# Patient Record
Sex: FEMALE | Race: BLACK OR AFRICAN AMERICAN | NOT HISPANIC OR LATINO | Employment: OTHER | URBAN - METROPOLITAN AREA
[De-identification: names, ages, dates, MRNs, and addresses within clinical notes are randomized per-mention and may not be internally consistent; named-entity substitution may affect disease eponyms.]

---

## 2017-08-13 ENCOUNTER — HOSPITAL ENCOUNTER (EMERGENCY)
Facility: HOSPITAL | Age: 72
Discharge: HOME | End: 2017-08-13
Attending: EMERGENCY MEDICINE
Payer: MEDICARE

## 2017-08-13 VITALS
TEMPERATURE: 98.3 F | HEART RATE: 66 BPM | RESPIRATION RATE: 18 BRPM | HEIGHT: 62 IN | SYSTOLIC BLOOD PRESSURE: 188 MMHG | DIASTOLIC BLOOD PRESSURE: 79 MMHG | OXYGEN SATURATION: 97 % | BODY MASS INDEX: 37.57 KG/M2 | WEIGHT: 204.15 LBS

## 2017-08-13 DIAGNOSIS — I10 ASYMPTOMATIC HYPERTENSION: Primary | ICD-10-CM

## 2017-08-13 PROCEDURE — 93005 ELECTROCARDIOGRAM TRACING: CPT

## 2017-08-13 PROCEDURE — 99283 EMERGENCY DEPT VISIT LOW MDM: CPT

## 2017-08-13 RX ORDER — TRAVOPROST OPHTHALMIC SOLUTION 0.04 MG/ML
1 SOLUTION OPHTHALMIC
COMMUNITY
End: 2022-02-27 | Stop reason: ALTCHOICE

## 2017-08-13 RX ORDER — ALBUTEROL SULFATE 90 UG/1
1 AEROSOL, METERED RESPIRATORY (INHALATION) AS NEEDED
COMMUNITY
End: 2019-09-30 | Stop reason: SDUPTHER

## 2017-08-13 RX ORDER — ENALAPRIL MALEATE 10 MG/1
10 TABLET ORAL 2 TIMES DAILY
COMMUNITY
Start: 2005-10-07 | End: 2019-09-30 | Stop reason: SDUPTHER

## 2017-08-13 RX ORDER — ASPIRIN 81 MG/1
81 TABLET, CHEWABLE ORAL DAILY
COMMUNITY
End: 2020-03-23 | Stop reason: SDUPTHER

## 2017-08-13 RX ORDER — LIDOCAINE HYDROCHLORIDE 30 MG/G
1 CREAM TOPICAL AS NEEDED
COMMUNITY
Start: 2006-10-05 | End: 2019-09-27 | Stop reason: ALTCHOICE

## 2017-08-13 RX ORDER — PREGABALIN 150 MG/1
150 CAPSULE ORAL DAILY
COMMUNITY
Start: 2007-05-15 | End: 2019-09-30 | Stop reason: SDUPTHER

## 2017-08-14 LAB
ATRIAL RATE: 59 BPM
P AXIS: 69 DEGREES
PR INTERVAL: 166 MS
QRS AXIS: -35 DEGREES
QRSD INTERVAL: 72 MS
QT INTERVAL: 418 MS
QTC INTERVAL: 413 MS
T WAVE AXIS: 55 DEGREES
VENTRICULAR RATE: 59 BPM

## 2017-09-13 ENCOUNTER — APPOINTMENT (OUTPATIENT)
Dept: LAB | Facility: HOSPITAL | Age: 72
End: 2017-09-13
Attending: UROLOGY
Payer: MEDICARE

## 2017-09-13 ENCOUNTER — TRANSCRIBE ORDERS (OUTPATIENT)
Dept: ADMINISTRATIVE | Facility: HOSPITAL | Age: 72
End: 2017-09-13

## 2017-09-13 ENCOUNTER — ALLSCRIPTS OFFICE VISIT (OUTPATIENT)
Dept: OTHER | Facility: OTHER | Age: 72
End: 2017-09-13

## 2017-09-13 DIAGNOSIS — N39.0 URINARY TRACT INFECTION: ICD-10-CM

## 2017-09-13 DIAGNOSIS — N20.0 CALCULUS OF KIDNEY: ICD-10-CM

## 2017-09-13 DIAGNOSIS — N39.0 URINARY TRACT INFECTION, SITE NOT SPECIFIED: Primary | ICD-10-CM

## 2017-09-13 LAB
BILIRUB UR QL STRIP: NORMAL
CLARITY UR: NORMAL
COLOR UR: YELLOW
GLUCOSE (HISTORICAL): NORMAL
HGB UR QL STRIP.AUTO: NORMAL
KETONES UR STRIP-MCNC: NORMAL MG/DL
LEUKOCYTE ESTERASE UR QL STRIP: NORMAL
NITRITE UR QL STRIP: NORMAL
PH UR STRIP.AUTO: 6 [PH]
PROT UR STRIP-MCNC: NORMAL MG/DL
SP GR UR STRIP.AUTO: 1
UROBILINOGEN UR QL STRIP.AUTO: NORMAL

## 2017-09-13 PROCEDURE — 87086 URINE CULTURE/COLONY COUNT: CPT

## 2017-09-14 LAB — BACTERIA UR CULT: NORMAL

## 2017-11-13 ENCOUNTER — HOSPITAL ENCOUNTER (OUTPATIENT)
Dept: ULTRASOUND IMAGING | Facility: HOSPITAL | Age: 72
Discharge: HOME | End: 2017-11-13
Attending: UROLOGY
Payer: MEDICARE

## 2017-11-13 DIAGNOSIS — N39.0 URINARY TRACT INFECTION: ICD-10-CM

## 2017-11-13 PROCEDURE — 51798 US URINE CAPACITY MEASURE: CPT

## 2017-12-05 ENCOUNTER — GENERIC CONVERSION - ENCOUNTER (OUTPATIENT)
Dept: OTHER | Facility: OTHER | Age: 72
End: 2017-12-05

## 2017-12-15 ENCOUNTER — ALLSCRIPTS OFFICE VISIT (OUTPATIENT)
Dept: OTHER | Facility: OTHER | Age: 72
End: 2017-12-15

## 2017-12-16 NOTE — PROGRESS NOTES
Assessment  1  Nephrolithiasis (592 0) (N20 0)   2  Overactive bladder (596 51) (N32 81)   3  Mixed incontinence urge and stress (788 33) (N39 46)    Plan   Overactive bladder    · Myrbetriq 25 MG Oral Tablet Extended Release 24 Hour; Take 1 tablet daily   Rx By: Paola Mayer; Dispense: 30 Days ; #:30 Tablet Extended Release 24 Hour; Refill: 5;For: Overactive bladder; IMELDA = N; Faxed To: [facility] #[id number]  · Measure Post Void Residual - POC; Status:Active - Perform Order; Requestedfor:34Fas0819;    Perform: In Office; Due:99Wfj1764; Ordered;For:Overactive bladder; Ordered By:Hayley Hurst;  * XR ABDOMEN 1 VIEW KUB; Status:Hold For - Exact Date,Retrospective Authorization; Requested for:Before 02Apr2018; [facility]; Due:48Lbo2772; Last Updated By:Oskar Hurst; 12/15/2017 11:18:21 AM;Ordered;   For:Nephrolithiasis; Ordered By:Oskar Hurst; Follow-up visit in 3 months Evaluation and Treatment  Follow-up  Status: Hold For - Scheduling,Retrospective Authorization  Requested for: 04IBL1210 Ordered; For: Overactive bladder;  Ordered By: Paola Mayer  Performed:   Due: 21JAI4764; Last Updated By: Paola Mayer; 12/15/2017 11:18:21 AM   Discussion/Summary  Discussion Summary:   1  Asymptomatic bacteruria with occasional urinary tract infection- Patient is currently asymptomatic of a urinary tract infection at this time  She was instructed to contact us should she become symptomatic of urinary infection and a urine culture will be obtained at that time so she can be treated appropriately  2  Nephrolithiasis- reviewed with the patient that since she do not have copies of her previous imaging nor did she obtain her KUB, I do not have a great idea of her current stone burden  According to her ultrasound there is some small intrarenal calculi within the left kidney that is nonobstructing at this time   I would not recommend intervention currently due to with results of her ultrasound  - Patient will obtain a KUB before her next follow-up  - She is aware to contact us with any signs and symptoms of a passing stone  3  OAB, mixed stress/urge incontinence- Reviewed in detail with the patient the etiology of both stress and urge incontinence  We've reviewed treatment options for both of these types of incontinence  - Pain emphasized with the patient the importance of proper hydration and tight glycemic control in order to minimize bladder irritation - I reviewed Kegel exercises with the patient  She is aware how to use these exercises during episodes of stress in order to help prevent incontinence  - We discussed treatment of overactivity and urge incontinence with anticholinergic versus beta 3  Patient will be initiated on Myrbetriq 25 mg for her her OAB symptoms  Side effects profile reviewed  - Patient will follow-up in about 3 months with a PVR at that time for symptom reassessment  Chief Complaint  Chief Complaint Free Text Note Form: UTIs, Nephrolithiasis      History of Present Illness  HPI: Marie Woodward is a 24-year-old female patient of Dr Aaron Santillan with a history of urinary tract infection and nephrolithiasis presenting for follow-up  Patient had previously been admitted for sepsis due to urinary source in July at a [facility] within [place]  In August 2017 patient had a low colony count multidrug-resistant Pseudomonas urinary culture  Her symptoms at that time or more so overactive in nature with urgency and frequency  Patientâs main lower urinary tract complaints include urinary urgency and urge incontinence wearing approximately 1 panty liner a day as a precautionary measure  Nocturia of approximately 2-3 times nightly  Her last appointment  She was reviewed to minimize her bladder irritant consumption  She had a recent ultrasound of her kidney and bladder revealing a left intrarenal calculus  There was no hydronephrosis   He was found to have a 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  Patient had no significant residual urine  Patient unfortunately did not go for her KUB nor did she bring copies of her imaging from [place]  Patient denies any urinary tract infections over the past month or 2  she is asymptomatic of any urinary tract infections at this time  She denies any dysuria, gross hematuria, suprapubic pressure, flank pain, nausea, vomiting, fevers, or chills  Her biggest complaint is her urinary incontinence  On further questioning patient has urge incontinence and stress incontinence  She does admit to a history of 2 vaginal births and a hysterectomy  Review of Systems  Complete-Female Urology:  Constitutional: No fever, no chills, feels well, no tiredness, no recent weight gain or weight loss  Respiratory: No complaints of shortness of breath, no wheezing, no cough, no SOB on exertion, no orthopnea, no PND  Cardiovascular: No complaints of slow heart rate, no fast heart rate, no chest pain, no palpitations, no leg claudication, no lower extremity edema  Gastrointestinal: No complaints of abdominal pain, no constipation, no nausea or vomiting, no diarrhea, no bloody stools  Genitourinary: feelings of urinary urgency,-- Empty sensation,-- incontinence-- and-- stream quality good, but-- no dysuria,-- no urinary hesitancy-- and-- no hematuria--   The patient presents with complaints of nocturia (2x)  Musculoskeletal: No complaints of arthralgias, no myalgias, no joint swelling or stiffness, no limb pain or swelling  Integumentary: No complaints of skin rash or lesions, no itching, no skin wounds, no breast pain or lump  Hematologic/Lymphatic: No complaints of swollen glands, no swollen glands in the neck, does not bleed easily, does not bruise easily  Neurological: No complaints of headache, no confusion, no convulsions, no numbness, no dizziness or fainting, no tingling, no limb weakness, no difficulty walking     ROS Reviewed:   ROS reviewed  Active Problems  1  Arthritis (716 90) (M19 90)   2  Asthma (493 90) (J45 909)   3  Bowel trouble (569 9) (K63 9)   4  Diabetes (250 00) (E11 9)   5  Mental health problem (V40 9) (F48 9)   6  Nephrolithiasis (592 0) (N20 0)   7  Obesity (278 00) (E66 9)   8  BOLA (obstructive sleep apnea) (327 23) (G47 33)   9  SOB (shortness of breath) (786 05) (R06 02)   10  UTI (urinary tract infection) (599 0) (N39 0)    Past Medical History  1  History of Bilateral kidney stones (592 0) (N20 0)   2  History of asthma (V12 69) (Z87 09)   3  History of chest pain (V13 89) (Z87 898)   4  History of hypertension (V12 59) (Z86 79)  Active Problems And Past Medical History Reviewed: The active problems and past medical history were reviewed and updated today  Surgical History  1  History of Hysterectomy   2  History of Tonsillectomy  Surgical History Reviewed: The surgical history was reviewed and updated today  Family History  Mother    1  Family history of diabetes mellitus (V18 0) (Z83 3)  Father    2  Family history of hypertension (V17 49) (Z82 49)   3  Family history of malignant neoplasm of prostate (V16 42) (Z80 45)  Sister    4  Family history of diabetes mellitus (V18 0) (Z83 3)   5  Family history of hypertension (V17 49) (Z82 49)  Brother    10  Family history of diabetes mellitus (V18 0) (Z83 3)   7  Family history of hypertension (V17 49) (Z82 49)  Family History    8  Family history of asthma (V17 5) (Z82 5)  Family History Reviewed: The family history was reviewed and updated today  Social History   ·    · Never a smoker   · No alcohol use   · No drug use   · Retired  Social History Reviewed: The social history was reviewed and is unchanged  Current Meds   1  Advair Diskus 250-50 MCG/DOSE Inhalation Aerosol Powder Breath Activated Recorded   2  Enalapril Maleate 10 MG Oral Tablet; TAKE 1 TABLET DAILY; Therapy: (Recorded:17Oct2016) to Recorded   3  Ibuprofen 600 MG Oral Tablet; Therapy: (Recorded:17Oct2016) to Recorded   4  Janumet -1000 MG Oral Tablet Extended Release 24 Hour; Therapy: (Recorded:17Oct2016) to Recorded   5  Lyrica 150 MG Oral Capsule; TAKE 1 CAPSULE TWICE DAILY; Therapy: (Recorded:17Oct2016) to Recorded   6  Sertraline HCl - 50 MG Oral Tablet; TAKE 1 TABLET DAILY AS DIRECTED; Therapy: (Recorded:17Oct2016) to Recorded   7  Travatan Z 0 004 % Ophthalmic Solution; Therapy: (Recorded:17Oct2016) to Recorded   8  Ventolin  (90 Base) MCG/ACT Inhalation Aerosol Solution; INHALE 2 PUFFS Every 6 hours PRN; Therapy: (Recorded:17Oct2016) to Recorded   9  Vitamin D3 46102 UNIT Oral Capsule; Therapy: (Recorded:17Oct2016) to Recorded  Medication List Reviewed: The medication list was reviewed and updated today  Allergies  1  Cipro TABS   2  Penicillins    Vitals  Vital Signs    Recorded: 52Hvk6037 10:12AM   Heart Rate 74   Systolic 513   Diastolic 80   Height 5 ft 2 in   Weight 203 lb 6 oz   BMI Calculated 37 2   BSA Calculated 1 93     Physical Exam   Constitutional  General appearance: No acute distress, well appearing and well nourished  Pulmonary  Respiratory effort: No increased work of breathing or signs of respiratory distress  Cardiovascular  Examination of extremities for edema and/or varicosities: Normal    Musculoskeletal  Gait and station: Normal    Skin  Skin and subcutaneous tissue: Normal without rashes or lesions  Additional Exam:  no focal neurologic deficits  Mood and affect appropriate  Results/Data  US KIDNEY AND BLADDER WITH PVR [id number] 11:28AM [facility]     Test Name Result Flag Reference   US KIDNEY AND BLADDER WITH PVR (Report)     RENAL ULTRASOUND   INDICATION: Urinary tract infection   COMPARISON: None  TECHNIQUE:  Ultrasound of the retroperitoneum was performed with a curvilinear transducer utilizing volumetric sweeps and still imaging techniques      FINDINGS:   KIDNEYS:  The left kidney small left   Right kidney: 11 1 x 4 1 cm  Normal echogenicity and contour  No suspicious masses detected  No hydronephrosis  No shadowing calculi  No perinephric fluid collections  Left kidney: 7 7 cm  X 4 9 cm  The left kidney demonstrates mildly lobulated contour  Echogenic foci are seen within the left kidney suggest calculi  There is no hydronephrosis seen   URETERS:  Nonvisualized  BLADDER:   Normally distended  The prevoid urinary bladder volume is 218 mL    There is no significant residue urine seen  Bladder diverticulum is seen from its right posterior lateral aspect   IMPRESSION:   There is no hydronephrosis   Echogenic foci seen within the mid left kidney compatible with the calculi, these were also seen on the previous CT chest of November 3, 2016   The left kidney is smaller than the right kidney, limited evaluation of the left kidney   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

## 2018-01-13 VITALS
BODY MASS INDEX: 37.43 KG/M2 | HEART RATE: 72 BPM | HEIGHT: 62 IN | WEIGHT: 203.38 LBS | DIASTOLIC BLOOD PRESSURE: 72 MMHG | SYSTOLIC BLOOD PRESSURE: 124 MMHG

## 2018-01-23 VITALS
WEIGHT: 203.38 LBS | HEART RATE: 74 BPM | BODY MASS INDEX: 37.43 KG/M2 | DIASTOLIC BLOOD PRESSURE: 80 MMHG | SYSTOLIC BLOOD PRESSURE: 156 MMHG | HEIGHT: 62 IN

## 2018-01-23 NOTE — MISCELLANEOUS
Message  The patient had called our office yesterday to discuss her renal bladder ultrasound results  I did review these results personally and also looked at the images and shows that she has complete bladder emptying with a small bladder diverticulum which I do not believe is clinically significant or contributing to her recurrent urinary tract infections as she is completely emptying her bladder  I did call her, she did not answer but I left her a vague message expressing that there is no need for urgent concern without leaving any protected health information or results in the body of that message  I invited her to call us back should she wish to discuss these results, per ID X it looks like she has an appointment on the 22nd December 2017  Should she continue to have recurrent urinary tract infections I recommend starting methenamine hippurate and scheduling the patient for cystoscopy        Signatures   Electronically signed by : TERRI Topete ; Dec  5 2017 10:09AM EST                       (Author)

## 2018-04-02 DIAGNOSIS — N20.0 CALCULUS OF KIDNEY: ICD-10-CM

## 2018-08-03 LAB — HBA1C MFR BLD HPLC: 6.7 %

## 2018-08-20 ENCOUNTER — TRANSCRIBE ORDERS (OUTPATIENT)
Dept: ADMINISTRATIVE | Facility: HOSPITAL | Age: 73
End: 2018-08-20

## 2018-08-20 DIAGNOSIS — Z12.31 VISIT FOR SCREENING MAMMOGRAM: Primary | ICD-10-CM

## 2018-08-23 ENCOUNTER — HOSPITAL ENCOUNTER (OUTPATIENT)
Dept: MAMMOGRAPHY | Facility: CLINIC | Age: 73
Discharge: HOME | End: 2018-08-23
Payer: MEDICARE

## 2018-08-23 DIAGNOSIS — Z12.31 VISIT FOR SCREENING MAMMOGRAM: ICD-10-CM

## 2018-08-23 PROCEDURE — 77067 SCR MAMMO BI INCL CAD: CPT

## 2018-08-23 PROCEDURE — 77063 BREAST TOMOSYNTHESIS BI: CPT

## 2018-11-30 ENCOUNTER — OFFICE VISIT (OUTPATIENT)
Dept: FAMILY MEDICINE CLINIC | Facility: CLINIC | Age: 73
End: 2018-11-30
Payer: MEDICARE

## 2018-11-30 VITALS
SYSTOLIC BLOOD PRESSURE: 140 MMHG | OXYGEN SATURATION: 96 % | RESPIRATION RATE: 14 BRPM | HEART RATE: 70 BPM | TEMPERATURE: 97.8 F | WEIGHT: 204 LBS | HEIGHT: 64 IN | BODY MASS INDEX: 34.83 KG/M2 | DIASTOLIC BLOOD PRESSURE: 80 MMHG

## 2018-11-30 DIAGNOSIS — E11.40 CONTROLLED TYPE 2 DIABETES WITH NEUROPATHY (HCC): Primary | ICD-10-CM

## 2018-11-30 DIAGNOSIS — Z12.11 SCREEN FOR COLON CANCER: ICD-10-CM

## 2018-11-30 DIAGNOSIS — I10 BENIGN HYPERTENSION: ICD-10-CM

## 2018-11-30 DIAGNOSIS — F41.8 DEPRESSION WITH ANXIETY: ICD-10-CM

## 2018-11-30 DIAGNOSIS — Z00.00 ENCOUNTER FOR MEDICAL EXAMINATION TO ESTABLISH CARE: ICD-10-CM

## 2018-11-30 DIAGNOSIS — F42.4 COMPULSIVE SKIN PICKING: ICD-10-CM

## 2018-11-30 DIAGNOSIS — J45.20 MILD INTERMITTENT ASTHMA, UNSPECIFIED WHETHER COMPLICATED: ICD-10-CM

## 2018-11-30 PROCEDURE — 83036 HEMOGLOBIN GLYCOSYLATED A1C: CPT | Performed by: FAMILY MEDICINE

## 2018-11-30 PROCEDURE — 99204 OFFICE O/P NEW MOD 45 MIN: CPT | Performed by: FAMILY MEDICINE

## 2018-11-30 PROCEDURE — 82948 REAGENT STRIP/BLOOD GLUCOSE: CPT | Performed by: FAMILY MEDICINE

## 2018-11-30 RX ORDER — LANCETS 33 GAUGE
EACH MISCELLANEOUS
COMMUNITY
Start: 2018-08-16 | End: 2022-06-13 | Stop reason: ALTCHOICE

## 2018-11-30 RX ORDER — LANOLIN ALCOHOL/MO/W.PET/CERES
6 CREAM (GRAM) TOPICAL
COMMUNITY
Start: 2018-10-19 | End: 2019-10-19

## 2018-11-30 RX ORDER — MOMETASONE FUROATE 1 MG/G
CREAM TOPICAL DAILY
Qty: 45 G | Refills: 0 | Status: SHIPPED | OUTPATIENT
Start: 2018-11-30 | End: 2020-03-12 | Stop reason: SDUPTHER

## 2018-11-30 NOTE — ASSESSMENT & PLAN NOTE
Advised to check the blood pressure at home is the blood pressure is slightly elevated today I would prefer to see it less than 140/80  Cut back on salt in the diet    Record the numbers and bring to follow-up when you return for your visit in January

## 2018-11-30 NOTE — ASSESSMENT & PLAN NOTE
Will consider increasing the Zoloft as needed  Patient at this time thinks her depression is a little better she is just suffering with the loss  Will also consider hydroxyzine for the picking  She will let me now

## 2018-11-30 NOTE — PATIENT INSTRUCTIONS
Discussed all with patient  Will request all were records from her previous physician  Will schedule colonoscopy after the new year  If you think that the depression is worse or the anxiety and the picking is worse we have 2 options  We can increase the Zoloft or I can give you hydroxyzine for the anxiety and the itch  Also will request labs and urine testing  Drink water before the test but no food for 12 hours  I will call with all results  Follow-up here in 3 months  At this time patient is refusing her Prevnar but she will think about it by the next appointment  Also does not want showing Sin at this time

## 2019-01-07 LAB
LEFT EYE DIABETIC RETINOPATHY: NORMAL
RIGHT EYE DIABETIC RETINOPATHY: NORMAL

## 2019-02-28 ENCOUNTER — OFFICE VISIT (OUTPATIENT)
Dept: FAMILY MEDICINE CLINIC | Facility: CLINIC | Age: 74
End: 2019-02-28
Payer: MEDICARE

## 2019-02-28 VITALS
HEART RATE: 70 BPM | DIASTOLIC BLOOD PRESSURE: 90 MMHG | TEMPERATURE: 98.3 F | HEIGHT: 64 IN | OXYGEN SATURATION: 97 % | BODY MASS INDEX: 35.27 KG/M2 | WEIGHT: 206.6 LBS | RESPIRATION RATE: 16 BRPM | SYSTOLIC BLOOD PRESSURE: 160 MMHG

## 2019-02-28 DIAGNOSIS — E11.9 WELL CONTROLLED DIABETES MELLITUS (HCC): ICD-10-CM

## 2019-02-28 DIAGNOSIS — M25.572 BILATERAL ANKLE PAIN, UNSPECIFIED CHRONICITY: ICD-10-CM

## 2019-02-28 DIAGNOSIS — Z00.00 MEDICARE ANNUAL WELLNESS VISIT, SUBSEQUENT: Primary | ICD-10-CM

## 2019-02-28 DIAGNOSIS — M25.571 BILATERAL ANKLE PAIN, UNSPECIFIED CHRONICITY: ICD-10-CM

## 2019-02-28 DIAGNOSIS — Z12.11 SCREEN FOR COLON CANCER: ICD-10-CM

## 2019-02-28 DIAGNOSIS — F41.8 DEPRESSION WITH ANXIETY: ICD-10-CM

## 2019-02-28 DIAGNOSIS — E11.40 CONTROLLED TYPE 2 DIABETES WITH NEUROPATHY (HCC): ICD-10-CM

## 2019-02-28 DIAGNOSIS — E66.9 OBESITY (BMI 35.0-39.9 WITHOUT COMORBIDITY): ICD-10-CM

## 2019-02-28 DIAGNOSIS — I10 BENIGN HYPERTENSION: ICD-10-CM

## 2019-02-28 DIAGNOSIS — Z13.220 SCREENING, LIPID: ICD-10-CM

## 2019-02-28 DIAGNOSIS — Z78.0 POSTMENOPAUSAL: ICD-10-CM

## 2019-02-28 PROCEDURE — 99214 OFFICE O/P EST MOD 30 MIN: CPT | Performed by: FAMILY MEDICINE

## 2019-02-28 PROCEDURE — G0439 PPPS, SUBSEQ VISIT: HCPCS | Performed by: FAMILY MEDICINE

## 2019-02-28 RX ORDER — AMLODIPINE BESYLATE 5 MG/1
5 TABLET ORAL DAILY
Qty: 30 TABLET | Refills: 1 | Status: SHIPPED | OUTPATIENT
Start: 2019-02-28 | End: 2019-03-25 | Stop reason: SDUPTHER

## 2019-02-28 NOTE — PROGRESS NOTES
Assessment/Plan:     Chronic Problems:  Well controlled diabetes mellitus ([facility] )  Hemoglobin A1c today is 6 3  Great job the diabetes is well controlled  Depression with anxiety  Suspect mostly grieving  Will refer to Mikael Phan      Visit Diagnosis:  Diagnoses and all orders for this visit:    Medicare annual wellness visit, subsequent    Screen for colon cancer  -     Occult Blood, Fecal Immunochemical    Postmenopausal  -     DXA bone density spine hip and pelvis; Future    Well controlled diabetes mellitus ([facility] )    Benign hypertension  -     amLODIPine (NORVASC) 5 mg tablet; Take 1 tablet (5 mg total) by mouth daily  -     Comprehensive metabolic panel; Future  -     Lipid panel; Future  -     Microalbumin / creatinine urine ratio  -     Urinalysis with microscopic    Controlled type 2 diabetes with neuropathy ([facility] )    Depression with anxiety  -     Ambulatory referral to Rodrigo Byrd; Future    Bilateral ankle pain, unspecified chronicity  -     XR ankle 3+ vw left; Future  -     XR ankle 3+ vw right; Future  -     JAMAAL Screen w/ Reflex to Titer/Pattern; Future  -     Lyme Antibody Profile with reflex to WB; Future  -     RF Screen w/ Reflex to Titer; Future  -     Sedimentation rate, automated; Future    Screening, lipid  -     Lipid panel; Future    Obesity (BMI 35 0-39 9 without comorbidity)          Subjective:    Patient ID: Damien Maharaj is a 69 y o  female  Pt is here for routine f/u appt  Feels a little sore since Monday  No cough, no sneezing just a runny nose  Feels lousy  Had diarrhea and had to take kaopectate  Drinking enough  No vomiting since Monday  Not checking her blood sugars at home  Watching her diet  Not taking bp's at  Home, but when she does they are lower than today  Usually in the 130's  Feels she is doing better on her zoloft  Lost her sister and brother together in less than 1 month  Very unexpectedly  Brother was 52 and sister was 46  Brother had ckd on dialysis  Sister with bone cancer and  in 7 months  Still grieving  Takes all other meds as directed  No side effects noted  The following portions of the patient's history were reviewed and updated as appropriate: allergies, current medications, past family history, past medical history, past social history, past surgical history and problem list     Review of Systems   Constitutional: Positive for chills  Negative for diaphoresis, fatigue and fever  HENT: Positive for rhinorrhea  Negative for congestion, sinus pressure, sinus pain and sore throat  Respiratory: Positive for wheezing (feels the inhaler helps  Last pft's in )  Negative for cough and shortness of breath  Cardiovascular: Negative for chest pain and palpitations  Gastrointestinal: Negative for abdominal pain, diarrhea (since Monday night but had a lot of kaopectate  ) and vomiting  Pt recently ate oxtail and goat  Genitourinary: Positive for frequency (but drinks alot  ) and urgency  Negative for dysuria  No h/o urinary incontinence  Musculoskeletal: Positive for arthralgias (ankles feel like they are breaking  Worse when she is walking  Feels uneven  Started about 1 years ago  ) and myalgias (jsut since Monday  No new exercises or meds  )  Neurological: Positive for headaches (only this week  )  Negative for dizziness and light-headedness  Psychiatric/Behavioral: Positive for dysphoric mood (and still grieving)  The patient is not nervous/anxious            /90   Pulse 70   Temp 98 3 °F (36 8 °C)   Resp 16   Ht 5' 3 5" (1 613 m)   Wt 93 7 kg (206 lb 9 6 oz)   SpO2 97%   BMI 36 02 kg/m²   Social History     Socioeconomic History    Marital status: /Civil Union     Spouse name: Not on file    Number of children: Not on file    Years of education: Not on file    Highest education level: Not on file   Occupational History    Occupation: retired    Social Needs    Financial resource strain: Not on file    Food insecurity:     Worry: Not on file     Inability: Not on file    Transportation needs:     Medical: Not on file     Non-medical: Not on file   Tobacco Use    Smoking status: Never Smoker    Smokeless tobacco: Never Used   Substance and Sexual Activity    Alcohol use: No    Drug use: No    Sexual activity: Not on file   Lifestyle    Physical activity:     Days per week: Not on file     Minutes per session: Not on file    Stress: Not on file   Relationships    Social connections:     Talks on phone: Not on file     Gets together: Not on file     Attends Hindu service: Not on file     Active member of club or organization: Not on file     Attends meetings of clubs or organizations: Not on file     Relationship status: Not on file    Intimate partner violence:     Fear of current or ex partner: Not on file     Emotionally abused: Not on file     Physically abused: Not on file     Forced sexual activity: Not on file   Other Topics Concern    Not on file   Social History Narrative    Not on file     Past Medical History:   Diagnosis Date    Asthma     [facility]     [facility]     Glaucoma     Hypertension     Neuropathy      Family History   Problem Relation Age of Onset    Diabetes Mother     Hypertension Father     Prostate cancer Father     Diabetes Sister     Hypertension Sister     Cancer Sister     Diabetes Brother     Hypertension Brother     Asthma Family      Past Surgical History:   Procedure Laterality Date     SECTION      HYSTERECTOMY      OOPHORECTOMY      TONSILLECTOMY         Current Outpatient Medications:     albuterol (VENTOLIN HFA) 90 mcg/act inhaler, Inhale 1 puff as needed, Disp: , Rfl:     enalapril (VASOTEC) 10 mg tablet, Take 10 mg by mouth 2 (two) times a day, Disp: , Rfl:     fluticasone-salmeterol (ADVAIR DISKUS) 250-50 mcg/dose inhaler, Inhale 1 puff as needed, Disp: , Rfl:     glucose blood (ONETOUCH VERIO) test strip, E11 9 / testing daily, Disp: , Rfl:     Lidocaine HCl 3 % CREA, Apply 1 application topically as needed, Disp: , Rfl:     melatonin 3 mg, Take 6 mg by mouth, Disp: , Rfl:     mometasone (ELOCON) 0 1 % cream, Apply topically daily, Disp: 45 g, Rfl: 0    pregabalin (LYRICA) 150 mg capsule, Take 150 mg by mouth daily, Disp: , Rfl:     sertraline (ZOLOFT) 50 mg tablet, Take 50 mg by mouth daily, Disp: , Rfl:     SITagliptin-MetFORMIN HCl ER (JANUMET XR) 100-1000 MG TB24, Take 1 tablet by mouth daily at bedtime, Disp: , Rfl:     travoprost (TRAVATAN Z) 0 004 % ophthalmic solution, Apply 1 drop to eye daily at bedtime, Disp: , Rfl:     amLODIPine (NORVASC) 5 mg tablet, Take 1 tablet (5 mg total) by mouth daily, Disp: 30 tablet, Rfl: 1    aspirin 81 mg chewable tablet, Chew 81 mg daily, Disp: , Rfl:     Mirabegron ER (MYRBETRIQ) 25 MG TB24, Take 1 tablet by mouth daily, Disp: , Rfl:     ONETOUCH DELICA LANCETS 80N MISC, E11 9/ testing daily, Disp: , Rfl:     Allergies   Allergen Reactions    Ciprofloxacin Itching    Levaquin [Levofloxacin] Swelling    Penicillins GI Intolerance          Lab Review   No visits with results within 2 Month(s) from this visit  Latest known visit with results is:   Office Visit on 11/30/2018   Component Date Value    Hemoglobin A1C 11/30/2018          Imaging: No results found  Objective:     Physical Exam   Constitutional: She is oriented to person, place, and time  She appears well-developed and well-nourished  No distress  HENT:   Head: Normocephalic and atraumatic  Right Ear: External ear normal    Left Ear: External ear normal    Mouth/Throat: Oropharynx is clear and moist    Eyes: Pupils are equal, round, and reactive to light  Conjunctivae and EOM are normal  Right eye exhibits no discharge  Left eye exhibits no discharge  Neck: Normal range of motion  Neck supple  No thyromegaly present     Cardiovascular: Normal rate, regular rhythm and normal heart sounds  Exam reveals no friction rub  No murmur heard  Repeat bp by me 170/80 on the right arm, 166/90 left arm  Pt feels it is because she is upset today  Pulmonary/Chest: Effort normal and breath sounds normal  No respiratory distress  She has no wheezes  She has no rales  She exhibits no tenderness  Abdominal: Soft  Bowel sounds are normal  There is no tenderness  Musculoskeletal: Normal range of motion  She exhibits tenderness (bilateral ankles  )  She exhibits no edema or deformity  Lymphadenopathy:     She has no cervical adenopathy  Neurological: She is alert and oriented to person, place, and time  No cranial nerve deficit  Skin: Skin is warm and dry  No rash noted  She is not diaphoretic  Psychiatric: She has a normal mood and affect  Her behavior is normal  Judgment and thought content normal    Looks sad  Tearful          Patient Instructions     Pt refuses any vaccines  Will get previous dexa and eye exam and colonoscopy  Let me know if you change your mind about getting your immunizations  Advised to have the lab done fasting and drink water before the test  The blood pressure is too high today  Will add amlodipine at night and advised to check the bp's daily and f/u here with the numbers when you return from [place]  Have the xrays done of the ankles as this has been bothering you for a year  I will call with results  Have your eye doctor send us a copy of the results  Will refer to Rajendra Minor when she returns from [place]  Obesity   AMBULATORY CARE:   Obesity  is when your body mass index (BMI) is greater than 30  Your healthcare provider will use your height and weight to measure your BMI  The risks of obesity include  many health problems, such as injuries or physical disability   You may need tests to check for the following:  · Diabetes     · High blood pressure or high cholesterol     · Heart disease     · Gallbladder or liver disease · Cancer of the colon, breast, prostate, liver, or kidney     · Sleep apnea     · Arthritis or gout  Seek care immediately if:   · You have a severe headache, confusion, or difficulty speaking  · You have weakness on one side of your body  · You have chest pain, sweating, or shortness of breath  Contact your healthcare provider if:   · You have symptoms of gallbladder or liver disease, such as pain in your upper abdomen  · You have knee or hip pain and discomfort while walking  · You have symptoms of diabetes, such as intense hunger and thirst, and frequent urination  · You have symptoms of sleep apnea, such as snoring or daytime sleepiness  · You have questions or concerns about your condition or care  Treatment for obesity  focuses on helping you lose weight to improve your health  Even a small decrease in BMI can reduce the risk for many health problems  Your healthcare provider will help you set a weight-loss goal   · Lifestyle changes  are the first step in treating obesity  These include making healthy food choices and getting regular physical activity  Your healthcare provider may suggest a weight-loss program that involves coaching, education, and therapy  · Medicine  may help you lose weight when it is used with a healthy diet and physical activity  · Surgery  can help you lose weight if you are very obese and have other health problems  There are several types of weight-loss surgery  Ask your healthcare provider for more information  Be successful losing weight:   · Set small, realistic goals  An example of a small goal is to walk for 20 minutes 5 days a week  Cindy goal is to lose 5% of your body weight  · Tell friends, family members, and coworkers about your goals  and ask for their support  Ask a friend to lose weight with you, or join a weight-loss support group  · Identify foods or triggers that may cause you to overeat , and find ways to avoid them  Remove tempting high-calorie foods from your home and workplace  Place a bowl of fresh fruit on your kitchen counter  If stress causes you to eat, then find other ways to cope with stress  · Keep a diary to track what you eat and drink  Also write down how many minutes of physical activity you do each day  Weigh yourself once a week and record it in your diary  Eating changes: You will need to eat 500 to 1,000 fewer calories each day than you currently eat to lose 1 to 2 pounds a week  The following changes will help you cut calories:  · Eat smaller portions  Use small plates, no larger than 9 inches in diameter  Fill your plate half full of fruits and vegetables  Measure your food using measuring cups until you know what a serving size looks like  · Eat 3 meals and 1 or 2 snacks each day  Plan your meals in advance  Richi Mcduffie and eat at home most of the time  Eat slowly  · Eat fruits and vegetables at every meal   They are low in calories and high in fiber, which makes you feel full  Do not add butter, margarine, or cream sauce to vegetables  Use herbs to season steamed vegetables  · Eat less fat and fewer fried foods  Eat more baked or grilled chicken and fish  These protein sources are lower in calories and fat than red meat  Limit fast food  Dress your salads with olive oil and vinegar instead of bottled dressing  · Limit the amount of sugar you eat  Do not drink sugary beverages  Limit alcohol  Activity changes:  Physical activity is good for your body in many ways  It helps you burn calories and build strong muscles  It decreases stress and depression, and improves your mood  It can also help you sleep better  Talk to your healthcare provider before you begin an exercise program   · Exercise for at least 30 minutes 5 days a week  Start slowly  Set aside time each day for physical activity that you enjoy and that is convenient for you   It is best to do both weight training and an activity that increases your heart rate, such as walking, bicycling, or swimming  · Find ways to be more active  Do yard work and housecleaning  Walk up the stairs instead of using elevators  Spend your leisure time going to events that require walking, such as outdoor festivals or fairs  This extra physical activity can help you lose weight and keep it off  Follow up with your healthcare provider as directed: You may need to meet with a dietitian  Write down your questions so you remember to ask them during your visits  © 2017 [employer]  Information is for End User's use only and may not be sold, redistributed or otherwise used for commercial purposes  All illustrations and images included in CareNotes® are the copyrighted property of [employer]  or Trevor Brown  The above information is an  only  It is not intended as medical advice for individual conditions or treatments  Talk to your doctor, nurse or pharmacist before following any medical regimen to see if it is safe and effective for you  Urinary Incontinence   WHAT YOU NEED TO KNOW:   What is urinary incontinence? Urinary incontinence (UI) is when you lose control of your bladder  What causes UI? UI occurs because your bladder cannot store or empty urine properly  The following are the most common types of UI:  · Stress incontinence  is when you leak urine due to increased bladder pressure  This may happen when you cough, sneeze, or exercise  · Urge incontinence  is when you feel the need to urinate right away and leak urine accidentally  · Mixed incontinence  is when you have both stress and urge UI  What are the signs and symptoms of UI?   · You feel like your bladder does not empty completely when you urinate  · You urinate often and need to urinate immediately  · You leak urine when you sleep, or you wake up with the urge to urinate      · You leak urine when you cough, sneeze, exercise, or laugh  How is UI diagnosed? Your healthcare provider will ask how often you leak urine and whether you have stress or urge symptoms  Tell him which medicines you take, how often you urinate, and how much liquid you drink each day  You may need any of the following tests:  · Urine tests  may show infection or kidney function  · A pelvic exam  may be done to check for blockages  A pelvic exam will also show if your bladder, uterus, or other organs have moved out of place  · An x-ray, ultrasound, or CT  may show problems with parts of your urinary system  You may be given contrast liquid to help your organs show up better in the pictures  Tell the healthcare provider if you have ever had an allergic reaction to contrast liquid  Do not enter the MRI room with anything metal  Metal can cause serious injury  Tell the healthcare provider if you have any metal in or on your body  · A bladder scan  will show how much urine is left in your bladder after you urinate  You will be asked to urinate and then healthcare providers will use a small ultrasound machine to check the urine left in your bladder  · Cystometry  is used to check the function of your urinary system  Your healthcare provider checks the pressure in your bladder while filling it with fluid  Your bladder pressure may also be tested when your bladder is full and while you urinate  How is UI treated? · Medicines  can help strengthen your bladder control  · Electrical stimulation  is used to send a small amount of electrical energy to your pelvic floor muscles  This helps control your bladder function  Electrodes may be placed outside your body or in your rectum  For women, the electrodes may be placed in the vagina  · A bulking agent  may be injected into the wall of your urethra to make it thicker  This helps keep your urethra closed and decreases urine leakage       · Devices  such as a clamp, pessary, or tampon may help stop urine leaks  Ask your healthcare provider for more information about these and other devices  · Surgery  may be needed if other treatments do not work  Several types of surgery can help improve your bladder control  Ask your healthcare provider for more information about the surgery you may need  How can I manage my symptoms? · Do pelvic muscle exercises often  Your pelvic muscles help you stop urinating  Squeeze these muscles tight for 5 seconds, then relax for 5 seconds  Gradually work up to squeezing for 10 seconds  Do 3 sets of 15 repetitions a day, or as directed  This will help strengthen your pelvic muscles and improve bladder control  · A catheter  may be used to help empty your bladder  A catheter is a tiny, plastic tube that is put into your bladder to drain your urine  Your healthcare provider may tell you to use a catheter to prevent your bladder from getting too full and leaking urine  · Keep a UI record  Write down how often you leak urine and how much you leak  Make a note of what you were doing when you leaked urine  · Train your bladder  Go to the bathroom at set times, such as every 2 hours, even if you do not feel the urge to go  You can also try to hold your urine when you feel the urge to go  For example, hold your urine for 5 minutes when you feel the urge to go  As that becomes easier, hold your urine for 10 minutes  · Drink liquids as directed  Ask your healthcare provider how much liquid to drink each day and which liquids are best for you  You may need to limit the amount of liquid you drink to help control your urine leakage  Limit or do not have drinks that contain caffeine or alcohol  Do not drink any liquid right before you go to bed  · Prevent constipation  Eat a variety of high-fiber foods  Good examples are high-fiber cereals, beans, vegetables, and whole-grain breads  Prune juice may help make your bowel movement softer   Walking is the best way to trigger your intestines to have a bowel movement  · Exercise regularly and maintain a healthy weight  Ask your healthcare provider how much you should weigh and about the best exercise plan for you  Weight loss and exercise will decrease pressure on your bladder and help you control your leakage  Ask him to help you create a weight loss plan if you are overweight  When should I seek immediate care? · You have severe pain  · You are confused or cannot think clearly  When should I contact my healthcare provider? · You have a fever  · You see blood in your urine  · You have pain when you urinate  · You have new or worse pain, even after treatment  · Your mouth feels dry or you have vision changes  · Your urine is cloudy or smells bad  · You have questions or concerns about your condition or care  CARE AGREEMENT:   You have the right to help plan your care  Learn about your health condition and how it may be treated  Discuss treatment options with your caregivers to decide what care you want to receive  You always have the right to refuse treatment  The above information is an  only  It is not intended as medical advice for individual conditions or treatments  Talk to your doctor, nurse or pharmacist before following any medical regimen to see if it is safe and effective for you  © 2017 [address] [employer]  Information is for End User's use only and may not be sold, redistributed or otherwise used for commercial purposes  All illustrations and images included in CareNotes® are the copyrighted property of [employer] M , Inc  or Trevor Brown  Cigarette Smoking and Your Health   AMBULATORY CARE:   Risks to your health if you smoke:  Nicotine and other chemicals found in tobacco damage every cell in your body  Even if you are a light smoker, you have an increased risk for cancer, heart disease, and lung disease   If you are pregnant or have diabetes, smoking increases your risk for complications  Benefits to your health if you stop smoking:   · You decrease respiratory symptoms such as coughing, wheezing, and shortness of breath  · You reduce your risk for cancers of the lung, mouth, throat, kidney, bladder, pancreas, stomach, and cervix  If you already have cancer, you increase the benefits of chemotherapy  You also reduce your risk for cancer returning or a second cancer from developing  · You reduce your risk for heart disease, blood clots, heart attack, and stroke  · You reduce your risk for lung infections, and diseases such as pneumonia, asthma, chronic bronchitis, and emphysema  · Your circulation improves  More oxygen can be delivered to your body  If you have diabetes, you lower your risk for complications, such as kidney, artery, and eye diseases  You also lower your risk for nerve damage  Nerve damage can lead to amputations, poor vision, and blindness  · You improve your body's ability to heal and to fight infections  Benefits to the health of others if you stop smoking:  Tobacco is harmful to nonsmokers who breathe in your secondhand smoke  The following are ways the health of others around you may improve when you stop smoking:  · You lower the risks for lung cancer and heart disease in nonsmoking adults  · If you are pregnant, you lower the risk for miscarriage, early delivery, low birth weight, and stillbirth  You also lower your baby's risk for SIDS, obesity, developmental delay, and neurobehavioral problems, such as ADHD  · If you have children, you lower their risk for ear infections, colds, pneumonia, bronchitis, and asthma  For more information and support to stop smoking:   · Smokefree  gov  Phone: 7- 587 - 457-8493  Web Address: www [employer]  gov  Follow up with your healthcare provider as directed:  Write down your questions so you remember to ask them during your visits     © 2017 [address] [place] Information is for End User's use only and may not be sold, redistributed or otherwise used for commercial purposes  All illustrations and images included in CareNotes® are the copyrighted property of [employer]  or Trevor Brown  The above information is an  only  It is not intended as medical advice for individual conditions or treatments  Talk to your doctor, nurse or pharmacist before following any medical regimen to see if it is safe and effective for you  Fall Prevention   AMBULATORY CARE:   Fall prevention  includes ways to make your home and other areas safer  It also includes ways you can move more carefully to prevent a fall  Health conditions that cause changes in your blood pressure, vision, or muscle strength and coordination may increase your risk for falls  Medicines may also increase your risk for falls if they make you dizzy, weak, or sleepy  Call 911 or have someone else call if:   · You have fallen and are unconscious  · You have fallen and cannot move part of your body  Contact your healthcare provider if:   · You have fallen and have pain or a headache  · You have questions or concerns about your condition or care  Fall prevention tips:   · Stand or sit up slowly  This may help you keep your balance and prevent falls  · Use assistive devices as directed  Your healthcare provider may suggest that you use a cane or walker to help you keep your balance  You may need to have grab bars put in your bathroom near the toilet or in the shower  · Wear shoes that fit well and have soles that   Wear shoes both inside and outside  Use slippers with good   Do not wear shoes with high heels  · Wear a personal alarm  This is a device that allows you to call 911 if you fall and need help  Ask your healthcare provider for more information  · Stay active  Exercise can help strengthen your muscles and improve your balance   Your healthcare provider may recommend water aerobics or walking  He or she may also recommend physical therapy to improve your coordination  Never start an exercise program without talking to your healthcare provider first      · Manage your medical conditions  Keep all appointments with your healthcare providers  Visit your eye doctor as directed  Home safety tips:   · Add items to prevent falls in the bathroom  Put nonslip strips on your bath or shower floor to prevent you from slipping  Use a bath mat if you do not have carpet in the bathroom  This will prevent you from falling when you step out of the bath or shower  Use a shower seat so you do not need to stand while you shower  Sit on the toilet or a chair in your bathroom to dry yourself and put on clothing  This will prevent you from losing your balance from drying or dressing yourself while you are standing  · Keep paths clear  Remove books, shoes, and other objects from walkways and stairs  Place cords for telephones and lamps out of the way so that you do not need to walk over them  Tape them down if you cannot move them  Remove small rugs  If you cannot remove a rug, secure it with double-sided tape  This will prevent you from tripping  · Install bright lights in your home  Use night lights to help light paths to the bathroom or kitchen  Always turn on the light before you start walking  · Keep items you use often on shelves within reach  Do not use a step stool to help you reach an item  · Paint or place reflective tape on the edges of your stairs  This will help you see the stairs better  Follow up with your healthcare provider as directed:  Write down your questions so you remember to ask them during your visits  © 2017 [address] [employer] Information is for End User's use only and may not be sold, redistributed or otherwise used for commercial purposes   All illustrations and images included in CareNotes® are the copyrighted property of [employer]  or Trevor Brown  The above information is an  only  It is not intended as medical advice for individual conditions or treatments  Talk to your doctor, nurse or pharmacist before following any medical regimen to see if it is safe and effective for you  Advance Directives   WHAT YOU NEED TO KNOW:   What are advance directives? Advance directives are legal documents that state your wishes and plans for medical care  These plans are made ahead of time in case you lose your ability to make decisions for yourself  Advance directives can apply to any medical decision, such as the treatments you want, and if you want to donate organs  What are the types of advance directives? There are many types of advance directives, and each state has rules about how to use them  You may choose a combination of any of the following:  · Living will: This is a written record of the treatment you want  You can also choose which treatments you do not want, which to limit, and which to stop at a certain time  This includes surgery, medicine, IV fluid, and tube feedings  · Durable power of  for healthcare [facility]): This is a written record that states who you want to make healthcare choices for you when you are unable to make them for yourself  This person, called a proxy, is usually a family member or a friend  You may choose more than 1 proxy  · Do not resuscitate (DNR) order:  A DNR order is used in case your heart stops beating or you stop breathing  It is a request not to have certain forms of treatment, such as CPR  A DNR order may be included in other types of advance directives  · Medical directive: This covers the care that you want if you are in a coma, near death, or unable to make decisions for yourself  You can list the treatments you want for each condition   Treatment may include pain medicine, surgery, blood transfusions, dialysis, IV or tube feedings, and a ventilator (breathing machine)  · Values history: This document has questions about your views, beliefs, and how you feel and think about life  This information can help others choose the care that you would choose  Why are advance directives important? An advance directive helps you control your care  Although spoken wishes may be used, it is better to have your wishes written down  Spoken wishes can be misunderstood, or not followed  Treatments may be given even if you do not want them  An advance directive may make it easier for your family to make difficult choices about your care  How do I decide what to put in my advance directives? · Make informed decisions:  Make sure you fully understand treatments or care you may receive  Think about the benefits and problems your decisions could cause for you or your family  Talk to healthcare providers if you have concerns or questions before you write down your wishes  You may also want to talk with your Confucianist or , or a   Check your state laws to make sure that what you put in your advance directive is legal      · Sign all forms:  Sign and date your advance directive when you have finished  You may also need 2 witnesses to sign the forms  Witnesses cannot be your doctor or his staff, your spouse, heirs or beneficiaries, people you owe money to, or your chosen proxy  Talk to your family, proxy, and healthcare providers about your advance directive  Give each person a copy, and keep one for yourself in a place you can get to easily  Do not keep it hidden or locked away  · Review and revise your plans: You can revise your advance directive at any time, as long as you are able to make decisions  Review your plan every year, and when there are changes in your life, or your health  When you make changes, let your family, proxy, and healthcare providers know  Give each a new copy  Where can I find more information?   · [facility]  [address] , [address]  Phone: [phone]  Phone: [phone]  Web Address: http://www  aafp org  · [address] [facility])  [address], [address] [facility] , [address] [place] [address]  Phone: [phone]  Phone: [phone]  Web Address: Shanthi hu  CARE AGREEMENT:   You have the right to help plan your care  To help with this plan, you must learn about your health condition and treatment options  You must also learn about advance directives and how they are used  Work with your healthcare providers to decide what care will be used to treat you  You always have the right to refuse treatment  The above information is an  only  It is not intended as medical advice for individual conditions or treatments  Talk to your doctor, nurse or pharmacist before following any medical regimen to see if it is safe and effective for you  © 2017 [address] [facility] Information is for End User's use only and may not be sold, redistributed or otherwise used for commercial purposes  All illustrations and images included in CareNotes® are the copyrighted property of [employer]  or Trevor Brown  Obesity   AMBULATORY CARE:   Obesity  is when your body mass index (BMI) is greater than 30  Your healthcare provider will use your height and weight to measure your BMI  The risks of obesity include  many health problems, such as injuries or physical disability  You may need tests to check for the following:  · Diabetes     · High blood pressure or high cholesterol     · Heart disease     · Gallbladder or liver disease     · Cancer of the colon, breast, prostate, liver, or kidney     · Sleep apnea     · Arthritis or gout  Seek care immediately if:   · You have a severe headache, confusion, or difficulty speaking  · You have weakness on one side of your body       · You have chest pain, sweating, or shortness of breath  Contact your healthcare provider if:   · You have symptoms of gallbladder or liver disease, such as pain in your upper abdomen  · You have knee or hip pain and discomfort while walking  · You have symptoms of diabetes, such as intense hunger and thirst, and frequent urination  · You have symptoms of sleep apnea, such as snoring or daytime sleepiness  · You have questions or concerns about your condition or care  Treatment for obesity  focuses on helping you lose weight to improve your health  Even a small decrease in BMI can reduce the risk for many health problems  Your healthcare provider will help you set a weight-loss goal   · Lifestyle changes  are the first step in treating obesity  These include making healthy food choices and getting regular physical activity  Your healthcare provider may suggest a weight-loss program that involves coaching, education, and therapy  · Medicine  may help you lose weight when it is used with a healthy diet and physical activity  · Surgery  can help you lose weight if you are very obese and have other health problems  There are several types of weight-loss surgery  Ask your healthcare provider for more information  Be successful losing weight:   · Set small, realistic goals  An example of a small goal is to walk for 20 minutes 5 days a week  Anther goal is to lose 5% of your body weight  · Tell friends, family members, and coworkers about your goals  and ask for their support  Ask a friend to lose weight with you, or join a weight-loss support group  · Identify foods or triggers that may cause you to overeat , and find ways to avoid them  Remove tempting high-calorie foods from your home and workplace  Place a bowl of fresh fruit on your kitchen counter  If stress causes you to eat, then find other ways to cope with stress  · Keep a diary to track what you eat and drink    Also write down how many minutes of physical activity you do each day  Weigh yourself once a week and record it in your diary  Eating changes: You will need to eat 500 to 1,000 fewer calories each day than you currently eat to lose 1 to 2 pounds a week  The following changes will help you cut calories:  · Eat smaller portions  Use small plates, no larger than 9 inches in diameter  Fill your plate half full of fruits and vegetables  Measure your food using measuring cups until you know what a serving size looks like  · Eat 3 meals and 1 or 2 snacks each day  Plan your meals in advance  Terryl Mcburney and eat at home most of the time  Eat slowly  · Eat fruits and vegetables at every meal   They are low in calories and high in fiber, which makes you feel full  Do not add butter, margarine, or cream sauce to vegetables  Use herbs to season steamed vegetables  · Eat less fat and fewer fried foods  Eat more baked or grilled chicken and fish  These protein sources are lower in calories and fat than red meat  Limit fast food  Dress your salads with olive oil and vinegar instead of bottled dressing  · Limit the amount of sugar you eat  Do not drink sugary beverages  Limit alcohol  Activity changes:  Physical activity is good for your body in many ways  It helps you burn calories and build strong muscles  It decreases stress and depression, and improves your mood  It can also help you sleep better  Talk to your healthcare provider before you begin an exercise program   · Exercise for at least 30 minutes 5 days a week  Start slowly  Set aside time each day for physical activity that you enjoy and that is convenient for you  It is best to do both weight training and an activity that increases your heart rate, such as walking, bicycling, or swimming  · Find ways to be more active  Do yard work and housecleaning  Walk up the stairs instead of using elevators   Spend your leisure time going to events that require walking, such as outdoor festivals or fairs  This extra physical activity can help you lose weight and keep it off  Follow up with your healthcare provider as directed: You may need to meet with a dietitian  Write down your questions so you remember to ask them during your visits  © 2017 [address] [employer] Information is for End User's use only and may not be sold, redistributed or otherwise used for commercial purposes  All illustrations and images included in CareNotes® are the copyrighted property of [employer]  or Trevor Brown  The above information is an  only  It is not intended as medical advice for individual conditions or treatments  Talk to your doctor, nurse or pharmacist before following any medical regimen to see if it is safe and effective for you  Weight Management   AMBULATORY CARE:   Why it is important to manage your weight:  Being overweight increases your risk of health conditions such as heart disease, high blood pressure, type 2 diabetes, and certain types of cancer  It can also increase your risk for osteoarthritis, sleep apnea, and other respiratory problems  Aim for a slow, steady weight loss  Even a small amount of weight loss can lower your risk of health problems  How to lose weight safely:  A safe and healthy way to lose weight is to eat fewer calories and get regular exercise  You can lose up about 1 pound a week by decreasing the number of calories you eat by 500 calories each day  You can decrease calories by eating smaller portion sizes or by cutting out high-calorie foods  Read labels to find out how many calories are in the foods you eat  You can also burn calories with exercise such as walking, swimming, or biking  You will be more likely to keep weight off if you make these changes part of your lifestyle  Healthy meal plan for weight management:  A healthy meal plan includes a variety of foods, contains fewer calories, and helps you stay healthy   A healthy meal plan includes the following:  · Eat whole-grain foods more often  A healthy meal plan should contain fiber  Fiber is the part of grains, fruits, and vegetables that is not broken down by your body  Whole-grain foods are healthy and provide extra fiber in your diet  Some examples of whole-grain foods are whole-wheat breads and pastas, oatmeal, brown rice, and bulgur  · Eat a variety of vegetables every day  Include dark, leafy greens such as spinach, kale, rowena greens, and mustard greens  Eat yellow and orange vegetables such as carrots, sweet potatoes, and winter squash  · Eat a variety of fruits every day  Choose fresh or canned fruit (canned in its own juice or light syrup) instead of juice  Fruit juice has very little or no fiber  · Eat low-fat dairy foods  Drink fat-free (skim) milk or 1% milk  Eat fat-free yogurt and low-fat cottage cheese  Try low-fat cheeses such as mozzarella and other reduced-fat cheeses  · Choose meat and other protein foods that are low in fat  Choose beans or other legumes such as split peas or lentils  Choose fish, skinless poultry (chicken or turkey), or lean cuts of red meat (beef or pork)  Before you cook meat or poultry, cut off any visible fat  · Use less fat and oil  Try baking foods instead of frying them  Add less fat, such as margarine, sour cream, regular salad dressing and mayonnaise to foods  Eat fewer high-fat foods  Some examples of high-fat foods include french fries, doughnuts, ice cream, and cakes  · Eat fewer sweets  Limit foods and drinks that are high in sugar  This includes candy, cookies, regular soda, and sweetened drinks  Ways to decrease calories:   · Eat smaller portions  ¨ Use a small plate with smaller servings  ¨ Do not eat second helpings  ¨ When you eat at a restaurant, ask for a box and place half of your meal in the box before you eat  ¨ Share an entrée with someone else      · Replace high-calorie snacks with healthy, low-calorie snacks  ¨ Choose fresh fruit, vegetables, fat-free rice cakes, or air-popped popcorn instead of potato chips, nuts, or chocolate  ¨ Choose water or calorie-free drinks instead of soda or sweetened drinks  · Eat regular meals  Skipping meals can lead to overeating later in the day  Eat a healthy snack in place of a meal if you do not have time to eat a regular meal      · Do not shop for groceries when you are hungry  You may be more likely to make unhealthy food choices  Take a grocery list of healthy foods and shop after you have eaten  Exercise:  Exercise at least 30 minutes per day on most days of the week  Some examples of exercise include walking, biking, dancing, and swimming  You can also fit in more physical activity by taking the stairs instead of the elevator or parking farther away from stores  Ask your healthcare provider about the best exercise plan for you  Other things to consider as you try to lose weight:   · Be aware of situations that may give you the urge to overeat, such as eating while watching television  Find ways to avoid these situations  For example, read a book, go for a walk, or do crafts  · Meet with a weight loss support group or friends who are also trying to lose weight  This may help you stay motivated to continue working on your weight loss goals  © 2017 [address] [employer] Information is for End User's use only and may not be sold, redistributed or otherwise used for commercial purposes  All illustrations and images included in CareNotes® are the copyrighted property of [employer]  or Trevor Brown  The above information is an  only  It is not intended as medical advice for individual conditions or treatments  Talk to your doctor, nurse or pharmacist before following any medical regimen to see if it is safe and effective for you      Low Fat Diet   AMBULATORY CARE:   A low-fat diet  is an eating plan that is low in total fat, unhealthy fat, and cholesterol  You may need to follow a low-fat diet if you have trouble digesting or absorbing fat  You may also need to follow this diet if you have high cholesterol  You can also lower your cholesterol by increasing the amount of fiber in your diet  Soluble fiber is a type of fiber that helps to decrease cholesterol levels  Different types of fat in food:   · Limit unhealthy fats  A diet that is high in cholesterol, saturated fat, and trans fat may cause unhealthy cholesterol levels  Unhealthy cholesterol levels increase your risk of heart disease  ¨ Cholesterol:  Limit intake of cholesterol to less than 200 mg per day  Cholesterol is found in meat, eggs, and dairy  ¨ Saturated fat:  Limit saturated fat to less than 7% of your total daily calories  Ask your dietitian how many calories you need each day  Saturated fat is found in butter, cheese, ice cream, whole milk, and palm oil  Saturated fat is also found in meat, such as beef, pork, chicken skin, and processed meats  Processed meats include sausage, hot dogs, and bologna  ¨ Trans fat:  Avoid trans fat as much as possible  Trans fat is used in fried and baked foods  Foods that say trans fat free on the label may still have up to 0 5 grams of trans fat per serving  · Include healthy fats  Replace foods that are high in saturated and trans fat with foods high in healthy fats  This may help to decrease high cholesterol levels  ¨ Monounsaturated fats: These are found in avocados, nuts, and vegetable oils, such as olive, canola, and sunflower oil  ¨ Polyunsaturated fats: These can be found in vegetable oils, such as soybean or corn oil  Omega-3 fats can help to decrease the risk of heart disease  Omega-3 fats are found in fish, such as salmon, herring, trout, and tuna  Omega-3 fats can also be found in plant foods, such as walnuts, flaxseed, soybeans, and canola oil    Foods to limit or avoid: · Grains:      ¨ Snacks that are made with partially hydrogenated oils, such as chips, regular crackers, and butter-flavored popcorn    ¨ High-fat baked goods, such as biscuits, croissants, doughnuts, pies, cookies, and pastries    · Dairy:      ¨ Whole milk, 2% milk, and yogurt and ice cream made with whole milk    ¨ Half and half creamer, heavy cream, and whipping cream    ¨ Cheese, cream cheese, and sour cream    · Meats and proteins:      ¨ High-fat cuts of meat (T-bone steak, regular hamburger, and ribs)    ¨ Fried meat, poultry (turkey and chicken), and fish    ¨ Poultry (chicken and turkey) with skin    ¨ Cold cuts (salami or bologna), hot dogs, clayton, and sausage    ¨ Whole eggs and egg yolks    · Vegetables and fruits with added fat:      ¨ Fried vegetables or vegetables in butter or high-fat sauces, such as cream or cheese sauces    ¨ Fried fruit or fruit served with butter or cream    · Fats:      ¨ Butter, stick margarine, and shortening    ¨ Coconut, palm oil, and palm kernel oil  Foods to include:   · Grains:      ¨ Whole-grain breads, cereals, pasta, and brown rice    ¨ Low-fat crackers and pretzels    · Vegetables and fruits:      ¨ Fresh, frozen, or canned vegetables (no salt or low-sodium)    ¨ Fresh, frozen, dried, or canned fruit (canned in light syrup or fruit juice)    ¨ Avocado    · Low-fat dairy products:      ¨ Nonfat (skim) or 1% milk    ¨ Nonfat or low-fat cheese, yogurt, and cottage cheese    · Meats and proteins:      ¨ Chicken or turkey with no skin    ¨ Baked or broiled fish    ¨ Lean beef and pork (loin, round, extra lean hamburger)    ¨ Beans and peas, unsalted nuts, soy products    ¨ Egg whites and substitutes    ¨ Seeds and nuts    · Fats:      ¨ Unsaturated oil, such as canola, olive, peanut, soybean, or sunflower oil    ¨ Soft or liquid margarine and vegetable oil spread    ¨ Low-fat salad dressing  Other ways to decrease fat:   · Read food labels before you buy foods    Choose foods that have less than 30% of calories from fat  Choose low-fat or fat-free dairy products  Remember that fat free does not mean calorie free  These foods still contain calories, and too many calories can lead to weight gain  · Trim fat from meat and avoid fried food  Trim all visible fat from meat before you cook it  Remove the skin from poultry  Do not boston meat, fish, or poultry  Bake, roast, boil, or broil these foods instead  Avoid fried foods  Eat a baked potato instead of Western Janie fries  Steam vegetables instead of sautéing them in butter  · Add less fat to foods  Use imitation clayton bits on salads and baked potatoes instead of regular clayton bits  Use fat-free or low-fat salad dressings instead of regular dressings  Use low-fat or nonfat butter-flavored topping instead of regular butter or margarine on popcorn and other foods  Ways to decrease fat in recipes:  Replace high-fat ingredients with low-fat or nonfat ones  This may cause baked goods to be drier than usual  You may need to use nonfat cooking spray on pans to prevent food from sticking  You also may need to change the amount of other ingredients, such as water, in the recipe  Try the following:  · Use low-fat or light margarine instead of regular margarine or shortening  · Use lean ground turkey breast or chicken, or lean ground beef (less than 5% fat) instead of hamburger  · Add 1 teaspoon of canola oil to 8 ounces of skim milk instead of using cream or half and half  · Use grated zucchini, carrots, or apples in breads instead of coconut  · Use blenderized, low-fat cottage cheese, plain tofu, or low-fat ricotta cheese instead of cream cheese  · Use 1 egg white and 1 teaspoon of canola oil, or use ¼ cup (2 ounces) of fat-free egg substitute instead of a whole egg  · Replace half of the oil that is called for in a recipe with applesauce when you bake   Use 3 tablespoons of cocoa powder and 1 tablespoon of canola oil instead of a square of baking chocolate  How to increase fiber:  Eat enough high-fiber foods to get 20 to 30 grams of fiber every day  Slowly increase your fiber intake to avoid stomach cramps, gas, and other problems  · Eat 3 ounces of whole-grain foods each day  An ounce is about 1 slice of bread  Eat whole-grain breads, such as whole-wheat bread  Whole wheat, whole-wheat flour, or other whole grains should be listed as the first ingredient on the food label  Replace white flour with whole-grain flour or use half of each in recipes  Whole-grain flour is heavier than white flour, so you may have to add more yeast or baking powder  · Eat a high-fiber cereal for breakfast   Oatmeal is a good source of soluble fiber  Look for cereals that have bran or fiber in the name  Choose whole-grain products, such as brown rice, barley, and whole-wheat pasta  · Eat more beans, peas, and lentils  For example, add beans to soups or salads  Eat at least 5 cups of fruits and vegetables each day  Eat fruits and vegetables with the peel because the peel is high in fiber  © 2017 [address]  Information is for End User's use only and may not be sold, redistributed or otherwise used for commercial purposes  All illustrations and images included in CareNotes® are the copyrighted property of [employer]  or Trevor Brown  The above information is an  only  It is not intended as medical advice for individual conditions or treatments  Talk to your doctor, nurse or pharmacist before following any medical regimen to see if it is safe and effective for you  Heart Healthy Diet   AMBULATORY CARE:   A heart healthy diet  is an eating plan low in total fat, unhealthy fats, and sodium (salt)  A heart healthy diet helps decrease your risk for heart disease and stroke  Limit the amount of fat you eat to 25% to 35% of your total daily calories  Limit sodium to less than 2,300 mg each day  Healthy fats:  Healthy fats can help improve cholesterol levels  The risk for heart disease is decreased when cholesterol levels are normal  Choose healthy fats, such as the following:  · Unsaturated fat  is found in foods such as soybean, canola, olive, corn, and safflower oils  It is also found in soft tub margarine that is made with liquid vegetable oil  · Omega-3 fat  is found in certain fish, such as salmon, tuna, and trout, and in walnuts and flaxseed  Unhealthy fats:  Unhealthy fats can cause unhealthy cholesterol levels in your blood and increase your risk of heart disease  Limit unhealthy fats, such as the following:  · Cholesterol  is found in animal foods, such as eggs and lobster, and in dairy products made from whole milk  Limit cholesterol to less than 300 milligrams (mg) each day  You may need to limit cholesterol to 200 mg each day if you have heart disease  · Saturated fat  is found in meats, such as clayton and hamburger  It is also found in chicken or turkey skin, whole milk, and butter  Limit saturated fat to less than 7% of your total daily calories  Limit saturated fat to less than 6% if you have heart disease or are at increased risk for it  · Trans fat  is found in packaged foods, such as potato chips and cookies  It is also in hard margarine, some fried foods, and shortening  Avoid trans fats as much as possible    Heart healthy foods and drinks to include:  Ask your dietitian or healthcare provider how many servings to have from each of the following food groups:  · Grains:      ¨ Whole-wheat breads, cereals, and pastas, and brown rice    ¨ Low-fat, low-sodium crackers and chips    · Vegetables:      ¨ Broccoli, green beans, green peas, and spinach    ¨ Collards, kale, and lima beans    ¨ Carrots, sweet potatoes, tomatoes, and peppers    ¨ Canned vegetables with no salt added    · Fruits:      ¨ Bananas, peaches, pears, and pineapple    ¨ Grapes, raisins, and dates    ¨ Oranges, tangerines, grapefruit, orange juice, and grapefruit juice    ¨ Apricots, mangoes, melons, and papaya    ¨ Raspberries and strawberries    ¨ Canned fruit with no added sugar    · Low-fat dairy products:      ¨ Nonfat (skim) milk, 1% milk, and low-fat almond, cashew, or soy milks fortified with calcium    ¨ Low-fat cheese, regular or frozen yogurt, and cottage cheese    · Meats and proteins , such as lean cuts of beef and pork (loin, leg, round), skinless chicken and turkey, legumes, soy products, egg whites, and nuts  Foods and drinks to limit or avoid:  Ask your dietitian or healthcare provider about these and other foods that are high in unhealthy fat, sodium, and sugar:  · Snack or packaged foods , such as frozen dinners, cookies, macaroni and cheese, and cereals with more than 300 mg of sodium per serving    · Canned or dry mixes  for cakes, soups, sauces, or gravies    · Vegetables with added sodium , such as instant potatoes, vegetables with added sauces, or regular canned vegetables    · Other foods high in sodium , such as ketchup, barbecue sauce, salad dressing, pickles, olives, soy sauce, and miso    · High-fat dairy foods  such as whole or 2% milk, cream cheese, or sour cream, and cheeses     · High-fat protein foods  such as high-fat cuts of beef (T-bone steaks, ribs), chicken or turkey with skin, and organ meats, such as liver    · Cured or smoked meats , such as hot dogs, clayton, and sausage    · Unhealthy fats and oils , such as butter, stick margarine, shortening, and cooking oils such as coconut or palm oil    · Food and drinks high in sugar , such as soft drinks (soda), sports drinks, sweetened tea, candy, cake, cookies, pies, and doughnuts  Other diet guidelines to follow:   · Eat more foods containing omega-3 fats  Eat fish high in omega-3 fats at least 2 times a week  · Limit alcohol  Too much alcohol can damage your heart and raise your blood pressure   Women should limit alcohol to 1 drink a day  Men should limit alcohol to 2 drinks a day  A drink of alcohol is 12 ounces of beer, 5 ounces of wine, or 1½ ounces of liquor  · Choose low-sodium foods  High-sodium foods can lead to high blood pressure  Add little or no salt to food you prepare  Use herbs and spices in place of salt  · Eat more fiber  to help lower cholesterol levels  Eat at least 5 servings of fruits and vegetables each day  Eat 3 ounces of whole-grain foods each day  Legumes (beans) are also a good source of fiber  Lifestyle guidelines:   · Do not smoke  Nicotine and other chemicals in cigarettes and cigars can cause lung and heart damage  Ask your healthcare provider for information if you currently smoke and need help to quit  E-cigarettes or smokeless tobacco still contain nicotine  Talk to your healthcare provider before you use these products  · Exercise regularly  to help you maintain a healthy weight and improve your blood pressure and cholesterol levels  Ask your healthcare provider about the best exercise plan for you  Do not start an exercise program without asking your healthcare provider  Follow up with your healthcare provider as directed:  Write down your questions so you remember to ask them during your visits  © 2017 [address]  Information is for End User's use only and may not be sold, redistributed or otherwise used for commercial purposes  All illustrations and images included in CareNotes® are the copyrighted property of [employer]  or Trevor Brown  The above information is an  only  It is not intended as medical advice for individual conditions or treatments  Talk to your doctor, nurse or pharmacist before following any medical regimen to see if it is safe and effective for you  Calorie Counting Diet   WHAT YOU NEED TO KNOW:   What is a calorie counting diet? It is a meal plan based on counting calories each day to reach a healthy body weight   You will need to eat fewer calories if you are trying to lose weight  Weight loss may decrease your risk for certain health problems or improve your health if you have health problems  Some of these health problems include heart disease, high blood pressure, and diabetes  What foods should I avoid? Your dietitian will tell you if you need to avoid certain foods based on your body weight and health condition  You may need to avoid high-fat foods if you are at risk for or have heart disease  You may need to eat fewer foods from the breads and starches food group if you have diabetes  How many calories are in foods? The following is a list of foods and drinks with the approximate number of calories in each  Check the food label to find the exact number of calories  A dietitian can tell you how many calories you should have from each food group each day    · Carbohydrate:      ¨ ½ of a 3-inch bagel, 1 slice of bread, or ½ of a hamburger bun or hot dog bun (80)    ¨ 1 (8-inch) flour tortilla or ½ cup of cooked rice (100)    ¨ 1 (6-inch) corn tortilla (80)    ¨ 1 (6-inch) pancake or 1 cup of bran flakes cereal (110)    ¨ ½ cup of cooked cereal (80)    ¨ ½ cup of cooked pasta (85)    ¨ 1 ounce of pretzels (100)    ¨ 3 cups of air-popped popcorn without butter or oil (80)    · Dairy:      ¨ 1 cup of skim or 1% milk (90)    ¨ 1 cup of 2% milk (120)    ¨ 1 cup of whole milk (160)    ¨ 1 cup of 2% chocolate milk (220)    ¨ 1 ounce of low-fat cheese with 3 grams of fat per ounce (70)    ¨ 1 ounce of cheddar cheese (114)    ¨ ½ cup of 1% fat cottage cheese (80)    ¨ 1 cup of plain or sugar-free, fat-free yogurt (90)    · Protein foods:      ¨ 3 ounces of fish (not breaded or fried) (95)    ¨ 3 ounces of breaded, fried fish (195)    ¨ ¾ cup of tuna canned in water (105)    ¨ 3 ounces of chicken breast without skin (105)    ¨ 1 fried chicken breast with skin (350)    ¨ ¼ cup of fat free egg substitute (40)    ¨ 1 large egg (75)    ¨ 3 ounces of lean beef or pork (165)    ¨ 3 ounces of fried pork chop or ham (185)    ¨ ½ cup of cooked dried beans, such as kidney, bartholomew, lentils, or navy (115)    ¨ 3 ounces of bologna or lunch meat (225)    ¨ 2 links of breakfast sausage (140)    · Vegetables:      ¨ ½ cup of sliced mushrooms (10)    ¨ 1 cup of salad greens, such as lettuce, spinach, or dolores (15)    ¨ ½ cup of steamed asparagus (20)    ¨ ½ cup of cooked summer squash, zucchini squash, or green or wax beans (25)    ¨ 1 cup of broccoli or cauliflower florets, or 1 medium tomato (25)    ¨ 1 large raw carrot or ½ cup of cooked carrots (40)    ¨ ? of a medium cucumber or 1 stalk of celery (5)    ¨ 1 small baked potato (160)    ¨ 1 cup of breaded, fried vegetables (230)    · Fruit:      ¨ 1 (6-inch) banana (55)     ¨ ½ of a 4-inch grapefruit (55)    ¨ 15 grapes (60)    ¨ 1 medium orange or apple (70)    ¨ 1 large peach (65)    ¨ 1 cup of fresh pineapple chunks (75)    ¨ 1 cup of melon cubes (50)    ¨ 1¼ cups of whole strawberries (45)    ¨ ½ cup of fruit canned in juice (55)    ¨ ½ cup of fruit canned in heavy syrup (110)    ¨ ?  cup of raisins (130)    ¨ ½ cup of unsweetened fruit juice (60)    ¨ ½ cup of grape, cranberry, or prune juice (90)    · Fat:      ¨ 10 peanuts or 2 teaspoons of peanut butter (55)    ¨ 2 tablespoons of avocado or 1 tablespoon of regular salad dressing (45)    ¨ 2 slices of clayton (90)    ¨ 1 teaspoon of oil, such as safflower, canola, corn, or olive oil (45)    ¨ 2 teaspoons of low-fat margarine, or 1 tablespoon of low-fat mayonnaise (50)    ¨ 1 teaspoon of regular margarine (40)    ¨ 1 tablespoon of regular mayonnaise (135)    ¨ 1 tablespoon of cream cheese or 2 tablespoons of low-fat cream cheese (45)    ¨ 2 tablespoons of vegetable shortening (215)    · Dessert and sweets:      ¨ 8 animal crackers or 5 vanilla wafers (80)    ¨ 1 frozen fruit juice bar (80)    ¨ ½ cup of ice milk or low-fat frozen yogurt (90)    ¨ ½ cup of sherbet or sorbet (125)    ¨ ½ cup of sugar-free pudding or custard (60)    ¨ ½ cup of ice cream (140)    ¨ ½ cup of pudding or custard (175)    ¨ 1 (2-inch) square chocolate brownie (185)    · Combination foods:      ¨ Bean burrito made with an 8-inch tortilla, without cheese (275)    ¨ Chicken breast sandwich with lettuce and tomato (325)    ¨ 1 cup of chicken noodle soup (60)    ¨ 1 beef taco (175)    ¨ Regular hamburger with lettuce and tomato (310)    ¨ Regular cheeseburger with lettuce and tomato (410)     ¨ ¼ of a 12-inch cheese pizza (280)    ¨ Fried fish sandwich with lettuce and tomato (425)    ¨ Hot dog and bun (275)    ¨ 1½ cups of macaroni and cheese (310)    ¨ Taco salad with a fried tortilla shell (870)    · Low-calorie foods:      ¨ 1 tablespoon of ketchup or 1 tablespoon of fat free sour cream (15)    ¨ 1 teaspoon of mustard (5)    ¨ ¼ cup of salsa (20)    ¨ 1 large dill pickle (15)    ¨ 1 tablespoon of fat free salad dressing (10)    ¨ 2 teaspoons of low-sugar, light jam or jelly, or 1 tablespoon of sugar-free syrup (15)    ¨ 1 sugar-free popsicle (15)    ¨ 1 cup of club soda, seltzer water, or diet soda (0)  CARE AGREEMENT:   You have the right to help plan your care  Discuss treatment options with your caregivers to decide what care you want to receive  You always have the right to refuse treatment  The above information is an  only  It is not intended as medical advice for individual conditions or treatments  Talk to your doctor, nurse or pharmacist before following any medical regimen to see if it is safe and effective for you  © 2017 [address] Information is for End User's use only and may not be sold, redistributed or otherwise used for commercial purposes  All illustrations and images included in CareNotes® are the copyrighted property of [employer]  or ALEKSANDER [employer]on    Portions of the record may have been created with voice recognition software   Occasional wrong word or "sound a like" substitutions may have occurred due to the inherent limitations of voice recognition software   Read the chart carefully and recognize, using context, where substitutions have occurred  BMI Counseling: Body mass index is 36 02 kg/m²  Discussed the patient's BMI with her  The BMI is above average  BMI counseling and education was provided to the patient  Nutrition recommendations include reducing portion sizes, decreasing overall calorie intake, 3-5 servings of fruits/vegetables daily, reducing fast food intake, consuming healthier snacks, decreasing soda and/or juice intake, moderation in carbohydrate intake, increasing intake of lean protein, reducing intake of saturated fat and trans fat and reducing intake of cholesterol  Exercise recommendations include exercising 3-5 times per week

## 2019-02-28 NOTE — PATIENT INSTRUCTIONS
Pt refuses any vaccines  Will get previous dexa and eye exam and colonoscopy  Let me know if you change your mind about getting your immunizations  Advised to have the lab done fasting and drink water before the test  The blood pressure is too high today  Will add amlodipine at night and advised to check the bp's daily and f/u here with the numbers when you return from [place]  Have the xrays done of the ankles as this has been bothering you for a year  I will call with results  Have your eye doctor send us a copy of the results  Will refer to Pj Jose Luis when she returns from [place]  Obesity   AMBULATORY CARE:   Obesity  is when your body mass index (BMI) is greater than 30  Your healthcare provider will use your height and weight to measure your BMI  The risks of obesity include  many health problems, such as injuries or physical disability  You may need tests to check for the following:  · Diabetes     · High blood pressure or high cholesterol     · Heart disease     · Gallbladder or liver disease     · Cancer of the colon, breast, prostate, liver, or kidney     · Sleep apnea     · Arthritis or gout  Seek care immediately if:   · You have a severe headache, confusion, or difficulty speaking  · You have weakness on one side of your body  · You have chest pain, sweating, or shortness of breath  Contact your healthcare provider if:   · You have symptoms of gallbladder or liver disease, such as pain in your upper abdomen  · You have knee or hip pain and discomfort while walking  · You have symptoms of diabetes, such as intense hunger and thirst, and frequent urination  · You have symptoms of sleep apnea, such as snoring or daytime sleepiness  · You have questions or concerns about your condition or care  Treatment for obesity  focuses on helping you lose weight to improve your health  Even a small decrease in BMI can reduce the risk for many health problems   Your healthcare provider will help you set a weight-loss goal   · Lifestyle changes  are the first step in treating obesity  These include making healthy food choices and getting regular physical activity  Your healthcare provider may suggest a weight-loss program that involves coaching, education, and therapy  · Medicine  may help you lose weight when it is used with a healthy diet and physical activity  · Surgery  can help you lose weight if you are very obese and have other health problems  There are several types of weight-loss surgery  Ask your healthcare provider for more information  Be successful losing weight:   · Set small, realistic goals  An example of a small goal is to walk for 20 minutes 5 days a week  Anther goal is to lose 5% of your body weight  · Tell friends, family members, and coworkers about your goals  and ask for their support  Ask a friend to lose weight with you, or join a weight-loss support group  · Identify foods or triggers that may cause you to overeat , and find ways to avoid them  Remove tempting high-calorie foods from your home and workplace  Place a bowl of fresh fruit on your kitchen counter  If stress causes you to eat, then find other ways to cope with stress  · Keep a diary to track what you eat and drink  Also write down how many minutes of physical activity you do each day  Weigh yourself once a week and record it in your diary  Eating changes: You will need to eat 500 to 1,000 fewer calories each day than you currently eat to lose 1 to 2 pounds a week  The following changes will help you cut calories:  · Eat smaller portions  Use small plates, no larger than 9 inches in diameter  Fill your plate half full of fruits and vegetables  Measure your food using measuring cups until you know what a serving size looks like  · Eat 3 meals and 1 or 2 snacks each day  Plan your meals in advance  Richi Mcduffie and eat at home most of the time  Eat slowly       · Eat fruits and vegetables at every meal   They are low in calories and high in fiber, which makes you feel full  Do not add butter, margarine, or cream sauce to vegetables  Use herbs to season steamed vegetables  · Eat less fat and fewer fried foods  Eat more baked or grilled chicken and fish  These protein sources are lower in calories and fat than red meat  Limit fast food  Dress your salads with olive oil and vinegar instead of bottled dressing  · Limit the amount of sugar you eat  Do not drink sugary beverages  Limit alcohol  Activity changes:  Physical activity is good for your body in many ways  It helps you burn calories and build strong muscles  It decreases stress and depression, and improves your mood  It can also help you sleep better  Talk to your healthcare provider before you begin an exercise program   · Exercise for at least 30 minutes 5 days a week  Start slowly  Set aside time each day for physical activity that you enjoy and that is convenient for you  It is best to do both weight training and an activity that increases your heart rate, such as walking, bicycling, or swimming  · Find ways to be more active  Do yard work and housecleaning  Walk up the stairs instead of using elevators  Spend your leisure time going to events that require walking, such as outdoor festivals or fairs  This extra physical activity can help you lose weight and keep it off  Follow up with your healthcare provider as directed: You may need to meet with a dietitian  Write down your questions so you remember to ask them during your visits  © 2017 [address] [employer] Information is for End User's use only and may not be sold, redistributed or otherwise used for commercial purposes  All illustrations and images included in CareNotes® are the copyrighted property of [employer] M [employer]  or Trevor Brown  The above information is an  only   It is not intended as medical advice for individual conditions or treatments  Talk to your doctor, nurse or pharmacist before following any medical regimen to see if it is safe and effective for you  Urinary Incontinence   WHAT YOU NEED TO KNOW:   What is urinary incontinence? Urinary incontinence (UI) is when you lose control of your bladder  What causes UI? UI occurs because your bladder cannot store or empty urine properly  The following are the most common types of UI:  · Stress incontinence  is when you leak urine due to increased bladder pressure  This may happen when you cough, sneeze, or exercise  · Urge incontinence  is when you feel the need to urinate right away and leak urine accidentally  · Mixed incontinence  is when you have both stress and urge UI  What are the signs and symptoms of UI?   · You feel like your bladder does not empty completely when you urinate  · You urinate often and need to urinate immediately  · You leak urine when you sleep, or you wake up with the urge to urinate  · You leak urine when you cough, sneeze, exercise, or laugh  How is UI diagnosed? Your healthcare provider will ask how often you leak urine and whether you have stress or urge symptoms  Tell him which medicines you take, how often you urinate, and how much liquid you drink each day  You may need any of the following tests:  · Urine tests  may show infection or kidney function  · A pelvic exam  may be done to check for blockages  A pelvic exam will also show if your bladder, uterus, or other organs have moved out of place  · An x-ray, ultrasound, or CT  may show problems with parts of your urinary system  You may be given contrast liquid to help your organs show up better in the pictures  Tell the healthcare provider if you have ever had an allergic reaction to contrast liquid  Do not enter the MRI room with anything metal  Metal can cause serious injury   Tell the healthcare provider if you have any metal in or on your body     · A bladder scan  will show how much urine is left in your bladder after you urinate  You will be asked to urinate and then healthcare providers will use a small ultrasound machine to check the urine left in your bladder  · Cystometry  is used to check the function of your urinary system  Your healthcare provider checks the pressure in your bladder while filling it with fluid  Your bladder pressure may also be tested when your bladder is full and while you urinate  How is UI treated? · Medicines  can help strengthen your bladder control  · Electrical stimulation  is used to send a small amount of electrical energy to your pelvic floor muscles  This helps control your bladder function  Electrodes may be placed outside your body or in your rectum  For women, the electrodes may be placed in the vagina  · A bulking agent  may be injected into the wall of your urethra to make it thicker  This helps keep your urethra closed and decreases urine leakage  · Devices  such as a clamp, pessary, or tampon may help stop urine leaks  Ask your healthcare provider for more information about these and other devices  · Surgery  may be needed if other treatments do not work  Several types of surgery can help improve your bladder control  Ask your healthcare provider for more information about the surgery you may need  How can I manage my symptoms? · Do pelvic muscle exercises often  Your pelvic muscles help you stop urinating  Squeeze these muscles tight for 5 seconds, then relax for 5 seconds  Gradually work up to squeezing for 10 seconds  Do 3 sets of 15 repetitions a day, or as directed  This will help strengthen your pelvic muscles and improve bladder control  · A catheter  may be used to help empty your bladder  A catheter is a tiny, plastic tube that is put into your bladder to drain your urine   Your healthcare provider may tell you to use a catheter to prevent your bladder from getting too full and leaking urine  · Keep a UI record  Write down how often you leak urine and how much you leak  Make a note of what you were doing when you leaked urine  · Train your bladder  Go to the bathroom at set times, such as every 2 hours, even if you do not feel the urge to go  You can also try to hold your urine when you feel the urge to go  For example, hold your urine for 5 minutes when you feel the urge to go  As that becomes easier, hold your urine for 10 minutes  · Drink liquids as directed  Ask your healthcare provider how much liquid to drink each day and which liquids are best for you  You may need to limit the amount of liquid you drink to help control your urine leakage  Limit or do not have drinks that contain caffeine or alcohol  Do not drink any liquid right before you go to bed  · Prevent constipation  Eat a variety of high-fiber foods  Good examples are high-fiber cereals, beans, vegetables, and whole-grain breads  Prune juice may help make your bowel movement softer  Walking is the best way to trigger your intestines to have a bowel movement  · Exercise regularly and maintain a healthy weight  Ask your healthcare provider how much you should weigh and about the best exercise plan for you  Weight loss and exercise will decrease pressure on your bladder and help you control your leakage  Ask him to help you create a weight loss plan if you are overweight  When should I seek immediate care? · You have severe pain  · You are confused or cannot think clearly  When should I contact my healthcare provider? · You have a fever  · You see blood in your urine  · You have pain when you urinate  · You have new or worse pain, even after treatment  · Your mouth feels dry or you have vision changes  · Your urine is cloudy or smells bad  · You have questions or concerns about your condition or care  CARE AGREEMENT:   You have the right to help plan your care   Learn about your health condition and how it may be treated  Discuss treatment options with your caregivers to decide what care you want to receive  You always have the right to refuse treatment  The above information is an  only  It is not intended as medical advice for individual conditions or treatments  Talk to your doctor, nurse or pharmacist before following any medical regimen to see if it is safe and effective for you  © 2017 [address] [employer] Information is for End User's use only and may not be sold, redistributed or otherwise used for commercial purposes  All illustrations and images included in CareNotes® are the copyrighted property of [employer]  or [employer]  Cigarette Smoking and Your Health   AMBULATORY CARE:   Risks to your health if you smoke:  Nicotine and other chemicals found in tobacco damage every cell in your body  Even if you are a light smoker, you have an increased risk for cancer, heart disease, and lung disease  If you are pregnant or have diabetes, smoking increases your risk for complications  Benefits to your health if you stop smoking:   · You decrease respiratory symptoms such as coughing, wheezing, and shortness of breath  · You reduce your risk for cancers of the lung, mouth, throat, kidney, bladder, pancreas, stomach, and cervix  If you already have cancer, you increase the benefits of chemotherapy  You also reduce your risk for cancer returning or a second cancer from developing  · You reduce your risk for heart disease, blood clots, heart attack, and stroke  · You reduce your risk for lung infections, and diseases such as pneumonia, asthma, chronic bronchitis, and emphysema  · Your circulation improves  More oxygen can be delivered to your body  If you have diabetes, you lower your risk for complications, such as kidney, artery, and eye diseases  You also lower your risk for nerve damage   Nerve damage can lead to amputations, poor vision, and blindness  · You improve your body's ability to heal and to fight infections  Benefits to the health of others if you stop smoking:  Tobacco is harmful to nonsmokers who breathe in your secondhand smoke  The following are ways the health of others around you may improve when you stop smoking:  · You lower the risks for lung cancer and heart disease in nonsmoking adults  · If you are pregnant, you lower the risk for miscarriage, early delivery, low birth weight, and stillbirth  You also lower your baby's risk for SIDS, obesity, developmental delay, and neurobehavioral problems, such as ADHD  · If you have children, you lower their risk for ear infections, colds, pneumonia, bronchitis, and asthma  For more information and support to stop smoking:   · [employer]  Phone: 2- 172 - 307-2207  Web Address: www [employer]  Follow up with your healthcare provider as directed:  Write down your questions so you remember to ask them during your visits  © 2017 [address] [facility] Information is for End User's use only and may not be sold, redistributed or otherwise used for commercial purposes  All illustrations and images included in CareNotes® are the copyrighted property of [employer]  or Trevor Brown  The above information is an  only  It is not intended as medical advice for individual conditions or treatments  Talk to your doctor, nurse or pharmacist before following any medical regimen to see if it is safe and effective for you  Fall Prevention   AMBULATORY CARE:   Fall prevention  includes ways to make your home and other areas safer  It also includes ways you can move more carefully to prevent a fall  Health conditions that cause changes in your blood pressure, vision, or muscle strength and coordination may increase your risk for falls  Medicines may also increase your risk for falls if they make you dizzy, weak, or sleepy     Call 911 or have someone else call if:   · You have fallen and are unconscious  · You have fallen and cannot move part of your body  Contact your healthcare provider if:   · You have fallen and have pain or a headache  · You have questions or concerns about your condition or care  Fall prevention tips:   · Stand or sit up slowly  This may help you keep your balance and prevent falls  · Use assistive devices as directed  Your healthcare provider may suggest that you use a cane or walker to help you keep your balance  You may need to have grab bars put in your bathroom near the toilet or in the shower  · Wear shoes that fit well and have soles that   Wear shoes both inside and outside  Use slippers with good   Do not wear shoes with high heels  · Wear a personal alarm  This is a device that allows you to call 911 if you fall and need help  Ask your healthcare provider for more information  · Stay active  Exercise can help strengthen your muscles and improve your balance  Your healthcare provider may recommend water aerobics or walking  He or she may also recommend physical therapy to improve your coordination  Never start an exercise program without talking to your healthcare provider first      · Manage your medical conditions  Keep all appointments with your healthcare providers  Visit your eye doctor as directed  Home safety tips:   · Add items to prevent falls in the bathroom  Put nonslip strips on your bath or shower floor to prevent you from slipping  Use a bath mat if you do not have carpet in the bathroom  This will prevent you from falling when you step out of the bath or shower  Use a shower seat so you do not need to stand while you shower  Sit on the toilet or a chair in your bathroom to dry yourself and put on clothing  This will prevent you from losing your balance from drying or dressing yourself while you are standing  · Keep paths clear    Remove books, shoes, and other objects from walkways and stairs  Place cords for telephones and lamps out of the way so that you do not need to walk over them  Tape them down if you cannot move them  Remove small rugs  If you cannot remove a rug, secure it with double-sided tape  This will prevent you from tripping  · Install bright lights in your home  Use night lights to help light paths to the bathroom or kitchen  Always turn on the light before you start walking  · Keep items you use often on shelves within reach  Do not use a step stool to help you reach an item  · Paint or place reflective tape on the edges of your stairs  This will help you see the stairs better  Follow up with your healthcare provider as directed:  Write down your questions so you remember to ask them during your visits  © 2017 [address] [employer] Information is for End User's use only and may not be sold, redistributed or otherwise used for commercial purposes  All illustrations and images included in CareNotes® are the copyrighted property of [employer]  or Trevor Brown  The above information is an  only  It is not intended as medical advice for individual conditions or treatments  Talk to your doctor, nurse or pharmacist before following any medical regimen to see if it is safe and effective for you  Advance Directives   WHAT YOU NEED TO KNOW:   What are advance directives? Advance directives are legal documents that state your wishes and plans for medical care  These plans are made ahead of time in case you lose your ability to make decisions for yourself  Advance directives can apply to any medical decision, such as the treatments you want, and if you want to donate organs  What are the types of advance directives? There are many types of advance directives, and each state has rules about how to use them  You may choose a combination of any of the following:  · Living will:   This is a written record of the treatment you want  You can also choose which treatments you do not want, which to limit, and which to stop at a certain time  This includes surgery, medicine, IV fluid, and tube feedings  · Durable power of  for healthcare [facility]): This is a written record that states who you want to make healthcare choices for you when you are unable to make them for yourself  This person, called a proxy, is usually a family member or a friend  You may choose more than 1 proxy  · Do not resuscitate (DNR) order:  A DNR order is used in case your heart stops beating or you stop breathing  It is a request not to have certain forms of treatment, such as CPR  A DNR order may be included in other types of advance directives  · Medical directive: This covers the care that you want if you are in a coma, near death, or unable to make decisions for yourself  You can list the treatments you want for each condition  Treatment may include pain medicine, surgery, blood transfusions, dialysis, IV or tube feedings, and a ventilator (breathing machine)  · Values history: This document has questions about your views, beliefs, and how you feel and think about life  This information can help others choose the care that you would choose  Why are advance directives important? An advance directive helps you control your care  Although spoken wishes may be used, it is better to have your wishes written down  Spoken wishes can be misunderstood, or not followed  Treatments may be given even if you do not want them  An advance directive may make it easier for your family to make difficult choices about your care  How do I decide what to put in my advance directives? · Make informed decisions:  Make sure you fully understand treatments or care you may receive  Think about the benefits and problems your decisions could cause for you or your family   Talk to healthcare providers if you have concerns or questions before you write down your wishes  You may also want to talk with your [employer] or , or a   Check your state laws to make sure that what you put in your advance directive is legal      · Sign all forms:  Sign and date your advance directive when you have finished  You may also need 2 witnesses to sign the forms  Witnesses cannot be your doctor or his staff, your spouse, heirs or beneficiaries, people you owe money to, or your chosen proxy  Talk to your family, proxy, and healthcare providers about your advance directive  Give each person a copy, and keep one for yourself in a place you can get to easily  Do not keep it hidden or locked away  · Review and revise your plans: You can revise your advance directive at any time, as long as you are able to make decisions  Review your plan every year, and when there are changes in your life, or your health  When you make changes, let your family, proxy, and healthcare providers know  Give each a new copy  Where can I find more information? · [employer]  [address] , [place]   Phone: [phone]  Phone: [phone]  Web Address: http://www  aafp org  · [address] [facility])  [address] [address] [facility], [address]  Phone: [phone]  Phone: [phone]  Web Address: Shanthi hu  CARE AGREEMENT:   You have the right to help plan your care  To help with this plan, you must learn about your health condition and treatment options  You must also learn about advance directives and how they are used  Work with your healthcare providers to decide what care will be used to treat you  You always have the right to refuse treatment  The above information is an  only  It is not intended as medical advice for individual conditions or treatments   Talk to your doctor, nurse or pharmacist before following any medical regimen to see if it is safe and effective for you  © 2017 [address] [employer] Information is for End User's use only and may not be sold, redistributed or otherwise used for commercial purposes  All illustrations and images included in CareNotes® are the copyrighted property of [employer]  or Trevor Brown  Obesity   AMBULATORY CARE:   Obesity  is when your body mass index (BMI) is greater than 30  Your healthcare provider will use your height and weight to measure your BMI  The risks of obesity include  many health problems, such as injuries or physical disability  You may need tests to check for the following:  · Diabetes     · High blood pressure or high cholesterol     · Heart disease     · Gallbladder or liver disease     · Cancer of the colon, breast, prostate, liver, or kidney     · Sleep apnea     · Arthritis or gout  Seek care immediately if:   · You have a severe headache, confusion, or difficulty speaking  · You have weakness on one side of your body  · You have chest pain, sweating, or shortness of breath  Contact your healthcare provider if:   · You have symptoms of gallbladder or liver disease, such as pain in your upper abdomen  · You have knee or hip pain and discomfort while walking  · You have symptoms of diabetes, such as intense hunger and thirst, and frequent urination  · You have symptoms of sleep apnea, such as snoring or daytime sleepiness  · You have questions or concerns about your condition or care  Treatment for obesity  focuses on helping you lose weight to improve your health  Even a small decrease in BMI can reduce the risk for many health problems  Your healthcare provider will help you set a weight-loss goal   · Lifestyle changes  are the first step in treating obesity  These include making healthy food choices and getting regular physical activity   Your healthcare provider may suggest a weight-loss program that involves coaching, education, and therapy  · Medicine  may help you lose weight when it is used with a healthy diet and physical activity  · Surgery  can help you lose weight if you are very obese and have other health problems  There are several types of weight-loss surgery  Ask your healthcare provider for more information  Be successful losing weight:   · Set small, realistic goals  An example of a small goal is to walk for 20 minutes 5 days a week  Anther goal is to lose 5% of your body weight  · Tell friends, family members, and coworkers about your goals  and ask for their support  Ask a friend to lose weight with you, or join a weight-loss support group  · Identify foods or triggers that may cause you to overeat , and find ways to avoid them  Remove tempting high-calorie foods from your home and workplace  Place a bowl of fresh fruit on your kitchen counter  If stress causes you to eat, then find other ways to cope with stress  · Keep a diary to track what you eat and drink  Also write down how many minutes of physical activity you do each day  Weigh yourself once a week and record it in your diary  Eating changes: You will need to eat 500 to 1,000 fewer calories each day than you currently eat to lose 1 to 2 pounds a week  The following changes will help you cut calories:  · Eat smaller portions  Use small plates, no larger than 9 inches in diameter  Fill your plate half full of fruits and vegetables  Measure your food using measuring cups until you know what a serving size looks like  · Eat 3 meals and 1 or 2 snacks each day  Plan your meals in advance  Richi Mcduffie and eat at home most of the time  Eat slowly  · Eat fruits and vegetables at every meal   They are low in calories and high in fiber, which makes you feel full  Do not add butter, margarine, or cream sauce to vegetables  Use herbs to season steamed vegetables  · Eat less fat and fewer fried foods    Eat more baked or grilled chicken and fish  These protein sources are lower in calories and fat than red meat  Limit fast food  Dress your salads with olive oil and vinegar instead of bottled dressing  · Limit the amount of sugar you eat  Do not drink sugary beverages  Limit alcohol  Activity changes:  Physical activity is good for your body in many ways  It helps you burn calories and build strong muscles  It decreases stress and depression, and improves your mood  It can also help you sleep better  Talk to your healthcare provider before you begin an exercise program   · Exercise for at least 30 minutes 5 days a week  Start slowly  Set aside time each day for physical activity that you enjoy and that is convenient for you  It is best to do both weight training and an activity that increases your heart rate, such as walking, bicycling, or swimming  · Find ways to be more active  Do yard work and housecleaning  Walk up the stairs instead of using elevators  Spend your leisure time going to events that require walking, such as outdoor festivals or fairs  This extra physical activity can help you lose weight and keep it off  Follow up with your healthcare provider as directed: You may need to meet with a dietitian  Write down your questions so you remember to ask them during your visits  © 2017 [facility] Information is for End User's use only and may not be sold, redistributed or otherwise used for commercial purposes  All illustrations and images included in CareNotes® are the copyrighted property of [employer]  or Trevor Brown  The above information is an  only  It is not intended as medical advice for individual conditions or treatments  Talk to your doctor, nurse or pharmacist before following any medical regimen to see if it is safe and effective for you      Weight Management   AMBULATORY CARE:   Why it is important to manage your weight:  Being overweight increases your risk of health conditions such as heart disease, high blood pressure, type 2 diabetes, and certain types of cancer  It can also increase your risk for osteoarthritis, sleep apnea, and other respiratory problems  Aim for a slow, steady weight loss  Even a small amount of weight loss can lower your risk of health problems  How to lose weight safely:  A safe and healthy way to lose weight is to eat fewer calories and get regular exercise  You can lose up about 1 pound a week by decreasing the number of calories you eat by 500 calories each day  You can decrease calories by eating smaller portion sizes or by cutting out high-calorie foods  Read labels to find out how many calories are in the foods you eat  You can also burn calories with exercise such as walking, swimming, or biking  You will be more likely to keep weight off if you make these changes part of your lifestyle  Healthy meal plan for weight management:  A healthy meal plan includes a variety of foods, contains fewer calories, and helps you stay healthy  A healthy meal plan includes the following:  · Eat whole-grain foods more often  A healthy meal plan should contain fiber  Fiber is the part of grains, fruits, and vegetables that is not broken down by your body  Whole-grain foods are healthy and provide extra fiber in your diet  Some examples of whole-grain foods are whole-wheat breads and pastas, oatmeal, brown rice, and bulgur  · Eat a variety of vegetables every day  Include dark, leafy greens such as spinach, kale, rowena greens, and mustard greens  Eat yellow and orange vegetables such as carrots, sweet potatoes, and winter squash  · Eat a variety of fruits every day  Choose fresh or canned fruit (canned in its own juice or light syrup) instead of juice  Fruit juice has very little or no fiber  · Eat low-fat dairy foods  Drink fat-free (skim) milk or 1% milk  Eat fat-free yogurt and low-fat cottage cheese   Try low-fat cheeses such as mozzarella and other reduced-fat cheeses  · Choose meat and other protein foods that are low in fat  Choose beans or other legumes such as split peas or lentils  Choose fish, skinless poultry (chicken or turkey), or lean cuts of red meat (beef or pork)  Before you cook meat or poultry, cut off any visible fat  · Use less fat and oil  Try baking foods instead of frying them  Add less fat, such as margarine, sour cream, regular salad dressing and mayonnaise to foods  Eat fewer high-fat foods  Some examples of high-fat foods include french fries, doughnuts, ice cream, and cakes  · Eat fewer sweets  Limit foods and drinks that are high in sugar  This includes candy, cookies, regular soda, and sweetened drinks  Ways to decrease calories:   · Eat smaller portions  ¨ Use a small plate with smaller servings  ¨ Do not eat second helpings  ¨ When you eat at a restaurant, ask for a box and place half of your meal in the box before you eat  ¨ Share an entrée with someone else  · Replace high-calorie snacks with healthy, low-calorie snacks  ¨ Choose fresh fruit, vegetables, fat-free rice cakes, or air-popped popcorn instead of potato chips, nuts, or chocolate  ¨ Choose water or calorie-free drinks instead of soda or sweetened drinks  · Eat regular meals  Skipping meals can lead to overeating later in the day  Eat a healthy snack in place of a meal if you do not have time to eat a regular meal      · Do not shop for groceries when you are hungry  You may be more likely to make unhealthy food choices  Take a grocery list of healthy foods and shop after you have eaten  Exercise:  Exercise at least 30 minutes per day on most days of the week  Some examples of exercise include walking, biking, dancing, and swimming  You can also fit in more physical activity by taking the stairs instead of the elevator or parking farther away from stores  Ask your healthcare provider about the best exercise plan for you  Other things to consider as you try to lose weight:   · Be aware of situations that may give you the urge to overeat, such as eating while watching television  Find ways to avoid these situations  For example, read a book, go for a walk, or do crafts  · Meet with a weight loss support group or friends who are also trying to lose weight  This may help you stay motivated to continue working on your weight loss goals  © 2017 [address] Information is for End User's use only and may not be sold, redistributed or otherwise used for commercial purposes  All illustrations and images included in CareNotes® are the copyrighted property of [employer]  or Trevor Brown  The above information is an  only  It is not intended as medical advice for individual conditions or treatments  Talk to your doctor, nurse or pharmacist before following any medical regimen to see if it is safe and effective for you  Low Fat Diet   AMBULATORY CARE:   A low-fat diet  is an eating plan that is low in total fat, unhealthy fat, and cholesterol  You may need to follow a low-fat diet if you have trouble digesting or absorbing fat  You may also need to follow this diet if you have high cholesterol  You can also lower your cholesterol by increasing the amount of fiber in your diet  Soluble fiber is a type of fiber that helps to decrease cholesterol levels  Different types of fat in food:   · Limit unhealthy fats  A diet that is high in cholesterol, saturated fat, and trans fat may cause unhealthy cholesterol levels  Unhealthy cholesterol levels increase your risk of heart disease  ¨ Cholesterol:  Limit intake of cholesterol to less than 200 mg per day  Cholesterol is found in meat, eggs, and dairy  ¨ Saturated fat:  Limit saturated fat to less than 7% of your total daily calories  Ask your dietitian how many calories you need each day   Saturated fat is found in butter, cheese, ice cream, whole milk, and palm oil  Saturated fat is also found in meat, such as beef, pork, chicken skin, and processed meats  Processed meats include sausage, hot dogs, and bologna  ¨ Trans fat:  Avoid trans fat as much as possible  Trans fat is used in fried and baked foods  Foods that say trans fat free on the label may still have up to 0 5 grams of trans fat per serving  · Include healthy fats  Replace foods that are high in saturated and trans fat with foods high in healthy fats  This may help to decrease high cholesterol levels  ¨ Monounsaturated fats: These are found in avocados, nuts, and vegetable oils, such as olive, canola, and sunflower oil  ¨ Polyunsaturated fats: These can be found in vegetable oils, such as soybean or corn oil  Omega-3 fats can help to decrease the risk of heart disease  Omega-3 fats are found in fish, such as salmon, herring, trout, and tuna  Omega-3 fats can also be found in plant foods, such as walnuts, flaxseed, soybeans, and canola oil    Foods to limit or avoid:   · Grains:      ¨ Snacks that are made with partially hydrogenated oils, such as chips, regular crackers, and butter-flavored popcorn    ¨ High-fat baked goods, such as biscuits, croissants, doughnuts, pies, cookies, and pastries    · Dairy:      ¨ Whole milk, 2% milk, and yogurt and ice cream made with whole milk    ¨ Half and half creamer, heavy cream, and whipping cream    ¨ Cheese, cream cheese, and sour cream    · Meats and proteins:      ¨ High-fat cuts of meat (T-bone steak, regular hamburger, and ribs)    ¨ Fried meat, poultry (turkey and chicken), and fish    ¨ Poultry (chicken and turkey) with skin    ¨ Cold cuts (salami or bologna), hot dogs, clayton, and sausage    ¨ Whole eggs and egg yolks    · Vegetables and fruits with added fat:      ¨ Fried vegetables or vegetables in butter or high-fat sauces, such as cream or cheese sauces    ¨ Fried fruit or fruit served with butter or cream    · Fats: ¨ Butter, stick margarine, and shortening    ¨ Coconut, palm oil, and palm kernel oil  Foods to include:   · Grains:      ¨ Whole-grain breads, cereals, pasta, and brown rice    ¨ Low-fat crackers and pretzels    · Vegetables and fruits:      ¨ Fresh, frozen, or canned vegetables (no salt or low-sodium)    ¨ Fresh, frozen, dried, or canned fruit (canned in light syrup or fruit juice)    ¨ Avocado    · Low-fat dairy products:      ¨ Nonfat (skim) or 1% milk    ¨ Nonfat or low-fat cheese, yogurt, and cottage cheese    · Meats and proteins:      ¨ Chicken or turkey with no skin    ¨ Baked or broiled fish    ¨ Lean beef and pork (loin, round, extra lean hamburger)    ¨ Beans and peas, unsalted nuts, soy products    ¨ Egg whites and substitutes    ¨ Seeds and nuts    · Fats:      ¨ Unsaturated oil, such as canola, olive, peanut, soybean, or sunflower oil    ¨ Soft or liquid margarine and vegetable oil spread    ¨ Low-fat salad dressing  Other ways to decrease fat:   · Read food labels before you buy foods  Choose foods that have less than 30% of calories from fat  Choose low-fat or fat-free dairy products  Remember that fat free does not mean calorie free  These foods still contain calories, and too many calories can lead to weight gain  · Trim fat from meat and avoid fried food  Trim all visible fat from meat before you cook it  Remove the skin from poultry  Do not [place] meat, fish, or poultry  Bake, roast, boil, or broil these foods instead  Avoid fried foods  Eat a baked potato instead of [place] Janie fries  Steam vegetables instead of sautéing them in butter  · Add less fat to foods  Use imitation clayton bits on salads and baked potatoes instead of regular clayton bits  Use fat-free or low-fat salad dressings instead of regular dressings  Use low-fat or nonfat butter-flavored topping instead of regular butter or margarine on popcorn and other foods    Ways to decrease fat in recipes:  Replace high-fat ingredients with low-fat or nonfat ones  This may cause baked goods to be drier than usual  You may need to use nonfat cooking spray on pans to prevent food from sticking  You also may need to change the amount of other ingredients, such as water, in the recipe  Try the following:  · Use low-fat or light margarine instead of regular margarine or shortening  · Use lean ground turkey breast or chicken, or lean ground beef (less than 5% fat) instead of hamburger  · Add 1 teaspoon of canola oil to 8 ounces of skim milk instead of using cream or half and half  · Use grated zucchini, carrots, or apples in breads instead of coconut  · Use blenderized, low-fat cottage cheese, plain tofu, or low-fat ricotta cheese instead of cream cheese  · Use 1 egg white and 1 teaspoon of canola oil, or use ¼ cup (2 ounces) of fat-free egg substitute instead of a whole egg  · Replace half of the oil that is called for in a recipe with applesauce when you bake  Use 3 tablespoons of cocoa powder and 1 tablespoon of canola oil instead of a square of baking chocolate  How to increase fiber:  Eat enough high-fiber foods to get 20 to 30 grams of fiber every day  Slowly increase your fiber intake to avoid stomach cramps, gas, and other problems  · Eat 3 ounces of whole-grain foods each day  An ounce is about 1 slice of bread  Eat whole-grain breads, such as whole-wheat bread  Whole wheat, whole-wheat flour, or other whole grains should be listed as the first ingredient on the food label  Replace white flour with whole-grain flour or use half of each in recipes  Whole-grain flour is heavier than white flour, so you may have to add more yeast or baking powder  · Eat a high-fiber cereal for breakfast   Oatmeal is a good source of soluble fiber  Look for cereals that have bran or fiber in the name  Choose whole-grain products, such as brown rice, barley, and whole-wheat pasta  · Eat more beans, peas, and lentils    For example, add beans to soups or salads  Eat at least 5 cups of fruits and vegetables each day  Eat fruits and vegetables with the peel because the peel is high in fiber  © 2017 [address] [employer] Information is for End User's use only and may not be sold, redistributed or otherwise used for commercial purposes  All illustrations and images included in CareNotes® are the copyrighted property of [employer]  or Trevor Brown  The above information is an  only  It is not intended as medical advice for individual conditions or treatments  Talk to your doctor, nurse or pharmacist before following any medical regimen to see if it is safe and effective for you  Heart Healthy Diet   AMBULATORY CARE:   A heart healthy diet  is an eating plan low in total fat, unhealthy fats, and sodium (salt)  A heart healthy diet helps decrease your risk for heart disease and stroke  Limit the amount of fat you eat to 25% to 35% of your total daily calories  Limit sodium to less than 2,300 mg each day  Healthy fats:  Healthy fats can help improve cholesterol levels  The risk for heart disease is decreased when cholesterol levels are normal  Choose healthy fats, such as the following:  · Unsaturated fat  is found in foods such as soybean, canola, olive, corn, and safflower oils  It is also found in soft tub margarine that is made with liquid vegetable oil  · Omega-3 fat  is found in certain fish, such as salmon, tuna, and trout, and in walnuts and flaxseed  Unhealthy fats:  Unhealthy fats can cause unhealthy cholesterol levels in your blood and increase your risk of heart disease  Limit unhealthy fats, such as the following:  · Cholesterol  is found in animal foods, such as eggs and lobster, and in dairy products made from whole milk  Limit cholesterol to less than 300 milligrams (mg) each day  You may need to limit cholesterol to 200 mg each day if you have heart disease       · Saturated fat  is found in meats, such as clayton and hamburger  It is also found in chicken or turkey skin, whole milk, and butter  Limit saturated fat to less than 7% of your total daily calories  Limit saturated fat to less than 6% if you have heart disease or are at increased risk for it  · Trans fat  is found in packaged foods, such as potato chips and cookies  It is also in hard margarine, some fried foods, and shortening  Avoid trans fats as much as possible    Heart healthy foods and drinks to include:  Ask your dietitian or healthcare provider how many servings to have from each of the following food groups:  · Grains:      ¨ Whole-wheat breads, cereals, and pastas, and brown rice    ¨ Low-fat, low-sodium crackers and chips    · Vegetables:      ¨ Broccoli, green beans, green peas, and spinach    ¨ Collards, kale, and lima beans    ¨ Carrots, sweet potatoes, tomatoes, and peppers    ¨ Canned vegetables with no salt added    · Fruits:      ¨ Bananas, peaches, pears, and pineapple    ¨ Grapes, raisins, and dates    ¨ Oranges, tangerines, grapefruit, orange juice, and grapefruit juice    ¨ Apricots, mangoes, melons, and papaya    ¨ Raspberries and strawberries    ¨ Canned fruit with no added sugar    · Low-fat dairy products:      ¨ Nonfat (skim) milk, 1% milk, and low-fat almond, cashew, or soy milks fortified with calcium    ¨ Low-fat cheese, regular or frozen yogurt, and cottage cheese    · Meats and proteins , such as lean cuts of beef and pork (loin, leg, round), skinless chicken and turkey, legumes, soy products, egg whites, and nuts  Foods and drinks to limit or avoid:  Ask your dietitian or healthcare provider about these and other foods that are high in unhealthy fat, sodium, and sugar:  · Snack or packaged foods , such as frozen dinners, cookies, macaroni and cheese, and cereals with more than 300 mg of sodium per serving    · Canned or dry mixes  for cakes, soups, sauces, or gravies    · Vegetables with added sodium , such as instant potatoes, vegetables with added sauces, or regular canned vegetables    · Other foods high in sodium , such as ketchup, barbecue sauce, salad dressing, pickles, olives, soy sauce, and miso    · High-fat dairy foods  such as whole or 2% milk, cream cheese, or sour cream, and cheeses     · High-fat protein foods  such as high-fat cuts of beef (T-bone steaks, ribs), chicken or turkey with skin, and organ meats, such as liver    · Cured or smoked meats , such as hot dogs, clayton, and sausage    · Unhealthy fats and oils , such as butter, stick margarine, shortening, and cooking oils such as coconut or palm oil    · Food and drinks high in sugar , such as soft drinks (soda), sports drinks, sweetened tea, candy, cake, cookies, pies, and doughnuts  Other diet guidelines to follow:   · Eat more foods containing omega-3 fats  Eat fish high in omega-3 fats at least 2 times a week  · Limit alcohol  Too much alcohol can damage your heart and raise your blood pressure  Women should limit alcohol to 1 drink a day  Men should limit alcohol to 2 drinks a day  A drink of alcohol is 12 ounces of beer, 5 ounces of wine, or 1½ ounces of liquor  · Choose low-sodium foods  High-sodium foods can lead to high blood pressure  Add little or no salt to food you prepare  Use herbs and spices in place of salt  · Eat more fiber  to help lower cholesterol levels  Eat at least 5 servings of fruits and vegetables each day  Eat 3 ounces of whole-grain foods each day  Legumes (beans) are also a good source of fiber  Lifestyle guidelines:   · Do not smoke  Nicotine and other chemicals in cigarettes and cigars can cause lung and heart damage  Ask your healthcare provider for information if you currently smoke and need help to quit  E-cigarettes or smokeless tobacco still contain nicotine  Talk to your healthcare provider before you use these products       · Exercise regularly  to help you maintain a healthy weight and improve your blood pressure and cholesterol levels  Ask your healthcare provider about the best exercise plan for you  Do not start an exercise program without asking your healthcare provider  Follow up with your healthcare provider as directed:  Write down your questions so you remember to ask them during your visits  © 2017 [address] [employer] Information is for End User's use only and may not be sold, redistributed or otherwise used for commercial purposes  All illustrations and images included in CareNotes® are the copyrighted property of [employer]  or Trevor Brown  The above information is an  only  It is not intended as medical advice for individual conditions or treatments  Talk to your doctor, nurse or pharmacist before following any medical regimen to see if it is safe and effective for you  Calorie Counting Diet   WHAT YOU NEED TO KNOW:   What is a calorie counting diet? It is a meal plan based on counting calories each day to reach a healthy body weight  You will need to eat fewer calories if you are trying to lose weight  Weight loss may decrease your risk for certain health problems or improve your health if you have health problems  Some of these health problems include heart disease, high blood pressure, and diabetes  What foods should I avoid? Your dietitian will tell you if you need to avoid certain foods based on your body weight and health condition  You may need to avoid high-fat foods if you are at risk for or have heart disease  You may need to eat fewer foods from the breads and starches food group if you have diabetes  How many calories are in foods? The following is a list of foods and drinks with the approximate number of calories in each  Check the food label to find the exact number of calories  A dietitian can tell you how many calories you should have from each food group each day    · Carbohydrate:      ¨ ½ of a 3-inch bagel, 1 slice of bread, or ½ of a hamburger bun or hot dog bun (80)    ¨ 1 (8-inch) flour tortilla or ½ cup of cooked rice (100)    ¨ 1 (6-inch) corn tortilla (80)    ¨ 1 (6-inch) pancake or 1 cup of bran flakes cereal (110)    ¨ ½ cup of cooked cereal (80)    ¨ ½ cup of cooked pasta (85)    ¨ 1 ounce of pretzels (100)    ¨ 3 cups of air-popped popcorn without butter or oil (80)    · Dairy:      ¨ 1 cup of skim or 1% milk (90)    ¨ 1 cup of 2% milk (120)    ¨ 1 cup of whole milk (160)    ¨ 1 cup of 2% chocolate milk (220)    ¨ 1 ounce of low-fat cheese with 3 grams of fat per ounce (70)    ¨ 1 ounce of cheddar cheese (114)    ¨ ½ cup of 1% fat cottage cheese (80)    ¨ 1 cup of plain or sugar-free, fat-free yogurt (90)    · Protein foods:      ¨ 3 ounces of fish (not breaded or fried) (95)    ¨ 3 ounces of breaded, fried fish (195)    ¨ ¾ cup of tuna canned in water (105)    ¨ 3 ounces of chicken breast without skin (105)    ¨ 1 fried chicken breast with skin (350)    ¨ ¼ cup of fat free egg substitute (40)    ¨ 1 large egg (75)    ¨ 3 ounces of lean beef or pork (165)    ¨ 3 ounces of fried pork chop or ham (185)    ¨ ½ cup of cooked dried beans, such as kidney, bartholomew, lentils, or navy (115)    ¨ 3 ounces of bologna or lunch meat (225)    ¨ 2 links of breakfast sausage (140)    · Vegetables:      ¨ ½ cup of sliced mushrooms (10)    ¨ 1 cup of salad greens, such as lettuce, spinach, or dolores (15)    ¨ ½ cup of steamed asparagus (20)    ¨ ½ cup of cooked summer squash, zucchini squash, or green or wax beans (25)    ¨ 1 cup of broccoli or cauliflower florets, or 1 medium tomato (25)    ¨ 1 large raw carrot or ½ cup of cooked carrots (40)    ¨ ? of a medium cucumber or 1 stalk of celery (5)    ¨ 1 small baked potato (160)    ¨ 1 cup of breaded, fried vegetables (230)    · Fruit:      ¨ 1 (6-inch) banana (55)     ¨ ½ of a 4-inch grapefruit (55)    ¨ 15 grapes (60)    ¨ 1 medium orange or apple (70)    ¨ 1 large peach (65)    ¨ 1 cup of fresh pineapple chunks (75)    ¨ 1 cup of melon cubes (50)    ¨ 1¼ cups of whole strawberries (45)    ¨ ½ cup of fruit canned in juice (55)    ¨ ½ cup of fruit canned in heavy syrup (110)    ¨ ?  cup of raisins (130)    ¨ ½ cup of unsweetened fruit juice (60)    ¨ ½ cup of grape, cranberry, or prune juice (90)    · Fat:      ¨ 10 peanuts or 2 teaspoons of peanut butter (55)    ¨ 2 tablespoons of avocado or 1 tablespoon of regular salad dressing (45)    ¨ 2 slices of clayton (90)    ¨ 1 teaspoon of oil, such as safflower, canola, corn, or olive oil (45)    ¨ 2 teaspoons of low-fat margarine, or 1 tablespoon of low-fat mayonnaise (50)    ¨ 1 teaspoon of regular margarine (40)    ¨ 1 tablespoon of regular mayonnaise (135)    ¨ 1 tablespoon of cream cheese or 2 tablespoons of low-fat cream cheese (45)    ¨ 2 tablespoons of vegetable shortening (215)    · Dessert and sweets:      ¨ 8 animal crackers or 5 vanilla wafers (80)    ¨ 1 frozen fruit juice bar (80)    ¨ ½ cup of ice milk or low-fat frozen yogurt (90)    ¨ ½ cup of sherbet or sorbet (125)    ¨ ½ cup of sugar-free pudding or custard (60)    ¨ ½ cup of ice cream (140)    ¨ ½ cup of pudding or custard (175)    ¨ 1 (2-inch) square chocolate brownie (185)    · Combination foods:      ¨ Bean burrito made with an 8-inch tortilla, without cheese (275)    ¨ Chicken breast sandwich with lettuce and tomato (325)    ¨ 1 cup of chicken noodle soup (60)    ¨ 1 beef taco (175)    ¨ Regular hamburger with lettuce and tomato (310)    ¨ Regular cheeseburger with lettuce and tomato (410)     ¨ ¼ of a 12-inch cheese pizza (280)    ¨ Fried fish sandwich with lettuce and tomato (425)    ¨ Hot dog and bun (275)    ¨ 1½ cups of macaroni and cheese (310)    ¨ Taco salad with a fried tortilla shell (870)    · Low-calorie foods:      ¨ 1 tablespoon of ketchup or 1 tablespoon of fat free sour cream (15)    ¨ 1 teaspoon of mustard (5)    ¨ ¼ cup of salsa (20)    ¨ 1 large dill pickle (15)    ¨ 1 tablespoon of fat free salad dressing (10)    ¨ 2 teaspoons of low-sugar, light jam or jelly, or 1 tablespoon of sugar-free syrup (15)    ¨ 1 sugar-free popsicle (15)    ¨ 1 cup of club soda, seltzer water, or diet soda (0)  CARE AGREEMENT:   You have the right to help plan your care  Discuss treatment options with your caregivers to decide what care you want to receive  You always have the right to refuse treatment  The above information is an  only  It is not intended as medical advice for individual conditions or treatments  Talk to your doctor, nurse or pharmacist before following any medical regimen to see if it is safe and effective for you  © 2017 [address] [employer] Information is for End User's use only and may not be sold, redistributed or otherwise used for commercial purposes  All illustrations and images included in CareNotes® are the copyrighted property of [employer]  or Trevor Brown

## 2019-02-28 NOTE — PROGRESS NOTES
Assessment and Plan:    Problem List Items Addressed This Visit     None      Visit Diagnoses     Medicare annual wellness visit, subsequent    -  Primary    Screen for colon cancer        Relevant Orders    Occult Blood, Fecal Immunochemical    Postmenopausal        Relevant Orders    DXA bone density spine hip and pelvis        Health Maintenance Due   Topic Date Due    Hepatitis C Screening  1945   Kimberly Domingo Medicare Annual Wellness Visit (AWV)  1945    CRC Screening: Colonoscopy  1945    BMI: Followup Plan  02/15/1965         HPI:  Felix Trinidad is a 69 y o  female here for her Subsequent Wellness Visit      Patient Active Problem List   Diagnosis    Benign hypertension    Depression with anxiety    Mild intermittent asthma     Past Medical History:   Diagnosis Date    Asthma     [facility] [address]     Diabetes mellitus [facility] [address]     Glaucoma     Hypertension     Neuropathy      Past Surgical History:   Procedure Laterality Date     SECTION      HYSTERECTOMY      OOPHORECTOMY      TONSILLECTOMY       Family History   Problem Relation Age of Onset    Diabetes Mother     Hypertension Father     Prostate cancer Father     Diabetes Sister     Hypertension Sister     Diabetes Brother     Hypertension Brother     Asthma Family      Social History     Tobacco Use   Smoking Status Never Smoker   Smokeless Tobacco Never Used     Social History     Substance and Sexual Activity   Alcohol Use No      Social History     Substance and Sexual Activity   Drug Use No       Current Outpatient Medications   Medication Sig Dispense Refill    albuterol (VENTOLIN HFA) 90 mcg/act inhaler Inhale 1 puff as needed      enalapril (VASOTEC) 10 mg tablet Take 10 mg by mouth 2 (two) times a day      fluticasone-salmeterol (ADVAIR DISKUS) 250-50 mcg/dose inhaler Inhale 1 puff as needed      glucose blood (ONETOUCH VERIO) test strip E11 9 / testing daily      Lidocaine HCl 3 % CREA Apply 1 application topically as needed      melatonin 3 mg Take 6 mg by mouth      mometasone (ELOCON) 0 1 % cream Apply topically daily 45 g 0    pregabalin (LYRICA) 150 mg capsule Take 150 mg by mouth daily      sertraline (ZOLOFT) 50 mg tablet Take 50 mg by mouth daily      SITagliptin-MetFORMIN HCl ER (JANUMET XR) 100-1000 MG TB24 Take 1 tablet by mouth daily at bedtime      travoprost (TRAVATAN Z) 0 004 % ophthalmic solution Apply 1 drop to eye daily at bedtime      aspirin 81 mg chewable tablet Chew 81 mg daily      Mirabegron ER (MYRBETRIQ) 25 MG TB24 Take 1 tablet by mouth daily      ONETOUCH DELICA LANCETS 98Z MISC E11 9/ testing daily       No current facility-administered medications for this visit  Allergies   Allergen Reactions    Ciprofloxacin Itching    Levaquin [Levofloxacin] Swelling    Penicillins GI Intolerance       There is no immunization history on file for this patient  Patient Care Team:  Annika Gastelum as PCP - General (Family Medicine)  Glory Mcburney, MD Nancey Hoit, MD    Medicare Screening Tests and Risk Assessments:  Regina Hernandez is here for her Subsequent Wellness visit  Health Risk Assessment:  Patient rates overall health as good  Patient feels that their physical health rating is Same  Eyesight was rated as Slightly worse  Hearing was rated as Slightly worse  Patient feels that their emotional and mental health rating is Slightly worse  Pain experienced by patient in the last 7 days has been None  Emotional/Mental Health:  Patient has been feeling nervous/anxious  Broken Bones/Falls: Fall Risk Assessment:    In the past year, patient has experienced: No history of falling in past year          Bladder/Bowel:  Patient has not leaked urine accidently in the last six months  Patient reports no loss of bowel control  Immunizations:  Patient has not had a flu vaccination within the last year  Patient has not received a pneumonia shot  Patient has not received a shingles shot  Patient has not received tetanus/diphtheria shot  Home Safety:  Patient has trouble with stairs inside or outside of their home  Patient currently reports that there are no safety hazards present in home, working smoke alarms, working carbon monoxide detectors  Preventative Screenings:   Breast cancer screening performed, no colon cancer screen completed, cholesterol screen completed, glaucoma eye exam completed,     Nutrition:  Current diet: Diabetic with servings of the following:    Medications:  Patient is currently taking over-the-counter supplements  Patient is able to manage medications  Lifestyle Choices:  Patient reports no tobacco use  Patient has not smoked or used tobacco in the past   Patient reports alcohol use  Patient drives a vehicle  Patient wears seat belt  Activities of Daily Living:  Can get out of bed by his or her self, able to dress self, able to make own meals, able to do own shopping, able to bathe self, can do own laundry/housekeeping, can manage own money, pay bills and track expenses    Previous Hospitalizations:  No hospitalization or ED visit in past 12 months        Advanced Directives:  Patient has decided on a power of   Patient has spoken to designated power of   Patient has completed advanced directive  Preventative Screening/Counseling:      Cardiovascular:      General: Risks and Benefits Discussed and Screening Current          Diabetes:      General: Risks and Benefits Discussed      Comments: Patient is overdue for labs  For God to have her labs to last November  Promises she will get them done  Colorectal Cancer:      General: Risks and Benefits Discussed      Due for studies: Fecal Occult Blood      Comments: The patient had her last colonoscopy done in [facility] daily  Will call for colonoscopy results          Breast Cancer:      General: Risks and Benefits Discussed and Screening Current          Cervical Cancer:      General: Risks and Benefits Discussed and Screening Not Indicated          Osteoporosis:      General: Risks and Benefits Discussed      Due for studies: DXA Appendicular          AAA:      General: Risks and Benefits Discussed and Screening Not Indicated          Glaucoma:      General: Risks and Benefits Discussed and Screening Current      Comments: Follows with Dr Trisha Hill from [facility]  HIV:      General: Risks and Benefits Discussed and Patient Declines          Hepatitis C:      General: Risks and Benefits Discussed and Patient Declines        Advanced Directives:   Patient has no living will for healthcare, does not have durable POA for healthcare, patient does not have an advanced directive  Information on ACP and/or AD provided  5 wishes given  End of life assessment reviewed with patient  Provider agrees with end of life decisions   Additional Comments: Would not want to be resuscitated if there were no hope for recovery  Immunizations:      Influenza: Risks & Benefits Discussed and Patient Declines      Pneumococcal: Risks & Benefits Discussed and Patient Declines      Shingrix: Risks & Benefits Discussed and Patient Declines      Hepatitis B (Medium to high risk patients): Risks & Benefits Discussed and Patient Declines      Zostavax: Patient Declines and Risks & Benefits Discussed      TDAP: Risks & Benefits Discussed and Tdap Vaccine Needed Today  Additional Comments: Pt was advised if she cuts or burns herself she would need a tetanus shot

## 2019-03-25 DIAGNOSIS — I10 BENIGN HYPERTENSION: ICD-10-CM

## 2019-03-25 RX ORDER — AMLODIPINE BESYLATE 5 MG/1
5 TABLET ORAL DAILY
Qty: 90 TABLET | Refills: 1 | Status: SHIPPED | OUTPATIENT
Start: 2019-03-25 | End: 2019-04-03 | Stop reason: DRUGHIGH

## 2019-04-01 ENCOUNTER — TELEPHONE (OUTPATIENT)
Dept: FAMILY MEDICINE CLINIC | Facility: CLINIC | Age: 74
End: 2019-04-01

## 2019-04-02 DIAGNOSIS — I10 BENIGN HYPERTENSION: Primary | ICD-10-CM

## 2019-04-03 RX ORDER — AMLODIPINE BESYLATE 10 MG/1
10 TABLET ORAL DAILY
Qty: 90 TABLET | Refills: 1 | Status: SHIPPED | OUTPATIENT
Start: 2019-04-03 | End: 2019-09-30 | Stop reason: ALTCHOICE

## 2019-04-29 LAB
LEFT EYE DIABETIC RETINOPATHY: NORMAL
RIGHT EYE DIABETIC RETINOPATHY: NORMAL

## 2019-07-16 ENCOUNTER — TELEPHONE (OUTPATIENT)
Dept: FAMILY MEDICINE CLINIC | Facility: CLINIC | Age: 74
End: 2019-07-16

## 2019-07-16 NOTE — TELEPHONE ENCOUNTER
Called patient to see which pharmacy she wanted this medication to be sent to   We got a refill request from [facility] for her amlodipine

## 2019-08-28 DIAGNOSIS — E11.40 CONTROLLED TYPE 2 DIABETES WITH NEUROPATHY (HCC): Primary | ICD-10-CM

## 2019-08-28 RX ORDER — SITAGLIPTIN AND METFORMIN HYDROCHLORIDE 100; 1000 MG/1; MG/1
TABLET, FILM COATED, EXTENDED RELEASE ORAL
Qty: 30 TABLET | Refills: 3 | Status: SHIPPED | OUTPATIENT
Start: 2019-08-28 | End: 2019-09-30 | Stop reason: SDUPTHER

## 2019-09-27 ENCOUNTER — APPOINTMENT (EMERGENCY)
Dept: CT IMAGING | Facility: HOSPITAL | Age: 74
End: 2019-09-27
Payer: MEDICARE

## 2019-09-27 ENCOUNTER — OFFICE VISIT (OUTPATIENT)
Dept: URGENT CARE | Facility: CLINIC | Age: 74
End: 2019-09-27
Payer: MEDICARE

## 2019-09-27 ENCOUNTER — HOSPITAL ENCOUNTER (EMERGENCY)
Facility: HOSPITAL | Age: 74
Discharge: HOME | End: 2019-09-27
Attending: EMERGENCY MEDICINE | Admitting: EMERGENCY MEDICINE
Payer: MEDICARE

## 2019-09-27 VITALS
WEIGHT: 203.6 LBS | HEIGHT: 62 IN | RESPIRATION RATE: 18 BRPM | DIASTOLIC BLOOD PRESSURE: 88 MMHG | SYSTOLIC BLOOD PRESSURE: 187 MMHG | BODY MASS INDEX: 37.47 KG/M2 | TEMPERATURE: 97.6 F | OXYGEN SATURATION: 95 % | HEART RATE: 67 BPM

## 2019-09-27 VITALS
HEIGHT: 62 IN | BODY MASS INDEX: 37.61 KG/M2 | DIASTOLIC BLOOD PRESSURE: 76 MMHG | RESPIRATION RATE: 18 BRPM | TEMPERATURE: 98.2 F | OXYGEN SATURATION: 93 % | HEART RATE: 60 BPM | WEIGHT: 204.4 LBS | SYSTOLIC BLOOD PRESSURE: 182 MMHG

## 2019-09-27 DIAGNOSIS — R10.31 RIGHT LOWER QUADRANT ABDOMINAL PAIN: Primary | ICD-10-CM

## 2019-09-27 DIAGNOSIS — R10.84 GENERALIZED ABDOMINAL PAIN: Primary | ICD-10-CM

## 2019-09-27 DIAGNOSIS — R19.7 DIARRHEA: ICD-10-CM

## 2019-09-27 DIAGNOSIS — R93.5 ABNORMAL CT OF THE ABDOMEN: ICD-10-CM

## 2019-09-27 LAB
ALBUMIN SERPL BCP-MCNC: 3.6 G/DL (ref 3.5–5)
ALP SERPL-CCNC: 74 U/L (ref 46–116)
ALT SERPL W P-5'-P-CCNC: 16 U/L (ref 12–78)
ANION GAP SERPL CALCULATED.3IONS-SCNC: 10 MMOL/L (ref 4–13)
AST SERPL W P-5'-P-CCNC: 17 U/L (ref 5–45)
BASOPHILS # BLD AUTO: 0.04 THOUSANDS/ΜL (ref 0–0.1)
BASOPHILS NFR BLD AUTO: 1 % (ref 0–1)
BILIRUB SERPL-MCNC: 0.3 MG/DL (ref 0.2–1)
BUN SERPL-MCNC: 14 MG/DL (ref 5–25)
CALCIUM SERPL-MCNC: 9.5 MG/DL (ref 8.3–10.1)
CHLORIDE SERPL-SCNC: 103 MMOL/L (ref 100–108)
CO2 SERPL-SCNC: 28 MMOL/L (ref 21–32)
CREAT SERPL-MCNC: 0.9 MG/DL (ref 0.6–1.3)
EOSINOPHIL # BLD AUTO: 0.21 THOUSAND/ΜL (ref 0–0.61)
EOSINOPHIL NFR BLD AUTO: 4 % (ref 0–6)
ERYTHROCYTE [DISTWIDTH] IN BLOOD BY AUTOMATED COUNT: 13.9 % (ref 11.6–15.1)
GFR SERPL CREATININE-BSD FRML MDRD: 74 ML/MIN/1.73SQ M
GLUCOSE SERPL-MCNC: 104 MG/DL (ref 65–140)
GLUCOSE SERPL-MCNC: 68 MG/DL (ref 65–140)
GLUCOSE SERPL-MCNC: 94 MG/DL (ref 65–140)
HCT VFR BLD AUTO: 41 % (ref 34.8–46.1)
HGB BLD-MCNC: 13.2 G/DL (ref 11.5–15.4)
IMM GRANULOCYTES # BLD AUTO: 0.01 THOUSAND/UL (ref 0–0.2)
IMM GRANULOCYTES NFR BLD AUTO: 0 % (ref 0–2)
LIPASE SERPL-CCNC: 170 U/L (ref 73–393)
LYMPHOCYTES # BLD AUTO: 2.22 THOUSANDS/ΜL (ref 0.6–4.47)
LYMPHOCYTES NFR BLD AUTO: 39 % (ref 14–44)
MCH RBC QN AUTO: 29.1 PG (ref 26.8–34.3)
MCHC RBC AUTO-ENTMCNC: 32.2 G/DL (ref 31.4–37.4)
MCV RBC AUTO: 91 FL (ref 82–98)
MONOCYTES # BLD AUTO: 0.34 THOUSAND/ΜL (ref 0.17–1.22)
MONOCYTES NFR BLD AUTO: 6 % (ref 4–12)
NEUTROPHILS # BLD AUTO: 2.81 THOUSANDS/ΜL (ref 1.85–7.62)
NEUTS SEG NFR BLD AUTO: 50 % (ref 43–75)
NRBC BLD AUTO-RTO: 0 /100 WBCS
PLATELET # BLD AUTO: 253 THOUSANDS/UL (ref 149–390)
PMV BLD AUTO: 9.7 FL (ref 8.9–12.7)
POTASSIUM SERPL-SCNC: 4.2 MMOL/L (ref 3.5–5.3)
PROT SERPL-MCNC: 8.6 G/DL (ref 6.4–8.2)
RBC # BLD AUTO: 4.53 MILLION/UL (ref 3.81–5.12)
SODIUM SERPL-SCNC: 141 MMOL/L (ref 136–145)
WBC # BLD AUTO: 5.63 THOUSAND/UL (ref 4.31–10.16)

## 2019-09-27 PROCEDURE — 99285 EMERGENCY DEPT VISIT HI MDM: CPT | Performed by: EMERGENCY MEDICINE

## 2019-09-27 PROCEDURE — G0463 HOSPITAL OUTPT CLINIC VISIT: HCPCS | Performed by: PHYSICIAN ASSISTANT

## 2019-09-27 PROCEDURE — 83690 ASSAY OF LIPASE: CPT | Performed by: EMERGENCY MEDICINE

## 2019-09-27 PROCEDURE — 85025 COMPLETE CBC W/AUTO DIFF WBC: CPT | Performed by: EMERGENCY MEDICINE

## 2019-09-27 PROCEDURE — 96375 TX/PRO/DX INJ NEW DRUG ADDON: CPT

## 2019-09-27 PROCEDURE — 74177 CT ABD & PELVIS W/CONTRAST: CPT

## 2019-09-27 PROCEDURE — 99284 EMERGENCY DEPT VISIT MOD MDM: CPT

## 2019-09-27 PROCEDURE — 82948 REAGENT STRIP/BLOOD GLUCOSE: CPT

## 2019-09-27 PROCEDURE — 36415 COLL VENOUS BLD VENIPUNCTURE: CPT | Performed by: EMERGENCY MEDICINE

## 2019-09-27 PROCEDURE — 80053 COMPREHEN METABOLIC PANEL: CPT | Performed by: EMERGENCY MEDICINE

## 2019-09-27 PROCEDURE — 96361 HYDRATE IV INFUSION ADD-ON: CPT

## 2019-09-27 PROCEDURE — 96374 THER/PROPH/DIAG INJ IV PUSH: CPT

## 2019-09-27 PROCEDURE — 99213 OFFICE O/P EST LOW 20 MIN: CPT | Performed by: PHYSICIAN ASSISTANT

## 2019-09-27 RX ORDER — ONDANSETRON 4 MG/1
4 TABLET, ORALLY DISINTEGRATING ORAL EVERY 8 HOURS PRN
Qty: 12 TABLET | Refills: 0 | Status: SHIPPED | OUTPATIENT
Start: 2019-09-27 | End: 2020-03-12

## 2019-09-27 RX ORDER — DICYCLOMINE HCL 20 MG
20 TABLET ORAL EVERY 6 HOURS
Qty: 20 TABLET | Refills: 0 | Status: SHIPPED | OUTPATIENT
Start: 2019-09-27 | End: 2019-10-15 | Stop reason: SDUPTHER

## 2019-09-27 RX ORDER — MORPHINE SULFATE 4 MG/ML
4 INJECTION, SOLUTION INTRAMUSCULAR; INTRAVENOUS ONCE
Status: COMPLETED | OUTPATIENT
Start: 2019-09-27 | End: 2019-09-27

## 2019-09-27 RX ORDER — ONDANSETRON 2 MG/ML
4 INJECTION INTRAMUSCULAR; INTRAVENOUS ONCE
Status: COMPLETED | OUTPATIENT
Start: 2019-09-27 | End: 2019-09-27

## 2019-09-27 RX ADMIN — IOHEXOL 100 ML: 350 INJECTION, SOLUTION INTRAVENOUS at 19:27

## 2019-09-27 RX ADMIN — MORPHINE SULFATE 4 MG: 4 INJECTION INTRAVENOUS at 20:30

## 2019-09-27 RX ADMIN — ONDANSETRON 4 MG: 2 INJECTION INTRAMUSCULAR; INTRAVENOUS at 20:30

## 2019-09-27 RX ADMIN — SODIUM CHLORIDE 1000 ML: 0.9 INJECTION, SOLUTION INTRAVENOUS at 20:30

## 2019-09-27 NOTE — PROGRESS NOTES
[facility]        NAME: Cecilia Padilla is a 69 y o  female  : 1945    MRN: [id number]  DATE: 2019  TIME: 5:35 PM    Assessment and Plan   Right lower quadrant abdominal pain [R10 31]  1  Right lower quadrant abdominal pain  Transfer to other facility         Patient Instructions       Follow up with PCP in 3-5 days  Proceed to  ER if symptoms worsen  Chief Complaint     Chief Complaint   Patient presents with    Abdominal Pain     pt states she has had nausea and stomach pain for a week, some diarrhea, no vomiting   Nausea         History of Present Illness       62-year-old female presents with abdominal pain that has been waxing waning for the past week  Patient reports no sore throat ear pain  Previous  for surgical history  Still has appendix and gallbladder  Denies any dysuria or urinary frequency  No chest pain shortness of breath  Abdominal Pain   This is a new problem  The current episode started in the past 7 days  The onset quality is sudden  The problem occurs constantly  The problem has been waxing and waning  The pain is located in the RLQ  The pain is moderate  The quality of the pain is aching  The abdominal pain does not radiate  Associated symptoms include diarrhea  Pertinent negatives include no constipation or hematochezia  The pain is relieved by nothing  She has tried nothing for the symptoms  The treatment provided no relief  Her past medical history is significant for abdominal surgery ()  Review of Systems   Review of Systems   Constitutional: Negative  HENT: Negative  Respiratory: Negative  Cardiovascular: Negative  Gastrointestinal: Positive for abdominal pain and diarrhea  Negative for constipation and hematochezia  Musculoskeletal: Negative  Skin: Negative  Neurological: Negative            Current Medications       Current Outpatient Medications:     albuterol (VENTOLIN HFA) 90 mcg/act inhaler, Inhale 1 puff as needed, Disp: , Rfl:     enalapril (VASOTEC) 10 mg tablet, Take 10 mg by mouth 2 (two) times a day, Disp: , Rfl:     fluticasone-salmeterol (ADVAIR DISKUS) 250-50 mcg/dose inhaler, Inhale 1 puff as needed, Disp: , Rfl:     glucose blood (ONETOUCH VERIO) test strip, E11 9 / testing daily, Disp: , Rfl:     JANUMET -1000 MG TB24, TAKE 1 TABLET BY MOUTH EVERY DAY, Disp: 30 tablet, Rfl: 3    melatonin 3 mg, Take 6 mg by mouth, Disp: , Rfl:     ONETOUCH DELICA LANCETS 28X MISC, E11 9/ testing daily, Disp: , Rfl:     pregabalin (LYRICA) 150 mg capsule, Take 150 mg by mouth daily, Disp: , Rfl:     sertraline (ZOLOFT) 50 mg tablet, Take 50 mg by mouth daily, Disp: , Rfl:     travoprost (TRAVATAN Z) 0 004 % ophthalmic solution, Apply 1 drop to eye daily at bedtime, Disp: , Rfl:     amLODIPine (NORVASC) 10 mg tablet, Take 1 tablet (10 mg total) by mouth daily (Patient not taking: Reported on 2019), Disp: 90 tablet, Rfl: 1    aspirin 81 mg chewable tablet, Chew 81 mg daily, Disp: , Rfl:     Mirabegron ER (MYRBETRIQ) 25 MG TB24, Take 1 tablet by mouth daily, Disp: , Rfl:     mometasone (ELOCON) 0 1 % cream, Apply topically daily (Patient not taking: Reported on 2019), Disp: 45 g, Rfl: 0    Current Allergies     Allergies as of 2019 - Reviewed 2019   Allergen Reaction Noted    Ciprofloxacin Itching 10/07/2005    Levaquin [levofloxacin] Swelling 2017    Penicillins GI Intolerance 2017            The following portions of the patient's history were reviewed and updated as appropriate: allergies, current medications, past family history, past medical history, past social history, past surgical history and problem list      Past Medical History:   Diagnosis Date    Asthma     [facility] [address]     Diabetes mellitus [facility] [address]     Glaucoma     Hypertension     Neuropathy        Past Surgical History:   Procedure Laterality Date     SECTION  HYSTERECTOMY      OOPHORECTOMY      TONSILLECTOMY         Family History   Problem Relation Age of Onset    Diabetes Mother     Hypertension Father     Prostate cancer Father     Diabetes Sister     Hypertension Sister     Cancer Sister     Diabetes Brother     Hypertension Brother     Asthma Family          Medications have been verified  Objective   BP (!) 187/88   Pulse 67   Temp 97 6 °F (36 4 °C) (Temporal)   Resp 18   Ht 5' 2" (1 575 m)   Wt 92 4 kg (203 lb 9 6 oz)   SpO2 95%   BMI 37 24 kg/m²        Physical Exam     Physical Exam   Constitutional: She is oriented to person, place, and time  She appears well-developed and well-nourished  No distress  HENT:   Head: Normocephalic and atraumatic  Right Ear: External ear normal    Left Ear: External ear normal    Nose: Nose normal    Mouth/Throat: Oropharynx is clear and moist  No oropharyngeal exudate  Eyes: Conjunctivae and EOM are normal  Right eye exhibits no discharge  Left eye exhibits no discharge  Neck: Normal range of motion  Neck supple  Cardiovascular: Normal rate, regular rhythm, normal heart sounds and intact distal pulses  No murmur heard  Pulmonary/Chest: Effort normal and breath sounds normal  No respiratory distress  She has no wheezes  She has no rales  Abdominal: Soft  Bowel sounds are normal  There is no hepatosplenomegaly, splenomegaly or hepatomegaly  There is tenderness (Moderate to severe) in the right lower quadrant  There is guarding and tenderness at McBurney's point  There is no rigidity, no rebound, no CVA tenderness and negative Mckinney's sign  No hernia  Musculoskeletal: Normal range of motion  Lymphadenopathy:     She has no cervical adenopathy  Neurological: She is alert and oriented to person, place, and time  Skin: Skin is warm and dry  Psychiatric: She has a normal mood and affect  Nursing note and vitals reviewed

## 2019-09-27 NOTE — ED PROVIDER NOTES
History  Chief Complaint   Patient presents with    Abdominal Pain     Patient reports abdominal pain for the last week or so  Patient reports nausea amd diarrhea  History provided by:  Patient  Abdominal Pain   Pain location:  Generalized  Pain quality: aching    Pain radiates to:  Does not radiate  Pain severity:  Moderate  Onset quality:  Gradual  Duration:  1 week  Timing:  Constant  Progression:  Unchanged  Chronicity:  New  Context: previous surgery (hysterectomy) and suspicious food intake (symptoms started after she ate a big salad)    Context: not eating    Relieved by: took some antacids and diarrhea medicines like mylanta  Worsened by:  Nothing  Ineffective treatments:  None tried  Associated symptoms: anorexia, diarrhea (had some watery brown diarrhea yesterday but none today because she hasn't eaten much) and nausea    Associated symptoms: no belching, no chest pain, no chills, no dysuria, no fatigue, no fever, no flatus, no hematuria, no shortness of breath, no sore throat and no vomiting        Prior to Admission Medications   Prescriptions Last Dose Informant Patient Reported? Taking?    JANUMET -1000 MG TB24   No No   Sig: TAKE 1 TABLET BY MOUTH EVERY DAY   Mirabegron ER (MYRBETRIQ) 25 MG TB24   Yes No   Sig: Take 1 tablet by mouth daily   ONETOUCH DELICA LANCETS 55G MISC   Yes No   Sig: E11 9/ testing daily   albuterol (VENTOLIN HFA) 90 mcg/act inhaler   Yes No   Sig: Inhale 1 puff as needed   amLODIPine (NORVASC) 10 mg tablet   No No   Sig: Take 1 tablet (10 mg total) by mouth daily   Patient not taking: Reported on 9/27/2019   aspirin 81 mg chewable tablet   Yes No   Sig: Chew 81 mg daily   enalapril (VASOTEC) 10 mg tablet   Yes No   Sig: Take 10 mg by mouth 2 (two) times a day   fluticasone-salmeterol (ADVAIR DISKUS) 250-50 mcg/dose inhaler   Yes No   Sig: Inhale 1 puff as needed   glucose blood (ONETOUCH VERIO) test strip   Yes No   Sig: E11 9 / testing daily   melatonin 3 mg Yes No   Sig: Take 6 mg by mouth   mometasone (ELOCON) 0 1 % cream   No No   Sig: Apply topically daily   Patient not taking: Reported on 2019   pregabalin (LYRICA) 150 mg capsule   Yes No   Sig: Take 150 mg by mouth daily   sertraline (ZOLOFT) 50 mg tablet   Yes No   Sig: Take 50 mg by mouth daily   travoprost (TRAVATAN Z) 0 004 % ophthalmic solution   Yes No   Sig: Apply 1 drop to eye daily at bedtime      Facility-Administered Medications: None       Past Medical History:   Diagnosis Date    Asthma     Cardiac arrest ([facility] )     Diabetes mellitus ([facility] )     Glaucoma     Hypertension     Neuropathy        Past Surgical History:   Procedure Laterality Date     SECTION      HYSTERECTOMY      OOPHORECTOMY      TONSILLECTOMY         Family History   Problem Relation Age of Onset    Diabetes Mother     Hypertension Father     Prostate cancer Father     Diabetes Sister     Hypertension Sister     Cancer Sister     Diabetes Brother     Hypertension Brother     Asthma Family      I have reviewed and agree with the history as documented  Social History     Tobacco Use    Smoking status: Never Smoker    Smokeless tobacco: Never Used   Substance Use Topics    Alcohol use: No    Drug use: No        Review of Systems   Constitutional: Negative for chills, fatigue and fever  HENT: Negative for sore throat  Respiratory: Negative for shortness of breath  Cardiovascular: Negative for chest pain  Gastrointestinal: Positive for abdominal pain, anorexia, diarrhea (had some watery brown diarrhea yesterday but none today because she hasn't eaten much) and nausea  Negative for flatus and vomiting  Genitourinary: Negative for dysuria and hematuria  All other systems reviewed and are negative  Physical Exam  Physical Exam   Constitutional: She is oriented to person, place, and time  She appears well-developed and well-nourished  No distress     HENT:   Head: Normocephalic and atraumatic  Nose: Nose normal    Mouth/Throat: Oropharynx is clear and moist    Eyes: Conjunctivae and EOM are normal    Neck: Normal range of motion  Neck supple  Cardiovascular: Normal rate, regular rhythm and normal heart sounds  Pulmonary/Chest: Effort normal and breath sounds normal  No respiratory distress  Abdominal: Soft  She exhibits no distension  There is no tenderness  Musculoskeletal: Normal range of motion  She exhibits no edema  Neurological: She is alert and oriented to person, place, and time  Skin: Skin is warm and dry  No rash noted  She is not diaphoretic  No erythema  Psychiatric: She has a normal mood and affect  Nursing note and vitals reviewed        Vital Signs  ED Triage Vitals [09/27/19 1811]   Temperature Pulse Respirations Blood Pressure SpO2   98 2 °F (36 8 °C) 63 18 (!) 195/84 95 %      Temp Source Heart Rate Source Patient Position - Orthostatic VS BP Location FiO2 (%)   Oral Monitor Sitting Left arm --      Pain Score       7           Vitals:    09/27/19 1811 09/27/19 2100 09/27/19 2130   BP: (!) 195/84 (!) 213/85 (!) 182/76   Pulse: 63 67 60   Patient Position - Orthostatic VS: Sitting Lying Sitting         Visual Acuity      ED Medications  Medications   ondansetron (ZOFRAN) injection 4 mg (4 mg Intravenous Given 9/27/19 2030)   morphine (PF) 4 mg/mL injection 4 mg (4 mg Intravenous Given 9/27/19 2030)   sodium chloride 0 9 % bolus 1,000 mL (0 mL Intravenous Stopped 9/27/19 2147)   iohexol (OMNIPAQUE) 350 MG/ML injection (MULTI-DOSE) 100 mL (100 mL Intravenous Given 9/27/19 1927)       Diagnostic Studies  Results Reviewed     Procedure Component Value Units Date/Time    Fingerstick Glucose (POCT) [[id number]]  (Normal) Collected:  09/27/19 2139    Lab Status:  Final result Updated:  09/27/19 2145     POC Glucose 104 mg/dl     Fingerstick Glucose (POCT) [[id number]]  (Normal) Collected:  09/27/19 2051    Lab Status:  Final result Updated:  09/27/19 2053     POC Glucose 68 mg/dl     Comprehensive metabolic panel [[id number]]  (Abnormal) Collected:  09/27/19 1854    Lab Status:  Final result Specimen:  Blood from Arm, Left Updated:  09/27/19 1916     Sodium 141 mmol/L      Potassium 4 2 mmol/L      Chloride 103 mmol/L      CO2 28 mmol/L      ANION GAP 10 mmol/L      BUN 14 mg/dL      Creatinine 0 90 mg/dL      Glucose 94 mg/dL      Calcium 9 5 mg/dL      AST 17 U/L      ALT 16 U/L      Alkaline Phosphatase 74 U/L      Total Protein 8 6 g/dL      Albumin 3 6 g/dL      Total Bilirubin 0 30 mg/dL      eGFR 74 ml/min/1 73sq m     Narrative:       [place] guidelines for Chronic Kidney Disease (CKD):     Stage 1 with normal or high GFR (GFR > 90 mL/min/1 73 square meters)    Stage 2 Mild CKD (GFR = 60-89 mL/min/1 73 square meters)    Stage 3A Moderate CKD (GFR = 45-59 mL/min/1 73 square meters)    Stage 3B Moderate CKD (GFR = 30-44 mL/min/1 73 square meters)    Stage 4 Severe CKD (GFR = 15-29 mL/min/1 73 square meters)    Stage 5 End Stage CKD (GFR <15 mL/min/1 73 square meters)  Note: GFR calculation is accurate only with a steady state creatinine    Lipase [[id number]]  (Normal) Collected:  09/27/19 1854    Lab Status:  Final result Specimen:  Blood from Arm, Left Updated:  09/27/19 1916     Lipase 170 u/L     CBC and differential [[id number]] Collected:  09/27/19 1854    Lab Status:  Final result Specimen:  Blood from Arm, Left Updated:  09/27/19 1903     WBC 5 63 Thousand/uL      RBC 4 53 Million/uL      Hemoglobin 13 2 g/dL      Hematocrit 41 0 %      MCV 91 fL      MCH 29 1 pg      MCHC 32 2 g/dL      RDW 13 9 %      MPV 9 7 fL      Platelets 931 Thousands/uL      nRBC 0 /100 WBCs      Neutrophils Relative 50 %      Immat GRANS % 0 %      Lymphocytes Relative 39 %      Monocytes Relative 6 %      Eosinophils Relative 4 %      Basophils Relative 1 %      Neutrophils Absolute 2 81 Thousands/µL      Immature Grans Absolute 0 01 Thousand/uL Lymphocytes Absolute 2 22 Thousands/µL      Monocytes Absolute 0 34 Thousand/µL      Eosinophils Absolute 0 21 Thousand/µL      Basophils Absolute 0 04 Thousands/µL                  CT abdomen pelvis with contrast   Final Result by Janessa Grubbs MD (09/27 1954)      1  Nonobstructing left renal calculi measure up to 2 mm   2  Cystic structure near the right cervix, concerning etiology  Recommend nonemergent transvaginal ultrasound      The study was marked in EPIC for significant notification  Workstation performed: [id number]                    Procedures  Procedures       ED Course  ED Course as of Sep 27 2300   Fri Sep 27, 2019   2117 Discussed with patient her labs and her CT scan results, including that they saw an incidental kidney stone and that she had a right sided cystic structure near her cervix  Patient discussed with her that this needs follow-up ultrasound to evaluate further and that it could be concerning for cancer and that she needs to call her family doctor Monday and arrange an outpatient ultrasound for evaluation a soon as possible  Her symptoms of her nausea vomiting and diarrhea for actually all improved  She feels better  Will send her with Zofran and Bentyl for her symptoms  Copy of her CT scan will be printed out highlighted for her for follow-up                                    MDM  Number of Diagnoses or Management Options  Abnormal CT of the abdomen: new and requires workup  Diarrhea: new and requires workup  Generalized abdominal pain: new and requires workup     Amount and/or Complexity of Data Reviewed  Clinical lab tests: ordered and reviewed  Tests in the radiology section of CPT®: ordered and reviewed    Risk of Complications, Morbidity, and/or Mortality  Presenting problems: high    Patient Progress  Patient progress: stable      Disposition  Final diagnoses:   Generalized abdominal pain   Diarrhea   Abnormal CT of the abdomen     Time reflects when diagnosis was documented in both MDM as applicable and the Disposition within this note     Time User Action Codes Description Comment    9/27/2019  9:25 PM Atiya Napoles Add [R10 84] Generalized abdominal pain     9/27/2019  9:26 PM Aakash Perales [address] [R19 7] Diarrhea     9/27/2019  9:26 PM Adrien [address] [R93 5] Abnormal CT of the abdomen       ED Disposition     ED Disposition Condition Date/Time Comment    Discharge Stable Fri Sep 27, 2019  9:27 PM Zachary Agosto discharge to home/self care              Follow-up Information     Follow up With Specialties Details Why Contact Info Additional [address] [facility] Emergency Department Emergency Medicine Go to  If symptoms worsen [address] [facility] [address] [phone] [place] ED, [address], [place], [address] [facility], [address] [place] [address], Nurse Practitioner Call  In 1 week to make sure you follow-up to get an ultrasound from your abnormal CT  [address] DWAYNE [place] [address]  [phone]             Discharge Medication List as of 9/27/2019  9:33 PM      START taking these medications    Details   dicyclomine (BENTYL) 20 mg tablet Take 1 tablet (20 mg total) by mouth every 6 (six) hours for 20 doses, Starting Fri 9/27/2019, Until Wed 10/2/2019, Print      ondansetron (ZOFRAN-ODT) 4 mg disintegrating tablet Take 1 tablet (4 mg total) by mouth every 8 (eight) hours as needed for nausea or vomiting, Starting Fri 9/27/2019, Until Sun 10/27/2019, Print         CONTINUE these medications which have NOT CHANGED    Details   albuterol (VENTOLIN HFA) 90 mcg/act inhaler Inhale 1 puff as needed, Historical Med      amLODIPine (NORVASC) 10 mg tablet Take 1 tablet (10 mg total) by mouth daily, Starting Wed 4/3/2019, Normal      aspirin 81 mg chewable tablet Chew 81 mg daily, Historical Med      enalapril (VASOTEC) 10 mg tablet Take 10 mg by mouth 2 (two) times a day, Starting Fri 10/7/2005, Historical Med      fluticasone-salmeterol (ADVAIR DISKUS) 250-50 mcg/dose inhaler Inhale 1 puff as needed, Historical Med      glucose blood (ONETOUCH VERIO) test strip E11 9 / testing daily, Historical Med      JANUMET -1000 MG TB24 TAKE 1 TABLET BY MOUTH EVERY DAY, Normal      melatonin 3 mg Take 6 mg by mouth, Starting Fri 10/19/2018, Until Sat 10/19/2019, Historical Med      Mirabegron ER (MYRBETRIQ) 25 MG TB24 Take 1 tablet by mouth daily, Starting Fri 12/15/2017, Historical Med      mometasone (ELOCON) 0 1 % cream Apply topically daily, Starting Fri 11/30/2018, Normal      ONETOUCH DELICA LANCETS 00A MISC E11 9/ testing daily, Historical Med      pregabalin (LYRICA) 150 mg capsule Take 150 mg by mouth daily, Starting Tue 5/15/2007, Historical Med      sertraline (ZOLOFT) 50 mg tablet Take 50 mg by mouth daily, Starting Fri 10/7/2005, Historical Med      travoprost (TRAVATAN Z) 0 004 % ophthalmic solution Apply 1 drop to eye daily at bedtime, Historical Med           No discharge procedures on file      ED Provider  Electronically Signed by           Deng Samaniego MD  09/27/19 0869

## 2019-09-30 ENCOUNTER — OFFICE VISIT (OUTPATIENT)
Dept: FAMILY MEDICINE CLINIC | Facility: CLINIC | Age: 74
End: 2019-09-30
Payer: MEDICARE

## 2019-09-30 ENCOUNTER — APPOINTMENT (OUTPATIENT)
Dept: LAB | Facility: HOSPITAL | Age: 74
End: 2019-09-30
Payer: MEDICARE

## 2019-09-30 VITALS
SYSTOLIC BLOOD PRESSURE: 144 MMHG | HEART RATE: 75 BPM | DIASTOLIC BLOOD PRESSURE: 80 MMHG | BODY MASS INDEX: 38.09 KG/M2 | HEIGHT: 62 IN | WEIGHT: 207 LBS | OXYGEN SATURATION: 94 % | TEMPERATURE: 98 F

## 2019-09-30 DIAGNOSIS — R77.8 ELEVATED TOTAL PROTEIN: ICD-10-CM

## 2019-09-30 DIAGNOSIS — Z11.59 ENCOUNTER FOR HEPATITIS C SCREENING TEST FOR LOW RISK PATIENT: ICD-10-CM

## 2019-09-30 DIAGNOSIS — J45.20 MILD INTERMITTENT ASTHMA, UNSPECIFIED WHETHER COMPLICATED: ICD-10-CM

## 2019-09-30 DIAGNOSIS — R35.0 URINARY FREQUENCY: ICD-10-CM

## 2019-09-30 DIAGNOSIS — Z12.11 SCREENING FOR MALIGNANT NEOPLASM OF COLON: ICD-10-CM

## 2019-09-30 DIAGNOSIS — I10 BENIGN HYPERTENSION: ICD-10-CM

## 2019-09-30 DIAGNOSIS — Z12.39 SCREENING FOR MALIGNANT NEOPLASM OF BREAST: ICD-10-CM

## 2019-09-30 DIAGNOSIS — E11.40 CONTROLLED TYPE 2 DIABETES WITH NEUROPATHY (HCC): Primary | ICD-10-CM

## 2019-09-30 DIAGNOSIS — N88.8 MASS OF CERVIX: ICD-10-CM

## 2019-09-30 DIAGNOSIS — F41.8 DEPRESSION WITH ANXIETY: ICD-10-CM

## 2019-09-30 LAB
BACTERIA UR QL AUTO: ABNORMAL /HPF
BILIRUB UR QL STRIP: NEGATIVE
CLARITY UR: CLEAR
COLOR UR: YELLOW
GLUCOSE UR STRIP-MCNC: NEGATIVE MG/DL
HGB UR QL STRIP.AUTO: NEGATIVE
KETONES UR STRIP-MCNC: NEGATIVE MG/DL
LEUKOCYTE ESTERASE UR QL STRIP: ABNORMAL
NITRITE UR QL STRIP: NEGATIVE
NON-SQ EPI CELLS URNS QL MICRO: ABNORMAL /HPF
PH UR STRIP.AUTO: 6.5 [PH]
PROT UR STRIP-MCNC: NEGATIVE MG/DL
RBC #/AREA URNS AUTO: ABNORMAL /HPF
SL AMB POCT HEMOGLOBIN AIC: 6.5 (ref ?–6.5)
SP GR UR STRIP.AUTO: <=1.005 (ref 1–1.03)
UROBILINOGEN UR QL STRIP.AUTO: 0.2 E.U./DL
WBC #/AREA URNS AUTO: ABNORMAL /HPF

## 2019-09-30 PROCEDURE — 86334 IMMUNOFIX E-PHORESIS SERUM: CPT | Performed by: PATHOLOGY

## 2019-09-30 PROCEDURE — 86334 IMMUNOFIX E-PHORESIS SERUM: CPT

## 2019-09-30 PROCEDURE — 81001 URINALYSIS AUTO W/SCOPE: CPT | Performed by: FAMILY MEDICINE

## 2019-09-30 PROCEDURE — 84165 PROTEIN E-PHORESIS SERUM: CPT

## 2019-09-30 PROCEDURE — 84166 PROTEIN E-PHORESIS/URINE/CSF: CPT | Performed by: FAMILY MEDICINE

## 2019-09-30 PROCEDURE — 87086 URINE CULTURE/COLONY COUNT: CPT

## 2019-09-30 PROCEDURE — 83036 HEMOGLOBIN GLYCOSYLATED A1C: CPT | Performed by: FAMILY MEDICINE

## 2019-09-30 PROCEDURE — 99214 OFFICE O/P EST MOD 30 MIN: CPT | Performed by: FAMILY MEDICINE

## 2019-09-30 PROCEDURE — 84165 PROTEIN E-PHORESIS SERUM: CPT | Performed by: PATHOLOGY

## 2019-09-30 PROCEDURE — 36415 COLL VENOUS BLD VENIPUNCTURE: CPT

## 2019-09-30 PROCEDURE — 82043 UR ALBUMIN QUANTITATIVE: CPT | Performed by: FAMILY MEDICINE

## 2019-09-30 PROCEDURE — 82570 ASSAY OF URINE CREATININE: CPT | Performed by: FAMILY MEDICINE

## 2019-09-30 PROCEDURE — 86803 HEPATITIS C AB TEST: CPT

## 2019-09-30 PROCEDURE — 84166 PROTEIN E-PHORESIS/URINE/CSF: CPT | Performed by: PATHOLOGY

## 2019-09-30 RX ORDER — ENALAPRIL MALEATE 10 MG/1
10 TABLET ORAL 2 TIMES DAILY
Qty: 60 TABLET | Refills: 3 | Status: SHIPPED | OUTPATIENT
Start: 2019-09-30 | End: 2020-01-13

## 2019-09-30 RX ORDER — AMLODIPINE BESYLATE 5 MG/1
5 TABLET ORAL DAILY
Qty: 30 TABLET | Refills: 1 | Status: SHIPPED | OUTPATIENT
Start: 2019-09-30 | End: 2020-03-23 | Stop reason: SDUPTHER

## 2019-09-30 RX ORDER — PREGABALIN 150 MG/1
150 CAPSULE ORAL 2 TIMES DAILY
Qty: 60 CAPSULE | Refills: 3 | Status: SHIPPED | OUTPATIENT
Start: 2019-09-30 | End: 2019-12-06 | Stop reason: SDUPTHER

## 2019-09-30 RX ORDER — ALBUTEROL SULFATE 90 UG/1
1 AEROSOL, METERED RESPIRATORY (INHALATION) EVERY 6 HOURS PRN
Qty: 1 INHALER | Refills: 3 | Status: SHIPPED | OUTPATIENT
Start: 2019-09-30 | End: 2020-01-24

## 2019-09-30 NOTE — PROGRESS NOTES
Assessment/Plan:     Chronic Problems:  Controlled type 2 diabetes with neuropathy [facility])    Lab Results   Component Value Date    HGBA1C 6 5 09/30/2019   Diabetes is very well controlled  Continue current meds  Benign hypertension  BP is still too high  Needs to restart amlodipine 5 mg daily and check the bp's at home and f/u here in 2 weeks with the numbers  Avoid salt  Visit Diagnosis:  Diagnoses and all orders for this visit:    Controlled type 2 diabetes with neuropathy ([address] )  -     POCT hemoglobin A1c  -     pregabalin (LYRICA) 150 mg capsule; Take 1 capsule (150 mg total) by mouth 2 (two) times a day  -     SITagliptin-metFORMIN HCl ER (JANUMET XR) 100-1000 MG TB24; Take 1 tablet by mouth daily    Mass of cervix  -     US pelvis complete non OB; Future    Benign hypertension  -     Microalbumin / creatinine urine ratio  -     amLODIPine (NORVASC) 5 mg tablet; Take 1 tablet (5 mg total) by mouth daily  -     enalapril (VASOTEC) 10 mg tablet; Take 1 tablet (10 mg total) by mouth 2 (two) times a day    Urinary frequency  Comments: Will get ua and c&S and call with results  Orders:  -     Urinalysis with microscopic  -     Urine culture; Future    Elevated total protein  -     Protein electrophoresis, serum; Future  -     Protein electrophoresis, urine    Encounter for hepatitis C screening test for low risk patient  -     Hepatitis C antibody; Future    Depression with anxiety  -     sertraline (ZOLOFT) 50 mg tablet; Take 1 tablet (50 mg total) by mouth daily    Mild intermittent asthma, unspecified whether complicated  -     albuterol (VENTOLIN HFA) 90 mcg/act inhaler; Inhale 1 puff every 6 (six) hours as needed for wheezing    Screening for malignant neoplasm of colon  -     Ambulatory referral to Gastroenterology; Future    Screening for malignant neoplasm of breast  -     Mammo screening bilateral w 3d & cad; Future          Subjective:    Patient ID: Maria Elena Wilkinson is a 69 y o  female  Pt is here for er f/u  Pt states over a week ago she had not eaten all day  Then ate a lot of salad and her stomach swelled and gave her a lot of pain   Developed abdominal spasms and nausea  Took maalox and otc antiacids  The nausea, distension and pain did not resolve  Started getting pains over the right sij and decided to go to urgent care  Could not get comfortable and felt she was urinating a lot  Went to urgent care and told she may have appendicitis and pt was sent to the er to get a ct scan  Was given meds for nausea  Pains eased but did not resolve totally and given iv fluids  Told in the er she would need an us and given meds for pain and nausea  Still feels low abdominal pains  Still with terrible nausea  Only tolerated a bran muffin and banana  Blood sugars ok per pt at home  Range in the 120's  Took zofran last night but very nauseated this am  Also took pain meds last night and was able to fall asleep  Now her abdomen is soft, but it was very hard  Pt checks her bp's at home and mostly normal per pt  Does go up when she is in pain  Takes all other meds as directed  No side effects noted  The following portions of the patient's history were reviewed and updated as appropriate: allergies, current medications, past family history, past medical history, past social history, past surgical history and problem list     Review of Systems   Constitutional: Positive for diaphoresis and fatigue (but not herself  )  Negative for chills and fever  HENT: Negative  Eyes: Negative  Last seen by [facility] in [place]  Respiratory: Positive for wheezing (was very hot in [place] while she was on vacation  Now using advair everyday  )  Negative for cough and shortness of breath  Cardiovascular: Negative for chest pain and palpitations  Gastrointestinal: Positive for abdominal pain (low abdomen), diarrhea (earlier on but now has resolved  ) and nausea     Genitourinary: Positive for frequency  Negative for dysuria  Feels she is not emptying her bladder properly  Pt had hysterectomy r/t polyps and excessive bleeding  Neurological: Positive for headaches  Negative for dizziness and light-headedness  Psychiatric/Behavioral: Negative for dysphoric mood  The patient is not nervous/anxious  Feels she is doing well on her meds            /80   Pulse 75   Temp 98 °F (36 7 °C) (Tympanic)   Ht 5' 2" (1 575 m)   Wt 93 9 kg (207 lb)   SpO2 94%   BMI 37 86 kg/m²   Social History     Socioeconomic History    Marital status: /Civil Union     Spouse name: Not on file    Number of children: Not on file    Years of education: Not on file    Highest education level: Not on file   Occupational History    Occupation: retired    Social Needs    Financial resource strain: Not on file    Food insecurity:     Worry: Not on file     Inability: Not on file   [employer] needs:     Medical: Not on file     Non-medical: Not on file   Tobacco Use    Smoking status: Never Smoker    Smokeless tobacco: Never Used   Substance and Sexual Activity    Alcohol use: No    Drug use: No    Sexual activity: Not on file   Lifestyle    Physical activity:     Days per week: Not on file     Minutes per session: Not on file    Stress: Not on file   Relationships    Social connections:     Talks on phone: Not on file     Gets together: Not on file     Attends [employer] service: Not on file     Active member of club or organization: Not on file     Attends meetings of clubs or organizations: Not on file     Relationship status: Not on file    Intimate partner violence:     Fear of current or ex partner: Not on file     Emotionally abused: Not on file     Physically abused: Not on file     Forced sexual activity: Not on file   Other Topics Concern    Not on file   Social History Narrative    Not on file     Past Medical History:   Diagnosis Date    Asthma     Cardiac arrest ([address] )  Diabetes mellitus ([address] )     Glaucoma     Hypertension     Neuropathy      Family History   Problem Relation Age of Onset    Diabetes Mother     Hypertension Father     Prostate cancer Father     Diabetes Sister     Hypertension Sister     Cancer Sister     Diabetes Brother     Hypertension Brother     Asthma Family      Past Surgical History:   Procedure Laterality Date     SECTION      HYSTERECTOMY      OOPHORECTOMY      TONSILLECTOMY         Current Outpatient Medications:     albuterol (VENTOLIN HFA) 90 mcg/act inhaler, Inhale 1 puff every 6 (six) hours as needed for wheezing, Disp: 1 Inhaler, Rfl: 3    aspirin 81 mg chewable tablet, Chew 81 mg daily, Disp: , Rfl:     dicyclomine (BENTYL) 20 mg tablet, Take 1 tablet (20 mg total) by mouth every 6 (six) hours for 20 doses, Disp: 20 tablet, Rfl: 0    enalapril (VASOTEC) 10 mg tablet, Take 1 tablet (10 mg total) by mouth 2 (two) times a day, Disp: 60 tablet, Rfl: 3    fluticasone-salmeterol (ADVAIR DISKUS) 250-50 mcg/dose inhaler, Inhale 1 puff as needed, Disp: , Rfl:     glucose blood (ONETOUCH VERIO) test strip, E11 9 / testing daily, Disp: , Rfl:     melatonin 3 mg, Take 6 mg by mouth, Disp: , Rfl:     ondansetron (ZOFRAN-ODT) 4 mg disintegrating tablet, Take 1 tablet (4 mg total) by mouth every 8 (eight) hours as needed for nausea or vomiting, Disp: 12 tablet, Rfl: 0    ONETOUCH DELICA LANCETS 74Y MISC, E11 9/ testing daily, Disp: , Rfl:     pregabalin (LYRICA) 150 mg capsule, Take 1 capsule (150 mg total) by mouth 2 (two) times a day, Disp: 60 capsule, Rfl: 3    sertraline (ZOLOFT) 50 mg tablet, Take 1 tablet (50 mg total) by mouth daily, Disp: 30 tablet, Rfl: 3    SITagliptin-metFORMIN HCl ER (JANUMET XR) 100-1000 MG TB24, Take 1 tablet by mouth daily, Disp: 30 tablet, Rfl: 3    travoprost (TRAVATAN Z) 0 004 % ophthalmic solution, Apply 1 drop to eye daily at bedtime, Disp: , Rfl:     amLODIPine (NORVASC) 5 mg tablet, Take 1 tablet (5 mg total) by mouth daily, Disp: 30 tablet, Rfl: 1    mometasone (ELOCON) 0 1 % cream, Apply topically daily (Patient not taking: Reported on 9/27/2019), Disp: 45 g, Rfl: 0    Allergies   Allergen Reactions    Ciprofloxacin Itching    Levaquin [Levofloxacin] Swelling    Penicillins GI Intolerance          Lab Review   Admission on 09/27/2019, Discharged on 09/27/2019   Component Date Value    WBC 09/27/2019 5 63     RBC 09/27/2019 4 53     Hemoglobin 09/27/2019 13 2     Hematocrit 09/27/2019 41 0     MCV 09/27/2019 91     MCH 09/27/2019 29 1     MCHC 09/27/2019 32 2     RDW 09/27/2019 13 9     MPV 09/27/2019 9 7     Platelets 28/72/0922 253     nRBC 09/27/2019 0     Neutrophils Relative 09/27/2019 50     Immat GRANS % 09/27/2019 0     Lymphocytes Relative 09/27/2019 39     Monocytes Relative 09/27/2019 6     Eosinophils Relative 09/27/2019 4     Basophils Relative 09/27/2019 1     Neutrophils Absolute 09/27/2019 2 81     Immature Grans Absolute 09/27/2019 0 01     Lymphocytes Absolute 09/27/2019 2 22     Monocytes Absolute 09/27/2019 0 34     Eosinophils Absolute 09/27/2019 0 21     Basophils Absolute 09/27/2019 0 04     Sodium 09/27/2019 141     Potassium 09/27/2019 4 2     Chloride 09/27/2019 103     CO2 09/27/2019 28     ANION GAP 09/27/2019 10     BUN 09/27/2019 14     Creatinine 09/27/2019 0 90     Glucose 09/27/2019 94     Calcium 09/27/2019 9 5     AST 09/27/2019 17     ALT 09/27/2019 16     Alkaline Phosphatase 09/27/2019 74     Total Protein 09/27/2019 8 6*    Albumin 09/27/2019 3 6     Total Bilirubin 09/27/2019 0 30     eGFR 09/27/2019 74     Lipase 09/27/2019 170     POC Glucose 09/27/2019 68     POC Glucose 09/27/2019 104         Imaging: Ct Abdomen Pelvis With Contrast    Result Date: 9/27/2019  Narrative: CT ABDOMEN AND PELVIS WITH IV CONTRAST INDICATION:   abd pain  COMPARISON:  None   TECHNIQUE:  CT examination of the abdomen and pelvis was performed  Axial, sagittal, and coronal 2D reformatted images were created from the source data and submitted for interpretation  Radiation dose length product (DLP) for this visit:  563 mGy-cm   This examination, like all CT scans performed in the [facility], was performed utilizing techniques to minimize radiation dose exposure, including the use of iterative reconstruction and automated exposure control  IV Contrast:  100 mL of iohexol (OMNIPAQUE) Enteric Contrast:  Enteric contrast was not administered  FINDINGS: ABDOMEN LOWER CHEST:  No clinically significant abnormality identified in the visualized lower chest  LIVER/BILIARY TREE:  Unremarkable  GALLBLADDER:  No calcified gallstones  No pericholecystic inflammatory change  SPLEEN:  Unremarkable  PANCREAS:  Unremarkable  ADRENAL GLANDS:  Unremarkable  KIDNEYS/URETERS:  Calculi in the left kidney measuring up to 2 mm in the upper pole STOMACH AND BOWEL:  Unremarkable  APPENDIX:  No findings to suggest appendicitis  ABDOMINOPELVIC CAVITY:  No ascites or free intraperitoneal air  No lymphadenopathy  VESSELS:  Unremarkable for patient's age  PELVIS REPRODUCTIVE ORGANS:  Patient is status post hysterectomy  Cystic structure near the right aspect of the cervix measures up to 1 2 cm seen on series 601/95 URINARY BLADDER:  Unremarkable  ABDOMINAL WALL/INGUINAL REGIONS:  Unremarkable  OSSEOUS STRUCTURES:  No acute fracture or destructive osseous lesion  Impression: 1  Nonobstructing left renal calculi measure up to 2 mm 2  Cystic structure near the right cervix, concerning etiology  Recommend nonemergent transvaginal ultrasound The study was marked in EPIC for significant notification  Workstation performed: [id number]       Objective:     Physical Exam   Constitutional: She is oriented to person, place, and time  She appears well-developed and well-nourished  No distress  HENT:   Head: Normocephalic and atraumatic     Right Ear: External ear normal    Left Ear: External ear normal    Mouth/Throat: Oropharynx is clear and moist    Eyes: Pupils are equal, round, and reactive to light  Conjunctivae and EOM are normal  Right eye exhibits no discharge  Left eye exhibits no discharge  Neck: Normal range of motion  Neck supple  Cardiovascular: Normal rate, regular rhythm and normal heart sounds  Exam reveals no friction rub  Pulses are no weak pulses  No murmur heard  Pulses:       Dorsalis pedis pulses are 2+ on the right side, and 2+ on the left side  Posterior tibial pulses are 1+ on the right side, and 1+ on the left side  Pulmonary/Chest: Effort normal and breath sounds normal  No respiratory distress  Abdominal: Soft  Bowel sounds are normal  There is no tenderness  Abdomen is obese and tender mid abdomen and ruq  Slight tenderness over s/p area  Musculoskeletal: Normal range of motion  She exhibits no edema, tenderness or deformity  Feet:    Feet:   Right Foot:   Skin Integrity: Negative for ulcer, skin breakdown, erythema, warmth, callus or dry skin  Left Foot:   Skin Integrity: Negative for ulcer, skin breakdown, erythema, warmth, callus or dry skin  Lymphadenopathy:     She has no cervical adenopathy  Neurological: She is alert and oriented to person, place, and time  No cranial nerve deficit  Skin: Skin is warm and dry  No rash noted  She is not diaphoretic  Multiple scars on arms from picking per pt  Psychiatric: She has a normal mood and affect  Her behavior is normal  Judgment and thought content normal      Patient's shoes and socks removed  Right Foot/Ankle   Right Foot Inspection  Skin Exam: skin normal and skin intact no dry skin, no warmth, no callus, no erythema, no maceration, no abnormal color, no pre-ulcer, no ulcer and no callus                          Toe Exam: ROM and strength within normal limitsno swelling, no tenderness, erythema and  no right toe deformity  Sensory   Vibration: intact  Proprioception: intact   Monofilament testing: intact  Vascular  Capillary refills: < 3 seconds  The right DP pulse is 2+  The right PT pulse is 1+  Left Foot/Ankle  Left Foot Inspection  Skin Exam: skin normal and skin intactno dry skin, no warmth, no erythema, no maceration, normal color, no pre-ulcer, no ulcer and no callus                         Toe Exam: ROM and strength within normal limitsno swelling, no tenderness and no erythema                   Sensory   Vibration: intact  Proprioception: intact  Monofilament: intact  Vascular  Capillary refills: < 3 seconds  The left DP pulse is 2+  The left PT pulse is 1+  Assign Risk Category:  No deformity present; No loss of protective sensation; No weak pulses       Risk: 0        Patient Instructions   Reviewed all ER reports with patient  Will get pelvic ultrasound  Also schedule your mammo and your colonoscopy  Will get additional labs and follow the urine testing  Blood pressure is too high I would like you to restart your amlodipine but only 5 mg daily  Check the blood pressure at home record the number follow-up here in 2 weeks with the numbers  If you want to get your blood pressure down stay off the salt  Continue all your other medications  Pt refuses flu shot  Diabetes is very well controlled  ALEKSANDER Mckeon    Portions of the record may have been created with voice recognition software  Occasional wrong word or "sound a like" substitutions may have occurred due to the inherent limitations of voice recognition software  Read the chart carefully and recognize, using context, where substitutions have occurred

## 2019-09-30 NOTE — ASSESSMENT & PLAN NOTE
Lab Results   Component Value Date    HGBA1C 6 5 09/30/2019   Diabetes is very well controlled  Continue current meds

## 2019-09-30 NOTE — PROGRESS NOTES
Called patient [facility] and n/a with medical records so I left a message for them to return my call   Looking for last diabetic eye exam

## 2019-09-30 NOTE — ASSESSMENT & PLAN NOTE
BP is still too high  Needs to restart amlodipine 5 mg daily and check the bp's at home and f/u here in 2 weeks with the numbers  Avoid salt

## 2019-09-30 NOTE — PATIENT INSTRUCTIONS
Reviewed all ER reports with patient  Will get pelvic ultrasound  Also schedule your mammo and your colonoscopy  Will get additional labs and follow the urine testing  Blood pressure is too high I would like you to restart your amlodipine but only 5 mg daily  Check the blood pressure at home record the number follow-up here in 2 weeks with the numbers  If you want to get your blood pressure down stay off the salt  Continue all your other medications  Pt refuses flu shot  Diabetes is very well controlled

## 2019-10-01 LAB
ALBUMIN SERPL ELPH-MCNC: 4.15 G/DL (ref 3.5–5)
ALBUMIN SERPL ELPH-MCNC: 50 % (ref 52–65)
ALBUMIN UR ELPH-MCNC: 100 %
ALPHA1 GLOB MFR UR ELPH: 0 %
ALPHA1 GLOB SERPL ELPH-MCNC: 0.37 G/DL (ref 0.1–0.4)
ALPHA1 GLOB SERPL ELPH-MCNC: 4.5 % (ref 2.5–5)
ALPHA2 GLOB MFR UR ELPH: 0 %
ALPHA2 GLOB SERPL ELPH-MCNC: 1.16 G/DL (ref 0.4–1.2)
ALPHA2 GLOB SERPL ELPH-MCNC: 14 % (ref 7–13)
B-GLOBULIN MFR UR ELPH: 0 %
BETA GLOB ABNORMAL SERPL ELPH-MCNC: 0.48 G/DL (ref 0.4–0.8)
BETA1 GLOB SERPL ELPH-MCNC: 5.8 % (ref 5–13)
BETA2 GLOB SERPL ELPH-MCNC: 7.2 % (ref 2–8)
BETA2+GAMMA GLOB SERPL ELPH-MCNC: 0.6 G/DL (ref 0.2–0.5)
CREAT UR-MCNC: 50.4 MG/DL
GAMMA GLOB ABNORMAL SERPL ELPH-MCNC: 1.54 G/DL (ref 0.5–1.6)
GAMMA GLOB MFR UR ELPH: 0 %
GAMMA GLOB SERPL ELPH-MCNC: 18.5 % (ref 12–22)
HCV AB SER QL: NORMAL
IGG/ALB SER: 1 {RATIO} (ref 1.1–1.8)
INTERPRETATION UR IFE-IMP: NORMAL
MICROALBUMIN UR-MCNC: <5 MG/L (ref 0–20)
MICROALBUMIN/CREAT 24H UR: <10 MG/G CREATININE (ref 0–30)
PROT PATTERN SERPL ELPH-IMP: ABNORMAL
PROT PATTERN UR ELPH-IMP: NORMAL
PROT SERPL-MCNC: 8.3 G/DL (ref 6.4–8.2)
PROT UR-MCNC: 6 MG/DL

## 2019-10-02 LAB — BACTERIA UR CULT: NORMAL

## 2019-10-04 ENCOUNTER — TELEPHONE (OUTPATIENT)
Dept: FAMILY MEDICINE CLINIC | Facility: CLINIC | Age: 74
End: 2019-10-04

## 2019-10-04 NOTE — TELEPHONE ENCOUNTER
----- Message from [address] [place] [address]  sent at 10/3/2019  7:09 PM EDT -----  Labs are acceptable, but I may want to repeat this test in several months  I put a note in the emr  Keep the f;u appt to discuss further

## 2019-10-07 ENCOUNTER — TELEPHONE (OUTPATIENT)
Dept: GASTROENTEROLOGY | Facility: CLINIC | Age: 74
End: 2019-10-07

## 2019-10-07 NOTE — TELEPHONE ENCOUNTER
Pt was just in the ER for abd pain, diarrhea, abnormal ct  Does not qualify for direct  Can you call pt for appt please

## 2019-10-15 ENCOUNTER — OFFICE VISIT (OUTPATIENT)
Dept: GASTROENTEROLOGY | Facility: CLINIC | Age: 74
End: 2019-10-15
Payer: MEDICARE

## 2019-10-15 VITALS
RESPIRATION RATE: 18 BRPM | HEIGHT: 62 IN | HEART RATE: 72 BPM | BODY MASS INDEX: 37.36 KG/M2 | WEIGHT: 203 LBS | SYSTOLIC BLOOD PRESSURE: 140 MMHG | DIASTOLIC BLOOD PRESSURE: 72 MMHG

## 2019-10-15 DIAGNOSIS — R10.84 ABDOMINAL PAIN, GENERALIZED: Primary | ICD-10-CM

## 2019-10-15 DIAGNOSIS — Z12.11 ENCOUNTER FOR SCREENING COLONOSCOPY: ICD-10-CM

## 2019-10-15 PROCEDURE — 99203 OFFICE O/P NEW LOW 30 MIN: CPT | Performed by: PHYSICIAN ASSISTANT

## 2019-10-15 RX ORDER — DICYCLOMINE HCL 20 MG
20 TABLET ORAL 3 TIMES DAILY PRN
Qty: 90 TABLET | Refills: 1 | Status: SHIPPED | OUTPATIENT
Start: 2019-10-15 | End: 2019-10-15 | Stop reason: CLARIF

## 2019-10-15 RX ORDER — SIMETHICONE 125 MG
125 CAPSULE ORAL 4 TIMES DAILY PRN
Qty: 28 EACH | Refills: 0 | Status: SHIPPED | OUTPATIENT
Start: 2019-10-15 | End: 2020-03-12

## 2019-10-15 NOTE — PROGRESS NOTES
[facility] - Outpatient Consultation  Justo Smalls 69 y o  female MRN: [id number]  Encounter: [id number]          ASSESSMENT AND PLAN:      1  Encounter for screening colonoscopy  2  Abdominal pain, generalized    Patient was referred for a colonoscopy  No family history of colon cancer or polyps  Last colonoscopy was in 2005  She also has been having generalized abdominal pain/gas and occasional diarrhea x several weeks  She did go to the ER and had a CT A/P which showed an abnormality with the right cervix and she is having a transvaginal ultrasound by her PCP to further investigate  EGD and colonoscopy to investigate  Recommend to begin a probiotic daily and simethicone prn gas pains (cannot give Bentyl as she has glaucoma)  ______________________________________________________________________    HPI:  Patient is a 69year old female who presents to the office for a consultation regarding colonoscopy  She reports her last colonoscopy was in 2005  No family history of colon cancer or polyps  She also does report that for the past several weeks she has been having issues with generalized abdominal discomfort but more in the left upper quadrant and left lower quadrant  Says initially the pain was very severe and associated with bloating  She went to the ER at 1 point and had a CT scan of the abdomen and pelvis which did show an abnormality with the right cervix so she is having a transvaginal ultrasound by her PCP for further evaluation  She also reports loose stool at times but will mostly have normal formed bowel movements  She reports occasional nausea but states that this seems to be related to when she takes her medications  No vomiting  No melena or rectal bleeding  No unintentional weight loss  Patient reports that her abdominal pain is overall better than it was a few weeks ago but she is still having milder episodes at times        REVIEW OF SYSTEMS:    CONSTITUTIONAL: Denies any fever, chills, rigors, and weight loss  HEENT: No earache or tinnitus  Denies hearing loss or visual disturbances  CARDIOVASCULAR: No chest pain or palpitations  RESPIRATORY: Denies any cough, hemoptysis, shortness of breath or dyspnea on exertion  GASTROINTESTINAL: As noted in the History of Present Illness  GENITOURINARY: No problems with urination  Denies any hematuria or dysuria  NEUROLOGIC: No dizziness or vertigo, denies headaches  MUSCULOSKELETAL: Denies any muscle or joint pain  SKIN: Denies skin rashes or itching  ENDOCRINE: Denies excessive thirst  Denies intolerance to heat or cold  PSYCHOSOCIAL: Denies depression or anxiety  Denies any recent memory loss         Historical Information   Past Medical History:   Diagnosis Date    Asthma     Cardiac arrest [facility] )     Diabetes mellitus ([facility] )     Glaucoma     Hypertension     Neuropathy      Past Surgical History:   Procedure Laterality Date     SECTION      HYSTERECTOMY      OOPHORECTOMY      TONSILLECTOMY       Social History   Social History     Substance and Sexual Activity   Alcohol Use No     Social History     Substance and Sexual Activity   Drug Use No     Social History     Tobacco Use   Smoking Status Never Smoker   Smokeless Tobacco Never Used     Family History   Problem Relation Age of Onset    Diabetes Mother     Hypertension Father     Prostate cancer Father     Diabetes Sister     Hypertension Sister     Cancer Sister     Diabetes Brother     Hypertension Brother     Asthma Family        Meds/Allergies       Current Outpatient Medications:     albuterol (VENTOLIN HFA) 90 mcg/act inhaler    amLODIPine (NORVASC) 5 mg tablet    aspirin 81 mg chewable tablet    enalapril (VASOTEC) 10 mg tablet    fluticasone-salmeterol (ADVAIR DISKUS) 250-50 mcg/dose inhaler    glucose blood (ONETOUCH VERIO) test strip    melatonin 3 mg    ondansetron (ZOFRAN-ODT) 4 mg disintegrating tablet    ONETOUCH DELICA LANCETS 06E MISC    pregabalin (LYRICA) 150 mg capsule    sertraline (ZOLOFT) 50 mg tablet    SITagliptin-metFORMIN HCl ER (JANUMET XR) 100-1000 MG TB24    travoprost (TRAVATAN Z) 0 004 % ophthalmic solution    dicyclomine (BENTYL) 20 mg tablet    mometasone (ELOCON) 0 1 % cream    Allergies   Allergen Reactions    Ciprofloxacin Itching    Levaquin [Levofloxacin] Swelling    Penicillins GI Intolerance           Objective     Blood pressure 140/72, pulse 72, resp  rate 18, height 5' 2" (1 575 m), weight 92 1 kg (203 lb)  Body mass index is 37 13 kg/m²  PHYSICAL EXAM:      General Appearance:   Alert, cooperative, no distress, obese   HEENT:   Normocephalic, atraumatic, anicteric      Neck:  Supple, symmetrical, trachea midline   Lungs:   Clear to auscultation bilaterally; no rales, rhonchi or wheezing; respirations unlabored    Heart[de-identified]   Regular rate and rhythm; no murmur, rub, or gallop  Abdomen:   Soft, non-tender, non-distended; normal bowel sounds; no masses, no organomegaly    Genitalia:   Deferred    Rectal:   Deferred    Extremities:  No cyanosis, clubbing or edema    Pulses:  2+ and symmetric    Skin:  No jaundice, rashes, or lesions    Lymph nodes:  No palpable cervical lymphadenopathy        Lab Results:   No visits with results within 1 Day(s) from this visit     Latest known visit with results is:   Appointment on 09/30/2019   Component Date Value    Urine Culture 09/30/2019 70,000-79,000 cfu/ml      A/G Ratio 09/30/2019 1 00*    Albumin Electrophoresis 09/30/2019 50 0*    Albumin CONC 09/30/2019 4 15     Alpha 1 09/30/2019 4 5     ALPHA 1 CONC 09/30/2019 0 37     Alpha 2 09/30/2019 14 0*    ALPHA 2 CONC 09/30/2019 1 16     Beta-1 09/30/2019 5 8     BETA 1 CONC 09/30/2019 0 48     Beta-2 09/30/2019 7 2     BETA 2 CONC 09/30/2019 0 60*    Gamma Globulin 09/30/2019 18 5     GAMMA CONC 09/30/2019 1 54     Total Protein 09/30/2019 8 3*  SPEP Interpretation 09/30/2019 The SPEP shows a faint possible band in the gamma region  Immunofixation to be performed  Reviewed by: Rebeka Blair MD  ([id number])  **Electronic Signature**     Hepatitis C Ab 09/30/2019 Non-reactive     Immunofixation Interpret* 09/30/2019 Serum immunofixation shows no monoclonal gammopathy  Reviewed by: Rebeka Blair MD  ([id number])  **Electronic Signature**          Radiology Results:   Ct Abdomen Pelvis With Contrast    Result Date: 9/27/2019  Narrative: CT ABDOMEN AND PELVIS WITH IV CONTRAST INDICATION:   abd pain  COMPARISON:  None  TECHNIQUE:  CT examination of the abdomen and pelvis was performed  Axial, sagittal, and coronal 2D reformatted images were created from the source data and submitted for interpretation  Radiation dose length product (DLP) for this visit:  563 mGy-cm   This examination, like all CT scans performed in the [facility], was performed utilizing techniques to minimize radiation dose exposure, including the use of iterative reconstruction and automated exposure control  IV Contrast:  100 mL of iohexol (OMNIPAQUE) Enteric Contrast:  Enteric contrast was not administered  FINDINGS: ABDOMEN LOWER CHEST:  No clinically significant abnormality identified in the visualized lower chest  LIVER/BILIARY TREE:  Unremarkable  GALLBLADDER:  No calcified gallstones  No pericholecystic inflammatory change  SPLEEN:  Unremarkable  PANCREAS:  Unremarkable  ADRENAL GLANDS:  Unremarkable  KIDNEYS/URETERS:  Calculi in the left kidney measuring up to 2 mm in the upper pole STOMACH AND BOWEL:  Unremarkable  APPENDIX:  No findings to suggest appendicitis  ABDOMINOPELVIC CAVITY:  No ascites or free intraperitoneal air  No lymphadenopathy  VESSELS:  Unremarkable for patient's age  PELVIS REPRODUCTIVE ORGANS:  Patient is status post hysterectomy    Cystic structure near the right aspect of the cervix measures up to 1 2 cm seen on series 601/95 URINARY BLADDER: Unremarkable  ABDOMINAL WALL/INGUINAL REGIONS:  Unremarkable  OSSEOUS STRUCTURES:  No acute fracture or destructive osseous lesion  Impression: 1  Nonobstructing left renal calculi measure up to 2 mm 2  Cystic structure near the right cervix, concerning etiology  Recommend nonemergent transvaginal ultrasound The study was marked in EPIC for significant notification   Workstation performed: [id number]

## 2019-10-15 NOTE — H&P (VIEW-ONLY)
[facility] - Outpatient Consultation  Jewels Mock 69 y o  female MRN: [id number]  Encounter: [id number]          ASSESSMENT AND PLAN:      1  Encounter for screening colonoscopy  2  Abdominal pain, generalized    Patient was referred for a colonoscopy  No family history of colon cancer or polyps  Last colonoscopy was in 2005  She also has been having generalized abdominal pain/gas and occasional diarrhea x several weeks  She did go to the ER and had a CT A/P which showed an abnormality with the right cervix and she is having a transvaginal ultrasound by her PCP to further investigate  EGD and colonoscopy to investigate  Recommend to begin a probiotic daily and simethicone prn gas pains (cannot give Bentyl as she has glaucoma)  ______________________________________________________________________    HPI:  Patient is a 69year old female who presents to the office for a consultation regarding colonoscopy  She reports her last colonoscopy was in 2005  No family history of colon cancer or polyps  She also does report that for the past several weeks she has been having issues with generalized abdominal discomfort but more in the left upper quadrant and left lower quadrant  Says initially the pain was very severe and associated with bloating  She went to the ER at 1 point and had a CT scan of the abdomen and pelvis which did show an abnormality with the right cervix so she is having a transvaginal ultrasound by her PCP for further evaluation  She also reports loose stool at times but will mostly have normal formed bowel movements  She reports occasional nausea but states that this seems to be related to when she takes her medications  No vomiting  No melena or rectal bleeding  No unintentional weight loss  Patient reports that her abdominal pain is overall better than it was a few weeks ago but she is still having milder episodes at times        REVIEW OF SYSTEMS:    CONSTITUTIONAL: Denies any fever, chills, rigors, and weight loss  HEENT: No earache or tinnitus  Denies hearing loss or visual disturbances  CARDIOVASCULAR: No chest pain or palpitations  RESPIRATORY: Denies any cough, hemoptysis, shortness of breath or dyspnea on exertion  GASTROINTESTINAL: As noted in the History of Present Illness  GENITOURINARY: No problems with urination  Denies any hematuria or dysuria  NEUROLOGIC: No dizziness or vertigo, denies headaches  MUSCULOSKELETAL: Denies any muscle or joint pain  SKIN: Denies skin rashes or itching  ENDOCRINE: Denies excessive thirst  Denies intolerance to heat or cold  PSYCHOSOCIAL: Denies depression or anxiety  Denies any recent memory loss         Historical Information   Past Medical History:   Diagnosis Date    Asthma     [facility] arrest [facility] )     Diabetes mellitus ([facility] )     Glaucoma     Hypertension     Neuropathy      Past Surgical History:   Procedure Laterality Date     SECTION      HYSTERECTOMY      OOPHORECTOMY      TONSILLECTOMY       Social History   Social History     Substance and Sexual Activity   Alcohol Use No     Social History     Substance and Sexual Activity   Drug Use No     Social History     Tobacco Use   Smoking Status Never Smoker   Smokeless Tobacco Never Used     Family History   Problem Relation Age of Onset    Diabetes Mother     Hypertension Father     Prostate cancer Father     Diabetes Sister     Hypertension Sister     Cancer Sister     Diabetes Brother     Hypertension Brother     Asthma Family        Meds/Allergies       Current Outpatient Medications:     albuterol (VENTOLIN HFA) 90 mcg/act inhaler    amLODIPine (NORVASC) 5 mg tablet    aspirin 81 mg chewable tablet    enalapril (VASOTEC) 10 mg tablet    fluticasone-salmeterol (ADVAIR DISKUS) 250-50 mcg/dose inhaler    glucose blood (ONETOUCH VERIO) test strip    melatonin 3 mg    ondansetron (ZOFRAN-ODT) 4 mg disintegrating tablet    ONETOUCH DELICA LANCETS 28X MISC    pregabalin (LYRICA) 150 mg capsule    sertraline (ZOLOFT) 50 mg tablet    SITagliptin-metFORMIN HCl ER (JANUMET XR) 100-1000 MG TB24    travoprost (TRAVATAN Z) 0 004 % ophthalmic solution    dicyclomine (BENTYL) 20 mg tablet    mometasone (ELOCON) 0 1 % cream    Allergies   Allergen Reactions    Ciprofloxacin Itching    Levaquin [Levofloxacin] Swelling    Penicillins GI Intolerance           Objective     Blood pressure 140/72, pulse 72, resp  rate 18, height 5' 2" (1 575 m), weight 92 1 kg (203 lb)  Body mass index is 37 13 kg/m²  PHYSICAL EXAM:      General Appearance:   Alert, cooperative, no distress, obese   HEENT:   Normocephalic, atraumatic, anicteric      Neck:  Supple, symmetrical, trachea midline   Lungs:   Clear to auscultation bilaterally; no rales, rhonchi or wheezing; respirations unlabored    Heart[de-identified]   Regular rate and rhythm; no murmur, rub, or gallop  Abdomen:   Soft, non-tender, non-distended; normal bowel sounds; no masses, no organomegaly    Genitalia:   Deferred    Rectal:   Deferred    Extremities:  No cyanosis, clubbing or edema    Pulses:  2+ and symmetric    Skin:  No jaundice, rashes, or lesions    Lymph nodes:  No palpable cervical lymphadenopathy        Lab Results:   No visits with results within 1 Day(s) from this visit     Latest known visit with results is:   Appointment on 09/30/2019   Component Date Value    Urine Culture 09/30/2019 70,000-79,000 cfu/ml      A/G Ratio 09/30/2019 1 00*    Albumin Electrophoresis 09/30/2019 50 0*    Albumin CONC 09/30/2019 4 15     Alpha 1 09/30/2019 4 5     ALPHA 1 CONC 09/30/2019 0 37     Alpha 2 09/30/2019 14 0*    ALPHA 2 CONC 09/30/2019 1 16     Beta-1 09/30/2019 5 8     BETA 1 CONC 09/30/2019 0 48     Beta-2 09/30/2019 7 2     BETA 2 CONC 09/30/2019 0 60*    Gamma Globulin 09/30/2019 18 5     GAMMA CONC 09/30/2019 1 54     Total Protein 09/30/2019 8 3*  SPEP Interpretation 09/30/2019 The SPEP shows a faint possible band in the gamma region  Immunofixation to be performed  Reviewed by: Arnie Howell MD  ([id number])  **Electronic Signature**     Hepatitis C Ab 09/30/2019 Non-reactive     Immunofixation Interpret* 09/30/2019 Serum immunofixation shows no monoclonal gammopathy  Reviewed by: Arnie Howell MD  ([id number])  **Electronic Signature**          Radiology Results:   Ct Abdomen Pelvis With Contrast    Result Date: 9/27/2019  Narrative: CT ABDOMEN AND PELVIS WITH IV CONTRAST INDICATION:   abd pain  COMPARISON:  None  TECHNIQUE:  CT examination of the abdomen and pelvis was performed  Axial, sagittal, and coronal 2D reformatted images were created from the source data and submitted for interpretation  Radiation dose length product (DLP) for this visit:  563 mGy-cm   This examination, like all CT scans performed in the [facility], was performed utilizing techniques to minimize radiation dose exposure, including the use of iterative reconstruction and automated exposure control  IV Contrast:  100 mL of iohexol (OMNIPAQUE) Enteric Contrast:  Enteric contrast was not administered  FINDINGS: ABDOMEN LOWER CHEST:  No clinically significant abnormality identified in the visualized lower chest  LIVER/BILIARY TREE:  Unremarkable  GALLBLADDER:  No calcified gallstones  No pericholecystic inflammatory change  SPLEEN:  Unremarkable  PANCREAS:  Unremarkable  ADRENAL GLANDS:  Unremarkable  KIDNEYS/URETERS:  Calculi in the left kidney measuring up to 2 mm in the upper pole STOMACH AND BOWEL:  Unremarkable  APPENDIX:  No findings to suggest appendicitis  ABDOMINOPELVIC CAVITY:  No ascites or free intraperitoneal air  No lymphadenopathy  VESSELS:  Unremarkable for patient's age  PELVIS REPRODUCTIVE ORGANS:  Patient is status post hysterectomy    Cystic structure near the right aspect of the cervix measures up to 1 2 cm seen on series 601/95 URINARY BLADDER: Unremarkable  ABDOMINAL WALL/INGUINAL REGIONS:  Unremarkable  OSSEOUS STRUCTURES:  No acute fracture or destructive osseous lesion  Impression: 1  Nonobstructing left renal calculi measure up to 2 mm 2  Cystic structure near the right cervix, concerning etiology  Recommend nonemergent transvaginal ultrasound The study was marked in EPIC for significant notification   Workstation performed: [id number]

## 2019-10-23 ENCOUNTER — HOSPITAL ENCOUNTER (OUTPATIENT)
Dept: ULTRASOUND IMAGING | Facility: HOSPITAL | Age: 74
Discharge: HOME | End: 2019-10-23
Payer: MEDICARE

## 2019-10-23 ENCOUNTER — HOSPITAL ENCOUNTER (OUTPATIENT)
Dept: MAMMOGRAPHY | Facility: CLINIC | Age: 74
Discharge: HOME | End: 2019-10-23
Payer: MEDICARE

## 2019-10-23 VITALS — HEIGHT: 62 IN | BODY MASS INDEX: 37.17 KG/M2 | WEIGHT: 202 LBS

## 2019-10-23 DIAGNOSIS — N88.8 MASS OF CERVIX: ICD-10-CM

## 2019-10-23 DIAGNOSIS — Z12.39 SCREENING FOR MALIGNANT NEOPLASM OF BREAST: ICD-10-CM

## 2019-10-23 PROCEDURE — 77067 SCR MAMMO BI INCL CAD: CPT

## 2019-10-23 PROCEDURE — 76830 TRANSVAGINAL US NON-OB: CPT

## 2019-10-23 PROCEDURE — 76856 US EXAM PELVIC COMPLETE: CPT

## 2019-10-23 PROCEDURE — 77063 BREAST TOMOSYNTHESIS BI: CPT

## 2019-10-24 ENCOUNTER — TELEPHONE (OUTPATIENT)
Dept: FAMILY MEDICINE CLINIC | Facility: CLINIC | Age: 74
End: 2019-10-24

## 2019-10-24 ENCOUNTER — ANESTHESIA (OUTPATIENT)
Dept: GASTROENTEROLOGY | Facility: HOSPITAL | Age: 74
End: 2019-10-24

## 2019-10-24 ENCOUNTER — HOSPITAL ENCOUNTER (OUTPATIENT)
Dept: GASTROENTEROLOGY | Facility: HOSPITAL | Age: 74
Setting detail: OUTPATIENT SURGERY
Discharge: HOME | End: 2019-10-24
Attending: INTERNAL MEDICINE | Admitting: INTERNAL MEDICINE
Payer: MEDICARE

## 2019-10-24 ENCOUNTER — ANESTHESIA EVENT (OUTPATIENT)
Dept: GASTROENTEROLOGY | Facility: HOSPITAL | Age: 74
End: 2019-10-24

## 2019-10-24 VITALS
HEIGHT: 62 IN | SYSTOLIC BLOOD PRESSURE: 141 MMHG | WEIGHT: 199.52 LBS | OXYGEN SATURATION: 100 % | TEMPERATURE: 97.3 F | HEART RATE: 53 BPM | DIASTOLIC BLOOD PRESSURE: 58 MMHG | RESPIRATION RATE: 18 BRPM | BODY MASS INDEX: 36.72 KG/M2

## 2019-10-24 DIAGNOSIS — R10.84 ABDOMINAL PAIN, GENERALIZED: ICD-10-CM

## 2019-10-24 DIAGNOSIS — Z12.11 ENCOUNTER FOR SCREENING COLONOSCOPY: ICD-10-CM

## 2019-10-24 LAB — GLUCOSE SERPL-MCNC: 112 MG/DL (ref 65–140)

## 2019-10-24 PROCEDURE — 88305 TISSUE EXAM BY PATHOLOGIST: CPT | Performed by: PATHOLOGY

## 2019-10-24 PROCEDURE — 82948 REAGENT STRIP/BLOOD GLUCOSE: CPT

## 2019-10-24 PROCEDURE — NC001 PR NO CHARGE: Performed by: INTERNAL MEDICINE

## 2019-10-24 PROCEDURE — 45385 COLONOSCOPY W/LESION REMOVAL: CPT | Performed by: INTERNAL MEDICINE

## 2019-10-24 PROCEDURE — 43239 EGD BIOPSY SINGLE/MULTIPLE: CPT | Performed by: INTERNAL MEDICINE

## 2019-10-24 RX ORDER — LIDOCAINE HYDROCHLORIDE 10 MG/ML
INJECTION, SOLUTION INFILTRATION; PERINEURAL AS NEEDED
Status: DISCONTINUED | OUTPATIENT
Start: 2019-10-24 | End: 2019-10-24 | Stop reason: SURG

## 2019-10-24 RX ORDER — PROPOFOL 10 MG/ML
INJECTION, EMULSION INTRAVENOUS AS NEEDED
Status: DISCONTINUED | OUTPATIENT
Start: 2019-10-24 | End: 2019-10-24 | Stop reason: SURG

## 2019-10-24 RX ORDER — ALBUTEROL SULFATE 90 UG/1
2 AEROSOL, METERED RESPIRATORY (INHALATION) EVERY 4 HOURS PRN
Status: DISCONTINUED | OUTPATIENT
Start: 2019-10-24 | End: 2019-10-28 | Stop reason: HOSPADM

## 2019-10-24 RX ORDER — SODIUM CHLORIDE, SODIUM LACTATE, POTASSIUM CHLORIDE, CALCIUM CHLORIDE 600; 310; 30; 20 MG/100ML; MG/100ML; MG/100ML; MG/100ML
125 INJECTION, SOLUTION INTRAVENOUS CONTINUOUS
Status: DISCONTINUED | OUTPATIENT
Start: 2019-10-24 | End: 2019-10-28 | Stop reason: HOSPADM

## 2019-10-24 RX ADMIN — PROPOFOL 50 MG: 10 INJECTION, EMULSION INTRAVENOUS at 10:46

## 2019-10-24 RX ADMIN — ALBUTEROL SULFATE 2 PUFF: 90 AEROSOL, METERED RESPIRATORY (INHALATION) at 10:16

## 2019-10-24 RX ADMIN — PROPOFOL 20 MG: 10 INJECTION, EMULSION INTRAVENOUS at 10:27

## 2019-10-24 RX ADMIN — PROPOFOL 50 MG: 10 INJECTION, EMULSION INTRAVENOUS at 10:41

## 2019-10-24 RX ADMIN — PROPOFOL 20 MG: 10 INJECTION, EMULSION INTRAVENOUS at 10:23

## 2019-10-24 RX ADMIN — PROPOFOL 20 MG: 10 INJECTION, EMULSION INTRAVENOUS at 10:35

## 2019-10-24 RX ADMIN — LIDOCAINE HYDROCHLORIDE 50 MG: 10 INJECTION, SOLUTION INFILTRATION; PERINEURAL at 10:21

## 2019-10-24 RX ADMIN — PROPOFOL 50 MG: 10 INJECTION, EMULSION INTRAVENOUS at 10:37

## 2019-10-24 RX ADMIN — PROPOFOL 100 MG: 10 INJECTION, EMULSION INTRAVENOUS at 10:21

## 2019-10-24 RX ADMIN — PROPOFOL 50 MG: 10 INJECTION, EMULSION INTRAVENOUS at 10:33

## 2019-10-24 RX ADMIN — SODIUM CHLORIDE, SODIUM LACTATE, POTASSIUM CHLORIDE, AND CALCIUM CHLORIDE 125 ML/HR: .6; .31; .03; .02 INJECTION, SOLUTION INTRAVENOUS at 10:09

## 2019-10-24 RX ADMIN — PROPOFOL 20 MG: 10 INJECTION, EMULSION INTRAVENOUS at 10:30

## 2019-10-24 NOTE — ANESTHESIA PREPROCEDURE EVALUATION
Review of Systems/Medical History  Patient summary reviewed  Chart reviewed      Cardiovascular  Hypertension controlled,    Pulmonary  Asthma , well controlled/ stable , Sleep apnea CPAP,        GI/Hepatic  Negative GI/hepatic ROS          Kidney stones,        Endo/Other  Diabetes well controlled type 1 Insulin,      GYN  Negative gynecology ROS          Hematology  Negative hematology ROS      Musculoskeletal  Negative musculoskeletal ROS        Neurology  Negative neurology ROS      Psychology   Anxiety, Depression ,              Physical Exam    Airway    Mallampati score: II  TM Distance: >3 FB  Neck ROM: full     Dental   No notable dental hx upper dentures,     Cardiovascular  Rhythm: regular, Rate: normal, Cardiovascular exam normal    Pulmonary  Pulmonary exam normal Breath sounds clear to auscultation,     Other Findings        Anesthesia Plan  ASA Score- 3     Anesthesia Type- IV sedation with anesthesia with ASA Monitors  Additional Monitors:   Airway Plan:         Plan Factors-    Induction- intravenous  Postoperative Plan- Plan for postoperative opioid use  Informed Consent- Anesthetic plan and risks discussed with patient  I personally reviewed this patient with the CRNA  Discussed and agreed on the Anesthesia Plan with the CRNA  Delmy Clifford

## 2019-10-24 NOTE — DISCHARGE INSTRUCTIONS

## 2019-10-24 NOTE — ANESTHESIA POSTPROCEDURE EVALUATION
Post-Op Assessment Note    CV Status:  Stable    Pain management: adequate     Mental Status:  Alert and awake   Hydration Status:  Euvolemic   PONV Controlled:  Controlled   Airway Patency:  Patent   Post Op Vitals Reviewed: Yes      Staff: CRNA           /66 (10/24/19 1053)    Temp (!) 96 9 °F (36 1 °C) (10/24/19 1053)    Pulse 60 (10/24/19 1053)   Resp 20 (10/24/19 1053)    SpO2 96 % (10/24/19 1053)

## 2019-10-24 NOTE — TELEPHONE ENCOUNTER
----- Message from [address] [place] [address]  sent at 10/23/2019 11:50 AM EDT -----  Please let her know that the mammo is wnl   Repeat yearly

## 2019-10-24 NOTE — INTERVAL H&P NOTE
H&P reviewed  After examining the patient I find no changes in the patients condition since the H&P had been written      Vitals:    10/24/19 0956   BP: 131/58   Pulse: 66   Resp: 19   Temp: 98 1 °F (36 7 °C)   SpO2: 98%

## 2019-10-28 ENCOUNTER — TELEPHONE (OUTPATIENT)
Dept: FAMILY MEDICINE CLINIC | Facility: CLINIC | Age: 74
End: 2019-10-28

## 2019-10-28 NOTE — TELEPHONE ENCOUNTER
Please let her know she does have a 1 9 cm complex cyst along the anterior vaginal canal as above  Follow-up ultrasound in 3 months recommended, however I would like her to consider seeing gynecology now  Please let me know what she would like to do

## 2019-10-29 ENCOUNTER — TELEPHONE (OUTPATIENT)
Dept: GASTROENTEROLOGY | Facility: CLINIC | Age: 74
End: 2019-10-29

## 2019-10-29 NOTE — TELEPHONE ENCOUNTER
My findings and recommendations are based on the patient's symptoms, exam, diagnostic testing and records. Patient called for results of procedures on 10/24/19   Please call [facility] at [phone] ty

## 2019-11-04 ENCOUNTER — OFFICE VISIT (OUTPATIENT)
Dept: OBGYN CLINIC | Facility: CLINIC | Age: 74
End: 2019-11-04
Payer: MEDICARE

## 2019-11-04 VITALS
BODY MASS INDEX: 36.99 KG/M2 | WEIGHT: 201 LBS | HEIGHT: 62 IN | SYSTOLIC BLOOD PRESSURE: 142 MMHG | DIASTOLIC BLOOD PRESSURE: 84 MMHG

## 2019-11-04 DIAGNOSIS — N89.8 VAGINAL CYST: ICD-10-CM

## 2019-11-04 DIAGNOSIS — Z01.419 ENCOUNTER FOR GYNECOLOGICAL EXAMINATION WITHOUT ABNORMAL FINDING: Primary | ICD-10-CM

## 2019-11-04 DIAGNOSIS — B37.3 CANDIDIASIS OF GENITALIA IN FEMALE: ICD-10-CM

## 2019-11-04 PROBLEM — B37.31 CANDIDIASIS OF GENITALIA IN FEMALE: Status: ACTIVE | Noted: 2019-11-04

## 2019-11-04 PROCEDURE — 99203 OFFICE O/P NEW LOW 30 MIN: CPT | Performed by: NURSE PRACTITIONER

## 2019-11-04 PROCEDURE — G0145 SCR C/V CYTO,THINLAYER,RESCR: HCPCS | Performed by: NURSE PRACTITIONER

## 2019-11-04 PROCEDURE — G0101 CA SCREEN;PELVIC/BREAST EXAM: HCPCS | Performed by: NURSE PRACTITIONER

## 2019-11-04 NOTE — PROGRESS NOTES
Diagnoses and all orders for this visit:    Encounter for gynecological examination without abnormal finding  -     Liquid-based pap, screening    Vaginal cyst  -     US pelvis complete w transvaginal; Future    Candidiasis of genitalia in female  -     terconazole (TERAZOL 7) 0 4 % vaginal cream; Insert 1 applicator into the vagina daily at bedtime        Call if no symptom improvement, all questions answered, return for annual         Pleasant 69 y o  here for follow up of a pelvic u/s done which showed a complex cyst 1 9 cm in diameter along the anterior vaginal canal  Patient is complaining of associted GI issues of diarrhea and gasiness as well as back pain  It was recommended she have a 3 month follow up  Patient is seeing Dr Leonidas Howard currently and Elsi Drummond  She had a hysterectomy in the 's for fibroids  She denies any treatments tried for this  Denies recent antibiotic use  Denies douching  Denies fever, pelvic pain or dyspareunia  Sexually active without issue   She denies vaginal complaints but her exam showed moderate yeast     Past Medical History:   Diagnosis Date    Asthma     Cardiac arrest ([address]     [facility]     Glaucoma     Hypertension     Kidney stone     Neuropathy     Sleep apnea     no machine    Tubular adenoma of colon 10/2019     Past Surgical History:   Procedure Laterality Date     SECTION      COLONOSCOPY      HYSTERECTOMY  1985    TONSILLECTOMY       Social History     Tobacco Use    Smoking status: Never Smoker    Smokeless tobacco: Never Used   Substance Use Topics    Alcohol use: No    Drug use: No     Family History   Problem Relation Age of Onset    Diabetes Mother     Hypertension Father     Prostate cancer Father     Diabetes Sister     Hypertension Sister     Breast cancer Sister 62    Diabetes Brother     Hypertension Brother     Asthma Family     No Known Problems Daughter     No Known Problems Sister     No Known Problems Daughter     No Known Problems Daughter     No Known Problems Maternal Aunt     Breast cancer Maternal Aunt 58    No Known Problems Maternal Aunt     No Known Problems Maternal Aunt     No Known Problems Paternal Aunt     No Known Problems Paternal Aunt     No Known Problems Paternal Aunt     Colon cancer Neg Hx     Ovarian cancer Neg Hx     Uterine cancer Neg Hx     Cervical cancer Neg Hx        Current Outpatient Medications:     albuterol (VENTOLIN HFA) 90 mcg/act inhaler, Inhale 1 puff every 6 (six) hours as needed for wheezing, Disp: 1 Inhaler, Rfl: 3    amLODIPine (NORVASC) 5 mg tablet, Take 1 tablet (5 mg total) by mouth daily, Disp: 30 tablet, Rfl: 1    aspirin 81 mg chewable tablet, Chew 81 mg daily, Disp: , Rfl:     enalapril (VASOTEC) 10 mg tablet, Take 1 tablet (10 mg total) by mouth 2 (two) times a day, Disp: 60 tablet, Rfl: 3    fluticasone-salmeterol (ADVAIR DISKUS) 250-50 mcg/dose inhaler, Inhale 1 puff as needed, Disp: , Rfl:     glucose blood (ONETOUCH VERIO) test strip, E11 9 / testing daily, Disp: , Rfl:     ONETOUCH DELICA LANCETS 93H MISC, E11 9/ testing daily, Disp: , Rfl:     pregabalin (LYRICA) 150 mg capsule, Take 1 capsule (150 mg total) by mouth 2 (two) times a day, Disp: 60 capsule, Rfl: 3    sertraline (ZOLOFT) 50 mg tablet, Take 1 tablet (50 mg total) by mouth daily, Disp: 30 tablet, Rfl: 3    SITagliptin-metFORMIN HCl ER (JANUMET XR) 100-1000 MG TB24, Take 1 tablet by mouth daily, Disp: 30 tablet, Rfl: 3    travoprost (TRAVATAN Z) 0 004 % ophthalmic solution, Apply 1 drop to eye daily at bedtime, Disp: , Rfl:     mometasone (ELOCON) 0 1 % cream, Apply topically daily (Patient not taking: Reported on 9/27/2019), Disp: 45 g, Rfl: 0    ondansetron (ZOFRAN-ODT) 4 mg disintegrating tablet, Take 1 tablet (4 mg total) by mouth every 8 (eight) hours as needed for nausea or vomiting, Disp: 12 tablet, Rfl: 0    simethicone (MYLICON,GAS-X) 743 MG CAPS, Take 1 capsule (125 mg total) by mouth 4 (four) times a day as needed for flatulence (gas pain) (Patient not taking: Reported on 2019), Disp: 28 each, Rfl: 0    terconazole (TERAZOL 7) 0 4 % vaginal cream, Insert 1 applicator into the vagina daily at bedtime, Disp: 45 g, Rfl: 1    Allergies   Allergen Reactions    Ciprofloxacin Itching    Levaquin [Levofloxacin] Swelling    Penicillins GI Intolerance     OB History    Para Term  AB Living   4 3 3   1 3   SAB TAB Ectopic Multiple Live Births   1       3      # Outcome Date GA Lbr Tyler/2nd Weight Sex Delivery Anes PTL Lv   4 SAB            3 Term            2 Term            1 Term                Vitals:    19 1502   BP: 142/84   BP Location: Right arm   Patient Position: Sitting   Weight: 91 2 kg (201 lb)   Height: 5' 2" (1 575 m)     Body mass index is 36 76 kg/m²  Review of Systems   Constitutional: Negative for chills, fatigue, fever and unexpected weight change  Respiratory: Negative for shortness of breath  Gastrointestinal: Negative for anal bleeding, blood in stool, constipation and diarrhea  Genitourinary: Negative for difficulty urinating, dysuria and hematuria  Physical Exam   Constitutional: She appears well-developed and well-nourished  No distress  Alert and oriented  HENT: atraumatic  Head: Normocephalic  Neck: Normal range of motion  Neck supple  Pulmonary: Effort normal   Abdominal: Soft  Pelvic exam was performed with patient supine  No labial fusion  There is no rash, tenderness, lesion or injury on the right labia  There is no rash, tenderness, lesion or injury on the left labia  Urethral meatus does not show any tenderness, inflammation or discharge  Palpation of midline bladder without pain or discomfort  Uterus and cervix absent  VAGINAL CUFF INTACT  NO VAGINAL CYST PALPATED  Right adnexum displays no mass, no tenderness and no fullness   Left adnexum displays no mass, no tenderness and no fullness  No erythema or tenderness in the vagina  No foreign body in the vagina  No signs of injury around the vagina  Vaginal discharge found, copious YEAST  Perineum and anus without areas of injury  No lesions noted or swelling  Lymphadenopathy:        Right: No inguinal adenopathy present  Left: No inguinal adenopathy present

## 2019-11-04 NOTE — PATIENT INSTRUCTIONS
Candidiasis vulvovaginal   LO QUE NECESITA SABER:   La candidiasis vulvovaginal o vaginitis por hongos es judy infección vaginal común  La candidiasis vulvovaginal es causada por un hongo o microorganismos levaduriformes  La cándida se encuentran normalmente en la vagina  Cuando hay demasiados hongos, se puede provocar judy infección  INSTRUCCIONES SOBRE [facility]ITAL[facility]:   Regrese a la tamara de emergencias si:   · Usted tiene fiebre y escalofríos  · Usted está sangrando por shaw vagina y no es shaw menstruación  · Usted desarrolla un dolor abdominal o pélvico   Pregúntele a shaw médico qué vitaminas y minerales son adecuados para usted  · An signos y [address]  · Usted tiene preguntas o inquietudes acerca de shaw condición o cuidado  Medicamentos:   · Medicamentos,  ayudan a tratar la infección por el hongo de la cándida y a disminuir la inflamación  [facility]  [employer] puede venir en presentación de [place], de crema tópica o supositorios para la vagina  · [place] an medicamentos bartolome se le haya indicado  Consulte con shaw médico si usted karen que shaw medicamento no le está ayudando o si presenta efectos secundarios  Infórmele si es alérgico a algún medicamento  Mantenga judy lista actualizada de los [facility]  [employer], las vitaminas y los productos herbales que alissa  Incluya los siguientes datos de los medicamentos: cantidad, frecuencia y motivo de administración  Traiga con usted la lista o los envases de la píldoras a an citas de seguimiento  Lleve la lista de los medicamentos con usted en paz de judy emergencia  [place] de shaw síntomas:   · Use ropa interior de algodón  · [address] vaginal limpia y [place]  · No tenga relaciones sexuales hasta que an síntomas hayan desaparecido  · No tome duchas vaginales  · No use aerosoles para higiene femenina, talcos o aga de burbujas  Prevenir otra infección:   · [place] judy ducha en vez de bañarse      · Coma yogur     · Limite la cantidad de alcohol que alissa  · Controle el azúcar en muñoz john si usted es diabético     · Limite el tiempo en las tinas de hidromasaje  Acuda a crissy consultas de control con muñoz médico según le indicaron  Anote crissy preguntas para que se acuerde de hacerlas ana crissy visitas  © 2017 [address] Information is for End User's use only and may not be sold, redistributed or otherwise used for commercial purposes  All illustrations and images included in CareNotes® are the copyrighted property of A D A M , Inc  or Trevor Brown  Esta información es sólo para uso en educación  Muñoz intención no es darle un consejo médico sobre enfermedades o tratamientos  Colsulte con muoñz Ronalee Mon farmacéutico antes de seguir cualquier régimen médico para saber si es seguro y efectivo para usted

## 2019-11-05 DIAGNOSIS — N89.8 VAGINAL MASS: Primary | ICD-10-CM

## 2019-11-06 NOTE — TELEPHONE ENCOUNTER
Nathan Paz I have been calling her to make sure she has an apt with GYN but n/a   She is coming in tomorrow with you can we check then please

## 2019-11-07 ENCOUNTER — OFFICE VISIT (OUTPATIENT)
Dept: FAMILY MEDICINE CLINIC | Facility: CLINIC | Age: 74
End: 2019-11-07
Payer: MEDICARE

## 2019-11-07 VITALS
DIASTOLIC BLOOD PRESSURE: 60 MMHG | HEIGHT: 62 IN | RESPIRATION RATE: 16 BRPM | BODY MASS INDEX: 37.47 KG/M2 | SYSTOLIC BLOOD PRESSURE: 140 MMHG | TEMPERATURE: 97.3 F | WEIGHT: 203.6 LBS | OXYGEN SATURATION: 97 % | HEART RATE: 77 BPM

## 2019-11-07 DIAGNOSIS — H61.23 BILATERAL IMPACTED CERUMEN: ICD-10-CM

## 2019-11-07 DIAGNOSIS — N89.8 VAGINAL CYST: ICD-10-CM

## 2019-11-07 DIAGNOSIS — E11.40 CONTROLLED TYPE 2 DIABETES WITH NEUROPATHY (HCC): ICD-10-CM

## 2019-11-07 DIAGNOSIS — R07.89 ATYPICAL CHEST PAIN: Primary | ICD-10-CM

## 2019-11-07 LAB
LAB AP GYN PRIMARY INTERPRETATION: NORMAL
Lab: NORMAL
PATH INTERP SPEC-IMP: NORMAL

## 2019-11-07 PROCEDURE — 99214 OFFICE O/P EST MOD 30 MIN: CPT | Performed by: FAMILY MEDICINE

## 2019-11-07 NOTE — ASSESSMENT & PLAN NOTE
Very well controlled continue current meds     Lab Results   Component Value Date    HGBA1C 6 5 09/30/2019

## 2019-11-07 NOTE — PATIENT INSTRUCTIONS
Discussed all with patient  Please make your appointment with your eye doctor as you do have cataracts but you do need a diabetic eye exam   When you see your doctor please have him send a copy of the consultation here  Labs reviewed and within normal limits  Diabetes is very well at goal   EKG done today is nsr with no ectopy or st changes  Please advise us if you get any further chest pain I would refer to cardiology  Continue all other meds  Keep the f/u appt for pelvic us in 3 months  Refuses a flu shot  Use the debrox in both ears twice daily and follow with a warm water flush  If you can't get out the wax I will flush you here

## 2019-11-07 NOTE — PROGRESS NOTES
Assessment/Plan:     Chronic Problems:  Vaginal cyst  Keep the f/u appt with Shilpa  Controlled type 2 diabetes with neuropathy ([address] )  Very well controlled continue current meds  Lab Results   Component Value Date    HGBA1C 6 5 09/30/2019       Visit Diagnosis:  Diagnoses and all orders for this visit:    Atypical chest pain  Comments:  EKG done today -> nsr with no ectopy or st-t changes  Bilateral impacted cerumen  Comments:  Given a script for debrox to be used twice daily in the ears followed by warm water flush  If pt can't remove the wax make f/u appt here  Orders:  -     carbamide peroxide (DEBROX) 6 5 % otic solution; Administer 5 drops into both ears 2 (two) times a day    Vaginal cyst    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)          Subjective:    Patient ID: Rodo Du is a 69 y o  female  Pt is here for routine f/u appt  Just seen by Shilpa for the vaginal canal cyst and is due for repeat us in 3 months  Also had labs done and here to discuss results  Pt recently had a colonscopy and egd and wonder why she needs repeat in 1 year  Pt had 20 mm tubular adenoma in the ascending colon  Also c/o ear discomfort for the last 2 days  Feels very sensitive and used tea tree oil in the ear and now has bilateral sore ears  Takes all other meds as directed  No side effects noted  Blood sugars at  Home are good per pt  The following portions of the patient's history were reviewed and updated as appropriate: allergies, current medications, past family history, past medical history, past social history, past surgical history and problem list     Review of Systems   Constitutional: Negative for chills, diaphoresis, fatigue and fever  HENT: Positive for ear pain  Negative for congestion, ear discharge, sinus pressure, sinus pain, sneezing and sore throat  Eyes: Negative  Respiratory: Positive for chest tightness (pt had chest tightness the other night when she was wheeZing  Also with left arm pain  Pain resolved after several hours  ) and wheezing (but uses her inhaler today  )  Negative for cough and shortness of breath  Cardiovascular: Positive for chest pain (over the epigastric area on Tuesday night  Pt was sweating at that time but also she felt hot r/t the temperature change  Pain started when she was in bed and she awoke with this  Did not use her inhaler but started looking up heart attacks  )  Negative for palpitations  Gastrointestinal: Negative for abdominal pain (much better  ), constipation, diarrhea, nausea and vomiting  Genitourinary: Negative  Neurological: Negative for dizziness, light-headedness and headaches  Psychiatric/Behavioral: Negative for dysphoric mood  The patient is not nervous/anxious            /60   Pulse 77   Temp (!) 97 3 °F (36 3 °C)   Resp 16   Ht 5' 2" (1 575 m)   Wt 92 4 kg (203 lb 9 6 oz)   SpO2 97%   BMI 37 24 kg/m²   Social History     Socioeconomic History    Marital status: /Civil Union     Spouse name: Not on file    Number of children: Not on file    Years of education: Not on file    Highest education level: Not on file   Occupational History    Occupation: retired    Social Needs    Financial resource strain: Not on file    Food insecurity:     Worry: Not on file     Inability: Not on file   BUX needs:     Medical: Not on file     Non-medical: Not on file   Tobacco Use    Smoking status: Never Smoker    Smokeless tobacco: Never Used   Substance and Sexual Activity    Alcohol use: No    Drug use: No    Sexual activity: Yes     Birth control/protection: Surgical   Lifestyle    Physical activity:     Days per week: Not on file     Minutes per session: Not on file    Stress: Not on file   Relationships    Social connections:     Talks on phone: Not on file     Gets together: Not on file     Attends [employer] service: Not on file     Active member of club or organization: Not on file Attends meetings of clubs or organizations: Not on file     Relationship status: Not on file    Intimate partner violence:     Fear of current or ex partner: Not on file     Emotionally abused: Not on file     Physically abused: Not on file     Forced sexual activity: Not on file   Other Topics Concern    Not on file   Social History Narrative    Not on file     Past Medical History:   Diagnosis Date    Asthma     [facility] [address] [address]     Diabetes mellitus ([facility]     Glaucoma     Hypertension     Kidney stone     Neuropathy     Sleep apnea     no machine    Tubular adenoma of colon 10/2019     Family History   Problem Relation Age of Onset    Diabetes Mother     Hypertension Father     Prostate cancer Father     Diabetes Sister     Hypertension Sister     Breast cancer Sister 62    Diabetes Brother     Hypertension Brother     Asthma Family     No Known Problems Daughter     No Known Problems Sister     No Known Problems Daughter     No Known Problems Daughter     No Known Problems Maternal Aunt     Breast cancer Maternal Aunt 58    No Known Problems Maternal Aunt     No Known Problems Maternal Aunt     No Known Problems Paternal Aunt     No Known Problems Paternal Aunt     No Known Problems Paternal Aunt     Colon cancer Neg Hx     Ovarian cancer Neg Hx     Uterine cancer Neg Hx     Cervical cancer Neg Hx      Past Surgical History:   Procedure Laterality Date     SECTION      COLONOSCOPY      HYSTERECTOMY  1985    TONSILLECTOMY         Current Outpatient Medications:     albuterol (VENTOLIN HFA) 90 mcg/act inhaler, Inhale 1 puff every 6 (six) hours as needed for wheezing, Disp: 1 Inhaler, Rfl: 3    amLODIPine (NORVASC) 5 mg tablet, Take 1 tablet (5 mg total) by mouth daily, Disp: 30 tablet, Rfl: 1    aspirin 81 mg chewable tablet, Chew 81 mg daily, Disp: , Rfl:     enalapril (VASOTEC) 10 mg tablet, Take 1 tablet (10 mg total) by mouth 2 (two) times a day, Disp: 60 tablet, Rfl: 3    fluticasone-salmeterol (ADVAIR DISKUS) 250-50 mcg/dose inhaler, Inhale 1 puff as needed, Disp: , Rfl:     pregabalin (LYRICA) 150 mg capsule, Take 1 capsule (150 mg total) by mouth 2 (two) times a day, Disp: 60 capsule, Rfl: 3    sertraline (ZOLOFT) 50 mg tablet, Take 1 tablet (50 mg total) by mouth daily, Disp: 30 tablet, Rfl: 3    SITagliptin-metFORMIN HCl ER (JANUMET XR) 100-1000 MG TB24, Take 1 tablet by mouth daily, Disp: 30 tablet, Rfl: 3    terconazole (TERAZOL 7) 0 4 % vaginal cream, Insert 1 applicator into the vagina daily at bedtime, Disp: 45 g, Rfl: 1    travoprost (TRAVATAN Z) 0 004 % ophthalmic solution, Apply 1 drop to eye daily at bedtime, Disp: , Rfl:     carbamide peroxide (DEBROX) 6 5 % otic solution, Administer 5 drops into both ears 2 (two) times a day, Disp: 15 mL, Rfl: 0    glucose blood (ONETOUCH VERIO) test strip, E11 9 / testing daily, Disp: , Rfl:     mometasone (ELOCON) 0 1 % cream, Apply topically daily (Patient not taking: Reported on 9/27/2019), Disp: 45 g, Rfl: 0    ondansetron (ZOFRAN-ODT) 4 mg disintegrating tablet, Take 1 tablet (4 mg total) by mouth every 8 (eight) hours as needed for nausea or vomiting, Disp: 12 tablet, Rfl: 0    ONETOUCH DELICA LANCETS 62R MISC, E11 9/ testing daily, Disp: , Rfl:     simethicone (MYLICON,GAS-X) 173 MG CAPS, Take 1 capsule (125 mg total) by mouth 4 (four) times a day as needed for flatulence (gas pain) (Patient not taking: Reported on 11/4/2019), Disp: 28 each, Rfl: 0    Allergies   Allergen Reactions    Ciprofloxacin Itching    Levaquin [Levofloxacin] Swelling    Penicillins GI Intolerance          Lab Review   Hospital Outpatient Visit on 10/24/2019   Component Date Value    POC Glucose 10/24/2019 112     Case Report 10/24/2019                      Value:Surgical Pathology Report                         Case: [id number]                                   Authorizing Provider:  Osmin Stafford MD Collected:           10/24/2019 1025              Ordering Location:      [facility]       Received:            10/24/2019 [facility]                                                             Pathologist:           Kelley Jerry MD                                                                Specimens:   A) - Stomach, antrum                                                                                B) - Stomach, body                                                                                  C) - Stomach, fundus                                                                                D) - Polyp, Colorectal, ascending                                                          Final Diagnosis 10/24/2019                      Value: This result contains rich text formatting which cannot be displayed here   Note 10/24/2019                      Value: This result contains rich text formatting which cannot be displayed here   Additional Information 10/24/2019                      Value: This result contains rich text formatting which cannot be displayed here   Synoptic Checklist 10/24/2019                      Value:  (COLON/RECTUM POLYP FORM-GI - D)                                                                                                                 : Adenoma(s)     Brian Grade Description 10/24/2019                      Value: This result contains rich text formatting which cannot be displayed here   Clinical Information 10/24/2019                      Value:Abdominal pain, generalized  Encounter for screening colonoscopy     Appointment on 09/30/2019   Component Date Value    Urine Culture 09/30/2019 70,000-79,000 cfu/ml      A/G Ratio 09/30/2019 1 00*    Albumin Electrophoresis 09/30/2019 50 0*    Albumin CONC 09/30/2019 4 15     Alpha 1 09/30/2019 4 5     ALPHA 1 CONC 09/30/2019 0 37     Alpha 2 09/30/2019 14 0*    ALPHA 2 CONC 09/30/2019 1 16     Beta-1 09/30/2019 5 8     BETA 1 CONC 09/30/2019 0 48     Beta-2 09/30/2019 7 2     BETA 2 CONC 09/30/2019 0 60*    Gamma Globulin 09/30/2019 18 5     GAMMA CONC 09/30/2019 1 54     Total Protein 09/30/2019 8 3*    SPEP Interpretation 09/30/2019 The SPEP shows a faint possible band in the gamma region  Immunofixation to be performed  Reviewed by: Ceci Montenegro MD  ([id number])  **Electronic Signature**     Hepatitis C Ab 09/30/2019 Non-reactive     Immunofixation Interpret* 09/30/2019 Serum immunofixation shows no monoclonal gammopathy  Reviewed by: Ceci Montenegro MD  ([id number])  **Electronic Signature**    Office Visit on 09/30/2019   Component Date Value    Hemoglobin A1C 09/30/2019 6 5     Urine Protein 09/30/2019 6 0     Albumin ELP, Urine 09/30/2019 100 0     Alpha 1, Urine 09/30/2019 0 0     Alpha 2, Urine 09/30/2019 0 0     Beta, Urine 09/30/2019 0 0     Gamma Globulin, Urine 09/30/2019 0 0     UPEP Interp 09/30/2019 No monoclonal bands noted   Reviewed by: Ceci Montenegro MD  ([id number])  **Electronic Signature**    Admission on 09/27/2019, Discharged on 09/27/2019   Component Date Value    WBC 09/27/2019 5 63     RBC 09/27/2019 4 53     Hemoglobin 09/27/2019 13 2     Hematocrit 09/27/2019 41 0     MCV 09/27/2019 91     [address] 09/27/2019 29 1     MCHC 09/27/2019 32 2     RDW 09/27/2019 13 9     MPV 09/27/2019 9 7     Platelets 51/11/8522 253     nRBC 09/27/2019 0     Neutrophils Relative 09/27/2019 50     Immat GRANS % 09/27/2019 0     Lymphocytes Relative 09/27/2019 39     Monocytes Relative 09/27/2019 6     Eosinophils Relative 09/27/2019 4     Basophils Relative 09/27/2019 1     Neutrophils Absolute 09/27/2019 2 81     Immature Grans Absolute 09/27/2019 0 01     Lymphocytes Absolute 09/27/2019 2 22     Monocytes Absolute 09/27/2019 0 34     Eosinophils Absolute 09/27/2019 0 21     Basophils Absolute 09/27/2019 0 04     Sodium 09/27/2019 141     Potassium 09/27/2019 4 2     Chloride 09/27/2019 103     CO2 09/27/2019 28     ANION GAP 09/27/2019 10     BUN 09/27/2019 14     Creatinine 09/27/2019 0 90     Glucose 09/27/2019 94     Calcium 09/27/2019 9 5     AST 09/27/2019 17     ALT 09/27/2019 16     Alkaline Phosphatase 09/27/2019 74     Total Protein 09/27/2019 8 6*    Albumin 09/27/2019 3 6     Total Bilirubin 09/27/2019 0 30     eGFR 09/27/2019 74     Lipase 09/27/2019 170     POC Glucose 09/27/2019 68     POC Glucose 09/27/2019 104         Imaging: Us Pelvis Complete W Transvaginal    Result Date: 10/28/2019  Narrative: PELVIC ULTRASOUND, COMPLETE INDICATION:  69years old  N88 8: Other specified noninflammatory disorders of cervix uteri  Right cervical cystic lesion reported on recent CT study  COMPARISON: CT abdomen pelvis 9/27/2019 TECHNIQUE:   Transabdominal pelvic ultrasound was performed in sagittal and transverse planes with a curvilinear transducer  Additional transvaginal imaging was performed to better evaluate the endometrium and ovaries  Imaging included volumetric sweeps as well as traditional still imaging technique  FINDINGS: UTERUS: The uterus is surgically absent  Along the anterior margin of the vagina is a 1 7 x 1 0 x 1 9 cm cystic structure with some particulate internal echoes and a nonvascular mural nodule which measures about 3 mm  The vaginal cuff is otherwise unremarkable  OVARIES/ADNEXA: Right ovary:  Atrophic right ovary questionably visualized measuring 1 9 x 0 7 x 0 8 cm  No suspicious ovarian abnormality  Color flow not well visualized  Left ovary: Not visualized  No suspicious adnexal mass or loculated collections  There is no free fluid  Impression:  1 9 cm complex cyst along the anterior vaginal canal as above  Follow-up ultrasound in 3 months recommended to ensure stability  Nonvisualization left ovary  The study was marked in EPIC for significant notification   Workstation performed: [id number]     Mammo Screening Bilateral W 3d & Cad    Result Date: 10/23/2019  Narrative: DIAGNOSIS: Screening for malignant neoplasm of breast TECHNIQUE: Digital screening mammography was performed  Computer Aided Detection (CAD) analyzed all applicable images  COMPARISONS:  Multiple prior exams dating between 01/30/2012 and 08/23/2018  RELEVANT HISTORY: Family Breast Cancer History: History of breast cancer in Sister, Maternal Aunt  Family Medical History: Family medical history includes breast cancer in maternal aunt and breast cancer in sister  Personal History: Hormone history includes birth control  Surgical history includes hysterectomy  No known relevant medical history  The patient is scheduled in a reminder system for screening mammography  8-10% of cancers will be missed on mammography  Management of a palpable abnormality must be based on clinical grounds  Patients will be notified of their results via letter from our facility  Accredited by [employer] of [employer] and FDA  RISK ASSESSMENT: 5 Year Tyrer-Cuzick: 2 65 % 10 Year Tyrer-Cuzick: 5 61 % Lifetime Tyrer-Cuzick: 7 49 % TISSUE DENSITY: The breasts are heterogeneously dense, which may obscure small masses  INDICATION: Jewels Mock is a 69 y o  female presenting for screening mammography  FINDINGS: Bilateral There are no suspicious masses, grouped microcalcifications or areas of architectural distortion  The skin and nipple areolar complex are unremarkable  Benign-appearing calcifications are noted in each breast      Impression: No mammographic evidence of malignancy  ASSESSMENT/BI-RADS CATEGORY: Left: 2 - Benign Right: 2 - Benign Overall: 2 - Benign RECOMMENDATION:      - Routine screening mammogram in 1 year for both breasts  Workstation ID: [id number]       Objective:     Physical Exam   Constitutional: She is oriented to person, place, and time  She appears well-developed and well-nourished  No distress     HENT:   Head: Normocephalic and atraumatic  Right Ear: External ear normal    Left Ear: External ear normal    Mouth/Throat: Oropharynx is clear and moist    Bilateral cerumen impaction   Eyes: Pupils are equal, round, and reactive to light  Conjunctivae and EOM are normal  Right eye exhibits no discharge  Left eye exhibits no discharge  + cataracts  Neck: Normal range of motion  Neck supple  Cardiovascular: Normal rate, regular rhythm and normal heart sounds  Exam reveals no friction rub  No murmur heard  Pulmonary/Chest: Effort normal and breath sounds normal  No respiratory distress  She has no wheezes  She has no rales  She exhibits no tenderness  Musculoskeletal: Normal range of motion  She exhibits no edema, tenderness or deformity  Lymphadenopathy:     She has no cervical adenopathy  Neurological: She is alert and oriented to person, place, and time  No cranial nerve deficit  Skin: Skin is warm and dry  No rash noted  She is not diaphoretic  Psychiatric: She has a normal mood and affect  Her behavior is normal  Judgment and thought content normal          Patient Instructions   Discussed all with patient  Please make your appointment with your eye doctor as you do have cataracts but you do need a diabetic eye exam   When you see your doctor please have him send a copy of the consultation here  Labs reviewed and within normal limits  Diabetes is very well at goal   EKG done today is nsr with no ectopy or st changes  Please advise us if you get any further chest pain I would refer to cardiology  Continue all other meds  Keep the f/u appt for pelvic us in 3 months  Refuses a flu shot  Use the debrox in both ears twice daily and follow with a warm water flush  If you can't get out the wax I will flush you here  ALEKSANDER Mckeon    Portions of the record may have been created with voice recognition software    Occasional wrong word or "sound a like" substitutions may have occurred due to the inherent limitations of voice recognition software  Read the chart carefully and recognize, using context, where substitutions have occurred

## 2019-12-06 ENCOUNTER — TELEPHONE (OUTPATIENT)
Dept: FAMILY MEDICINE CLINIC | Facility: CLINIC | Age: 74
End: 2019-12-06

## 2019-12-06 DIAGNOSIS — E11.40 CONTROLLED TYPE 2 DIABETES WITH NEUROPATHY (HCC): ICD-10-CM

## 2019-12-06 NOTE — TELEPHONE ENCOUNTER
Pt called to check if Lyrica was sent to the pharmacy  I informed the pt it was sent to the pharmacy 9:35 am     Her  went early and picked up Pragablin but the pt has to take name brand only  Pt called the pharmacy and the pharmacist informed her they would have to check with insurance because it may not be covered  Pt advised to wait to here from the pharmacy, any other issues to call the office

## 2019-12-09 LAB
LEFT EYE DIABETIC RETINOPATHY: NORMAL
RIGHT EYE DIABETIC RETINOPATHY: NORMAL

## 2019-12-23 DIAGNOSIS — F41.8 DEPRESSION WITH ANXIETY: ICD-10-CM

## 2020-01-13 DIAGNOSIS — I10 BENIGN HYPERTENSION: ICD-10-CM

## 2020-01-13 RX ORDER — ENALAPRIL MALEATE 10 MG/1
TABLET ORAL
Qty: 180 TABLET | Refills: 0 | Status: SHIPPED | OUTPATIENT
Start: 2020-01-13 | End: 2020-03-23 | Stop reason: SDUPTHER

## 2020-01-24 DIAGNOSIS — J45.20 MILD INTERMITTENT ASTHMA, UNSPECIFIED WHETHER COMPLICATED: ICD-10-CM

## 2020-01-24 RX ORDER — ALBUTEROL SULFATE 90 UG/1
AEROSOL, METERED RESPIRATORY (INHALATION)
Qty: 18 INHALER | Refills: 0 | Status: SHIPPED | OUTPATIENT
Start: 2020-01-24 | End: 2020-03-23 | Stop reason: SDUPTHER

## 2020-03-12 ENCOUNTER — OFFICE VISIT (OUTPATIENT)
Dept: FAMILY MEDICINE CLINIC | Facility: CLINIC | Age: 75
End: 2020-03-12
Payer: MEDICARE

## 2020-03-12 VITALS
OXYGEN SATURATION: 98 % | HEART RATE: 74 BPM | HEIGHT: 62 IN | WEIGHT: 206.4 LBS | SYSTOLIC BLOOD PRESSURE: 140 MMHG | DIASTOLIC BLOOD PRESSURE: 60 MMHG | BODY MASS INDEX: 37.98 KG/M2 | TEMPERATURE: 97 F | RESPIRATION RATE: 16 BRPM

## 2020-03-12 DIAGNOSIS — L30.9 DERMATITIS: ICD-10-CM

## 2020-03-12 DIAGNOSIS — Z12.31 ENCOUNTER FOR SCREENING MAMMOGRAM FOR BREAST CANCER: ICD-10-CM

## 2020-03-12 DIAGNOSIS — Z78.0 ASYMPTOMATIC POSTMENOPAUSAL STATE: ICD-10-CM

## 2020-03-12 DIAGNOSIS — I10 BENIGN HYPERTENSION: ICD-10-CM

## 2020-03-12 DIAGNOSIS — Z00.00 MEDICARE ANNUAL WELLNESS VISIT, SUBSEQUENT: Primary | ICD-10-CM

## 2020-03-12 DIAGNOSIS — R07.89 ATYPICAL CHEST PAIN: ICD-10-CM

## 2020-03-12 DIAGNOSIS — F41.8 DEPRESSION WITH ANXIETY: ICD-10-CM

## 2020-03-12 DIAGNOSIS — E11.40 CONTROLLED TYPE 2 DIABETES WITH NEUROPATHY (HCC): ICD-10-CM

## 2020-03-12 DIAGNOSIS — Z13.220 SCREENING, LIPID: ICD-10-CM

## 2020-03-12 DIAGNOSIS — R06.02 SHORTNESS OF BREATH: ICD-10-CM

## 2020-03-12 DIAGNOSIS — E11.9 WELL CONTROLLED DIABETES MELLITUS (HCC): ICD-10-CM

## 2020-03-12 DIAGNOSIS — F42.4 COMPULSIVE SKIN PICKING: ICD-10-CM

## 2020-03-12 LAB — SL AMB POCT HEMOGLOBIN AIC: 6.2 (ref ?–6.5)

## 2020-03-12 PROCEDURE — 99215 OFFICE O/P EST HI 40 MIN: CPT | Performed by: FAMILY MEDICINE

## 2020-03-12 PROCEDURE — 3077F SYST BP >= 140 MM HG: CPT | Performed by: FAMILY MEDICINE

## 2020-03-12 PROCEDURE — 1160F RVW MEDS BY RX/DR IN RCRD: CPT | Performed by: FAMILY MEDICINE

## 2020-03-12 PROCEDURE — 1036F TOBACCO NON-USER: CPT | Performed by: FAMILY MEDICINE

## 2020-03-12 PROCEDURE — 1170F FXNL STATUS ASSESSED: CPT | Performed by: FAMILY MEDICINE

## 2020-03-12 PROCEDURE — 83036 HEMOGLOBIN GLYCOSYLATED A1C: CPT | Performed by: FAMILY MEDICINE

## 2020-03-12 PROCEDURE — 3078F DIAST BP <80 MM HG: CPT | Performed by: FAMILY MEDICINE

## 2020-03-12 PROCEDURE — 3008F BODY MASS INDEX DOCD: CPT | Performed by: FAMILY MEDICINE

## 2020-03-12 PROCEDURE — 2022F DILAT RTA XM EVC RTNOPTHY: CPT | Performed by: FAMILY MEDICINE

## 2020-03-12 PROCEDURE — G0439 PPPS, SUBSEQ VISIT: HCPCS | Performed by: FAMILY MEDICINE

## 2020-03-12 RX ORDER — BIMATOPROST 0.01 %
DROPS OPHTHALMIC (EYE)
COMMUNITY
Start: 2020-02-03 | End: 2020-03-12

## 2020-03-12 RX ORDER — MOMETASONE FUROATE 1 MG/G
CREAM TOPICAL DAILY
Qty: 45 G | Refills: 0 | Status: SHIPPED | OUTPATIENT
Start: 2020-03-12 | End: 2020-03-23 | Stop reason: SDUPTHER

## 2020-03-12 NOTE — Clinical Note
Please have pt sign release for records from Dr Pamela Greenwood and [facility]  Also need her last stress test

## 2020-03-12 NOTE — ASSESSMENT & PLAN NOTE
Patient reports a history of syncope with subsequent cardiac arrest    Per patient, her  was told she was brain dead however she was resuscitated  States when she woke up that she was told that she had fractured ribs asthma and diabetes  Will request old stress test records from [address] and previous records from her physician  EKG done today show flat t wave in lead 1 and inverted t wave in avl

## 2020-03-12 NOTE — PATIENT INSTRUCTIONS
Discussed all with patient  The history is quite extensive with a history of cardiac arrest with subsequent resuscitation and a history of fractured ribs  Patient reports when she awoke from this event she was told she was a diabetic with asthma  Will request old records from [facility], previous primary physician, previous stress test   EKG done today shows flat T-waves in lead 1 and flipped in aVL  Will refer to cardiology for evaluation related to the atypical chest pain in the shortness of breath  Will also get pulmonary function test   Have the blood work done fasting but drink water before you go for the test because we need to get a urine test is well  Make your appointment with the dermatologist there is 1 in town at [facility]  Continue all medication for now, but will consider  Lowering Janumet as your diabetes is very very well controlled  Follow-up here in 2 month will review old records labs and all consult at that time  ER if you feel worse  Will also check cholesterol panel as patient is not on any statin  Pt refuses all immunizations  Medicare Preventive Visit Patient Instructions  Thank you for completing your Welcome to Medicare Visit or Medicare Annual Wellness Visit today  Your next wellness visit will be due in one year (3/12/2021)  The screening/preventive services that you may require over the next 5-10 years are detailed below  Some tests may not apply to you based off risk factors and/or age  Screening tests ordered at today's visit but not completed yet may show as past due  Also, please note that scanned in results may not display below    Preventive Screenings:  Service Recommendations Previous Testing/Comments   Colorectal Cancer Screening  * Colonoscopy    * Fecal Occult Blood Test (FOBT)/Fecal Immunochemical Test (FIT)  * Fecal DNA/Cologuard Test  * Flexible Sigmoidoscopy Age: 54-65 years old   Colonoscopy: every 10 years (may be performed more frequently if at higher risk)  OR  FOBT/FIT: every 1 year  OR  Cologuard: every 3 years  OR  Sigmoidoscopy: every 5 years  Screening may be recommended earlier than age 48 if at higher risk for colorectal cancer  Also, an individualized decision between you and your healthcare provider will decide whether screening between the ages of 74-80 would be appropriate  Colonoscopy: 10/24/2019  FOBT/FIT: Not on file  Cologuard: Not on file  Sigmoidoscopy: Not on file    Screening Current     Breast Cancer Screening Age: 36 years old  Frequency: every 1-2 years  Not required if history of left and right mastectomy Mammogram: 10/23/2019    Screening Current   Cervical Cancer Screening Between the ages of 21-29, pap smear recommended once every 3 years  Between the ages of 33-67, can perform pap smear with HPV co-testing every 5 years  Recommendations may differ for women with a history of total hysterectomy, cervical cancer, or abnormal pap smears in past  Pap Smear: 11/04/2019    Screening Not Indicated   Hepatitis C Screening Once for adults born between 1945 and 1965  More frequently in patients at high risk for Hepatitis C Hep C Antibody: 09/30/2019    Screening Current   Diabetes Screening 1-2 times per year if you're at risk for diabetes or have pre-diabetes Fasting glucose: No results in last 5 years   A1C: 6 5    Screening Not Indicated  History Diabetes   Cholesterol Screening Once every 5 years if you don't have a lipid disorder  May order more often based on risk factors  Lipid panel: Not on file         Other Preventive Screenings Covered by Medicare:  1  Abdominal Aortic Aneurysm (AAA) Screening: covered once if your at risk  You're considered to be at risk if you have a family history of AAA    2  Lung Cancer Screening: covers low dose CT scan once per year if you meet all of the following conditions: (1) Age 50-69; (2) No signs or symptoms of lung cancer; (3) Current smoker or have quit smoking within the last 15 years; (4) You have a tobacco smoking history of at least 30 pack years (packs per day multiplied by number of years you smoked); (5) You get a written order from a healthcare provider  3  Glaucoma Screening: covered annually if you're considered high risk: (1) You have diabetes OR (2) Family history of glaucoma OR (3)  aged 48 and older OR (3)  American aged 72 and older  3  Osteoporosis Screening: covered every 2 years if you meet one of the following conditions: (1) You're estrogen deficient and at risk for osteoporosis based off medical history and other findings; (2) Have a vertebral abnormality; (3) On glucocorticoid therapy for more than 3 months; (4) Have primary hyperparathyroidism; (5) On osteoporosis medications and need to assess response to drug therapy  · Last bone density test (DXA Scan): Not on file  5  HIV Screening: covered annually if you're between the age of 12-76  Also covered annually if you are younger than 13 and older than 72 with risk factors for HIV infection  For pregnant patients, it is covered up to 3 times per pregnancy  Immunizations:  Immunization Recommendations   Influenza Vaccine Annual influenza vaccination during flu season is recommended for all persons aged >= 6 months who do not have contraindications   Pneumococcal Vaccine (Prevnar and Pneumovax)  * Prevnar = PCV13  * Pneumovax = PPSV23   Adults 25-60 years old: 1-3 doses may be recommended based on certain risk factors  Adults 72 years old: Prevnar (PCV13) vaccine recommended followed by Pneumovax (PPSV23) vaccine  If already received PPSV23 since turning 65, then PCV13 recommended at least one year after PPSV23 dose     Hepatitis B Vaccine 3 dose series if at intermediate or high risk (ex: diabetes, end stage renal disease, liver disease)   Tetanus (Td) Vaccine - COST NOT COVERED BY MEDICARE PART B Following completion of primary series, a booster dose should be given every 10 years to maintain immunity against tetanus  Td may also be given as tetanus wound prophylaxis  Tdap Vaccine - COST NOT COVERED BY MEDICARE PART B Recommended at least once for all adults  For pregnant patients, recommended with each pregnancy  Shingles Vaccine (Shingrix) - COST NOT COVERED BY MEDICARE PART B  2 shot series recommended in those aged 48 and above     Health Maintenance Due:      Topic Date Due    MAMMOGRAM  10/23/2020    CRC Screening: Colonoscopy  10/24/2020    Hepatitis C Screening  Completed     Immunizations Due:  There are no preventive care reminders to display for this patient  Advance Directives   What are advance directives? Advance directives are legal documents that state your wishes and plans for medical care  These plans are made ahead of time in case you lose your ability to make decisions for yourself  Advance directives can apply to any medical decision, such as the treatments you want, and if you want to donate organs  What are the types of advance directives? There are many types of advance directives, and each state has rules about how to use them  You may choose a combination of any of the following:  · Living will: This is a written record of the treatment you want  You can also choose which treatments you do not want, which to limit, and which to stop at a certain time  This includes surgery, medicine, IV fluid, and tube feedings  · Durable power of  for healthcare [facility] [facility]): This is a written record that states who you want to make healthcare choices for you when you are unable to make them for yourself  This person, called a proxy, is usually a family member or a friend  You may choose more than 1 proxy  · Do not resuscitate (DNR) order:  A DNR order is used in case your heart stops beating or you stop breathing  It is a request not to have certain forms of treatment, such as CPR   A DNR order may be included in other types of advance directives  · Medical directive: This covers the care that you want if you are in a coma, near death, or unable to make decisions for yourself  You can list the treatments you want for each condition  Treatment may include pain medicine, surgery, blood transfusions, dialysis, IV or tube feedings, and a ventilator (breathing machine)  · Values history: This document has questions about your views, beliefs, and how you feel and think about life  This information can help others choose the care that you would choose  Why are advance directives important? An advance directive helps you control your care  Although spoken wishes may be used, it is better to have your wishes written down  Spoken wishes can be misunderstood, or not followed  Treatments may be given even if you do not want them  An advance directive may make it easier for your family to make difficult choices about your care  Weight Management   Why it is important to manage your weight:  Being overweight increases your risk of health conditions such as heart disease, high blood pressure, type 2 diabetes, and certain types of cancer  It can also increase your risk for osteoarthritis, sleep apnea, and other respiratory problems  Aim for a slow, steady weight loss  Even a small amount of weight loss can lower your risk of health problems  How to lose weight safely:  A safe and healthy way to lose weight is to eat fewer calories and get regular exercise  You can lose up about 1 pound a week by decreasing the number of calories you eat by 500 calories each day  Healthy meal plan for weight management:  A healthy meal plan includes a variety of foods, contains fewer calories, and helps you stay healthy  A healthy meal plan includes the following:  · Eat whole-grain foods more often  A healthy meal plan should contain fiber  Fiber is the part of grains, fruits, and vegetables that is not broken down by your body   Whole-grain foods are healthy and provide extra fiber in your diet  Some examples of whole-grain foods are whole-wheat breads and pastas, oatmeal, brown rice, and bulgur  · Eat a variety of vegetables every day  Include dark, leafy greens such as spinach, kale, rowena greens, and mustard greens  Eat yellow and orange vegetables such as carrots, sweet potatoes, and winter squash  · Eat a variety of fruits every day  Choose fresh or canned fruit (canned in its own juice or light syrup) instead of juice  Fruit juice has very little or no fiber  · Eat low-fat dairy foods  Drink fat-free (skim) milk or 1% milk  Eat fat-free yogurt and low-fat cottage cheese  Try low-fat cheeses such as mozzarella and other reduced-fat cheeses  · Choose meat and other protein foods that are low in fat  Choose beans or other legumes such as split peas or lentils  Choose fish, skinless poultry (chicken or turkey), or lean cuts of red meat (beef or pork)  Before you cook meat or poultry, cut off any visible fat  · Use less fat and oil  Try baking foods instead of frying them  Add less fat, such as margarine, sour cream, regular salad dressing and mayonnaise to foods  Eat fewer high-fat foods  Some examples of high-fat foods include french fries, doughnuts, ice cream, and cakes  · Eat fewer sweets  Limit foods and drinks that are high in sugar  This includes candy, cookies, regular soda, and sweetened drinks  Exercise:  Exercise at least 30 minutes per day on most days of the week  Some examples of exercise include walking, biking, dancing, and swimming  You can also fit in more physical activity by taking the stairs instead of the elevator or parking farther away from stores  Ask your healthcare provider about the best exercise plan for you  © Copyright [employer] 2018 Information is for End User's use only and may not be sold, redistributed or otherwise used for commercial purposes   All illustrations and images included in CareNotes® are the copyrighted property of A D A M , Inc  or Noelle Harris Aliza     Obesity   AMBULATORY CARE:   Obesity  is when your body mass index (BMI) is greater than 30  Your healthcare provider will use your height and weight to measure your BMI  The risks of obesity include  many health problems, such as injuries or physical disability  You may need tests to check for the following:  · Diabetes     · High blood pressure or high cholesterol     · Heart disease     · Gallbladder or liver disease     · Cancer of the colon, breast, prostate, liver, or kidney     · Sleep apnea     · Arthritis or gout  Seek care immediately if:   · You have a severe headache, confusion, or difficulty speaking  · You have weakness on one side of your body  · You have chest pain, sweating, or shortness of breath  Contact your healthcare provider if:   · You have symptoms of gallbladder or liver disease, such as pain in your upper abdomen  · You have knee or hip pain and discomfort while walking  · You have symptoms of diabetes, such as intense hunger and thirst, and frequent urination  · You have symptoms of sleep apnea, such as snoring or daytime sleepiness  · You have questions or concerns about your condition or care  Treatment for obesity  focuses on helping you lose weight to improve your health  Even a small decrease in BMI can reduce the risk for many health problems  Your healthcare provider will help you set a weight-loss goal   · Lifestyle changes  are the first step in treating obesity  These include making healthy food choices and getting regular physical activity  Your healthcare provider may suggest a weight-loss program that involves coaching, education, and therapy  · Medicine  may help you lose weight when it is used with a healthy diet and physical activity  · Surgery  can help you lose weight if you are very obese and have other health problems  There are several types of weight-loss surgery   Ask your healthcare provider for more information  Be successful losing weight:   · Set small, realistic goals  An example of a small goal is to walk for 20 minutes 5 days a week  Anther goal is to lose 5% of your body weight  · Tell friends, family members, and coworkers about your goals  and ask for their support  Ask a friend to lose weight with you, or join a weight-loss support group  · Identify foods or triggers that may cause you to overeat , and find ways to avoid them  Remove tempting high-calorie foods from your home and workplace  Place a bowl of fresh fruit on your kitchen counter  If stress causes you to eat, then find other ways to cope with stress  · Keep a diary to track what you eat and drink  Also write down how many minutes of physical activity you do each day  Weigh yourself once a week and record it in your diary  Eating changes: You will need to eat 500 to 1,000 fewer calories each day than you currently eat to lose 1 to 2 pounds a week  The following changes will help you cut calories:  · Eat smaller portions  Use small plates, no larger than 9 inches in diameter  Fill your plate half full of fruits and vegetables  Measure your food using measuring cups until you know what a serving size looks like  · Eat 3 meals and 1 or 2 snacks each day  Plan your meals in advance  Miles Castillo and eat at home most of the time  Eat slowly  · Eat fruits and vegetables at every meal   They are low in calories and high in fiber, which makes you feel full  Do not add butter, margarine, or cream sauce to vegetables  Use herbs to season steamed vegetables  · Eat less fat and fewer fried foods  Eat more baked or grilled chicken and fish  These protein sources are lower in calories and fat than red meat  Limit fast food  Dress your salads with olive oil and vinegar instead of bottled dressing  · Limit the amount of sugar you eat  Do not drink sugary beverages  Limit alcohol    Activity changes:  Physical activity is good for your body in many ways  It helps you burn calories and build strong muscles  It decreases stress and depression, and improves your mood  It can also help you sleep better  Talk to your healthcare provider before you begin an exercise program   · Exercise for at least 30 minutes 5 days a week  Start slowly  Set aside time each day for physical activity that you enjoy and that is convenient for you  It is best to do both weight training and an activity that increases your heart rate, such as walking, bicycling, or swimming  · Find ways to be more active  Do yard work and housecleaning  Walk up the stairs instead of using elevators  Spend your leisure time going to events that require walking, such as outdoor festivals or fairs  This extra physical activity can help you lose weight and keep it off  Follow up with your healthcare provider as directed: You may need to meet with a dietitian  Write down your questions so you remember to ask them during your visits  © 2017 [address] Information is for End User's use only and may not be sold, redistributed or otherwise used for commercial purposes  All illustrations and images included in CareNotes® are the copyrighted property of A D A M , Inc  or Trevor Brown  The above information is an  only  It is not intended as medical advice for individual conditions or treatments  Talk to your doctor, nurse or pharmacist before following any medical regimen to see if it is safe and effective for you  Weight Management   AMBULATORY CARE:   Why it is important to manage your weight:  Being overweight increases your risk of health conditions such as heart disease, high blood pressure, type 2 diabetes, and certain types of cancer  It can also increase your risk for osteoarthritis, sleep apnea, and other respiratory problems  Aim for a slow, steady weight loss   Even a small amount of weight loss can lower your risk of health problems  How to lose weight safely:  A safe and healthy way to lose weight is to eat fewer calories and get regular exercise  You can lose up about 1 pound a week by decreasing the number of calories you eat by 500 calories each day  You can decrease calories by eating smaller portion sizes or by cutting out high-calorie foods  Read labels to find out how many calories are in the foods you eat  You can also burn calories with exercise such as walking, swimming, or biking  You will be more likely to keep weight off if you make these changes part of your lifestyle  Healthy meal plan for weight management:  A healthy meal plan includes a variety of foods, contains fewer calories, and helps you stay healthy  A healthy meal plan includes the following:  · Eat whole-grain foods more often  A healthy meal plan should contain fiber  Fiber is the part of grains, fruits, and vegetables that is not broken down by your body  Whole-grain foods are healthy and provide extra fiber in your diet  Some examples of whole-grain foods are whole-wheat breads and pastas, oatmeal, brown rice, and bulgur  · Eat a variety of vegetables every day  Include dark, leafy greens such as spinach, kale, rowena greens, and mustard greens  Eat yellow and orange vegetables such as carrots, sweet potatoes, and winter squash  · Eat a variety of fruits every day  Choose fresh or canned fruit (canned in its own juice or light syrup) instead of juice  Fruit juice has very little or no fiber  · Eat low-fat dairy foods  Drink fat-free (skim) milk or 1% milk  Eat fat-free yogurt and low-fat cottage cheese  Try low-fat cheeses such as mozzarella and other reduced-fat cheeses  · Choose meat and other protein foods that are low in fat  Choose beans or other legumes such as split peas or lentils  Choose fish, skinless poultry (chicken or turkey), or lean cuts of red meat (beef or pork)   Before you cook meat or poultry, cut off any visible fat  · Use less fat and oil  Try baking foods instead of frying them  Add less fat, such as margarine, sour cream, regular salad dressing and mayonnaise to foods  Eat fewer high-fat foods  Some examples of high-fat foods include french fries, doughnuts, ice cream, and cakes  · Eat fewer sweets  Limit foods and drinks that are high in sugar  This includes candy, cookies, regular soda, and sweetened drinks  Ways to decrease calories:   · Eat smaller portions  ¨ Use a small plate with smaller servings  ¨ Do not eat second helpings  ¨ When you eat at a restaurant, ask for a box and place half of your meal in the box before you eat  ¨ Share an entrée with someone else  · Replace high-calorie snacks with healthy, low-calorie snacks  ¨ Choose fresh fruit, vegetables, fat-free rice cakes, or air-popped popcorn instead of potato chips, nuts, or chocolate  ¨ Choose water or calorie-free drinks instead of soda or sweetened drinks  · Eat regular meals  Skipping meals can lead to overeating later in the day  Eat a healthy snack in place of a meal if you do not have time to eat a regular meal      · Do not shop for groceries when you are hungry  You may be more likely to make unhealthy food choices  Take a grocery list of healthy foods and shop after you have eaten  Exercise:  Exercise at least 30 minutes per day on most days of the week  Some examples of exercise include walking, biking, dancing, and swimming  You can also fit in more physical activity by taking the stairs instead of the elevator or parking farther away from stores  Ask your healthcare provider about the best exercise plan for you  Other things to consider as you try to lose weight:   · Be aware of situations that may give you the urge to overeat, such as eating while watching television  Find ways to avoid these situations  For example, read a book, go for a walk, or do crafts      · Meet with a weight loss support group or friends who are also trying to lose weight  This may help you stay motivated to continue working on your weight loss goals  © 2017 [address] Information is for End User's use only and may not be sold, redistributed or otherwise used for commercial purposes  All illustrations and images included in CareNotes® are the copyrighted property of A D A M , Inc  or Trevor Brown  The above information is an  only  It is not intended as medical advice for individual conditions or treatments  Talk to your doctor, nurse or pharmacist before following any medical regimen to see if it is safe and effective for you  Low Fat Diet   AMBULATORY CARE:   A low-fat diet  is an eating plan that is low in total fat, unhealthy fat, and cholesterol  You may need to follow a low-fat diet if you have trouble digesting or absorbing fat  You may also need to follow this diet if you have high cholesterol  You can also lower your cholesterol by increasing the amount of fiber in your diet  Soluble fiber is a type of fiber that helps to decrease cholesterol levels  Different types of fat in food:   · Limit unhealthy fats  A diet that is high in cholesterol, saturated fat, and trans fat may cause unhealthy cholesterol levels  Unhealthy cholesterol levels increase your risk of heart disease  ¨ Cholesterol:  Limit intake of cholesterol to less than 200 mg per day  Cholesterol is found in meat, eggs, and dairy  ¨ Saturated fat:  Limit saturated fat to less than 7% of your total daily calories  Ask your dietitian how many calories you need each day  Saturated fat is found in butter, cheese, ice cream, whole milk, and palm oil  Saturated fat is also found in meat, such as beef, pork, chicken skin, and processed meats  Processed meats include sausage, hot dogs, and bologna  ¨ Trans fat:  Avoid trans fat as much as possible   Trans fat is used in fried and baked foods  Foods that say trans fat free on the label may still have up to 0 5 grams of trans fat per serving  · Include healthy fats  Replace foods that are high in saturated and trans fat with foods high in healthy fats  This may help to decrease high cholesterol levels  ¨ Monounsaturated fats: These are found in avocados, nuts, and vegetable oils, such as olive, canola, and sunflower oil  ¨ Polyunsaturated fats: These can be found in vegetable oils, such as soybean or corn oil  Omega-3 fats can help to decrease the risk of heart disease  Omega-3 fats are found in fish, such as salmon, herring, trout, and tuna  Omega-3 fats can also be found in plant foods, such as walnuts, flaxseed, soybeans, and canola oil    Foods to limit or avoid:   · Grains:      ¨ Snacks that are made with partially hydrogenated oils, such as chips, regular crackers, and butter-flavored popcorn    ¨ High-fat baked goods, such as biscuits, croissants, doughnuts, pies, cookies, and pastries    · Dairy:      ¨ Whole milk, 2% milk, and yogurt and ice cream made with whole milk    ¨ Half and half creamer, heavy cream, and whipping cream    ¨ Cheese, cream cheese, and sour cream    · Meats and proteins:      ¨ High-fat cuts of meat (T-bone steak, regular hamburger, and ribs)    ¨ Fried meat, poultry (turkey and chicken), and fish    ¨ Poultry (chicken and turkey) with skin    ¨ Cold cuts (salami or bologna), hot dogs, clayton, and sausage    ¨ Whole eggs and egg yolks    · Vegetables and fruits with added fat:      ¨ Fried vegetables or vegetables in butter or high-fat sauces, such as cream or cheese sauces    ¨ Fried fruit or fruit served with butter or cream    · Fats:      ¨ Butter, stick margarine, and shortening    ¨ Coconut, palm oil, and palm kernel oil  Foods to include:   · Grains:      ¨ Whole-grain breads, cereals, pasta, and brown rice    ¨ Low-fat crackers and pretzels    · Vegetables and fruits:      ¨ Fresh, frozen, or canned vegetables (no salt or low-sodium)    ¨ Fresh, frozen, dried, or canned fruit (canned in light syrup or fruit juice)    ¨ Avocado    · Low-fat dairy products:      ¨ Nonfat (skim) or 1% milk    ¨ Nonfat or low-fat cheese, yogurt, and cottage cheese    · Meats and proteins:      ¨ Chicken or turkey with no skin    ¨ Baked or broiled fish    ¨ Lean beef and pork (loin, round, extra lean hamburger)    ¨ Beans and peas, unsalted nuts, soy products    ¨ Egg whites and substitutes    ¨ Seeds and nuts    · Fats:      ¨ Unsaturated oil, such as canola, olive, peanut, soybean, or sunflower oil    ¨ Soft or liquid margarine and vegetable oil spread    ¨ Low-fat salad dressing  Other ways to decrease fat:   · Read food labels before you buy foods  Choose foods that have less than 30% of calories from fat  Choose low-fat or fat-free dairy products  Remember that fat free does not mean calorie free  These foods still contain calories, and too many calories can lead to weight gain  · Trim fat from meat and avoid fried food  Trim all visible fat from meat before you cook it  Remove the skin from poultry  Do not [place] meat, fish, or poultry  Bake, roast, boil, or broil these foods instead  Avoid fried foods  Eat a baked potato instead of [place] Janie fries  Steam vegetables instead of sautéing them in butter  · Add less fat to foods  Use imitation clayton bits on salads and baked potatoes instead of regular clayton bits  Use fat-free or low-fat salad dressings instead of regular dressings  Use low-fat or nonfat butter-flavored topping instead of regular butter or margarine on popcorn and other foods  Ways to decrease fat in recipes:  Replace high-fat ingredients with low-fat or nonfat ones  This may cause baked goods to be drier than usual  You may need to use nonfat cooking spray on pans to prevent food from sticking  You also may need to change the amount of other ingredients, such as water, in the recipe   Try the following:  · Use low-fat or light margarine instead of regular margarine or shortening  · Use lean ground turkey breast or chicken, or lean ground beef (less than 5% fat) instead of hamburger  · Add 1 teaspoon of canola oil to 8 ounces of skim milk instead of using cream or half and half  · Use grated zucchini, carrots, or apples in breads instead of coconut  · Use blenderized, low-fat cottage cheese, plain tofu, or low-fat ricotta cheese instead of cream cheese  · Use 1 egg white and 1 teaspoon of canola oil, or use ¼ cup (2 ounces) of fat-free egg substitute instead of a whole egg  · Replace half of the oil that is called for in a recipe with applesauce when you bake  Use 3 tablespoons of cocoa powder and 1 tablespoon of canola oil instead of a square of baking chocolate  How to increase fiber:  Eat enough high-fiber foods to get 20 to 30 grams of fiber every day  Slowly increase your fiber intake to avoid stomach cramps, gas, and other problems  · Eat 3 ounces of whole-grain foods each day  An ounce is about 1 slice of bread  Eat whole-grain breads, such as whole-wheat bread  Whole wheat, whole-wheat flour, or other whole grains should be listed as the first ingredient on the food label  Replace white flour with whole-grain flour or use half of each in recipes  Whole-grain flour is heavier than white flour, so you may have to add more yeast or baking powder  · Eat a high-fiber cereal for breakfast   Oatmeal is a good source of soluble fiber  Look for cereals that have bran or fiber in the name  Choose whole-grain products, such as brown rice, barley, and whole-wheat pasta  · Eat more beans, peas, and lentils  For example, add beans to soups or salads  Eat at least 5 cups of fruits and vegetables each day  Eat fruits and vegetables with the peel because the peel is high in fiber    © 2017 [employer] Information is for End User's use only and may not be sold, redistributed or otherwise used for commercial purposes  All illustrations and images included in CareNotes® are the copyrighted property of [employer]  or Trevor Brown  The above information is an  only  It is not intended as medical advice for individual conditions or treatments  Talk to your doctor, nurse or pharmacist before following any medical regimen to see if it is safe and effective for you  Heart Healthy Diet   AMBULATORY CARE:   A heart healthy diet  is an eating plan low in total fat, unhealthy fats, and sodium (salt)  A heart healthy diet helps decrease your risk for heart disease and stroke  Limit the amount of fat you eat to 25% to 35% of your total daily calories  Limit sodium to less than 2,300 mg each day  Healthy fats:  Healthy fats can help improve cholesterol levels  The risk for heart disease is decreased when cholesterol levels are normal  Choose healthy fats, such as the following:  · Unsaturated fat  is found in foods such as soybean, canola, olive, corn, and safflower oils  It is also found in soft tub margarine that is made with liquid vegetable oil  · Omega-3 fat  is found in certain fish, such as salmon, tuna, and trout, and in walnuts and flaxseed  Unhealthy fats:  Unhealthy fats can cause unhealthy cholesterol levels in your blood and increase your risk of heart disease  Limit unhealthy fats, such as the following:  · Cholesterol  is found in animal foods, such as eggs and lobster, and in dairy products made from whole milk  Limit cholesterol to less than 300 milligrams (mg) each day  You may need to limit cholesterol to 200 mg each day if you have heart disease  · Saturated fat  is found in meats, such as clayton and hamburger  It is also found in chicken or turkey skin, whole milk, and butter  Limit saturated fat to less than 7% of your total daily calories   Limit saturated fat to less than 6% if you have heart disease or are at increased risk for it  · Trans fat  is found in packaged foods, such as potato chips and cookies  It is also in hard margarine, some fried foods, and shortening  Avoid trans fats as much as possible    Heart healthy foods and drinks to include:  Ask your dietitian or healthcare provider how many servings to have from each of the following food groups:  · Grains:      ¨ Whole-wheat breads, cereals, and pastas, and brown rice    ¨ Low-fat, low-sodium crackers and chips    · Vegetables:      ¨ Broccoli, green beans, green peas, and spinach    ¨ Collards, kale, and lima beans    ¨ Carrots, sweet potatoes, tomatoes, and peppers    ¨ Canned vegetables with no salt added    · Fruits:      ¨ Bananas, peaches, pears, and pineapple    ¨ Grapes, raisins, and dates    ¨ Oranges, tangerines, grapefruit, orange juice, and grapefruit juice    ¨ Apricots, mangoes, melons, and papaya    ¨ Raspberries and strawberries    ¨ Canned fruit with no added sugar    · Low-fat dairy products:      ¨ Nonfat (skim) milk, 1% milk, and low-fat almond, cashew, or soy milks fortified with calcium    ¨ Low-fat cheese, regular or frozen yogurt, and cottage cheese    · Meats and proteins , such as lean cuts of beef and pork (loin, leg, round), skinless chicken and turkey, legumes, soy products, egg whites, and nuts  Foods and drinks to limit or avoid:  Ask your dietitian or healthcare provider about these and other foods that are high in unhealthy fat, sodium, and sugar:  · Snack or packaged foods , such as frozen dinners, cookies, macaroni and cheese, and cereals with more than 300 mg of sodium per serving    · Canned or dry mixes  for cakes, soups, sauces, or gravies    · Vegetables with added sodium , such as instant potatoes, vegetables with added sauces, or regular canned vegetables    · Other foods high in sodium , such as ketchup, barbecue sauce, salad dressing, pickles, olives, soy sauce, and miso    · High-fat dairy foods  such as whole or 2% milk, cream cheese, or sour cream, and cheeses     · High-fat protein foods  such as high-fat cuts of beef (T-bone steaks, ribs), chicken or turkey with skin, and organ meats, such as liver    · Cured or smoked meats , such as hot dogs, clayton, and sausage    · Unhealthy fats and oils , such as butter, stick margarine, shortening, and cooking oils such as coconut or palm oil    · Food and drinks high in sugar , such as soft drinks (soda), sports drinks, sweetened tea, candy, cake, cookies, pies, and doughnuts  Other diet guidelines to follow:   · Eat more foods containing omega-3 fats  Eat fish high in omega-3 fats at least 2 times a week  · Limit alcohol  Too much alcohol can damage your heart and raise your blood pressure  Women should limit alcohol to 1 drink a day  Men should limit alcohol to 2 drinks a day  A drink of alcohol is 12 ounces of beer, 5 ounces of wine, or 1½ ounces of liquor  · Choose low-sodium foods  High-sodium foods can lead to high blood pressure  Add little or no salt to food you prepare  Use herbs and spices in place of salt  · Eat more fiber  to help lower cholesterol levels  Eat at least 5 servings of fruits and vegetables each day  Eat 3 ounces of whole-grain foods each day  Legumes (beans) are also a good source of fiber  Lifestyle guidelines:   · Do not smoke  Nicotine and other chemicals in cigarettes and cigars can cause lung and heart damage  Ask your healthcare provider for information if you currently smoke and need help to quit  E-cigarettes or smokeless tobacco still contain nicotine  Talk to your healthcare provider before you use these products  · Exercise regularly  to help you maintain a healthy weight and improve your blood pressure and cholesterol levels  Ask your healthcare provider about the best exercise plan for you  Do not start an exercise program without asking your healthcare provider     Follow up with your healthcare provider as directed:  Write down your questions so you remember to ask them during your visits  © 2017 [address] [employer] Information is for End User's use only and may not be sold, redistributed or otherwise used for commercial purposes  All illustrations and images included in CareNotes® are the copyrighted property of [employer]  or Trevor Brown  The above information is an  only  It is not intended as medical advice for individual conditions or treatments  Talk to your doctor, nurse or pharmacist before following any medical regimen to see if it is safe and effective for you  Calorie Counting Diet   WHAT YOU NEED TO KNOW:   What is a calorie counting diet? It is a meal plan based on counting calories each day to reach a healthy body weight  You will need to eat fewer calories if you are trying to lose weight  Weight loss may decrease your risk for certain health problems or improve your health if you have health problems  Some of these health problems include heart disease, high blood pressure, and diabetes  What foods should I avoid? Your dietitian will tell you if you need to avoid certain foods based on your body weight and health condition  You may need to avoid high-fat foods if you are at risk for or have heart disease  You may need to eat fewer foods from the breads and starches food group if you have diabetes  How many calories are in foods? The following is a list of foods and drinks with the approximate number of calories in each  Check the food label to find the exact number of calories  A dietitian can tell you how many calories you should have from each food group each day    · Carbohydrate:      ¨ ½ of a 3-inch bagel, 1 slice of bread, or ½ of a hamburger bun or hot dog bun (80)    ¨ 1 (8-inch) flour tortilla or ½ cup of cooked rice (100)    ¨ 1 (6-inch) corn tortilla (80)    ¨ 1 (6-inch) pancake or 1 cup of bran flakes cereal (110)    ¨ ½ cup of cooked cereal (80)    ¨ ½ cup of cooked pasta (85)    ¨ 1 ounce of pretzels (100)    ¨ 3 cups of air-popped popcorn without butter or oil (80)    · Dairy:      ¨ 1 cup of skim or 1% milk (90)    ¨ 1 cup of 2% milk (120)    ¨ 1 cup of whole milk (160)    ¨ 1 cup of 2% chocolate milk (220)    ¨ 1 ounce of low-fat cheese with 3 grams of fat per ounce (70)    ¨ 1 ounce of cheddar cheese (114)    ¨ ½ cup of 1% fat cottage cheese (80)    ¨ 1 cup of plain or sugar-free, fat-free yogurt (90)    · Protein foods:      ¨ 3 ounces of fish (not breaded or fried) (95)    ¨ 3 ounces of breaded, fried fish (195)    ¨ ¾ cup of tuna canned in water (105)    ¨ 3 ounces of chicken breast without skin (105)    ¨ 1 fried chicken breast with skin (350)    ¨ ¼ cup of fat free egg substitute (40)    ¨ 1 large egg (75)    ¨ 3 ounces of lean beef or pork (165)    ¨ 3 ounces of fried pork chop or ham (185)    ¨ ½ cup of cooked dried beans, such as kidney, bartholomew, lentils, or navy (115)    ¨ 3 ounces of bologna or lunch meat (225)    ¨ 2 links of breakfast sausage (140)    · Vegetables:      ¨ ½ cup of sliced mushrooms (10)    ¨ 1 cup of salad greens, such as lettuce, spinach, or dolores (15)    ¨ ½ cup of steamed asparagus (20)    ¨ ½ cup of cooked summer squash, zucchini squash, or green or wax beans (25)    ¨ 1 cup of broccoli or cauliflower florets, or 1 medium tomato (25)    ¨ 1 large raw carrot or ½ cup of cooked carrots (40)    ¨ ? of a medium cucumber or 1 stalk of celery (5)    ¨ 1 small baked potato (160)    ¨ 1 cup of breaded, fried vegetables (230)    · Fruit:      ¨ 1 (6-inch) banana (55)     ¨ ½ of a 4-inch grapefruit (55)    ¨ 15 grapes (60)    ¨ 1 medium orange or apple (70)    ¨ 1 large peach (65)    ¨ 1 cup of fresh pineapple chunks (75)    ¨ 1 cup of melon cubes (50)    ¨ 1¼ cups of whole strawberries (45)    ¨ ½ cup of fruit canned in juice (55)    ¨ ½ cup of fruit canned in heavy syrup (110)    ¨ ?  cup of raisins (130)    ¨ ½ cup of unsweetened fruit juice (60)    ¨ ½ cup of grape, cranberry, or prune juice (90)    · Fat:      ¨ 10 peanuts or 2 teaspoons of peanut butter (55)    ¨ 2 tablespoons of avocado or 1 tablespoon of regular salad dressing (45)    ¨ 2 slices of clayton (90)    ¨ 1 teaspoon of oil, such as safflower, canola, corn, or olive oil (45)    ¨ 2 teaspoons of low-fat margarine, or 1 tablespoon of low-fat mayonnaise (50)    ¨ 1 teaspoon of regular margarine (40)    ¨ 1 tablespoon of regular mayonnaise (135)    ¨ 1 tablespoon of cream cheese or 2 tablespoons of low-fat cream cheese (45)    ¨ 2 tablespoons of vegetable shortening (215)    · Dessert and sweets:      ¨ 8 animal crackers or 5 vanilla wafers (80)    ¨ 1 frozen fruit juice bar (80)    ¨ ½ cup of ice milk or low-fat frozen yogurt (90)    ¨ ½ cup of sherbet or sorbet (125)    ¨ ½ cup of sugar-free pudding or custard (60)    ¨ ½ cup of ice cream (140)    ¨ ½ cup of pudding or custard (175)    ¨ 1 (2-inch) square chocolate brownie (185)    · Combination foods:      ¨ Bean burrito made with an 8-inch tortilla, without cheese (275)    ¨ Chicken breast sandwich with lettuce and tomato (325)    ¨ 1 cup of chicken noodle soup (60)    ¨ 1 beef taco (175)    ¨ Regular hamburger with lettuce and tomato (310)    ¨ Regular cheeseburger with lettuce and tomato (410)     ¨ ¼ of a 12-inch cheese pizza (280)    ¨ Fried fish sandwich with lettuce and tomato (425)    ¨ Hot dog and bun (275)    ¨ 1½ cups of macaroni and cheese (310)    ¨ Taco salad with a fried tortilla shell (870)    · Low-calorie foods:      ¨ 1 tablespoon of ketchup or 1 tablespoon of fat free sour cream (15)    ¨ 1 teaspoon of mustard (5)    ¨ ¼ cup of salsa (20)    ¨ 1 large dill pickle (15)    ¨ 1 tablespoon of fat free salad dressing (10)    ¨ 2 teaspoons of low-sugar, light jam or jelly, or 1 tablespoon of sugar-free syrup (15)    ¨ 1 sugar-free popsicle (15)    ¨ 1 cup of club soda, seltzer water, or diet soda (0)  CARE AGREEMENT:   You have the right to help plan your care  Discuss treatment options with your caregivers to decide what care you want to receive  You always have the right to refuse treatment  The above information is an  only  It is not intended as medical advice for individual conditions or treatments  Talk to your doctor, nurse or pharmacist before following any medical regimen to see if it is safe and effective for you  © 2017 [address] Information is for End User's use only and may not be sold, redistributed or otherwise used for commercial purposes  All illustrations and images included in CareNotes® are the copyrighted property of [employer]  or Trevor Brown

## 2020-03-12 NOTE — PROGRESS NOTES
Assessment and Plan:     Problem List Items Addressed This Visit     None      Visit Diagnoses     Medicare annual wellness visit, subsequent    -  Primary    Encounter for screening mammogram for breast cancer        Relevant Orders    Mammo screening bilateral w 3d & cad    Asymptomatic postmenopausal state        Relevant Orders    DXA bone density spine hip and pelvis    BMI 37 0-37 9, adult               Preventive health issues were discussed with patient, and age appropriate screening tests were ordered as noted in patient's After Visit Summary  Personalized health advice and appropriate referrals for health education or preventive services given if needed, as noted in patient's After Visit Summary       History of Present Illness:     Patient presents for Medicare Annual Wellness visit    Patient Care Team:  Gosia Roberson as PCP - General (Family Medicine)  MD Minna Stokes MD     Problem List:     Patient Active Problem List   Diagnosis    Benign hypertension    Depression with anxiety    Mild intermittent asthma    Well controlled diabetes mellitus ([address]    Controlled type 2 diabetes with neuropathy ([address] )    Vaginal cyst    Candidiasis of genitalia in female    Encounter for gynecological examination without abnormal finding      Past Medical and Surgical History:     Past Medical History:   Diagnosis Date    Asthma     Cardiac arrest ([address] )     Diabetes mellitus ([address]     Glaucoma     Hypertension     Kidney stone     Neuropathy     Sleep apnea     no machine    Tubular adenoma of colon 10/2019     Past Surgical History:   Procedure Laterality Date     SECTION      COLONOSCOPY      HYSTERECTOMY  1985    TONSILLECTOMY        Family History:     Family History   Problem Relation Age of Onset    Diabetes Mother     Hypertension Father     Prostate cancer Father     Diabetes Sister     Hypertension Sister     Breast cancer Sister 62    Diabetes Brother     Hypertension Brother     Asthma Family     No Known Problems Daughter     No Known Problems Sister     No Known Problems Daughter     No Known Problems Daughter     No Known Problems Maternal Aunt     Breast cancer Maternal Aunt 58    No Known Problems Maternal Aunt     No Known Problems Maternal Aunt     No Known Problems Paternal Aunt     No Known Problems Paternal Aunt     No Known Problems Paternal Aunt     Colon cancer Neg Hx     Ovarian cancer Neg Hx     Uterine cancer Neg Hx     Cervical cancer Neg Hx       Social History:        Social History     Socioeconomic History    Marital status: /Civil Union     Spouse name: None    Number of children: None    Years of education: None    Highest education level: None   Occupational History    Occupation: retired    Social Needs    Financial resource strain: None    Food insecurity:     Worry: None     Inability: None    Transportation needs:     Medical: None     Non-medical: None   Tobacco Use    Smoking status: Never Smoker    Smokeless tobacco: Never Used   Substance and Sexual Activity    Alcohol use: No    Drug use: No    Sexual activity: Yes     Birth control/protection: Surgical   Lifestyle    Physical activity:     Days per week: None     Minutes per session: None    Stress: None   Relationships    Social connections:     Talks on phone: None     Gets together: None     Attends Denominational service: None     Active member of club or organization: None     Attends meetings of clubs or organizations: None     Relationship status: None    Intimate partner violence:     Fear of current or ex partner: None     Emotionally abused: None     Physically abused: None     Forced sexual activity: None   Other Topics Concern    None   Social History Narrative    None      Medications and Allergies:     Current Outpatient Medications   Medication Sig Dispense Refill    amLODIPine (NORVASC) 5 mg tablet Take 1 tablet (5 mg total) by mouth daily 30 tablet 1    aspirin 81 mg chewable tablet Chew 81 mg daily      enalapril (VASOTEC) 10 mg tablet TAKE 1 TABLET BY MOUTH TWICE A  tablet 0    fluticasone-salmeterol (ADVAIR DISKUS) 250-50 mcg/dose inhaler Inhale 1 puff as needed      LYRICA 150 MG capsule Take 1 capsule (150 mg total) by mouth 2 (two) times a day 60 capsule 3    sertraline (ZOLOFT) 50 mg tablet Take 1 tablet (50 mg total) by mouth daily 30 tablet 3    SITagliptin-metFORMIN HCl ER (JANUMET XR) 100-1000 MG TB24 Take 1 tablet by mouth daily 30 tablet 3    travoprost (TRAVATAN Z) 0 004 % ophthalmic solution Apply 1 drop to eye daily at bedtime      albuterol (PROVENTIL HFA,VENTOLIN HFA) 90 mcg/act inhaler INHALE 1 PUFF BY MOUTH EVERY 6 HOURS AS NEEDED FOR WHEEZE 18 Inhaler 0    carbamide peroxide (DEBROX) 6 5 % otic solution Administer 5 drops into both ears 2 (two) times a day 15 mL 0    glucose blood (ONETOUCH VERIO) test strip E11 9 / testing daily      mometasone (ELOCON) 0 1 % cream Apply topically daily (Patient not taking: Reported on 9/27/2019) 45 g 0    ONETOUCH DELICA LANCETS 31Z MISC E11 9/ testing daily      terconazole (TERAZOL 7) 0 4 % vaginal cream Insert 1 applicator into the vagina daily at bedtime 45 g 1     No current facility-administered medications for this visit  Allergies   Allergen Reactions    Ciprofloxacin Itching    Levaquin [Levofloxacin] Swelling    Penicillins GI Intolerance      Immunizations: There is no immunization history on file for this patient  Health Maintenance:         Topic Date Due    MAMMOGRAM  10/23/2020    CRC Screening: Colonoscopy  10/24/2020    Hepatitis C Screening  Completed     There are no preventive care reminders to display for this patient     Medicare Health Risk Assessment:     /60   Pulse 74   Temp (!) 97 °F (36 1 °C)   Resp 16   Ht 5' 2" (1 575 m)   Wt 93 6 kg (206 lb 6 4 oz)   SpO2 98%   BMI 37 75 kg/m² Rikki Allison is here for her Subsequent Wellness visit  Health Risk Assessment:   Patient rates overall health as good  Patient feels that their physical health rating is slightly better  Eyesight was rated as same  Hearing was rated as same  Patient feels that their emotional and mental health rating is slightly better  Depression Screening:   PHQ-2 Score: 0  PHQ-9 Score: 1      Fall Risk Screening: In the past year, patient has experienced: no history of falling in past year      Urinary Incontinence Screening:   Patient has not leaked urine accidently in the last six months  Patient did say she has noticed that when she has to go she is running because her bladder feels like she cant hold it     Home Safety:  Patient has trouble with stairs inside or outside of their home  Patient has working smoke alarms and has no working carbon monoxide detector  Home safety hazards include: none  Nutrition:   Current diet is Regular and Low Carb  A lot of fruits and salads    Medications:   Patient is currently taking over-the-counter supplements  OTC medications include: see medication list  Patient is able to manage medications  Activities of Daily Living (ADLs)/Instrumental Activities of Daily Living (IADLs):   Walk and transfer into and out of bed and chair?: Yes  Dress and groom yourself?: Yes    Bathe or shower yourself?: Yes    Feed yourself?  Yes  Do your laundry/housekeeping?: Yes  Manage your money, pay your bills and track your expenses?: Yes  Make your own meals?: Yes    Do your own shopping?: Yes    Advance Care Planning:   Living will: Yes    Durable POA for healthcare: No    Advanced directive: No    Advanced directive counseling given: Yes    Five wishes given: Yes    Patient declined ACP directive: No    End of Life Decisions reviewed with patient: Yes    Provider agrees with end of life decisions: Yes      Comments: Patient would not want to be resuscitated if there were no hope for recovery      Cognitive Screening:   Provider or family/friend/caregiver concerned regarding cognition?: No    PREVENTIVE SCREENINGS        Diabetes Screening:     General: Screening Not Indicated and History Diabetes      Colorectal Cancer Screening:     General: Screening Current      Breast Cancer Screening:     General: Screening Current    Due for: Mammogram        Cervical Cancer Screening:    General: Screening Not Indicated      Osteoporosis Screening:    General: Risks and Benefits Discussed    Due for: DXA Appendicular      Abdominal Aortic Aneurysm (AAA) Screening:        General: Risks and Benefits Discussed and Screening Not Indicated      Lung Cancer Screening:     General: Screening Not Indicated      Hepatitis C Screening:    General: Screening Current      Reina Figueroa

## 2020-03-12 NOTE — PROGRESS NOTES
Assessment/Plan:     Chronic Problems:  Atypical chest pain    Patient reports a history of syncope with subsequent cardiac arrest    Per patient, her  was told she was brain dead however she was resuscitated  States when she woke up that she was told that she had fractured ribs asthma and diabetes  Will request old stress test records from [address] and previous records from her physician  EKG done today show flat t wave in lead 1 and inverted t wave in avl  Benign hypertension   BP at goal today continue current medications  Dermatitis    Plaque like lesion on right lower extremity  Will refer to Derm  Use the mometasone for now  Well controlled diabetes mellitus ([facility] [address] )    Hemoglobin A1c today is 6 2  Patient is aware she needs to eat after meds  If she continues to be less than 6 5 or feels shaky with low blood sugars sweating etc  I will decrease the the Janumet    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)  Hba1c today is 6 2  Will consider decreasing janumet if pt has hypoglycemia  For now, continue current meds  Lab Results   Component Value Date    HGBA1C 6 5 09/30/2019       Visit Diagnosis:  Diagnoses and all orders for this visit:    Medicare annual wellness visit, subsequent    Encounter for screening mammogram for breast cancer  -     Mammo screening bilateral w 3d & cad; Future    Asymptomatic postmenopausal state  -     DXA bone density spine hip and pelvis; Future    BMI 37 0-37 9, adult  -     TSH, 3rd generation with Free T4 reflex; Future    Depression with anxiety  Comments:  Stable on current meds  Atypical chest pain  -     Ambulatory referral to Cardiology; Future    Benign hypertension    Dermatitis  -     Ambulatory referral to Dermatology; Future    Compulsive skin picking  Comments:  Discussed with patient  The option is to increase the Zoloft are consciously work on stopping itching  Will give steroid cream to be used sparingly    Orders:  - Ambulatory referral to Dermatology; Future  -     mometasone (ELOCON) 0 1 % cream; Apply topically daily    Well controlled diabetes mellitus ([address] )    Shortness of breath  Comments: Will get pft's  Orders:  -     Complete PFT with post bronchodilator; Future    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)  -     Microalbumin / creatinine urine ratio  -     Urinalysis with microscopic    Screening, lipid  -     Comprehensive metabolic panel; Future  -     Lipid panel; Future    Other orders  -     Discontinue: LUMIGAN 0 01 % ophthalmic drops; INSTILL 1 DROP INTO BOTH EYES AT BEDTIME          Subjective:    Patient ID: Justo Smalls is a 70 y o  female  Pt is here for routine fu appt  Does not monitor her diabetes at home, but tries to watch her diet  She does check her bp's at home and never higher than 135  Uses a wrist cuff  Pt c/o sob with walking up hills  Also feels sob with walking fast  Gets sternal chest pains at times, but not when she walks  Also sweats when she is sob  No arm pain or jaw pain  No radiation of pain when she has chest pains  Sometimes gets the chest pain which feels like a cramp when she is sitting  No heartburn or reflux when she has the chest pains, but heartburn is after meals at times, especially when she has sauce  Eats spicy foods  Sometimes pt takes prilosec otc with relief  Rarely uses it  Patient reports when she was in [facility] she was seen there for an episode of syncope  Patient reports to me at that time they called her  and told her  that she would be brain dead  He told them to keep her alive  She recovered however states that they cracked her chest and she has been on Advair ever since  Pt states that is when she was dx with diabetes she was put on lyrica for neuropathy  Pt states when she is on her feet too long her feet will burn  Did not feel well on gabapentin  Pt reports she was seen by ani at that time   Was put on lyrica by ani and then renewed by Dr Philomena Manzanares  In between this she was following with [facility] for family care  Takes all other meds as directed  No side effects noted  The following portions of the patient's history were reviewed and updated as appropriate: allergies, current medications, past family history, past medical history, past social history, past surgical history and problem list     Review of Systems   Constitutional: Negative for chills, diaphoresis, fatigue and fever  HENT: Negative  Eyes: Negative  [place] [facility] [place] Juan Carlos Lau, [facility]   Respiratory: Positive for shortness of breath and wheezing  Negative for cough  Cardiovascular: Positive for chest pain  Negative for palpitations  Gastrointestinal: Negative for abdominal pain, constipation, diarrhea and nausea  Occasional heartburn  Genitourinary: Positive for urgency  Negative for dysuria and frequency  Neurological: Positive for numbness (if not on lyrica will get numbness in her feet  )  Negative for dizziness, light-headedness and headaches  Psychiatric/Behavioral: Negative for dysphoric mood  The patient is nervous/anxious            /60   Pulse 74   Temp (!) 97 °F (36 1 °C)   Resp 16   Ht 5' 2" (1 575 m)   Wt 93 6 kg (206 lb 6 4 oz)   SpO2 98%   BMI 37 75 kg/m²   Social History     Socioeconomic History    Marital status: /Civil Union     Spouse name: Not on file    Number of children: Not on file    Years of education: Not on file    Highest education level: Not on file   Occupational History    Occupation: retired    Social Needs    Financial resource strain: Not on file    Food insecurity:     Worry: Not on file     Inability: Not on file   Tolerx needs:     Medical: Not on file     Non-medical: Not on file   Tobacco Use    Smoking status: Never Smoker    Smokeless tobacco: Never Used   Substance and Sexual Activity    Alcohol use: No    Drug use: No    Sexual activity: Yes     Birth control/protection: Surgical   Lifestyle    Physical activity:     Days per week: Not on file     Minutes per session: Not on file    Stress: Not on file   Relationships    Social connections:     Talks on phone: Not on file     Gets together: Not on file     Attends Anabaptism service: Not on file     Active member of club or organization: Not on file     Attends meetings of clubs or organizations: Not on file     Relationship status: Not on file    Intimate partner violence:     Fear of current or ex partner: Not on file     Emotionally abused: Not on file     Physically abused: Not on file     Forced sexual activity: Not on file   Other Topics Concern    Not on file   Social History Narrative    Not on file     Past Medical History:   Diagnosis Date    Asthma     Card[facility] arrest ([facility] [address] )     Diabetes mellitus ([facility] [address] )     Glaucoma     Hypertension     Kidney stone     Neuropathy     Sleep apnea     no machine    Tubular adenoma of colon 10/2019     Family History   Problem Relation Age of Onset    Diabetes Mother     Hypertension Father     Prostate cancer Father     Diabetes Sister     Hypertension Sister     Breast cancer Sister 62    Diabetes Brother     Hypertension Brother     Asthma Family     No Known Problems Daughter     No Known Problems Sister     No Known Problems Daughter     No Known Problems Daughter     No Known Problems Maternal Aunt     Breast cancer Maternal Aunt 58    No Known Problems Maternal Aunt     No Known Problems Maternal Aunt     No Known Problems Paternal Aunt     No Known Problems Paternal Aunt     No Known Problems Paternal Aunt     Colon cancer Neg Hx     Ovarian cancer Neg Hx     Uterine cancer Neg Hx     Cervical cancer Neg Hx      Past Surgical History:   Procedure Laterality Date     SECTION      COLONOSCOPY      HYSTERECTOMY  1985    TONSILLECTOMY         Current Outpatient Medications:     amLODIPine (NORVASC) 5 mg tablet, Take 1 tablet (5 mg total) by mouth daily, Disp: 30 tablet, Rfl: 1    aspirin 81 mg chewable tablet, Chew 81 mg daily, Disp: , Rfl:     enalapril (VASOTEC) 10 mg tablet, TAKE 1 TABLET BY MOUTH TWICE A DAY, Disp: 180 tablet, Rfl: 0    fluticasone-salmeterol (ADVAIR DISKUS) 250-50 mcg/dose inhaler, Inhale 1 puff as needed, Disp: , Rfl:     LYRICA 150 MG capsule, Take 1 capsule (150 mg total) by mouth 2 (two) times a day, Disp: 60 capsule, Rfl: 3    sertraline (ZOLOFT) 50 mg tablet, Take 1 tablet (50 mg total) by mouth daily, Disp: 30 tablet, Rfl: 3    SITagliptin-metFORMIN HCl ER (JANUMET XR) 100-1000 MG TB24, Take 1 tablet by mouth daily, Disp: 30 tablet, Rfl: 3    travoprost (TRAVATAN Z) 0 004 % ophthalmic solution, Apply 1 drop to eye daily at bedtime, Disp: , Rfl:     albuterol (PROVENTIL HFA,VENTOLIN HFA) 90 mcg/act inhaler, INHALE 1 PUFF BY MOUTH EVERY 6 HOURS AS NEEDED FOR WHEEZE, Disp: 18 Inhaler, Rfl: 0    carbamide peroxide (DEBROX) 6 5 % otic solution, Administer 5 drops into both ears 2 (two) times a day, Disp: 15 mL, Rfl: 0    glucose blood (ONETOUCH VERIO) test strip, E11 9 / testing daily, Disp: , Rfl:     mometasone (ELOCON) 0 1 % cream, Apply topically daily, Disp: 45 g, Rfl: 0    ONETOUCH DELICA LANCETS 02P [facility], E11 9/ testing daily, Disp: , Rfl:     terconazole (TERAZOL 7) 0 4 % vaginal cream, Insert 1 applicator into the vagina daily at bedtime, Disp: 45 g, Rfl: 1    Allergies   Allergen Reactions    Ciprofloxacin Itching    Levaquin [Levofloxacin] Swelling    Penicillins GI Intolerance          Lab Review   not applicable     Imaging: No results found  Objective:     Physical Exam   Constitutional: She is oriented to person, place, and time  She appears well-developed and well-nourished  No distress  HENT:   Head: Normocephalic and atraumatic     Right Ear: External ear normal    Left Ear: External ear normal    Nose: Nose normal    Mouth/Throat: Oropharynx is clear and moist    Eyes: Pupils are equal, round, and reactive to light  EOM are normal  Right eye exhibits no discharge  Left eye exhibits no discharge  Neck: Normal range of motion  Neck supple  No thyromegaly present  Cardiovascular: Normal rate, regular rhythm and normal heart sounds  Pulses:       Dorsalis pedis pulses are 2+ on the right side, and 2+ on the left side  Posterior tibial pulses are 1+ on the right side, and 1+ on the left side  Pulmonary/Chest: Effort normal and breath sounds normal  No respiratory distress  Abdominal: Soft  Bowel sounds are normal  She exhibits no distension  Musculoskeletal: Normal range of motion  She exhibits no edema, tenderness or deformity  Feet:    Feet:   Right Foot:   Skin Integrity: Positive for callus and dry skin  Negative for ulcer, skin breakdown, erythema or warmth  Left Foot:   Skin Integrity: Positive for callus and dry skin  Negative for ulcer, skin breakdown, erythema or warmth  Neurological: She is alert and oriented to person, place, and time  No cranial nerve deficit  Skin: Skin is warm and dry  Rash (on the right lower extremity  Appears to be plaque like) noted  She is not diaphoretic  Psychiatric: She has a normal mood and affect  Her behavior is normal  Judgment and thought content normal      Patient's shoes and socks removed  Right Foot/Ankle   Right Foot Inspection  Skin Exam: skin normal, skin intact, dry skin, callus and callus no warmth, no erythema, no maceration, no abnormal color, no pre-ulcer and no ulcer                      Callus Size (cm):1    Toe Exam: ROM and strength within normal limitsno swelling, no tenderness, erythema and  no right toe deformity  Sensory   Vibration: intact  Proprioception: intact   Monofilament testing: intact  Vascular  Capillary refills: < 3 seconds  The right DP pulse is 2+  The right PT pulse is 1+       Left Foot/Ankle  Left Foot Inspection  Skin Exam: skin normal, skin intact, dry skin and callusno warmth, no erythema, no maceration, normal color, no pre-ulcer and no ulcer                     Callus Size (cm): 1    Toe Exam: ROM and strength within normal limitsno swelling, no tenderness and no erythema                   Sensory   Vibration: intact  Proprioception: intact  Monofilament: intact  Vascular  Capillary refills: < 3 seconds  The left DP pulse is 2+  The left PT pulse is 1+  Assign Risk Category:  No deformity present; No loss of protective sensation;        Risk: 0         Patient Instructions      Discussed all with patient  The history is quite extensive with a history of cardiac arrest with subsequent resuscitation and a history of fractured ribs  Patient reports when she awoke from this event she was told she was a diabetic with asthma  Will request old records from [facility], previous primary physician, previous stress test   EKG done today shows flat T-waves in lead 1 and flipped in aVL  Will refer to cardiology for evaluation related to the atypical chest pain in the shortness of breath  Will also get pulmonary function test   Have the blood work done fasting but drink water before you go for the test because we need to get a urine test is well  Make your appointment with the dermatologist there is 1 in town at [facility]  Continue all medication for now, but will consider  Lowering Janumet as your diabetes is very very well controlled  Follow-up here in 2 month will review old records labs and all consult at that time  ER if you feel worse  Will also check cholesterol panel as patient is not on any statin  Pt refuses all immunizations  Medicare Preventive Visit Patient Instructions  Thank you for completing your Welcome to Medicare Visit or Medicare Annual Wellness Visit today  Your next wellness visit will be due in one year (3/12/2021)    The screening/preventive services that you may require over the next 5-10 years are detailed below  Some tests may not apply to you based off risk factors and/or age  Screening tests ordered at today's visit but not completed yet may show as past due  Also, please note that scanned in results may not display below  Preventive Screenings:  Service Recommendations Previous Testing/Comments   Colorectal Cancer Screening  * Colonoscopy    * Fecal Occult Blood Test (FOBT)/Fecal Immunochemical Test (FIT)  * Fecal DNA/Cologuard Test  * Flexible Sigmoidoscopy Age: 54-65 years old   Colonoscopy: every 10 years (may be performed more frequently if at higher risk)  OR  FOBT/FIT: every 1 year  OR  Cologuard: every 3 years  OR  Sigmoidoscopy: every 5 years  Screening may be recommended earlier than age 48 if at higher risk for colorectal cancer  Also, an individualized decision between you and your healthcare provider will decide whether screening between the ages of 74-80 would be appropriate  Colonoscopy: 10/24/2019  FOBT/FIT: Not on file  Cologuard: Not on file  Sigmoidoscopy: Not on file    Screening Current     Breast Cancer Screening Age: 36 years old  Frequency: every 1-2 years  Not required if history of left and right mastectomy Mammogram: 10/23/2019    Screening Current   Cervical Cancer Screening Between the ages of 21-29, pap smear recommended once every 3 years  Between the ages of 33-67, can perform pap smear with HPV co-testing every 5 years     Recommendations may differ for women with a history of total hysterectomy, cervical cancer, or abnormal pap smears in past  Pap Smear: 11/04/2019    Screening Not Indicated   Hepatitis C Screening Once for adults born between 1945 and 1965  More frequently in patients at high risk for Hepatitis C Hep C Antibody: 09/30/2019    Screening Current   Diabetes Screening 1-2 times per year if you're at risk for diabetes or have pre-diabetes Fasting glucose: No results in last 5 years   A1C: 6 5    Screening Not Indicated  History Diabetes   Cholesterol Screening Once every 5 years if you don't have a lipid disorder  May order more often based on risk factors  Lipid panel: Not on file         Other Preventive Screenings Covered by Medicare:  1  Abdominal Aortic Aneurysm (AAA) Screening: covered once if your at risk  You're considered to be at risk if you have a family history of AAA  2  Lung Cancer Screening: covers low dose CT scan once per year if you meet all of the following conditions: (1) Age 50-69; (2) No signs or symptoms of lung cancer; (3) Current smoker or have quit smoking within the last 15 years; (4) You have a tobacco smoking history of at least 30 pack years (packs per day multiplied by number of years you smoked); (5) You get a written order from a healthcare provider  3  Glaucoma Screening: covered annually if you're considered high risk: (1) You have diabetes OR (2) Family history of glaucoma OR (3)  aged 48 and older OR (3)  American aged 72 and older  3  Osteoporosis Screening: covered every 2 years if you meet one of the following conditions: (1) You're estrogen deficient and at risk for osteoporosis based off medical history and other findings; (2) Have a vertebral abnormality; (3) On glucocorticoid therapy for more than 3 months; (4) Have primary hyperparathyroidism; (5) On osteoporosis medications and need to assess response to drug therapy  · Last bone density test (DXA Scan): Not on file  5  HIV Screening: covered annually if you're between the age of 12-76  Also covered annually if you are younger than 13 and older than 72 with risk factors for HIV infection  For pregnant patients, it is covered up to 3 times per pregnancy      Immunizations:  Immunization Recommendations   Influenza Vaccine Annual influenza vaccination during flu season is recommended for all persons aged >= 6 months who do not have contraindications   Pneumococcal Vaccine (Prevnar and Pneumovax)  * Prevnar = PCV13  * Pneumovax = PPSV23   Adults 25-60 years old: 1-3 doses may be recommended based on certain risk factors  Adults 72 years old: Prevnar (PCV13) vaccine recommended followed by Pneumovax (PPSV23) vaccine  If already received PPSV23 since turning 65, then PCV13 recommended at least one year after PPSV23 dose  Hepatitis B Vaccine 3 dose series if at intermediate or high risk (ex: diabetes, end stage renal disease, liver disease)   Tetanus (Td) Vaccine - COST NOT COVERED BY MEDICARE PART B Following completion of primary series, a booster dose should be given every 10 years to maintain immunity against tetanus  Td may also be given as tetanus wound prophylaxis  Tdap Vaccine - COST NOT COVERED BY MEDICARE PART B Recommended at least once for all adults  For pregnant patients, recommended with each pregnancy  Shingles Vaccine (Shingrix) - COST NOT COVERED BY MEDICARE PART B  2 shot series recommended in those aged 48 and above     Health Maintenance Due:      Topic Date Due    MAMMOGRAM  10/23/2020    CRC Screening: Colonoscopy  10/24/2020    Hepatitis C Screening  Completed     Immunizations Due:  There are no preventive care reminders to display for this patient  Advance Directives   What are advance directives? Advance directives are legal documents that state your wishes and plans for medical care  These plans are made ahead of time in case you lose your ability to make decisions for yourself  Advance directives can apply to any medical decision, such as the treatments you want, and if you want to donate organs  What are the types of advance directives? There are many types of advance directives, and each state has rules about how to use them  You may choose a combination of any of the following:  · Living will: This is a written record of the treatment you want  You can also choose which treatments you do not want, which to limit, and which to stop at a certain time   This includes surgery, medicine, IV fluid, and tube feedings  · Durable power of  for healthcare [facility]): This is a written record that states who you want to make healthcare choices for you when you are unable to make them for yourself  This person, called a proxy, is usually a family member or a friend  You may choose more than 1 proxy  · Do not resuscitate (DNR) order:  A DNR order is used in case your heart stops beating or you stop breathing  It is a request not to have certain forms of treatment, such as CPR  A DNR order may be included in other types of advance directives  · Medical directive: This covers the care that you want if you are in a coma, near death, or unable to make decisions for yourself  You can list the treatments you want for each condition  Treatment may include pain medicine, surgery, blood transfusions, dialysis, IV or tube feedings, and a ventilator (breathing machine)  · Values history: This document has questions about your views, beliefs, and how you feel and think about life  This information can help others choose the care that you would choose  Why are advance directives important? An advance directive helps you control your care  Although spoken wishes may be used, it is better to have your wishes written down  Spoken wishes can be misunderstood, or not followed  Treatments may be given even if you do not want them  An advance directive may make it easier for your family to make difficult choices about your care  Weight Management   Why it is important to manage your weight:  Being overweight increases your risk of health conditions such as heart disease, high blood pressure, type 2 diabetes, and certain types of cancer  It can also increase your risk for osteoarthritis, sleep apnea, and other respiratory problems  Aim for a slow, steady weight loss  Even a small amount of weight loss can lower your risk of health problems    How to lose weight safely:  A safe and healthy way to lose weight is to eat fewer calories and get regular exercise  You can lose up about 1 pound a week by decreasing the number of calories you eat by 500 calories each day  Healthy meal plan for weight management:  A healthy meal plan includes a variety of foods, contains fewer calories, and helps you stay healthy  A healthy meal plan includes the following:  · Eat whole-grain foods more often  A healthy meal plan should contain fiber  Fiber is the part of grains, fruits, and vegetables that is not broken down by your body  Whole-grain foods are healthy and provide extra fiber in your diet  Some examples of whole-grain foods are whole-wheat breads and pastas, oatmeal, brown rice, and bulgur  · Eat a variety of vegetables every day  Include dark, leafy greens such as spinach, kale, rowena greens, and mustard greens  Eat yellow and orange vegetables such as carrots, sweet potatoes, and winter squash  · Eat a variety of fruits every day  Choose fresh or canned fruit (canned in its own juice or light syrup) instead of juice  Fruit juice has very little or no fiber  · Eat low-fat dairy foods  Drink fat-free (skim) milk or 1% milk  Eat fat-free yogurt and low-fat cottage cheese  Try low-fat cheeses such as mozzarella and other reduced-fat cheeses  · Choose meat and other protein foods that are low in fat  Choose beans or other legumes such as split peas or lentils  Choose fish, skinless poultry (chicken or turkey), or lean cuts of red meat (beef or pork)  Before you cook meat or poultry, cut off any visible fat  · Use less fat and oil  Try baking foods instead of frying them  Add less fat, such as margarine, sour cream, regular salad dressing and mayonnaise to foods  Eat fewer high-fat foods  Some examples of high-fat foods include french fries, doughnuts, ice cream, and cakes  · Eat fewer sweets  Limit foods and drinks that are high in sugar   This includes candy, cookies, regular soda, and sweetened drinks  Exercise:  Exercise at least 30 minutes per day on most days of the week  Some examples of exercise include walking, biking, dancing, and swimming  You can also fit in more physical activity by taking the stairs instead of the elevator or parking farther away from stores  Ask your healthcare provider about the best exercise plan for you  © Copyright [employer] 2018 Information is for End User's use only and may not be sold, redistributed or otherwise used for commercial purposes  All illustrations and images included in CareNotes® are the copyrighted property of A D A M , Inc  or [address] [place], [place] [id number]     Obesity   AMBULATORY CARE:   Obesity  is when your body mass index (BMI) is greater than 30  Your healthcare provider will use your height and weight to measure your BMI  The risks of obesity include  many health problems, such as injuries or physical disability  You may need tests to check for the following:  · Diabetes     · High blood pressure or high cholesterol     · Heart disease     · Gallbladder or liver disease     · Cancer of the colon, breast, prostate, liver, or kidney     · Sleep apnea     · Arthritis or gout  Seek care immediately if:   · You have a severe headache, confusion, or difficulty speaking  · You have weakness on one side of your body  · You have chest pain, sweating, or shortness of breath  Contact your healthcare provider if:   · You have symptoms of gallbladder or liver disease, such as pain in your upper abdomen  · You have knee or hip pain and discomfort while walking  · You have symptoms of diabetes, such as intense hunger and thirst, and frequent urination  · You have symptoms of sleep apnea, such as snoring or daytime sleepiness  · You have questions or concerns about your condition or care  Treatment for obesity  focuses on helping you lose weight to improve your health   Even a small decrease in BMI can reduce the risk for many health problems  Your healthcare provider will help you set a weight-loss goal   · Lifestyle changes  are the first step in treating obesity  These include making healthy food choices and getting regular physical activity  Your healthcare provider may suggest a weight-loss program that involves coaching, education, and therapy  · Medicine  may help you lose weight when it is used with a healthy diet and physical activity  · Surgery  can help you lose weight if you are very obese and have other health problems  There are several types of weight-loss surgery  Ask your healthcare provider for more information  Be successful losing weight:   · Set small, realistic goals  An example of a small goal is to walk for 20 minutes 5 days a week  Anther goal is to lose 5% of your body weight  · Tell friends, family members, and coworkers about your goals  and ask for their support  Ask a friend to lose weight with you, or join a weight-loss support group  · Identify foods or triggers that may cause you to overeat , and find ways to avoid them  Remove tempting high-calorie foods from your home and workplace  Place a bowl of fresh fruit on your kitchen counter  If stress causes you to eat, then find other ways to cope with stress  · Keep a diary to track what you eat and drink  Also write down how many minutes of physical activity you do each day  Weigh yourself once a week and record it in your diary  Eating changes: You will need to eat 500 to 1,000 fewer calories each day than you currently eat to lose 1 to 2 pounds a week  The following changes will help you cut calories:  · Eat smaller portions  Use small plates, no larger than 9 inches in diameter  Fill your plate half full of fruits and vegetables  Measure your food using measuring cups until you know what a serving size looks like  · Eat 3 meals and 1 or 2 snacks each day  Plan your meals in advance  Eva Ochoa and eat at home most of the time  Eat slowly  · Eat fruits and vegetables at every meal   They are low in calories and high in fiber, which makes you feel full  Do not add butter, margarine, or cream sauce to vegetables  Use herbs to season steamed vegetables  · Eat less fat and fewer fried foods  Eat more baked or grilled chicken and fish  These protein sources are lower in calories and fat than red meat  Limit fast food  Dress your salads with olive oil and vinegar instead of bottled dressing  · Limit the amount of sugar you eat  Do not drink sugary beverages  Limit alcohol  Activity changes:  Physical activity is good for your body in many ways  It helps you burn calories and build strong muscles  It decreases stress and depression, and improves your mood  It can also help you sleep better  Talk to your healthcare provider before you begin an exercise program   · Exercise for at least 30 minutes 5 days a week  Start slowly  Set aside time each day for physical activity that you enjoy and that is convenient for you  It is best to do both weight training and an activity that increases your heart rate, such as walking, bicycling, or swimming  · Find ways to be more active  Do yard work and housecleaning  Walk up the stairs instead of using elevators  Spend your leisure time going to events that require walking, such as outdoor festivals or fairs  This extra physical activity can help you lose weight and keep it off  Follow up with your healthcare provider as directed: You may need to meet with a dietitian  Write down your questions so you remember to ask them during your visits  © 2017 [address] Information is for End User's use only and may not be sold, redistributed or otherwise used for commercial purposes  All illustrations and images included in CareNotes® are the copyrighted property of [employer]  or Trevor Brown  The above information is an  only   It is not intended as medical advice for individual conditions or treatments  Talk to your doctor, nurse or pharmacist before following any medical regimen to see if it is safe and effective for you  Weight Management   AMBULATORY CARE:   Why it is important to manage your weight:  Being overweight increases your risk of health conditions such as heart disease, high blood pressure, type 2 diabetes, and certain types of cancer  It can also increase your risk for osteoarthritis, sleep apnea, and other respiratory problems  Aim for a slow, steady weight loss  Even a small amount of weight loss can lower your risk of health problems  How to lose weight safely:  A safe and healthy way to lose weight is to eat fewer calories and get regular exercise  You can lose up about 1 pound a week by decreasing the number of calories you eat by 500 calories each day  You can decrease calories by eating smaller portion sizes or by cutting out high-calorie foods  Read labels to find out how many calories are in the foods you eat  You can also burn calories with exercise such as walking, swimming, or biking  You will be more likely to keep weight off if you make these changes part of your lifestyle  Healthy meal plan for weight management:  A healthy meal plan includes a variety of foods, contains fewer calories, and helps you stay healthy  A healthy meal plan includes the following:  · Eat whole-grain foods more often  A healthy meal plan should contain fiber  Fiber is the part of grains, fruits, and vegetables that is not broken down by your body  Whole-grain foods are healthy and provide extra fiber in your diet  Some examples of whole-grain foods are whole-wheat breads and pastas, oatmeal, brown rice, and bulgur  · Eat a variety of vegetables every day  Include dark, leafy greens such as spinach, kale, rowena greens, and mustard greens  Eat yellow and orange vegetables such as carrots, sweet potatoes, and winter squash       · Eat a variety of fruits every day  Choose fresh or canned fruit (canned in its own juice or light syrup) instead of juice  Fruit juice has very little or no fiber  · Eat low-fat dairy foods  Drink fat-free (skim) milk or 1% milk  Eat fat-free yogurt and low-fat cottage cheese  Try low-fat cheeses such as mozzarella and other reduced-fat cheeses  · Choose meat and other protein foods that are low in fat  Choose beans or other legumes such as split peas or lentils  Choose fish, skinless poultry (chicken or turkey), or lean cuts of red meat (beef or pork)  Before you cook meat or poultry, cut off any visible fat  · Use less fat and oil  Try baking foods instead of frying them  Add less fat, such as margarine, sour cream, regular salad dressing and mayonnaise to foods  Eat fewer high-fat foods  Some examples of high-fat foods include french fries, doughnuts, ice cream, and cakes  · Eat fewer sweets  Limit foods and drinks that are high in sugar  This includes candy, cookies, regular soda, and sweetened drinks  Ways to decrease calories:   · Eat smaller portions  ¨ Use a small plate with smaller servings  ¨ Do not eat second helpings  ¨ When you eat at a restaurant, ask for a box and place half of your meal in the box before you eat  ¨ Share an entrée with someone else  · Replace high-calorie snacks with healthy, low-calorie snacks  ¨ Choose fresh fruit, vegetables, fat-free rice cakes, or air-popped popcorn instead of potato chips, nuts, or chocolate  ¨ Choose water or calorie-free drinks instead of soda or sweetened drinks  · Eat regular meals  Skipping meals can lead to overeating later in the day  Eat a healthy snack in place of a meal if you do not have time to eat a regular meal      · Do not shop for groceries when you are hungry  You may be more likely to make unhealthy food choices  Take a grocery list of healthy foods and shop after you have eaten    Exercise:  Exercise at least 30 minutes per day on most days of the week  Some examples of exercise include walking, biking, dancing, and swimming  You can also fit in more physical activity by taking the stairs instead of the elevator or parking farther away from stores  Ask your healthcare provider about the best exercise plan for you  Other things to consider as you try to lose weight:   · Be aware of situations that may give you the urge to overeat, such as eating while watching television  Find ways to avoid these situations  For example, read a book, go for a walk, or do crafts  · Meet with a weight loss support group or friends who are also trying to lose weight  This may help you stay motivated to continue working on your weight loss goals  © 2017 [address] [facility] Information is for End User's use only and may not be sold, redistributed or otherwise used for commercial purposes  All illustrations and images included in CareNotes® are the copyrighted property of [employer]  or Trevor Brown  The above information is an  only  It is not intended as medical advice for individual conditions or treatments  Talk to your doctor, nurse or pharmacist before following any medical regimen to see if it is safe and effective for you  Low Fat Diet   AMBULATORY CARE:   A low-fat diet  is an eating plan that is low in total fat, unhealthy fat, and cholesterol  You may need to follow a low-fat diet if you have trouble digesting or absorbing fat  You may also need to follow this diet if you have high cholesterol  You can also lower your cholesterol by increasing the amount of fiber in your diet  Soluble fiber is a type of fiber that helps to decrease cholesterol levels  Different types of fat in food:   · Limit unhealthy fats  A diet that is high in cholesterol, saturated fat, and trans fat may cause unhealthy cholesterol levels  Unhealthy cholesterol levels increase your risk of heart disease      ¨ Cholesterol: Limit intake of cholesterol to less than 200 mg per day  Cholesterol is found in meat, eggs, and dairy  ¨ Saturated fat:  Limit saturated fat to less than 7% of your total daily calories  Ask your dietitian how many calories you need each day  Saturated fat is found in butter, cheese, ice cream, whole milk, and palm oil  Saturated fat is also found in meat, such as beef, pork, chicken skin, and processed meats  Processed meats include sausage, hot dogs, and bologna  ¨ Trans fat:  Avoid trans fat as much as possible  Trans fat is used in fried and baked foods  Foods that say trans fat free on the label may still have up to 0 5 grams of trans fat per serving  · Include healthy fats  Replace foods that are high in saturated and trans fat with foods high in healthy fats  This may help to decrease high cholesterol levels  ¨ Monounsaturated fats: These are found in avocados, nuts, and vegetable oils, such as olive, canola, and sunflower oil  ¨ Polyunsaturated fats: These can be found in vegetable oils, such as soybean or corn oil  Omega-3 fats can help to decrease the risk of heart disease  Omega-3 fats are found in fish, such as salmon, herring, trout, and tuna  Omega-3 fats can also be found in plant foods, such as walnuts, flaxseed, soybeans, and canola oil    Foods to limit or avoid:   · Grains:      ¨ Snacks that are made with partially hydrogenated oils, such as chips, regular crackers, and butter-flavored popcorn    ¨ High-fat baked goods, such as biscuits, croissants, doughnuts, pies, cookies, and pastries    · Dairy:      ¨ Whole milk, 2% milk, and yogurt and ice cream made with whole milk    ¨ Half and half creamer, heavy cream, and whipping cream    ¨ Cheese, cream cheese, and sour cream    · Meats and proteins:      ¨ High-fat cuts of meat (T-bone steak, regular hamburger, and ribs)    ¨ Fried meat, poultry (turkey and chicken), and fish    ¨ Poultry (chicken and turkey) with skin    ¨ Cold cuts (salami or bologna), hot dogs, clayton, and sausage    ¨ Whole eggs and egg yolks    · Vegetables and fruits with added fat:      ¨ Fried vegetables or vegetables in butter or high-fat sauces, such as cream or cheese sauces    ¨ Fried fruit or fruit served with butter or cream    · Fats:      ¨ Butter, stick margarine, and shortening    ¨ Coconut, palm oil, and palm kernel oil  Foods to include:   · Grains:      ¨ Whole-grain breads, cereals, pasta, and brown rice    ¨ Low-fat crackers and pretzels    · Vegetables and fruits:      ¨ Fresh, frozen, or canned vegetables (no salt or low-sodium)    ¨ Fresh, frozen, dried, or canned fruit (canned in light syrup or fruit juice)    ¨ Avocado    · Low-fat dairy products:      ¨ Nonfat (skim) or 1% milk    ¨ Nonfat or low-fat cheese, yogurt, and cottage cheese    · Meats and proteins:      ¨ Chicken or turkey with no skin    ¨ Baked or broiled fish    ¨ Lean beef and pork (loin, round, extra lean hamburger)    ¨ Beans and peas, unsalted nuts, soy products    ¨ Egg whites and substitutes    ¨ Seeds and nuts    · Fats:      ¨ Unsaturated oil, such as canola, olive, peanut, soybean, or sunflower oil    ¨ Soft or liquid margarine and vegetable oil spread    ¨ Low-fat salad dressing  Other ways to decrease fat:   · Read food labels before you buy foods  Choose foods that have less than 30% of calories from fat  Choose low-fat or fat-free dairy products  Remember that fat free does not mean calorie free  These foods still contain calories, and too many calories can lead to weight gain  · Trim fat from meat and avoid fried food  Trim all visible fat from meat before you cook it  Remove the skin from poultry  Do not [place] meat, fish, or poultry  Bake, roast, boil, or broil these foods instead  Avoid fried foods  Eat a baked potato instead of [place] fries  Steam vegetables instead of sautéing them in butter  · Add less fat to foods    Use imitation clayton bits on salads and baked potatoes instead of regular clayton bits  Use fat-free or low-fat salad dressings instead of regular dressings  Use low-fat or nonfat butter-flavored topping instead of regular butter or margarine on popcorn and other foods  Ways to decrease fat in recipes:  Replace high-fat ingredients with low-fat or nonfat ones  This may cause baked goods to be drier than usual  You may need to use nonfat cooking spray on pans to prevent food from sticking  You also may need to change the amount of other ingredients, such as water, in the recipe  Try the following:  · Use low-fat or light margarine instead of regular margarine or shortening  · Use lean ground turkey breast or chicken, or lean ground beef (less than 5% fat) instead of hamburger  · Add 1 teaspoon of canola oil to 8 ounces of skim milk instead of using cream or half and half  · Use grated zucchini, carrots, or apples in breads instead of coconut  · Use blenderized, low-fat cottage cheese, plain tofu, or low-fat ricotta cheese instead of cream cheese  · Use 1 egg white and 1 teaspoon of canola oil, or use ¼ cup (2 ounces) of fat-free egg substitute instead of a whole egg  · Replace half of the oil that is called for in a recipe with applesauce when you bake  Use 3 tablespoons of cocoa powder and 1 tablespoon of canola oil instead of a square of baking chocolate  How to increase fiber:  Eat enough high-fiber foods to get 20 to 30 grams of fiber every day  Slowly increase your fiber intake to avoid stomach cramps, gas, and other problems  · Eat 3 ounces of whole-grain foods each day  An ounce is about 1 slice of bread  Eat whole-grain breads, such as whole-wheat bread  Whole wheat, whole-wheat flour, or other whole grains should be listed as the first ingredient on the food label  Replace white flour with whole-grain flour or use half of each in recipes   Whole-grain flour is heavier than white flour, so you may have to add more yeast or baking powder  · Eat a high-fiber cereal for breakfast   Oatmeal is a good source of soluble fiber  Look for cereals that have bran or fiber in the name  Choose whole-grain products, such as brown rice, barley, and whole-wheat pasta  · Eat more beans, peas, and lentils  For example, add beans to soups or salads  Eat at least 5 cups of fruits and vegetables each day  Eat fruits and vegetables with the peel because the peel is high in fiber  © 2017 [address] [employer] Information is for End User's use only and may not be sold, redistributed or otherwise used for commercial purposes  All illustrations and images included in CareNotes® are the copyrighted property of [employer]  or Trevor Brown  The above information is an  only  It is not intended as medical advice for individual conditions or treatments  Talk to your doctor, nurse or pharmacist before following any medical regimen to see if it is safe and effective for you  Heart Healthy Diet   AMBULATORY CARE:   A heart healthy diet  is an eating plan low in total fat, unhealthy fats, and sodium (salt)  A heart healthy diet helps decrease your risk for heart disease and stroke  Limit the amount of fat you eat to 25% to 35% of your total daily calories  Limit sodium to less than 2,300 mg each day  Healthy fats:  Healthy fats can help improve cholesterol levels  The risk for heart disease is decreased when cholesterol levels are normal  Choose healthy fats, such as the following:  · Unsaturated fat  is found in foods such as soybean, canola, olive, corn, and safflower oils  It is also found in soft tub margarine that is made with liquid vegetable oil  · Omega-3 fat  is found in certain fish, such as salmon, tuna, and trout, and in walnuts and flaxseed  Unhealthy fats:  Unhealthy fats can cause unhealthy cholesterol levels in your blood and increase your risk of heart disease   Limit unhealthy fats, such as the following:  · Cholesterol  is found in animal foods, such as eggs and lobster, and in dairy products made from whole milk  Limit cholesterol to less than 300 milligrams (mg) each day  You may need to limit cholesterol to 200 mg each day if you have heart disease  · Saturated fat  is found in meats, such as clayton and hamburger  It is also found in chicken or turkey skin, whole milk, and butter  Limit saturated fat to less than 7% of your total daily calories  Limit saturated fat to less than 6% if you have heart disease or are at increased risk for it  · Trans fat  is found in packaged foods, such as potato chips and cookies  It is also in hard margarine, some fried foods, and shortening  Avoid trans fats as much as possible    Heart healthy foods and drinks to include:  Ask your dietitian or healthcare provider how many servings to have from each of the following food groups:  · Grains:      ¨ Whole-wheat breads, cereals, and pastas, and brown rice    ¨ Low-fat, low-sodium crackers and chips    · Vegetables:      ¨ Broccoli, green beans, green peas, and spinach    ¨ Collards, kale, and lima beans    ¨ Carrots, sweet potatoes, tomatoes, and peppers    ¨ Canned vegetables with no salt added    · Fruits:      ¨ Bananas, peaches, pears, and pineapple    ¨ Grapes, raisins, and dates    ¨ Oranges, tangerines, grapefruit, orange juice, and grapefruit juice    ¨ Apricots, mangoes, melons, and papaya    ¨ Raspberries and strawberries    ¨ Canned fruit with no added sugar    · Low-fat dairy products:      ¨ Nonfat (skim) milk, 1% milk, and low-fat almond, cashew, or soy milks fortified with calcium    ¨ Low-fat cheese, regular or frozen yogurt, and cottage cheese    · Meats and proteins , such as lean cuts of beef and pork (loin, leg, round), skinless chicken and turkey, legumes, soy products, egg whites, and nuts  Foods and drinks to limit or avoid:  Ask your dietitian or healthcare provider about these and other foods that are high in unhealthy fat, sodium, and sugar:  · Snack or packaged foods , such as frozen dinners, cookies, macaroni and cheese, and cereals with more than 300 mg of sodium per serving    · Canned or dry mixes  for cakes, soups, sauces, or gravies    · Vegetables with added sodium , such as instant potatoes, vegetables with added sauces, or regular canned vegetables    · Other foods high in sodium , such as ketchup, barbecue sauce, salad dressing, pickles, olives, soy sauce, and miso    · High-fat dairy foods  such as whole or 2% milk, cream cheese, or sour cream, and cheeses     · High-fat protein foods  such as high-fat cuts of beef (T-bone steaks, ribs), chicken or turkey with skin, and organ meats, such as liver    · Cured or smoked meats , such as hot dogs, clayton, and sausage    · Unhealthy fats and oils , such as butter, stick margarine, shortening, and cooking oils such as coconut or palm oil    · Food and drinks high in sugar , such as soft drinks (soda), sports drinks, sweetened tea, candy, cake, cookies, pies, and doughnuts  Other diet guidelines to follow:   · Eat more foods containing omega-3 fats  Eat fish high in omega-3 fats at least 2 times a week  · Limit alcohol  Too much alcohol can damage your heart and raise your blood pressure  Women should limit alcohol to 1 drink a day  Men should limit alcohol to 2 drinks a day  A drink of alcohol is 12 ounces of beer, 5 ounces of wine, or 1½ ounces of liquor  · Choose low-sodium foods  High-sodium foods can lead to high blood pressure  Add little or no salt to food you prepare  Use herbs and spices in place of salt  · Eat more fiber  to help lower cholesterol levels  Eat at least 5 servings of fruits and vegetables each day  Eat 3 ounces of whole-grain foods each day  Legumes (beans) are also a good source of fiber  Lifestyle guidelines:   · Do not smoke    Nicotine and other chemicals in cigarettes and cigars can cause lung and heart damage  Ask your healthcare provider for information if you currently smoke and need help to quit  E-cigarettes or smokeless tobacco still contain nicotine  Talk to your healthcare provider before you use these products  · Exercise regularly  to help you maintain a healthy weight and improve your blood pressure and cholesterol levels  Ask your healthcare provider about the best exercise plan for you  Do not start an exercise program without asking your healthcare provider  Follow up with your healthcare provider as directed:  Write down your questions so you remember to ask them during your visits  © 2017 [address] [employer] Information is for End User's use only and may not be sold, redistributed or otherwise used for commercial purposes  All illustrations and images included in CareNotes® are the copyrighted property of [employer]  or Trevor Brown  The above information is an  only  It is not intended as medical advice for individual conditions or treatments  Talk to your doctor, nurse or pharmacist before following any medical regimen to see if it is safe and effective for you  Calorie Counting Diet   WHAT YOU NEED TO KNOW:   What is a calorie counting diet? It is a meal plan based on counting calories each day to reach a healthy body weight  You will need to eat fewer calories if you are trying to lose weight  Weight loss may decrease your risk for certain health problems or improve your health if you have health problems  Some of these health problems include heart disease, high blood pressure, and diabetes  What foods should I avoid? Your dietitian will tell you if you need to avoid certain foods based on your body weight and health condition  You may need to avoid high-fat foods if you are at risk for or have heart disease  You may need to eat fewer foods from the breads and starches food group if you have diabetes  How many calories are in foods? The following is a list of foods and drinks with the approximate number of calories in each  Check the food label to find the exact number of calories  A dietitian can tell you how many calories you should have from each food group each day    · Carbohydrate:      ¨ ½ of a 3-inch bagel, 1 slice of bread, or ½ of a hamburger bun or hot dog bun (80)    ¨ 1 (8-inch) flour tortilla or ½ cup of cooked rice (100)    ¨ 1 (6-inch) corn tortilla (80)    ¨ 1 (6-inch) pancake or 1 cup of bran flakes cereal (110)    ¨ ½ cup of cooked cereal (80)    ¨ ½ cup of cooked pasta (85)    ¨ 1 ounce of pretzels (100)    ¨ 3 cups of air-popped popcorn without butter or oil (80)    · Dairy:      ¨ 1 cup of skim or 1% milk (90)    ¨ 1 cup of 2% milk (120)    ¨ 1 cup of whole milk (160)    ¨ 1 cup of 2% chocolate milk (220)    ¨ 1 ounce of low-fat cheese with 3 grams of fat per ounce (70)    ¨ 1 ounce of cheddar cheese (114)    ¨ ½ cup of 1% fat cottage cheese (80)    ¨ 1 cup of plain or sugar-free, fat-free yogurt (90)    · Protein foods:      ¨ 3 ounces of fish (not breaded or fried) (95)    ¨ 3 ounces of breaded, fried fish (195)    ¨ ¾ cup of tuna canned in water (105)    ¨ 3 ounces of chicken breast without skin (105)    ¨ 1 fried chicken breast with skin (350)    ¨ ¼ cup of fat free egg substitute (40)    ¨ 1 large egg (75)    ¨ 3 ounces of lean beef or pork (165)    ¨ 3 ounces of fried pork chop or ham (185)    ¨ ½ cup of cooked dried beans, such as kidney, bartholomew, lentils, or navy (115)    ¨ 3 ounces of bologna or lunch meat (225)    ¨ 2 links of breakfast sausage (140)    · Vegetables:      ¨ ½ cup of sliced mushrooms (10)    ¨ 1 cup of salad greens, such as lettuce, spinach, or dolores (15)    ¨ ½ cup of steamed asparagus (20)    ¨ ½ cup of cooked summer squash, zucchini squash, or green or wax beans (25)    ¨ 1 cup of broccoli or cauliflower florets, or 1 medium tomato (25)    ¨ 1 large raw carrot or ½ cup of cooked carrots (40)    ¨ ? of a medium cucumber or 1 stalk of celery (5)    ¨ 1 small baked potato (160)    ¨ 1 cup of breaded, fried vegetables (230)    · Fruit:      ¨ 1 (6-inch) banana (55)     ¨ ½ of a 4-inch grapefruit (55)    ¨ 15 grapes (60)    ¨ 1 medium orange or apple (70)    ¨ 1 large peach (65)    ¨ 1 cup of fresh pineapple chunks (75)    ¨ 1 cup of melon cubes (50)    ¨ 1¼ cups of whole strawberries (45)    ¨ ½ cup of fruit canned in juice (55)    ¨ ½ cup of fruit canned in heavy syrup (110)    ¨ ?  cup of raisins (130)    ¨ ½ cup of unsweetened fruit juice (60)    ¨ ½ cup of grape, cranberry, or prune juice (90)    · Fat:      ¨ 10 peanuts or 2 teaspoons of peanut butter (55)    ¨ 2 tablespoons of avocado or 1 tablespoon of regular salad dressing (45)    ¨ 2 slices of clayton (90)    ¨ 1 teaspoon of oil, such as safflower, canola, corn, or olive oil (45)    ¨ 2 teaspoons of low-fat margarine, or 1 tablespoon of low-fat mayonnaise (50)    ¨ 1 teaspoon of regular margarine (40)    ¨ 1 tablespoon of regular mayonnaise (135)    ¨ 1 tablespoon of cream cheese or 2 tablespoons of low-fat cream cheese (45)    ¨ 2 tablespoons of vegetable shortening (215)    · Dessert and sweets:      ¨ 8 animal crackers or 5 vanilla wafers (80)    ¨ 1 frozen fruit juice bar (80)    ¨ ½ cup of ice milk or low-fat frozen yogurt (90)    ¨ ½ cup of sherbet or sorbet (125)    ¨ ½ cup of sugar-free pudding or custard (60)    ¨ ½ cup of ice cream (140)    ¨ ½ cup of pudding or custard (175)    ¨ 1 (2-inch) square chocolate brownie (185)    · Combination foods:      ¨ Bean burrito made with an 8-inch tortilla, without cheese (275)    ¨ Chicken breast sandwich with lettuce and tomato (325)    ¨ 1 cup of chicken noodle soup (60)    ¨ 1 beef taco (175)    ¨ Regular hamburger with lettuce and tomato (310)    ¨ Regular cheeseburger with lettuce and tomato (410)     ¨ ¼ of a 12-inch cheese pizza (280)    ¨ Fried fish sandwich with lettuce and tomato (425)    ¨ Hot dog and bun (275)    ¨ 1½ cups of macaroni and cheese (310)    ¨ Taco salad with a fried tortilla shell (870)    · Low-calorie foods:      ¨ 1 tablespoon of ketchup or 1 tablespoon of fat free sour cream (15)    ¨ 1 teaspoon of mustard (5)    ¨ ¼ cup of salsa (20)    ¨ 1 large dill pickle (15)    ¨ 1 tablespoon of fat free salad dressing (10)    ¨ 2 teaspoons of low-sugar, light jam or jelly, or 1 tablespoon of sugar-free syrup (15)    ¨ 1 sugar-free popsicle (15)    ¨ 1 cup of club soda, seltzer water, or diet soda (0)  CARE AGREEMENT:   You have the right to help plan your care  Discuss treatment options with your caregivers to decide what care you want to receive  You always have the right to refuse treatment  The above information is an  only  It is not intended as medical advice for individual conditions or treatments  Talk to your doctor, nurse or pharmacist before following any medical regimen to see if it is safe and effective for you  © 2017 [employer]  Information is for End User's use only and may not be sold, redistributed or otherwise used for commercial purposes  All illustrations and images included in CareNotes® are the copyrighted property of [employer] , [employer]  or ALEKSANDER Hanson    Portions of the record may have been created with voice recognition software  Occasional wrong word or "sound a like" substitutions may have occurred due to the inherent limitations of voice recognition software  Read the chart carefully and recognize, using context, where substitutions have occurred  BMI Counseling: Body mass index is 37 75 kg/m²   The BMI is above normal  Nutrition recommendations include reducing portion sizes, decreasing overall calorie intake, 3-5 servings of fruits/vegetables daily, reducing fast food intake, consuming healthier snacks, decreasing soda and/or juice intake, moderation in carbohydrate intake, increasing intake of lean protein, reducing intake of saturated fat and trans fat and reducing intake of cholesterol  Exercise recommendations include moderate aerobic physical activity for 150 minutes/week

## 2020-03-12 NOTE — ASSESSMENT & PLAN NOTE
Hemoglobin A1c today is 6 2  Patient is aware she needs to eat after meds    If she continues to be less than 6 5 or feels shaky with low blood sugars sweating etc  I will decrease the the Janumet

## 2020-03-12 NOTE — ASSESSMENT & PLAN NOTE
Hba1c today is 6 2  Will consider decreasing janumet if pt has hypoglycemia  For now, continue current meds     Lab Results   Component Value Date    HGBA1C 6 5 09/30/2019

## 2020-03-12 NOTE — Clinical Note
Need the last consult from [facility] eye Dr Natalie Mock and also call over to Dr Carolina Alarcon and find out when her next colonoscopy is due

## 2020-03-16 NOTE — PROGRESS NOTES
No I am not sure they just said her last one was last year in Oct 2019   It was abnormal so she was due this year in Oct

## 2020-03-23 ENCOUNTER — TELEPHONE (OUTPATIENT)
Dept: FAMILY MEDICINE CLINIC | Facility: CLINIC | Age: 75
End: 2020-03-23

## 2020-03-23 DIAGNOSIS — E11.40 CONTROLLED TYPE 2 DIABETES WITH NEUROPATHY (HCC): ICD-10-CM

## 2020-03-23 DIAGNOSIS — F42.4 COMPULSIVE SKIN PICKING: ICD-10-CM

## 2020-03-23 DIAGNOSIS — F41.8 DEPRESSION WITH ANXIETY: ICD-10-CM

## 2020-03-23 DIAGNOSIS — I10 BENIGN HYPERTENSION: ICD-10-CM

## 2020-03-23 DIAGNOSIS — J45.20 MILD INTERMITTENT ASTHMA, UNSPECIFIED WHETHER COMPLICATED: ICD-10-CM

## 2020-03-23 RX ORDER — ALBUTEROL SULFATE 90 UG/1
2 AEROSOL, METERED RESPIRATORY (INHALATION) EVERY 6 HOURS PRN
Qty: 3 INHALER | Refills: 1 | Status: SHIPPED | OUTPATIENT
Start: 2020-03-23 | End: 2020-05-04

## 2020-03-23 RX ORDER — ENALAPRIL MALEATE 10 MG/1
10 TABLET ORAL 2 TIMES DAILY
Qty: 180 TABLET | Refills: 1 | Status: SHIPPED | OUTPATIENT
Start: 2020-03-23 | End: 2020-10-08

## 2020-03-23 RX ORDER — AMLODIPINE BESYLATE 5 MG/1
5 TABLET ORAL DAILY
Qty: 90 TABLET | Refills: 1 | Status: SHIPPED | OUTPATIENT
Start: 2020-03-23 | End: 2020-10-08

## 2020-03-23 RX ORDER — ASPIRIN 81 MG/1
81 TABLET, CHEWABLE ORAL DAILY
Qty: 90 TABLET | Refills: 1 | Status: SHIPPED | OUTPATIENT
Start: 2020-03-23 | End: 2020-10-08

## 2020-03-23 RX ORDER — MOMETASONE FUROATE 1 MG/G
CREAM TOPICAL DAILY
Qty: 45 G | Refills: 0 | Status: SHIPPED | OUTPATIENT
Start: 2020-03-23 | End: 2022-06-13 | Stop reason: ALTCHOICE

## 2020-04-20 ENCOUNTER — TELEMEDICINE (OUTPATIENT)
Dept: FAMILY MEDICINE CLINIC | Facility: CLINIC | Age: 75
End: 2020-04-20
Payer: COMMERCIAL

## 2020-04-20 VITALS
HEART RATE: 80 BPM | DIASTOLIC BLOOD PRESSURE: 75 MMHG | BODY MASS INDEX: 37.75 KG/M2 | SYSTOLIC BLOOD PRESSURE: 134 MMHG | HEIGHT: 62 IN

## 2020-04-20 DIAGNOSIS — R07.89 ATYPICAL CHEST PAIN: ICD-10-CM

## 2020-04-20 DIAGNOSIS — E11.40 CONTROLLED TYPE 2 DIABETES WITH NEUROPATHY (HCC): Primary | ICD-10-CM

## 2020-04-20 DIAGNOSIS — J45.20 MILD INTERMITTENT ASTHMA, UNSPECIFIED WHETHER COMPLICATED: ICD-10-CM

## 2020-04-20 DIAGNOSIS — I10 BENIGN HYPERTENSION: ICD-10-CM

## 2020-04-20 PROCEDURE — 99214 OFFICE O/P EST MOD 30 MIN: CPT | Performed by: FAMILY MEDICINE

## 2020-04-20 RX ORDER — BIMATOPROST 0.01 %
DROPS OPHTHALMIC (EYE)
COMMUNITY
Start: 2020-04-06 | End: 2022-02-27 | Stop reason: ALTCHOICE

## 2020-05-04 DIAGNOSIS — J45.20 MILD INTERMITTENT ASTHMA, UNSPECIFIED WHETHER COMPLICATED: Primary | ICD-10-CM

## 2020-07-13 ENCOUNTER — TELEMEDICINE (OUTPATIENT)
Dept: FAMILY MEDICINE CLINIC | Facility: CLINIC | Age: 75
End: 2020-07-13
Payer: COMMERCIAL

## 2020-07-13 DIAGNOSIS — E11.40 CONTROLLED TYPE 2 DIABETES WITH NEUROPATHY (HCC): ICD-10-CM

## 2020-07-13 DIAGNOSIS — M25.561 ACUTE PAIN OF RIGHT KNEE: Primary | ICD-10-CM

## 2020-07-13 DIAGNOSIS — M25.561 ACUTE PAIN OF RIGHT KNEE: ICD-10-CM

## 2020-07-13 PROCEDURE — 3075F SYST BP GE 130 - 139MM HG: CPT | Performed by: NURSE PRACTITIONER

## 2020-07-13 PROCEDURE — 3078F DIAST BP <80 MM HG: CPT | Performed by: NURSE PRACTITIONER

## 2020-07-13 PROCEDURE — 1160F RVW MEDS BY RX/DR IN RCRD: CPT | Performed by: NURSE PRACTITIONER

## 2020-07-13 PROCEDURE — 1036F TOBACCO NON-USER: CPT | Performed by: NURSE PRACTITIONER

## 2020-07-13 PROCEDURE — 3044F HG A1C LEVEL LT 7.0%: CPT | Performed by: NURSE PRACTITIONER

## 2020-07-13 PROCEDURE — 2022F DILAT RTA XM EVC RTNOPTHY: CPT | Performed by: NURSE PRACTITIONER

## 2020-07-13 PROCEDURE — 99213 OFFICE O/P EST LOW 20 MIN: CPT | Performed by: NURSE PRACTITIONER

## 2020-07-13 RX ORDER — METAXALONE 800 MG/1
800 TABLET ORAL 3 TIMES DAILY
Qty: 60 TABLET | Refills: 0 | Status: SHIPPED | OUTPATIENT
Start: 2020-07-13 | End: 2020-07-13

## 2020-07-13 RX ORDER — CYCLOBENZAPRINE HCL 5 MG
5 TABLET ORAL 3 TIMES DAILY PRN
Qty: 30 TABLET | Refills: 0 | Status: SHIPPED | OUTPATIENT
Start: 2020-07-13 | End: 2020-07-28

## 2020-07-13 NOTE — PROGRESS NOTES
Virtual Regular Visit      Assessment/Plan:    Problem List Items Addressed This Visit     None      Visit Diagnoses     Acute pain of right knee    -  Primary    Use Voltaren gel on the right knee for pain  May use Skelaxin up to 3 times a day as needed for pain  If pain is not resolving in about a week or 2, return  Relevant Medications    metaxalone (SKELAXIN) 800 mg tablet    diclofenac sodium (VOLTAREN) 1 %               Reason for visit is   Chief Complaint   Patient presents with    Virtual Regular Visit        Encounter provider ALEKSANDER Vasquez    Provider located at [address] [place] [address] [address] [place] [address]  [address] [place] [id number] [phone]      Recent Visits  No visits were found meeting these conditions  Showing recent visits within past 7 days and meeting all other requirements     Today's Visits  Date Type Provider Dept   07/13/20 Telemedicine ALEKSANDER [facility] today's visits and meeting all other requirements     Future Appointments  No visits were found meeting these conditions  Showing future appointments within next 150 days and meeting all other requirements        The patient was identified by name and date of birth  Eliazar Peña was informed that this is a telemedicine visit and that the visit is being conducted through [address] [place] [address] and patient was informed that this is not a secure, HIPAA-complaint platform  She agrees to proceed     My office door was closed  No one else was in the room  She acknowledged consent and understanding of privacy and security of the video platform  The patient has agreed to participate and understands they can discontinue the visit at any time  Patient is aware this is a billable service  Subjective  Eliazar Peña is a 70 y o  female presenting via face time for right knee pain    She states that the right knee pain started about a month ago when she was outside working in her garden with rocker sneakers on  She was outside in the sneakers for about 4-5 hours and she feels like she pulled a muscle in her leg  She states that the pain is much better today and yesterday, but she is still having muscle spasms  She is using cold sprays and icy hot cream with some relief  She is limping due to the pain  The pain is about a 6/10 currently  She did take ibuprofen with no relief  No previous injury  Patient denies any swelling in the leg erythema or warmth        HPI     Past Medical History:   Diagnosis Date    Asthma     Benign hypertension 2018    [facility] [address] [facility]     [facility]     Glaucoma     Hypertension     Kidney stone     Neuropathy     Sleep apnea     no machine    Tubular adenoma of colon 10/2019       Past Surgical History:   Procedure Laterality Date     SECTION      COLONOSCOPY      HYSTERECTOMY  1985    TONSILLECTOMY         Current Outpatient Medications   Medication Sig Dispense Refill    amLODIPine (NORVASC) 5 mg tablet Take 1 tablet (5 mg total) by mouth daily 90 tablet 1    aspirin 81 mg chewable tablet Chew 1 tablet (81 mg total) daily 90 tablet 1    carbamide peroxide (DEBROX) 6 5 % otic solution Administer 5 drops into both ears 2 (two) times a day 15 mL 0    diclofenac sodium (VOLTAREN) 1 % Apply 2 g topically 4 (four) times a day 50 g 0    enalapril (VASOTEC) 10 mg tablet Take 1 tablet (10 mg total) by mouth 2 (two) times a day 180 tablet 1    fluticasone-salmeterol (Advair Diskus) 250-50 mcg/dose inhaler Inhale 1 puff 2 (two) times a day 3 Inhaler 1    glucose blood (ONETOUCH VERIO) test strip E11 9 / testing daily      LUMIGAN 0 01 % ophthalmic drops       LYRICA 150 MG capsule Take 1 capsule (150 mg total) by mouth 2 (two) times a day 180 capsule 1    metaxalone (SKELAXIN) 800 mg tablet Take 1 tablet (800 mg total) by mouth 3 (three) times a day 60 tablet 0    mometasone (ELOCON) 0 1 % cream Apply topically daily 45 g 0    ONETOUCH DELICA LANCETS 96F MISC E11 9/ testing daily      PROAIR  (90 Base) MCG/ACT inhaler Inhale 2 puffs every 6 (six) hours as needed for wheezing 1 Inhaler 1    sertraline (Zoloft) 50 mg tablet Take 1 tablet (50 mg total) by mouth daily 90 tablet 1    SITagliptin-metFORMIN HCl ER (Janumet XR) 100-1000 MG TB24 Take 1 tablet by mouth daily 90 tablet 1    travoprost (TRAVATAN Z) 0 004 % ophthalmic solution Apply 1 drop to eye daily at bedtime       No current facility-administered medications for this visit  Allergies   Allergen Reactions    Ciprofloxacin Itching    Levaquin [Levofloxacin] Swelling    Penicillins GI Intolerance       Review of Systems   Constitutional: Negative for chills, fatigue and fever  HENT: Negative  Respiratory: Negative for chest tightness, shortness of breath and wheezing  Cardiovascular: Negative for chest pain and palpitations  Gastrointestinal: Negative for abdominal pain, constipation, diarrhea, nausea and vomiting  Genitourinary: Negative for difficulty urinating, flank pain, frequency, hematuria, pelvic pain and urgency  Musculoskeletal: Positive for arthralgias (right knee) and gait problem  Negative for joint swelling  Neurological: Negative for dizziness, light-headedness, numbness and headaches  Psychiatric/Behavioral: Negative for dysphoric mood and sleep disturbance  The patient is nervous/anxious  Video Exam    There were no vitals filed for this visit  Physical Exam   Constitutional: She is oriented to person, place, and time  No distress  Pulmonary/Chest: Effort normal    Neurological: She is alert and oriented to person, place, and time  Psychiatric: She has a normal mood and affect          I spent 15 minutes directly with the patient during this visit      VIRTUAL VISIT DISCLAIMER    [facility] acknowledges that she has consented to an online visit or consultation  She understands that the online visit is based solely on information provided by her, and that, in the absence of a face-to-face physical evaluation by the physician, the diagnosis she receives is both limited and provisional in terms of accuracy and completeness  This is not intended to replace a full medical face-to-face evaluation by the physician  Derek Silva understands and accepts these terms

## 2020-07-25 DIAGNOSIS — M25.561 ACUTE PAIN OF RIGHT KNEE: ICD-10-CM

## 2020-07-25 DIAGNOSIS — J45.20 MILD INTERMITTENT ASTHMA, UNSPECIFIED WHETHER COMPLICATED: ICD-10-CM

## 2020-07-25 RX ORDER — ALBUTEROL SULFATE 90 UG/1
AEROSOL, METERED RESPIRATORY (INHALATION)
Qty: 18 INHALER | Refills: 0 | Status: CANCELLED | OUTPATIENT
Start: 2020-07-25

## 2020-07-27 NOTE — TELEPHONE ENCOUNTER
The pharmacy needs me to document where she is using the diclofenac   I will order but find out what body part first  ty

## 2020-07-28 ENCOUNTER — TELEPHONE (OUTPATIENT)
Dept: FAMILY MEDICINE CLINIC | Facility: CLINIC | Age: 75
End: 2020-07-28

## 2020-07-28 RX ORDER — CYCLOBENZAPRINE HCL 5 MG
TABLET ORAL
Qty: 30 TABLET | Refills: 0 | Status: SHIPPED | OUTPATIENT
Start: 2020-07-28 | End: 2021-04-15 | Stop reason: SDUPTHER

## 2020-07-28 NOTE — TELEPHONE ENCOUNTER
Vivek Garcia did you switch her to cyclobenzaprine? because I saw you put her on skelaxin and diclofenac  I did ask pura if she was taking the flexeril and she said yes but I didn't see where it was added in her chart

## 2020-07-28 NOTE — TELEPHONE ENCOUNTER
I looked at [facility]ila last note who prescribed this medicine and she wrote for her R knee also it says it on top of the encounter for R knee pain       I did check with patient as well and she said yes she does need a refill because it is helping her knee pain

## 2020-09-01 DIAGNOSIS — E11.40 CONTROLLED TYPE 2 DIABETES WITH NEUROPATHY (HCC): ICD-10-CM

## 2020-09-01 RX ORDER — SITAGLIPTIN AND METFORMIN HYDROCHLORIDE 100; 1000 MG/1; MG/1
TABLET, FILM COATED, EXTENDED RELEASE ORAL
Qty: 90 TABLET | Refills: 1 | Status: SHIPPED | OUTPATIENT
Start: 2020-09-01 | End: 2021-03-08

## 2020-09-14 DIAGNOSIS — E11.40 CONTROLLED TYPE 2 DIABETES WITH NEUROPATHY (HCC): ICD-10-CM

## 2020-09-18 ENCOUNTER — TELEPHONE (OUTPATIENT)
Dept: FAMILY MEDICINE CLINIC | Facility: CLINIC | Age: 75
End: 2020-09-18

## 2020-09-18 NOTE — TELEPHONE ENCOUNTER
Have to start a prior auth on her Lyrica with [facility]  Patient was able to get a months worth [facility] pharmacy       [phone] GZ#[id number]

## 2020-09-21 ENCOUNTER — TELEPHONE (OUTPATIENT)
Dept: FAMILY MEDICINE CLINIC | Facility: CLINIC | Age: 75
End: 2020-09-21

## 2020-09-21 NOTE — TELEPHONE ENCOUNTER
Called insurance company to start a Prior Auth for patient Lyrica brand necessity   It is pending and will get final response on Thursday at 11:35am case # [id number]

## 2020-09-22 ENCOUNTER — OFFICE VISIT (OUTPATIENT)
Dept: FAMILY MEDICINE CLINIC | Facility: CLINIC | Age: 75
End: 2020-09-22
Payer: COMMERCIAL

## 2020-09-22 VITALS
HEART RATE: 68 BPM | SYSTOLIC BLOOD PRESSURE: 160 MMHG | TEMPERATURE: 97.2 F | WEIGHT: 205.4 LBS | DIASTOLIC BLOOD PRESSURE: 90 MMHG | OXYGEN SATURATION: 97 % | BODY MASS INDEX: 37.8 KG/M2 | HEIGHT: 62 IN | RESPIRATION RATE: 14 BRPM

## 2020-09-22 DIAGNOSIS — M25.561 ACUTE PAIN OF RIGHT KNEE: Primary | ICD-10-CM

## 2020-09-22 DIAGNOSIS — Z13.220 SCREENING, LIPID: ICD-10-CM

## 2020-09-22 DIAGNOSIS — E11.40 CONTROLLED TYPE 2 DIABETES WITH NEUROPATHY (HCC): ICD-10-CM

## 2020-09-22 DIAGNOSIS — I10 BENIGN HYPERTENSION: ICD-10-CM

## 2020-09-22 PROCEDURE — 99214 OFFICE O/P EST MOD 30 MIN: CPT | Performed by: FAMILY MEDICINE

## 2020-09-22 RX ORDER — MELOXICAM 15 MG/1
15 TABLET ORAL DAILY
Qty: 30 TABLET | Refills: 0 | Status: SHIPPED | OUTPATIENT
Start: 2020-09-22 | End: 2020-10-15

## 2020-09-22 NOTE — PATIENT INSTRUCTIONS
Discussed all with patient  The right knee is swollen I would like you to have an x-ray  Please have the blood work done fasting but drink water before you go for the test I will call you with all results  Take the meloxicam once daily with food  Try this but hold on the Voltaren gel for now  I am also testing you for Lyme disease  I will call with results  The diabetes is perfect at goal   Continue your current medications  Patient would prefer to call with her blood pressures from home over the next 3 weeks  If still over 140/80 will need to increase medications  No added salt in the diet  Refuses flu shot

## 2020-09-22 NOTE — ASSESSMENT & PLAN NOTE
Blood pressure is too high today  Discussed with the patient  She prefers to check the blood pressures at home record the numbers and call me in 3 weeks with numbers  If it remains over 140 will need to increase meds

## 2020-09-22 NOTE — ASSESSMENT & PLAN NOTE
Hemoglobin A1c today is 6 4  This is perfect continue current medications    Lab Results   Component Value Date    HGBA1C 6 2 03/12/2020

## 2020-09-22 NOTE — PROGRESS NOTES
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)    Hemoglobin A1c today is 6 4  This is perfect continue current medications  Lab Results   Component Value Date    HGBA1C 6 2 03/12/2020       Benign hypertension    Blood pressure is too high today  Discussed with the patient  She prefers to check the blood pressures at home record the numbers and call me in 3 weeks with numbers  If it remains over 140 will need to increase meds  Visit Diagnosis:  Diagnoses and all orders for this visit:    Acute pain of right knee  Comments: Will get xrays and labs and treat with meloxicam daily with food  hold the voltaren to see if the meloxicam is helping  Orders:  -     meloxicam (MOBIC) 15 mg tablet; Take 1 tablet (15 mg total) by mouth daily With food  -     CBC and differential; Future  -     Lyme Antibody Profile with reflex to WB; Future  -     JAMAAL Screen w/ Reflex to Titer/Pattern; Future  -     RF Screen w/ Reflex to Titer; Future  -     C-reactive protein; Future  -     XR knee 3 vw right non injury; Future    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)    Benign hypertension  -     Microalbumin / creatinine urine ratio  -     Urinalysis with microscopic    Screening, lipid  -     Comprehensive metabolic panel; Future  -     Lipid panel; Future          Subjective:    Patient ID: Ysundar Ferguson is a 70 y o  female  Pt is here with c/o right knee pain for 2 months  Thinks it started when she was wearing exercise sneakers  Wore them too long and gardened with them  Made her ankles hurt, now the pain is in the right knee and knee is giving way at times  Feels she is stumbling  Using voltaren gel but finished the prescription and now picked some up over the counter  No injury to the knee but the pain is excrutiating  Now her wrists are hurting  Not sure if the knee is swollen but hurts very bad and now using other muscles  Takes all other meds as directed  No side effects noted  Checks her bs's at home and reports they are normal  Not checking bp's at home  The following portions of the patient's history were reviewed and updated as appropriate: allergies, current medications, past family history, past medical history, past social history, past surgical history and problem list     Review of Systems   Constitutional: Negative for chills, diaphoresis, fatigue and fever  HENT: Negative  Eyes: Negative  Respiratory: Negative for cough, shortness of breath (with steps  ) and wheezing  Cardiovascular: Negative for chest pain and palpitations  Gastrointestinal: Negative  Genitourinary: Negative  Skin: Positive for wound (picking at lower legs  )  Negative for rash (has been scratching herself like crazy  )  Neurological: Negative for dizziness, light-headedness and headaches  Psychiatric/Behavioral: Negative for dysphoric mood  The patient is not nervous/anxious            /90 (BP Location: Left arm, Patient Position: Sitting, Cuff Size: Adult)   Pulse 68   Temp (!) 97 2 °F (36 2 °C)   Resp 14   Ht 5' 2" (1 575 m)   Wt 93 2 kg (205 lb 6 4 oz)   SpO2 97%   BMI 37 57 kg/m²   Social History     Socioeconomic History    Marital status: /Civil Union     Spouse name: Not on file    Number of children: Not on file    Years of education: Not on file    Highest education level: Not on file   Occupational History    Occupation: retired    Social Needs    Financial resource strain: Not on file    Food insecurity     Worry: Not on file     Inability: Not on file   [employer] needs     Medical: Not on file     Non-medical: Not on file   Tobacco Use    Smoking status: Never Smoker    Smokeless tobacco: Never Used   Substance and Sexual Activity    Alcohol use: No    Drug use: No    Sexual activity: Yes     Birth control/protection: Surgical   Lifestyle    Physical activity     Days per week: Not on file     Minutes per session: Not on file    Stress: Not on file   Relationships    Social connections     Talks on phone: Not on file     Gets together: Not on file     Attends Buddhism service: Not on file     Active member of club or organization: Not on file     Attends meetings of clubs or organizations: Not on file     Relationship status: Not on file    Intimate partner violence     Fear of current or ex partner: Not on file     Emotionally abused: Not on file     Physically abused: Not on file     Forced sexual activity: Not on file   Other Topics Concern    Not on file   Social History Narrative    Not on file     Past Medical History:   Diagnosis Date    Asthma     Benign hypertension 2018    [facility] [address]     Diabetes mellitus ([facility] [address] [facility]     Glaucoma     Hypertension     Kidney stone     Neuropathy     Sleep apnea     no machine    Tubular adenoma of colon 10/2019     Family History   Problem Relation Age of Onset    Diabetes Mother     Hypertension Father     Prostate cancer Father     Diabetes Sister     Hypertension Sister     Breast cancer Sister 62    Diabetes Brother     Hypertension Brother     Asthma Family     No Known Problems Daughter     No Known Problems Sister     No Known Problems Daughter     No Known Problems Daughter     No Known Problems Maternal Aunt     Breast cancer Maternal Aunt 58    No Known Problems Maternal Aunt     No Known Problems Maternal Aunt     No Known Problems Paternal Aunt     No Known Problems Paternal Aunt     No Known Problems Paternal Aunt     Colon cancer Neg Hx     Ovarian cancer Neg Hx     Uterine cancer Neg Hx     Cervical cancer Neg Hx      Past Surgical History:   Procedure Laterality Date     SECTION      COLONOSCOPY      HYSTERECTOMY  1985    TONSILLECTOMY         Current Outpatient Medications:     amLODIPine (NORVASC) 5 mg tablet, Take 1 tablet (5 mg total) by mouth daily, Disp: 90 tablet, Rfl: 1    aspirin 81 mg chewable tablet, Chew 1 tablet (81 mg total) daily, Disp: 90 tablet, Rfl: 1    carbamide peroxide (DEBROX) 6 5 % otic solution, Administer 5 drops into both ears 2 (two) times a day, Disp: 15 mL, Rfl: 0    cyclobenzaprine (FLEXERIL) 5 mg tablet, TAKE 1 TABLET BY MOUTH THREE TIMES A DAY AS NEEDED FOR MUSCLE SPASMS, Disp: 30 tablet, Rfl: 0    diclofenac sodium (VOLTAREN) 1 %, Apply to R knee 4 times a day as needed, Disp: 100 g, Rfl: 0    enalapril (VASOTEC) 10 mg tablet, Take 1 tablet (10 mg total) by mouth 2 (two) times a day, Disp: 180 tablet, Rfl: 1    fluticasone-salmeterol (Advair Diskus) 250-50 mcg/dose inhaler, Inhale 1 puff 2 (two) times a day, Disp: 3 Inhaler, Rfl: 1    glucose blood (ONETOUCH VERIO) test strip, E11 9 / testing daily, Disp: , Rfl:     Janumet -1000 MG TB24, TAKE 1 TABLET BY MOUTH EVERY DAY, Disp: 90 tablet, Rfl: 1    LUMIGAN 0 01 % ophthalmic drops, , Disp: , Rfl:     LYRICA 150 MG capsule, TAKE 1 CAPSULE BY MOUTH TWICE A DAY, Disp: 180 capsule, Rfl: 1    mometasone (ELOCON) 0 1 % cream, Apply topically daily, Disp: 45 g, Rfl: 0    ONETOUCH DELICA LANCETS 13P MISC, E11 9/ testing daily, Disp: , Rfl:     PROAIR  (90 Base) MCG/ACT inhaler, TAKE 2 PUFFS BY MOUTH EVERY 6 HOURS AS NEEDED FOR WHEEZE, Disp: 8 5 Inhaler, Rfl: 1    sertraline (Zoloft) 50 mg tablet, Take 1 tablet (50 mg total) by mouth daily, Disp: 90 tablet, Rfl: 1    travoprost (TRAVATAN Z) 0 004 % ophthalmic solution, Apply 1 drop to eye daily at bedtime, Disp: , Rfl:     meloxicam (MOBIC) 15 mg tablet, Take 1 tablet (15 mg total) by mouth daily With food, Disp: 30 tablet, Rfl: 0    Allergies   Allergen Reactions    Ciprofloxacin Itching    Levaquin [Levofloxacin] Swelling    Penicillins GI Intolerance          Lab Review   No visits with results within 2 Month(s) from this visit     Latest known visit with results is:   Office Visit on 03/12/2020   Component Date Value    Hemoglobin A1C 03/12/2020 6 2         Imaging: No results found  Objective:     Physical Exam  Vitals signs and nursing note reviewed  Constitutional:       General: She is not in acute distress  Appearance: Normal appearance  She is obese  She is not ill-appearing, toxic-appearing or diaphoretic  HENT:      Head: Normocephalic and atraumatic  Right Ear: Tympanic membrane, ear canal and external ear normal  There is no impacted cerumen  Left Ear: Tympanic membrane, ear canal and external ear normal  There is no impacted cerumen  Nose: Nose normal  No congestion or rhinorrhea  Mouth/Throat:      Mouth: Mucous membranes are moist       Pharynx: No oropharyngeal exudate  Eyes:      General:         Right eye: No discharge  Left eye: No discharge  Extraocular Movements: Extraocular movements intact  Conjunctiva/sclera: Conjunctivae normal       Pupils: Pupils are equal, round, and reactive to light  Neck:      Musculoskeletal: Normal range of motion and neck supple  Cardiovascular:      Rate and Rhythm: Normal rate and regular rhythm  Heart sounds: No murmur  Pulmonary:      Effort: Pulmonary effort is normal  No respiratory distress  Breath sounds: Normal breath sounds  No wheezing or rales  Musculoskeletal:         General: Swelling (noted to right knee and tender medial and lateral aspect  No popliteal tendernss  ) and tenderness present  No signs of injury  Comments: Ambulates with limp and a cane  Skin:     General: Skin is warm and dry  Comments: Discolored picked at lesions noted on arms and legs  Neurological:      General: No focal deficit present  Mental Status: She is alert  She is disoriented  Cranial Nerves: No cranial nerve deficit  Psychiatric:         Mood and Affect: Mood normal          Behavior: Behavior normal          Thought Content:  Thought content normal          Judgment: Judgment normal            Patient Instructions Discussed all with patient  The right knee is swollen I would like you to have an x-ray  Please have the blood work done fasting but drink water before you go for the test I will call you with all results  Take the meloxicam once daily with food  Try this but hold on the Voltaren gel for now  I am also testing you for Lyme disease  I will call with results  The diabetes is perfect at goal   Continue your current medications  Patient would prefer to call with her blood pressures from home over the next 3 weeks  If still over 140/80 will need to increase medications  No added salt in the diet  Refuses flu shot  ALEKSANDER Mckeon    Portions of the record may have been created with voice recognition software  Occasional wrong word or "sound a like" substitutions may have occurred due to the inherent limitations of voice recognition software  Read the chart carefully and recognize, using context, where substitutions have occurred

## 2020-09-24 ENCOUNTER — HOSPITAL ENCOUNTER (OUTPATIENT)
Dept: RADIOLOGY | Facility: HOSPITAL | Age: 75
Discharge: HOME | End: 2020-09-24
Payer: COMMERCIAL

## 2020-09-24 ENCOUNTER — APPOINTMENT (OUTPATIENT)
Dept: LAB | Facility: HOSPITAL | Age: 75
End: 2020-09-24
Payer: COMMERCIAL

## 2020-09-24 ENCOUNTER — TELEPHONE (OUTPATIENT)
Dept: FAMILY MEDICINE CLINIC | Facility: CLINIC | Age: 75
End: 2020-09-24

## 2020-09-24 DIAGNOSIS — Z13.220 SCREENING, LIPID: ICD-10-CM

## 2020-09-24 DIAGNOSIS — M25.561 ACUTE PAIN OF RIGHT KNEE: ICD-10-CM

## 2020-09-24 DIAGNOSIS — E11.40 CONTROLLED TYPE 2 DIABETES WITH NEUROPATHY (HCC): ICD-10-CM

## 2020-09-24 LAB
ALBUMIN SERPL BCP-MCNC: 3.6 G/DL (ref 3.5–5)
ALP SERPL-CCNC: 74 U/L (ref 46–116)
ALT SERPL W P-5'-P-CCNC: 18 U/L (ref 12–78)
ANION GAP SERPL CALCULATED.3IONS-SCNC: 6 MMOL/L (ref 4–13)
AST SERPL W P-5'-P-CCNC: 13 U/L (ref 5–45)
BACTERIA UR QL AUTO: ABNORMAL /HPF
BASOPHILS # BLD AUTO: 0.04 THOUSANDS/ΜL (ref 0–0.1)
BASOPHILS NFR BLD AUTO: 1 % (ref 0–1)
BILIRUB SERPL-MCNC: 0.2 MG/DL (ref 0.2–1)
BILIRUB UR QL STRIP: NEGATIVE
BUN SERPL-MCNC: 20 MG/DL (ref 5–25)
CALCIUM SERPL-MCNC: 9.1 MG/DL (ref 8.3–10.1)
CHLORIDE SERPL-SCNC: 101 MMOL/L (ref 100–108)
CHOLEST SERPL-MCNC: 148 MG/DL (ref 50–200)
CLARITY UR: CLEAR
CO2 SERPL-SCNC: 30 MMOL/L (ref 21–32)
COLOR UR: YELLOW
CREAT SERPL-MCNC: 0.93 MG/DL (ref 0.6–1.3)
CREAT UR-MCNC: 74.9 MG/DL
CRP SERPL QL: 11.1 MG/L
EOSINOPHIL # BLD AUTO: 0.32 THOUSAND/ΜL (ref 0–0.61)
EOSINOPHIL NFR BLD AUTO: 6 % (ref 0–6)
ERYTHROCYTE [DISTWIDTH] IN BLOOD BY AUTOMATED COUNT: 14.3 % (ref 11.6–15.1)
EST. AVERAGE GLUCOSE BLD GHB EST-MCNC: 140 MG/DL
GFR SERPL CREATININE-BSD FRML MDRD: 71 ML/MIN/1.73SQ M
GLUCOSE P FAST SERPL-MCNC: 113 MG/DL (ref 65–99)
GLUCOSE UR STRIP-MCNC: NEGATIVE MG/DL
HBA1C MFR BLD: 6.5 %
HCT VFR BLD AUTO: 38.6 % (ref 34.8–46.1)
HDLC SERPL-MCNC: 82 MG/DL
HGB BLD-MCNC: 11.9 G/DL (ref 11.5–15.4)
HGB UR QL STRIP.AUTO: NEGATIVE
IMM GRANULOCYTES # BLD AUTO: 0.01 THOUSAND/UL (ref 0–0.2)
IMM GRANULOCYTES NFR BLD AUTO: 0 % (ref 0–2)
KETONES UR STRIP-MCNC: NEGATIVE MG/DL
LDLC SERPL CALC-MCNC: 61 MG/DL (ref 0–100)
LEUKOCYTE ESTERASE UR QL STRIP: ABNORMAL
LYMPHOCYTES # BLD AUTO: 2.22 THOUSANDS/ΜL (ref 0.6–4.47)
LYMPHOCYTES NFR BLD AUTO: 39 % (ref 14–44)
MCH RBC QN AUTO: 28.7 PG (ref 26.8–34.3)
MCHC RBC AUTO-ENTMCNC: 30.8 G/DL (ref 31.4–37.4)
MCV RBC AUTO: 93 FL (ref 82–98)
MICROALBUMIN UR-MCNC: 5.9 MG/L (ref 0–20)
MICROALBUMIN/CREAT 24H UR: 8 MG/G CREATININE (ref 0–30)
MONOCYTES # BLD AUTO: 0.36 THOUSAND/ΜL (ref 0.17–1.22)
MONOCYTES NFR BLD AUTO: 6 % (ref 4–12)
NEUTROPHILS # BLD AUTO: 2.77 THOUSANDS/ΜL (ref 1.85–7.62)
NEUTS SEG NFR BLD AUTO: 48 % (ref 43–75)
NITRITE UR QL STRIP: NEGATIVE
NON-SQ EPI CELLS URNS QL MICRO: ABNORMAL /HPF
NONHDLC SERPL-MCNC: 66 MG/DL
NRBC BLD AUTO-RTO: 0 /100 WBCS
PH UR STRIP.AUTO: 6.5 [PH]
PLATELET # BLD AUTO: 258 THOUSANDS/UL (ref 149–390)
PMV BLD AUTO: 9.7 FL (ref 8.9–12.7)
POTASSIUM SERPL-SCNC: 4.8 MMOL/L (ref 3.5–5.3)
PROT SERPL-MCNC: 8.5 G/DL (ref 6.4–8.2)
PROT UR STRIP-MCNC: NEGATIVE MG/DL
RBC # BLD AUTO: 4.15 MILLION/UL (ref 3.81–5.12)
RBC #/AREA URNS AUTO: ABNORMAL /HPF
SODIUM SERPL-SCNC: 137 MMOL/L (ref 136–145)
SP GR UR STRIP.AUTO: 1.01 (ref 1–1.03)
TRIGL SERPL-MCNC: 26 MG/DL
TSH SERPL DL<=0.05 MIU/L-ACNC: 2.4 UIU/ML (ref 0.36–3.74)
UROBILINOGEN UR QL STRIP.AUTO: 0.2 E.U./DL
WBC # BLD AUTO: 5.72 THOUSAND/UL (ref 4.31–10.16)
WBC #/AREA URNS AUTO: ABNORMAL /HPF

## 2020-09-24 PROCEDURE — 84443 ASSAY THYROID STIM HORMONE: CPT

## 2020-09-24 PROCEDURE — 86038 ANTINUCLEAR ANTIBODIES: CPT

## 2020-09-24 PROCEDURE — 80053 COMPREHEN METABOLIC PANEL: CPT

## 2020-09-24 PROCEDURE — 36415 COLL VENOUS BLD VENIPUNCTURE: CPT

## 2020-09-24 PROCEDURE — 82043 UR ALBUMIN QUANTITATIVE: CPT | Performed by: FAMILY MEDICINE

## 2020-09-24 PROCEDURE — 86618 LYME DISEASE ANTIBODY: CPT

## 2020-09-24 PROCEDURE — 86140 C-REACTIVE PROTEIN: CPT

## 2020-09-24 PROCEDURE — 73562 X-RAY EXAM OF KNEE 3: CPT

## 2020-09-24 PROCEDURE — 86430 RHEUMATOID FACTOR TEST QUAL: CPT

## 2020-09-24 PROCEDURE — 80061 LIPID PANEL: CPT

## 2020-09-24 PROCEDURE — 81001 URINALYSIS AUTO W/SCOPE: CPT | Performed by: FAMILY MEDICINE

## 2020-09-24 PROCEDURE — 85025 COMPLETE CBC W/AUTO DIFF WBC: CPT

## 2020-09-24 PROCEDURE — 82570 ASSAY OF URINE CREATININE: CPT | Performed by: FAMILY MEDICINE

## 2020-09-24 PROCEDURE — 83036 HEMOGLOBIN GLYCOSYLATED A1C: CPT

## 2020-09-25 ENCOUNTER — TELEPHONE (OUTPATIENT)
Dept: FAMILY MEDICINE CLINIC | Facility: CLINIC | Age: 75
End: 2020-09-25

## 2020-09-25 DIAGNOSIS — R77.8 ELEVATED TOTAL PROTEIN: ICD-10-CM

## 2020-09-25 DIAGNOSIS — F42.4 COMPULSIVE SKIN PICKING: Primary | ICD-10-CM

## 2020-09-25 LAB
B BURGDOR IGG+IGM SER-ACNC: <0.91 ISR (ref 0–0.9)
RHEUMATOID FACT SER QL LA: NEGATIVE
RYE IGE QN: NEGATIVE

## 2020-09-25 NOTE — TELEPHONE ENCOUNTER
I put the slip in for Dr Dheeraj Polanco, but it may be a wait  If she can find someone else that is ok too

## 2020-09-25 NOTE — TELEPHONE ENCOUNTER
----- Message from [address] [place] [address]  sent at 9/25/2020 12:51 PM EDT -----  Please let her know that her labs look good except for elevated total protein again and elevated marker of non specific inflammation  Lyme is pending but andrew and rf negative  I have added on SPEP and UPEP  Please make sure the lab can and it on and I will call her with results

## 2020-09-25 NOTE — TELEPHONE ENCOUNTER
She called back and I let her know the referral for Nica was put in  I gave her the phone number and will mail her the referral as well

## 2020-09-28 NOTE — TELEPHONE ENCOUNTER
Spoke with patient and she is aware  She will have the add lab done  They added on the urine but the serum she will have to get

## 2020-10-02 ENCOUNTER — TELEPHONE (OUTPATIENT)
Dept: FAMILY MEDICINE CLINIC | Facility: CLINIC | Age: 75
End: 2020-10-02

## 2020-10-08 DIAGNOSIS — E11.40 CONTROLLED TYPE 2 DIABETES WITH NEUROPATHY (HCC): ICD-10-CM

## 2020-10-08 DIAGNOSIS — I10 BENIGN HYPERTENSION: ICD-10-CM

## 2020-10-08 DIAGNOSIS — J45.20 MILD INTERMITTENT ASTHMA, UNSPECIFIED WHETHER COMPLICATED: ICD-10-CM

## 2020-10-08 DIAGNOSIS — F41.8 DEPRESSION WITH ANXIETY: ICD-10-CM

## 2020-10-08 RX ORDER — LORATADINE 10 MG
TABLET ORAL
Qty: 90 TABLET | Refills: 1 | Status: SHIPPED | OUTPATIENT
Start: 2020-10-08 | End: 2021-03-23

## 2020-10-08 RX ORDER — AMLODIPINE BESYLATE 5 MG/1
TABLET ORAL
Qty: 90 TABLET | Refills: 1 | Status: SHIPPED | OUTPATIENT
Start: 2020-10-08 | End: 2021-03-08

## 2020-10-08 RX ORDER — ENALAPRIL MALEATE 10 MG/1
TABLET ORAL
Qty: 180 TABLET | Refills: 1 | Status: SHIPPED | OUTPATIENT
Start: 2020-10-08 | End: 2021-03-24

## 2020-10-15 DIAGNOSIS — M25.561 ACUTE PAIN OF RIGHT KNEE: ICD-10-CM

## 2020-10-15 RX ORDER — MELOXICAM 15 MG/1
15 TABLET ORAL DAILY
Qty: 30 TABLET | Refills: 0 | Status: SHIPPED | OUTPATIENT
Start: 2020-10-15 | End: 2020-11-10

## 2020-10-16 ENCOUNTER — TELEPHONE (OUTPATIENT)
Dept: GASTROENTEROLOGY | Facility: CLINIC | Age: 75
End: 2020-10-16

## 2020-11-09 DIAGNOSIS — M25.561 ACUTE PAIN OF RIGHT KNEE: ICD-10-CM

## 2020-11-10 RX ORDER — MELOXICAM 15 MG/1
15 TABLET ORAL DAILY
Qty: 30 TABLET | Refills: 0 | Status: SHIPPED | OUTPATIENT
Start: 2020-11-10 | End: 2020-11-17 | Stop reason: SDUPTHER

## 2020-11-17 ENCOUNTER — TELEMEDICINE (OUTPATIENT)
Dept: FAMILY MEDICINE CLINIC | Facility: CLINIC | Age: 75
End: 2020-11-17
Payer: MEDICARE
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
## 2020-11-17 DIAGNOSIS — M25.561 ACUTE PAIN OF RIGHT KNEE: ICD-10-CM
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
## 2020-11-17 PROCEDURE — 99213 OFFICE O/P EST LOW 20 MIN: CPT | Performed by: NURSE PRACTITIONER

## 2020-11-17 RX ORDER — MELOXICAM 15 MG/1
15 TABLET ORAL DAILY
Qty: 30 TABLET | Refills: 0 | Status: SHIPPED | OUTPATIENT
Start: 2020-11-17 | End: 2021-01-19

## 2020-12-14 DIAGNOSIS — J45.20 MILD INTERMITTENT ASTHMA, UNSPECIFIED WHETHER COMPLICATED: ICD-10-CM

## 2020-12-19 DIAGNOSIS — J45.20 MILD INTERMITTENT ASTHMA, UNSPECIFIED WHETHER COMPLICATED: ICD-10-CM

## 2021-01-18 DIAGNOSIS — M25.561 ACUTE PAIN OF RIGHT KNEE: ICD-10-CM

## 2021-01-19 RX ORDER — MELOXICAM 15 MG/1
15 TABLET ORAL DAILY
Qty: 30 TABLET | Refills: 0 | Status: SHIPPED | OUTPATIENT
Start: 2021-01-19 | End: 2021-02-19 | Stop reason: SDUPTHER

## 2021-01-29 ENCOUNTER — TELEPHONE (OUTPATIENT)
Dept: FAMILY MEDICINE CLINIC | Facility: CLINIC | Age: 76
End: 2021-01-29

## 2021-01-29 DIAGNOSIS — J45.20 MILD INTERMITTENT ASTHMA, UNSPECIFIED WHETHER COMPLICATED: Primary | ICD-10-CM

## 2021-01-29 NOTE — TELEPHONE ENCOUNTER
Pharmacy called and stated Earlenemandeep Bryan in [facility] is not covered under patients insurance however Advair is preferred,

## 2021-02-03 ENCOUNTER — TELEPHONE (OUTPATIENT)
Dept: FAMILY MEDICINE CLINIC | Facility: CLINIC | Age: 76
End: 2021-02-03

## 2021-02-03 ENCOUNTER — TELEPHONE (OUTPATIENT)
Dept: DERMATOLOGY | Facility: CLINIC | Age: 76
End: 2021-02-03

## 2021-02-03 DIAGNOSIS — E11.40 CONTROLLED TYPE 2 DIABETES WITH NEUROPATHY (HCC): Primary | ICD-10-CM

## 2021-02-03 NOTE — TELEPHONE ENCOUNTER
Patient called saying she got a notification from [facility] saying her Lyrica 150MG is no longer covered  She said she was told the Lyrica 165MG extended release or the Lyrica 330MG extended release  She wants to know what you want her to do or if you can appeal this so she can get the 150MG  There is a document scanned into her chart about this

## 2021-02-05 DIAGNOSIS — E11.40 CONTROLLED TYPE 2 DIABETES WITH NEUROPATHY (HCC): Primary | ICD-10-CM

## 2021-02-05 RX ORDER — PREGABALIN 165 MG/1
165 TABLET, FILM COATED, EXTENDED RELEASE ORAL DAILY
Qty: 30 TABLET | Refills: 0 | Status: SHIPPED | OUTPATIENT
Start: 2021-02-05 | End: 2021-02-07 | Stop reason: SDUPTHER

## 2021-02-05 NOTE — TELEPHONE ENCOUNTER
Please let her know I called in 165 mg daily and let me know how she is doing  Arrange f/u appt in about 3 weeks   TY

## 2021-02-07 DIAGNOSIS — E11.40 CONTROLLED TYPE 2 DIABETES WITH NEUROPATHY (HCC): ICD-10-CM

## 2021-02-07 RX ORDER — PREGABALIN 165 MG/1
165 TABLET, FILM COATED, EXTENDED RELEASE ORAL DAILY
Qty: 30 TABLET | Refills: 0 | Status: SHIPPED | OUTPATIENT
Start: 2021-02-07 | End: 2021-02-19 | Stop reason: DRUGHIGH

## 2021-02-07 RX ORDER — PREGABALIN 165 MG/1
165 TABLET, FILM COATED, EXTENDED RELEASE ORAL DAILY
Qty: 30 TABLET | Refills: 0 | Status: SHIPPED | OUTPATIENT
Start: 2021-02-07 | End: 2021-02-07 | Stop reason: SDUPTHER

## 2021-02-09 ENCOUNTER — TELEPHONE (OUTPATIENT)
Dept: FAMILY MEDICINE CLINIC | Facility: CLINIC | Age: 76
End: 2021-02-09

## 2021-02-09 NOTE — TELEPHONE ENCOUNTER
Patient aware and will call back in 3 days to update Roxanne  Also patient called back to let us know the pharmacist stated the medication needs a prior authorization

## 2021-02-09 NOTE — TELEPHONE ENCOUNTER
spoke with patient and he has some concerns  That her being on the dose she was on and now going down to 165 mg extended release daily  She said she still has the neuropathy in her hands and feet and if she drops down the dose so much she thinks it will get worse  She wants to know if she can do the 330 extended release instead?

## 2021-02-10 NOTE — TELEPHONE ENCOUNTER
Prior Arturo Rosario was [facility]   Just waiting for a repsonse

## 2021-02-19 ENCOUNTER — TELEMEDICINE (OUTPATIENT)
Dept: FAMILY MEDICINE CLINIC | Facility: CLINIC | Age: 76
End: 2021-02-19
Payer: MEDICARE

## 2021-02-19 DIAGNOSIS — E11.40 CONTROLLED TYPE 2 DIABETES WITH NEUROPATHY (HCC): ICD-10-CM

## 2021-02-19 DIAGNOSIS — M25.561 ACUTE PAIN OF RIGHT KNEE: ICD-10-CM

## 2021-02-19 PROCEDURE — 99213 OFFICE O/P EST LOW 20 MIN: CPT | Performed by: NURSE PRACTITIONER

## 2021-02-19 RX ORDER — MELOXICAM 15 MG/1
15 TABLET ORAL DAILY
Qty: 30 TABLET | Refills: 0 | Status: SHIPPED | OUTPATIENT
Start: 2021-02-19 | End: 2021-03-03

## 2021-02-19 RX ORDER — PREGABALIN 165 MG/1
165 TABLET, FILM COATED, EXTENDED RELEASE ORAL 2 TIMES DAILY
Qty: 60 TABLET | Refills: 0 | Status: SHIPPED | OUTPATIENT
Start: 2021-02-19 | End: 2021-04-27 | Stop reason: SDUPTHER

## 2021-02-19 NOTE — PROGRESS NOTES
Virtual Regular Visit      Assessment/Plan:    Problem List Items Addressed This Visit        Endocrine    Controlled type 2 diabetes with neuropathy ([facility] [address] )       Lab Results   Component Value Date    HGBA1C 6 5 (H) 09/24/2020     Patient reports that she was taking 165 mg of Lyrica daily  Prior to that she was taking 150 mg twice a day  Feels like she has increase tingling burning sensation and numbness  Will increase dosage to  twice a day  Advised to maintain safety  Monitor for effectiveness  Relevant Medications    Pregabalin  MG TB24      Other Visit Diagnoses     Acute pain of right knee        Relevant Medications    meloxicam (MOBIC) 15 mg tablet          BMI Counseling: There is no height or weight on file to calculate BMI  The BMI is above normal  Nutrition recommendations include decreasing portion sizes, encouraging healthy choices of fruits and vegetables, decreasing fast food intake, consuming healthier snacks, limiting drinks that contain sugar, moderation in carbohydrate intake, increasing intake of lean protein, reducing intake of saturated and trans fat and reducing intake of cholesterol  Exercise recommendations include strength training exercises  No pharmacotherapy was ordered  Reason for visit is No chief complaint on file  Encounter provider ALEKSANDER Vega    Provider located at [facility] [id number] [facility]  [address] [place] [id number] [phone]      Recent Visits  No visits were found meeting these conditions  Showing recent visits within past 7 days and meeting all other requirements     Today's Visits  Date Type Provider Dept   02/19/21 Telemedicine Jovana Calvert [address] today's visits and meeting all other requirements     Future Appointments  No visits were found meeting these conditions     Showing future appointments within next 150 days and meeting all other requirements        The patient was identified by name and date of birth  Harjinder Raymond was informed that this is a telemedicine visit and that the visit is being conducted through [employer] and patient was informed that this is not a secure, HIPAA-compliant platform  She agrees to proceed     My office door was closed  No one else was in the room  She acknowledged consent and understanding of privacy and security of the video platform  The patient has agreed to participate and understands they can discontinue the visit at any time  Patient is aware this is a billable service  Subjective  Harjinder Raymond is a 78 y o  female    Patient is being seen for virtual visit with complaints of burning and some numbness in her legs  Reports that she has been taking 165 mg of worker daily  Prior to this she has been taking 150 twice a day  Feels like it is not enough medication         Past Medical History:   Diagnosis Date    Asthma     Benign hypertension 2018    [facility] [address]     Diabetes mellitus ([facility] [address]     Glaucoma     Hypertension     Kidney stone     Neuropathy     Sleep apnea     no machine    Tubular adenoma of colon 10/2019       Past Surgical History:   Procedure Laterality Date     SECTION      COLONOSCOPY      HYSTERECTOMY  1985    TONSILLECTOMY         Current Outpatient Medications   Medication Sig Dispense Refill    amLODIPine (NORVASC) 5 mg tablet TAKE 1 TABLET BY MOUTH EVERY DAY 90 tablet 1    carbamide peroxide (DEBROX) 6 5 % otic solution Administer 5 drops into both ears 2 (two) times a day 15 mL 0    [facility] Aspirin Adult Low Dose 81 MG chewable tablet CHEW 1 TABLET BY MOUTH DAILY 90 tablet 1    cyclobenzaprine (FLEXERIL) 5 mg tablet TAKE 1 TABLET BY MOUTH THREE TIMES A DAY AS NEEDED FOR MUSCLE SPASMS 30 tablet 0    diclofenac sodium (VOLTAREN) 1 % Apply to R knee 4 times a day as needed 100 g 0    enalapril (VASOTEC) 10 mg tablet TAKE 1 TABLET BY MOUTH TWICE A  tablet 1    fluticasone-salmeterol (Advair Diskus) 250-50 mcg/dose inhaler Inhale 1 puff 2 (two) times a day Rinse mouth after use  1 Inhaler 3    glucose blood (ONETOUCH VERIO) test strip E11 9 / testing daily      Janumet -1000 MG TB24 TAKE 1 TABLET BY MOUTH EVERY DAY 90 tablet 1    LUMIGAN 0 01 % ophthalmic drops       meloxicam (MOBIC) 15 mg tablet Take 1 tablet (15 mg total) by mouth daily With food 30 tablet 0    mometasone (ELOCON) 0 1 % cream Apply topically daily 45 g 0    ONETOUCH DELICA LANCETS 58X MISC E11 9/ testing daily      Pregabalin  MG TB24 Take 165 mg by mouth 2 (two) times a day 60 tablet 0    ProAir  (90 Base) MCG/ACT inhaler INHALE 2 PUFFS BY MOUTH EVERY 6 HOURS AS NEEDED FOR WHEEZE 8 5 Inhaler 3    sertraline (ZOLOFT) 50 mg tablet TAKE 1 TABLET BY MOUTH EVERY DAY 90 tablet 1    travoprost (TRAVATAN Z) 0 004 % ophthalmic solution Apply 1 drop to eye daily at bedtime       No current facility-administered medications for this visit  Allergies   Allergen Reactions    Ciprofloxacin Itching    Levaquin [Levofloxacin] Swelling    Penicillins GI Intolerance       Review of Systems   Constitutional: Negative  HENT: Negative  Eyes: Negative  Respiratory: Negative  Cardiovascular: Negative  Gastrointestinal: Negative  Endocrine: Negative  Genitourinary: Negative  Musculoskeletal: Positive for arthralgias  Negative for gait problem and joint swelling  Skin: Negative  Allergic/Immunologic: Negative  Neurological: Positive for numbness  Psychiatric/Behavioral: Negative  Video Exam    There were no vitals filed for this visit  Physical Exam  Constitutional:       Appearance: She is well-developed  HENT:      Head: Normocephalic and atraumatic  Neck:      Musculoskeletal: Normal range of motion and neck supple     Pulmonary:      Effort: Pulmonary effort is normal  Musculoskeletal: Normal range of motion  Skin:     General: Skin is dry  Neurological:      Mental Status: She is alert and oriented to person, place, and time  Psychiatric:         Behavior: Behavior normal          Thought Content: Thought content normal          Judgment: Judgment normal           I spent 15 minutes directly with the patient during this visit      VIRTUAL VISIT DISCLAIMER    Cynthia Silva acknowledges that she has consented to an online visit or consultation  She understands that the online visit is based solely on information provided by her, and that, in the absence of a face-to-face physical evaluation by the physician, the diagnosis she receives is both limited and provisional in terms of accuracy and completeness  This is not intended to replace a full medical face-to-face evaluation by the physician  Cynthia Silva understands and accepts these terms

## 2021-02-19 NOTE — ASSESSMENT & PLAN NOTE
Lab Results   Component Value Date    HGBA1C 6 5 (H) 09/24/2020     Patient reports that she was taking 165 mg of Lyrica daily  Prior to that she was taking 150 mg twice a day  Feels like she has increase tingling burning sensation and numbness  Will increase dosage to  twice a day  Advised to maintain safety  Monitor for effectiveness

## 2021-02-19 NOTE — PATIENT INSTRUCTIONS
Increase Lyrica to 165 mg twice a day  Maintain safety  Continue with movement  Use heat at site to help arthritis     May continue Mobic    Maintain good glucose control it will help with worsening of neuropathy

## 2021-03-02 DIAGNOSIS — M25.561 ACUTE PAIN OF RIGHT KNEE: ICD-10-CM

## 2021-03-03 RX ORDER — MELOXICAM 15 MG/1
15 TABLET ORAL DAILY
Qty: 30 TABLET | Refills: 0 | Status: SHIPPED | OUTPATIENT
Start: 2021-03-03 | End: 2021-04-22

## 2021-03-08 DIAGNOSIS — F41.8 DEPRESSION WITH ANXIETY: ICD-10-CM

## 2021-03-08 DIAGNOSIS — I10 BENIGN HYPERTENSION: ICD-10-CM

## 2021-03-08 DIAGNOSIS — E11.40 CONTROLLED TYPE 2 DIABETES WITH NEUROPATHY (HCC): ICD-10-CM

## 2021-03-08 RX ORDER — SITAGLIPTIN AND METFORMIN HYDROCHLORIDE 100; 1000 MG/1; MG/1
TABLET, FILM COATED, EXTENDED RELEASE ORAL
Qty: 90 TABLET | Refills: 1 | Status: SHIPPED | OUTPATIENT
Start: 2021-03-08 | End: 2021-10-06

## 2021-03-08 RX ORDER — AMLODIPINE BESYLATE 5 MG/1
TABLET ORAL
Qty: 90 TABLET | Refills: 1 | Status: SHIPPED | OUTPATIENT
Start: 2021-03-08 | End: 2021-10-07

## 2021-03-10 ENCOUNTER — APPOINTMENT (OUTPATIENT)
Dept: LAB | Facility: HOSPITAL | Age: 76
End: 2021-03-10
Payer: MEDICARE

## 2021-03-10 DIAGNOSIS — R77.8 ELEVATED TOTAL PROTEIN: ICD-10-CM

## 2021-03-10 PROCEDURE — 84165 PROTEIN E-PHORESIS SERUM: CPT

## 2021-03-10 PROCEDURE — 84166 PROTEIN E-PHORESIS/URINE/CSF: CPT | Performed by: PATHOLOGY

## 2021-03-10 PROCEDURE — 84166 PROTEIN E-PHORESIS/URINE/CSF: CPT | Performed by: FAMILY MEDICINE

## 2021-03-10 PROCEDURE — 84165 PROTEIN E-PHORESIS SERUM: CPT | Performed by: PATHOLOGY

## 2021-03-10 PROCEDURE — 36415 COLL VENOUS BLD VENIPUNCTURE: CPT

## 2021-03-11 LAB
ALBUMIN SERPL ELPH-MCNC: 4.12 G/DL (ref 3.5–5)
ALBUMIN SERPL ELPH-MCNC: 52.8 % (ref 52–65)
ALBUMIN UR ELPH-MCNC: 100 %
ALPHA1 GLOB MFR UR ELPH: 0 %
ALPHA1 GLOB SERPL ELPH-MCNC: 0.32 G/DL (ref 0.1–0.4)
ALPHA1 GLOB SERPL ELPH-MCNC: 4.1 % (ref 2.5–5)
ALPHA2 GLOB MFR UR ELPH: 0 %
ALPHA2 GLOB SERPL ELPH-MCNC: 1.01 G/DL (ref 0.4–1.2)
ALPHA2 GLOB SERPL ELPH-MCNC: 13 % (ref 7–13)
B-GLOBULIN MFR UR ELPH: 0 %
BETA GLOB ABNORMAL SERPL ELPH-MCNC: 0.42 G/DL (ref 0.4–0.8)
BETA1 GLOB SERPL ELPH-MCNC: 5.4 % (ref 5–13)
BETA2 GLOB SERPL ELPH-MCNC: 6.6 % (ref 2–8)
BETA2+GAMMA GLOB SERPL ELPH-MCNC: 0.51 G/DL (ref 0.2–0.5)
GAMMA GLOB ABNORMAL SERPL ELPH-MCNC: 1.41 G/DL (ref 0.5–1.6)
GAMMA GLOB MFR UR ELPH: 0 %
GAMMA GLOB SERPL ELPH-MCNC: 18.1 % (ref 12–22)
IGG/ALB SER: 1.12 {RATIO} (ref 1.1–1.8)
PROT PATTERN SERPL ELPH-IMP: ABNORMAL
PROT PATTERN UR ELPH-IMP: NORMAL
PROT SERPL-MCNC: 7.8 G/DL (ref 6.4–8.2)
PROT UR-MCNC: 15 MG/DL

## 2021-03-22 DIAGNOSIS — E11.40 CONTROLLED TYPE 2 DIABETES WITH NEUROPATHY (HCC): ICD-10-CM

## 2021-03-22 DIAGNOSIS — I10 BENIGN HYPERTENSION: ICD-10-CM

## 2021-03-23 RX ORDER — ASPIRIN 81 MG
TABLET,CHEWABLE ORAL
Qty: 90 TABLET | Refills: 1 | Status: SHIPPED | OUTPATIENT
Start: 2021-03-23

## 2021-03-23 NOTE — TELEPHONE ENCOUNTER
Requested medication(s) are due for refill today: YES  Patient has already received a courtesy refill: No  Other reason request has been forwarded to provider:Requirements not met

## 2021-03-24 RX ORDER — ENALAPRIL MALEATE 10 MG/1
TABLET ORAL
Qty: 180 TABLET | Refills: 1 | Status: SHIPPED | OUTPATIENT
Start: 2021-03-24 | End: 2021-09-19

## 2021-03-25 ENCOUNTER — TELEPHONE (OUTPATIENT)
Dept: FAMILY MEDICINE CLINIC | Facility: CLINIC | Age: 76
End: 2021-03-25

## 2021-03-25 NOTE — TELEPHONE ENCOUNTER
Patient came in today  She thought her appointment was at 1:15 and not 10:15  She misread the reminder she had in her phone  I put her in for 4/13/2021  She is just requesting her lab results for the time being, that is what she is most concerned about

## 2021-04-09 ENCOUNTER — TELEPHONE (OUTPATIENT)
Dept: FAMILY MEDICINE CLINIC | Facility: CLINIC | Age: 76
End: 2021-04-09

## 2021-04-09 DIAGNOSIS — M79.604 RIGHT LEG PAIN: Primary | ICD-10-CM

## 2021-04-09 NOTE — TELEPHONE ENCOUNTER
Pt called requesting an xray for her Right knee and right leg  She is [facility]le's patient but with sparkle being out is requesting another provdier to put in request  She has changed her appt with Lucy Joiner on Tuesday to Thursday with you, Jovana   She would like to do the xray before coming to see you Thursday

## 2021-04-09 NOTE — TELEPHONE ENCOUNTER
She had an x-ray done of the right knee in September of last year that showed degenerative joint disease  Did she have any new injury that would require sending for another x-ray    Please let me know

## 2021-04-09 NOTE — TELEPHONE ENCOUNTER
She states there has not been a new injury but there much more pain than before since easter sunday  She states it hurts when walking and putting pressure on right leg

## 2021-04-12 ENCOUNTER — HOSPITAL ENCOUNTER (OUTPATIENT)
Dept: RADIOLOGY | Facility: HOSPITAL | Age: 76
Discharge: HOME | End: 2021-04-12
Payer: MEDICARE

## 2021-04-12 DIAGNOSIS — M79.604 RIGHT LEG PAIN: ICD-10-CM

## 2021-04-12 PROCEDURE — 73552 X-RAY EXAM OF FEMUR 2/>: CPT

## 2021-04-12 PROCEDURE — 73502 X-RAY EXAM HIP UNI 2-3 VIEWS: CPT

## 2021-04-12 PROCEDURE — 73630 X-RAY EXAM OF FOOT: CPT

## 2021-04-15 ENCOUNTER — OFFICE VISIT (OUTPATIENT)
Dept: FAMILY MEDICINE CLINIC | Facility: CLINIC | Age: 76
End: 2021-04-15
Payer: MEDICARE

## 2021-04-15 VITALS
WEIGHT: 206 LBS | DIASTOLIC BLOOD PRESSURE: 82 MMHG | SYSTOLIC BLOOD PRESSURE: 134 MMHG | HEART RATE: 68 BPM | HEIGHT: 62 IN | OXYGEN SATURATION: 98 % | BODY MASS INDEX: 37.91 KG/M2

## 2021-04-15 DIAGNOSIS — E66.01 OBESITY, MORBID (HCC): ICD-10-CM

## 2021-04-15 DIAGNOSIS — M25.561 ACUTE PAIN OF RIGHT KNEE: ICD-10-CM

## 2021-04-15 DIAGNOSIS — Z12.31 ENCOUNTER FOR SCREENING MAMMOGRAM FOR MALIGNANT NEOPLASM OF BREAST: ICD-10-CM

## 2021-04-15 DIAGNOSIS — Z00.00 MEDICARE ANNUAL WELLNESS VISIT, SUBSEQUENT: ICD-10-CM

## 2021-04-15 DIAGNOSIS — J42 CHRONIC BRONCHITIS, UNSPECIFIED CHRONIC BRONCHITIS TYPE (HCC): ICD-10-CM

## 2021-04-15 DIAGNOSIS — E11.40 CONTROLLED TYPE 2 DIABETES WITH NEUROPATHY (HCC): ICD-10-CM

## 2021-04-15 DIAGNOSIS — N30.01 ACUTE CYSTITIS WITH HEMATURIA: Primary | ICD-10-CM

## 2021-04-15 DIAGNOSIS — J45.20 MILD INTERMITTENT ASTHMA, UNSPECIFIED WHETHER COMPLICATED: ICD-10-CM

## 2021-04-15 LAB
SL AMB  POCT GLUCOSE, UA: ABNORMAL
SL AMB LEUKOCYTE ESTERASE,UA: 70
SL AMB POCT BILIRUBIN,UA: ABNORMAL
SL AMB POCT BLOOD,UA: ABNORMAL
SL AMB POCT CLARITY,UA: ABNORMAL
SL AMB POCT COLOR,UA: ABNORMAL
SL AMB POCT HEMOGLOBIN AIC: 5.9 (ref ?–6.5)
SL AMB POCT KETONES,UA: ABNORMAL
SL AMB POCT NITRITE,UA: ABNORMAL
SL AMB POCT PH,UA: 5
SL AMB POCT SPECIFIC GRAVITY,UA: 1.02
SL AMB POCT URINE PROTEIN: ABNORMAL
SL AMB POCT UROBILINOGEN: 0.2

## 2021-04-15 PROCEDURE — G0439 PPPS, SUBSEQ VISIT: HCPCS | Performed by: NURSE PRACTITIONER

## 2021-04-15 PROCEDURE — 87086 URINE CULTURE/COLONY COUNT: CPT | Performed by: NURSE PRACTITIONER

## 2021-04-15 PROCEDURE — 83036 HEMOGLOBIN GLYCOSYLATED A1C: CPT | Performed by: NURSE PRACTITIONER

## 2021-04-15 PROCEDURE — 1123F ACP DISCUSS/DSCN MKR DOCD: CPT | Performed by: NURSE PRACTITIONER

## 2021-04-15 PROCEDURE — 99214 OFFICE O/P EST MOD 30 MIN: CPT | Performed by: NURSE PRACTITIONER

## 2021-04-15 PROCEDURE — 81002 URINALYSIS NONAUTO W/O SCOPE: CPT | Performed by: NURSE PRACTITIONER

## 2021-04-15 RX ORDER — NITROFURANTOIN 25; 75 MG/1; MG/1
100 CAPSULE ORAL 2 TIMES DAILY
Qty: 10 CAPSULE | Refills: 0 | Status: SHIPPED | OUTPATIENT
Start: 2021-04-15 | End: 2021-04-20

## 2021-04-15 RX ORDER — CYCLOBENZAPRINE HCL 5 MG
5 TABLET ORAL 3 TIMES DAILY PRN
Qty: 30 TABLET | Refills: 0 | Status: SHIPPED | OUTPATIENT
Start: 2021-04-15 | End: 2022-04-19 | Stop reason: HOSPADM

## 2021-04-15 NOTE — ASSESSMENT & PLAN NOTE
Lab Results   Component Value Date    HGBA1C 5 9 04/15/2021   Reviewed lab  Continue medication  Maintain low carbohydrate diet

## 2021-04-15 NOTE — PATIENT INSTRUCTIONS
Macrobid twice a day for 5 days increase fluid hydration drink more during the day than at night continue diabetic medication continue blood pressure medication   Take Advair inhaler every day   follow-up with pulmonology for chronic bronchitis and asthma   I will call with x-ray results    Will evaluate the need for physical therapy orthopedic referral

## 2021-04-15 NOTE — ASSESSMENT & PLAN NOTE
Continues to have SOB  Discussed with patient that she needs to use advair daily, not as needed   Referral made to pulmonologist

## 2021-04-15 NOTE — PROGRESS NOTES
Assessment and Plan:     Problem List Items Addressed This Visit        Endocrine    Controlled type 2 diabetes with neuropathy [facility])       Lab Results   Component Value Date    HGBA1C 5 9 04/15/2021   Reviewed lab  Continue medication  Maintain low carbohydrate diet           Relevant Orders    POCT hemoglobin A1c (Completed)       Respiratory    Mild intermittent asthma     Continues to have SOB  Discussed with patient that she needs to use advair daily, not as needed  Referral made to pulmonologist           Relevant Orders    Ambulatory referral to Pulmonology    Chronic bronchitis [facility])    Relevant Orders    Ambulatory referral to Pulmonology       Other    Obesity, morbid ([address] )    Medicare annual wellness visit, subsequent      Wellness visit completed  Order placed for mammogram and DEXA scan  Other Visit Diagnoses     Acute cystitis with hematuria    -  Primary    Relevant Medications    nitrofurantoin (MACROBID) 100 mg capsule    Other Relevant Orders    Urine culture (Completed)    POCT urine dip (Completed)    Encounter for screening mammogram for malignant neoplasm of breast        Relevant Orders    Mammo screening bilateral w cad    Acute pain of right knee        Use Voltaren gel on the right knee for pain  May use Skelaxin up to 3 times a day as needed for pain  If pain is not resolving in about a week or 2, return  Relevant Medications    cyclobenzaprine (FLEXERIL) 5 mg tablet           Preventive health issues were discussed with patient, and age appropriate screening tests were ordered as noted in patient's After Visit Summary  Personalized health advice and appropriate referrals for health education or preventive services given if needed, as noted in patient's After Visit Summary       History of Present Illness:     Patient presents for Medicare Annual Wellness visit    Patient Care Team:  Todd Espinoza as PCP - General (Family Medicine)  MD Maria D Kuo MD Andrea     Problem List:     Patient Active Problem List   Diagnosis    Benign hypertension    Depression with anxiety    Mild intermittent asthma    Controlled type 2 diabetes with neuropathy ([facility] )    Vaginal cyst    Candidiasis of genitalia in female    Encounter for gynecological examination without abnormal finding    Atypical chest pain    Dermatitis    BMI 37 0-37 9, adult    Chronic bronchitis ([facility] )    Obesity, morbid ([facility] )    Medicare annual wellness visit, subsequent      Past Medical and Surgical History:     Past Medical History:   Diagnosis Date    Asthma     Benign hypertension 2018    Cardiac arrest ([place] [address] )     Diabetes mellitus ([facility] )     Glaucoma     Hypertension     Kidney stone     Neuropathy     Sleep apnea     no machine    Tubular adenoma of colon 10/2019     Past Surgical History:   Procedure Laterality Date     SECTION      COLONOSCOPY      HYSTERECTOMY  1985    TONSILLECTOMY        Family History:     Family History   Problem Relation Age of Onset    Diabetes Mother     Hypertension Father     Prostate cancer Father     Diabetes Sister     Hypertension Sister     Breast cancer Sister 62    Diabetes Brother     Hypertension Brother     Asthma Family     No Known Problems Daughter     No Known Problems Sister     No Known Problems Daughter     No Known Problems Daughter     No Known Problems Maternal Aunt     Breast cancer Maternal Aunt 58    No Known Problems Maternal Aunt     No Known Problems Maternal Aunt     No Known Problems Paternal Aunt     No Known Problems Paternal Aunt     No Known Problems Paternal Aunt     Colon cancer Neg Hx     Ovarian cancer Neg Hx     Uterine cancer Neg Hx     Cervical cancer Neg Hx       Social History:        Social History     Socioeconomic History    Marital status: /Civil Union     Spouse name: None    Number of children: None    Years of education: None    Highest education level: None   Occupational History    Occupation: retired    Social Needs    Financial resource strain: None    Food insecurity     Worry: None     Inability: None    Transportation needs     Medical: None     Non-medical: None   Tobacco Use    Smoking status: Never Smoker    Smokeless tobacco: Never Used   Substance and Sexual Activity    Alcohol use: No    Drug use: No    Sexual activity: Yes     Birth control/protection: Surgical   Lifestyle    Physical activity     Days per week: None     Minutes per session: None    Stress: None   Relationships    Social connections     Talks on phone: None     Gets together: None     Attends Shinto service: None     Active member of club or organization: None     Attends meetings of clubs or organizations: None     Relationship status: None    Intimate partner violence     Fear of current or ex partner: None     Emotionally abused: None     Physically abused: None     Forced sexual activity: None   Other Topics Concern    None   Social History Narrative    None      Medications and Allergies:     Current Outpatient Medications   Medication Sig Dispense Refill    amLODIPine (NORVASC) 5 mg tablet TAKE 1 TABLET BY MOUTH EVERY DAY 90 tablet 1    Aspirin Low Dose 81 MG chewable tablet CHEW 1 TABLET BY MOUTH DAILY 90 tablet 1    cyclobenzaprine (FLEXERIL) 5 mg tablet Take 1 tablet (5 mg total) by mouth 3 (three) times a day as needed for muscle spasms 30 tablet 0    diclofenac sodium (VOLTAREN) 1 % Apply to R knee 4 times a day as needed 100 g 0    enalapril (VASOTEC) 10 mg tablet TAKE 1 TABLET BY MOUTH TWICE A  tablet 1    fluticasone-salmeterol (Advair Diskus) 250-50 mcg/dose inhaler Inhale 1 puff 2 (two) times a day Rinse mouth after use   1 Inhaler 3    glucose blood (ONETOUCH VERIO) test strip E11 9 / testing daily      Janumet -1000 MG TB24 TAKE 1 TABLET BY MOUTH EVERY DAY 90 tablet 1    LUMIGAN 0 01 % ophthalmic drops       meloxicam (MOBIC) 15 mg tablet TAKE 1 TABLET (15 MG TOTAL) BY MOUTH DAILY WITH FOOD 30 tablet 0    mometasone (ELOCON) 0 1 % cream Apply topically daily 45 g 0    ONETOUCH DELICA LANCETS 62S MISC E11 9/ testing daily      Pregabalin  MG TB24 Take 165 mg by mouth 2 (two) times a day 60 tablet 0    ProAir  (90 Base) MCG/ACT inhaler INHALE 2 PUFFS BY MOUTH EVERY 6 HOURS AS NEEDED FOR WHEEZE 8 5 Inhaler 3    sertraline (ZOLOFT) 50 mg tablet TAKE 1 TABLET BY MOUTH EVERY DAY 90 tablet 1    travoprost (TRAVATAN Z) 0 004 % ophthalmic solution Apply 1 drop to eye daily at bedtime      carbamide peroxide (DEBROX) 6 5 % otic solution Administer 5 drops into both ears 2 (two) times a day 15 mL 0    nitrofurantoin (MACROBID) 100 mg capsule Take 1 capsule (100 mg total) by mouth 2 (two) times a day for 5 days 10 capsule 0     No current facility-administered medications for this visit  Allergies   Allergen Reactions    Ciprofloxacin Itching    Levaquin [Levofloxacin] Swelling    Penicillins GI Intolerance      Immunizations:     Immunization History   Administered Date(s) Administered    SARS-CoV-2 / COVID-19 mRNA IM ([employer]) 02/14/2021, 03/15/2021      Health Maintenance:         Topic Date Due    MAMMOGRAM  10/23/2020    Colonoscopy Surveillance  10/24/2020    Colorectal Cancer Screening  10/24/2029    Hepatitis C Screening  Completed         Topic Date Due    DTaP,Tdap,and Td Vaccines (1 - Tdap) Never done    Pneumococcal Vaccine: 65+ Years (1 of 1 - PPSV23) Never done    Influenza Vaccine (1) Never done      Medicare Health Risk Assessment:     /82   Pulse 68   Ht 5' 2" (1 575 m)   Wt 93 4 kg (206 lb)   SpO2 98%   BMI 37 68 kg/m²          Health Risk Assessment:   Patient rates overall health as good  Patient feels that their physical health rating is slightly worse  Patient is satisfied with their life  Eyesight was rated as same  Hearing was rated as slightly worse   Patient feels that their emotional and mental health rating is same  Patients states they are sometimes angry  Patient states they are sometimes unusually tired/fatigued  Pain experienced in the last 7 days has been a lot  Patient's pain rating has been 10/10  Patient states that she has experienced weight loss or gain in last 6 months  Gained     Fall Risk Screening: In the past year, patient has experienced: history of falling in past year    Number of falls: 1  Injured during fall?: No    Feels unsteady when standing or walking?: Yes    Worried about falling?: Yes      Urinary Incontinence Screening:   Patient has leaked urine accidently in the last six months  Home Safety:  Patient has trouble with stairs inside or outside of their home  Patient has working smoke alarms and has working carbon monoxide detector  Home safety hazards include: loose rugs on the floor  Nutrition:   Current diet is Regular  Medications:   Patient is currently taking over-the-counter supplements  OTC medications include: see medication list  Patient is able to manage medications  Activities of Daily Living (ADLs)/Instrumental Activities of Daily Living (IADLs):   Walk and transfer into and out of bed and chair?: Yes  Dress and groom yourself?: Yes    Bathe or shower yourself?: Yes    Feed yourself? Yes  Do your laundry/housekeeping?: Yes  Manage your money, pay your bills and track your expenses?: Yes  Make your own meals?: Yes    Do your own shopping?: Yes    Previous Hospitalizations:   Any hospitalizations or ED visits within the last 12 months?: No      Advance Care Planning:   Living will: Yes    Durable POA for healthcare:  Yes    Advanced directive: Yes      Comments: POA -  and dtr  Short term intubation    Cognitive Screening:   Provider or family/friend/caregiver concerned regarding cognition?: Yes    PREVENTIVE SCREENINGS      Cardiovascular Screening:    General: Screening Current      Diabetes Screening: General: Screening Not Indicated and History Diabetes      Colorectal Cancer Screening:     General: Screening Current      Breast Cancer Screening:     General: Screening Current    Due for: Mammogram        Cervical Cancer Screening:    General: Screening Not Indicated      Osteoporosis Screening:      Due for: Bone Density Ultrasound      Abdominal Aortic Aneurysm (AAA) Screening:        General: Screening Not Indicated      Lung Cancer Screening:     General: Screening Not Indicated      Hepatitis C Screening:    General: Screening Current    Screening, Brief Intervention, and Referral to Treatment (SBIRT)    Screening  Typical number of drinks in a day: 0  Typical number of drinks in a week: 0  Interpretation: Low risk drinking behavior  Single Item Drug Screening:  How often have you used an illegal drug (including marijuana) or a prescription medication for non-medical reasons in the past year? never    Single Item Drug Screen Score: 0  Interpretation: Negative screen for possible drug use disorder    Other Counseling Topics:   Car/seat belt/driving safety, skin self-exam, sunscreen and calcium and vitamin D intake and regular weightbearing exercise         ALEKSANDER Reaves

## 2021-04-16 ENCOUNTER — TELEPHONE (OUTPATIENT)
Dept: FAMILY MEDICINE CLINIC | Facility: CLINIC | Age: 76
End: 2021-04-16

## 2021-04-16 DIAGNOSIS — M17.11 OSTEOARTHRITIS OF RIGHT KNEE, UNSPECIFIED OSTEOARTHRITIS TYPE: Primary | ICD-10-CM

## 2021-04-16 LAB — BACTERIA UR CULT: NORMAL

## 2021-04-16 NOTE — TELEPHONE ENCOUNTER
Lynsey called wanting to know if we received the form for the patients psoriasis wand  I spoke with Genevieve Everett and she states Irene Olivarez discussed this with the patient and the patient had no idea about this so Jovana did not sign the forms  I let them know and they stated they will send another form, but it needs to be signed stating that the patient is refusing it

## 2021-04-16 NOTE — TELEPHONE ENCOUNTER
Pt called would like you to give her a call if possible to discuss the xrays, she said she will be home all day no rush but is looking forward to your call

## 2021-04-17 PROBLEM — N30.01 ACUTE CYSTITIS WITH HEMATURIA: Status: ACTIVE | Noted: 2021-04-17

## 2021-04-17 PROBLEM — Z00.00 MEDICARE ANNUAL WELLNESS VISIT, SUBSEQUENT: Status: ACTIVE | Noted: 2021-04-17

## 2021-04-17 PROBLEM — M79.604 RIGHT LEG PAIN: Status: ACTIVE | Noted: 2021-04-17

## 2021-04-18 NOTE — ASSESSMENT & PLAN NOTE
Patient has flank pain  Positive EtOH urine dip in office  Will send for culture  Macrobid ordered twice a day for 5 days  Encouraged fluid hydration  Maintain proper hygiene  You would probiotic prevent any abdominal discomfort

## 2021-04-18 NOTE — PROGRESS NOTES
Assessment/Plan:     Controlled type 2 diabetes with neuropathy [facility])    Lab Results   Component Value Date    HGBA1C 5 9 04/15/2021   Reviewed lab  Continue medication  Maintain low carbohydrate diet      Mild intermittent asthma  Continues to have SOB  Discussed with patient that she needs to use advair daily, not as needed  Referral made to pulmonologist      Medicare annual wellness visit, subsequent   Wellness visit completed  Order placed for mammogram and DEXA scan  Right leg pain   X-ray ordered  Discussed using heat, muscle rub  Referral made to physical therapy    Acute cystitis with hematuria   Patient has flank pain  Positive EtOH urine dip in office  Will send for culture  Macrobid ordered twice a day for 5 days  Encouraged fluid hydration  Maintain proper hygiene  You would probiotic prevent any abdominal discomfort  Problem List Items Addressed This Visit        Endocrine    Controlled type 2 diabetes with neuropathy [facility])       Lab Results   Component Value Date    HGBA1C 5 9 04/15/2021   Reviewed lab  Continue medication  Maintain low carbohydrate diet           Relevant Orders    POCT hemoglobin A1c (Completed)       Respiratory    Mild intermittent asthma     Continues to have SOB  Discussed with patient that she needs to use advair daily, not as needed  Referral made to pulmonologist           Relevant Orders    Ambulatory referral to Pulmonology    Chronic bronchitis [facility])    Relevant Orders    Ambulatory referral to Pulmonology       Genitourinary    Acute cystitis with hematuria - Primary      Patient has flank pain  Positive EtOH urine dip in office  Will send for culture  Macrobid ordered twice a day for 5 days  Encouraged fluid hydration  Maintain proper hygiene  You would probiotic prevent any abdominal discomfort           Relevant Medications    nitrofurantoin (MACROBID) 100 mg capsule    Other Relevant Orders    Urine culture (Completed)    POCT urine dip (Completed)       Other    Obesity, morbid ([facility] )    Medicare annual wellness visit, subsequent      Wellness visit completed  Order placed for mammogram and DEXA scan  Other Visit Diagnoses     Encounter for screening mammogram for malignant neoplasm of breast        Relevant Orders    Mammo screening bilateral w cad    Acute pain of right knee        Use Voltaren gel on the right knee for pain  May use Skelaxin up to 3 times a day as needed for pain  If pain is not resolving in about a week or 2, return  Relevant Medications    cyclobenzaprine (FLEXERIL) 5 mg tablet            Subjective:      Patient ID: Wil Santos is a 78 y o  female  HPI    The following portions of the patient's history were reviewed and updated as appropriate: allergies, current medications, past family history, past medical history, past social history, past surgical history and problem list     Review of Systems   Constitutional: Negative  HENT: Negative  Eyes: Negative  Respiratory: Negative  Cardiovascular: Negative  Negative for leg swelling  Gastrointestinal: Negative  Endocrine: Negative  Genitourinary: Positive for flank pain and frequency (  Especially at night)  Musculoskeletal: Positive for arthralgias  Allergic/Immunologic: Negative  Neurological: Negative  Psychiatric/Behavioral: Negative  Objective:      /82   Pulse 68   Ht 5' 2" (1 575 m)   Wt 93 4 kg (206 lb)   SpO2 98%   BMI 37 68 kg/m²          Physical Exam  Vitals signs and nursing note reviewed  Constitutional:       Appearance: She is well-developed  HENT:      Head: Normocephalic and atraumatic  Right Ear: External ear normal       Left Ear: External ear normal    Eyes:      Pupils: Pupils are equal, round, and reactive to light  Neck:      Musculoskeletal: Normal range of motion  Cardiovascular:      Rate and Rhythm: Normal rate and regular rhythm     Pulmonary:      Effort: Pulmonary effort is normal    Abdominal:      General: Bowel sounds are normal       Palpations: Abdomen is soft  Tenderness: There is abdominal tenderness  Musculoskeletal: Normal range of motion  General: Tenderness present  Skin:     General: Skin is warm and dry  Neurological:      General: No focal deficit present  Mental Status: She is alert and oriented to person, place, and time  Psychiatric:         Mood and Affect: Mood normal          Behavior: Behavior normal          Thought Content:  Thought content normal          Judgment: Judgment normal            Labs:    Lab Results   Component Value Date    WBC 5 72 09/24/2020    HGB 11 9 09/24/2020    HCT 38 6 09/24/2020    MCV 93 09/24/2020     09/24/2020     Lab Results   Component Value Date    K 4 8 09/24/2020     09/24/2020    CO2 30 09/24/2020    BUN 20 09/24/2020    CREATININE 0 93 09/24/2020    GLUF 113 (H) 09/24/2020    CALCIUM 9 1 09/24/2020    AST 13 09/24/2020    ALT 18 09/24/2020    ALKPHOS 74 09/24/2020    EGFR 71 09/24/2020     Lab Results   Component Value Date    CALCIUM 9 1 09/24/2020    K 4 8 09/24/2020    CO2 30 09/24/2020     09/24/2020    BUN 20 09/24/2020    CREATININE 0 93 09/24/2020

## 2021-04-21 ENCOUNTER — TRANSCRIBE ORDERS (OUTPATIENT)
Dept: ADMINISTRATIVE | Facility: HOSPITAL | Age: 76
End: 2021-04-21

## 2021-04-21 DIAGNOSIS — Z12.31 SCREENING MAMMOGRAM FOR HIGH-RISK PATIENT: Primary | ICD-10-CM

## 2021-04-22 ENCOUNTER — TELEPHONE (OUTPATIENT)
Dept: FAMILY MEDICINE CLINIC | Facility: CLINIC | Age: 76
End: 2021-04-22

## 2021-04-22 DIAGNOSIS — M25.561 ACUTE PAIN OF RIGHT KNEE: ICD-10-CM

## 2021-04-22 RX ORDER — MELOXICAM 15 MG/1
15 TABLET ORAL DAILY
Qty: 30 TABLET | Refills: 0 | Status: SHIPPED | OUTPATIENT
Start: 2021-04-22 | End: 2021-05-17

## 2021-04-23 DIAGNOSIS — J42 CHRONIC BRONCHITIS, UNSPECIFIED CHRONIC BRONCHITIS TYPE (HCC): Primary | ICD-10-CM

## 2021-04-23 DIAGNOSIS — R06.02 SOB (SHORTNESS OF BREATH): ICD-10-CM

## 2021-04-23 NOTE — TELEPHONE ENCOUNTER
She said she doesn't want the cxr but in order for her to see a pulmonologist they are telling her she needs to have one

## 2021-04-27 DIAGNOSIS — E11.40 CONTROLLED TYPE 2 DIABETES WITH NEUROPATHY (HCC): ICD-10-CM

## 2021-04-27 RX ORDER — PREGABALIN 165 MG/1
165 TABLET, FILM COATED, EXTENDED RELEASE ORAL 2 TIMES DAILY
Qty: 60 TABLET | Refills: 3 | Status: SHIPPED | OUTPATIENT
Start: 2021-04-27 | End: 2021-05-26 | Stop reason: SDUPTHER

## 2021-04-27 NOTE — TELEPHONE ENCOUNTER
Camden Bonner can you please send in a prescription to [facility] for the Lyrica please  I spoke with the pharmacist and she said they filled the old dose for her instead of the twice a day   I will inform patient what happened but can you please send in the new dose

## 2021-05-03 ENCOUNTER — EVALUATION (OUTPATIENT)
Dept: PHYSICAL THERAPY | Age: 76
End: 2021-05-03
Payer: MEDICARE

## 2021-05-03 DIAGNOSIS — M25.561 RIGHT KNEE PAIN, UNSPECIFIED CHRONICITY: Primary | ICD-10-CM

## 2021-05-03 PROCEDURE — 97113 AQUATIC THERAPY/EXERCISES: CPT | Performed by: PHYSICAL THERAPIST

## 2021-05-03 PROCEDURE — 97162 PT EVAL MOD COMPLEX 30 MIN: CPT | Performed by: PHYSICAL THERAPIST

## 2021-05-03 NOTE — PROGRESS NOTES
PT Evaluation     Today's date: 5/3/2021  Patient name: Eliazar Peña  : 1945  MRN: [id number]  Referring provider: ALEKSANDER Blevins  Dx:   Encounter Diagnosis     ICD-10-CM    1  Right knee pain, unspecified chronicity  [id number] PT plan of care cert/re-cert       Start Time: 1300  Stop Time: 1400  Total time in clinic (min): 60 minutes    Assessment  Assessment details: Eliazar Peña is a 78 y o  female who presents with pain, decreased strength, decreased ROM, decreased joint mobility, joint effusion, ambulatory dysfunction and postural  dysfunction  Due to these impairments, Patient has difficulty performing a/iadls  Patient's clinical presentation is consistent with their referring diagnosis of right knee pain  Patient would benefit from skilled physical therapy to address their aforementioned impairments, improve their level of function and to improve their overall quality of life  Impairments: abnormal gait, abnormal muscle tone, abnormal or restricted ROM, abnormal movement, activity intolerance, impaired balance, impaired physical strength, lacks appropriate home exercise program, pain with function, weight-bearing intolerance, poor posture  and poor body mechanics  Understanding of Dx/Px/POC: good   Prognosis: good    Goals  ST-3 WEEKS  1  Decrease pain by 2 points on VAS at its worst   2   Increase ROM by > 5 deg in all deficients planes  3   Increase LE by 1/2 MMT grade in all deficient planes  LT-6 WEEKS  1  Patient to be independent with a/iadls especially with steps, standing and walking  2  Increase functional activities for leisure and home activities to previous LOF    3  Independent with HEP and/or fitness program     Plan  Patient would benefit from: skilled physical therapy  Planned modality interventions: cryotherapy, electrical stimulation/Russian stimulation, thermotherapy: hydrocollator packs and unattended electrical stimulation  Planned therapy interventions: activity modification, behavior modification, body mechanics training, aquatic therapy, flexibility, functional ROM exercises, home exercise program, IADL retraining, joint mobilization, manual therapy, neuromuscular re-education, patient education, postural training, strengthening, stretching, therapeutic activities and therapeutic exercise  Frequency: 2x week (2-3x week)  Duration in weeks: 12  Plan of Care beginning date: 5/3/2021  Plan of Care expiration date: 8/3/2021  Treatment plan discussed with: patient        Subjective Evaluation    History of Present Illness  Date of onset: 3/3/2021  Mechanism of injury: Patient reports right knee pain x 2 months secondary to unknown, complains of right knee pain   Quality of life: good    Pain  Current pain ratin  At best pain rating: 3  At worst pain rating: 10  Quality: pressure  Relieving factors: rest and medications  Aggravating factors: standing, walking and stair climbing  Progression: worsening    Social Support  Steps to enter house: yes  Stairs in house: yes   Lives in: multiple-level home  Lives with: spouse      Diagnostic Tests  X-ray: abnormal  Treatments  Previous treatment: medication  Patient Goals  Patient goals for therapy: decreased pain, increased motion, increased strength and independence with ADLs/IADLs          Objective     Static Posture     Ankle/Foot   Ankle/Foot (Left): Pes planus  Ankle/Foot (Right): Pes planus  Tenderness     Right Knee   Tenderness in the medial joint line  Active Range of Motion   Left Knee   Flexion: 120 degrees   Extension: -2 degrees     Right Knee   Flexion: 90 degrees   Extension: -7 degrees     Strength/Myotome Testing     Left Knee   Flexion: 4  Extension: 4-    Right Knee   Flexion: 4  Extension: 3+    Tests     Right Knee   Positive Apley's compression and patellar compression       Swelling     Left Knee Girth Measurement (cm)   Joint line: 47 cm    Right Knee Girth Measurement (cm)   Joint line: 48 cm    Ambulation     Observational Gait   Gait: antalgic   Decreased walking speed and stride length       Functional Assessment        Single Leg Stance   Left: 6 seconds  Right: 4 seconds      Flowsheet Rows      Most Recent Value   PT/OT G-Codes   Current Score  41   Projected Score  54   Assessment Type  Evaluation   G code set  Mobility: Walking & Moving Around   Mobility: Walking and Moving Around Current Status ()  CJ   Mobility: Walking and Moving Around Goal Status ()  CI        Precautions: HTN, DM, Neuropathy    Daily Treatment Diary     Manual                                                                                   Exercise Diary  5/3            Water walking 5            Postural training             Gait training             Home exercise pgm/patient education             Wall: t/h raises 1            Hip abd/add 2            Marching 1            squats 1            Knee flex/ext 1            Step-ups (fwd/bkwd/ss)             SLS (eyes open/closed)             SLS w UE mvmt  AROM/ball toss             Weight shifting             UE Noodle work x 4              UE AROM             Resistive UE work (paddles, bells, TB)             Core work on noodle (sitting/stdg)             Sit on noodle with movement             Seated on pool bench w proper posture 1            Ankle df/pf 1            marching 1            Hip Ab/add 1            Knee flex/ext 1            Deep water mvmt 5            Deep water tx/stretching 5            Specific self - stretches wall/steps                 Modalities  5/3            whirlpool 5

## 2021-05-05 ENCOUNTER — OFFICE VISIT (OUTPATIENT)
Dept: PHYSICAL THERAPY | Age: 76
End: 2021-05-05
Payer: MEDICARE

## 2021-05-05 DIAGNOSIS — M25.561 RIGHT KNEE PAIN, UNSPECIFIED CHRONICITY: Primary | ICD-10-CM

## 2021-05-05 PROCEDURE — 97113 AQUATIC THERAPY/EXERCISES: CPT

## 2021-05-10 ENCOUNTER — OFFICE VISIT (OUTPATIENT)
Dept: PHYSICAL THERAPY | Age: 76
End: 2021-05-10
Payer: MEDICARE

## 2021-05-10 DIAGNOSIS — M25.561 RIGHT KNEE PAIN, UNSPECIFIED CHRONICITY: Primary | ICD-10-CM

## 2021-05-10 PROCEDURE — 97113 AQUATIC THERAPY/EXERCISES: CPT

## 2021-05-12 ENCOUNTER — OFFICE VISIT (OUTPATIENT)
Dept: PHYSICAL THERAPY | Age: 76
End: 2021-05-12
Payer: MEDICARE

## 2021-05-12 DIAGNOSIS — M25.561 RIGHT KNEE PAIN, UNSPECIFIED CHRONICITY: Primary | ICD-10-CM

## 2021-05-12 PROCEDURE — 97113 AQUATIC THERAPY/EXERCISES: CPT

## 2021-05-12 NOTE — PROGRESS NOTES
Daily Note     Today's date: 2021  Patient name: Gary Hawthorne  : 1945  MRN: [id number]  Referring provider: ALEKSANDER Wynne  Dx:   Encounter Diagnosis     ICD-10-CM    1  Right knee pain, unspecified chronicity  M25 561        Start Time: 1500  Stop Time: 1600  Total time in clinic (min): 60 minutes    Subjective: pt notes tightness across R knee today  Objective: See treatment diary below      Assessment: Tolerated treatment fairly well  Slow progression as pt tolerates w/ ex's  Focusing on strengthening LE's and improving ROM and gait  Patient would benefit from continued PT      Plan: Continue per plan of care         Precautions: HTN, DM, Neuropathy    Daily Treatment Diary     Manual                                                                                   Exercise Diary  5/3 5/5 5/10 5/12         Water walking 12 12         Postural training             Gait training             Home exercise pgm/patient education             Wall: t/h raises 1 1 1 1         Hip abd/add 2 2 2 2         Marching 1 2 2 2         squats 1 1 1 1         Knee flex/ext 1 2 2 2         SLR FF  2 2 2         SLS (eyes open/closed)             SLS w UE mvmt  AROM/ball toss             Weight shifting             UE Noodle work x 4              Step ups   4 4         Resistive UE work (paddles, bells, TB)             Core work on noodle (sitting/stdg)             Sit on noodle with movement             Seated on pool bench w proper posture 1            Ankle df/pf 1 2 1 1         marching 1 1 1 1         Hip Ab/add 1 1 1 1         Knee flex/ext 1 2 2 2         Deep water mvmt 5 5 5 5         Deep water tx/stretching 5 10 10 10         Specific self - stretches wall/steps  2 2 2             Modalities  5/3 5/5 5/10 5/12         whirlpool 5 10 10 10

## 2021-05-16 DIAGNOSIS — M25.561 ACUTE PAIN OF RIGHT KNEE: ICD-10-CM

## 2021-05-17 ENCOUNTER — OFFICE VISIT (OUTPATIENT)
Dept: PHYSICAL THERAPY | Age: 76
End: 2021-05-17
Payer: MEDICARE

## 2021-05-17 DIAGNOSIS — M25.561 RIGHT KNEE PAIN, UNSPECIFIED CHRONICITY: Primary | ICD-10-CM

## 2021-05-17 PROCEDURE — 97113 AQUATIC THERAPY/EXERCISES: CPT

## 2021-05-17 RX ORDER — MELOXICAM 15 MG/1
15 TABLET ORAL DAILY
Qty: 30 TABLET | Refills: 0 | Status: SHIPPED | OUTPATIENT
Start: 2021-05-17 | End: 2021-07-12

## 2021-05-17 NOTE — PROGRESS NOTES
Daily Note     Today's date: 2021  Patient name: Jada Gaxiola  : 1945  MRN: [id number]  Referring provider: ALEKSANDER Alcaraz  Dx:   Encounter Diagnosis     ICD-10-CM    1  Right knee pain, unspecified chronicity  M25 561        Start Time: 1600  Stop Time: 1700  Total time in clinic (min): 60 minutes    Subjective: pt notes she was playing w/ her grandkids and notes R knee soreness and pain  Objective: See treatment diary below  Pt seen 1:1 for 30 minutes    Assessment: Tolerated treatment fairly well  Gentle ex's in the water today due to soreness  Good relief after session  Progress as tolerated  VCing for gait and knee flexion during gait  Patient would benefit from continued PT      Plan: Continue per plan of care         Precautions: HTN, DM, Neuropathy    Daily Treatment Diary     Manual                                                                                   Exercise Diary  5/3 5/5 5/10 5/12 5/17        Water walking  12 12 12        Postural training             Gait training             Home exercise pgm/patient education             Wall: t/h raises 1 1 1 1 1        Hip abd/add 2 2 2 2 2        Marching 1 2 2 2 2        squats 1 1 1 1 1        Knee flex/ext 1 2 2 2 2        SLR FF  2 2 2 2        SLS (eyes open/closed)             SLS w UE mvmt  AROM/ball toss             Weight shifting             UE Noodle work x 4              Step ups   4 4 4        Resistive UE work (paddles, bells, TB)             Core work on noodle (sitting/stdg)             Sit on noodle with movement             Seated on pool bench w proper posture 1            Ankle df/pf 1 2 1 1 1        marching 1 1 1 1 1        Hip Ab/add 1 1 1 1 1        Knee flex/ext 1 2 2 2 2        Deep water mvmt 5 5 5 5 5        Deep water tx/stretching 5 10 10 10 10        Specific self - stretches wall/steps  2 2 2 2            Modalities  5/3 5/5 5/10 5/12 5/17        whirlpool 5 10 10 10 10

## 2021-05-19 ENCOUNTER — OFFICE VISIT (OUTPATIENT)
Dept: PHYSICAL THERAPY | Age: 76
End: 2021-05-19
Payer: MEDICARE

## 2021-05-19 DIAGNOSIS — M25.561 RIGHT KNEE PAIN, UNSPECIFIED CHRONICITY: Primary | ICD-10-CM

## 2021-05-19 PROCEDURE — 97113 AQUATIC THERAPY/EXERCISES: CPT

## 2021-05-19 NOTE — PROGRESS NOTES
Daily Note     Today's date: 2021  Patient name: Herber Kirk  : 1945  MRN: [id number]  Referring provider: Sundra Castleman, CRNP  Dx:   Encounter Diagnosis     ICD-10-CM    1  Right knee pain, unspecified chronicity  M25 561        Start Time: 1300  Stop Time: 1400  Total time in clinic (min): 60 minutes    Subjective: she continues to feel sore in R knee  Objective: See treatment diary below    Pt seen 1:1 for 30 minutes    Assessment: Tolerated treatment fair  Patient would benefit from continued PT      Plan: Continue per plan of care        Precautions: HTN, DM, Neuropathy    Daily Treatment Diary     Manual                                                                                   Exercise Diary  5/3 5/5 5/10 5/12 5/17 5/17       Water walking 12 12 12 12       Postural training             Gait training             Home exercise pgm/patient education             Wall: t/h raises 1 1 1 1 1 1       Hip abd/add 2 2 2 2 2 2       Marching 1 2 2 2 2 2       squats 1 1 1 1 1 1       Knee flex/ext 1 2 2 2 2 2       SLR FF  2 2 2 2 2       SLS (eyes open/closed)             SLS w UE mvmt  AROM/ball toss             Weight shifting             UE Noodle work x 4              Step ups   4 4 4 4       Resistive UE work (paddles, bells, TB)             Core work on noodle (sitting/stdg)             Sit on noodle with movement             Seated on pool bench w proper posture 1            Ankle df/pf 1 2 1 1 1 1       marching 1 1 1 1 1 1       Hip Ab/add 1 1 1 1 1 1       Knee flex/ext 1 2 2 2 2 2       Deep water mvmt 5 5 5 5 5 5       Deep water tx/stretching 5 10 10 10 10 10       Specific self - stretches wall/steps  2 2 2 2 2           Modalities  /3 5/5 5/10 5/12 5/17 5/19       whirlpool 5 10 10 10 10 10

## 2021-05-24 ENCOUNTER — OFFICE VISIT (OUTPATIENT)
Dept: PHYSICAL THERAPY | Age: 76
End: 2021-05-24
Payer: MEDICARE

## 2021-05-24 DIAGNOSIS — M25.561 RIGHT KNEE PAIN, UNSPECIFIED CHRONICITY: Primary | ICD-10-CM

## 2021-05-24 PROCEDURE — 97113 AQUATIC THERAPY/EXERCISES: CPT

## 2021-05-24 NOTE — PROGRESS NOTES
Daily Note     Today's date: 2021  Patient name: Pushpa De La Torre  : 1945  MRN: [id number]  Referring provider: ALEKSANDER Mcmullen  Dx:   Encounter Diagnosis     ICD-10-CM    1  Right knee pain, unspecified chronicity  M25 561        Start Time: 1300  Stop Time: 1400  Total time in clinic (min): 60 minutes    Subjective: pt notes she's doing OK, R knee still hurts  Objective: See treatment diary below  Pt seen 1:1 for 30 minutes    Assessment: Tolerated treatment well  Progressing slow and steady  Improved gait in the water  Patient would benefit from continued PT      Plan: Continue per plan of care        Precautions: HTN, DM, Neuropathy    Daily Treatment Diary     Manual                                                                                   Exercise Diary  5/3 5/5 5/10 5/12 5/17 5/17 5/24      Water walking 12 12 12 12 12      Postural training             Gait training             Home exercise pgm/patient education             Wall: t/h raises 1 1 1 1 1 1 1      Hip abd/add 2 2 2 2 2 2 2      Marching 1 2 2 2 2 2 2      squats 1 1 1 1 1 1 1      Knee flex/ext 1 2 2 2 2 2 2      SLR FF  2 2 2 2 2 2      SLS (eyes open/closed)             SLS w UE mvmt  AROM/ball toss             Weight shifting             UE Noodle work x 4              Step ups   4 4 4 4 4      Resistive UE work (paddles, bells, TB)             Core work on noodle (sitting/stdg)             Sit on noodle with movement             Seated on pool bench w proper posture 1            Ankle df/pf 1 2 1 1 1 1 1      marching 1 1 1 1 1 1 1      Hip Ab/add 1 1 1 1 1 1 1      Knee flex/ext 1 2 2 2 2 2 2      Deep water mvmt 5 5 5 5 5 5 6      Deep water tx/stretching 5 10 10 10 10 10 10      Specific self - stretches wall/steps  2 2 2 2 2 2          Modalities  /3 5/5 5/10 5/12 5/17 5/19 5/24      whirlpool 5 10 10 10 10 10 10

## 2021-05-26 DIAGNOSIS — E11.40 CONTROLLED TYPE 2 DIABETES WITH NEUROPATHY (HCC): ICD-10-CM

## 2021-05-26 RX ORDER — PREGABALIN 165 MG/1
165 TABLET, FILM COATED, EXTENDED RELEASE ORAL 2 TIMES DAILY
Qty: 60 TABLET | Refills: 3 | Status: SHIPPED | OUTPATIENT
Start: 2021-05-26 | End: 2021-05-26

## 2021-05-26 RX ORDER — PREGABALIN 165 MG/1
TABLET, FILM COATED, EXTENDED RELEASE ORAL
Qty: 60 TABLET | Refills: 3 | Status: SHIPPED | OUTPATIENT
Start: 2021-05-26 | End: 2021-05-27 | Stop reason: SDUPTHER

## 2021-05-27 ENCOUNTER — TELEPHONE (OUTPATIENT)
Dept: FAMILY MEDICINE CLINIC | Facility: CLINIC | Age: 76
End: 2021-05-27

## 2021-05-27 DIAGNOSIS — E11.40 CONTROLLED TYPE 2 DIABETES WITH NEUROPATHY (HCC): ICD-10-CM

## 2021-05-27 RX ORDER — PREGABALIN 165 MG/1
1 TABLET, FILM COATED, EXTENDED RELEASE ORAL 2 TIMES DAILY
Qty: 60 TABLET | Refills: 0 | Status: SHIPPED | OUTPATIENT
Start: 2021-05-27 | End: 2021-12-29

## 2021-05-27 NOTE — TELEPHONE ENCOUNTER
Spoke with patient and he is okay with changing to 330 once a day  She told the pharmacy that she knew you called it in brand and they told her no you didn't  She said her feet feels much better and not in the much pain

## 2021-05-27 NOTE — TELEPHONE ENCOUNTER
Let her know that I did order it that way, but the pharmacy must have changed it  I ordered it again and wrote dispense as written  We need to discuss this at f/u as this drug should be given once daily and was changed r/t her insurance  She is on a very high dose for her age

## 2021-05-27 NOTE — TELEPHONE ENCOUNTER
Patient called advising she went to  her prescription for the Lyrica but they gave her the generic one and she does not want the generic version of lyrica  Please advise

## 2021-06-07 ENCOUNTER — OFFICE VISIT (OUTPATIENT)
Dept: PHYSICAL THERAPY | Age: 76
End: 2021-06-07
Payer: MEDICARE

## 2021-06-07 DIAGNOSIS — M25.561 RIGHT KNEE PAIN, UNSPECIFIED CHRONICITY: Primary | ICD-10-CM

## 2021-06-07 PROCEDURE — 97113 AQUATIC THERAPY/EXERCISES: CPT

## 2021-06-07 NOTE — PROGRESS NOTES
Daily Note     Today's date: 2021  Patient name: Sheryl Multani  : 1945  MRN: [id number]  Referring provider: ALEKSANDER Heller  Dx:   Encounter Diagnosis     ICD-10-CM    1  Right knee pain, unspecified chronicity  M25 561        Start Time: 1300  Stop Time: 1400  Total time in clinic (min): 60 minutes    Subjective: pt notes she [place] a [place] in [place]  " [place] up at night "      Objective: See treatment diary below  Pt seen 1:1 for 30 minutes    Assessment: Tolerated treatment well  Increased R knee pain and tightness today w/ ex's  Gentle stretches on the step  Patient would benefit from continued PT      Plan: Continue per plan of care        Precautions: HTN, DM, Neuropathy    Daily Treatment Diary     Manual                                                                                   Exercise Diary  5/3 5/5 5/10 5/12 5/17 5/17 5/24 67     Water walking 5 12 12 12 12 12 12 12     Postural training             Gait training             Home exercise pgm/patient education             Wall: t/h raises 1 1 1 1 1 1 1 1     Hip abd/add 2 2 2 2 2 2 2 2     Marching 1 2 2 2 2 2 2 1     squats 1 1 1 1 1 1 1 1     Knee flex/ext 1 2 2 2 2 2 2 2     SLR FF  2 2 2 2 2 2 2     SLS (eyes open/closed)             SLS w UE mvmt  AROM/ball toss             Weight shifting             UE Noodle work x 4              Step ups   4 4 4 4 4 4     Resistive UE work (paddles, bells, TB)             Core work on noodle (sitting/stdg)             Sit on noodle with movement             Seated on pool bench w proper posture 1            Ankle df/pf 1 2 1 1 1 1 1 1     marching 1 1 1 1 1 1 1 1     Hip Ab/add 1 1 1 1 1 1 1 1     Knee flex/ext 1 2 2 2 2 2 2 2     Deep water mvmt 5 5 5 5 5 5 6 6     Deep water tx/stretching 5 10 10 10 10 10 10 5     Specific self - stretches wall/steps  2 2 2 2 2 2 2         Modalities  5/3 5/5 5/10 5/12 5/17 5/19 5/24 6/7     whirlpool 5 10 10 10 10 10 10 5

## 2021-06-16 ENCOUNTER — HOSPITAL ENCOUNTER (OUTPATIENT)
Dept: RADIOLOGY | Facility: HOSPITAL | Age: 76
Discharge: HOME | End: 2021-06-16
Payer: MEDICARE

## 2021-06-16 DIAGNOSIS — J42 CHRONIC BRONCHITIS, UNSPECIFIED CHRONIC BRONCHITIS TYPE (HCC): ICD-10-CM

## 2021-06-16 DIAGNOSIS — R06.02 SOB (SHORTNESS OF BREATH): ICD-10-CM

## 2021-06-16 PROCEDURE — 71046 X-RAY EXAM CHEST 2 VIEWS: CPT

## 2021-06-21 ENCOUNTER — OFFICE VISIT (OUTPATIENT)
Dept: PHYSICAL THERAPY | Age: 76
End: 2021-06-21
Payer: MEDICARE

## 2021-06-21 DIAGNOSIS — M25.561 RIGHT KNEE PAIN, UNSPECIFIED CHRONICITY: Primary | ICD-10-CM

## 2021-06-21 PROCEDURE — 97113 AQUATIC THERAPY/EXERCISES: CPT

## 2021-06-21 NOTE — PROGRESS NOTES
Daily Note     Today's date: 2021  Patient name: Kareem Guajardo  : 1945  MRN: [id number]  Referring provider: ALEKSANDER Leon  Dx:   Encounter Diagnosis     ICD-10-CM    1  Right knee pain, unspecified chronicity  M25 561                   Subjective: Patient reports her R knee is bothering her a little more today  Objective: See treatment diary below  Pt seen 1:1 for 30 minutes    Assessment: Patient tolerated treatment well, able to complete outlined program  VC provided the facilitate proper for and dosage  Fatigue demonstrated post session, WP effective for pain modulation post session  Plan: Continue per plan of care        Precautions: HTN, DM, Neuropathy    Daily Treatment Diary     Manual                                                                                   Exercise Diary  5/3 5/5 5/10 5/12 5/17 5/17 5/24 6/7 6/21    Water walking  12 12 12 12 12 12 12 12'    Postural training             Gait training             Home exercise pgm/patient education             Wall: t/h raises 1 1 1 1 1 1 1 1 1    Hip abd/add 2 2 2 2 2 2 2 2 2    Marching 1 2 2 2 2 2 2 1 1    squats 1 1 1 1 1 1 1 1 1    Knee flex/ext 1 2 2 2 2 2 2 2 2    SLR FF  2 2 2 2 2 2 2 2    SLS (eyes open/closed)             SLS w UE mvmt  AROM/ball toss             Weight shifting             UE Noodle work x 4              Step ups   4 4 4 4 4 4 4    Resistive UE work (paddles, bells, TB)             Core work on noodle (sitting/stdg)             Sit on noodle with movement             Seated on pool bench w proper posture 1            Ankle df/pf 1 2 1 1 1 1 1 1 1    marching 1 1 1 1 1 1 1 1 1    Hip Ab/add 1 1 1 1 1 1 1 1 1    Knee flex/ext 1 2 2 2 2 2 2 2 2    Deep water mvmt 5 5 5 5 5 5 6 6 6    Deep water tx/stretching 5 10 10 10 10 10 10 5 5    Specific self - stretches wall/steps  2 2 2 2 2 2 2 2        Modalities  5/3 5/5 5/10     whirlpool 5 10 10 10 10 10 10 5 10

## 2021-06-23 ENCOUNTER — EVALUATION (OUTPATIENT)
Dept: PHYSICAL THERAPY | Age: 76
End: 2021-06-23
Payer: MEDICARE

## 2021-06-23 DIAGNOSIS — M25.561 RIGHT KNEE PAIN, UNSPECIFIED CHRONICITY: Primary | ICD-10-CM

## 2021-06-23 PROCEDURE — 97113 AQUATIC THERAPY/EXERCISES: CPT | Performed by: PHYSICAL THERAPIST

## 2021-06-23 NOTE — PROGRESS NOTES
PT Re-Evaluation     Today's date: 2021  Patient name: Megna Hassan  : 1945  MRN: [id number]  Referring provider: ALESKANDER Quinones  Dx:   Encounter Diagnosis     ICD-10-CM    1  Right knee pain, unspecified chronicity  M25 561        Start Time: 0900  Stop Time: 1000  Total time in clinic (min): 60 minutes    Assessment  Assessment details: Megan Hassan is a 78 y o  female who presents with pain, decreased strength, decreased ROM, decreased joint mobility, joint effusion, ambulatory dysfunction and postural  dysfunction  Due to these impairments, Patient has difficulty performing a/iadls  Patient's clinical presentation is consistent with their referring diagnosis of right knee pain  Patient missed some PT visits due to [place] and able to resume PT now  Making slow gains in PT  Patient would benefit from skilled physical therapy to address their aforementioned impairments, improve their level of function and to improve their overall quality of life  Impairments: abnormal gait, abnormal muscle tone, abnormal or restricted ROM, abnormal movement, activity intolerance, impaired balance, impaired physical strength, lacks appropriate home exercise program, pain with function, weight-bearing intolerance, poor posture  and poor body mechanics    Symptom irritability: moderateUnderstanding of Dx/Px/POC: good   Prognosis: good    Goals  ST-3 WEEKS  1  Decrease pain by 2 points on VAS at its worst  Progressing towards  Ongoing goal    2   Increase ROM by > 5 deg in all deficients planes  Progressing towards  Ongoing goal    3   Increase LE by 1/2 MMT grade in all deficient planes  Progressing towards  Ongoing goal      LT-6 WEEKS  1  Patient to be independent with a/iadls especially with steps, standing and walking  Ongoing goal    2  Increase functional activities for leisure and home activities to previous LOF   Ongoing goal    3  Independent with HEP and/or fitness program  Ongoing goal      Plan  Patient would benefit from: skilled physical therapy  Planned modality interventions: cryotherapy, thermotherapy: hydrocollator packs and unattended electrical stimulation  Planned therapy interventions: activity modification, behavior modification, body mechanics training, aquatic therapy, flexibility, functional ROM exercises, home exercise program, IADL retraining, joint mobilization, manual therapy, neuromuscular re-education, patient education, postural training, strengthening, stretching, therapeutic activities, therapeutic exercise and balance/weight bearing training  Frequency: 2x week (2-3x week)  Duration in weeks: 8  Plan of Care beginning date: 5/3/2021  Plan of Care expiration date: 8/3/2021  Treatment plan discussed with: patient        Subjective Evaluation    History of Present Illness  Date of onset: 3/3/2021  Mechanism of injury: Patient reports right knee pain x 2 months secondary to unknown, complains of right knee pain   21: Patient reports pain in right knee with weight bearing  Pool is helpful  Quality of life: good    Pain  Current pain ratin  At best pain rating: 3  At worst pain ratin  Location: right knee  Quality: pressure  Relieving factors: rest and medications  Aggravating factors: standing, walking and stair climbing  Progression: worsening    Social Support  Steps to enter house: yes  Stairs in house: yes   Lives in: multiple-level home  Lives with: spouse      Diagnostic Tests  X-ray: abnormal  Treatments  Previous treatment: medication  Patient Goals  Patient goals for therapy: decreased pain, increased motion, increased strength and independence with ADLs/IADLs  Patient goal: able to walk farther without increased symptoms  Objective     Static Posture     Ankle/Foot   Ankle/Foot (Left): Pes planus  Ankle/Foot (Right): Pes planus  Tenderness     Right Knee   Tenderness in the medial joint line       Active Range of Motion   Left Knee Flexion: 120 degrees   Extension: -2 degrees     Right Knee   Flexion: 90 degrees   Extension: -7 degrees     Strength/Myotome Testing     Left Knee   Flexion: 4  Extension: 4-    Right Knee   Flexion: 4  Extension: 3+    Ambulation     Observational Gait   Gait: antalgic   Decreased walking speed and stride length       Functional Assessment        Single Leg Stance   Left: 6 seconds  Right: 4 seconds             Precautions:  Exercise Diary  5/3 5/5 5/10 5/12 5/17 5/17 5/24 6/7 6/21 6/23   Water walking 5 12 12 12 12 12 12 12 12' 12   Postural training             Gait training             Home exercise pgm/patient education             Wall: t/h raises 1 1 1 1 1 1 1 1 1 1'   Hip abd/add 2 2 2 2 2 2 2 2 2 2'   Marching 1 2 2 2 2 2 2 1 1 1'   squats 1 1 1 1 1 1 1 1 1 1'   Knee flex/ext 1 2 2 2 2 2 2 2 2 2'   SLR FF  2 2 2 2 2 2 2 2 2   SLS (eyes open/closed)             SLS w UE mvmt  AROM/ball toss             Weight shifting             UE Noodle work x 4              Step ups   4 4 4 4 4 4 4 4'   Resistive UE work (paddles, bells, TB)             Core work on noodle (sitting/stdg)             Sit on noodle with movement             Seated on pool bench w proper posture 1            Ankle df/pf 1 2 1 1 1 1 1 1 1 1'   marching 1 1 1 1 1 1 1 1 1 1'   Hip Ab/add 1 1 1 1 1 1 1 1 1 1'   Knee flex/ext 1 2 2 2 2 2 2 2 2 2'   Deep water mvmt 5 5 5 5 5 5 6 6 6 6'   Deep water tx/stretching 5 10 10 10 10 10 10 5 5 5'   Specific self - stretches wall/steps  2 2 2 2 2 2 2 2 2       Modalities  5/3 5/5 5/10 5/12 5/17 5/19 5/24 6/7 6/21 6/23   whirlpool 5 10 10 10 10 10 10 5 10 10

## 2021-06-28 ENCOUNTER — CONSULT (OUTPATIENT)
Dept: PULMONOLOGY | Facility: CLINIC | Age: 76
End: 2021-06-28
Payer: MEDICARE

## 2021-06-28 VITALS
BODY MASS INDEX: 37.54 KG/M2 | HEART RATE: 70 BPM | TEMPERATURE: 97 F | SYSTOLIC BLOOD PRESSURE: 124 MMHG | DIASTOLIC BLOOD PRESSURE: 80 MMHG | HEIGHT: 62 IN | OXYGEN SATURATION: 96 % | WEIGHT: 204 LBS

## 2021-06-28 DIAGNOSIS — J45.990 EXERCISE INDUCED BRONCHOSPASM: ICD-10-CM

## 2021-06-28 DIAGNOSIS — J45.20 MILD INTERMITTENT ASTHMA, UNSPECIFIED WHETHER COMPLICATED: ICD-10-CM

## 2021-06-28 DIAGNOSIS — R06.00 DOE (DYSPNEA ON EXERTION): ICD-10-CM

## 2021-06-28 DIAGNOSIS — J42 CHRONIC BRONCHITIS, UNSPECIFIED CHRONIC BRONCHITIS TYPE (HCC): ICD-10-CM

## 2021-06-28 DIAGNOSIS — R06.02 SOB (SHORTNESS OF BREATH): Primary | ICD-10-CM

## 2021-06-28 PROCEDURE — 99204 OFFICE O/P NEW MOD 45 MIN: CPT | Performed by: INTERNAL MEDICINE

## 2021-06-28 RX ORDER — MONTELUKAST SODIUM 10 MG/1
10 TABLET ORAL
Qty: 30 TABLET | Refills: 1 | Status: SHIPPED | OUTPATIENT
Start: 2021-06-28 | End: 2021-07-21

## 2021-06-28 NOTE — PROGRESS NOTES
Assessment/Plan:     Diagnoses and all orders for this visit:    SOB (shortness of breath)  -     [facility] Panel, Adult; Future  -     Hypersensitivity pnuemonitis profile; Future  -     Ambulatory referral to Cardiology; Future  -     Echo complete with contrast if indicated; Future  -     Complete PFT with post Bronchodilator and Six Minute walk; Future    Chronic bronchitis, unspecified chronic bronchitis type ([facility] )  -     Ambulatory referral to Pulmonology    YANG (dyspnea on exertion)  -     montelukast (SINGULAIR) 10 mg tablet; Take 1 tablet (10 mg total) by mouth daily at bedtime    Mild intermittent asthma, unspecified whether complicated  -     Ambulatory referral to Pulmonology    Exercise induced bronchospasm  -     montelukast (SINGULAIR) 10 mg tablet; Take 1 tablet (10 mg total) by mouth daily at bedtime        Shortness of breath and dyspnea on exertion likely secondary to reactive airway disease / asthma  Will get respiratory allergy panel and hypersensitivity pneumonitis panel  Complete PFT with post bronchodilator and a 6 minutes walk test and follow-up  Her recent chest x-ray report and images were reviewed which is normal     Discussed with the patient regarding using the Advair 250/51 puff twice daily, she is only using it once in 2-3 days  Need for compliance with the maintenance inhalers discussed understands and verbalizes   And continue with albuterol MDI 2 puffs 4 times daily as needed   MDI technique reviewed with the patient  Add Singulair 10 milligrams daily at bedtime      Side effects of the medication discussed with the patient understands and verbalizes   Also she states she has had cardiology evaluation in [place] a few years ago after which she has not seen any cardiologist    I will place in an echocardiogram to make sure there is no evidence of pulmonary hypertension but she would need to be seen by a cardiologist to rule out any cardiac etiology for the shortness of breath and dyspnea on exertion  Currently does not describe any symptoms related to sleep disordered breathing  Recommend weight loss   Follow-up in 6 weeks or p r n  earlier as needed with the above testing  Return in about 6 weeks (around 8/9/2021)  All questions are answered to the patient's satisfaction and understanding  She verbalizes understanding  She is encouraged to call with any further questions or concerns  Portions of the record may have been created with voice recognition software  Occasional wrong word or "sound a like" substitutions may have occurred due to the inherent limitations of voice recognition software  Read the chart carefully and recognize, using context, where substitutions have occurred  a    Electronically Signed by Juliana Sarabia MD    ______________________________________________________________________    Chief Complaint:   Chief Complaint   Patient presents with    Shortness of Breath    Wheezing        Patient ID: David Fatima is a 78 y o  y o  female has a past medical history of Asthma, Benign hypertension (11/30/2018), Cardiac arrest ([facility] ), Diabetes mellitus ([facility] ), Glaucoma, Hypertension, Kidney stone, Neuropathy, Sleep apnea, SOB (shortness of breath), Tubular adenoma of colon (10/2019), and Wheezing     6/28/2021  Patient presents today for initial visit  Patient is a very pleasant 81-year-old lady who has never smoked, who is currently retired used to work in [address] [place] [address], states she has been having shortness of breath and dyspnea on exertion she was given a rescue inhaler as well as Advair which she states has not been using it daily  Her shortness of breath and wheezing has gotten worse for which she is here for evaluation  Occupational/Exposure history: retired currently, she was working  before  Pets/Enviroment:  None  Travel history: none  Review of Systems   Constitutional: Positive for fatigue  HENT: Negative  Eyes: Negative  Respiratory: Positive for shortness of breath and wheezing  Cardiovascular: Negative  Gastrointestinal: Negative  Endocrine: Negative  Genitourinary: Negative  Musculoskeletal: Negative  Allergic/Immunologic: Positive for environmental allergies  Neurological: Negative  Hematological: Negative  Psychiatric/Behavioral: Negative  Social history: She reports that she has never smoked  She has never used smokeless tobacco  She reports that she does not drink alcohol and does not use drugs      Past surgical history:   Past Surgical History:   Procedure Laterality Date     SECTION      COLONOSCOPY      HYSTERECTOMY  1985    TONSILLECTOMY       Family history:   Family History   Problem Relation Age of Onset    Diabetes Mother     Hypertension Father     Prostate cancer Father     Diabetes Sister     Hypertension Sister     Breast cancer Sister 62    Diabetes Brother     Hypertension Brother     Asthma Family     No Known Problems Daughter     No Known Problems Sister     No Known Problems Daughter     No Known Problems Daughter     No Known Problems Maternal Aunt     Breast cancer Maternal Aunt 58    No Known Problems Maternal Aunt     No Known Problems Maternal Aunt     No Known Problems Paternal Aunt     No Known Problems Paternal Aunt     No Known Problems Paternal Aunt     Colon cancer Neg Hx     Ovarian cancer Neg Hx     Uterine cancer Neg Hx     Cervical cancer Neg Hx        Immunization History   Administered Date(s) Administered    SARS-CoV-2 / COVID-19 mRNA IM ([employer]) 2021, 03/15/2021     Current Outpatient Medications   Medication Sig Dispense Refill    amLODIPine (NORVASC) 5 mg tablet TAKE 1 TABLET BY MOUTH EVERY DAY 90 tablet 1    Aspirin Low Dose 81 MG chewable tablet CHEW 1 TABLET BY MOUTH DAILY 90 tablet 1    carbamide peroxide (DEBROX) 6 5 % otic solution Administer 5 drops into both ears 2 (two) times a day 15 mL 0  cyclobenzaprine (FLEXERIL) 5 mg tablet Take 1 tablet (5 mg total) by mouth 3 (three) times a day as needed for muscle spasms 30 tablet 0    diclofenac sodium (VOLTAREN) 1 % Apply to R knee 4 times a day as needed 100 g 0    enalapril (VASOTEC) 10 mg tablet TAKE 1 TABLET BY MOUTH TWICE A  tablet 1    fluticasone-salmeterol (Advair Diskus) 250-50 mcg/dose inhaler Inhale 1 puff 2 (two) times a day Rinse mouth after use  1 Inhaler 3    glucose blood (ONETOUCH VERIO) test strip E11 9 / testing daily      Janumet -1000 MG TB24 TAKE 1 TABLET BY MOUTH EVERY DAY 90 tablet 1    LUMIGAN 0 01 % ophthalmic drops       Lyrica  MG TB24 Take 1 tablet by mouth 2 (two) times a day 60 tablet 0    meloxicam (MOBIC) 15 mg tablet TAKE 1 TABLET (15 MG TOTAL) BY MOUTH DAILY WITH FOOD 30 tablet 0    mometasone (ELOCON) 0 1 % cream Apply topically daily 45 g 0    ONETOUCH DELICA LANCETS 93U MISC E11 9/ testing daily      ProAir  (90 Base) MCG/ACT inhaler INHALE 2 PUFFS BY MOUTH EVERY 6 HOURS AS NEEDED FOR WHEEZE 8 5 g 3    sertraline (ZOLOFT) 50 mg tablet TAKE 1 TABLET BY MOUTH EVERY DAY 90 tablet 1    travoprost (TRAVATAN Z) 0 004 % ophthalmic solution Apply 1 drop to eye daily at bedtime      montelukast (SINGULAIR) 10 mg tablet Take 1 tablet (10 mg total) by mouth daily at bedtime 30 tablet 1     No current facility-administered medications for this visit  Allergies: Ciprofloxacin, Levaquin [levofloxacin], and Penicillins    Objective:  Vitals:    06/28/21 1509   BP: 124/80   Pulse: 70   Temp: (!) 97 °F (36 1 °C)   SpO2: 96%   Weight: 92 5 kg (204 lb)   Height: 5' 2" (1 575 m)   Oxygen Therapy  SpO2: 96 %    Wt Readings from Last 3 Encounters:   06/28/21 92 5 kg (204 lb)   04/15/21 93 4 kg (206 lb)   09/22/20 93 2 kg (205 lb 6 4 oz)     Body mass index is 37 31 kg/m²  Physical Exam  Vitals and nursing note reviewed  Constitutional:       Appearance: She is well-developed     HENT: Head: Normocephalic and atraumatic  Eyes:      Conjunctiva/sclera: Conjunctivae normal       Pupils: Pupils are equal, round, and reactive to light  Neck:      Thyroid: No thyromegaly  Vascular: No JVD  Cardiovascular:      Rate and Rhythm: Normal rate and regular rhythm  Heart sounds: Normal heart sounds  No murmur heard  No friction rub  No gallop  Pulmonary:      Effort: Pulmonary effort is normal  No respiratory distress  Breath sounds: Normal breath sounds  No wheezing or rales  Chest:      Chest wall: No tenderness  Musculoskeletal:         General: No tenderness or deformity  Normal range of motion  Cervical back: Normal range of motion and neck supple  Lymphadenopathy:      Cervical: No cervical adenopathy  Skin:     General: Skin is warm and dry  Neurological:      Mental Status: She is alert and oriented to person, place, and time  Diagnostics:  I have personally reviewed pertinent films in PACS  XR chest pa & lateral    Result Date: 6/18/2021  Narrative: CHEST INDICATION:   J42: Unspecified chronic bronchitis R06 02: Shortness of breath  COMPARISON:  Chest CT from 11/3/2016  EXAM PERFORMED/VIEWS:  XR CHEST PA & LATERAL  FINDINGS: Cardiomediastinal silhouette normal  Lungs clear  No effusion or pneumothorax  Osseous structures normal for age  Impression: No acute cardiopulmonary disease    Workstation performed: [id number]

## 2021-06-28 NOTE — PROGRESS NOTES
Assessment/Plan:     {Assess/PlanSJosemanuels:70949}      No follow-ups on file  All questions are answered to the patient's satisfaction and understanding  She verbalizes understanding  She is encouraged to call with any further questions or concerns  Portions of the record may have been created with voice recognition software  Occasional wrong word or "sound a like" substitutions may have occurred due to the inherent limitations of voice recognition software  Read the chart carefully and recognize, using context, where substitutions have occurred  a    Electronically Signed by Rachel Link MD    ______________________________________________________________________    Chief Complaint:   Chief Complaint   Patient presents with    Shortness of Breath    Wheezing        Patient ID: Judie Terrazas is a 78 y o  y o  female has a past medical history of Asthma, Benign hypertension (2018), Cardiac arrest ([facility] ), Diabetes mellitus ([facility] ), Glaucoma, Hypertension, Kidney stone, Neuropathy, Sleep apnea, SOB (shortness of breath), Tubular adenoma of colon (10/2019), and Wheezing     2021  Patient presents today for initial visit  HPI    Occupational/Exposure history:  Currently at retired, did  before  Pets/Enviroment: none  Travel history: none  Review of Systems   Constitutional: Positive for fatigue and unexpected weight change  Respiratory: Positive for shortness of breath and wheezing  Social history: She reports that she has never smoked  She has never used smokeless tobacco  She reports that she does not drink alcohol and does not use drugs      Past surgical history:   Past Surgical History:   Procedure Laterality Date     SECTION      COLONOSCOPY      HYSTERECTOMY  1985    TONSILLECTOMY       Family history:   Family History   Problem Relation Age of Onset    Diabetes Mother     Hypertension Father     Prostate cancer Father     Diabetes Sister     Hypertension Sister  Breast cancer Sister 62    Diabetes Brother     Hypertension Brother     Asthma Family     No Known Problems Daughter     No Known Problems Sister     No Known Problems Daughter     No Known Problems Daughter     No Known Problems Maternal Aunt     Breast cancer Maternal Aunt 58    No Known Problems Maternal Aunt     No Known Problems Maternal Aunt     No Known Problems Paternal Aunt     No Known Problems Paternal Aunt     No Known Problems Paternal Aunt     Colon cancer Neg Hx     Ovarian cancer Neg Hx     Uterine cancer Neg Hx     Cervical cancer Neg Hx        Immunization History   Administered Date(s) Administered    SARS-CoV-2 / COVID-19 mRNA IM ([employer]) 02/14/2021, 03/15/2021     Current Outpatient Medications   Medication Sig Dispense Refill    amLODIPine (NORVASC) 5 mg tablet TAKE 1 TABLET BY MOUTH EVERY DAY 90 tablet 1    Aspirin Low Dose 81 MG chewable tablet CHEW 1 TABLET BY MOUTH DAILY 90 tablet 1    carbamide peroxide (DEBROX) 6 5 % otic solution Administer 5 drops into both ears 2 (two) times a day 15 mL 0    cyclobenzaprine (FLEXERIL) 5 mg tablet Take 1 tablet (5 mg total) by mouth 3 (three) times a day as needed for muscle spasms 30 tablet 0    diclofenac sodium (VOLTAREN) 1 % Apply to R knee 4 times a day as needed 100 g 0    enalapril (VASOTEC) 10 mg tablet TAKE 1 TABLET BY MOUTH TWICE A  tablet 1    fluticasone-salmeterol (Advair Diskus) 250-50 mcg/dose inhaler Inhale 1 puff 2 (two) times a day Rinse mouth after use   1 Inhaler 3    glucose blood (ONETOUCH VERIO) test strip E11 9 / testing daily      Janumet -1000 MG TB24 TAKE 1 TABLET BY MOUTH EVERY DAY 90 tablet 1    LUMIGAN 0 01 % ophthalmic drops       Lyrica  MG TB24 Take 1 tablet by mouth 2 (two) times a day 60 tablet 0    meloxicam (MOBIC) 15 mg tablet TAKE 1 TABLET (15 MG TOTAL) BY MOUTH DAILY WITH FOOD 30 tablet 0    mometasone (ELOCON) 0 1 % cream Apply topically daily 45 g 0    ONETOUCH DELICA LANCETS 51A MISC E11 9/ testing daily      ProAir  (90 Base) MCG/ACT inhaler INHALE 2 PUFFS BY MOUTH EVERY 6 HOURS AS NEEDED FOR WHEEZE 8 5 g 3    sertraline (ZOLOFT) 50 mg tablet TAKE 1 TABLET BY MOUTH EVERY DAY 90 tablet 1    travoprost (TRAVATAN Z) 0 004 % ophthalmic solution Apply 1 drop to eye daily at bedtime       No current facility-administered medications for this visit  Allergies: Ciprofloxacin, Levaquin [levofloxacin], and Penicillins    Objective:  Vitals:    06/28/21 1509   BP: 124/80   Pulse: 70   Temp: (!) 97 °F (36 1 °C)   SpO2: 96%   Weight: 92 5 kg (204 lb)   Height: 5' 2" (1 575 m)   Oxygen Therapy  SpO2: 96 %    Wt Readings from Last 3 Encounters:   06/28/21 92 5 kg (204 lb)   04/15/21 93 4 kg (206 lb)   09/22/20 93 2 kg (205 lb 6 4 oz)     Body mass index is 37 31 kg/m²  Physical Exam    Lab Review:   {Recent LIJQ:22966::"JTP applicable"}    Diagnostics:  {Results Review Statement:36634}  {DIAGNOSTICS:38499}  Office Spirometry Results:     ESS:    XR chest pa & lateral    Result Date: 6/18/2021  Narrative: CHEST INDICATION:   J42: Unspecified chronic bronchitis R06 02: Shortness of breath  COMPARISON:  Chest CT from 11/3/2016  EXAM PERFORMED/VIEWS:  XR CHEST PA & LATERAL  FINDINGS: Cardiomediastinal silhouette normal  Lungs clear  No effusion or pneumothorax  Osseous structures normal for age  Impression: No acute cardiopulmonary disease    Workstation performed: [id number]

## 2021-06-29 ENCOUNTER — OFFICE VISIT (OUTPATIENT)
Dept: PHYSICAL THERAPY | Age: 76
End: 2021-06-29
Payer: MEDICARE

## 2021-06-29 DIAGNOSIS — M25.561 RIGHT KNEE PAIN, UNSPECIFIED CHRONICITY: Primary | ICD-10-CM

## 2021-06-29 PROCEDURE — 97113 AQUATIC THERAPY/EXERCISES: CPT

## 2021-06-29 NOTE — PROGRESS NOTES
Daily Note     Today's date: 2021  Patient name: Jamshid Dalton  : 1945  MRN: [id number]  Referring provider: ALEKSANDER Maynard  Dx:   Encounter Diagnosis     ICD-10-CM    1  Right knee pain, unspecified chronicity  M25 561        Start Time: 0800  Stop Time: 0900  Total time in clinic (min): 60 minutes    Subjective: Patient reports having some right knee, still having pain with doing stairs nithin when carrying laundry basket  Objective: See treatment diary below  1:1 with patient for 30 min    Assessment: Tolerated treatment well, cues for ex technique and posture, a little guarding with DEW due fear of water  Patient exhibited good technique with therapeutic exercises and would benefit from continued PT      Plan: Continue per plan of care        Precautions:  Exercise Diary  6/29    5/10 5/12 5/17 5/17 5/24 6/7 6/21 6/23   Water walking 12 12 12 12 12 12 12 12 12' 12   Postural training             Gait training             Home exercise pgm/patient education             Wall: t/h raises 1 1 1 1 1 1 1 1 1 1'   Hip abd/add 2 2 2 2 2 2 2 2 2 2'   Marching 1 2 2 2 2 2 2 1 1 1'   squats 1 1 1 1 1 1 1 1 1 1'   Knee flex/ext 1 2 2 2 2 2 2 2 2 2'   SLR FF 2 2 2 2 2 2 2 2 2 2   SLS (eyes open/closed)             SLS w UE mvmt  AROM/ball toss             Weight shifting             UE Noodle work x 4              Step ups 4  4 4 4 4 4 4 4 4'   Resistive UE work (paddles, bells, TB)             Core work on noodle (sitting/stdg)             Sit on noodle with movement             Seated on pool bench w proper posture             Ankle df/pf 1 2 1 1 1 1 1 1 1 1'   marching 1 1 1 1 1 1 1 1 1 1'   Hip Ab/add 1 1 1 1 1 1 1 1 1 1'   Knee flex/ext 1 2 2 2 2 2 2 2 2 2'   Deep water mvmt 5 5 5 5 5 5 6 6 6 6'   Deep water tx/stretching 10 10 10 10 10 10 10 5 5 5'   Specific self - stretches wall/steps 2 2 2 2 2 2 2 2 2 2       Modalities  6/29  5/10 5/12 5/17 5/19 5/24 6/7 6/21 6/23   whirlpool 5 10 10 10 10 10 10 5 10 10

## 2021-07-01 ENCOUNTER — OFFICE VISIT (OUTPATIENT)
Dept: PHYSICAL THERAPY | Age: 76
End: 2021-07-01
Payer: MEDICARE

## 2021-07-01 DIAGNOSIS — M25.561 RIGHT KNEE PAIN, UNSPECIFIED CHRONICITY: Primary | ICD-10-CM

## 2021-07-01 PROCEDURE — 97113 AQUATIC THERAPY/EXERCISES: CPT

## 2021-07-01 NOTE — PROGRESS NOTES
Daily Note     Today's date: 2021  Patient name: Nirmala Haynes  : 1945  MRN: [id number]  Referring provider: ALEKSANDER Yanes  Dx:   Encounter Diagnosis     ICD-10-CM    1  Right knee pain, unspecified chronicity  M25 561        Start Time: 0900  Stop Time: 1000  Total time in clinic (min): 60 minutes    Subjective: pt notes B knees are really bothering her today  She had to go down into her basement to do the laundry and c/o increased pain after that  Objective: See treatment diary below      Assessment: Tolerated treatment well  Pain continued in knees w/ ex's but a little less after session  Pt to be away next week  Patient would benefit from continued PT      Plan: Continue per plan of care        Precautions:  Exercise Diary  6/29 7/1   5/10 5/12 5/17 5/17 5/24 6/7 6/21 6/23   Water walking 12 12 12 12 12 12 12 12 12' 12   Postural training             Gait training             Home exercise pgm/patient education             Wall: t/h raises 1 1 1 1 1 1 1 1 1 1'   Hip abd/add 2 2 2 2 2 2 2 2 2 2'   Marching 1 1 2 2 2 2 2 1 1 1'   squats 1 1 1 1 1 1 1 1 1 1'   Knee flex/ext 1 1 2 2 2 2 2 2 2 2'   SLR FF 2 2 2 2 2 2 2 2 2 2   SLS (eyes open/closed)             SLS w UE mvmt  AROM/ball toss             Weight shifting             UE Noodle work x 4              Step ups 4 4 4 4 4 4 4 4 4 4'   Resistive UE work (paddles, bells, TB)             Core work on noodle (sitting/stdg)             Sit on noodle with movement             Seated on pool bench w proper posture             Ankle df/pf 1 2 1 1 1 1 1 1 1 1'   marching 1 1 1 1 1 1 1 1 1 1'   Hip Ab/add 1 1 1 1 1 1 1 1 1 1'   Knee flex/ext 2 2 2 2 2 2 2 2 2 2'   Deep water mvmt 6 6 5 5 5 5 6 6 6 6'   Deep water tx/stretching 10 10 10 10 10 10 10 5 5 5'   Specific self - stretches wall/steps 2 2 2 2 2 2 2 2 2 2       Modalities  6/29 7/1 5/10 5/12 5/17 5/19 5/24 6/7 6/21 6/23   whirlpool 5 10 10 10 10 10 10 5 10 10

## 2021-07-12 DIAGNOSIS — M25.561 ACUTE PAIN OF RIGHT KNEE: ICD-10-CM

## 2021-07-12 RX ORDER — MELOXICAM 15 MG/1
TABLET ORAL
Qty: 30 TABLET | Refills: 1 | Status: SHIPPED | OUTPATIENT
Start: 2021-07-12 | End: 2021-09-10

## 2021-07-16 ENCOUNTER — APPOINTMENT (OUTPATIENT)
Dept: LAB | Facility: CLINIC | Age: 76
End: 2021-07-16
Payer: MEDICARE

## 2021-07-16 ENCOUNTER — HOSPITAL ENCOUNTER (OUTPATIENT)
Dept: NON INVASIVE DIAGNOSTICS | Facility: CLINIC | Age: 76
Discharge: HOME | End: 2021-07-16
Payer: MEDICARE

## 2021-07-16 DIAGNOSIS — R06.02 SOB (SHORTNESS OF BREATH): ICD-10-CM

## 2021-07-16 PROCEDURE — 93306 TTE W/DOPPLER COMPLETE: CPT | Performed by: INTERNAL MEDICINE

## 2021-07-16 PROCEDURE — 93306 TTE W/DOPPLER COMPLETE: CPT

## 2021-07-20 ENCOUNTER — OFFICE VISIT (OUTPATIENT)
Dept: PHYSICAL THERAPY | Age: 76
End: 2021-07-20
Payer: MEDICARE

## 2021-07-20 DIAGNOSIS — M25.561 RIGHT KNEE PAIN, UNSPECIFIED CHRONICITY: Primary | ICD-10-CM

## 2021-07-20 PROCEDURE — 97113 AQUATIC THERAPY/EXERCISES: CPT

## 2021-07-20 NOTE — PROGRESS NOTES
Daily Note     Today's date: 2021  Patient name: Narendra Maya  : 1945  MRN: [id number]  Referring provider: ALEKSANDER Pryor  Dx:   Encounter Diagnosis     ICD-10-CM    1  Right knee pain, unspecified chronicity  M25 561        Start Time: 0800  Stop Time: 0900  Total time in clinic (min): 60 minutes    Subjective: Patient reports she was out of town and couldn't make it to therapy past few weeks  Objective: See treatment diary below      Assessment: Tolerated treatment well  Patient exhibited good technique with therapeutic exercises and would benefit from continued PT    1:1 30 minutes in pool  Plan: Continue per plan of care        Precautions:  Exercise Diary     Water walking 12 12 12 12 12 12 12 12 12' 12   Postural training             Gait training             Home exercise pgm/patient education             Wall: t/h raises 1 1 1 1 1 1 1 1 1 1'   Hip abd/add 2 2 2 2 2 2 2 2 2 2'   Marching 1 1 2 2 2 2 2 1 1 1'   squats 1 1 1 1 1 1 1 1 1 1'   Knee flex/ext 1 1 2 2 2 2 2 2 2 2'   SLR FF 2 2 2 2 2 2 2 2 2 2   SLS (eyes open/closed)             SLS w UE mvmt  AROM/ball toss             Weight shifting             UE Noodle work x 4              Step ups 4 4 4 4 4 4 4 4 4 4'   Resistive UE work (paddles, bells, TB)             Core work on noodle (sitting/stdg)             Sit on noodle with movement             Seated on pool bench w proper posture             Ankle df/pf 1 2 1 1 1 1 1 1 1 1'   marching 1 1 1 1 1 1 1 1 1 1'   Hip Ab/add 1 1 1 1 1 1 1 1 1 1'   Knee flex/ext 2 2 2 2 2 2 2 2 2 2'   Deep water mvmt 6 6 6 5 5 5 6 6 6 6'   Deep water tx/stretching 10 10 10 10 10 10 10 5 5 5'   Specific self - stretches wall/steps 2 2 2 2 2 2 2 2 2 2       Modalities     whirlpool 5 10 10 10 10 10 10 5 10 10

## 2021-07-21 DIAGNOSIS — J45.990 EXERCISE INDUCED BRONCHOSPASM: ICD-10-CM

## 2021-07-21 DIAGNOSIS — R06.00 DOE (DYSPNEA ON EXERTION): ICD-10-CM

## 2021-07-21 RX ORDER — MONTELUKAST SODIUM 10 MG/1
TABLET ORAL
Qty: 30 TABLET | Refills: 1 | Status: SHIPPED | OUTPATIENT
Start: 2021-07-21 | End: 2021-08-17

## 2021-07-23 ENCOUNTER — OFFICE VISIT (OUTPATIENT)
Dept: PHYSICAL THERAPY | Age: 76
End: 2021-07-23
Payer: MEDICARE

## 2021-07-23 DIAGNOSIS — M25.561 RIGHT KNEE PAIN, UNSPECIFIED CHRONICITY: Primary | ICD-10-CM

## 2021-07-23 PROCEDURE — 97113 AQUATIC THERAPY/EXERCISES: CPT

## 2021-07-23 NOTE — PROGRESS NOTES
Daily Note     Today's date: 2021  Patient name: Richard Capps  : 1945  MRN: [id number]  Referring provider: ALEKSANDER Ruiz  Dx:   Encounter Diagnosis     ICD-10-CM    1  Right knee pain, unspecified chronicity  M25 561        Start Time: 1515  Stop Time: 1615  Total time in clinic (min): 60 minutes    Subjective: Reports pain at her R knee today       Objective: See treatment diary below      Assessment: Tolerated treatment well  C/o burning at her R knee with exercises that subsides some with rest  Continues with some burning post session  CS on pool deck for safety  Patient did well with exercises performed below, cues for carryover of program needed  Monitoring of time needed to ensure patient performs the proper quantity of exercises  Patient would benefit from continued PT  Plan: Continue per plan of care        Precautions:  Exercise Diary     Water walking 12 12 12 12' 12 12 12 12 12' 12   Postural training             Gait training             Home exercise pgm/patient education             Wall: t/h raises 1 1 1 1' 1 1 1 1 1 1'   Hip abd/add 2 2 2 2' 2 2 2 2 2 2'   Marching 1 1 2 2' 2 2 2 1 1 1'   squats 1 1 1 1' 1 1 1 1 1 1'   Knee flex/ext 1 1 2 2' 2 2 2 2 2 2'   SLR FF 2 2 2 2' 2 2 2 2 2 2   SLS (eyes open/closed)             SLS w UE mvmt  AROM/ball toss             Weight shifting             UE Noodle work x 4              Step ups 4 4 4 4' 4 4 4 4 4 4'   Resistive UE work (paddles, bells, TB)             Core work on noodle (sitting/stdg)             Sit on noodle with movement             Seated on pool bench w proper posture             Ankle df/pf 1 2 1 1' 1 1 1 1 1 1'   marching 1 1 1 1' 1 1 1 1 1 1'   Hip Ab/add 1 1 1 1' 1 1 1 1 1 1'   Knee flex/ext 2 2 2 1' 2 2 2 2 2 2'   Deep water mvmt 6 6 6 6' 5 5 6 6 6 6'   Deep water tx/stretching 10 10 10 10' 10 10 10 5 5 5'   Specific self - stretches wall/steps 2 2 2 2' 2 2 2 2 2 2       Modalities  6/29 7/1 7/20 7/23 5/19 5/24 6/7 6/21 6/23   whirlpool 5 10 10 10' 10 10 10 5 10 10

## 2021-07-28 ENCOUNTER — OFFICE VISIT (OUTPATIENT)
Dept: PHYSICAL THERAPY | Age: 76
End: 2021-07-28
Payer: MEDICARE

## 2021-07-28 DIAGNOSIS — M25.561 RIGHT KNEE PAIN, UNSPECIFIED CHRONICITY: Primary | ICD-10-CM

## 2021-07-28 PROCEDURE — 97113 AQUATIC THERAPY/EXERCISES: CPT

## 2021-07-28 NOTE — PROGRESS NOTES
Daily Note     Today's date: 2021  Patient name: Margery Shone  : 1945  MRN: [id number]  Referring provider: ALEKSANDER Magaña  Dx:   Encounter Diagnosis     ICD-10-CM    1  Right knee pain, unspecified chronicity  M25 561        Start Time: 1400  Stop Time: 1500  Total time in clinic (min): 60 minutes    Subjective: Patient reports some pain in her right knee still  Objective: See treatment diary below      Assessment: Tolerated treatment well, continues to have some discomfort in her right knee with TE but subsides with rest breaks  Close supervision with land walking for safety  Cues for ex technique and core activation  Patient exhibited good technique with therapeutic exercises and would benefit from continued PT      Plan: Continue per plan of care        Precautions:  Exercise Diary     Water walking 12 12 12 12' 12 12 12 12 12' 12   Postural training             Gait training             Home exercise pgm/patient education             Wall: t/h raises 1 1 1 1' 1 1 1 1 1 1'   Hip abd/add 2 2 2 2' 2 2 2 2 2 2'   Marching 1 1 2 2' 2 2 2 1 1 1'   squats 1 1 1 1' 1 1 1 1 1 1'   Knee flex/ext 1 1 2 2' 2 2 2 2 2 2'   SLR FF 2 2 2 2' 2 2 2 2 2 2   SLS (eyes open/closed)             SLS w UE mvmt  AROM/ball toss             Weight shifting             UE Noodle work x 4              Step ups 4 4 4 4' 4 4 4 4 4 4'   Resistive UE work (paddles, bells, TB)             Core work on noodle (sitting/stdg)             Sit on noodle with movement             Seated on pool bench w proper posture             Ankle df/pf 1 2 1 1' 1 1 1 1 1 1'   marching 1 1 1 1' 1 1 1 1 1 1'   Hip Ab/add 1 1 1 1' 1 1 1 1 1 1'   Knee flex/ext 2 2 2 1' 2 2 2 2 2 2'   Deep water mvmt 6 6 6 6' 5 5 6 6 6 6'   Deep water tx/stretching 10 10 10 10' 10 10 10 5 5 5'   Specific self - stretches wall/steps 2 2 2 2' 2 2 2 2 2 2       Modalities   whirlpool 5 10 10 10' 10 10 10 5 10 10

## 2021-07-30 ENCOUNTER — OFFICE VISIT (OUTPATIENT)
Dept: PHYSICAL THERAPY | Age: 76
End: 2021-07-30
Payer: MEDICARE

## 2021-07-30 DIAGNOSIS — Z00.00 MEDICARE ANNUAL WELLNESS VISIT, SUBSEQUENT: Primary | ICD-10-CM

## 2021-07-30 PROCEDURE — 97113 AQUATIC THERAPY/EXERCISES: CPT

## 2021-07-30 NOTE — PROGRESS NOTES
Daily Note     Today's date: 2021  Patient name: Sara Marin  : 1945  MRN: [id number]  Referring provider: ALEKSANDER Scott  Dx:   Encounter Diagnosis     ICD-10-CM    1  Medicare annual wellness visit, subsequent  Z00 00                   Subjective: Patient reports she is feeling pretty good today  Objective: See treatment diary below      Assessment: Tolerated treatment well, continued to provide close supervision with land walking for safety  VC provided t/o program for execution of proper technique and maintaining core engagement  Patient offers no complaints post session  Plan: Continue per plan of care        Precautions:  Exercise Diary     Water walking 12 12 12 12' 12 12 12 12 12' 12   Postural training             Gait training             Home exercise pgm/patient education             Wall: t/h raises 1 1 1 1' 1 1 1 1 1 1'   Hip abd/add 2 2 2 2' 2 2 2 2 2 2'   Marching 1 1 2 2' 2 2 2 1 1 1'   squats 1 1 1 1' 1 1 1 1 1 1'   Knee flex/ext 1 1 2 2' 2 2 2 2 2 2'   SLR FF 2 2 2 2' 2 2 2 2 2 2   SLS (eyes open/closed)             SLS w UE mvmt  AROM/ball toss             Weight shifting             UE Noodle work x 4              Step ups 4 4 4 4' 4 4 4 4 4 4'   Resistive UE work (paddles, bells, TB)             Core work on noodle (sitting/stdg)             Sit on noodle with movement             Seated on pool bench w proper posture             Ankle df/pf 1 2 1 1' 1 1 1 1 1 1'   marching 1 1 1 1' 1 1 1 1 1 1'   Hip Ab/add 1 1 1 1' 1 1 1 1 1 1'   Knee flex/ext 2 2 2 1' 2 2 2 2 2 2'   Deep water mvmt 6 6 6 6' 5 5 6 6 6 6'   Deep water tx/stretching 10 10 10 10' 10 10 10 5 5 5'   Specific self - stretches wall/steps 2 2 2 2' 2 2 2 2 2 2       Modalities     whirlpool 5 10 10 10' 10 10 10 5 10 10                               Daily Note     Today's date: 2021  Patient name: Elmira Farris [facility]  : 1945  MRN: [id number]  Referring provider: ALEKSANDER Landa  Dx:   Encounter Diagnosis     ICD-10-CM    1  Medicare annual wellness visit, subsequent                     Subjective: Patient reports some pain in her right knee still  Objective: See treatment diary below      Assessment: Tolerated treatment well, continues to have some discomfort in her right knee with TE but subsides with rest breaks  Close supervision with land walking for safety  Cues for ex technique and core activation  Patient exhibited good technique with therapeutic exercises and would benefit from continued PT      Plan: Continue per plan of care        Precautions:  Exercise Diary     Water walking 12 12 12 12' 12 12 12 12 12' 12   Postural training             Gait training             Home exercise pgm/patient education             Wall: t/h raises 1 1 1 1' 1 1 1 1 1 1'   Hip abd/add 2 2 2 2' 2 2 2 2 2 2'   Marching 1 1 2 2' 2 2 2 1 1 1'   squats 1 1 1 1' 1 1 1 1 1 1'   Knee flex/ext 1 1 2 2' 2 2 2 2 2 2'   SLR FF 2 2 2 2' 2 2 2 2 2 2   SLS (eyes open/closed)             SLS w UE mvmt  AROM/ball toss             Weight shifting             UE Noodle work x 4              Step ups 4 4 4 4' 4 4 4 4 4 4'   Resistive UE work (paddles, bells, TB)             Core work on noodle (sitting/stdg)             Sit on noodle with movement             Seated on pool bench w proper posture             Ankle df/pf 1 2 1 1' 1 1 1 1 1 1'   marching 1 1 1 1' 1 1 1 1 1 1'   Hip Ab/add 1 1 1 1' 1 1 1 1 1 1'   Knee flex/ext 2 2 2 1' 2 2 2 2 2 2'   Deep water mvmt 6 6 6 6' 5 5 6 6 6 6'   Deep water tx/stretching 10 10 10 10' 10 10 10 5 5 5'   Specific self - stretches wall/steps 2 2 2 2' 2 2 2 2 2 2       Modalities     whirlpool 5 10 10 10' 10 10 10 5 10 10

## 2021-08-04 ENCOUNTER — OFFICE VISIT (OUTPATIENT)
Dept: PHYSICAL THERAPY | Age: 76
End: 2021-08-04
Payer: MEDICARE

## 2021-08-04 DIAGNOSIS — M25.561 RIGHT KNEE PAIN, UNSPECIFIED CHRONICITY: Primary | ICD-10-CM

## 2021-08-04 PROCEDURE — 97113 AQUATIC THERAPY/EXERCISES: CPT

## 2021-08-04 NOTE — PROGRESS NOTES
Daily Note     Today's date: 2021  Patient name: Cherylene Slice  : 1945  MRN: [id number]  Referring provider: ALEKSANDER Solis  Dx:   Encounter Diagnosis     ICD-10-CM    1  Right knee pain, unspecified chronicity  M25 561        Start Time: 1210  Stop Time: 1310  Total time in clinic (min): 60 minutes    Subjective: Reports pain at B knees today  Objective: See treatment diary below      Assessment: Tolerated treatment well  Patient is much more comfortable in water, especially when entering deep end  Cues for carryover of exercises and to ensure proper form t/o session  Monitoring of time to ensure proper quantity of exercises are performed  VC's for lateral step ups provided today to avoid compensation and to activate glutes  Added deep water hip abd/add and scissor today with good tolerance  Patient would benefit from continued PT  Plan: Continue per plan of care        Precautions:  Exercise Diary     Water walking 12 12 12 12' 12 12 12 12 12' 12   Postural training             Gait training             Home exercise pgm/patient education             Wall: t/h raises 1 1 1 1' 1 1 1 1 1 1'   Hip abd/add 2 2 2 2' 2 2 2 2 2 2'   Marching 1 1 2 2' 2 2 2 1 1 1'   squats 1 1 1 1' 1 1 1 1 1 1'   Knee flex/ext 1 1 2 2' 2 2 2 2 2 2'   SLR FF 2 2 2 2' 2 2 2 2 2 2   SLS (eyes open/closed)             SLS w UE mvmt  AROM/ball toss             Weight shifting             UE Noodle work x 4              Step ups 4 4 4 4' 4 4 4 4 4 4'   Resistive UE work (paddles, bells, TB)             Core work on noodle (sitting/stdg)             Sit on noodle with movement             Seated on pool bench w proper posture             Ankle df/pf 1 2 1 1' 1 1 1 1 1 1'   marching 1 1 1 1' 1 1 1 1 1 1'   Hip Ab/add 1 1 1 1' 1 1 1 1 1 1'   Knee flex/ext 2 2 2 1' 2 2 2 2 2 2'   Deep water mvmt 6 6 6 6' 5 5 7' 6 6 6'   Deep water tx/stretching 10 10 10 10' 10 10 10' 5 5 5' Specific self - stretches wall/steps 2 2 2 2' 2 2 2' 2 2 2       Modalities  6/29 7/1 7/20 7/23 7/28 5/24 6/7 6/21 6/23   whirlpool 5 10 10 10' 10 10 10' 5 10 10

## 2021-08-06 ENCOUNTER — OFFICE VISIT (OUTPATIENT)
Dept: PHYSICAL THERAPY | Age: 76
End: 2021-08-06
Payer: MEDICARE

## 2021-08-06 DIAGNOSIS — M25.561 RIGHT KNEE PAIN, UNSPECIFIED CHRONICITY: Primary | ICD-10-CM

## 2021-08-06 DIAGNOSIS — Z00.00 MEDICARE ANNUAL WELLNESS VISIT, SUBSEQUENT: ICD-10-CM

## 2021-08-06 PROCEDURE — 97113 AQUATIC THERAPY/EXERCISES: CPT

## 2021-08-06 NOTE — PROGRESS NOTES
Daily Note     Today's date: 2021  Patient name: Glenn Corral  : 1945  MRN: [id number]  Referring provider: ALEKSANDER Multani  Dx:   Encounter Diagnosis     ICD-10-CM    1  Right knee pain, unspecified chronicity  M25 561    2  Medicare annual wellness visit, subsequent  Z00 00                   Subjective: Patient reports feeling better overall  Objective: See treatment diary below      Assessment: Tolerated treatment well, no c/o pain with TE  doing well with program   Patient exhibited good technique with therapeutic exercises and would benefit from continued PT      Plan: Continue per plan of care  prepare to D/C        Precautions:  Exercise Diary   8   Water walking 12 12 12 12' 12 12 12 12 12' 12   Postural training             Gait training             Home exercise pgm/patient education             Wall: t/h raises 1 1 1 1' 1 1 1 1 1 1'   Hip abd/add 2 2 2 2' 2 2 2 2 2 2'   Marching 1 1 2 2' 2 2 2 1 1 1'   squats 1 1 1 1' 1 1 1 1 1 1'   Knee flex/ext 1 1 2 2' 2 2 2 2 2 2'   SLR FF 2 2 2 2' 2 2 2 2 2 2   SLS (eyes open/closed)             SLS w UE mvmt  AROM/ball toss             Weight shifting             UE Noodle work x 4              Step ups 4 4 4 4' 4 4 4 4 4 4'   Resistive UE work (paddles, bells, TB)             Core work on noodle (sitting/stdg)             Sit on noodle with movement             Seated on pool bench w proper posture             Ankle df/pf 1 2 1 1' 1 1 1 1 1 1'   marching 1 1 1 1' 1 1 1 1 1 1'   Hip Ab/add 1 1 1 1' 1 1 1 1 1 1'   Knee flex/ext 2 2 2 1' 2 2 2 2 2 2'   Deep water mvmt 6 6 6 6' 5 5 7' 6 6 6'   Deep water tx/stretching 10 10 10 10' 10 10 10' 10 5 5'   Specific self - stretches wall/steps 2 2 2 2' 2 2 2' 2 2 2       Modalities     whirlpool 5 10 10 10' 10 10 10' 5 10 10

## 2021-08-09 ENCOUNTER — OFFICE VISIT (OUTPATIENT)
Dept: PHYSICAL THERAPY | Age: 76
End: 2021-08-09
Payer: MEDICARE

## 2021-08-09 DIAGNOSIS — M25.561 RIGHT KNEE PAIN, UNSPECIFIED CHRONICITY: Primary | ICD-10-CM

## 2021-08-09 PROCEDURE — 97113 AQUATIC THERAPY/EXERCISES: CPT

## 2021-08-09 NOTE — PROGRESS NOTES
Daily Note     Today's date: 2021  Patient name: Comfort Marcano  : 1945  MRN: [id number]  Referring provider: ALEKSANDER Centeno  Dx:   Encounter Diagnosis     ICD-10-CM    1  Right knee pain, unspecified chronicity  M25 561        Start Time: 0900  Stop Time: 1000  Total time in clinic (min): 60 minutes    Subjective: No new complaints or concerns  Objective: See treatment diary below      Assessment: Tolerated treatment well  Cues for carryover of exercises and to ensure proper quantity of exercises are performed  Extra cues for FW and lateral step ups to ensure proper technique and form  Patient would benefit from continued PT  Plan: Continue per plan of care        Precautions:  Exercise Diary      Water walking 12 12 12 12' 12 12 12 12 12' 12   Postural training             Gait training             Home exercise pgm/patient education             Wall: t/h raises 1 1 1 1' 1 1 1 1 1' 1'   Hip abd/add 2 2 2 2' 2 2 2 2 2' 2'   Marching 1 1 2 2' 2 2 2 1 1' 1'   squats 1 1 1 1' 1 1 1 1 1' 1'   Knee flex/ext 1 1 2 2' 2 2 2 2 2' 2'   SLR FF 2 2 2 2' 2 2 2 2 2' 2   SLS (eyes open/closed)             SLS w UE mvmt  AROM/ball toss             Weight shifting             UE Noodle work x 4              Step ups 4 4 4 4' 4 4 4 4 4' 4'   Resistive UE work (paddles, bells, TB)             Core work on noodle (sitting/stdg)             Sit on noodle with movement             Seated on pool bench w proper posture             Ankle df/pf 1 2 1 1' 1 1 1 1 1' 1'   marching 1 1 1 1' 1 1 1 1 1' 1'   Hip Ab/add 1 1 1 1' 1 1 1 1 1' 1'   Knee flex/ext 2 2 2 1' 2 2 2 2 2' 2'   Deep water mvmt 6 6 6 6' 5 5 7' 6 6' 6'   Deep water tx/stretching 10 10 10 10' 10 10 10' 10 5' 5'   Specific self - stretches wall/steps 2 2 2 2' 2 2 2' 2 2' 2       Modalities  6/29 7/1 7/20 7/23 7/28  5/24 8/6 8/9    whirlpool 5 10 10 10' 10 10 10' 5 10' 10

## 2021-08-11 ENCOUNTER — APPOINTMENT (OUTPATIENT)
Dept: PHYSICAL THERAPY | Age: 76
End: 2021-08-11
Payer: MEDICARE

## 2021-08-13 ENCOUNTER — OFFICE VISIT (OUTPATIENT)
Dept: PHYSICAL THERAPY | Age: 76
End: 2021-08-13
Payer: MEDICARE

## 2021-08-13 DIAGNOSIS — Z00.00 MEDICARE ANNUAL WELLNESS VISIT, SUBSEQUENT: ICD-10-CM

## 2021-08-13 DIAGNOSIS — M25.561 RIGHT KNEE PAIN, UNSPECIFIED CHRONICITY: Primary | ICD-10-CM

## 2021-08-13 PROCEDURE — 97113 AQUATIC THERAPY/EXERCISES: CPT

## 2021-08-13 NOTE — PROGRESS NOTES
Daily Note     Today's date: 2021  Patient name: Cherylene Slice  : 1945  MRN: [id number]  Referring provider: ALEKSANDER Solis  Dx:   Encounter Diagnosis     ICD-10-CM    1  Right knee pain, unspecified chronicity  M25 561    2  Medicare annual wellness visit, subsequent  Z00 00        Start Time: 0900  Stop Time: 1000  Total time in clinic (min): 60 minutes    Subjective: pt notes back is feeling better since she started PT  Pt wants to continue exercising in the pool w/ our fitness program here  Objective: See treatment diary below  Pt seen 1:1 for 30 minutes    Assessment: Tolerated treatment well  Patient exhibited good technique with therapeutic exercises  HEP issued to pt, information given to pt regarding our fitness aquatic program offered here  Pt has made good progress w/ PT  Pt has become confident in the water w/ improved ROM and overall core and back strengthening         Plan: discharge pt by PT to fitness program/HEP     Precautions:  Exercise Diary   8   Water walking 12 12 12 12' 12 12 12 12 12' 12   Postural training             Gait training             Home exercise pgm/patient education             Wall: t/h raises 1 1 1 1' 1 1 1 1 1' 1'   Hip abd/add 2 2 2 2' 2 2 2 2 2' 2'   Marching 1 1 2 2' 2 2 2 1 1' 1'   squats 1 1 1 1' 1 1 1 1 1' 1'   Knee flex/ext 1 1 2 2' 2 2 2 2 2' 2'   SLR FF 2 2 2 2' 2 2 2 2 2' 2   SLS (eyes open/closed)             SLS w UE mvmt  AROM/ball toss             Weight shifting             UE Noodle work x 4              Step ups 4 4 4 4' 4 4 4 4 4' 4'   Resistive UE work (paddles, bells, TB)             Core work on noodle (sitting/stdg)             Sit on noodle with movement             Seated on pool bench w proper posture             Ankle df/pf 1 2 1 1' 1 1 1 1 1' 1'   marching 1 1 1 1' 1 1 1 1 1' 1'   Hip Ab/add 1 1 1 1' 1 1 1 1 1' 1'   Knee flex/ext 2 2 2 1' 2 2 2 2 2' 2'   Deep water mvmt 6 6 6 6' 5 5 7' 6 6' 6'   Deep water tx/stretching 10 10 10 10' 10 10 10' 10 5' 5'   Specific self - stretches wall/steps 2 2 2 2' 2 2 2' 2 2' 2       Modalities  6/29 7/1 7/20 7/23 7/28 5/24 8/6 8/9 8/13   whirlpool 5 10 10 10' 10 10 10' 5 10' 10

## 2021-08-17 DIAGNOSIS — J45.990 EXERCISE INDUCED BRONCHOSPASM: ICD-10-CM

## 2021-08-17 DIAGNOSIS — R06.00 DOE (DYSPNEA ON EXERTION): ICD-10-CM

## 2021-08-17 RX ORDER — MONTELUKAST SODIUM 10 MG/1
TABLET ORAL
Qty: 30 TABLET | Refills: 1 | Status: SHIPPED | OUTPATIENT
Start: 2021-08-17 | End: 2021-09-10

## 2021-09-10 DIAGNOSIS — J45.990 EXERCISE INDUCED BRONCHOSPASM: ICD-10-CM

## 2021-09-10 DIAGNOSIS — R06.00 DOE (DYSPNEA ON EXERTION): ICD-10-CM

## 2021-09-10 DIAGNOSIS — M25.561 ACUTE PAIN OF RIGHT KNEE: ICD-10-CM

## 2021-09-10 RX ORDER — MELOXICAM 15 MG/1
TABLET ORAL
Qty: 30 TABLET | Refills: 1 | Status: SHIPPED | OUTPATIENT
Start: 2021-09-10 | End: 2021-11-08

## 2021-09-10 RX ORDER — MONTELUKAST SODIUM 10 MG/1
TABLET ORAL
Qty: 30 TABLET | Refills: 1 | Status: SHIPPED | OUTPATIENT
Start: 2021-09-10 | End: 2021-10-29

## 2021-09-16 ENCOUNTER — HOSPITAL ENCOUNTER (OUTPATIENT)
Dept: PULMONOLOGY | Facility: HOSPITAL | Age: 76
Discharge: HOME | End: 2021-09-16
Attending: INTERNAL MEDICINE
Payer: MEDICARE

## 2021-09-16 DIAGNOSIS — R06.02 SOB (SHORTNESS OF BREATH): ICD-10-CM

## 2021-09-16 PROCEDURE — 94618 PULMONARY STRESS TESTING: CPT | Performed by: INTERNAL MEDICINE

## 2021-09-16 PROCEDURE — 94729 DIFFUSING CAPACITY: CPT | Performed by: INTERNAL MEDICINE

## 2021-09-16 PROCEDURE — 94761 N-INVAS EAR/PLS OXIMETRY MLT: CPT

## 2021-09-16 PROCEDURE — 94729 DIFFUSING CAPACITY: CPT

## 2021-09-16 PROCEDURE — 94727 GAS DIL/WSHOT DETER LNG VOL: CPT

## 2021-09-16 PROCEDURE — 94060 EVALUATION OF WHEEZING: CPT

## 2021-09-16 PROCEDURE — 94727 GAS DIL/WSHOT DETER LNG VOL: CPT | Performed by: INTERNAL MEDICINE

## 2021-09-16 PROCEDURE — 94060 EVALUATION OF WHEEZING: CPT | Performed by: INTERNAL MEDICINE

## 2021-09-16 RX ORDER — ALBUTEROL SULFATE 2.5 MG/3ML
2.5 SOLUTION RESPIRATORY (INHALATION) ONCE
Status: COMPLETED | OUTPATIENT
Start: 2021-09-16 | End: 2021-09-16

## 2021-09-16 RX ADMIN — ALBUTEROL SULFATE 2.5 MG: 2.5 SOLUTION RESPIRATORY (INHALATION) at 10:30

## 2021-09-19 DIAGNOSIS — I10 BENIGN HYPERTENSION: ICD-10-CM

## 2021-09-19 RX ORDER — ENALAPRIL MALEATE 10 MG/1
TABLET ORAL
Qty: 180 TABLET | Refills: 1 | Status: SHIPPED | OUTPATIENT
Start: 2021-09-19 | End: 2022-01-04 | Stop reason: DRUGHIGH

## 2021-10-06 DIAGNOSIS — E11.40 CONTROLLED TYPE 2 DIABETES WITH NEUROPATHY (HCC): ICD-10-CM

## 2021-10-06 DIAGNOSIS — F41.8 DEPRESSION WITH ANXIETY: ICD-10-CM

## 2021-10-06 RX ORDER — SITAGLIPTIN AND METFORMIN HYDROCHLORIDE 100; 1000 MG/1; MG/1
TABLET, FILM COATED, EXTENDED RELEASE ORAL
Qty: 90 TABLET | Refills: 1 | Status: SHIPPED | OUTPATIENT
Start: 2021-10-06 | End: 2022-03-28

## 2021-10-07 DIAGNOSIS — I10 BENIGN HYPERTENSION: ICD-10-CM

## 2021-10-07 RX ORDER — AMLODIPINE BESYLATE 5 MG/1
TABLET ORAL
Qty: 90 TABLET | Refills: 1 | Status: SHIPPED | OUTPATIENT
Start: 2021-10-07 | End: 2021-12-22

## 2021-10-29 DIAGNOSIS — J45.990 EXERCISE INDUCED BRONCHOSPASM: ICD-10-CM

## 2021-10-29 DIAGNOSIS — R06.00 DOE (DYSPNEA ON EXERTION): ICD-10-CM

## 2021-10-29 RX ORDER — MONTELUKAST SODIUM 10 MG/1
TABLET ORAL
Qty: 30 TABLET | Refills: 1 | Status: SHIPPED | OUTPATIENT
Start: 2021-10-29 | End: 2021-11-24

## 2021-11-08 DIAGNOSIS — M25.561 ACUTE PAIN OF RIGHT KNEE: ICD-10-CM

## 2021-11-08 RX ORDER — MELOXICAM 15 MG/1
TABLET ORAL
Qty: 30 TABLET | Refills: 1 | Status: SHIPPED | OUTPATIENT
Start: 2021-11-08 | End: 2022-01-03

## 2021-11-24 DIAGNOSIS — R06.00 DOE (DYSPNEA ON EXERTION): ICD-10-CM

## 2021-11-24 DIAGNOSIS — J45.990 EXERCISE INDUCED BRONCHOSPASM: ICD-10-CM

## 2021-11-24 RX ORDER — MONTELUKAST SODIUM 10 MG/1
TABLET ORAL
Qty: 30 TABLET | Refills: 1 | Status: SHIPPED | OUTPATIENT
Start: 2021-11-24 | End: 2021-12-22

## 2021-11-30 DIAGNOSIS — J45.20 MILD INTERMITTENT ASTHMA, UNSPECIFIED WHETHER COMPLICATED: ICD-10-CM

## 2021-12-18 ENCOUNTER — APPOINTMENT (EMERGENCY)
Dept: RADIOLOGY | Facility: HOSPITAL | Age: 76
End: 2021-12-18
Payer: MEDICARE

## 2021-12-18 ENCOUNTER — HOSPITAL ENCOUNTER (EMERGENCY)
Facility: HOSPITAL | Age: 76
Discharge: HOME | End: 2021-12-19
Attending: EMERGENCY MEDICINE
Payer: MEDICARE

## 2021-12-18 DIAGNOSIS — R07.9 CHEST PAIN: ICD-10-CM

## 2021-12-18 DIAGNOSIS — H11.31 SUBCONJUNCTIVAL HEMORRHAGE OF RIGHT EYE: Primary | ICD-10-CM

## 2021-12-18 LAB
ALBUMIN SERPL BCP-MCNC: 3.8 G/DL (ref 3.5–5)
ALP SERPL-CCNC: 78 U/L (ref 46–116)
ALT SERPL W P-5'-P-CCNC: 9 U/L (ref 12–78)
ANION GAP SERPL CALCULATED.3IONS-SCNC: 9 MMOL/L (ref 4–13)
AST SERPL W P-5'-P-CCNC: 6 U/L (ref 5–45)
BASOPHILS # BLD AUTO: 0.04 THOUSANDS/ΜL (ref 0–0.1)
BASOPHILS NFR BLD AUTO: 1 % (ref 0–1)
BILIRUB SERPL-MCNC: 0.21 MG/DL (ref 0.2–1)
BUN SERPL-MCNC: 17 MG/DL (ref 5–25)
CALCIUM SERPL-MCNC: 9.2 MG/DL (ref 8.3–10.1)
CARDIAC TROPONIN I PNL SERPL HS: <2 NG/L
CHLORIDE SERPL-SCNC: 104 MMOL/L (ref 100–108)
CO2 SERPL-SCNC: 28 MMOL/L (ref 21–32)
CREAT SERPL-MCNC: 0.97 MG/DL (ref 0.6–1.3)
EOSINOPHIL # BLD AUTO: 0.38 THOUSAND/ΜL (ref 0–0.61)
EOSINOPHIL NFR BLD AUTO: 6 % (ref 0–6)
ERYTHROCYTE [DISTWIDTH] IN BLOOD BY AUTOMATED COUNT: 14.4 % (ref 11.6–15.1)
GFR SERPL CREATININE-BSD FRML MDRD: 57 ML/MIN/1.73SQ M
GLUCOSE SERPL-MCNC: 164 MG/DL (ref 65–140)
HCT VFR BLD AUTO: 37.8 % (ref 34.8–46.1)
HGB BLD-MCNC: 12 G/DL (ref 11.5–15.4)
IMM GRANULOCYTES # BLD AUTO: 0.01 THOUSAND/UL (ref 0–0.2)
IMM GRANULOCYTES NFR BLD AUTO: 0 % (ref 0–2)
LYMPHOCYTES # BLD AUTO: 2.39 THOUSANDS/ΜL (ref 0.6–4.47)
LYMPHOCYTES NFR BLD AUTO: 38 % (ref 14–44)
MCH RBC QN AUTO: 28.6 PG (ref 26.8–34.3)
MCHC RBC AUTO-ENTMCNC: 31.7 G/DL (ref 31.4–37.4)
MCV RBC AUTO: 90 FL (ref 82–98)
MONOCYTES # BLD AUTO: 0.39 THOUSAND/ΜL (ref 0.17–1.22)
MONOCYTES NFR BLD AUTO: 6 % (ref 4–12)
NEUTROPHILS # BLD AUTO: 3.14 THOUSANDS/ΜL (ref 1.85–7.62)
NEUTS SEG NFR BLD AUTO: 49 % (ref 43–75)
NRBC BLD AUTO-RTO: 0 /100 WBCS
PLATELET # BLD AUTO: 247 THOUSANDS/UL (ref 149–390)
PMV BLD AUTO: 9.8 FL (ref 8.9–12.7)
POTASSIUM SERPL-SCNC: 3.5 MMOL/L (ref 3.5–5.3)
PROT SERPL-MCNC: 8.3 G/DL (ref 6.4–8.2)
RBC # BLD AUTO: 4.2 MILLION/UL (ref 3.81–5.12)
SODIUM SERPL-SCNC: 141 MMOL/L (ref 136–145)
WBC # BLD AUTO: 6.35 THOUSAND/UL (ref 4.31–10.16)

## 2021-12-18 PROCEDURE — 99285 EMERGENCY DEPT VISIT HI MDM: CPT | Performed by: PHYSICIAN ASSISTANT

## 2021-12-18 PROCEDURE — 84484 ASSAY OF TROPONIN QUANT: CPT | Performed by: EMERGENCY MEDICINE

## 2021-12-18 PROCEDURE — 71045 X-RAY EXAM CHEST 1 VIEW: CPT

## 2021-12-18 PROCEDURE — 99285 EMERGENCY DEPT VISIT HI MDM: CPT

## 2021-12-18 PROCEDURE — 36415 COLL VENOUS BLD VENIPUNCTURE: CPT

## 2021-12-18 PROCEDURE — 85025 COMPLETE CBC W/AUTO DIFF WBC: CPT | Performed by: EMERGENCY MEDICINE

## 2021-12-18 PROCEDURE — 93005 ELECTROCARDIOGRAM TRACING: CPT

## 2021-12-18 PROCEDURE — 80053 COMPREHEN METABOLIC PANEL: CPT | Performed by: EMERGENCY MEDICINE

## 2021-12-18 RX ORDER — TETRACAINE HYDROCHLORIDE 5 MG/ML
2 SOLUTION OPHTHALMIC ONCE
Status: COMPLETED | OUTPATIENT
Start: 2021-12-18 | End: 2021-12-18

## 2021-12-18 RX ADMIN — FLUORESCEIN SODIUM 1 STRIP: 1 STRIP OPHTHALMIC at 22:37

## 2021-12-18 RX ADMIN — TETRACAINE HYDROCHLORIDE 2 DROP: 5 SOLUTION OPHTHALMIC at 22:37

## 2021-12-19 VITALS
WEIGHT: 204 LBS | OXYGEN SATURATION: 96 % | HEART RATE: 78 BPM | HEIGHT: 62 IN | RESPIRATION RATE: 20 BRPM | SYSTOLIC BLOOD PRESSURE: 153 MMHG | TEMPERATURE: 97.2 F | BODY MASS INDEX: 37.54 KG/M2 | DIASTOLIC BLOOD PRESSURE: 70 MMHG

## 2021-12-19 LAB
ATRIAL RATE: 62 BPM
P AXIS: 57 DEGREES
PR INTERVAL: 172 MS
QRS AXIS: 100 DEGREES
QRSD INTERVAL: 74 MS
QT INTERVAL: 416 MS
QTC INTERVAL: 422 MS
T WAVE AXIS: 32 DEGREES
VENTRICULAR RATE: 62 BPM

## 2021-12-19 PROCEDURE — 93010 ELECTROCARDIOGRAM REPORT: CPT | Performed by: INTERNAL MEDICINE

## 2021-12-22 ENCOUNTER — OFFICE VISIT (OUTPATIENT)
Dept: FAMILY MEDICINE CLINIC | Facility: CLINIC | Age: 76
End: 2021-12-22
Payer: MEDICARE

## 2021-12-22 VITALS
DIASTOLIC BLOOD PRESSURE: 84 MMHG | SYSTOLIC BLOOD PRESSURE: 148 MMHG | HEART RATE: 77 BPM | HEIGHT: 62 IN | TEMPERATURE: 97.1 F | OXYGEN SATURATION: 95 % | RESPIRATION RATE: 18 BRPM | BODY MASS INDEX: 36.44 KG/M2 | WEIGHT: 198 LBS

## 2021-12-22 DIAGNOSIS — R06.00 DOE (DYSPNEA ON EXERTION): ICD-10-CM

## 2021-12-22 DIAGNOSIS — E11.40 CONTROLLED TYPE 2 DIABETES WITH NEUROPATHY (HCC): ICD-10-CM

## 2021-12-22 DIAGNOSIS — I10 BENIGN HYPERTENSION: ICD-10-CM

## 2021-12-22 DIAGNOSIS — F51.04 PSYCHOPHYSIOLOGICAL INSOMNIA: ICD-10-CM

## 2021-12-22 DIAGNOSIS — F41.8 DEPRESSION WITH ANXIETY: ICD-10-CM

## 2021-12-22 DIAGNOSIS — Z12.11 SCREENING FOR COLON CANCER: Primary | ICD-10-CM

## 2021-12-22 DIAGNOSIS — J45.990 EXERCISE INDUCED BRONCHOSPASM: ICD-10-CM

## 2021-12-22 LAB — SL AMB POCT HEMOGLOBIN AIC: 6.4 (ref ?–6.5)

## 2021-12-22 PROCEDURE — 99214 OFFICE O/P EST MOD 30 MIN: CPT | Performed by: NURSE PRACTITIONER

## 2021-12-22 PROCEDURE — 83036 HEMOGLOBIN GLYCOSYLATED A1C: CPT | Performed by: NURSE PRACTITIONER

## 2021-12-22 RX ORDER — AMLODIPINE BESYLATE 10 MG/1
10 TABLET ORAL DAILY
Qty: 30 TABLET | Refills: 5 | Status: SHIPPED | OUTPATIENT
Start: 2021-12-22 | End: 2022-02-04 | Stop reason: ALTCHOICE

## 2021-12-22 RX ORDER — HYDROXYZINE HYDROCHLORIDE 10 MG/1
10 TABLET, FILM COATED ORAL EVERY 6 HOURS PRN
COMMUNITY
Start: 2021-12-17

## 2021-12-22 RX ORDER — LATANOPROST 50 UG/ML
SOLUTION/ DROPS OPHTHALMIC
COMMUNITY
Start: 2021-09-24

## 2021-12-22 RX ORDER — BETAMETHASONE DIPROPIONATE 0.05 %
OINTMENT (GRAM) TOPICAL
COMMUNITY
Start: 2021-10-18 | End: 2022-02-27 | Stop reason: ALTCHOICE

## 2021-12-22 RX ORDER — MONTELUKAST SODIUM 10 MG/1
TABLET ORAL
Qty: 30 TABLET | Refills: 1 | Status: SHIPPED | OUTPATIENT
Start: 2021-12-22 | End: 2022-01-14

## 2021-12-22 RX ORDER — TRIAMCINOLONE ACETONIDE 1 MG/G
CREAM TOPICAL
COMMUNITY
Start: 2021-10-18 | End: 2022-06-13 | Stop reason: ALTCHOICE

## 2021-12-22 RX ORDER — TOBRAMYCIN AND DEXAMETHASONE 3; 1 MG/ML; MG/ML
SUSPENSION/ DROPS OPHTHALMIC
COMMUNITY
Start: 2021-12-17 | End: 2022-04-19 | Stop reason: HOSPADM

## 2021-12-23 ENCOUNTER — TELEPHONE (OUTPATIENT)
Dept: FAMILY MEDICINE CLINIC | Facility: CLINIC | Age: 76
End: 2021-12-23

## 2021-12-23 PROBLEM — F51.04 PSYCHOPHYSIOLOGICAL INSOMNIA: Status: ACTIVE | Noted: 2021-12-23

## 2021-12-28 DIAGNOSIS — E11.40 CONTROLLED TYPE 2 DIABETES WITH NEUROPATHY (HCC): ICD-10-CM

## 2021-12-29 ENCOUNTER — TELEPHONE (OUTPATIENT)
Dept: FAMILY MEDICINE CLINIC | Facility: CLINIC | Age: 76
End: 2021-12-29

## 2021-12-29 RX ORDER — PREGABALIN 165 MG/1
TABLET, FILM COATED, EXTENDED RELEASE ORAL
Qty: 60 TABLET | Refills: 0 | Status: SHIPPED | OUTPATIENT
Start: 2021-12-29 | End: 2022-03-22

## 2022-01-03 ENCOUNTER — TELEPHONE (OUTPATIENT)
Dept: FAMILY MEDICINE CLINIC | Facility: CLINIC | Age: 77
End: 2022-01-03

## 2022-01-03 DIAGNOSIS — M25.561 ACUTE PAIN OF RIGHT KNEE: ICD-10-CM

## 2022-01-03 RX ORDER — MELOXICAM 15 MG/1
TABLET ORAL
Qty: 30 TABLET | Refills: 1 | Status: SHIPPED | OUTPATIENT
Start: 2022-01-03 | End: 2022-03-17

## 2022-01-05 ENCOUNTER — TELEPHONE (OUTPATIENT)
Dept: FAMILY MEDICINE CLINIC | Facility: CLINIC | Age: 77
End: 2022-01-05

## 2022-01-05 NOTE — TELEPHONE ENCOUNTER
Called in about the enalapril   Directions state to take 1 tablet by mouth daily   She said she remembers you telling her to take more than that and cannot remember what it was   Do you want her to take more or keep it at 1 tablet

## 2022-01-05 NOTE — TELEPHONE ENCOUNTER
Dose was increased to 20 milligrams    Needs to take to 10 milligrams pills until she picks up the new prescription for 20 milligrams

## 2022-01-07 ENCOUNTER — TELEPHONE (OUTPATIENT)
Dept: FAMILY MEDICINE CLINIC | Facility: CLINIC | Age: 77
End: 2022-01-07

## 2022-01-13 ENCOUNTER — OFFICE VISIT (OUTPATIENT)
Dept: FAMILY MEDICINE CLINIC | Facility: CLINIC | Age: 77
End: 2022-01-13
Payer: MEDICARE

## 2022-01-13 VITALS
RESPIRATION RATE: 18 BRPM | WEIGHT: 196.6 LBS | HEIGHT: 62 IN | TEMPERATURE: 97.6 F | BODY MASS INDEX: 36.18 KG/M2 | SYSTOLIC BLOOD PRESSURE: 130 MMHG | HEART RATE: 72 BPM | DIASTOLIC BLOOD PRESSURE: 72 MMHG | OXYGEN SATURATION: 96 %

## 2022-01-13 DIAGNOSIS — R94.31 ABNORMAL EKG: Primary | ICD-10-CM

## 2022-01-13 DIAGNOSIS — J42 CHRONIC BRONCHITIS, UNSPECIFIED CHRONIC BRONCHITIS TYPE (HCC): ICD-10-CM

## 2022-01-13 DIAGNOSIS — I10 PRIMARY HYPERTENSION: ICD-10-CM

## 2022-01-13 DIAGNOSIS — E66.01 OBESITY, MORBID (HCC): ICD-10-CM

## 2022-01-13 DIAGNOSIS — N18.31 STAGE 3A CHRONIC KIDNEY DISEASE (HCC): ICD-10-CM

## 2022-01-13 DIAGNOSIS — E11.40 CONTROLLED TYPE 2 DIABETES WITH NEUROPATHY (HCC): ICD-10-CM

## 2022-01-13 PROCEDURE — 99213 OFFICE O/P EST LOW 20 MIN: CPT | Performed by: NURSE PRACTITIONER

## 2022-01-13 NOTE — PATIENT INSTRUCTIONS
Take 20 mg of Vasotec in the morning   Take 10 mg of amlodipine at night   May take 10 mg of Ambien for sleep   decrease salt intake   Make appointment to follow up with Cardiology

## 2022-01-13 NOTE — PROGRESS NOTES
Assessment/Plan:  BMI Counseling: Body mass index is 35 96 kg/m²  The BMI is above normal  Nutrition recommendations include decreasing portion sizes, encouraging healthy choices of fruits and vegetables, decreasing fast food intake, consuming healthier snacks, limiting drinks that contain sugar, moderation in carbohydrate intake, increasing intake of lean protein, reducing intake of saturated and trans fat and reducing intake of cholesterol  Exercise recommendations include strength training exercises  No pharmacotherapy was ordered  Rationale for BMI follow-up plan is due to patient being overweight or obese  Primary hypertension  Patient is here for follow-up on blood pressure  Blood pressure continues to be elevated 160s over 90s  Has been taking increased enalapril and amlodipine  Continues to be stress, not sleeping well  Reports palpitations  Generally not feeling well  Neuro checks within normal limits  Heart rate normal in office  Discussed management, self-care  Continue medication  Low-salt diet  Education provided regarding checking blood pressure when resting  Referral made to Cardiology  Problem List Items Addressed This Visit        Endocrine    Controlled type 2 diabetes with neuropathy ([facility] [address] )       Respiratory    Chronic bronchitis ([facility] [address] )       Cardiovascular and Mediastinum    Primary hypertension     Patient is here for follow-up on blood pressure  Blood pressure continues to be elevated 160s over 90s  Has been taking increased enalapril and amlodipine  Continues to be stress, not sleeping well  Reports palpitations  Generally not feeling well  Neuro checks within normal limits  Heart rate normal in office  Discussed management, self-care  Continue medication  Low-salt diet  Education provided regarding checking blood pressure when resting  Referral made to Cardiology           Relevant Orders    Ambulatory Referral to Cardiology       Genitourinary    Stage 3a chronic kidney disease ([facility] [address] )       Other    [facility], [facility]    Abnormal EKG - Primary    Relevant Orders    Ambulatory Referral to Cardiology            Subjective:      Patient ID: Amrita Chris is a 78 y o  female  Patient continues to have fluctuations with blood pressure  Has been taking increase dosage of enalapril and amlodipine  Continues to have problems with stress, sleeping  Patient has not seen Cardiology, had an abnormal EKG  Does have some palpitations  The following portions of the patient's history were reviewed and updated as appropriate: allergies, current medications, past family history, past medical history, past social history, past surgical history and problem list     Review of Systems   Constitutional: Positive for fatigue  HENT: Negative  Eyes: Negative  Respiratory: Negative  Negative for chest tightness  Cardiovascular: Positive for palpitations  Negative for chest pain  Gastrointestinal: Negative  Endocrine: Negative  Genitourinary: Negative  Musculoskeletal: Negative  Skin: Negative  Allergic/Immunologic: Negative  Neurological: Negative  Psychiatric/Behavioral: Negative  Objective:      /72 (Patient Position: Standing)   Pulse 72   Temp 97 6 °F (36 4 °C) (Tympanic)   Resp 18   Ht 5' 2" (1 575 m)   Wt 89 2 kg (196 lb 9 6 oz)   SpO2 96%   BMI 35 96 kg/m²          Physical Exam  Vitals and nursing note reviewed  Constitutional:       Appearance: She is well-developed  She is ill-appearing  HENT:      Head: Normocephalic and atraumatic  Eyes:      Pupils: Pupils are equal, round, and reactive to light  Cardiovascular:      Rate and Rhythm: Normal rate and regular rhythm  Pulses: Normal pulses  no weak pulses          Dorsalis pedis pulses are 2+ on the right side and 2+ on the left side  Posterior tibial pulses are 2+ on the right side and 2+ on the left side        Heart sounds: Normal heart sounds  Pulmonary:      Effort: Pulmonary effort is normal    Abdominal:      General: Bowel sounds are normal       Palpations: Abdomen is soft  Musculoskeletal:         General: Normal range of motion  Cervical back: Normal range of motion  Feet:      Right foot:      Skin integrity: Dry skin present  No ulcer, skin breakdown, erythema, warmth or callus  Left foot:      Skin integrity: Dry skin present  No ulcer, skin breakdown, erythema, warmth or callus  Skin:     General: Skin is warm and dry  Neurological:      General: No focal deficit present  Mental Status: She is alert and oriented to person, place, and time  Motor: No weakness  Gait: Gait normal    Psychiatric:         Mood and Affect: Mood normal          Behavior: Behavior normal          Thought Content: Thought content normal          Judgment: Judgment normal            Labs:    Lab Results   Component Value Date    WBC 6 35 12/18/2021    HGB 12 0 12/18/2021    HCT 37 8 12/18/2021    MCV 90 12/18/2021     12/18/2021     Lab Results   Component Value Date    K 3 5 12/18/2021     12/18/2021    CO2 28 12/18/2021    BUN 17 12/18/2021    CREATININE 0 97 12/18/2021    GLUF 113 (H) 09/24/2020    CALCIUM 9 2 12/18/2021    AST 6 12/18/2021    ALT 9 (L) 12/18/2021    ALKPHOS 78 12/18/2021    EGFR 57 12/18/2021     Lab Results   Component Value Date    CALCIUM 9 2 12/18/2021    K 3 5 12/18/2021    CO2 28 12/18/2021     12/18/2021    BUN 17 12/18/2021    CREATININE 0 97 12/18/2021     Diabetic Foot Exam    Patient's shoes and socks removed  Right Foot/Ankle   Right Foot Inspection  Skin Exam: skin normal, skin intact and dry skin  No warmth, no callus, no erythema, no maceration, no abnormal color, no pre-ulcer, no ulcer and no callus  Toe Exam: ROM and strength within normal limits       Sensory   Vibration: intact  Proprioception: intact  Monofilament testing: intact    Vascular  Capillary refills: < 3 seconds  The right DP pulse is 2+  The right PT pulse is 2+  Right Toe  - Comprehensive Exam  Ecchymosis: none  Arch: normal  Hammertoes: absent  Claw Toes: absent  Swelling: none   Tenderness: none         Left Foot/Ankle  Left Foot Inspection  Skin Exam: skin normal, skin intact and dry skin  No warmth, no erythema, no maceration, normal color, no pre-ulcer, no ulcer and no callus  Toe Exam: ROM and strength within normal limits  Sensory   Vibration: intact  Proprioception: intact  Monofilament testing: intact    Vascular  Capillary refills: < 3 seconds  The left DP pulse is 2+  The left PT pulse is 2+       Left Toe  - Comprehensive Exam  Ecchymosis: none  Arch: normal  Hammertoes: absent  Claw toes: absent  Swelling: none   Tenderness: none           Assign Risk Category  No deformity present  No loss of protective sensation  No weak pulses  Risk: 0

## 2022-01-14 DIAGNOSIS — R06.00 DOE (DYSPNEA ON EXERTION): ICD-10-CM

## 2022-01-14 DIAGNOSIS — J45.990 EXERCISE INDUCED BRONCHOSPASM: ICD-10-CM

## 2022-01-14 PROBLEM — R94.31 ABNORMAL EKG: Status: ACTIVE | Noted: 2022-01-14

## 2022-01-14 PROBLEM — I10 PRIMARY HYPERTENSION: Status: ACTIVE | Noted: 2022-01-14

## 2022-01-14 RX ORDER — MONTELUKAST SODIUM 10 MG/1
TABLET ORAL
Qty: 30 TABLET | Refills: 1 | Status: SHIPPED | OUTPATIENT
Start: 2022-01-14 | End: 2022-02-07

## 2022-01-14 NOTE — ASSESSMENT & PLAN NOTE
Patient is here for follow-up on blood pressure  Blood pressure continues to be elevated 160s over 90s  Has been taking increased enalapril and amlodipine  Continues to be stress, not sleeping well  Reports palpitations  Generally not feeling well  Neuro checks within normal limits  Heart rate normal in office  Discussed management, self-care  Continue medication  Low-salt diet  Education provided regarding checking blood pressure when resting  Referral made to Cardiology

## 2022-01-27 DIAGNOSIS — F51.04 PSYCHOPHYSIOLOGICAL INSOMNIA: ICD-10-CM

## 2022-01-28 RX ORDER — ZOLPIDEM TARTRATE 5 MG/1
TABLET ORAL
Qty: 30 TABLET | Refills: 0 | Status: ON HOLD | OUTPATIENT
Start: 2022-01-28 | End: 2022-04-19 | Stop reason: SDUPTHER

## 2022-02-04 ENCOUNTER — OFFICE VISIT (OUTPATIENT)
Dept: CARDIOLOGY CLINIC | Facility: CLINIC | Age: 77
End: 2022-02-04
Payer: MEDICARE

## 2022-02-04 VITALS
RESPIRATION RATE: 16 BRPM | WEIGHT: 195 LBS | OXYGEN SATURATION: 97 % | SYSTOLIC BLOOD PRESSURE: 170 MMHG | BODY MASS INDEX: 35.88 KG/M2 | HEART RATE: 85 BPM | HEIGHT: 62 IN | DIASTOLIC BLOOD PRESSURE: 82 MMHG

## 2022-02-04 DIAGNOSIS — R94.31 ABNORMAL EKG: ICD-10-CM

## 2022-02-04 DIAGNOSIS — I10 PRIMARY HYPERTENSION: ICD-10-CM

## 2022-02-04 DIAGNOSIS — I20.8 ANGINAL EQUIVALENT (HCC): Primary | ICD-10-CM

## 2022-02-04 PROCEDURE — 99204 OFFICE O/P NEW MOD 45 MIN: CPT | Performed by: INTERNAL MEDICINE

## 2022-02-04 PROCEDURE — 93000 ELECTROCARDIOGRAM COMPLETE: CPT | Performed by: INTERNAL MEDICINE

## 2022-02-04 RX ORDER — LISINOPRIL 20 MG/1
20 TABLET ORAL 2 TIMES DAILY
Qty: 60 TABLET | Refills: 6 | Status: SHIPPED | OUTPATIENT
Start: 2022-02-04 | End: 2022-04-19 | Stop reason: HOSPADM

## 2022-02-04 RX ORDER — CARVEDILOL 6.25 MG/1
6.25 TABLET ORAL 2 TIMES DAILY WITH MEALS
Qty: 60 TABLET | Refills: 6 | Status: SHIPPED | OUTPATIENT
Start: 2022-02-04 | End: 2022-02-28 | Stop reason: HOSPADM

## 2022-02-04 NOTE — PROGRESS NOTES
Cardiology Office Note    Brenton Julian 78 y o  female MRN: [id number]    02/04/22          Assessment:  1  Chest pain   2  Dyspnea on exertion   3  Hypertension   4  DM2      Plan:  · Will evaluate with a pharmacologic MPI   · Will discontinue enalapril and amlodipine and start lisinopril and carvedilol  · Ambulatory blood pressure monitoring and maintaining a low sodium diet was advised  · She was advised to notify us with the onset of cardiac symptoms  Follow up: 1 month or sooner as needed    1  Abnormal EKG  Ambulatory Referral to Cardiology   2  Primary hypertension  Ambulatory Referral to Cardiology       HPI: Brenton Julian is a 78y o  year old female with history of hypertension and diabetes who was referred by her PCP ALEKSANDER Mckeon to establish care  She has a longstanding history of hypertension that has been suboptimally controlled despite compliance with her antihypertensive regimens  She has even cut out sodium from her diet with minimal improvement  She also reports     Additionally she has noted progressive dyspnea on exertion over the past year  She cannot ambulate up a flight of stairs without getting dyspneic  She denies associated chest pain or any other cardiac concerns at this time  TTE 7/2021: EF: 65%, mild aortic regurgitation     ECG personally reviewed: NSR with poor anterior R wave progression      Family History: she denies family history of cardiac disease    Social history: she denies tobacco use    Allergies   Allergen Reactions    Ciprofloxacin Itching    Levaquin [Levofloxacin] Swelling    Penicillins GI Intolerance         Current Outpatient Medications:     Advair Diskus 250-50 MCG/DOSE inhaler, INHALE 1 PUFF 2 (TWO) TIMES A DAY RINSE MOUTH AFTER USE , Disp: 60 blister, Rfl: 3    amLODIPine (NORVASC) 10 mg tablet, Take 1 tablet (10 mg total) by mouth daily, Disp: 30 tablet, Rfl: 5    Aspirin Low Dose 81 MG chewable tablet, CHEW 1 TABLET BY MOUTH DAILY, Disp: 90 tablet, Rfl: 1    betamethasone dipropionate (DIPROSONE) 0 05 % ointment, Apply to affected areas daily as needed on weekdays, take breaks on weekends  Do not apply to face, groin or skin folds  , Disp: , Rfl:     cyclobenzaprine (FLEXERIL) 5 mg tablet, Take 1 tablet (5 mg total) by mouth 3 (three) times a day as needed for muscle spasms, Disp: 30 tablet, Rfl: 0    enalapril (VASOTEC) 20 mg tablet, Take 1 tablet (20 mg total) by mouth daily, Disp: 90 tablet, Rfl: 3    glucose blood (ONETOUCH VERIO) test strip, E11 9 / testing daily, Disp: , Rfl:     hydrOXYzine HCL (ATARAX) 10 mg tablet, , Disp: , Rfl:     Janumet -1000 MG TB24, TAKE 1 TABLET BY MOUTH EVERY DAY, Disp: 90 tablet, Rfl: 1    latanoprost (XALATAN) 0 005 % ophthalmic solution, , Disp: , Rfl:     LUMIGAN 0 01 % ophthalmic drops, , Disp: , Rfl:     Lyrica  MG TB24, TAKE 1 TABLET BY MOUTH TWICE A DAY, Disp: 60 tablet, Rfl: 0    meloxicam (MOBIC) 15 mg tablet, TAKE 1 TABLET BY MOUTH EVERY DAY WITH FOOD, Disp: 30 tablet, Rfl: 1    mometasone (ELOCON) 0 1 % cream, Apply topically daily, Disp: 45 g, Rfl: 0    montelukast (SINGULAIR) 10 mg tablet, TAKE 1 TABLET BY MOUTH EVERYDAY AT BEDTIME, Disp: 30 tablet, Rfl: 1    ONETOUCH DELICA LANCETS 65J MIS, E11 9/ testing daily, Disp: , Rfl:     ProAir  (90 Base) MCG/ACT inhaler, INHALE 2 PUFFS BY MOUTH EVERY 6 HOURS AS NEEDED FOR WHEEZE, Disp: 8 5 g, Rfl: 3    sertraline (ZOLOFT) 50 mg tablet, TAKE 1 TABLET BY MOUTH EVERY DAY, Disp: 90 tablet, Rfl: 1    travoprost (TRAVATAN Z) 0 004 % ophthalmic solution, Apply 1 drop to eye daily at bedtime, Disp: , Rfl:     triamcinolone (KENALOG) 0 1 % cream, , Disp: , Rfl:     zolpidem (AMBIEN) 5 mg tablet, TAKE 1 TABLET BY MOUTH DAILY AT BEDTIME AS NEEDED FOR SLEEP, Disp: 30 tablet, Rfl: 0    carbamide peroxide (DEBROX) 6 5 % otic solution, Administer 5 drops into both ears 2 (two) times a day (Patient not taking: Reported on 2/4/2022 ), Disp: 15 mL, Rfl: 0    tobramycin-dexamethasone (TOBRADEX) ophthalmic suspension, , Disp: , Rfl:     Past Medical History:   Diagnosis Date    Asthma     Benign hypertension 2018    [facility] [address] [facility]     [facility]     Glaucoma     Hypertension     Kidney stone     Neuropathy     Sleep apnea     no machine    SOB (shortness of breath)     Tubular adenoma of colon 10/2019    Wheezing        Family History   Problem Relation Age of Onset    Diabetes Mother     Hypertension Father     Prostate cancer Father     Diabetes Sister     Hypertension Sister     Breast cancer Sister 62    Diabetes Brother     Hypertension Brother     Asthma Family     No Known Problems Daughter     No Known Problems Sister     No Known Problems Daughter     No Known Problems Daughter     No Known Problems Maternal Aunt     Breast cancer Maternal Aunt 58    No Known Problems Maternal Aunt     No Known Problems Maternal Aunt     No Known Problems Paternal Aunt     No Known Problems Paternal Aunt     No Known Problems Paternal Aunt     Colon cancer Neg Hx     Ovarian cancer Neg Hx     Uterine cancer Neg Hx     Cervical cancer Neg Hx        Past Surgical History:   Procedure Laterality Date     SECTION      COLONOSCOPY      HYSTERECTOMY  1985    TONSILLECTOMY         Social History     Socioeconomic History    Marital status: /Civil Union     Spouse name: Not on file    Number of children: Not on file    Years of education: Not on file    Highest education level: Not on file   Occupational History    Occupation: retired    Tobacco Use    Smoking status: Never Smoker    Smokeless tobacco: Never Used   Vaping Use    Vaping Use: Not on file   Substance and Sexual Activity    Alcohol use: No    Drug use: No    Sexual activity: Yes     Birth control/protection: Surgical   Other Topics Concern    Not on file   Social History Narrative    Not on file     Social Determinants of Health     Financial Resource Strain: Not on file   Food Insecurity: Not on file   Transportation Needs: Not on file   Physical Activity: Not on file   Stress: Not on file   Social Connections: Not on file   Intimate Partner Violence: Not on file   Housing Stability: Not on file       Review of Systems   Constitutional: Negative for diaphoresis, weight gain and weight loss  HENT: Negative for congestion  Cardiovascular: Positive for dyspnea on exertion  Negative for chest pain, irregular heartbeat, leg swelling, near-syncope, orthopnea, palpitations, paroxysmal nocturnal dyspnea and syncope  Respiratory: Negative for shortness of breath, sleep disturbances due to breathing and snoring  Hematologic/Lymphatic: Does not bruise/bleed easily  Skin: Negative for rash  Musculoskeletal: Negative for myalgias  Gastrointestinal: Negative for nausea and vomiting  Neurological: Negative for excessive daytime sleepiness and light-headedness  Psychiatric/Behavioral: The patient has insomnia  The patient is not nervous/anxious  Vitals: /82 (BP Location: Left arm, Patient Position: Sitting)   Pulse 85   Resp 16   Ht 5' 2" (1 575 m)   Wt 88 5 kg (195 lb)   SpO2 97%   BMI 35 67 kg/m²       Physical Exam:     GEN: Alert and oriented x 3, in no acute distress  Well appearing and well nourished  HEENT: Sclera anicteric, conjunctivae pink, mucous membranes moist  Oropharynx clear  NECK: Supple, no carotid bruits, no significant JVD  Trachea midline, no thyromegaly  HEART: Regular rhythm, normal S1 and S2, no murmurs, clicks, gallops or rubs  PMI nondisplaced, no thrills  LUNGS: Clear to auscultation bilaterally; no wheezes, rales, or rhonchi  No increased work of breathing or signs of respiratory distress  ABDOMEN: Soft, nontender, nondistended, normoactive bowel sounds  EXTREMITIES: Skin warm and well perfused, no clubbing, cyanosis, or edema  NEURO: No focal findings  Normal speech  Mood and affect normal    SKIN: Normal without suspicious lesions on exposed skin

## 2022-02-06 DIAGNOSIS — J45.990 EXERCISE INDUCED BRONCHOSPASM: ICD-10-CM

## 2022-02-06 DIAGNOSIS — R06.00 DOE (DYSPNEA ON EXERTION): ICD-10-CM

## 2022-02-07 RX ORDER — MONTELUKAST SODIUM 10 MG/1
TABLET ORAL
Qty: 30 TABLET | Refills: 1 | Status: SHIPPED | OUTPATIENT
Start: 2022-02-07 | End: 2022-02-21

## 2022-02-20 DIAGNOSIS — J45.990 EXERCISE INDUCED BRONCHOSPASM: ICD-10-CM

## 2022-02-20 DIAGNOSIS — R06.00 DOE (DYSPNEA ON EXERTION): ICD-10-CM

## 2022-02-21 RX ORDER — MONTELUKAST SODIUM 10 MG/1
TABLET ORAL
Qty: 90 TABLET | Refills: 1 | Status: SHIPPED | OUTPATIENT
Start: 2022-02-21 | End: 2022-06-01 | Stop reason: SDUPTHER

## 2022-02-26 ENCOUNTER — HOSPITAL ENCOUNTER (OUTPATIENT)
Facility: HOSPITAL | Age: 77
Setting detail: OBSERVATION
Discharge: HOME | End: 2022-02-28
Attending: EMERGENCY MEDICINE | Admitting: FAMILY MEDICINE
Payer: MEDICARE

## 2022-02-26 DIAGNOSIS — I10 UNCONTROLLED HYPERTENSION: Primary | ICD-10-CM

## 2022-02-26 DIAGNOSIS — R06.00 DYSPNEA: ICD-10-CM

## 2022-02-26 DIAGNOSIS — I16.0 HYPERTENSIVE URGENCY: ICD-10-CM

## 2022-02-26 DIAGNOSIS — R79.89 ELEVATED BRAIN NATRIURETIC PEPTIDE (BNP) LEVEL: ICD-10-CM

## 2022-02-26 DIAGNOSIS — R00.1 BRADYCARDIA: ICD-10-CM

## 2022-02-26 PROCEDURE — 99285 EMERGENCY DEPT VISIT HI MDM: CPT

## 2022-02-26 PROCEDURE — 93005 ELECTROCARDIOGRAM TRACING: CPT

## 2022-02-27 ENCOUNTER — APPOINTMENT (EMERGENCY)
Dept: CT IMAGING | Facility: HOSPITAL | Age: 77
End: 2022-02-27
Payer: MEDICARE

## 2022-02-27 PROBLEM — I16.0 HYPERTENSIVE URGENCY: Status: ACTIVE | Noted: 2022-02-27

## 2022-02-27 PROBLEM — R00.1 BRADYCARDIA: Status: ACTIVE | Noted: 2022-02-27

## 2022-02-27 PROBLEM — R11.0 NAUSEA: Status: ACTIVE | Noted: 2022-02-27

## 2022-02-27 LAB
ALBUMIN SERPL BCP-MCNC: 3.5 G/DL (ref 3.5–5)
ALP SERPL-CCNC: 72 U/L (ref 46–116)
ALT SERPL W P-5'-P-CCNC: 45 U/L (ref 12–78)
ANION GAP SERPL CALCULATED.3IONS-SCNC: 10 MMOL/L (ref 4–13)
AST SERPL W P-5'-P-CCNC: 15 U/L (ref 5–45)
ATRIAL RATE: 50 BPM
ATRIAL RATE: 55 BPM
BASOPHILS # BLD AUTO: 0.05 THOUSANDS/ΜL (ref 0–0.1)
BASOPHILS NFR BLD AUTO: 1 % (ref 0–1)
BILIRUB SERPL-MCNC: 0.23 MG/DL (ref 0.2–1)
BUN SERPL-MCNC: 18 MG/DL (ref 5–25)
CALCIUM SERPL-MCNC: 9.2 MG/DL (ref 8.3–10.1)
CARDIAC TROPONIN I PNL SERPL HS: 3 NG/L
CHLORIDE SERPL-SCNC: 104 MMOL/L (ref 100–108)
CO2 SERPL-SCNC: 29 MMOL/L (ref 21–32)
CREAT SERPL-MCNC: 1.16 MG/DL (ref 0.6–1.3)
EOSINOPHIL # BLD AUTO: 0.37 THOUSAND/ΜL (ref 0–0.61)
EOSINOPHIL NFR BLD AUTO: 6 % (ref 0–6)
ERYTHROCYTE [DISTWIDTH] IN BLOOD BY AUTOMATED COUNT: 15.2 % (ref 11.6–15.1)
FLUAV RNA RESP QL NAA+PROBE: NEGATIVE
FLUBV RNA RESP QL NAA+PROBE: NEGATIVE
GFR SERPL CREATININE-BSD FRML MDRD: 46 ML/MIN/1.73SQ M
GLUCOSE SERPL-MCNC: 118 MG/DL (ref 65–140)
GLUCOSE SERPL-MCNC: 143 MG/DL (ref 65–140)
GLUCOSE SERPL-MCNC: 145 MG/DL (ref 65–140)
GLUCOSE SERPL-MCNC: 83 MG/DL (ref 65–140)
GLUCOSE SERPL-MCNC: 90 MG/DL (ref 65–140)
HCT VFR BLD AUTO: 34.1 % (ref 34.8–46.1)
HGB BLD-MCNC: 11 G/DL (ref 11.5–15.4)
IMM GRANULOCYTES # BLD AUTO: 0.01 THOUSAND/UL (ref 0–0.2)
IMM GRANULOCYTES NFR BLD AUTO: 0 % (ref 0–2)
LYMPHOCYTES # BLD AUTO: 2.4 THOUSANDS/ΜL (ref 0.6–4.47)
LYMPHOCYTES NFR BLD AUTO: 38 % (ref 14–44)
MAGNESIUM SERPL-MCNC: 2 MG/DL (ref 1.6–2.6)
MCH RBC QN AUTO: 28.6 PG (ref 26.8–34.3)
MCHC RBC AUTO-ENTMCNC: 32.3 G/DL (ref 31.4–37.4)
MCV RBC AUTO: 89 FL (ref 82–98)
MONOCYTES # BLD AUTO: 0.48 THOUSAND/ΜL (ref 0.17–1.22)
MONOCYTES NFR BLD AUTO: 8 % (ref 4–12)
NEUTROPHILS # BLD AUTO: 3.03 THOUSANDS/ΜL (ref 1.85–7.62)
NEUTS SEG NFR BLD AUTO: 47 % (ref 43–75)
NRBC BLD AUTO-RTO: 0 /100 WBCS
NT-PROBNP SERPL-MCNC: 601 PG/ML
P AXIS: 64 DEGREES
P AXIS: 68 DEGREES
PLATELET # BLD AUTO: 227 THOUSANDS/UL (ref 149–390)
PMV BLD AUTO: 11.3 FL (ref 8.9–12.7)
POTASSIUM SERPL-SCNC: 4.3 MMOL/L (ref 3.5–5.3)
PR INTERVAL: 172 MS
PR INTERVAL: 178 MS
PROT SERPL-MCNC: 7.6 G/DL (ref 6.4–8.2)
QRS AXIS: 20 DEGREES
QRS AXIS: 57 DEGREES
QRSD INTERVAL: 70 MS
QRSD INTERVAL: 70 MS
QT INTERVAL: 444 MS
QT INTERVAL: 456 MS
QTC INTERVAL: 415 MS
QTC INTERVAL: 424 MS
RBC # BLD AUTO: 3.84 MILLION/UL (ref 3.81–5.12)
RSV RNA RESP QL NAA+PROBE: NEGATIVE
SARS-COV-2 RNA RESP QL NAA+PROBE: NEGATIVE
SODIUM SERPL-SCNC: 143 MMOL/L (ref 136–145)
T WAVE AXIS: 97 DEGREES
T WAVE AXIS: 99 DEGREES
VENTRICULAR RATE: 50 BPM
VENTRICULAR RATE: 55 BPM
WBC # BLD AUTO: 6.34 THOUSAND/UL (ref 4.31–10.16)

## 2022-02-27 PROCEDURE — 85025 COMPLETE CBC W/AUTO DIFF WBC: CPT | Performed by: EMERGENCY MEDICINE

## 2022-02-27 PROCEDURE — 96375 TX/PRO/DX INJ NEW DRUG ADDON: CPT

## 2022-02-27 PROCEDURE — 82948 REAGENT STRIP/BLOOD GLUCOSE: CPT

## 2022-02-27 PROCEDURE — 99215 OFFICE O/P EST HI 40 MIN: CPT | Performed by: INTERNAL MEDICINE

## 2022-02-27 PROCEDURE — 70498 CT ANGIOGRAPHY NECK: CPT

## 2022-02-27 PROCEDURE — 83735 ASSAY OF MAGNESIUM: CPT | Performed by: EMERGENCY MEDICINE

## 2022-02-27 PROCEDURE — 84484 ASSAY OF TROPONIN QUANT: CPT | Performed by: EMERGENCY MEDICINE

## 2022-02-27 PROCEDURE — 93005 ELECTROCARDIOGRAM TRACING: CPT

## 2022-02-27 PROCEDURE — G1004 CDSM NDSC: HCPCS

## 2022-02-27 PROCEDURE — 93010 ELECTROCARDIOGRAM REPORT: CPT | Performed by: INTERNAL MEDICINE

## 2022-02-27 PROCEDURE — 36415 COLL VENOUS BLD VENIPUNCTURE: CPT | Performed by: EMERGENCY MEDICINE

## 2022-02-27 PROCEDURE — 83880 ASSAY OF NATRIURETIC PEPTIDE: CPT | Performed by: EMERGENCY MEDICINE

## 2022-02-27 PROCEDURE — 0241U HB NFCT DS VIR RESP RNA 4 TRGT: CPT | Performed by: FAMILY MEDICINE

## 2022-02-27 PROCEDURE — 96374 THER/PROPH/DIAG INJ IV PUSH: CPT

## 2022-02-27 PROCEDURE — 99220 PR INITIAL OBSERVATION CARE/DAY 70 MINUTES: CPT | Performed by: FAMILY MEDICINE

## 2022-02-27 PROCEDURE — 80053 COMPREHEN METABOLIC PANEL: CPT | Performed by: EMERGENCY MEDICINE

## 2022-02-27 PROCEDURE — 70496 CT ANGIOGRAPHY HEAD: CPT

## 2022-02-27 PROCEDURE — 99225 PR SBSQ OBSERVATION CARE/DAY 25 MINUTES: CPT | Performed by: HOSPITALIST

## 2022-02-27 PROCEDURE — 99285 EMERGENCY DEPT VISIT HI MDM: CPT | Performed by: EMERGENCY MEDICINE

## 2022-02-27 RX ORDER — METOCLOPRAMIDE HYDROCHLORIDE 5 MG/ML
10 INJECTION INTRAMUSCULAR; INTRAVENOUS ONCE
Status: COMPLETED | OUTPATIENT
Start: 2022-02-27 | End: 2022-02-27

## 2022-02-27 RX ORDER — LISINOPRIL 20 MG/1
20 TABLET ORAL 2 TIMES DAILY
Status: DISCONTINUED | OUTPATIENT
Start: 2022-02-27 | End: 2022-02-28 | Stop reason: HOSPADM

## 2022-02-27 RX ORDER — ACETAMINOPHEN 325 MG/1
650 TABLET ORAL ONCE
Status: COMPLETED | OUTPATIENT
Start: 2022-02-27 | End: 2022-02-27

## 2022-02-27 RX ORDER — METOCLOPRAMIDE HYDROCHLORIDE 5 MG/ML
5 INJECTION INTRAMUSCULAR; INTRAVENOUS ONCE
Status: COMPLETED | OUTPATIENT
Start: 2022-02-27 | End: 2022-02-27

## 2022-02-27 RX ORDER — AMLODIPINE BESYLATE 10 MG/1
10 TABLET ORAL DAILY
Status: DISCONTINUED | OUTPATIENT
Start: 2022-02-27 | End: 2022-02-28 | Stop reason: HOSPADM

## 2022-02-27 RX ORDER — DIPHENHYDRAMINE HYDROCHLORIDE 50 MG/ML
25 INJECTION INTRAMUSCULAR; INTRAVENOUS ONCE
Status: COMPLETED | OUTPATIENT
Start: 2022-02-27 | End: 2022-02-27

## 2022-02-27 RX ORDER — ACETAMINOPHEN 325 MG/1
650 TABLET ORAL EVERY 6 HOURS PRN
Status: DISCONTINUED | OUTPATIENT
Start: 2022-02-27 | End: 2022-02-28 | Stop reason: HOSPADM

## 2022-02-27 RX ORDER — CARVEDILOL 6.25 MG/1
6.25 TABLET ORAL 2 TIMES DAILY WITH MEALS
Status: DISCONTINUED | OUTPATIENT
Start: 2022-02-27 | End: 2022-02-27

## 2022-02-27 RX ORDER — ONDANSETRON 2 MG/ML
4 INJECTION INTRAMUSCULAR; INTRAVENOUS EVERY 6 HOURS PRN
Status: DISCONTINUED | OUTPATIENT
Start: 2022-02-27 | End: 2022-02-28 | Stop reason: HOSPADM

## 2022-02-27 RX ORDER — ASPIRIN 81 MG/1
81 TABLET, CHEWABLE ORAL DAILY
Status: DISCONTINUED | OUTPATIENT
Start: 2022-02-27 | End: 2022-02-28 | Stop reason: HOSPADM

## 2022-02-27 RX ORDER — ACETAMINOPHEN 325 MG/1
975 TABLET ORAL ONCE
Status: COMPLETED | OUTPATIENT
Start: 2022-02-27 | End: 2022-02-27

## 2022-02-27 RX ADMIN — ACETAMINOPHEN 975 MG: 325 TABLET ORAL at 22:20

## 2022-02-27 RX ADMIN — SERTRALINE HYDROCHLORIDE 50 MG: 50 TABLET ORAL at 09:08

## 2022-02-27 RX ADMIN — DIPHENHYDRAMINE HYDROCHLORIDE 25 MG: 50 INJECTION, SOLUTION INTRAMUSCULAR; INTRAVENOUS at 22:20

## 2022-02-27 RX ADMIN — LISINOPRIL 20 MG: 20 TABLET ORAL at 21:16

## 2022-02-27 RX ADMIN — METOCLOPRAMIDE HYDROCHLORIDE 10 MG: 5 INJECTION INTRAMUSCULAR; INTRAVENOUS at 22:20

## 2022-02-27 RX ADMIN — ACETAMINOPHEN 650 MG: 325 TABLET, FILM COATED ORAL at 02:26

## 2022-02-27 RX ADMIN — ASPIRIN 81 MG: 81 TABLET, CHEWABLE ORAL at 09:08

## 2022-02-27 RX ADMIN — LISINOPRIL 20 MG: 20 TABLET ORAL at 09:08

## 2022-02-27 RX ADMIN — DIPHENHYDRAMINE HYDROCHLORIDE 25 MG: 50 INJECTION, SOLUTION INTRAMUSCULAR; INTRAVENOUS at 04:05

## 2022-02-27 RX ADMIN — METOCLOPRAMIDE HYDROCHLORIDE 5 MG: 5 INJECTION INTRAMUSCULAR; INTRAVENOUS at 04:05

## 2022-02-27 RX ADMIN — IOHEXOL 100 ML: 350 INJECTION, SOLUTION INTRAVENOUS at 02:49

## 2022-02-27 RX ADMIN — AMLODIPINE BESYLATE 10 MG: 10 TABLET ORAL at 09:08

## 2022-02-27 RX ADMIN — ACETAMINOPHEN 650 MG: 325 TABLET, FILM COATED ORAL at 13:38

## 2022-02-27 NOTE — ED NOTES
Pt ambulated to bathroom slow steady gait   Medicated as directed    Pt c/o  Feeling sob after ambulating back from bathroom      Brett Florentino RN  02/27/22 [id number]

## 2022-02-27 NOTE — PLAN OF CARE
Problem: CARDIOVASCULAR - ADULT  Goal: Maintains optimal cardiac output and hemodynamic stability  Description: INTERVENTIONS:  - Monitor I/O, vital signs and rhythm  - Monitor for S/S and trends of decreased cardiac output  - Administer and titrate ordered vasoactive medications to optimize hemodynamic stability  - Assess quality of pulses, skin color and temperature  - Assess for signs of decreased coronary artery perfusion  - Instruct patient to report change in severity of symptoms  Outcome: Progressing     Problem: CARDIOVASCULAR - ADULT  Goal: Absence of cardiac dysrhythmias or at baseline rhythm  Description: INTERVENTIONS:  - Continuous cardiac monitoring, vital signs, obtain 12 lead EKG if ordered  - Administer antiarrhythmic and heart rate control medications as ordered  - Monitor electrolytes and administer replacement therapy as ordered  Outcome: Progressing     Problem: PAIN - ADULT  Goal: Verbalizes/displays adequate comfort level or baseline comfort level  Description: Interventions:  - Encourage patient to monitor pain and request assistance  - Assess pain using appropriate pain scale  - Administer analgesics based on type and severity of pain and evaluate response  - Implement non-pharmacological measures as appropriate and evaluate response  - Consider cultural and social influences on pain and pain management  - Notify physician/advanced practitioner if interventions unsuccessful or patient reports new pain  Outcome: Progressing     Problem: Knowledge Deficit  Goal: Patient/family/caregiver demonstrates understanding of disease process, treatment plan, medications, and discharge instructions  Description: Complete learning assessment and assess knowledge base    Interventions:  - Provide teaching at level of understanding  - Provide teaching via preferred learning methods  Outcome: Not Progressing     Problem: DISCHARGE PLANNING  Goal: Discharge to home or other facility with appropriate resources  Description: INTERVENTIONS:  - Identify barriers to discharge w/patient and caregiver  - Arrange for needed discharge resources and transportation as appropriate  - Identify discharge learning needs (meds, wound care, etc )  - Arrange for interpretive services to assist at discharge as needed  - Refer to Case Management Department for coordinating discharge planning if the patient needs post-hospital services based on physician/advanced practitioner order or complex needs related to functional status, cognitive ability, or social support system  Outcome: Progressing

## 2022-02-27 NOTE — CONSULTS
Consultation - Cardiology Team One  Darwin Lima 78 y o  female MRN: [id number]  Unit/Bed#: -01 Encounter: [id number]    Consults    Physician Requesting Consult: Jm Leon MD  Reason for Consult / Principal Problem:  Chest pain, shortness of breath    Assessment/Plan:    1  Hypertensive urgency  -blood pressure is overall improved  -continue amlodipine, lisinopril  -continue to monitor blood pressures closely    2  Chest pain with shortness of breath  -pharmacological stress testing to be performed tomorrow  -continue to monitor telemetry  -continue aspirin    3  Diabetes type 2  -care per [facility]      HPI: 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 Edwards Beverage is a 78y o  year old female who has a history of diabetes type 2, hypertension, depression and anxiety who presents to the emergency room yesterday with complaints of  elevated blood pressure, headache and dizziness  In the emergency room she was noted to be significantly hypertensive also reported shortness of breath occurring on exertion that has gotten progressively worse over the last few months  She has seen Cardiology on outpatient basis and was ordered stress testing, but has not completed yet  Given her symptoms and continuing increase in shortness of breath pharmacological stress testing will be performed as well as a TTE prior to discharge        REVIEW OF SYSTEMS:  Constitutional:  Denies fever or chills   Eyes:  Denies change in visual acuity   HENT:  Denies nasal congestion or sore throat   Respiratory:  Denies cough, orthopnea, PND, + shortness of breath   Cardiovascular:  Denies chest pain, palpitations or edema   GI:  Denies abdominal pain, nausea, vomiting, bloody stools or diarrhea   :  Denies dysuria, frequency, difficulty in micturition and nocturia  Musculoskeletal:  Denies back pain or joint pain   Neurologic:  + headache, denies focal weakness or sensory changes   Endocrine:  Denies polyuria or polydipsia   Lymphatic:  Denies swollen glands   Psychiatric:  Denies depression or anxiety     Historical Information   Past Medical History:   Diagnosis Date    Asthma     Benign hypertension 2018    [facility] [address] [facility]     [facility] mellitus ([facility] )     Glaucoma     Hypertension     Kidney stone     Neuropathy     Sleep apnea     no machine    SOB (shortness of breath)     Tubular adenoma of colon 10/2019    Wheezing      Past Surgical History:   Procedure Laterality Date     SECTION      COLONOSCOPY      HYSTERECTOMY  1985    TONSILLECTOMY       Social History     Substance and Sexual Activity   Alcohol Use No     Social History     Substance and Sexual Activity   Drug Use No     Social History     Tobacco Use   Smoking Status Never Smoker   Smokeless Tobacco Never Used       Family History:   Family History   Problem Relation Age of Onset    Diabetes Mother     Hypertension Father     Prostate cancer Father     Diabetes Sister     Hypertension Sister     Breast cancer Sister 62    Diabetes Brother     Hypertension Brother     Asthma Family     No Known Problems Daughter     No Known Problems Sister     No Known Problems Daughter     No Known Problems Daughter     No Known Problems Maternal Aunt     Breast cancer Maternal Aunt 58    No Known Problems Maternal Aunt     No Known Problems Maternal Aunt     No Known Problems Paternal Aunt     No Known Problems Paternal Aunt     No Known Problems Paternal Aunt     Colon cancer Neg Hx     Ovarian cancer Neg Hx     Uterine cancer Neg Hx     Cervical cancer Neg Hx        MEDS & ALLERGIES:  all current active meds have been reviewed and current meds:   Current Facility-Administered Medications   Medication Dose Route Frequency    acetaminophen (TYLENOL) tablet 650 mg  650 mg Oral Q6H PRN    amLODIPine (NORVASC) tablet 10 mg  10 mg Oral Daily    aspirin chewable tablet 81 mg  81 mg Oral Daily    lisinopril (ZESTRIL) tablet 20 mg  20 mg Oral BID    ondansetron (ZOFRAN) injection 4 mg  4 mg Intravenous Q6H PRN    sertraline (ZOLOFT) tablet 50 mg  50 mg Oral Daily        Allergies   Allergen Reactions    Ciprofloxacin Itching    Levaquin [Levofloxacin] Swelling    Penicillins GI Intolerance       OBJECTIVE:  Vitals:   Vitals:    22 0623   BP: 141/61   Pulse: (!) 54   Resp: 19   Temp: 97 7 °F (36 5 °C)   SpO2: 96%     There is no height or weight on file to calculate BMI  Systolic (80WRF), KE , Min:141 , YUO:905     Diastolic (40JPY), GY, Min:61, Max:88      Intake/Output Summary (Last 24 hours) at 2022 1030  Last data filed at 2022 0700  Gross per 24 hour   Intake 240 ml   Output --   Net 240 ml     Weight (last 2 days)     None        Invasive Devices  Report    Peripheral Intravenous Line            Peripheral IV 19 Left Antecubital 883 days                PHYSICAL EXAMS:  General:  Patient is not in acute distress, laying in the bed comfortably, awake, alert   Head: Normocephalic, Atraumatic     HEENT: White sclera, pink conjunctiva  Neck:  Supple, no neck vein distention  Respiratory: clear to auscultation   Cardiovascular:  PMI normal, S1-S2 normal, no murmurs, thrills, gallops, rubs, regular rhythm  GI:  Abdomen soft, non-tender, non-distended  Extremities: No edema, normal pulses  Integument:  No skin rashes or ulceration  Lymphatic:  No cervical or inguinal lymphadenopathy  Neurologic:  Patient is awake alert, responding to command, oriented to person, place and time     LABORATORY RESULTS:      CBC with diff:   Results from last 7 days   Lab Units 22  0153   WBC Thousand/uL 6 34   HEMOGLOBIN g/dL 11 0*   HEMATOCRIT % 34 1*   MCV fL 89   PLATELETS Thousands/uL 227   MCH pg 28 6   MCHC g/dL 32 3   RDW % 15 2*   MPV fL 11 3   NRBC AUTO /100 WBCs 0       CMP:  Results from last 7 days   Lab Units 22  0152   POTASSIUM mmol/L 4 3   CHLORIDE mmol/L 104   CO2 mmol/L 29   BUN mg/dL 18   CREATININE mg/dL 1 16   CALCIUM mg/dL 9 2   AST U/L 15   ALT U/L 45   ALK PHOS U/L 72   EGFR ml/min/1 73sq m 46       BMP:  Results from last 7 days   Lab Units 22  0152   POTASSIUM mmol/L 4 3   CHLORIDE mmol/L 104   CO2 mmol/L 29   BUN mg/dL 18   CREATININE mg/dL 1 16   CALCIUM mg/dL 9 2       Results from last 7 days   Lab Units 22  0152   NT-PRO BNP pg/mL 601*      Results from last 7 days   Lab Units 22  0152   MAGNESIUM mg/dL 2 0                   Lipid Profile:   No results found for: CHOL  Lab Results   Component Value Date    HDL 82 2020     Lab Results   Component Value Date    LDLCALC 61 2020     Lab Results   Component Value Date    TRIG 26 2020       Cardiac testing:   Results for orders placed during the hospital encounter of 21    Echo complete with contrast if indicated    Narrative  [place] [id number], [facility]  [phone]    Transthoracic Echocardiogram  2D, M-mode, Doppler, and Color Doppler    Study date:  2021    Patient: Mabel Heaton  MR number: [id number]  Account number: [de-identified]  : 1945  Age: 78 years  Gender: Female  Status: Outpatient  Location: [facility]  Height: 62 in  Weight: 203 5 lb  BP: 124/ 80 mmHg    Indications: Shortness of breath  Diagnoses: R06 02 - Shortness of breath    Sonographer:  DEVONTE Pablo  Primary Physician:  Curtis King  Referring Physician:  Bogdan Fragoso MD  Group:  [facility]  Interpreting Physician:  Nickie Obrien MD    SUMMARY    LEFT VENTRICLE:  Ejection fraction was estimated to be 65 %  There were no regional wall motion abnormalities  There was moderate concentric hypertrophy  RIGHT VENTRICLE:  Wall thickness was increased  MITRAL VALVE:  There was trace to mild regurgitation  AORTIC VALVE:  There was mild regurgitation      HISTORY: PRIOR HISTORY: Risk factors: hypertension and oral hypoglycemic-treated diabetes  PROCEDURE: The study was performed in the [address] [place] [address]  This was a routine study  The transthoracic approach was used  The study included complete 2D imaging, M-mode, complete spectral Doppler, and color Doppler  The  heart rate was 59 bpm, at the start of the study  Image quality was adequate  LEFT VENTRICLE: Size was normal  Ejection fraction was estimated to be 65 %  There were no regional wall motion abnormalities  There was moderate concentric hypertrophy  DOPPLER: There was an increased relative contribution of atrial  contraction to ventricular filling  RIGHT VENTRICLE: The size was normal  Systolic function was normal  Wall thickness was increased  LEFT ATRIUM: Size was normal     RIGHT ATRIUM: Size was normal     MITRAL VALVE: There was annular calcification  Valve structure was normal  There was normal leaflet separation  DOPPLER: The transmitral velocity was within the normal range  There was no evidence for stenosis  There was trace to mild  regurgitation  AORTIC VALVE: The valve was probably trileaflet  The valve was not well visualized  DOPPLER: There was mild regurgitation  TRICUSPID VALVE: The valve structure was normal  There was normal leaflet separation  DOPPLER: The transtricuspid velocity was within the normal range  There was no evidence for stenosis  There was no regurgitation  PULMONIC VALVE: Leaflets exhibited normal thickness, no calcification, and normal cuspal separation  DOPPLER: The transpulmonic velocity was within the normal range  There was no regurgitation  PERICARDIUM: There was no pericardial effusion  The pericardium was normal in appearance  AORTA: The root exhibited normal size      SYSTEM MEASUREMENT TABLES    2D  %FS: 25 07 %  Ao Diam: 2 58 cm  EDV(Teich): 88 3 ml  EF(Teich): 49 77 %  ESV(Teich): 44 36 ml  IVSd: 1 31 cm  LA Area: 19 75 cm2  LA Diam: 3 8 cm  LVEDV MOD A4C: 68 7 ml  LVEF MOD A4C: 74 73 %  LVESV MOD A4C: 17 36 ml  LVIDd: 4 41 cm  LVIDs: 3 31 cm  LVLd A4C: 7 16 cm  LVLs A4C: 5 75 cm  LVPWd: 1 22 cm  RA Area: 16 74 cm2  RVIDd: 3 12 cm  SV MOD A4C: 51 34 ml  SV(Teich): 43 95 ml    CW  AR Dec [place]: 1 76 m/s2  AR Dec Time: 1762 22 ms  AR PHT: 511 04 ms  AR Vmax: 3 1 m/s  AR maxP 4 mmHg  AV Env  Ti: 336 82 ms  AV MaxP 02 mmHg  AV VTI: 41 15 cm  AV Vmax: 1 8 m/s  AV Vmean: 1 22 m/s  AV meanP 84 mmHg    MM  TAPSE: 2 33 cm    PW  E' Sept: 0 07 m/s  E/E' Sept: 10 1  LVOT Env  Ti: 327 31 ms  LVOT VTI: 32 77 cm  LVOT Vmax: 1 4 m/s  LVOT Vmean: 1 m/s  LVOT maxP 85 mmHg  LVOT meanP 57 mmHg  MV A Lukasz: 0 93 m/s  MV Dec [place]: 2 83 m/s2  MV DecT: 266 55 ms  MV E Lukasz: 0 75 m/s  MV E/A Ratio: 0 81  MV PHT: 77 3 ms  MVA By PHT: 2 85 cm2    [employer] Accredited Echocardiography [facility]    Prepared and electronically signed by    Marcelino Damian MD  Signed 2021 13:52:16    No results found for this or any previous visit  No valid procedures specified  No results found for this or any previous visit  Imaging:   I have personally reviewed pertinent reports          EKG reviewed personally:  None available for my review    Telemetry reviewed personally:   Sinus rhythm      Code Status: Level 1 - Full Code      ALEKSANDER Schumacher  2022,10:30 AM

## 2022-02-27 NOTE — ASSESSMENT & PLAN NOTE
Presents with elevated hypertension with associated headache, dizziness  Patient has been following with her cardiologist, she was recently switched from amlodipine 10mg and enalapril 20mg to carvedilol 6 25 mg b i d  and lisinopril 20 mg daily due to poorly controlled HTN  · Initial /88, improved to systolic  with treatment of headache  · Trop negative  · EKG reveals sinus bradycardia HR 55 with T-wave inversion in aVL, V1 and V2  · Cr 1 16, slightly above baseline 0 9   · CTA reveals: No acute intracranial abnormality  No hemodynamically significant stenosis or occlusion of the carotid or vertebral arteries or major vessels of the [place]  · Likely acutely accelerated BP due to pain response, however headache itself may have been a result of chronic uncontrolled BP   · Unable to increase metoprolol due to bradycardia, continue 20 mg of lisinopril     · Consider adding hydrochlorothiazide  · 2g sodium diet

## 2022-02-27 NOTE — PROGRESS NOTES
[address] [facility]  Progress Note - Damien Maharaj 1945, 78 y o  female MRN: [id number]  Unit/Bed#: -01 Encounter: [id number]  Primary Care Provider: ALEKSANDER Reaves   Date and time admitted to hospital: 2/26/2022 11:53 PM    * Hypertensive urgency  Assessment & Plan  Presents with elevated hypertension with associated headache, dizziness  Patient has been following with her cardiologist, she was recently switched from amlodipine 10mg and enalapril 20mg to carvedilol 6 25 mg b i d  and lisinopril 20 mg daily due to poorly controlled HTN  · Initial /88, improved to systolic  with treatment of headache  · Trop negative  · EKG reveals sinus bradycardia HR 55 with T-wave inversion in aVL, V1 and V2  · Cr 1 16, slightly above baseline 0 9   · CTA reveals: No acute intracranial abnormality  No hemodynamically significant stenosis or occlusion of the carotid or vertebral arteries or major vessels of the [place]  · Likely acutely accelerated BP due to pain response, however headache itself may have been a result of chronic uncontrolled BP   · Unable to increase metoprolol due to bradycardia, continue 20 mg of lisinopril  · Consider adding hydrochlorothiazide  · 2g sodium diet    Bradycardia  Assessment & Plan  Hold coreg  Get EKG  cards consult for pacer consideration    Nausea  Assessment & Plan  · Due to headache  · Prn antiemetics    Exertional dyspnea  Assessment & Plan  Patient reports exertional dyspnea  On review of EMR, exertional dyspnea has been present for greater than 1 year  She has been following with cardiology and pulmonology  · ED RN reports patient was extremely dyspneic after he ambulating back to bed from bathroom  O2 sat 97% at the time  · TTE 7/2021: EF: 65%, mild aortic regurgitation  ·    No prior result for comparison   · Cardiology planning for pharmacological stress test outpt for further evaluation    Controlled type 2 diabetes with neuropathy ([facility] [address] )  Assessment & Plan  · Hold home oral agents  · Glucose checks a c  And HS    Depression with anxiety  Assessment & Plan  · Continue home sertraline         VTE Pharmacologic Prophylaxis: VTE Score: 4 Moderate Risk (Score 3-4) - Pharmacological DVT Prophylaxis Ordered: enoxaparin (Lovenox)  Patient Centered Rounds: I performed bedside rounds with nursing staff today  Education and Discussions with Family / Patient: Attempted to update  () via phone  Left voicemail  Time Spent for Care: 30 minutes  More than 50% of total time spent on counseling and coordination of care as described above  Current Length of Stay: 0 day(s)  Current Patient Status: Observation   Certification Statement: The patient will continue to require additional inpatient hospital stay due to stress test tomorrow  Discharge Plan: Anticipate discharge in 48-72 hrs to discharge location to be determined pending rehab evaluations  Code Status: Level 1 - Full Code    Subjective:   Pt still c/o SOB    Objective:     Vitals:   Temp (24hrs), Av 7 °F (36 5 °C), Min:97 3 °F (36 3 °C), Max:98 °F (36 7 °C)    Temp:  [97 3 °F (36 3 °C)-98 °F (36 7 °C)] 97 3 °F (36 3 °C)  HR:  [45-57] 57  Resp:  [17-22] 17  BP: (105-225)/(61-88) 105/63  SpO2:  [94 %-99 %] 95 %  There is no height or weight on file to calculate BMI  Input and Output Summary (last 24 hours): Intake/Output Summary (Last 24 hours) at 2022 1311  Last data filed at 2022 0700  Gross per 24 hour   Intake 240 ml   Output --   Net 240 ml       Physical Exam:   Vitals and nursing note reviewed  Constitutional:       General: She is not in acute distress  Appearance: She is well-developed  She is ill-appearing  HENT:      Head: Normocephalic and atraumatic  Eyes:      Extraocular Movements: Extraocular movements intact        Conjunctiva/sclera: Conjunctivae normal    Cardiovascular:      Rate and Rhythm: Normal rate and regular rhythm  Heart sounds: No murmur heard  Comments: No lower extremity edema  Pulmonary:      Effort: Pulmonary effort is normal  No respiratory distress  Breath sounds: Normal breath sounds  No wheezing  Abdominal:      Palpations: Abdomen is soft  Tenderness: There is no abdominal tenderness  Musculoskeletal:      Cervical back: Neck supple  Skin:     General: Skin is warm and dry  Neurological:      General: No focal deficit present  Mental Status: She is alert and oriented to person, place, and time  Additional Data:     Labs:  Results from last 7 days   Lab Units 02/27/22  0153   WBC Thousand/uL 6 34   HEMOGLOBIN g/dL 11 0*   HEMATOCRIT % 34 1*   PLATELETS Thousands/uL 227   NEUTROS PCT % 47   LYMPHS PCT % 38   MONOS PCT % 8   EOS PCT % 6     Results from last 7 days   Lab Units 02/27/22  0152   SODIUM mmol/L 143   POTASSIUM mmol/L 4 3   CHLORIDE mmol/L 104   CO2 mmol/L 29   BUN mg/dL 18   CREATININE mg/dL 1 16   ANION GAP mmol/L 10   CALCIUM mg/dL 9 2   ALBUMIN g/dL 3 5   TOTAL BILIRUBIN mg/dL 0 23   ALK PHOS U/L 72   ALT U/L 45   AST U/L 15   GLUCOSE RANDOM mg/dL 83         Results from last 7 days   Lab Units 02/27/22  1056 02/27/22  0744   POC GLUCOSE mg/dl 118 90               Lines/Drains:  Invasive Devices  Report    Peripheral Intravenous Line            Peripheral IV 09/27/19 Left Antecubital 883 days                  Telemetry:  Telemetry Orders (From admission, onward)             48 Hour Telemetry Monitoring  Continuous x 48 hours        References:    Telemetry Guidelines   Question:  Reason for 48 Hour Telemetry  Answer:  Patient on Vasopressor or Vasoactive IV meds                 Telemetry Reviewed: Normal Sinus Rhythm  Indication for Continued Telemetry Use: Acute MI/Unstable Angina/Rule out ACS             Imaging: No pertinent imaging reviewed      Recent Cultures (last 7 days):         Last 24 Hours Medication List:   Current Facility-Administered Medications   Medication Dose Route Frequency Provider Last Rate    acetaminophen  650 mg Oral Q6H PRN [employer], GAYLE      amLODIPine  10 mg Oral Daily Raphael Betts MD      aspirin  81 mg Oral Daily [employer], GAYLE      lisinopril  20 mg Oral BID [employer], GAYLE      ondansetron  4 mg Intravenous Q6H PRN [employer], PA-BAILEE      sertraline  50 mg Oral Daily [employer], GAYLE          Today, Patient Was Seen By: Raphael Betts MD    **Please Note: This note may have been constructed using a voice recognition system  **

## 2022-02-27 NOTE — ED PROVIDER NOTES
History  Chief Complaint   Patient presents with    Hypertension     pt arrived via ems with c/o high BP, headache and dizzy  pt has been having trouble regulating her BP and her [facility] doctor recently recommended a cardiologist        History provided by:  Patient  Hypertension  Severity:  Moderate  Onset quality:  Gradual  Duration:  1 month  Timing:  Intermittent  Progression:  Worsening  Chronicity:  Chronic  Notable PTA blood pressures:  225  Relieved by:  Nothing  Worsened by:  Nothing  Ineffective treatments: was on enalapril, BP was high so sent to cardiologist and put on lisnipril and carvedilol but BPs still high  Associated symptoms: dizziness, headaches and shortness of breath    Associated symptoms: no abdominal pain, no blurred vision, no chest pain, no confusion, no palpitations, no peripheral edema, no syncope and not vomiting    Headaches:     Severity:  Moderate    Onset quality:  Gradual    Duration:  2 days    Timing:  Constant    Progression:  Worsening    Chronicity:  New  Risk factors: cardiac disease and diabetes        Prior to Admission Medications   Prescriptions Last Dose Informant Patient Reported? Taking? Advair Diskus 250-50 MCG/DOSE inhaler  Self No No   Sig: INHALE 1 PUFF 2 (TWO) TIMES A DAY RINSE MOUTH AFTER USE     Aspirin Low Dose 81 MG chewable tablet  Self No No   Sig: CHEW 1 TABLET BY MOUTH DAILY   Janumet -1000 MG TB24  Self No No   Sig: TAKE 1 TABLET BY MOUTH EVERY DAY   Lyrica  MG TB24  Self No No   Sig: TAKE 1 TABLET BY MOUTH TWICE A DAY   ONETOUCH DELICA LANCETS 57I MISC  Self Yes No   Sig: E11 9/ testing daily   ProAir  (90 Base) MCG/ACT inhaler  Self No No   Sig: INHALE 2 PUFFS BY MOUTH EVERY 6 HOURS AS NEEDED FOR WHEEZE   carvedilol (COREG) 6 25 mg tablet   No No   Sig: Take 1 tablet (6 25 mg total) by mouth 2 (two) times a day with meals   cyclobenzaprine (FLEXERIL) 5 mg tablet Not Taking at Unknown time Self No No   Sig: Take 1 tablet (5 mg total) by mouth 3 (three) times a day as needed for muscle spasms   Patient not taking: Reported on 2022    glucose blood (ONETOUCH VERIO) test strip  Self Yes No   Sig: E11 9 /  daily   hydrOXYzine HCL (ATARAX) 10 mg tablet  Self Yes No   latanoprost (XALATAN) 0 005 % ophthalmic solution  Self Yes No   lisinopril (ZESTRIL) 20 mg tablet   No No   Sig: Take 1 tablet (20 mg total) by mouth 2 (two) times a day   meloxicam (MOBIC) 15 mg tablet Not Taking at Unknown time Self No No   Sig: TAKE 1 TABLET BY MOUTH EVERY DAY WITH FOOD   Patient not taking: Reported on 2022   mometasone (ELOCON) 0 1 % cream  Self No No   Sig: Apply topically daily   montelukast (SINGULAIR) 10 mg tablet   No No   Sig: TAKE 1 TABLET BY MOUTH EVERYDAY AT BEDTIME   sertraline (ZOLOFT) 50 mg tablet  Self No No   Sig: TAKE 1 TABLET BY MOUTH EVERY DAY   tobramycin-dexamethasone (TOBRADEX) ophthalmic suspension  Self Yes No   Patient not taking: Reported on 2022    triamcinolone (KENALOG) 0 1 % cream  Self Yes No   zolpidem (AMBIEN) 5 mg tablet  Self No No   Sig: TAKE 1 TABLET BY MOUTH DAILY AT BEDTIME AS NEEDED FOR SLEEP      Facility-Administered Medications: None       Past Medical History:   Diagnosis Date    Asthma     Benign hypertension 2018    [facility]     Diabetes mellitus [facility]     Glaucoma     Hypertension     Kidney stone     Neuropathy     Sleep apnea     no machine    SOB (shortness of breath)     Tubular adenoma of colon 10/2019    Wheezing        Past Surgical History:   Procedure Laterality Date     SECTION      COLONOSCOPY      HYSTERECTOMY  1985    TONSILLECTOMY         Family History   Problem Relation Age of Onset    Diabetes Mother     Hypertension Father     Prostate cancer Father     Diabetes Sister     Hypertension Sister     Breast cancer Sister 62    Diabetes Brother     Hypertension Brother     Asthma Family     No Known Problems Daughter     No Known Problems Sister     No Known Problems Daughter     No Known Problems Daughter     No Known Problems Maternal Aunt     Breast cancer Maternal Aunt 58    No Known Problems Maternal Aunt     No Known Problems Maternal Aunt     No Known Problems Paternal Aunt     No Known Problems Paternal Aunt     No Known Problems Paternal Aunt     Colon cancer Neg Hx     Ovarian cancer Neg Hx     Uterine cancer Neg Hx     Cervical cancer Neg Hx      I have reviewed and agree with the history as documented  E-Cigarette/Vaping     E-Cigarette/Vaping Substances    Nicotine No     THC No     CBD No     Flavoring No     Other No     Unknown No      Social History     Tobacco Use    Smoking status: Never Smoker    Smokeless tobacco: Never Used   Vaping Use    Vaping Use: Not on file   Substance Use Topics    Alcohol use: Never    Drug use: No       Review of Systems   Eyes: Negative for blurred vision  Respiratory: Positive for shortness of breath  Cardiovascular: Negative for chest pain, palpitations and syncope  Gastrointestinal: Negative for abdominal pain and vomiting  Neurological: Positive for dizziness and headaches  Psychiatric/Behavioral: Negative for confusion  All other systems reviewed and are negative  Physical Exam  Physical Exam  Vitals and nursing note reviewed  Constitutional:       General: She is not in acute distress  Appearance: She is well-developed  She is not diaphoretic  HENT:      Head: Normocephalic and atraumatic  Nose: Nose normal    Eyes:      Extraocular Movements: Extraocular movements intact  Conjunctiva/sclera: Conjunctivae normal       Pupils: Pupils are equal, round, and reactive to light  Cardiovascular:      Rate and Rhythm: Normal rate and regular rhythm  Heart sounds: Normal heart sounds  Pulmonary:      Effort: Pulmonary effort is normal  No respiratory distress  Breath sounds: Normal breath sounds     Abdominal: General: There is no distension  Palpations: Abdomen is soft  Tenderness: There is no abdominal tenderness  Musculoskeletal:         General: Normal range of motion  Cervical back: Normal range of motion and neck supple  Skin:     General: Skin is warm and dry  Neurological:      General: No focal deficit present  Mental Status: She is alert and oriented to person, place, and time  Mental status is at baseline  Cranial Nerves: No cranial nerve deficit  Motor: No weakness     Psychiatric:         Mood and Affect: Mood normal          Vital Signs  ED Triage Vitals   Temperature Pulse Respirations Blood Pressure SpO2   02/27/22 0009 02/27/22 0006 02/27/22 0006 02/27/22 0006 02/27/22 0006   98 °F (36 7 °C) 57 17 (!) 225/88 96 %      Temp Source Heart Rate Source Patient Position - Orthostatic VS BP Location FiO2 (%)   02/27/22 0009 02/27/22 0006 02/27/22 0006 02/27/22 0006 --   Oral Monitor Lying Left arm       Pain Score       02/27/22 0314       3           Vitals:    02/28/22 0915 02/28/22 1147 02/28/22 1148 02/28/22 1500   BP: 155/73  125/84 131/78   Pulse: (!) 50 58 62 62   Patient Position - Orthostatic VS:             Visual Acuity  Visual Acuity      Most Recent Value   L Pupil Size (mm) 3   R Pupil Size (mm) 3   L Pupil Shape Round   R Pupil Shape Round          ED Medications  Medications   lisinopril (ZESTRIL) tablet 20 mg (20 mg Oral Given 2/28/22 0843)   sertraline (ZOLOFT) tablet 50 mg (50 mg Oral Given 2/28/22 0843)   aspirin chewable tablet 81 mg (81 mg Oral Given 2/28/22 0843)   acetaminophen (TYLENOL) tablet 650 mg (650 mg Oral Not Given 2/27/22 2116)   [facility] 4 mg (has no administration in time range)   amLODIPine (NORVASC) tablet 10 mg (10 mg Oral Given 2/28/22 0843)   enoxaparin (LOVENOX) subcutaneous injection 40 mg (40 mg Subcutaneous Refused 2/28/22 0843)   acetaminophen (TYLENOL) tablet 650 mg (650 mg Oral Given 2/27/22 0226)   iohexol (OMNIPAQUE) 350 MG/ML injection (SINGLE-DOSE) 100 mL (100 mL Intravenous Given 2/27/22 0249)   metoclopramide (REGLAN) injection 5 mg (5 mg Intravenous Given 2/27/22 0405)   diphenhydrAMINE (BENADRYL) injection 25 mg (25 mg Intravenous Given 2/27/22 0405)   metoclopramide (REGLAN) injection 10 mg (10 mg Intravenous Given 2/27/22 2220)   diphenhydrAMINE (BENADRYL) injection 25 mg (25 mg Intravenous Given 2/27/22 2220)   acetaminophen (TYLENOL) tablet 975 mg (975 mg Oral Given 2/27/22 2220)   regadenoson (LEXISCAN) injection 0 4 mg (0 4 mg Intravenous Given 2/28/22 1304)   aminophylline injection 50 mg (50 mg Intravenous Given 2/28/22 1312)       Diagnostic Studies  Results Reviewed     Procedure Component Value Units Date/Time    COVID/FLU/RSV [[id number]]  (Normal) Collected: 02/27/22 0506    Lab Status: Final result Specimen: Nares from Nasopharyngeal Swab Updated: 02/27/22 0525     SARS-CoV-2 Negative     INFLUENZA A PCR Negative     INFLUENZA B PCR Negative     RSV PCR Negative    Narrative:      FOR PEDIATRIC PATIENTS - copy/paste COVID Guidelines URL to browser: [email or web address]    SARS-CoV-2 assay is a Nucleic Acid Amplification assay intended for the  qualitative detection of nucleic acid from SARS-CoV-2 in nasopharyngeal  swabs  Results are for the presumptive identification of SARS-CoV-2 RNA  Positive results are indicative of infection with SARS-CoV-2, the virus  causing COVID-19, but do not rule out bacterial infection or co-infection  with other viruses  Laboratories within the [place] and its  territories are required to report all positive results to the appropriate  public health authorities  Negative results do not preclude SARS-CoV-2  infection and should not be used as the sole basis for treatment or other  patient management decisions   Negative results must be combined with  clinical observations, patient history, and epidemiological information  This test has not been FDA cleared or approved  This test has been authorized by FDA under an Emergency Use Authorization  (EUA)  This test is only authorized for the duration of time the  declaration that circumstances exist justifying the authorization of the  emergency use of an in vitro diagnostic tests for detection of SARS-CoV-2  virus and/or diagnosis of COVID-19 infection under section 564(b)(1) of  the Act, 21 U  S C  848SVV-6(M)(2), unless the authorization is terminated  or revoked sooner  The test has been validated but independent review by FDA  and CLIA is pending  Test performed using [employer]: This RT-PCR assay targets N2,  a region unique to SARS-CoV-2  A conserved region in the E-gene was chosen  for pan-Sarbecovirus detection which includes SARS-CoV-2      HS Troponin 0hr (reflex protocol) [[id number]]  (Normal) Collected: 02/27/22 0152    Lab Status: Final result Specimen: Blood Updated: 02/27/22 0216     hs TnI 0hr 3 ng/L     Magnesium [[id number]]  (Normal) Collected: 02/27/22 0152    Lab Status: Final result Specimen: Blood Updated: 02/27/22 0215     Magnesium 2 0 mg/dL     NT-BNP PRO [[id number]]  (Abnormal) Collected: 02/27/22 0152    Lab Status: Final result Specimen: Blood Updated: 02/27/22 0215     NT-proBNP 601 pg/mL     Comprehensive metabolic panel [[id number]] Collected: 02/27/22 0152    Lab Status: Final result Specimen: Blood Updated: 02/27/22 8977     Sodium 143 mmol/L      Potassium 4 3 mmol/L      Chloride 104 mmol/L      CO2 29 mmol/L      ANION GAP 10 mmol/L      BUN 18 mg/dL      Creatinine 1 16 mg/dL      Glucose 83 mg/dL      Calcium 9 2 mg/dL      AST 15 U/L      ALT 45 U/L      Alkaline Phosphatase 72 U/L      Total Protein 7 6 g/dL      Albumin 3 5 g/dL      Total Bilirubin 0 23 mg/dL      eGFR 46 ml/min/1 73sq m     Narrative:      [place] guidelines for Chronic Kidney Disease (CKD):     Stage 1 with normal or high GFR (GFR > 90 mL/min/1 73 square meters)    Stage 2 Mild CKD (GFR = 60-89 mL/min/1 73 square meters)    Stage 3A Moderate CKD (GFR = 45-59 mL/min/1 73 square meters)    Stage 3B Moderate CKD (GFR = 30-44 mL/min/1 73 square meters)    Stage 4 Severe CKD (GFR = 15-29 mL/min/1 73 square meters)    Stage 5 End Stage CKD (GFR <15 mL/min/1 73 square meters)  Note: GFR calculation is accurate only with a steady state creatinine    CBC and differential [[id number]]  (Abnormal) Collected: 02/27/22 0153    Lab Status: Final result Specimen: Blood Updated: 02/27/22 0156     WBC 6 34 Thousand/uL      RBC 3 84 Million/uL      Hemoglobin 11 0 g/dL      Hematocrit 34 1 %      MCV 89 fL      MCH 28 6 pg      MCHC 32 3 g/dL      RDW 15 2 %      MPV 11 3 fL      Platelets 968 Thousands/uL      nRBC 0 /100 WBCs      Neutrophils Relative 47 %      Immat GRANS % 0 %      Lymphocytes Relative 38 %      Monocytes Relative 8 %      Eosinophils Relative 6 %      Basophils Relative 1 %      Neutrophils Absolute 3 03 Thousands/µL      Immature Grans Absolute 0 01 Thousand/uL      Lymphocytes Absolute 2 40 Thousands/µL      Monocytes Absolute 0 48 Thousand/µL      Eosinophils Absolute 0 37 Thousand/µL      Basophils Absolute 0 05 Thousands/µL                  CTA head and neck with and without contrast   Final Result by Darron Lawson MD (02/27 0322)      1  No acute intracranial abnormality  2   No hemodynamically significant stenosis or occlusion of the carotid or vertebral arteries or major vessels of the [place]                    Workstation performed: [id number]                    Procedures  ECG 12 Lead Documentation Only    Date/Time: 2/28/2022 4:52 PM  Performed by: Edenilson Amaro MD  Authorized by: Edenilson Amaro MD     Indications / Diagnosis:  HTN  Rate:     ECG rate:  52    ECG rate assessment: bradycardic    Rhythm:     Rhythm: sinus bradycardia    Ectopy:     Ectopy: PVCs               ED Course                                             MDM  Number of Diagnoses or Management Options  Dyspnea: new and requires workup  Elevated brain natriuretic peptide (BNP) level: new and requires workup  Uncontrolled hypertension: new and requires workup     Amount and/or Complexity of Data Reviewed  Clinical lab tests: ordered and reviewed  Tests in the radiology section of CPT®: ordered and reviewed  Independent visualization of images, tracings, or specimens: yes    Risk of Complications, Morbidity, and/or Mortality  Presenting problems: high    Patient Progress  Patient progress: stable      Disposition  Final diagnoses:   Uncontrolled hypertension   Dyspnea   Elevated brain natriuretic peptide (BNP) level     Time reflects when diagnosis was documented in both MDM as applicable and the Disposition within this note     Time User Action Codes Description Comment    2/27/2022  4:38 AM Adrien, [address] [place] [address] Uncontrolled hypertension     2/27/2022  4:44 AM Aakash Perales, [address] [facility] Add [R06 00] Dyspnea     2/27/2022  4:44 AM Aakash Perales, [address] [R79 89] Elevated brain natriuretic peptide (BNP) level     2/27/2022  8:09 AM Christopher Mendosa Add [R00 1] Bradycardia     2/28/2022  3:59 PM Viona Boxer Add [I16 0] Hypertensive urgency       ED Disposition     ED Disposition Condition Date/Time Comment    Admit Stable Sun Feb 27, 2022  4:38 AM         Follow-up Information    None         Current Discharge Medication List      START taking these medications    Details   amLODIPine (NORVASC) 10 mg tablet Take 1 tablet (10 mg total) by mouth daily  Qty: 30 tablet, Refills: 0    Associated Diagnoses: Hypertensive urgency         CONTINUE these medications which have NOT CHANGED    Details   Advair Diskus 250-50 MCG/DOSE inhaler INHALE 1 PUFF 2 (TWO) TIMES A DAY RINSE MOUTH AFTER USE    Qty: 60 blister, Refills: 3    Associated Diagnoses: Mild intermittent asthma, unspecified whether complicated      Aspirin Low Dose 81 MG chewable tablet CHEW 1 TABLET BY MOUTH DAILY  Qty: 90 tablet, Refills: 1    Associated Diagnoses: 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)      cyclobenzaprine (FLEXERIL) 5 mg tablet Take 1 tablet (5 mg total) by mouth 3 (three) times a day as needed for muscle spasms  Qty: 30 tablet, Refills: 0    Comments: DX Code Needed    [id number]  Associated Diagnoses: Acute pain of right knee      glucose blood (ONETOUCH VERIO) test strip E11 9 / testing daily      hydrOXYzine HCL (ATARAX) 10 mg tablet       Janumet -1000 MG TB24 TAKE 1 TABLET BY MOUTH EVERY DAY  Qty: 90 tablet, Refills: 1    Associated Diagnoses: 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)      latanoprost (XALATAN) 0 005 % ophthalmic solution       lisinopril (ZESTRIL) 20 mg tablet Take 1 tablet (20 mg total) by mouth 2 (two) times a day  Qty: 60 tablet, Refills: 6    Associated Diagnoses: Primary hypertension      Lyrica  MG TB24 TAKE 1 TABLET BY MOUTH TWICE A DAY  Qty: 60 tablet, Refills: 0    Comments: This request is for a new prescription for a controlled substance as required by Federal/State law  DX Code Needed    Associated Diagnoses: 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)      meloxicam (MOBIC) 15 mg tablet TAKE 1 TABLET BY MOUTH EVERY DAY WITH FOOD  Qty: 30 tablet, Refills: 1    Associated Diagnoses: Acute pain of right knee      mometasone (ELOCON) 0 1 % cream Apply topically daily  Qty: 45 g, Refills: 0    Associated Diagnoses: Compulsive skin picking      montelukast (SINGULAIR) 10 mg tablet TAKE 1 TABLET BY MOUTH EVERYDAY AT BEDTIME  Qty: 90 tablet, Refills: 1    Associated Diagnoses: YANG (dyspnea on exertion);  Exercise induced bronchospasm      ONETOUCH DELICA LANCETS 66Z MISC E11 9/ testing daily      ProAir  (90 Base) MCG/ACT inhaler INHALE 2 PUFFS BY MOUTH EVERY 6 HOURS AS NEEDED FOR WHEEZE  Qty: 8 5 g, Refills: 3    Associated Diagnoses: Mild intermittent asthma, unspecified whether complicated sertraline (ZOLOFT) 50 mg tablet TAKE 1 TABLET BY MOUTH EVERY DAY  Qty: 90 tablet, Refills: 1    Associated Diagnoses: Depression with anxiety      tobramycin-dexamethasone (TOBRADEX) ophthalmic suspension       triamcinolone (KENALOG) 0 1 % cream       zolpidem (AMBIEN) 5 mg tablet TAKE 1 TABLET BY MOUTH DAILY AT BEDTIME AS NEEDED FOR SLEEP  Qty: 30 tablet, Refills: 0    Comments: Not to exceed 5 additional fills before 07/03/2022 DX Code Needed      Associated Diagnoses: Psychophysiological insomnia         STOP taking these medications       carvedilol (COREG) 6 25 mg tablet Comments:   Reason for Stopping:               Outpatient Discharge Orders   Discharge Diet     Activity as tolerated       PDMP Review       Value Time User    PDMP Reviewed  Yes 1/28/2022  7:05 AM Antonette Mima [place], [place]          ED Provider  Electronically Signed by           Obinna Oliver MD  02/28/22 1146

## 2022-02-27 NOTE — ED NOTES
Er md spoke to pt   Pt for admission pending orders and bed placement      Gretta Multani RN  02/27/22 6975

## 2022-02-27 NOTE — H&P
[address] [facility]  H&P- [facility] 1945, 78 y o  female MRN: [id number]  Unit/Bed#: ED 17 Encounter: [id number]  Primary Care Provider: ALEKSANDER Best   Date and time admitted to hospital: 2/26/2022 11:53 PM    Hypertensive urgency  Assessment & Plan  Presents with elevated hypertension with associated headache, dizziness  Patient has been following with her cardiologist, she was recently switched from amlodipine 10mg and enalapril 20mg to carvedilol 6 25 mg b i d  and lisinopril 20 mg daily due to poorly controlled HTN  · Initial /88, improved to systolic  with treatment of headache  · Trop negative  · EKG reveals sinus bradycardia HR 55 with T-wave inversion in aVL, V1 and V2  · Cr 1 16, slightly above baseline 0 9   · CTA reveals: No acute intracranial abnormality  No hemodynamically significant stenosis or occlusion of the carotid or vertebral arteries or major vessels of the [place]  · Likely acutely accelerated BP due to pain response, however headache itself may have been a result of chronic uncontrolled BP   · Unable to increase metoprolol due to bradycardia, continue 20 mg of lisinopril  · Consider adding hydrochlorothiazide  · 2g sodium diet    Exertional dyspnea  Assessment & Plan  Patient reports exertional dyspnea  On review of EMR, exertional dyspnea has been present for greater than 1 year  She has been following with cardiology and pulmonology  · TTE 7/2021: EF: 65%, mild aortic regurgitation  ·   No prior result for comparison   · Cardiology planning for pharmacological stress test outpt for further evaluation    Controlled type 2 diabetes with neuropathy [facility])  Assessment & Plan  · Hold home oral agents  · Glucose checks a c  And HS    Depression with anxiety  Assessment & Plan  · Continue home sertraline      VTE Pharmacologic Prophylaxis: VTE Score: 4 Anticipated stay less than 24 hours  Not ordered     Code Status: No Order     Anticipated Length of Stay: Patient will be admitted on an observation basis with an anticipated length of stay of less than 2 midnights secondary to HTN  Total Time for Visit, including Counseling / Coordination of Care: 60 minutes Greater than 50% of this total time spent on direct patient counseling and coordination of care  Chief Complaint:   Chief Complaint   Patient presents with    Hypertension     pt arrived via ems with c/o high BP, headache and dizzy  pt has been having trouble regulating her BP and her [facility] doctor recently recommended a cardiologist          History of Present Illness:  Megan Hassan is a 78 y o  female with a PMH of hypertension, diabetes, BOLA, chronic bronchitis, exercise-induced asthma who presents with complaints of elevated BP, headache and dizziness  Patient reports that she had changes in her BP meds by cardiologist approximately 2 weeks ago  She has had systolic BP readings of 579 throughout this whole week at home  She has had headache x2 days with associated nausea  No vomiting  No visual disturbances  Patient states that she last had a headache many years ago  CTA obtained in the ED was unremarkable  Patient denies chest pain  She does report shortness of breath on exertion which has been a chronic issue for months  Her outpatient cardiologist is planning for pharmacological stress test   Patient denies fever, chills  She did have some dizziness today and unsteadiness on her feet  Typically ambulates without assistance  Review of Systems:  Review of Systems   Constitutional: Negative for chills and fever  HENT: Negative for ear pain and sore throat  Eyes: Negative for photophobia, pain and visual disturbance  Respiratory: Positive for shortness of breath  Negative for cough  Cardiovascular: Negative for chest pain and palpitations  Gastrointestinal: Positive for nausea  Negative for abdominal pain, diarrhea and vomiting  Genitourinary: Negative for difficulty urinating, dyspareunia, dysuria and hematuria  Musculoskeletal: Positive for gait problem  Negative for arthralgias and back pain  Skin: Negative for color change and rash  Neurological: Positive for dizziness and headaches  Negative for seizures, syncope, weakness and light-headedness  All other systems reviewed and are negative  Past Medical and Surgical History:   Past Medical History:   Diagnosis Date    Asthma     Benign hypertension 2018    Cardiac arrest ([facility] )     Diabetes mellitus ([facility] )     Glaucoma     Hypertension     Kidney stone     Neuropathy     Sleep apnea     no machine    SOB (shortness of breath)     Tubular adenoma of colon 10/2019    Wheezing        Past Surgical History:   Procedure Laterality Date     SECTION      COLONOSCOPY      HYSTERECTOMY  1985    TONSILLECTOMY         Meds/Allergies:  Prior to Admission medications    Medication Sig Start Date End Date Taking?  Authorizing Provider   Advair Diskus 250-50 MCG/DOSE inhaler INHALE 1 PUFF 2 (TWO) TIMES A DAY RINSE MOUTH AFTER USE  21   ALEKSANDER Mckeon   Aspirin Low Dose 81 MG chewable tablet CHEW 1 TABLET BY MOUTH DAILY 3/23/21   ALEKSANDER Mckeon   carvedilol (COREG) 6 25 mg tablet Take 1 tablet (6 25 mg total) by mouth 2 (two) times a day with meals 22   Jenny Severance, MD   cyclobenzaprine (FLEXERIL) 5 mg tablet Take 1 tablet (5 mg total) by mouth 3 (three) times a day as needed for muscle spasms 4/15/21   ALEKSANDER Velázquez   glucose blood (ONETOUCH VERIO) test strip E11  daily 18   Historical Provider, MD   hydrOXYzine HCL (ATARAX) 10 mg tablet  21   Historical Provider, MD   Janumet -1000 MG TB24 TAKE 1 TABLET BY MOUTH EVERY DAY 10/6/21   ALEKSANDER Mckeon   latanoprost (XALATAN) 0 005 % ophthalmic solution  21   Historical Provider, MD   lisinopril (ZESTRIL) 20 mg tablet Take 1 tablet (20 mg total) by mouth 2 (two) times a day 2/4/22   Jeana Ireland MD   Lyrica  MG TB24 TAKE 1 TABLET BY MOUTH TWICE A DAY 12/29/21   ALEKSANDER Mckeon   meloxicam (MOBIC) 15 mg tablet TAKE 1 TABLET BY MOUTH EVERY DAY WITH FOOD 1/3/22   ALEKSANDER Mckeon   mometasone (ELOCON) 0 1 % cream Apply topically daily 3/23/20   ALEKSANDER Mckeon   montelukast (SINGULAIR) 10 mg tablet TAKE 1 TABLET BY MOUTH EVERYDAY AT BEDTIME 2/21/22   Lesly Conn PA-C   ONETOUCH DELICA LANCETS 36I MISC E11 9/ testing daily 8/16/18   Historical Provider, MD   ProAir  (90 Base) MCG/ACT inhaler INHALE 2 PUFFS BY MOUTH EVERY 6 HOURS AS NEEDED FOR WHEEZE 6/18/21   ALEKSANDER Mckeon   sertraline (ZOLOFT) 50 mg tablet TAKE 1 TABLET BY MOUTH EVERY DAY 10/6/21   ALEKSANDER Mckeon   tobramycin-dexamethasone (TOBRADEX) ophthalmic suspension  12/17/21   Historical Provider, MD   triamcinolone (KENALOG) 0 1 % cream  10/18/21   Historical Provider, MD   zolpidem (AMBIEN) 5 mg tablet TAKE 1 TABLET BY MOUTH DAILY AT BEDTIME AS NEEDED FOR SLEEP 1/28/22   ALEKSANDER Mckeon   betamethasone dipropionate (DIPROSONE) 0 05 % ointment Apply to affected areas daily as needed on weekdays, take breaks on weekends  Do not apply to face, groin or skin folds  10/18/21 2/27/22  Historical Provider, MD   carbamide peroxide (DEBROX) 6 5 % otic solution Administer 5 drops into both ears 2 (two) times a day  Patient not taking: Reported on 2/4/2022 11/7/19 2/27/22  ALEKSANDER Mckeon   LUMIGAN 0 01 % ophthalmic drops  4/6/20 2/27/22  Historical Provider, MD   travoprost (TRAVATAN Z) 0 004 % ophthalmic solution Apply 1 drop to eye daily at bedtime  2/27/22  Historical Provider, MD     I have reviewed home medications with patient personally  Allergies:    Allergies   Allergen Reactions    Ciprofloxacin Itching    Levaquin [Levofloxacin] Swelling    Penicillins GI Intolerance Social History:  Marital Status: /Civil Union   Occupation:   Patient Pre-hospital Living Situation: With spouse  Patient Pre-hospital Level of Mobility: walks  Patient Pre-hospital Diet Restrictions:   Substance Use History:   Social History     Substance and Sexual Activity   Alcohol Use No     Social History     Tobacco Use   Smoking Status Never Smoker   Smokeless Tobacco Never Used     Social History     Substance and Sexual Activity   Drug Use No       Family History:  Family History   Problem Relation Age of Onset    Diabetes Mother     Hypertension Father     Prostate cancer Father     Diabetes Sister     Hypertension Sister     Breast cancer Sister 62    Diabetes Brother     Hypertension Brother     Asthma Family     No Known Problems Daughter     No Known Problems Sister     No Known Problems Daughter     No Known Problems Daughter     No Known Problems Maternal Aunt     Breast cancer Maternal Aunt 58    No Known Problems Maternal Aunt     No Known Problems Maternal Aunt     No Known Problems Paternal Aunt     No Known Problems Paternal Aunt     No Known Problems Paternal Aunt     Colon cancer Neg Hx     Ovarian cancer Neg Hx     Uterine cancer Neg Hx     Cervical cancer Neg Hx        Physical Exam:     Vitals:   Blood Pressure: 146/64 (02/27/22 0515)  Pulse: (!) 51 (02/27/22 0515)  Temperature: 98 °F (36 7 °C) (02/27/22 0009)  Temp Source: Oral (02/27/22 0009)  Respirations: 18 (02/27/22 0515)  SpO2: 99 % (02/27/22 0515)    Physical Exam  Vitals and nursing note reviewed  Constitutional:       General: She is not in acute distress  Appearance: She is well-developed  She is ill-appearing  HENT:      Head: Normocephalic and atraumatic  Eyes:      Extraocular Movements: Extraocular movements intact  Conjunctiva/sclera: Conjunctivae normal    Cardiovascular:      Rate and Rhythm: Normal rate and regular rhythm  Heart sounds: No murmur heard  Comments: No lower extremity edema  Pulmonary:      Effort: Pulmonary effort is normal  No respiratory distress  Breath sounds: Normal breath sounds  No wheezing  Abdominal:      Palpations: Abdomen is soft  Tenderness: There is no abdominal tenderness  Musculoskeletal:      Cervical back: Neck supple  Skin:     General: Skin is warm and dry  Neurological:      General: No focal deficit present  Mental Status: She is alert and oriented to person, place, and time  Additional Data:     Lab Results:  Results from last 7 days   Lab Units 02/27/22  0153   WBC Thousand/uL 6 34   HEMOGLOBIN g/dL 11 0*   HEMATOCRIT % 34 1*   PLATELETS Thousands/uL 227   NEUTROS PCT % 47   LYMPHS PCT % 38   MONOS PCT % 8   EOS PCT % 6     Results from last 7 days   Lab Units 02/27/22  0152   SODIUM mmol/L 143   POTASSIUM mmol/L 4 3   CHLORIDE mmol/L 104   CO2 mmol/L 29   BUN mg/dL 18   CREATININE mg/dL 1 16   ANION GAP mmol/L 10   CALCIUM mg/dL 9 2   ALBUMIN g/dL 3 5   TOTAL BILIRUBIN mg/dL 0 23   ALK PHOS U/L 72   ALT U/L 45   AST U/L 15   GLUCOSE RANDOM mg/dL 83                       Imaging: Reviewed radiology reports from this admission including: CT head  CTA head and neck with and without contrast   Final Result by Nanda Preston MD (02/27 0322)      1  No acute intracranial abnormality  2   No hemodynamically significant stenosis or occlusion of the carotid or vertebral arteries or major vessels of the [place]  Workstation performed: [id number]             EKG and Other Studies Reviewed on Admission:   · EKG: Sinus Bradycardia  HR 55     ** Please Note: This note has been constructed using a voice recognition system   **

## 2022-02-27 NOTE — ASSESSMENT & PLAN NOTE
Presents with elevated hypertension with associated headache, dizziness  Patient has been following with her cardiologist, she was recently switched from amlodipine 10mg and enalapril 20mg to carvedilol 6 25 mg b i d  and lisinopril 20 mg daily due to poorly controlled HTN  · Initial /88, improved to systolic  with treatment of headache  · Trop negative  · EKG reveals sinus bradycardia HR 55 with T-wave inversion in aVL, V1 and V2  · Cr 1 16, slightly above baseline 0 9   · CTA reveals: No acute intracranial abnormality  No hemodynamically significant stenosis or occlusion of the carotid or vertebral arteries or major vessels of the [place]  · Likely acutely accelerated BP due to pain response, however headache itself may have been a result of chronic uncontrolled BP   · Unable to increase metoprolol due to bradycardia, continue 20 mg of lisinopril     · Consider adding hydrochlorothiazide  · 2g sodium diet

## 2022-02-27 NOTE — ASSESSMENT & PLAN NOTE
Patient reports exertional dyspnea  On review of EMR, exertional dyspnea has been present for greater than 1 year  She has been following with cardiology and pulmonology  · ED RN reports patient was extremely dyspneic after he ambulating back to bed from bathroom  O2 sat 97% at the time  · TTE 7/2021: EF: 65%, mild aortic regurgitation  ·    No prior result for comparison   · Cardiology planning for pharmacological stress test outpt for further evaluation

## 2022-02-28 ENCOUNTER — APPOINTMENT (OUTPATIENT)
Dept: NON INVASIVE DIAGNOSTICS | Facility: HOSPITAL | Age: 77
End: 2022-02-28
Payer: MEDICARE

## 2022-02-28 ENCOUNTER — APPOINTMENT (OUTPATIENT)
Dept: NUCLEAR MEDICINE | Facility: HOSPITAL | Age: 77
End: 2022-02-28
Payer: MEDICARE

## 2022-02-28 VITALS
HEART RATE: 62 BPM | BODY MASS INDEX: 35.88 KG/M2 | TEMPERATURE: 98 F | OXYGEN SATURATION: 96 % | HEIGHT: 62 IN | RESPIRATION RATE: 18 BRPM | WEIGHT: 195 LBS | SYSTOLIC BLOOD PRESSURE: 131 MMHG | DIASTOLIC BLOOD PRESSURE: 78 MMHG

## 2022-02-28 LAB
ANION GAP SERPL CALCULATED.3IONS-SCNC: 7 MMOL/L (ref 4–13)
AORTIC ROOT: 2.4 CM
APICAL FOUR CHAMBER EJECTION FRACTION: 72 %
ASCENDING AORTA: 2.5 CM (ref 2.03–3.04)
ATRIAL RATE: 52 BPM
BASELINE ST DEPRESSION: 0 MM
BASOPHILS # BLD AUTO: 0.05 THOUSANDS/ΜL (ref 0–0.1)
BASOPHILS NFR BLD AUTO: 1 % (ref 0–1)
BUN SERPL-MCNC: 19 MG/DL (ref 5–25)
CALCIUM SERPL-MCNC: 9.2 MG/DL (ref 8.3–10.1)
CHLORIDE SERPL-SCNC: 103 MMOL/L (ref 100–108)
CHOLEST SERPL-MCNC: 145 MG/DL
CO2 SERPL-SCNC: 29 MMOL/L (ref 21–32)
CREAT SERPL-MCNC: 0.87 MG/DL (ref 0.6–1.3)
E WAVE DECELERATION TIME: 225 MS
EOSINOPHIL # BLD AUTO: 0.39 THOUSAND/ΜL (ref 0–0.61)
EOSINOPHIL NFR BLD AUTO: 7 % (ref 0–6)
ERYTHROCYTE [DISTWIDTH] IN BLOOD BY AUTOMATED COUNT: 15.1 % (ref 11.6–15.1)
FRACTIONAL SHORTENING: 38 % (ref 28–44)
GFR SERPL CREATININE-BSD FRML MDRD: 65 ML/MIN/1.73SQ M
GLUCOSE P FAST SERPL-MCNC: 92 MG/DL (ref 65–99)
GLUCOSE SERPL-MCNC: 104 MG/DL (ref 65–140)
GLUCOSE SERPL-MCNC: 111 MG/DL (ref 65–140)
GLUCOSE SERPL-MCNC: 137 MG/DL (ref 65–140)
GLUCOSE SERPL-MCNC: 92 MG/DL (ref 65–140)
HCT VFR BLD AUTO: 34.4 % (ref 34.8–46.1)
HDLC SERPL-MCNC: 83 MG/DL
HGB BLD-MCNC: 11.1 G/DL (ref 11.5–15.4)
IMM GRANULOCYTES # BLD AUTO: 0.01 THOUSAND/UL (ref 0–0.2)
IMM GRANULOCYTES NFR BLD AUTO: 0 % (ref 0–2)
INTERVENTRICULAR SEPTUM IN DIASTOLE (PARASTERNAL SHORT AXIS VIEW): 1 CM (ref 0.53–1)
INTERVENTRICULAR SEPTUM: 1 CM (ref 0.6–1.1)
LA/AORTA RATIO 2D: 1.46
LAAS-AP2: 19.7 CM2
LAAS-AP4: 16.3 CM2
LDLC SERPL CALC-MCNC: 58 MG/DL (ref 0–100)
LEFT ATRIUM SIZE: 3.5 CM
LEFT INTERNAL DIMENSION IN SYSTOLE: 3 CM (ref 2.94–4.45)
LEFT VENTRICULAR INTERNAL DIMENSION IN DIASTOLE: 4.8 CM (ref 4.83–7.19)
LEFT VENTRICULAR POSTERIOR WALL IN END DIASTOLE: 1 CM (ref 0.52–0.99)
LEFT VENTRICULAR STROKE VOLUME: 75 ML
LVSV (TEICH): 75 ML
LYMPHOCYTES # BLD AUTO: 2.56 THOUSANDS/ΜL (ref 0.6–4.47)
LYMPHOCYTES NFR BLD AUTO: 42 % (ref 14–44)
MCH RBC QN AUTO: 28.3 PG (ref 26.8–34.3)
MCHC RBC AUTO-ENTMCNC: 32.3 G/DL (ref 31.4–37.4)
MCV RBC AUTO: 88 FL (ref 82–98)
MONOCYTES # BLD AUTO: 0.51 THOUSAND/ΜL (ref 0.17–1.22)
MONOCYTES NFR BLD AUTO: 9 % (ref 4–12)
MV E'TISSUE VEL-SEP: 6 CM/S
MV PEAK A VEL: 1.03 M/S
MV PEAK E VEL: 79 CM/S
MV STENOSIS PRESSURE HALF TIME: 66 MS
MV VALVE AREA P 1/2 METHOD: 3.33 CM2
NEUTROPHILS # BLD AUTO: 2.41 THOUSANDS/ΜL (ref 1.85–7.62)
NEUTS SEG NFR BLD AUTO: 41 % (ref 43–75)
NRBC BLD AUTO-RTO: 0 /100 WBCS
NUC STRESS EJECTION FRACTION: 67 %
P AXIS: 45 DEGREES
PLATELET # BLD AUTO: 222 THOUSANDS/UL (ref 149–390)
PMV BLD AUTO: 10.6 FL (ref 8.9–12.7)
POTASSIUM SERPL-SCNC: 3.9 MMOL/L (ref 3.5–5.3)
PR INTERVAL: 180 MS
QRS AXIS: 22 DEGREES
QRSD INTERVAL: 72 MS
QT INTERVAL: 448 MS
QTC INTERVAL: 416 MS
RATE PRESSURE PRODUCT: NORMAL
RBC # BLD AUTO: 3.92 MILLION/UL (ref 3.81–5.12)
SL CV PED ECHO LEFT VENTRICLE DIASTOLIC VOLUME (MOD BIPLANE) 2D: 109 ML
SL CV PED ECHO LEFT VENTRICLE SYSTOLIC VOLUME (MOD BIPLANE) 2D: 34 ML
SL CV REST NUCLEAR ISOTOPE DOSE: 10.5 MCI
SL CV STRESS NUCLEAR ISOTOPE DOSE: 27 MCI
SL CV STRESS RECOVERY BP: NORMAL MMHG
SL CV STRESS RECOVERY HR: 74 BPM
SODIUM SERPL-SCNC: 139 MMOL/L (ref 136–145)
STRESS BASELINE BP: NORMAL MMHG
STRESS BASELINE HR: 63 BPM
STRESS O2 SAT REST: 97 %
STRESS PEAK HR: 78 BPM
STRESS POST O2 SAT PEAK: 99 %
STRESS POST PEAK BP: 183 MMHG
STRESS ST DEPRESSION: 0 MM
T WAVE AXIS: 90 DEGREES
T4 FREE SERPL-MCNC: 0.8 NG/DL (ref 0.76–1.46)
TR MAX PG: 38 MMHG
TR PEAK VELOCITY: 3.1 M/S
TRICUSPID ANNULAR PLANE SYSTOLIC EXCURSION: 1.8 CM
TRICUSPID VALVE PEAK REGURGITATION VELOCITY: 3.08 M/S
TRIGL SERPL-MCNC: 18 MG/DL
TSH SERPL DL<=0.05 MIU/L-ACNC: 4.89 UIU/ML (ref 0.36–3.74)
VENTRICULAR RATE: 52 BPM
WBC # BLD AUTO: 5.93 THOUSAND/UL (ref 4.31–10.16)
Z-SCORE OF ASCENDING AORTA: -0.15
Z-SCORE OF INTERVENTRICULAR SEPTUM IN END DIASTOLE: 1.96
Z-SCORE OF LEFT VENTRICULAR DIMENSION IN END DIASTOLE: -2.05
Z-SCORE OF LEFT VENTRICULAR DIMENSION IN END SYSTOLE: -1.47
Z-SCORE OF LEFT VENTRICULAR POSTERIOR WALL IN END DIASTOLE: 2.07

## 2022-02-28 PROCEDURE — 99214 OFFICE O/P EST MOD 30 MIN: CPT | Performed by: INTERNAL MEDICINE

## 2022-02-28 PROCEDURE — 93010 ELECTROCARDIOGRAM REPORT: CPT | Performed by: INTERNAL MEDICINE

## 2022-02-28 PROCEDURE — 93018 CV STRESS TEST I&R ONLY: CPT | Performed by: INTERNAL MEDICINE

## 2022-02-28 PROCEDURE — 84439 ASSAY OF FREE THYROXINE: CPT

## 2022-02-28 PROCEDURE — 80048 BASIC METABOLIC PNL TOTAL CA: CPT | Performed by: HOSPITALIST

## 2022-02-28 PROCEDURE — 84443 ASSAY THYROID STIM HORMONE: CPT

## 2022-02-28 PROCEDURE — 93017 CV STRESS TEST TRACING ONLY: CPT

## 2022-02-28 PROCEDURE — A9502 TC99M TETROFOSMIN: HCPCS

## 2022-02-28 PROCEDURE — G1004 CDSM NDSC: HCPCS

## 2022-02-28 PROCEDURE — 78452 HT MUSCLE IMAGE SPECT MULT: CPT | Performed by: INTERNAL MEDICINE

## 2022-02-28 PROCEDURE — 93306 TTE W/DOPPLER COMPLETE: CPT | Performed by: INTERNAL MEDICINE

## 2022-02-28 PROCEDURE — 85025 COMPLETE CBC W/AUTO DIFF WBC: CPT | Performed by: HOSPITALIST

## 2022-02-28 PROCEDURE — 78452 HT MUSCLE IMAGE SPECT MULT: CPT

## 2022-02-28 PROCEDURE — 93306 TTE W/DOPPLER COMPLETE: CPT

## 2022-02-28 PROCEDURE — 93016 CV STRESS TEST SUPVJ ONLY: CPT | Performed by: INTERNAL MEDICINE

## 2022-02-28 PROCEDURE — 82948 REAGENT STRIP/BLOOD GLUCOSE: CPT

## 2022-02-28 PROCEDURE — 99217 PR OBSERVATION CARE DISCHARGE MANAGEMENT: CPT | Performed by: FAMILY MEDICINE

## 2022-02-28 PROCEDURE — 80061 LIPID PANEL: CPT | Performed by: NURSE PRACTITIONER

## 2022-02-28 RX ORDER — AMINOPHYLLINE DIHYDRATE 25 MG/ML
50 INJECTION, SOLUTION INTRAVENOUS ONCE
Status: COMPLETED | OUTPATIENT
Start: 2022-02-28 | End: 2022-02-28

## 2022-02-28 RX ORDER — AMLODIPINE BESYLATE 10 MG/1
10 TABLET ORAL DAILY
Qty: 30 TABLET | Refills: 0 | Status: SHIPPED | OUTPATIENT
Start: 2022-03-01 | End: 2022-05-24 | Stop reason: SDUPTHER

## 2022-02-28 RX ADMIN — SERTRALINE HYDROCHLORIDE 50 MG: 50 TABLET ORAL at 08:43

## 2022-02-28 RX ADMIN — AMLODIPINE BESYLATE 10 MG: 10 TABLET ORAL at 08:43

## 2022-02-28 RX ADMIN — AMINOPHYLLINE 50 MG: 25 INJECTION, SOLUTION INTRAVENOUS at 13:12

## 2022-02-28 RX ADMIN — REGADENOSON 0.4 MG: 0.08 INJECTION, SOLUTION INTRAVENOUS at 13:04

## 2022-02-28 RX ADMIN — LISINOPRIL 20 MG: 20 TABLET ORAL at 08:43

## 2022-02-28 RX ADMIN — ASPIRIN 81 MG: 81 TABLET, CHEWABLE ORAL at 08:43

## 2022-02-28 NOTE — CASE MANAGEMENT
Case Management Assessment & Discharge Planning Note    Patient name Stanley Seo  Location /-01 MRN [id number]  : 1945 Date 2022       Current Admission Date: 2022  Current Admission Diagnosis:Hypertensive urgency   Patient Active Problem List    Diagnosis Date Noted    Hypertensive urgency 2022    Nausea 2022    Bradycardia 2022    Abnormal EKG 2022    Primary hypertension 2022    Stage 3a chronic kidney disease ([facility] ) 2022    Psychophysiological insomnia 2021    Exertional dyspnea     Medicare annual wellness visit, subsequent 2021    Right leg pain 2021    Acute cystitis with hematuria 2021    Chronic bronchitis ([facility] ) 04/15/2021    Ob[facility], [facility] 04/15/2021    BMI 37 0-37 9, adult 2020    Atypical chest pain 2020    Dermatitis 2020    Vaginal cyst 2019    Candidiasis of genitalia in female 2019    Encounter for gynecological examination without abnormal finding 2019    Controlled type 2 diabetes with neuropathy ([facility] ) 2019    Benign hypertension 2018    Depression with anxiety 2018    Mild intermittent asthma 2018      LOS (days): 0  Geometric Mean LOS (GMLOS) (days):   Days to GMLOS:     OBJECTIVE:              Current admission status: Observation       Preferred Pharmacy:   [address] [place], [place] [id number] R R 1 [phone] R R 1 [phone] [phone]  [address] [place] [address]  Phone: [phone] Fax: [phone]    [id number], [address] [place] [address]  Phone: [phone] Fax: [phone]    Primary Care Provider: ALEKSANDER Ferraro    Primary Insurance: MEDICARE  Secondary Insurance: [employer] LIFE    ASSESSMENT:  [facility], [address] Representative - Daughter   Primary Phone: [phone] (Mobile)               Advance Directives  Does patient have a [address]?: No  Was patient offered paperwork?: Yes (paperwork given)  Does patient currently have a Health Care decision maker?: Yes, please see Health Care Proxy section  Does patient have Advance Directives?: No  Was patient offered paperwork?: Yes (paperwork given)  Primary Contact: dtrAndrew         Readmission Root Cause  30 Day Readmission: No    Patient Information  Admitted from[de-identified] Home  Mental Status: Alert  During Assessment patient was accompanied by: Not accompanied during assessment  Assessment information provided by[de-identified] Patient  Primary Caregiver: Self  Support Systems: Spouse/significant other,Daughter  [place] of Residence: [place] [address] do you live in?: [place]  Home entry access options  Select all that apply : Stairs  Number of steps to enter home  : 1  Do the steps have railings?: No  Type of Current Residence: 2 story home  In the last 12 months, was there a time when you were not able to pay the mortgage or rent on time?: No  In the last 12 months, how many places have you lived?: 1  In the last 12 months, was there a time when you did not have a steady place to sleep or slept in a shelter (including now)?: No  Homeless/housing insecurity resource given?: Refused  Living Arrangements: Lives w/ Spouse/significant other,Lives w/ Daughter (Dtr- Lisa Caceres)  Is patient a ?: Yes  Is patient active with [facility])?: No    Activities of Daily Living Prior to Admission  Functional Status: Independent  Completes ADLs independently?: Yes  Ambulates independently?: Yes  Does patient use assisted devices?: No  Does patient currently own DME?: Yes  What DME does the patient currently own?: Straight Cane,Walker  Does patient have a history of Outpatient Therapy (PT/OT)?: Yes  Does the patient have a history of Short-Term Rehab?: No  Does patient have a history of HHC?: No  Does patient currently have [facility]?: No         Patient Information Continued  Income Source: Pension/USP  Does patient have prescription coverage?: Yes  Within the past 12 months, you worried that your food would run out before you got the money to buy more : Never true  Within the past 12 months, the food you bought just didnt last and you didnt have money to get more : Never true  Food insecurity resource given?: Refused  Does patient receive dialysis treatments?: No  Does patient have a history of substance abuse?: No  Does patient have a history of Mental Health Diagnosis?: Yes (anxiety)  Is patient receiving treatment for mental health?: Yes  Has patient received inpatient treatment related to mental health in the last 2 years?: No         Means of Transportation  Means of Transport to Appts[de-identified] Drives Self  In the past 12 months, has lack of transportation kept you from medical appointments or from getting medications?: No  In the past 12 months, has lack of transportation kept you from meetings, work, or from getting things needed for daily living?: No  Was application for public transport provided?: Refused        DISCHARGE DETAILS:    Discharge planning discussed with[de-identified] patient  Freedom of Choice: Yes  Comments - Freedom of Choice: patient is covid vaccinated but no booster  patient is declining STR and [address] services    CM contacted family/caregiver?: No- see comments  Were Treatment Team discharge recommendations reviewed with patient/caregiver?: Yes  Did patient/caregiver verbalize understanding of patient care needs?: Yes  Were patient/caregiver advised of the risks associated with not following Treatment Team discharge recommendations?: Yes         [address]         [facility] the [facility] at discharge?: No    DME Referral Provided  Referral made for DME?: No         Would you like to participate in our [address] service program?  : No - Declined    Treatment Team Recommendation: Home  Discharge Destination Plan[de-identified] Home  Transport at Discharge : Family ( - mandy  dtr -  radha to transport home)

## 2022-02-28 NOTE — CASE MANAGEMENT
Case Management Progress Note    Patient name Yessi Son  Location /-01 MRN [id number]  : 1945 Date 2022       LOS (days): 0  Geometric Mean LOS (GMLOS) (days):   Days to GMLOS:        OBJECTIVE:        Current admission status: Observation  Preferred Pharmacy:   [address] [place] [address] 9293 R R 1 [phone] R R 1 ([address])  [facility]  Phone: [phone] Fax: [phone]    [id number] [place], [address] [place] [address]  Phone: [phone] Fax: [phone]    Primary Care Provider: Karon Sahu    Primary Insurance: MEDICARE  Secondary Insurance: [employer] LIFE    PROGRESS NOTE:        CM attempted to visit patient to complete assessment patient was not in her room    Nurse reports patient went down for stress test

## 2022-02-28 NOTE — QUICK NOTE
Patient had worsening headache overnight  BP at this time had been 185/81 however had been systolic in the 851B to 623T most of the day  Patient was given Benadryl, Reglan, 975 mg of Tylenol  Bedside RN reports the patient states her headache is "better now " BP now 134/56  Accelerated BP likely due to pain/headache  Continue to monitor

## 2022-02-28 NOTE — PROGRESS NOTES
Cardiology Progress Note - Alivia Metz 78 y o  female MRN: [id number]    Unit/Bed#: -01 Encounter: [id number]      Assessment/Plan:  1  Hypertensive urgency  · BP improving, 155/73 today  · Continue amlodipine 10 mg daily and lisinopril 20 mg daily  · Echo done: EF 60%, wall thickness mildly increased, G1 DD, trace MR, trace TR, trivial pericardial effusion circumferential to the heart    2  Chest pain with shortness of breath  · Plan for pharmacologic MPI this morning  · Continue telemetry monitoring  · Continue aspirin 81 mg daily    3  Bradycardia  · Patient denies any lightheadedness, dizziness, presyncope/syncope  · HR trends 40-50s  · Avoid AV ana rosa blocking agents    4  Diabetes type 2  ·  A1c 6 4, management per primary team      Subjective:   Patient seen and examined  Patient states that she had headache last night the correlated with an elevated blood pressure 185/81  Patient denies any headache, chest pain, shortness breath, lower extremity edema at this time  Patient awaiting pharmacologic MPI  Objective:     Vitals: Blood pressure 155/73, pulse (!) 50, temperature 98 2 °F (36 8 °C), temperature source Oral, resp  rate 18, height 5' 2" (1 575 m), weight 88 5 kg (195 lb), SpO2 95 %, not currently breastfeeding , Body mass index is 35 67 kg/m² ,   Orthostatic Blood Pressures      Most Recent Value   Blood Pressure 155/73 filed at 02/28/2022 0915   Patient Position - Orthostatic VS Lying filed at 02/27/2022 1504            Intake/Output Summary (Last 24 hours) at 2/28/2022 1030  Last data filed at 2/27/2022 1504  Gross per 24 hour   Intake 360 ml   Output --   Net 360 ml         Physical Exam:  Physical Exam  Vitals and nursing note reviewed  Constitutional:       General: She is not in acute distress  Appearance: She is well-developed  HENT:      Head: Normocephalic and atraumatic     Eyes:      Conjunctiva/sclera: Conjunctivae normal    Cardiovascular:      Rate and Rhythm: Normal rate and regular rhythm  Heart sounds: Normal heart sounds  No murmur heard  Pulmonary:      Effort: Pulmonary effort is normal  No respiratory distress  Breath sounds: Normal breath sounds  Abdominal:      Palpations: Abdomen is soft  Tenderness: There is no abdominal tenderness  Musculoskeletal:      Cervical back: Neck supple  Right lower leg: No edema  Left lower leg: No edema  Skin:     General: Skin is warm and dry  Neurological:      Mental Status: She is alert and oriented to person, place, and time     Psychiatric:         Mood and Affect: Mood normal          Behavior: Behavior normal               Medications:      Current Facility-Administered Medications:     acetaminophen (TYLENOL) tablet 650 mg, 650 mg, Oral, Q6H PRN, [employer], PA-C, 650 mg at 02/27/22 1338    amLODIPine (NORVASC) tablet 10 mg, 10 mg, Oral, Daily, Layd Aguilar MD, 10 mg at 02/28/22 7271    aspirin chewable tablet 81 mg, 81 mg, Oral, Daily, [employer], PA-C, 81 mg at 02/28/22 0843    enoxaparin (LOVENOX) subcutaneous injection 40 mg, 40 mg, Subcutaneous, Q24H [facility], Lady Aguilar MD    lisinopril (ZESTRIL) tablet 20 mg, 20 mg, Oral, BID, Rezzcardon Ocapi, PA-C, 20 mg at 02/28/22 0843    ondansetron (ZOFRAN) injection 4 mg, 4 mg, Intravenous, Q6H PRN, [employer], PA-C    sertraline (ZOLOFT) tablet 50 mg, 50 mg, Oral, Daily, [employer], PA-C, 50 mg at 02/28/22 0843     Labs & Results:     Results from last 7 days   Lab Units 02/27/22  0152   HS TNI 0HR ng/L 3   NT-PRO BNP pg/mL 601*     Results from last 7 days   Lab Units 02/28/22  0552 02/27/22  0153   WBC Thousand/uL 5 93 6 34   HEMOGLOBIN g/dL 11 1* 11 0*   HEMATOCRIT % 34 4* 34 1*   PLATELETS Thousands/uL 222 227     Results from last 7 days   Lab Units 02/28/22  0552   TRIGLYCERIDES mg/dL 18   HDL mg/dL 83     Results from last 7 days   Lab Units 02/28/22  0552 02/27/22  0152   POTASSIUM mmol/L 3 9 4 3 CHLORIDE mmol/L 103 104   CO2 mmol/L 29 29   BUN mg/dL 19 18   CREATININE mg/dL 0 87 1 16   CALCIUM mg/dL 9 2 9 2   ALK PHOS U/L  --  72   ALT U/L  --  45   AST U/L  --  15         Results from last 7 days   Lab Units 02/27/22  0152   MAGNESIUM mg/dL 2 0       Vitals: Blood pressure 155/73, pulse (!) 50, temperature 98 2 °F (36 8 °C), temperature source Oral, resp  rate 18, height 5' 2" (1 575 m), weight 88 5 kg (195 lb), SpO2 95 %, not currently breastfeeding , Body mass index is 35 67 kg/m² ,   Orthostatic Blood Pressures      Most Recent Value   Blood Pressure 155/73 filed at 02/28/2022 0915   Patient Position - Orthostatic VS Lying filed at 02/27/2022 5023          Systolic (95SNG), FSR:336 , Min:105 , QJZ:029     Diastolic (71URJ), SMV:32, Min:51, Max:81        Intake/Output Summary (Last 24 hours) at 2/28/2022 1030  Last data filed at 2/27/2022 1504  Gross per 24 hour   Intake 360 ml   Output --   Net 360 ml       Invasive Devices  Report    Peripheral Intravenous Line            Peripheral IV 09/27/19 Left Antecubital 884 days                  EKG:  Sinus bradycardia with Fusion complexes  T wave abnormality, consider lateral ischemia  When compared with ECG of 26-FEB-2022 23:56, (unconfirmed)  Fusion complexes are now Present  Confirmed by Julian Larose ([id number]) on 2/27/2022 12:39:46 PM    Telemetry:  Telemetry Orders (From admission, onward)             48 Hour Telemetry Monitoring  Continuous x 48 hours        References:    Telemetry Guidelines   Question:  Reason for 48 Hour Telemetry  Answer:  Patient on Vasopressor or Vasoactive IV meds                 Telemetry Reviewed: Sinus Bradycardia      BP Readings from Last 3 Encounters:   02/28/22 155/73   02/04/22 170/82   01/13/22 130/72      Wt Readings from Last 3 Encounters:   02/28/22 88 5 kg (195 lb)   02/04/22 88 5 kg (195 lb)   01/13/22 89 2 kg (196 lb 9 6 oz)

## 2022-02-28 NOTE — DISCHARGE SUMMARY
[facility]  Discharge- Ivy Landon 1945, 78 y o  female MRN: [id number]  Unit/Bed#: -01 Encounter: [id number]  Primary Care Provider: ALEKSANDER Stanford   Date and time admitted to hospital: 2/26/2022 11:53 PM    * Hypertensive urgency  Assessment & Plan  · Presented to the ED with elevated hypertension with associated headache, dizziness  Patient has been following with her cardiologist, she was recently switched from amlodipine 10mg and enalapril 20mg to carvedilol 6 25 mg b i d  and lisinopril 20 mg daily due to poorly controlled HTN  · Initial /88, improved to systolic  with treatment of headache  · Trop negative; EKG reveals sinus bradycardia HR 55 with T-wave inversion in aVL, V1 and V2  · Cardiology consult, appreciate input  · Blood pressure overall improved, 155/73  · Continue Amlodipine 10 mg daily, Lisinopril 20 mg daily  · S/p stress test 2/28; no ischemia noted  Bradycardia  Assessment & Plan  · Continue to hold Coreg at this time  · Heart rate trending 40-50s  Nausea  Assessment & Plan  · Resolved    Exertional dyspnea  Assessment & Plan  · Patient reports exertional dyspnea  On review of EMR, exertional dyspnea has been present for greater than 1 year  She has been following with cardiology and pulmonology  · Echo (2/27/22): EF 60%  Wall thickness mildly increased, Grade 1 diastolic dysfunction, trace MR, trace TR  Trivial pericardial effusion circumferential to the heart  ·   No prior result for comparison   · Stress test completed; no ischemia noted  Controlled type 2 diabetes with neuropathy [facility])  Assessment & Plan  · Okay to restart home medications on discharge      Depression with anxiety  Assessment & Plan  · Continue home sertraline    Medical Problems             Resolved Problems  Date Reviewed: 2/27/2022    None              Discharging Physician / Practitioner: ALEKSANDER Arechiga  PCP: Kitty Kenyon CRNP  Admission Date:   Admission Orders (From admission, onward)     Ordered        02/27/22 0501  Place in Observation  Once                      Discharge Date: 02/28/22    Consultations During Hospital Stay:  · IP CONSULT TO CARDIOLOGY    Procedures Performed:   · Stress test (2/28/22)    Significant Findings / Test Results:   CTA head and neck with and without contrast   Final Result by Shannon Dover MD (02/27 0322)      1  No acute intracranial abnormality  2   No hemodynamically significant stenosis or occlusion of the carotid or vertebral arteries or major vessels of the [place]  Workstation performed: [id number]         ·     Incidental Findings:   · None     Test Results Pending at Discharge (will require follow up): · None     Outpatient Tests Requested:  · Follow up with PCP within 1 week    Complications:  None    Reason for Admission:  Hypertensive urgency    Hospital Course:   Gary Hawthorne is a 78 y o  female patient with past medical history of hypertension, diabetes mellitus, asthma, sleep apnea who originally presented to the hospital on 2/26/2022 due to elevated blood pressure, headache and dizziness  Patient states she recently had changes in her blood pressure medications by her cardiologist approximately 2 weeks ago  However presented to the ED with headache x2 days and associated nausea  Blood pressure on admission was noted to be 225/88  Cardiology was consulted  Patient underwent an echocardiogram/stress test, results noted above  Patient was also noted to be bradycardic during her admission, Coreg held and not restarted on discharge  Blood pressures improved on 10 mg amlodipine and 20 mg of lisinopril daily  Cleared by cardiology for discharge  Please see above list of diagnoses and related plan for additional information       Condition at Discharge: stable    Discharge Day Visit / Exam:   Subjective:  I am feeling fine    Vitals: Blood Pressure: 125/84 (02/28/22 1148)  Pulse: 62 (02/28/22 1148)  Temperature: 98 2 °F (36 8 °C) (02/28/22 0259)  Temp Source: Oral (02/28/22 0259)  Respirations: 18 (02/28/22 0259)  Height: 5' 2" (157 5 cm) (02/28/22 0915)  Weight - Scale: 88 5 kg (195 lb) (02/28/22 0915)  SpO2: 96 % (02/28/22 1148)     Exam:   Physical Exam S1-S2 audible, regular, lungs clear to auscultation, alert and oriented time place and person, cooperative, abdomen nontender nondistended bowel sounds audible all 4 quadrants, oral mucosa moist     Discussion with Family: Attempted to update  () via phone  Unable to contact  Discharge instructions/Information to patient and family:   See after visit summary for information provided to patient and family  Provisions for Follow-Up Care:  See after visit summary for information related to follow-up care and any pertinent home health orders  Disposition:   Home    Planned Readmission: None     Discharge Statement:  I spent 35 minutes discharging the patient  This time was spent on the day of discharge  I had direct contact with the patient on the day of discharge  Greater than 50% of the total time was spent examining patient, answering all patient questions, arranging and discussing plan of care with patient as well as directly providing post-discharge instructions  Additional time then spent on discharge activities  Discharge Medications:  See after visit summary for reconciled discharge medications provided to patient and/or family        **Please Note: This note may have been constructed using a voice recognition system**

## 2022-02-28 NOTE — PLAN OF CARE
Problem: CARDIOVASCULAR - ADULT  Goal: Maintains optimal cardiac output and hemodynamic stability  Description: INTERVENTIONS:  - Monitor I/O, vital signs and rhythm  - Monitor for S/S and trends of decreased cardiac output  - Administer and titrate ordered vasoactive medications to optimize hemodynamic stability  - Assess quality of pulses, skin color and temperature  - Assess for signs of decreased coronary artery perfusion  - Instruct patient to report change in severity of symptoms  Outcome: Progressing  Goal: Absence of cardiac dysrhythmias or at baseline rhythm  Description: INTERVENTIONS:  - Continuous cardiac monitoring, vital signs, obtain 12 lead EKG if ordered  - Administer antiarrhythmic and heart rate control medications as ordered  - Monitor electrolytes and administer replacement therapy as ordered  Outcome: Progressing     Problem: PAIN - ADULT  Goal: Verbalizes/displays adequate comfort level or baseline comfort level  Description: Interventions:  - Encourage patient to monitor pain and request assistance  - Assess pain using appropriate pain scale  - Administer analgesics based on type and severity of pain and evaluate response  - Implement non-pharmacological measures as appropriate and evaluate response  - Consider cultural and social influences on pain and pain management  - Notify physician/advanced practitioner if interventions unsuccessful or patient reports new pain  Outcome: Progressing     Problem: DISCHARGE PLANNING  Goal: Discharge to home or other facility with appropriate resources  Description: INTERVENTIONS:  - Identify barriers to discharge w/patient and caregiver  - Arrange for needed discharge resources and transportation as appropriate  - Identify discharge learning needs (meds, wound care, etc )  - Arrange for interpretive services to assist at discharge as needed  - Refer to Case Management Department for coordinating discharge planning if the patient needs post-hospital services based on physician/advanced practitioner order or complex needs related to functional status, cognitive ability, or social support system  Outcome: Progressing     Problem: Knowledge Deficit  Goal: Patient/family/caregiver demonstrates understanding of disease process, treatment plan, medications, and discharge instructions  Description: Complete learning assessment and assess knowledge base    Interventions:  - Provide teaching at level of understanding  - Provide teaching via preferred learning methods  Outcome: Progressing

## 2022-02-28 NOTE — ASSESSMENT & PLAN NOTE
· Presented to the ED with elevated hypertension with associated headache, dizziness  Patient has been following with her cardiologist, she was recently switched from amlodipine 10mg and enalapril 20mg to carvedilol 6 25 mg b i d  and lisinopril 20 mg daily due to poorly controlled HTN  · Initial /88, improved to systolic  with treatment of headache  · Trop negative; EKG reveals sinus bradycardia HR 55 with T-wave inversion in aVL, V1 and V2  · Cardiology consult, appreciate input  · Blood pressure overall improved, 155/73  · Continue Amlodipine 10 mg daily, Lisinopril 20 mg daily  · S/p stress test 2/28; no ischemia noted

## 2022-03-04 ENCOUNTER — TELEPHONE (OUTPATIENT)
Dept: CARDIOLOGY CLINIC | Facility: CLINIC | Age: 77
End: 2022-03-04

## 2022-03-07 LAB
ARRHY DURING EX: NORMAL
CHEST PAIN STATEMENT: NORMAL
MAX DIASTOLIC BP: 87 MMHG
MAX HEART RATE: 78 BPM
MAX PREDICTED HEART RATE: 145 BPM
MAX. SYSTOLIC BP: 186 MMHG
PROTOCOL NAME: NORMAL
REASON FOR TERMINATION: NORMAL
TARGET HR FORMULA: NORMAL
TEST INDICATION: NORMAL
TIME IN EXERCISE PHASE: NORMAL

## 2022-03-08 ENCOUNTER — OFFICE VISIT (OUTPATIENT)
Dept: FAMILY MEDICINE CLINIC | Facility: CLINIC | Age: 77
End: 2022-03-08
Payer: MEDICARE

## 2022-03-08 VITALS
BODY MASS INDEX: 36.44 KG/M2 | HEART RATE: 76 BPM | OXYGEN SATURATION: 96 % | WEIGHT: 198 LBS | SYSTOLIC BLOOD PRESSURE: 130 MMHG | TEMPERATURE: 96.7 F | HEIGHT: 62 IN | DIASTOLIC BLOOD PRESSURE: 64 MMHG

## 2022-03-08 DIAGNOSIS — F41.9 ANXIETY: Primary | ICD-10-CM

## 2022-03-08 PROCEDURE — 99496 TRANSJ CARE MGMT HIGH F2F 7D: CPT | Performed by: NURSE PRACTITIONER

## 2022-03-08 RX ORDER — LORAZEPAM 1 MG/1
0.5 TABLET ORAL EVERY 8 HOURS PRN
Qty: 30 TABLET | Refills: 0 | Status: ON HOLD | OUTPATIENT
Start: 2022-03-08 | End: 2022-04-19 | Stop reason: SDUPTHER

## 2022-03-08 NOTE — PROGRESS NOTES
Assessment/Plan:     Primary hypertension   Pressure is well controlled today  Continue medication  Continue low-salt heart healthy diet  Discussed anxiety is a component increasing blood pressure  Patient has had some very stressful situation in the past few months  Discussed management  Coping mechanisms  Nonpharmacological interventions discussed  Anxiety    Patient has had some hypertensive situations  Blood pressure has been elevated  Was seen in the emergency room  Was admitted for monitoring  Stress test was done,  essentially normal   Discussed with patient management of anxiety,  Patient is taking Zoloft  Will add Ativan for acute episodes of anxiety  Education provided regarding medication  Patient is aware that she should not take her Ativan and Ambien at the same time  Discussed coping mechanisms, breathing techniques, nonpharmacological interventions to manage anxiety therefore maintaining good blood pressure control  Problem List Items Addressed This Visit        Other    Anxiety - Primary       Patient has had some hypertensive situations  Blood pressure has been elevated  Was seen in the emergency room  Was admitted for monitoring  Stress test was done,  essentially normal   Discussed with patient management of anxiety,  Patient is taking Zoloft  Will add Ativan for acute episodes of anxiety  Education provided regarding medication  Patient is aware that she should not take her Ativan and Ambien at the same time  Discussed coping mechanisms, breathing techniques, nonpharmacological interventions to manage anxiety therefore maintaining good blood pressure control  Relevant Medications    LORazepam (ATIVAN) 1 mg tablet            Subjective:      Patient ID: John Segura is a 78 y o  female  Patient is here for follow-up  After being admitted to the hospital for elevated blood pressure  Patient reports her niece  a couple weeks ago    Continues to have a lot of anxiety and depression regarding this  Has been using her coping mechanisms  Continues to have some problems with sleep  Had a stress test done  Blood pressure continues to be labile at home  Has been taking her amlodipine and lisinopril  Denies any dizziness, headaches, shortness of breath, chest pain      The following portions of the patient's history were reviewed and updated as appropriate: allergies, current medications, past family history, past medical history, past social history, past surgical history and problem list     Review of Systems      Objective:      /64 (BP Location: Left arm, Patient Position: Sitting)   Pulse 76   Temp (!) 96 7 °F (35 9 °C) (Tympanic)   Ht 5' 2" (1 575 m)   Wt 89 8 kg (198 lb)   SpO2 96%   BMI 36 21 kg/m²          Physical Exam  Vitals and nursing note reviewed  Constitutional:       Appearance: She is well-developed  She is obese  She is ill-appearing  HENT:      Head: Normocephalic and atraumatic  Right Ear: External ear normal       Left Ear: External ear normal    Eyes:      Pupils: Pupils are equal, round, and reactive to light  Cardiovascular:      Rate and Rhythm: Normal rate and regular rhythm  Pulses: Normal pulses  Heart sounds: Normal heart sounds  Pulmonary:      Effort: Pulmonary effort is normal       Breath sounds: Normal breath sounds  Abdominal:      General: Bowel sounds are normal       Palpations: Abdomen is soft  Musculoskeletal:         General: Normal range of motion  Cervical back: Normal range of motion  Skin:     General: Skin is warm and dry  Neurological:      General: No focal deficit present  Mental Status: She is alert and oriented to person, place, and time  Psychiatric:         Mood and Affect: Mood normal          Behavior: Behavior normal          Thought Content:  Thought content normal          Judgment: Judgment normal

## 2022-03-08 NOTE — PATIENT INSTRUCTIONS
Continue Pressure medication take Ativan every 8 hours as needed for palpitations acute anxiety continue making sure you sleep well you eating healthy continue with exercise chamomile tea lab in the oral  Anxiety   WHAT YOU NEED TO KNOW:   Anxiety is a condition that causes you to feel extremely worried or nervous  The feelings are so strong that they can cause problems with your daily activities or sleep  Anxiety may be triggered by something you fear, or it may happen without a cause  Family or work stress, smoking, caffeine, and alcohol can increase your risk for anxiety  Certain medicines or health conditions can also increase your risk  Anxiety can become a long-term condition if it is not managed or treated  DISCHARGE INSTRUCTIONS:   Call your local emergency number (911 in the [address] [facility]) if:   · You have chest pain, tightness, or heaviness that may spread to your shoulders, arms, jaw, neck, or back  · You feel like hurting yourself or someone else  Call your doctor if:   · Your symptoms get worse or do not get better with treatment  · Your anxiety keeps you from doing your regular daily activities  · You have new symptoms since your last visit  · You have questions or concerns about your condition or care  Medicines:   · Medicines  may be given to help you feel more calm and relaxed, and decrease your symptoms  · Take your medicine as directed  Contact your healthcare provider if you think your medicine is not helping or if you have side effects  Tell him of her if you are allergic to any medicine  Keep a list of the medicines, vitamins, and herbs you take  Include the amounts, and when and why you take them  Bring the list or the pill bottles to follow-up visits  Carry your medicine list with you in case of an emergency  Manage anxiety:   · Talk to someone about your anxiety  Your healthcare provider may suggest counseling   Cognitive behavioral therapy can help you understand and change how you react to events that trigger your symptoms  You might feel more comfortable talking with a friend or family member about your anxiety  Choose someone you know will be supportive and encouraging  · Find ways to relax  Activities such as exercise, meditation, or listening to music can help you relax  Spend time with friends, or do things you enjoy  · Practice deep breathing  Deep breathing can help you relax when you feel anxious  Focus on taking slow, deep breaths several times a day, or during an anxiety attack  Breathe in through your nose and out through your mouth  · Create a regular sleep routine  Regular sleep can help you feel calmer during the day  Go to sleep and wake up at the same times every day  Do not watch television or use the computer right before bed  Your room should be comfortable, dark, and quiet  · Eat a variety of healthy foods  Healthy foods include fruits, vegetables, low-fat dairy products, lean meats, fish, whole-grain breads, and cooked beans  Healthy foods can help you feel less anxious and have more energy  · Exercise regularly  Exercise can increase your energy level  Exercise may also lift your mood and help you sleep better  Your healthcare provider can help you create an exercise plan  · Do not smoke  Nicotine and other chemicals in cigarettes and cigars can increase anxiety  Ask your healthcare provider for information if you currently smoke and need help to quit  E-cigarettes or smokeless tobacco still contain nicotine  Talk to your healthcare provider before you use these products  · Do not have caffeine  Caffeine can make your symptoms worse  Do not have foods or drinks that are meant to increase your energy level  · Limit or do not drink alcohol  Ask your healthcare provider if alcohol is safe for you  You may not be able to drink alcohol if you take certain anxiety or depression medicines   Limit alcohol to 1 drink per day if you are a woman  Limit alcohol to 2 drinks per day if you are a man  A drink of alcohol is 12 ounces of beer, 5 ounces of wine, or 1½ ounces of liquor  · Do not use drugs  Drugs can make your anxiety worse  It can also make anxiety hard to manage  Talk to your healthcare provider if you use drugs and want help to quit  Follow up with your doctor within 2 weeks or as directed:  Write down your questions so you remember to ask them during your visits  © Copyright [employer] 2022 Information is for End User's use only and may not be sold, redistributed or otherwise used for commercial purposes  All illustrations and images included in CareNotes® are the copyrighted property of [employer]  or [facility]   The above information is an  only  It is not intended as medical advice for individual conditions or treatments  Talk to your doctor, nurse or pharmacist before following any medical regimen to see if it is safe and effective for you

## 2022-03-14 ENCOUNTER — TELEPHONE (OUTPATIENT)
Dept: CARDIOLOGY CLINIC | Facility: CLINIC | Age: 77
End: 2022-03-14

## 2022-03-15 DIAGNOSIS — M25.561 ACUTE PAIN OF RIGHT KNEE: ICD-10-CM

## 2022-03-17 RX ORDER — MELOXICAM 15 MG/1
TABLET ORAL
Qty: 30 TABLET | Refills: 1 | Status: SHIPPED | OUTPATIENT
Start: 2022-03-17 | End: 2022-04-19 | Stop reason: HOSPADM

## 2022-03-18 DIAGNOSIS — E11.40 CONTROLLED TYPE 2 DIABETES WITH NEUROPATHY (HCC): ICD-10-CM

## 2022-03-22 RX ORDER — PREGABALIN 165 MG/1
TABLET, FILM COATED, EXTENDED RELEASE ORAL
Qty: 60 TABLET | Refills: 0 | Status: SHIPPED | OUTPATIENT
Start: 2022-03-22 | End: 2022-03-24 | Stop reason: SDUPTHER

## 2022-03-24 DIAGNOSIS — E11.40 CONTROLLED TYPE 2 DIABETES WITH NEUROPATHY (HCC): ICD-10-CM

## 2022-03-24 NOTE — TELEPHONE ENCOUNTER
Patients Lyrica never made it to the pharmacy  It says Verification status not available for this order  Pharmacy never received it  Could you please resend? Patient is completely out now

## 2022-03-28 DIAGNOSIS — E11.40 CONTROLLED TYPE 2 DIABETES WITH NEUROPATHY (HCC): ICD-10-CM

## 2022-03-28 DIAGNOSIS — F41.8 DEPRESSION WITH ANXIETY: ICD-10-CM

## 2022-03-28 RX ORDER — PREGABALIN 165 MG/1
TABLET, FILM COATED, EXTENDED RELEASE ORAL
Qty: 60 TABLET | Refills: 0 | Status: SHIPPED | OUTPATIENT
Start: 2022-03-28 | End: 2022-04-19 | Stop reason: HOSPADM

## 2022-03-28 RX ORDER — SITAGLIPTIN AND METFORMIN HYDROCHLORIDE 100; 1000 MG/1; MG/1
TABLET, FILM COATED, EXTENDED RELEASE ORAL
Qty: 90 TABLET | Refills: 1 | Status: SHIPPED | OUTPATIENT
Start: 2022-03-28 | End: 2022-04-19 | Stop reason: HOSPADM

## 2022-03-28 NOTE — TELEPHONE ENCOUNTER
Patient has been out of medication  Are you able to send this in for her? Patient has been out since 3/24/2022  Script failed to make it to the pharmacy

## 2022-04-04 ENCOUNTER — TELEPHONE (OUTPATIENT)
Dept: FAMILY MEDICINE CLINIC | Facility: CLINIC | Age: 77
End: 2022-04-04

## 2022-04-04 ENCOUNTER — APPOINTMENT (EMERGENCY)
Dept: CT IMAGING | Facility: HOSPITAL | Age: 77
DRG: 815 | End: 2022-04-04
Payer: MEDICARE

## 2022-04-04 ENCOUNTER — HOSPITAL ENCOUNTER (INPATIENT)
Facility: HOSPITAL | Age: 77
LOS: 15 days | Discharge: NURSING, REHAB OR LONG-TERM CARE | DRG: 815 | End: 2022-04-19
Attending: EMERGENCY MEDICINE | Admitting: INTERNAL MEDICINE
Payer: MEDICARE

## 2022-04-04 DIAGNOSIS — E11.40 CONTROLLED TYPE 2 DIABETES WITH NEUROPATHY (HCC): ICD-10-CM

## 2022-04-04 DIAGNOSIS — R05.9 COUGH: ICD-10-CM

## 2022-04-04 DIAGNOSIS — D72.10 EOSINOPHILIA, UNSPECIFIED TYPE: ICD-10-CM

## 2022-04-04 DIAGNOSIS — F51.04 PSYCHOPHYSIOLOGICAL INSOMNIA: ICD-10-CM

## 2022-04-04 DIAGNOSIS — R10.9 ABDOMINAL PAIN: ICD-10-CM

## 2022-04-04 DIAGNOSIS — K57.92 ACUTE DIVERTICULITIS: Primary | ICD-10-CM

## 2022-04-04 DIAGNOSIS — I10 PRIMARY HYPERTENSION: ICD-10-CM

## 2022-04-04 DIAGNOSIS — K21.9 GERD (GASTROESOPHAGEAL REFLUX DISEASE): ICD-10-CM

## 2022-04-04 DIAGNOSIS — F41.9 ANXIETY: ICD-10-CM

## 2022-04-04 DIAGNOSIS — R74.01 TRANSAMINITIS: ICD-10-CM

## 2022-04-04 DIAGNOSIS — K92.1 MELENA: ICD-10-CM

## 2022-04-04 LAB
ALBUMIN SERPL BCP-MCNC: 3.6 G/DL (ref 3.5–5)
ALP SERPL-CCNC: 216 U/L (ref 46–116)
ALT SERPL W P-5'-P-CCNC: 126 U/L (ref 12–78)
ANION GAP SERPL CALCULATED.3IONS-SCNC: 11 MMOL/L (ref 4–13)
AST SERPL W P-5'-P-CCNC: 58 U/L (ref 5–45)
BACTERIA UR QL AUTO: ABNORMAL /HPF
BASOPHILS # BLD AUTO: 0.06 THOUSANDS/ΜL (ref 0–0.1)
BASOPHILS NFR BLD AUTO: 0 % (ref 0–1)
BILIRUB SERPL-MCNC: 0.31 MG/DL (ref 0.2–1)
BILIRUB UR QL STRIP: NEGATIVE
BUN SERPL-MCNC: 13 MG/DL (ref 5–25)
CALCIUM SERPL-MCNC: 9.5 MG/DL (ref 8.3–10.1)
CARDIAC TROPONIN I PNL SERPL HS: <2 NG/L
CHLORIDE SERPL-SCNC: 101 MMOL/L (ref 100–108)
CLARITY UR: ABNORMAL
CO2 SERPL-SCNC: 26 MMOL/L (ref 21–32)
COLOR UR: YELLOW
CREAT SERPL-MCNC: 0.98 MG/DL (ref 0.6–1.3)
EOSINOPHIL # BLD AUTO: 8.77 THOUSAND/ΜL (ref 0–0.61)
EOSINOPHIL NFR BLD AUTO: 45 % (ref 0–6)
ERYTHROCYTE [DISTWIDTH] IN BLOOD BY AUTOMATED COUNT: 14.6 % (ref 11.6–15.1)
GFR SERPL CREATININE-BSD FRML MDRD: 56 ML/MIN/1.73SQ M
GLUCOSE SERPL-MCNC: 113 MG/DL (ref 65–140)
GLUCOSE SERPL-MCNC: 116 MG/DL (ref 65–140)
GLUCOSE SERPL-MCNC: 79 MG/DL (ref 65–140)
GLUCOSE UR STRIP-MCNC: NEGATIVE MG/DL
HCT VFR BLD AUTO: 40.5 % (ref 34.8–46.1)
HGB BLD-MCNC: 13 G/DL (ref 11.5–15.4)
HGB UR QL STRIP.AUTO: ABNORMAL
IMM GRANULOCYTES # BLD AUTO: 0.14 THOUSAND/UL (ref 0–0.2)
IMM GRANULOCYTES NFR BLD AUTO: 1 % (ref 0–2)
KETONES UR STRIP-MCNC: ABNORMAL MG/DL
LACTATE SERPL-SCNC: 0.9 MMOL/L (ref 0.5–2)
LEUKOCYTE ESTERASE UR QL STRIP: NEGATIVE
LIPASE SERPL-CCNC: 48 U/L (ref 73–393)
LYMPHOCYTES # BLD AUTO: 2.16 THOUSANDS/ΜL (ref 0.6–4.47)
LYMPHOCYTES NFR BLD AUTO: 11 % (ref 14–44)
MCH RBC QN AUTO: 29.2 PG (ref 26.8–34.3)
MCHC RBC AUTO-ENTMCNC: 32.1 G/DL (ref 31.4–37.4)
MCV RBC AUTO: 91 FL (ref 82–98)
MONOCYTES # BLD AUTO: 0.64 THOUSAND/ΜL (ref 0.17–1.22)
MONOCYTES NFR BLD AUTO: 3 % (ref 4–12)
NEUTROPHILS # BLD AUTO: 7.66 THOUSANDS/ΜL (ref 1.85–7.62)
NEUTS SEG NFR BLD AUTO: 40 % (ref 43–75)
NITRITE UR QL STRIP: POSITIVE
NON-SQ EPI CELLS URNS QL MICRO: ABNORMAL /HPF
NRBC BLD AUTO-RTO: 0 /100 WBCS
PH UR STRIP.AUTO: 5.5 [PH]
PLATELET # BLD AUTO: 170 THOUSANDS/UL (ref 149–390)
PMV BLD AUTO: 9.8 FL (ref 8.9–12.7)
POTASSIUM SERPL-SCNC: 4.2 MMOL/L (ref 3.5–5.3)
PROT SERPL-MCNC: 8.8 G/DL (ref 6.4–8.2)
PROT UR STRIP-MCNC: NEGATIVE MG/DL
RBC # BLD AUTO: 4.45 MILLION/UL (ref 3.81–5.12)
RBC #/AREA URNS AUTO: ABNORMAL /HPF
SODIUM SERPL-SCNC: 138 MMOL/L (ref 136–145)
SP GR UR STRIP.AUTO: 1.02 (ref 1–1.03)
UROBILINOGEN UR QL STRIP.AUTO: 0.2 E.U./DL
WBC # BLD AUTO: 19.43 THOUSAND/UL (ref 4.31–10.16)
WBC #/AREA URNS AUTO: ABNORMAL /HPF

## 2022-04-04 PROCEDURE — 82948 REAGENT STRIP/BLOOD GLUCOSE: CPT

## 2022-04-04 PROCEDURE — 96365 THER/PROPH/DIAG IV INF INIT: CPT

## 2022-04-04 PROCEDURE — 83605 ASSAY OF LACTIC ACID: CPT | Performed by: PHYSICIAN ASSISTANT

## 2022-04-04 PROCEDURE — 85025 COMPLETE CBC W/AUTO DIFF WBC: CPT | Performed by: PHYSICIAN ASSISTANT

## 2022-04-04 PROCEDURE — 99285 EMERGENCY DEPT VISIT HI MDM: CPT | Performed by: PHYSICIAN ASSISTANT

## 2022-04-04 PROCEDURE — 93005 ELECTROCARDIOGRAM TRACING: CPT

## 2022-04-04 PROCEDURE — 96375 TX/PRO/DX INJ NEW DRUG ADDON: CPT

## 2022-04-04 PROCEDURE — 80053 COMPREHEN METABOLIC PANEL: CPT | Performed by: PHYSICIAN ASSISTANT

## 2022-04-04 PROCEDURE — 96361 HYDRATE IV INFUSION ADD-ON: CPT

## 2022-04-04 PROCEDURE — 99223 1ST HOSP IP/OBS HIGH 75: CPT | Performed by: INTERNAL MEDICINE

## 2022-04-04 PROCEDURE — 99285 EMERGENCY DEPT VISIT HI MDM: CPT

## 2022-04-04 PROCEDURE — G1004 CDSM NDSC: HCPCS

## 2022-04-04 PROCEDURE — 96376 TX/PRO/DX INJ SAME DRUG ADON: CPT

## 2022-04-04 PROCEDURE — 81001 URINALYSIS AUTO W/SCOPE: CPT | Performed by: INTERNAL MEDICINE

## 2022-04-04 PROCEDURE — 74177 CT ABD & PELVIS W/CONTRAST: CPT

## 2022-04-04 PROCEDURE — 36415 COLL VENOUS BLD VENIPUNCTURE: CPT | Performed by: PHYSICIAN ASSISTANT

## 2022-04-04 PROCEDURE — 84484 ASSAY OF TROPONIN QUANT: CPT | Performed by: PHYSICIAN ASSISTANT

## 2022-04-04 PROCEDURE — 83690 ASSAY OF LIPASE: CPT | Performed by: PHYSICIAN ASSISTANT

## 2022-04-04 RX ORDER — SODIUM CHLORIDE, SODIUM LACTATE, POTASSIUM CHLORIDE, CALCIUM CHLORIDE 600; 310; 30; 20 MG/100ML; MG/100ML; MG/100ML; MG/100ML
75 INJECTION, SOLUTION INTRAVENOUS CONTINUOUS
Status: DISCONTINUED | OUTPATIENT
Start: 2022-04-04 | End: 2022-04-06

## 2022-04-04 RX ORDER — DICYCLOMINE HCL 20 MG
20 TABLET ORAL ONCE
Status: COMPLETED | OUTPATIENT
Start: 2022-04-04 | End: 2022-04-04

## 2022-04-04 RX ORDER — AMLODIPINE BESYLATE 10 MG/1
10 TABLET ORAL DAILY
Status: DISCONTINUED | OUTPATIENT
Start: 2022-04-05 | End: 2022-04-19 | Stop reason: HOSPADM

## 2022-04-04 RX ORDER — FLUTICASONE FUROATE AND VILANTEROL 200; 25 UG/1; UG/1
1 POWDER RESPIRATORY (INHALATION) DAILY
Status: DISCONTINUED | OUTPATIENT
Start: 2022-04-05 | End: 2022-04-19 | Stop reason: HOSPADM

## 2022-04-04 RX ORDER — ONDANSETRON 2 MG/ML
4 INJECTION INTRAMUSCULAR; INTRAVENOUS ONCE
Status: COMPLETED | OUTPATIENT
Start: 2022-04-04 | End: 2022-04-04

## 2022-04-04 RX ORDER — HYDROMORPHONE HCL IN WATER/PF 6 MG/30 ML
0.2 PATIENT CONTROLLED ANALGESIA SYRINGE INTRAVENOUS
Status: DISCONTINUED | OUTPATIENT
Start: 2022-04-04 | End: 2022-04-06

## 2022-04-04 RX ORDER — ONDANSETRON 2 MG/ML
4 INJECTION INTRAMUSCULAR; INTRAVENOUS EVERY 6 HOURS PRN
Status: DISCONTINUED | OUTPATIENT
Start: 2022-04-04 | End: 2022-04-19 | Stop reason: HOSPADM

## 2022-04-04 RX ORDER — ALBUTEROL SULFATE 90 UG/1
1 AEROSOL, METERED RESPIRATORY (INHALATION) EVERY 4 HOURS PRN
Status: DISCONTINUED | OUTPATIENT
Start: 2022-04-04 | End: 2022-04-18

## 2022-04-04 RX ORDER — HYDROMORPHONE HCL/PF 1 MG/ML
0.5 SYRINGE (ML) INJECTION EVERY 6 HOURS PRN
Status: DISCONTINUED | OUTPATIENT
Start: 2022-04-04 | End: 2022-04-06

## 2022-04-04 RX ORDER — FENTANYL CITRATE 50 UG/ML
50 INJECTION, SOLUTION INTRAMUSCULAR; INTRAVENOUS ONCE
Status: COMPLETED | OUTPATIENT
Start: 2022-04-04 | End: 2022-04-04

## 2022-04-04 RX ORDER — ZOLPIDEM TARTRATE 5 MG/1
5 TABLET ORAL
Status: DISCONTINUED | OUTPATIENT
Start: 2022-04-04 | End: 2022-04-19 | Stop reason: HOSPADM

## 2022-04-04 RX ORDER — LORAZEPAM 0.5 MG/1
0.5 TABLET ORAL EVERY 8 HOURS PRN
Status: DISCONTINUED | OUTPATIENT
Start: 2022-04-04 | End: 2022-04-06

## 2022-04-04 RX ORDER — ASPIRIN 81 MG/1
81 TABLET, CHEWABLE ORAL DAILY
Status: DISCONTINUED | OUTPATIENT
Start: 2022-04-05 | End: 2022-04-19 | Stop reason: HOSPADM

## 2022-04-04 RX ORDER — LISINOPRIL 20 MG/1
20 TABLET ORAL 2 TIMES DAILY
Status: DISCONTINUED | OUTPATIENT
Start: 2022-04-04 | End: 2022-04-08

## 2022-04-04 RX ADMIN — DICYCLOMINE HYDROCHLORIDE 20 MG: 20 TABLET ORAL at 17:48

## 2022-04-04 RX ADMIN — FENTANYL CITRATE 50 MCG: 50 INJECTION, SOLUTION INTRAMUSCULAR; INTRAVENOUS at 17:32

## 2022-04-04 RX ADMIN — LISINOPRIL 20 MG: 20 TABLET ORAL at 20:26

## 2022-04-04 RX ADMIN — CEFTRIAXONE SODIUM 1000 MG: 10 INJECTION, POWDER, FOR SOLUTION INTRAVENOUS at 17:48

## 2022-04-04 RX ADMIN — SODIUM CHLORIDE, SODIUM LACTATE, POTASSIUM CHLORIDE, AND CALCIUM CHLORIDE 125 ML/HR: .6; .31; .03; .02 INJECTION, SOLUTION INTRAVENOUS at 20:26

## 2022-04-04 RX ADMIN — IOHEXOL 100 ML: 350 INJECTION, SOLUTION INTRAVENOUS at 15:48

## 2022-04-04 RX ADMIN — ONDANSETRON 4 MG: 2 INJECTION INTRAMUSCULAR; INTRAVENOUS at 14:56

## 2022-04-04 RX ADMIN — HYDROMORPHONE HYDROCHLORIDE 0.5 MG: 1 INJECTION, SOLUTION INTRAMUSCULAR; INTRAVENOUS; SUBCUTANEOUS at 20:20

## 2022-04-04 RX ADMIN — METRONIDAZOLE 500 MG: 500 INJECTION, SOLUTION INTRAVENOUS at 18:50

## 2022-04-04 RX ADMIN — ZOLPIDEM TARTRATE 5 MG: 5 TABLET, COATED ORAL at 22:34

## 2022-04-04 RX ADMIN — HYDROMORPHONE HYDROCHLORIDE 0.2 MG: 0.2 INJECTION, SOLUTION INTRAMUSCULAR; INTRAVENOUS; SUBCUTANEOUS at 22:34

## 2022-04-04 RX ADMIN — SODIUM CHLORIDE 500 ML: 0.9 INJECTION, SOLUTION INTRAVENOUS at 14:55

## 2022-04-04 RX ADMIN — FENTANYL CITRATE 50 MCG: 50 INJECTION, SOLUTION INTRAMUSCULAR; INTRAVENOUS at 14:56

## 2022-04-04 NOTE — ED NOTES
Pt no distress   For admission pending admit orders and bed assignment      Julieta Barillas RN  04/04/22 0191

## 2022-04-04 NOTE — TELEPHONE ENCOUNTER
Patients son called stating Sohail Angers is having stomach pains and she has had them for the last week  He said he needs help as soon as possible  I asked if he feels she needs to go to the ER and he said they just need help  I spoke with the MA' s and they states if the pain is that severe, then she would need to go to the ER or she can be seen today at 2:30  I let the patients son know this and he asked Pebbles what she would prefer to do, she wanted to go to the ER  They will be going to the ER

## 2022-04-04 NOTE — ASSESSMENT & PLAN NOTE
Patient presenting to the ED for left lower quadrant abdominal pain that started last Tuesday  Patient reports that over the last week it has been increasing and she has been having associated symptoms such as nausea, vomiting, diarrhea, poor appetite  Denies other symptoms such as fever/chills, headaches, dizziness chest pain, shortness of breath  · CT A/P: There is thickening of the splenic flexure with associated pericolonic infiltration, suggestive diverticulitis  Additional thickening of the ascending colon with some infiltration at its mesenteric margin may be due to additional segment of diverticulitis or colitis  Suggest follow-up with the colonoscopy when the acute illness subsides to exclude  focal colonic l lesion  There is no fluid collection seen  There is no free air  · WBC 19 43, afebrile   Does not meet sepsis criteria  · Continue IV ceftriaxone and metronidazole  · Continuous IVF  · Keep NPO for now  · Pain control, anti-emetics  · Consult to General surgery, recommendations are appreciated

## 2022-04-04 NOTE — ASSESSMENT & PLAN NOTE
Lab Results   Component Value Date    HGBA1C 6 4 12/22/2021       Recent Labs     04/04/22  1253   POCGLU 116       Blood Sugar Average: Last 72 hrs:  (P) 116     · Holding home oral medications  · Start on SSI with accu checks  · Hypoglycemia protocol

## 2022-04-04 NOTE — ED PROVIDER NOTES
History  Chief Complaint   Patient presents with    Abdominal Pain     Severe pain in abdomen, increasing nausea and diarrhea since  Reports increased weakness  Has taken pepto, charcoal, without any relief of symptoms     77yo female with a history of hypertension, type 2 diabetes, and asthma presenting for evaluation of abdominal pain x 6 days  Patient states her symptoms started shortly after drinking a glass of milk  Her pain is primarily located in the periumbilical region and is described as "cutting " Pain has been intermittent since it began  She has been using multiple OTC medications including ibuprofen, Pepto-bismol, and charcoal which provide transient relief  Patient has also been experiencing nausea, vomiting, and diarrhea  She has only been able to tolerate small sips of fluids and soup since her symptoms began  Patient admits to becoming increasingly weak and was unable to get out of bed today which prompted her to come to the ED  Patient also reports urinary frequency and shortness of breath  No fevers or chest pain  Previous abdominal surgeries include a  section and a hysterectomy  History provided by:  Patient   used: No    Abdominal Pain  Pain location:  Periumbilical  Pain quality comment:  "cutting"  Pain radiates to:  Does not radiate  Pain severity:  Moderate  Onset quality:  Gradual  Duration:  6 days  Timing:  Constant  Progression:  Worsening  Chronicity:  New  Relieved by:  Nothing  Worsened by:  Nothing  Associated symptoms: diarrhea, fatigue, nausea, shortness of breath and vomiting    Associated symptoms: no chest pain, no chills, no dysuria, no fever and no hematuria        Prior to Admission Medications   Prescriptions Last Dose Informant Patient Reported? Taking? Advair Diskus 250-50 MCG/DOSE inhaler  Self No No   Sig: INHALE 1 PUFF 2 (TWO) TIMES A DAY RINSE MOUTH AFTER USE     Aspirin Low Dose 81 MG chewable tablet  Self No No   Sig: CHEW 1 TABLET BY MOUTH DAILY   Janumet -1000 MG TB24   No No   Sig: TAKE 1 TABLET BY MOUTH EVERY DAY   LORazepam (ATIVAN) 1 mg tablet   No No   Sig: Take 0 5 tablets (0 5 mg total) by mouth every 8 (eight) hours as needed for anxiety   ONETOUCH DELICA LANCETS 19W MISC  Self Yes No   Sig: E11 9/ testing daily   Pregabalin ER (Lyrica CR) 165 MG TB24   No No   Si tab every 12 hours   ProAir  (90 Base) MCG/ACT inhaler  Self No No   Sig: INHALE 2 PUFFS BY MOUTH EVERY 6 HOURS AS NEEDED FOR WHEEZE   amLODIPine (NORVASC) 10 mg tablet   No No   Sig: Take 1 tablet (10 mg total) by mouth daily   cyclobenzaprine (FLEXERIL) 5 mg tablet  Self No No   Sig: Take 1 tablet (5 mg total) by mouth 3 (three) times a day as needed for muscle spasms   Patient not taking: Reported on 2022    glucose blood (ONETOUCH VERIO) test strip  Self Yes No   Sig: E11 9 / testing daily   hydrOXYzine HCL (ATARAX) 10 mg tablet  Self Yes No   latanoprost (XALATAN) 0 005 % ophthalmic solution  Self Yes No   lisinopril (ZESTRIL) 20 mg tablet   No No   Sig: Take 1 tablet (20 mg total) by mouth 2 (two) times a day   meloxicam (MOBIC) 15 mg tablet   No No   Sig: TAKE 1 TABLET BY MOUTH EVERY DAY WITH FOOD   mometasone (ELOCON) 0 1 % cream  Self No No   Sig: Apply topically daily   montelukast (SINGULAIR) 10 mg tablet   No No   Sig: TAKE 1 TABLET BY MOUTH EVERYDAY AT BEDTIME   sertraline (ZOLOFT) 50 mg tablet   No No   Sig: TAKE 1 TABLET BY MOUTH EVERY DAY   tobramycin-dexamethasone (TOBRADEX) ophthalmic suspension  Self Yes No   Patient not taking: Reported on 2022    triamcinolone (KENALOG) 0 1 % cream  Self Yes No   zolpidem (AMBIEN) 5 mg tablet  Self No No   Sig: TAKE 1 TABLET BY MOUTH DAILY AT BEDTIME AS NEEDED FOR SLEEP      Facility-Administered Medications: None       Past Medical History:   Diagnosis Date    Asthma     Benign hypertension 2018    [facility] arrest ([facility] [address] )     Diabetes mellitus (HCC)     Glaucoma     Hypertension     Kidney stone     Neuropathy     Sleep apnea     no machine    SOB (shortness of breath)     Tubular adenoma of colon 10/2019    Wheezing        Past Surgical History:   Procedure Laterality Date     SECTION      COLONOSCOPY      HYSTERECTOMY  1985    TONSILLECTOMY         Family History   Problem Relation Age of Onset    Diabetes Mother     Hypertension Father     Prostate cancer Father     Diabetes Sister     Hypertension Sister     Breast cancer Sister 62    Diabetes Brother     Hypertension Brother     Asthma Family     No Known Problems Daughter     No Known Problems Sister     No Known Problems Daughter     No Known Problems Daughter     No Known Problems Maternal Aunt     Breast cancer Maternal Aunt 58    No Known Problems Maternal Aunt     No Known Problems Maternal Aunt     No Known Problems Paternal Aunt     No Known Problems Paternal Aunt     No Known Problems Paternal Aunt     Colon cancer Neg Hx     Ovarian cancer Neg Hx     Uterine cancer Neg Hx     Cervical cancer Neg Hx      I have reviewed and agree with the history as documented  E-Cigarette/Vaping     E-Cigarette/Vaping Substances    Nicotine No     THC No     CBD No     Flavoring No     Other No     Unknown No      Social History     Tobacco Use    Smoking status: Never Smoker    Smokeless tobacco: Never Used   Vaping Use    Vaping Use: Not on file   Substance Use Topics    Alcohol use: Never    Drug use: No       Review of Systems   Constitutional: Positive for fatigue  Negative for chills and fever  HENT: Negative for drooling and voice change  Eyes: Negative for discharge and redness  Respiratory: Positive for shortness of breath  Negative for stridor  Cardiovascular: Negative for chest pain and leg swelling  Gastrointestinal: Positive for abdominal pain, diarrhea, nausea and vomiting  Genitourinary: Negative for difficulty urinating, dysuria and hematuria  Musculoskeletal: Negative for neck pain and neck stiffness  Skin: Negative for color change and rash  Neurological: Positive for weakness  Negative for seizures and syncope  Psychiatric/Behavioral: Negative for confusion  The patient is not nervous/anxious  All other systems reviewed and are negative  Physical Exam  Physical Exam  Vitals and nursing note reviewed  Constitutional:       General: She is not in acute distress  Appearance: She is well-developed  She is not diaphoretic  Comments: Appears uncomfortable, non-toxic   HENT:      Head: Normocephalic and atraumatic  Right Ear: External ear normal       Left Ear: External ear normal       Nose: Nose normal    Eyes:      General: No scleral icterus  Right eye: No discharge  Left eye: No discharge  Conjunctiva/sclera: Conjunctivae normal    Cardiovascular:      Rate and Rhythm: Normal rate and regular rhythm  Heart sounds: Normal heart sounds  No murmur heard  Pulmonary:      Effort: Pulmonary effort is normal  No respiratory distress  Breath sounds: Normal breath sounds  No stridor  No wheezing or rales  Abdominal:      General: Bowel sounds are normal  There is no distension  Palpations: Abdomen is soft  Tenderness: There is abdominal tenderness in the epigastric area, periumbilical area, left upper quadrant and left lower quadrant  There is guarding  There is no rebound  Musculoskeletal:         General: No deformity  Normal range of motion  Cervical back: Normal range of motion and neck supple  Lymphadenopathy:      Cervical: No cervical adenopathy  Skin:     General: Skin is warm and dry  Neurological:      Mental Status: She is alert  She is not disoriented  GCS: GCS eye subscore is 4  GCS verbal subscore is 5  GCS motor subscore is 6     Psychiatric:         Behavior: Behavior normal          Vital Signs  ED Triage Vitals   Temperature Pulse Respirations Blood Pressure SpO2   04/04/22 1247 04/04/22 1247 04/04/22 1247 04/04/22 1247 04/04/22 1247   99 °F (37 2 °C) 85 18 137/68 97 %      Temp Source Heart Rate Source Patient Position - Orthostatic VS BP Location FiO2 (%)   04/04/22 1247 04/04/22 1247 04/04/22 1247 04/04/22 1247 --   Tympanic Monitor Sitting Left arm       Pain Score       04/04/22 1649       9           Vitals:    04/04/22 1247 04/04/22 1700 04/04/22 1800   BP: 137/68 163/78 147/70   Pulse: 85 82 69   Patient Position - Orthostatic VS: Sitting           Visual Acuity      ED Medications  Medications   metroNIDAZOLE (FLAGYL) IVPB (premix) 500 mg 100 mL (500 mg Intravenous New Bag 4/4/22 1850)   sodium chloride 0 9 % bolus 500 mL (0 mL Intravenous Stopped 4/4/22 1649)   ondansetron (ZOFRAN) injection 4 mg (4 mg Intravenous Given 4/4/22 1456)   fentanyl citrate (PF) 100 MCG/2ML 50 mcg (50 mcg Intravenous Given 4/4/22 1456)   iohexol (OMNIPAQUE) 350 MG/ML injection (SINGLE-DOSE) 100 mL (100 mL Intravenous Given 4/4/22 1548)   fentanyl citrate (PF) 100 MCG/2ML 50 mcg (50 mcg Intravenous Given 4/4/22 1732)   ceftriaxone (ROCEPHIN) 1 g/50 mL in dextrose IVPB (0 mg Intravenous Stopped 4/4/22 1847)   dicyclomine (BENTYL) tablet 20 mg (20 mg Oral Given 4/4/22 1748)       Diagnostic Studies  Results Reviewed     Procedure Component Value Units Date/Time    HS Troponin 0hr (reflex protocol) [[id number]]  (Normal) Collected: 04/04/22 1454    Lab Status: Final result Specimen: Blood from Arm, Right Updated: 04/04/22 1540     hs TnI 0hr <2 ng/L     Lactic acid, plasma [[id number]]  (Normal) Collected: 04/04/22 1454    Lab Status: Final result Specimen: Blood from Arm, Right Updated: 04/04/22 1536     LACTIC ACID 0 9 mmol/L     Narrative:      Result may be elevated if tourniquet was used during collection      Lipase [[id number]]  (Abnormal) Collected: 04/04/22 3727    Lab Status: Final result Specimen: Blood from Arm, Right Updated: 04/04/22 1531     Lipase 48 u/L Comprehensive metabolic panel [[id number]]  (Abnormal) Collected: 04/04/22 1454    Lab Status: Final result Specimen: Blood from Arm, Right Updated: 04/04/22 1531     Sodium 138 mmol/L      Potassium 4 2 mmol/L      Chloride 101 mmol/L      CO2 26 mmol/L      ANION GAP 11 mmol/L      BUN 13 mg/dL      Creatinine 0 98 mg/dL      Glucose 113 mg/dL      Calcium 9 5 mg/dL      AST 58 U/L       U/L      Alkaline Phosphatase 216 U/L      Total Protein 8 8 g/dL      Albumin 3 6 g/dL      Total Bilirubin 0 31 mg/dL      eGFR 56 ml/min/1 73sq m     Narrative:      [place] guidelines for Chronic Kidney Disease (CKD):     Stage 1 with normal or high GFR (GFR > 90 mL/min/1 73 square meters)    Stage 2 Mild CKD (GFR = 60-89 mL/min/1 73 square meters)    Stage 3A Moderate CKD (GFR = 45-59 mL/min/1 73 square meters)    Stage 3B Moderate CKD (GFR = 30-44 mL/min/1 73 square meters)    Stage 4 Severe CKD (GFR = 15-29 mL/min/1 73 square meters)    Stage 5 End Stage CKD (GFR <15 mL/min/1 73 square meters)  Note: GFR calculation is accurate only with a steady state creatinine    CBC and differential [[id number]]  (Abnormal) Collected: 04/04/22 1454    Lab Status: Final result Specimen: Blood from Arm, Right Updated: 04/04/22 1505     WBC 19 43 Thousand/uL      RBC 4 45 Million/uL      Hemoglobin 13 0 g/dL      Hematocrit 40 5 %      MCV 91 fL      MCH 29 2 pg      MCHC 32 1 g/dL      RDW 14 6 %      MPV 9 8 fL      Platelets 902 Thousands/uL      nRBC 0 /100 WBCs      Neutrophils Relative 40 %      Immat GRANS % 1 %      Lymphocytes Relative 11 %      Monocytes Relative 3 %      Eosinophils Relative 45 %      Basophils Relative 0 %      Neutrophils Absolute 7 66 Thousands/µL      Immature Grans Absolute 0 14 Thousand/uL      Lymphocytes Absolute 2 16 Thousands/µL      Monocytes Absolute 0 64 Thousand/µL      Eosinophils Absolute 8 77 Thousand/µL      Basophils Absolute 0 06 Thousands/µL     UA w Reflex to Microscopic w Reflex to Culture [[id number]]     Lab Status: No result Specimen: Urine     Fingerstick Glucose (POCT) [[id number]]  (Normal) Collected: 04/04/22 1253    Lab Status: Final result Updated: 04/04/22 1306     POC Glucose 116 mg/dl                  CT abdomen pelvis with contrast   Final Result by Sophy Horner MD (04/04 1706)      There is thickening of the splenic flexure with associated pericolonic infiltration, suggestive diverticulitis  Additional thickening of the ascending colon with some infiltration at its mesenteric margin may be due to additional segment of diverticulitis or colitis  Suggest follow-up with the colonoscopy when the acute illness subsides to exclude  focal colonic    lesion      There is no fluid collection seen      There is no free air      Small mesenteric lymph nodes with largest measuring about 1 cm, short interval follow-up at 3 months suggested   The study was marked in EPIC for immediate notification  Follow-up notification has been created in Epic      Workstation performed: [id number]                    Procedures  Procedures         ED Course  ED Course as of 04/04/22 1904   Mon Apr 04, 2022   1506 WBC(!): 19 43                   SBIRT 22yo+      Most Recent Value   SBIRT (23 yo +)    In order to provide better care to our patients, we are screening all of our patients for alcohol and drug use  Would it be okay to ask you these screening questions? Unable to answer at this time Filed at: 04/04/2022 1414                    MDM  Number of Diagnoses or Management Options  Acute diverticulitis: new and requires workup  Transaminitis: new and requires workup  Diagnosis management comments: 77yo female presenting for abdominal pain, vomiting, and diarrhea x 6 days  She also c/o generalized weakness and fatigue and is now not able to get out of bed  She is afebrile and hemodynamically stable   She has guarding on abdominal exam  Differential diagnosis includes but is not limited to: gastritis, GERD, pancreatitis, hepatitis, cholecystitis, cholelithiasis, colitis, diverticulitis, appendicitis, mesenteric adenitis, UTI, pyelonephritis, SBO, constipation, kidney stone, musculoskeletal, nonspecific abdominal pain  Initial ED plan: Check abdominal labs, lactate, troponin/EKG, UA, and CT abdomen  IV fentanyl and fluid bolus for symptoms  Final assessment:  Labs reveal a leukocytosis with a white count of 19  Lactate is normal   She also has a mild transaminitis with an AST of 58 and ALT of 126  Total bilirubin is normal   Remainder of labs unremarkable including normal troponin  Renal function stable  CT abdomen reveals evidence diverticulitis versus colitis  On re-evaluation, patient continues to appear very uncomfortable  She is requiring multiple doses of IV narcotics for pain control  IV Rocephin and Flagyl ordered and patient admitted for further management         Amount and/or Complexity of Data Reviewed  Clinical lab tests: reviewed and ordered  Tests in the radiology section of CPT®: ordered and reviewed  Review and summarize past medical records: yes  Discuss the patient with other providers: yes  Independent visualization of images, tracings, or specimens: yes    Risk of Complications, Morbidity, and/or Mortality  Presenting problems: moderate  Diagnostic procedures: moderate  Management options: moderate    Patient Progress  Patient progress: stable      Disposition  Final diagnoses:   Acute diverticulitis   Transaminitis     Time reflects when diagnosis was documented in both MDM as applicable and the Disposition within this note     Time User Action Codes Description Comment    4/4/2022  7:03 PM Kanchan Tovar Settlefritz Add [K57 [id number]] Acute diverticulitis     4/4/2022  7:03 PM [address] [place] [address] [R74 [id number]] Transaminitis       ED Disposition     ED Disposition Condition Date/Time Comment    Admit Stable Mon Apr 4, 2022  7:03 PM Case was discussed with Dr Navdeep Jenkins and the patient's admission status was agreed to be Admission Status: inpatient status to the service of Dr Navdeep Jenkins   Follow-up Information    None         Patient's Medications   Discharge Prescriptions    No medications on file       No discharge procedures on file      PDMP Review       Value Time User    PDMP Reviewed  Yes 1/28/2022  7:05 AM Reina Thomas, [facility]          ED Provider  Electronically Signed by           Nohelia Mitchell PA-C  04/04/22 1001

## 2022-04-05 ENCOUNTER — TELEPHONE (OUTPATIENT)
Dept: GASTROENTEROLOGY | Facility: CLINIC | Age: 77
End: 2022-04-05

## 2022-04-05 LAB
ALBUMIN SERPL BCP-MCNC: 2.9 G/DL (ref 3.5–5)
ALP SERPL-CCNC: 178 U/L (ref 46–116)
ALT SERPL W P-5'-P-CCNC: 90 U/L (ref 12–78)
ANION GAP SERPL CALCULATED.3IONS-SCNC: 9 MMOL/L (ref 4–13)
AST SERPL W P-5'-P-CCNC: 36 U/L (ref 5–45)
BILIRUB SERPL-MCNC: 0.28 MG/DL (ref 0.2–1)
BUN SERPL-MCNC: 13 MG/DL (ref 5–25)
CALCIUM ALBUM COR SERPL-MCNC: 9.7 MG/DL (ref 8.3–10.1)
CALCIUM SERPL-MCNC: 8.8 MG/DL (ref 8.3–10.1)
CHLORIDE SERPL-SCNC: 104 MMOL/L (ref 100–108)
CO2 SERPL-SCNC: 26 MMOL/L (ref 21–32)
CREAT SERPL-MCNC: 0.86 MG/DL (ref 0.6–1.3)
ERYTHROCYTE [DISTWIDTH] IN BLOOD BY AUTOMATED COUNT: 14.6 % (ref 11.6–15.1)
GFR SERPL CREATININE-BSD FRML MDRD: 66 ML/MIN/1.73SQ M
GLUCOSE SERPL-MCNC: 107 MG/DL (ref 65–140)
GLUCOSE SERPL-MCNC: 134 MG/DL (ref 65–140)
GLUCOSE SERPL-MCNC: 64 MG/DL (ref 65–140)
GLUCOSE SERPL-MCNC: 75 MG/DL (ref 65–140)
GLUCOSE SERPL-MCNC: 78 MG/DL (ref 65–140)
GLUCOSE SERPL-MCNC: 94 MG/DL (ref 65–140)
HCT VFR BLD AUTO: 35.8 % (ref 34.8–46.1)
HGB BLD-MCNC: 11.4 G/DL (ref 11.5–15.4)
MCH RBC QN AUTO: 29.5 PG (ref 26.8–34.3)
MCHC RBC AUTO-ENTMCNC: 31.8 G/DL (ref 31.4–37.4)
MCV RBC AUTO: 93 FL (ref 82–98)
PLATELET # BLD AUTO: 150 THOUSANDS/UL (ref 149–390)
PMV BLD AUTO: 9.7 FL (ref 8.9–12.7)
POTASSIUM SERPL-SCNC: 4.5 MMOL/L (ref 3.5–5.3)
PROT SERPL-MCNC: 7.3 G/DL (ref 6.4–8.2)
RBC # BLD AUTO: 3.86 MILLION/UL (ref 3.81–5.12)
SODIUM SERPL-SCNC: 139 MMOL/L (ref 136–145)
WBC # BLD AUTO: 18.3 THOUSAND/UL (ref 4.31–10.16)

## 2022-04-05 PROCEDURE — 82948 REAGENT STRIP/BLOOD GLUCOSE: CPT

## 2022-04-05 PROCEDURE — 97116 GAIT TRAINING THERAPY: CPT

## 2022-04-05 PROCEDURE — 97163 PT EVAL HIGH COMPLEX 45 MIN: CPT

## 2022-04-05 PROCEDURE — 85027 COMPLETE CBC AUTOMATED: CPT | Performed by: INTERNAL MEDICINE

## 2022-04-05 PROCEDURE — 80053 COMPREHEN METABOLIC PANEL: CPT | Performed by: INTERNAL MEDICINE

## 2022-04-05 PROCEDURE — 99233 SBSQ HOSP IP/OBS HIGH 50: CPT | Performed by: INTERNAL MEDICINE

## 2022-04-05 PROCEDURE — 99222 1ST HOSP IP/OBS MODERATE 55: CPT | Performed by: INTERNAL MEDICINE

## 2022-04-05 PROCEDURE — 99222 1ST HOSP IP/OBS MODERATE 55: CPT | Performed by: STUDENT IN AN ORGANIZED HEALTH CARE EDUCATION/TRAINING PROGRAM

## 2022-04-05 RX ORDER — DEXTROSE MONOHYDRATE 100 MG/ML
INJECTION, SOLUTION INTRAVENOUS
Status: COMPLETED
Start: 2022-04-05 | End: 2022-04-05

## 2022-04-05 RX ORDER — DEXTROSE MONOHYDRATE 100 MG/ML
125 INJECTION, SOLUTION INTRAVENOUS ONCE
Status: COMPLETED | OUTPATIENT
Start: 2022-04-05 | End: 2022-04-05

## 2022-04-05 RX ORDER — DEXTROSE MONOHYDRATE 25 G/50ML
25 INJECTION, SOLUTION INTRAVENOUS ONCE
Status: DISCONTINUED | OUTPATIENT
Start: 2022-04-05 | End: 2022-04-05

## 2022-04-05 RX ADMIN — HYDROMORPHONE HYDROCHLORIDE 0.5 MG: 1 INJECTION, SOLUTION INTRAMUSCULAR; INTRAVENOUS; SUBCUTANEOUS at 04:09

## 2022-04-05 RX ADMIN — DEXTROSE MONOHYDRATE 125 ML: 100 INJECTION, SOLUTION INTRAVENOUS at 07:55

## 2022-04-05 RX ADMIN — CEFTRIAXONE SODIUM 1000 MG: 10 INJECTION, POWDER, FOR SOLUTION INTRAVENOUS at 16:44

## 2022-04-05 RX ADMIN — METRONIDAZOLE 500 MG: 500 INJECTION, SOLUTION INTRAVENOUS at 09:47

## 2022-04-05 RX ADMIN — SODIUM CHLORIDE, SODIUM LACTATE, POTASSIUM CHLORIDE, AND CALCIUM CHLORIDE 125 ML/HR: .6; .31; .03; .02 INJECTION, SOLUTION INTRAVENOUS at 15:15

## 2022-04-05 RX ADMIN — LISINOPRIL 20 MG: 20 TABLET ORAL at 09:45

## 2022-04-05 RX ADMIN — ASPIRIN 81 MG: 81 TABLET, CHEWABLE ORAL at 09:45

## 2022-04-05 RX ADMIN — HYDROMORPHONE HYDROCHLORIDE 0.2 MG: 0.2 INJECTION, SOLUTION INTRAMUSCULAR; INTRAVENOUS; SUBCUTANEOUS at 05:24

## 2022-04-05 RX ADMIN — SERTRALINE HYDROCHLORIDE 50 MG: 50 TABLET ORAL at 09:45

## 2022-04-05 RX ADMIN — METRONIDAZOLE 500 MG: 500 INJECTION, SOLUTION INTRAVENOUS at 01:55

## 2022-04-05 RX ADMIN — ZOLPIDEM TARTRATE 5 MG: 5 TABLET, COATED ORAL at 22:14

## 2022-04-05 RX ADMIN — SODIUM CHLORIDE, SODIUM LACTATE, POTASSIUM CHLORIDE, AND CALCIUM CHLORIDE 125 ML/HR: .6; .31; .03; .02 INJECTION, SOLUTION INTRAVENOUS at 22:07

## 2022-04-05 RX ADMIN — HYDROMORPHONE HYDROCHLORIDE 0.5 MG: 1 INJECTION, SOLUTION INTRAMUSCULAR; INTRAVENOUS; SUBCUTANEOUS at 22:14

## 2022-04-05 RX ADMIN — LISINOPRIL 20 MG: 20 TABLET ORAL at 22:06

## 2022-04-05 RX ADMIN — HYDROMORPHONE HYDROCHLORIDE 0.5 MG: 1 INJECTION, SOLUTION INTRAMUSCULAR; INTRAVENOUS; SUBCUTANEOUS at 15:17

## 2022-04-05 RX ADMIN — AMLODIPINE BESYLATE 10 MG: 10 TABLET ORAL at 09:45

## 2022-04-05 RX ADMIN — FLUTICASONE FUROATE AND VILANTEROL TRIFENATATE 1 PUFF: 200; 25 POWDER RESPIRATORY (INHALATION) at 09:47

## 2022-04-05 RX ADMIN — SODIUM CHLORIDE, SODIUM LACTATE, POTASSIUM CHLORIDE, AND CALCIUM CHLORIDE 125 ML/HR: .6; .31; .03; .02 INJECTION, SOLUTION INTRAVENOUS at 04:09

## 2022-04-05 RX ADMIN — METRONIDAZOLE 500 MG: 500 INJECTION, SOLUTION INTRAVENOUS at 17:37

## 2022-04-05 NOTE — CONSULTS
GENERAL SURGERY CONSULT                                                                                                                                               [facility] 78 y o  female MRN:                                                                     [id number]  Unit/Bed#: -01                                                         Encounter: [id number]                                                        Assessment/Plan   Diverticulitis in two locations one at the splenic flexure and in the ascending colon no perforation or abscess   Leukocytosis 18 30     -no surgical intervention  -cont with antibiotics-Rocephin and Flagyl   -cont to monitor exam blood  -await GI consult  -follow up with colonoscopy in  6-8 weeks  -will check for c-diff       CHIEF COMPLAINT:  I started having belly pain  The pain and diarrhea have decreased  HPI: Greg Leonard is a 78y o  year oldfemale,PMH: history of diverticulitis about 20 years ago managed conservatively  DM, HTN, Asthma,  admission for Hypertensive Urgency, Bradycardia, Chronic Knee pain, , Hysterectomy  consulted for Ct findings indicating diverticulitis in two separate locations  One at the splenic flexure and another in the ascending colon  Pt states she had diverticulitis about 20 years ago, no surgery  She reported to the ED complaining of abdominal pain x 6 days  The pain developed shortly after drinking milk and questioned if it had gone bad  It has been intermittent since then  She also complains of diarrhea frequently  She tried Pepto-bismol, imodium, charcoal, ibuprofen which did not help  She has been unable to tolerated food except some sips of fluids and soup  She was so weak she was unable to get out of bed and reported to the ED  Work up revealed Ct findings suggestive of diverticulitis in two locations, at the splenic flexure, and in the ascending colon  No perforation or abscess  leukocytosis 18, elevation of ALT 90 and ALK PHOS 178  She states she had a colonoscopy and EGD  10/21 and detected large ascending colon poly, minor sigmoid and ascending diverticulosis  EGD was normal    Imaging Studies: CT abdomen pelvis with contrast    Result Date: 2022  Impression: There is thickening of the splenic flexure with associated pericolonic infiltration, suggestive diverticulitis  Additional thickening of the ascending colon with some infiltration at its mesenteric margin may be due to additional segment of diverticulitis or colitis  Suggest follow-up with the colonoscopy when the acute illness subsides to exclude  focal colonic lesion There is no fluid collection seen There is no free air Small mesenteric lymph nodes with largest measuring about 1 cm, short interval follow-up at 3 months suggested The study was marked in EPIC for immediate notification   Follow-up notification has been created in [employer] performed: [id number]     Lab Results:   Lab Results   Component Value Date    WBC 18 30 (H) 2022    HGB 11 4 (L) 2022    HCT 35 8 2022     2022     Lab Results   Component Value Date    K 4 5 2022     2022    CO2 26 2022    BUN 13 2022    CREATININE 0 86 2022     Lab Results   Component Value Date    AST 36 2022    ALT 90 (H) 2022    ALKPHOS 178 (H) 2022    TBILI 0 28 2022    ALB 2 9 (L) 2022       Consults    Historical Information   Past Medical History:   Diagnosis Date    Asthma     Benign hypertension 2018    [facility] [address]     Diabetes mellitus [facility] [address] [facility]     Glaucoma     Hypertension     Kidney stone     Neuropathy     Sleep apnea     no machine    SOB (shortness of breath)     Tubular adenoma of colon 10/2019    Wheezing      Past Surgical History:   Procedure Laterality Date     SECTION      COLONOSCOPY      HYSTERECTOMY  1985    TONSILLECTOMY Social History   Social History     Substance and Sexual Activity   Alcohol Use Never     Social History     Substance and Sexual Activity   Drug Use No     Social History     Tobacco Use   Smoking Status Never Smoker   Smokeless Tobacco Never Used     Family History: non-contributory    Allergies   Allergen Reactions    Ciprofloxacin Itching    Levaquin [Levofloxacin] Swelling    Penicillins GI Intolerance       Meds/Allergies   current meds:   Current Facility-Administered Medications   Medication Dose Route Frequency    albuterol (PROVENTIL HFA,VENTOLIN HFA) inhaler 1 puff  1 puff Inhalation Q4H PRN    amLODIPine (NORVASC) tablet 10 mg  10 mg Oral Daily    aspirin chewable tablet 81 mg  81 mg Oral Daily    ceftriaxone (ROCEPHIN) 1 g/50 mL in dextrose IVPB  1,000 mg Intravenous Q24H    enoxaparin (LOVENOX) subcutaneous injection 40 mg  40 mg Subcutaneous Daily    fluticasone-vilanterol (BREO ELLIPTA) 200-25 MCG/INH inhaler 1 puff  1 puff Inhalation Daily    HYDROmorphone (DILAUDID) injection 0 5 mg  0 5 mg Intravenous Q6H PRN    HYDROmorphone HCl (DILAUDID) injection 0 2 mg  0 2 mg Intravenous Q3H PRN    insulin lispro (HumaLOG) 100 units/mL subcutaneous injection 1-5 Units  1-5 Units Subcutaneous HS    insulin lispro (HumaLOG) 100 units/mL subcutaneous injection 1-6 Units  1-6 Units Subcutaneous Q6H    lactated ringers infusion  125 mL/hr Intravenous Continuous    lisinopril (ZESTRIL) tablet 20 mg  20 mg Oral BID    LORazepam (ATIVAN) tablet 0 5 mg  0 5 mg Oral Q8H PRN    metroNIDAZOLE (FLAGYL) IVPB (premix) 500 mg 100 mL  500 mg Intravenous Q8H    ondansetron (ZOFRAN) injection 4 mg  4 mg Intravenous Q6H PRN    sertraline (ZOLOFT) tablet 50 mg  50 mg Oral Daily    zolpidem (AMBIEN) tablet 5 mg  5 mg Oral HS PRN              Objective   Vitals:    Intake/Output Summary (Last 24 hours) at 4/5/2022 1232  Last data filed at 4/5/2022 0830  Gross per 24 hour   Intake 1500 ml   Output 300 ml Net 1200 ml     Invasive Devices  Report    Peripheral Intravenous Line            Peripheral IV 04/04/22 Right Forearm <1 day                Review of Systems  12 point ROS reviewed and Negative except[de-identified]   Pain in my belly  Diarrhea many times per hour  Unable to eat or drink much    Physical Exam    Blood pressure 149/69, pulse 80, temperature 98 5 °F (36 9 °C), resp  rate 16, height 5' 2" (1 575 m), weight 88 1 kg (194 lb 3 6 oz), SpO2 (!) 89 %, not currently breastfeeding  GEN: A & O x 3, cooperative   HEENT: PERRLA EOMI, sclera anicterus  NECK: supple   LUNGS: clear throughout  COR: RRR no murmur  ABD: soft, mild tenderness periumbilical  NBS, incisions well healed  EXTREM: FROM no joint deformities    No pedal edema   SKIN: no rashes or lesion   NEURO: CN II -XII intact, no tremor, affect appropriate            Navin Lopez PA-C  4/5/2022

## 2022-04-05 NOTE — H&P
[address] [place] [address]  [facility] 1945[facility] 78 [facility]  [facility] MRN: [id number]  Unit/Bed#: -[facility] Encounter: [id number]  Primary Care Provider: ALEKSANDER Doss   Date and time admitted to hospital: 4/4/2022  2:10 PM    * Acute diverticulitis  Assessment & Plan  Patient presenting to the ED for left lower quadrant abdominal pain that started last Tuesday  Patient reports that over the last week it has been increasing and she has been having associated symptoms such as nausea, vomiting, diarrhea, poor appetite  Denies other symptoms such as fever/chills, headaches, dizziness chest pain, shortness of breath  · CT A/P: There is thickening of the splenic flexure with associated pericolonic infiltration, suggestive diverticulitis  Additional thickening of the ascending colon with some infiltration at its mesenteric margin may be due to additional segment of diverticulitis or colitis  Suggest follow-up with the colonoscopy when the acute illness subsides to exclude  focal colonic l lesion  There is no fluid collection seen  There is no free air  · WBC 19 43, afebrile  Does not meet sepsis criteria  · Continue IV ceftriaxone and metronidazole  · Continuous IVF  · Keep NPO for now  · Pain control, anti-emetics  · Consult to General surgery, recommendations are appreciated    Transaminitis  Assessment & Plan  · Elevated LFTs on admission  · AST 58, , Alkaline phosphatase 216  · Likely in the setting of acute diverticulitis infection  Denies any right upper quadrant abdominal pain at this time  Denies any drug or alcohol use  · Continue to trend with daily CMP  · If patient develops right upper quadrant pain or worsening liver enzymes, consider further workup with ultrasound and lab studies      Primary hypertension  Assessment & Plan  · /72 on admission  · Continue pre-hospital amlodipine and lisinopril  · Monitor vitals per routine    Stage 3a chronic kidney disease [facility])  Assessment & Plan  Lab Results   Component Value Date    EGFR 56 04/04/2022    EGFR 65 02/28/2022    EGFR 46 02/27/2022    CREATININE 0 98 04/04/2022    CREATININE 0 87 02/28/2022    CREATININE 1 16 02/27/2022     · Creatinine stable at baseline  · Avoid hypotension and nephrotoxic agents  · Monitor BMP daily    Controlled type 2 diabetes with neuropathy [facility])  Assessment & Plan  Lab Results   Component Value Date    HGBA1C 6 4 12/22/2021       Recent Labs     04/04/22  1253   POCGLU 116       Blood Sugar Average: Last 72 hrs:  (P) 116     · Holding home oral medications  · Start on SSI with accu checks  · Hypoglycemia protocol    Mild intermittent asthma  Assessment & Plan  · No acute exacerbation  · Continue pre-hospital inhalers    VTE Prophylaxis: Enoxaparin (Lovenox)  / sequential compression device   Code Status: Level 1 code  Discussion with family:  All patient questions answered to the best my ability    Anticipated Length of Stay:  Patient will be admitted on an Inpatient basis with an anticipated length of stay of  More than 2 midnights  Justification for Hospital Stay:  Acute diverticulitis    Total Time for Visit, including Counseling / Coordination of Care: 60 minutes  Greater than 50% of this total time spent on direct patient counseling and coordination of care  Chief Complaint:   Abdominal pain    History of Present Illness:    Maria Isabel He is a 78 y o  female who has past medical history significant for hypertension, diabetes, asthma, CKD  She presents with left lower quadrant abdominal pain that started last Tuesday  Patient reports that over the last week it has been increasing and she has been having associated symptoms such as nausea, vomiting, diarrhea, poor appetite  Denies other symptoms such as fever/chills, headaches, dizziness chest pain, shortness of breath    Patient requiring medical admission for IV antibiotic treatment for acute diverticulitis with  general surgery consultation  Review of Systems:    Review of Systems   Constitutional: Negative for chills and fever  HENT: Negative for congestion, ear pain, rhinorrhea and sore throat  Eyes: Negative for pain and visual disturbance  Respiratory: Negative for cough and shortness of breath  Cardiovascular: Negative for chest pain and palpitations  Gastrointestinal: Positive for abdominal pain, diarrhea, nausea and vomiting  Genitourinary: Negative for dysuria, hematuria and urgency  Musculoskeletal: Negative for arthralgias, back pain and myalgias  Skin: Negative for color change and rash  Neurological: Negative for dizziness, seizures, syncope and headaches  All other systems reviewed and are negative  Past Medical and Surgical History:     Past Medical History:   Diagnosis Date    Asthma     Benign hypertension 2018    [facility] [address] [facility]     Diabetes mellitus [facility] [address] [facility]     Glaucoma     Hypertension     Kidney stone     Neuropathy     Sleep apnea     no machine    SOB (shortness of breath)     Tubular adenoma of colon 10/2019    Wheezing        Past Surgical History:   Procedure Laterality Date     SECTION      COLONOSCOPY      HYSTERECTOMY  1985    TONSILLECTOMY         Meds/Allergies:    Prior to Admission medications    Medication Sig Start Date End Date Taking?  Authorizing Provider   Advair Diskus 250-50 MCG/DOSE inhaler INHALE 1 PUFF 2 (TWO) TIMES A DAY RINSE MOUTH AFTER USE  21   ALEKSANDER Mckeon   amLODIPine (NORVASC) 10 mg tablet Take 1 tablet (10 mg total) by mouth daily 3/1/22 3/31/22  Dale Singh MD   Aspirin Low Dose 81 MG chewable tablet CHEW 1 TABLET BY MOUTH DAILY 3/23/21   ALEKSANDER Mckeon   cyclobenzaprine (FLEXERIL) 5 mg tablet Take 1 tablet (5 mg total) by mouth 3 (three) times a day as needed for muscle spasms  Patient not taking: Reported on 2022  4/15/21   ALEKSANDER Arce   glucose blood (ONETOUCH VERIO) test strip E11 9 / testing daily 8/16/18   Historical Provider, MD   hydrOXYzine HCL (ATARAX) 10 mg tablet  12/17/21   Historical Provider, MD   Janumet -1000 MG TB24 TAKE 1 TABLET BY MOUTH EVERY DAY 3/28/22   ALEKSANDER Mckeon   latanoprost (XALATAN) 0 005 % ophthalmic solution  9/24/21   Historical Provider, MD   lisinopril (ZESTRIL) 20 mg tablet Take 1 tablet (20 mg total) by mouth 2 (two) times a day 2/4/22   Alo Wallace MD   LORazepam (ATIVAN) 1 mg tablet Take 0 5 tablets (0 5 mg total) by mouth every 8 (eight) hours as needed for anxiety 3/8/22   ALEKSANDER Mcintyre   meloxicam (MOBIC) 15 mg tablet TAKE 1 TABLET BY MOUTH EVERY DAY WITH FOOD 3/17/22   ALEKSANDER Escudero   mometasone (ELOCON) 0 1 % cream Apply topically daily 3/23/20   ALEKSANDER Mckeon   montelukast (SINGULAIR) 10 mg tablet TAKE 1 TABLET BY MOUTH EVERYDAY AT BEDTIME 2/21/22   Greta Prado PA-C   [facility] 81Z MISC E11 9/ testing daily 8/16/18   Historical Provider, MD   Pregabalin ER (Lyrica CR) 165 MG TB24 1 tab every 12 hours 3/28/22   ALEKSANDER Alcantar   ProAir  (90 Base) MCG/ACT inhaler INHALE 2 PUFFS BY MOUTH EVERY 6 HOURS AS NEEDED FOR WHEEZE 6/18/21   ALEKSANDER Mckeon   sertraline (ZOLOFT) 50 mg tablet TAKE 1 TABLET BY MOUTH EVERY DAY 3/28/22   ALEKSANDER Mckeon   tobramycin-dexamethasone [facility] APOPKA) ophthalmic suspension  12/17/21   Historical Provider, MD   triamcinolone (KENALOG) 0 1 % cream  10/18/21   Historical Provider, MD   zolpidem (AMBIEN) 5 mg tablet TAKE 1 TABLET BY MOUTH DAILY AT BEDTIME AS NEEDED FOR SLEEP 1/28/22   ALEKSANDER Mckeon     I have reviewed home medications using allscripts  Allergies:    Allergies   Allergen Reactions    Ciprofloxacin Itching    Levaquin [Levofloxacin] Swelling    Penicillins GI Intolerance       Social History:     Marital Status: /Civil Union   Occupation: NA  Patient Pre-hospital Living Situation: Home  Patient Pre-hospital Level of Mobility: Independent  Patient Pre-hospital Diet Restrictions: Diabetic  Substance Use History:   Social History     Substance and Sexual Activity   Alcohol Use Never     Social History     Tobacco Use   Smoking Status Never Smoker   Smokeless Tobacco Never Used     Social History     Substance and Sexual Activity   Drug Use No       Family History:    Family History   Problem Relation Age of Onset    Diabetes Mother     Hypertension Father     Prostate cancer Father     Diabetes Sister     Hypertension Sister     Breast cancer Sister 62    Diabetes Brother     Hypertension Brother     Asthma Family     No Known Problems Daughter     No Known Problems Sister     No Known Problems Daughter     No Known Problems Daughter     No Known Problems Maternal Aunt     Breast cancer Maternal Aunt 58    No Known Problems Maternal Aunt     No Known Problems Maternal Aunt     No Known Problems Paternal Aunt     No Known Problems Paternal Aunt     No Known Problems Paternal Aunt     Colon cancer Neg Hx     Ovarian cancer Neg Hx     Uterine cancer Neg Hx     Cervical cancer Neg Hx        Physical Exam:     Vitals:   Blood Pressure: 138/72 (04/04/22 1915)  Pulse: 66 (04/04/22 1915)  Temperature: 99 °F (37 2 °C) (04/04/22 1247)  Temp Source: Tympanic (04/04/22 1247)  Respirations: 18 (04/04/22 1915)  SpO2: 95 % (04/04/22 1915)    Physical Exam  Vitals and nursing note reviewed  Constitutional:       General: She is in acute distress  Appearance: She is well-developed  She is obese  HENT:      Head: Normocephalic and atraumatic  Nose: No congestion or rhinorrhea  Mouth/Throat:      Mouth: Mucous membranes are moist       Pharynx: Oropharynx is clear  Eyes:      Conjunctiva/sclera: Conjunctivae normal    Cardiovascular:      Rate and Rhythm: Normal rate and regular rhythm  Pulses: Normal pulses  Heart sounds: No murmur heard        Pulmonary: Effort: Pulmonary effort is normal  No respiratory distress  Breath sounds: Normal breath sounds  Abdominal:      General: Bowel sounds are normal       Palpations: Abdomen is soft  Tenderness: There is abdominal tenderness  There is guarding  Musculoskeletal:         General: Normal range of motion  Cervical back: Neck supple  Right lower leg: No edema  Left lower leg: No edema  Skin:     General: Skin is warm and dry  Neurological:      Mental Status: She is alert and oriented to person, place, and time  Additional Data:     Lab Results: I have personally reviewed pertinent reports  Results from last 7 days   Lab Units 04/04/22  1454   WBC Thousand/uL 19 43*   HEMOGLOBIN g/dL 13 0   HEMATOCRIT % 40 5   PLATELETS Thousands/uL 170   NEUTROS PCT % 40*   LYMPHS PCT % 11*   MONOS PCT % 3*   EOS PCT % 45*     Results from last 7 days   Lab Units 04/04/22  1454   SODIUM mmol/L 138   POTASSIUM mmol/L 4 2   CHLORIDE mmol/L 101   CO2 mmol/L 26   BUN mg/dL 13   CREATININE mg/dL 0 98   ANION GAP mmol/L 11   CALCIUM mg/dL 9 5   ALBUMIN g/dL 3 6   TOTAL BILIRUBIN mg/dL 0 31   ALK PHOS U/L 216*   ALT U/L 126*   AST U/L 58*   GLUCOSE RANDOM mg/dL 113         Results from last 7 days   Lab Units 04/04/22  1253   POC GLUCOSE mg/dl 116         Results from last 7 days   Lab Units 04/04/22  1454   LACTIC ACID mmol/L 0 9       Imaging: I have personally reviewed pertinent reports  CT abdomen pelvis with contrast   Final Result by Mounika Akers MD (04/04 1706)      There is thickening of the splenic flexure with associated pericolonic infiltration, suggestive diverticulitis  Additional thickening of the ascending colon with some infiltration at its mesenteric margin may be due to additional segment of diverticulitis or colitis    Suggest follow-up with the colonoscopy when the acute illness subsides to exclude  focal colonic    lesion      There is no fluid collection seen There is no free air      Small mesenteric lymph nodes with largest measuring about 1 cm, short interval follow-up at 3 months suggested   The study was marked in EPIC for immediate notification  Follow-up notification has been created in [employer] performed: [id number]             EKG, Pathology, and Other Studies Reviewed on Admission:   · EKG: Reviewed  · CT A/P: Reviewed    Allscripts / Epic Records Reviewed: Yes     ** Please Note: This note has been constructed using a voice recognition system   **

## 2022-04-05 NOTE — ASSESSMENT & PLAN NOTE
· /72 on admission-- in a m  Mildly 149/69, likely due to pain  · Continue pre-hospital amlodipine and lisinopril  · Monitor vitals per routine

## 2022-04-05 NOTE — ASSESSMENT & PLAN NOTE
Lab Results   Component Value Date    EGFR 66 04/05/2022    EGFR 56 04/04/2022    EGFR 65 02/28/2022    CREATININE 0 86 04/05/2022    CREATININE 0 98 04/04/2022    CREATININE 0 87 02/28/2022     · Creatinine stable at baseline  · Avoid hypotension and nephrotoxic agents  · Monitor BMP daily

## 2022-04-05 NOTE — CONSULTS
Consultation - [facility]  Jose Miles 78 y o  female MRN: [id number]  Unit/Bed#: -01 Encounter: [id number]         Reason for Consult / Principal Problem:  Diverticulitis    HPI: Mrs Pauline Faust is a 74-year-old female with history of asthma, hypertension, CKD stage 3, chronic bronchitis and anxiety  Patient also has history of colon polyps  GI was consulted as patient was admitted yesterday for almost 1 week of abdominal pain  She reports that her pain started after she drink chocolate milk  At 1st, she had thought may be the milk or the syrup was spoiled  However, she continued to have diarrhea and generalized abdominal pain  Because she was not improving, she decided to come to the emergency room  Patient reports that she has diverticulitis history in the past, over 20 years ago  CT on admission revealing thickening at the splenic flexure suggestive of diverticulitis, there is additional thickening of the ascending colon  Of note, there is some mesenteric lymphadenopathy with the largest lymph node measuring 1 cm  Leukocytosis 19,000, hemoglobin 13  Patient was seen examined the bedside with patient's  present as well  Patient reports that her abdominal pain is better on pain medication  She reports that her diarrhea has subsided  Patient's last colonoscopy was with Dr Ronn Shabazz in 2019  She had a piecemeal polypectomy of an ascending colon polyp measuring 2 cm  The polyp biopsied as tubular adenoma/tubulovillous adenoma  She was recommended to have repeat colonoscopy in 1 year  Review of Systems:    CONSTITUTIONAL: Denies any fever, chills, or rigors  Good appetite, and no recent weight loss  HEENT: No earache or tinnitus  Denies hearing loss or visual disturbances  CARDIOVASCULAR: No chest pain or palpitations  RESPIRATORY: Denies any cough, hemoptysis, shortness of breath or dyspnea on exertion    GASTROINTESTINAL: As noted in the History of Present Illness  GENITOURINARY: No problems with urination  Denies any hematuria or dysuria  NEUROLOGIC: No dizziness or vertigo, denies headaches  MUSCULOSKELETAL: Denies any muscle or joint pain  SKIN: Denies skin rashes or itching  ENDOCRINE: Denies excessive thirst  Denies intolerance to heat or cold  PSYCHOSOCIAL: Denies depression or anxiety  Denies any recent memory loss         Historical Information   Past Medical History:   Diagnosis Date    Asthma     Benign hypertension 2018    [facility] )     Diabetes mellitus ([facility] )     Glaucoma     Hypertension     Kidney stone     Neuropathy     Sleep apnea     no machine    SOB (shortness of breath)     Tubular adenoma of colon 10/2019    Wheezing      Past Surgical History:   Procedure Laterality Date     SECTION      COLONOSCOPY      HYSTERECTOMY  1985    TONSILLECTOMY       Social History   Social History     Substance and Sexual Activity   Alcohol Use Never     Social History     Substance and Sexual Activity   Drug Use No     Social History     Tobacco Use   Smoking Status Never Smoker   Smokeless Tobacco Never Used     Family History   Problem Relation Age of Onset    Diabetes Mother     Hypertension Father     Prostate cancer Father     Diabetes Sister     Hypertension Sister     Breast cancer Sister 62    Diabetes Brother     Hypertension Brother     Asthma Family     No Known Problems Daughter     No Known Problems Sister     No Known Problems Daughter     No Known Problems Daughter     No Known Problems Maternal Aunt     Breast cancer Maternal Aunt 58    No Known Problems Maternal Aunt     No Known Problems Maternal Aunt     No Known Problems Paternal Aunt     No Known Problems Paternal Aunt     No Known Problems Paternal Aunt     Colon cancer Neg Hx     Ovarian cancer Neg Hx     Uterine cancer Neg Hx     Cervical cancer Neg Hx         Meds/Allergies     Current Facility-Administered Medications   Medication Dose Route Frequency    albuterol (PROVENTIL HFA,VENTOLIN HFA) inhaler 1 puff  1 puff Inhalation Q4H PRN    amLODIPine (NORVASC) tablet 10 mg  10 mg Oral Daily    aspirin chewable tablet 81 mg  81 mg Oral Daily    ceftriaxone (ROCEPHIN) 1 g/50 mL in dextrose IVPB  1,000 mg Intravenous Q24H    enoxaparin (LOVENOX) subcutaneous injection 40 mg  40 mg Subcutaneous Daily    fluticasone-vilanterol (BREO ELLIPTA) 200-25 MCG/INH inhaler 1 puff  1 puff Inhalation Daily    HYDROmorphone (DILAUDID) injection 0 5 mg  0 5 mg Intravenous Q6H PRN    HYDROmorphone HCl (DILAUDID) injection 0 2 mg  0 2 mg Intravenous Q3H PRN    insulin lispro (HumaLOG) 100 units/mL subcutaneous injection 1-5 Units  1-5 Units Subcutaneous HS    insulin lispro (HumaLOG) 100 units/mL subcutaneous injection 1-6 Units  1-6 Units Subcutaneous Q6H    lactated ringers infusion  125 mL/hr Intravenous Continuous    lisinopril (ZESTRIL) tablet 20 mg  20 mg Oral BID    LORazepam (ATIVAN) tablet 0 5 mg  0 5 mg Oral Q8H PRN    metroNIDAZOLE (FLAGYL) IVPB (premix) 500 mg 100 mL  500 mg Intravenous Q8H    ondansetron (ZOFRAN) injection 4 mg  4 mg Intravenous Q6H PRN    sertraline (ZOLOFT) tablet 50 mg  50 mg Oral Daily    zolpidem (AMBIEN) tablet 5 mg  5 mg Oral HS PRN       Allergies   Allergen Reactions    Ciprofloxacin Itching    Levaquin [Levofloxacin] Swelling    Penicillins GI Intolerance         Objective     Blood pressure 146/69, pulse 75, temperature 98 3 °F (36 8 °C), resp  rate 18, height 5' 2" (1 575 m), weight 88 1 kg (194 lb 3 6 oz), SpO2 93 %, not currently breastfeeding  Intake/Output Summary (Last 24 hours) at 4/5/2022 1506  Last data filed at 4/5/2022 1335  Gross per 24 hour   Intake 1500 ml   Output 600 ml   Net 900 ml         PHYSICAL EXAM:      General Appearance:   Alert and oriented x 3   Cooperative, and in no respiratory distress   HEENT:   Normocephalic, atraumatic, anicteric      Neck:  Supple, symmetrical, trachea midline   Lungs:   Clear to auscultation bilaterally; no rales, rhonchi or wheezing; respirations unlabored    Heart[de-identified]   S1 and S2 normal; regular rate and rhythm; no murmur, rub, or gallop  Abdomen:   Soft, non-tender, non-distended; normal bowel sounds; no masses, no organomegaly    Genitalia:   Deferred    Rectal:   Deferred    Extremities:  No cyanosis, clubbing or edema    Pulses:  2+ and symmetric all extremities    Skin:  Skin color, texture, turgor normal, no rashes or lesions    Lymph nodes:  No palpable cervical or supraclavicular lymphadenopathy        Lab Results:   Results from last 7 days   Lab Units 04/05/22  0501 04/04/22  1454 04/04/22  1454   WBC Thousand/uL 18 30*   < > 19 43*   HEMOGLOBIN g/dL 11 4*   < > 13 0   HEMATOCRIT % 35 8   < > 40 5   PLATELETS Thousands/uL 150   < > 170   NEUTROS PCT %  --   --  40*   LYMPHS PCT %  --   --  11*   MONOS PCT %  --   --  3*   EOS PCT %  --   --  45*    < > = values in this interval not displayed  Results from last 7 days   Lab Units 04/05/22  0501   POTASSIUM mmol/L 4 5   CHLORIDE mmol/L 104   CO2 mmol/L 26   BUN mg/dL 13   CREATININE mg/dL 0 86   CALCIUM mg/dL 8 8   ALK PHOS U/L 178*   ALT U/L 90*   AST U/L 36         Results from last 7 days   Lab Units 04/04/22  1454   LIPASE u/L 48*       Imaging Studies: I have personally reviewed pertinent imaging studies  CT abdomen pelvis with contrast    Result Date: 4/4/2022  Impression: There is thickening of the splenic flexure with associated pericolonic infiltration, suggestive diverticulitis  Additional thickening of the ascending colon with some infiltration at its mesenteric margin may be due to additional segment of diverticulitis or colitis    Suggest follow-up with the colonoscopy when the acute illness subsides to exclude  focal colonic lesion There is no fluid collection seen There is no free air Small mesenteric lymph nodes with largest measuring about 1 cm, short interval follow-up at 3 months suggested The study was marked in [facility] for immediate notification  Follow-up notification has been created in [employer] performed: [address] and PLAN:      1) Splenic flexure diverticulitis with additional thickening in the ascending colon; 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 - Patient was recommended to have a colonoscopy 1 year after she had 1 in 2019 revealing a 2 cm polyp in the ascending colon requiring piecemeal removal   CT scan revealing diverticulitis an additional thickening of the ascending colon which may represent either diverticulitis or colitis  Of course, significant as patient does have history of precancerous polyp in this area  - Agree with IV antibiotic use and bowel rest with eventual advancement to low-fiber diet  - Serial abdominal exams  - I told the patient that she can return to a high-fiber diet after she has recovered over the next 2-4 weeks  - Would recommend colonoscopy in 6-8 weeks to investigate, we can arrange this with Dr Shepard Manual office     The patient was seen and examined by Dr Flex Mitchell, all peck medical decisions were made with Dr Flex Mitchell  Thank you for allowing us to participate in the care of this pleasant patient  GI will sign off, please call with questions  Thank you

## 2022-04-05 NOTE — ASSESSMENT & PLAN NOTE
· Patient presented to the ED for left lower quadrant abdominal pain that started on 03/29  · Patient reported that over the 1 week prior to admission pain had been increasing and she had been having associated symptoms such as nausea, vomiting, diarrhea, poor appetite  Denies other symptoms such as fever/chills, headaches, dizziness chest pain, shortness of breath  · CT A/P: There is thickening of the splenic flexure with associated pericolonic infiltration, suggestive diverticulitis  Additional thickening of the ascending colon with some infiltration at its mesenteric margin may be due to additional segment of diverticulitis or colitis  Suggest follow-up with the colonoscopy when the acute illness subsides to exclude  focal colonic l lesion  There is no fluid collection seen  There is no free air  · WBC 19 43, afebrile on admission    Did not meet sepsis criteria, WBC-day-18 3  · Continue IV ceftriaxone and metronidazole  · Continuous IVF-- LR at 125 cc an hour  · Keep NPO for now--as per general surgery :   · No surgical intervention   · Continue antibiotics   · Await GI consult   · F/u with colonoscopy in 6-8 weeks   · Check for C difficile   · Pain control- currently on Dilaudid 0 5 mg IV q 6 hours as needed, 0 2 mg q 3 hours as needed, Zofran p r n , anti-emetics

## 2022-04-05 NOTE — ASSESSMENT & PLAN NOTE
· Elevated LFTs on admission  · AST 58, , Alkaline phosphatase 216  · Likely in the setting of acute diverticulitis infection  Denies any right upper quadrant abdominal pain at this time  Denies any drug or alcohol use    · Continue to trend with daily CMP-- ALT decreased from 126-90, AST normalized, ALP decreased from to 120643, albumin-2 9, normal total bilirubin  · If patient develops right upper quadrant pain or worsening liver enzymes, consider further workup with ultrasound and lab studies--currently denies any right upper quadrant pain, has diffuse abdominal pain secondary to diverticulitis

## 2022-04-05 NOTE — PROGRESS NOTES
[facility]  Progress Note - Tati Marroquin 1945, 78 y o  female MRN: [id number]  Unit/Bed#: -01 Encounter: [id number]  Primary Care Provider: Sol Woodall   Date and time admitted to hospital: 4/4/2022  2:10 PM    Assessment :   The patient is a 72-year-old female with a past medical history of type 2 diabetes, hypertension, mild intermittent asthma, CKD who presented on 04/04/2022 with left lower quadrant abdominal pain with started 1 week prior, worsened over the 1 week prior to admission with associated symptoms like nausea, vomiting, diarrhea and poor appetite  Patient reports that she had a prior episode of diverticulitis, colonoscopy in 2019 revealed 1 x 20 mm sessile multilobulated polyp in the ascending colon, which was removed and multiple small and large diverticula in the ascending colon sigmoid colon  Repeat colonoscopy was recommended in 1 year due to personal history of colonic polyps  CT abdomen/pelvis during this admission revealed thickening of the splenic flexure with associated pericolonic infiltration suggestive of diverticulitis, initial thickening of the ascending colon with some infiltration at its mesenteric margin, additional segment of diverticulitis or colitis  No focal fluid collection or free air was noted  Patient did not meet sepsis criteria, was started on empiric antibiotics with ceftriaxone and metronidazole kept NPO and given IV fluids and pain control  General surgery has been consulted  * Acute diverticulitis  Assessment & Plan  · Patient presented to the ED for left lower quadrant abdominal pain that started on 03/29  · Patient reported that over the 1 week prior to admission pain had been increasing and she had been having associated symptoms such as nausea, vomiting, diarrhea, poor appetite  Denies other symptoms such as fever/chills, headaches, dizziness chest pain, shortness of breath    · CT A/P: There is thickening of the splenic flexure with associated pericolonic infiltration, suggestive diverticulitis  Additional thickening of the ascending colon with some infiltration at its mesenteric margin may be due to additional segment of diverticulitis or colitis  Suggest follow-up with the colonoscopy when the acute illness subsides to exclude  focal colonic l lesion  There is no fluid collection seen  There is no free air  · WBC 19 43, afebrile on admission  Did not meet sepsis criteria, WBC-day-18 3  · Continue IV ceftriaxone and metronidazole  · Continuous IVF-- LR at 125 cc an hour  · Keep NPO for now--as per general surgery :   · No surgical intervention   · Continue antibiotics   · Await GI consult   · F/u with colonoscopy in 6-8 weeks   · Check for C difficile   · Pain control- currently on Dilaudid 0 5 mg IV q 6 hours as needed, 0 2 mg q 3 hours as needed, Zofran p r n , anti-emetics      Transaminitis  Assessment & Plan  · Elevated LFTs on admission  · AST 58, , Alkaline phosphatase 216  · Likely in the setting of acute diverticulitis infection  Denies any right upper quadrant abdominal pain at this time  Denies any drug or alcohol use    · Continue to trend with daily CMP-- ALT decreased from 126-90, AST normalized, ALP decreased from to 371742, albumin-2 9, normal total bilirubin  · If patient develops right upper quadrant pain or worsening liver enzymes, consider further workup with ultrasound and lab studies--currently denies any right upper quadrant pain, has diffuse abdominal pain secondary to diverticulitis    Primary hypertension  Assessment & Plan  · /72 on admission-- in a m  Mildly 149/69, likely due to pain  · Continue pre-hospital amlodipine and lisinopril  · Monitor vitals per routine    Stage 3a chronic kidney disease [facility])  Assessment & Plan  Lab Results   Component Value Date    EGFR 66 04/05/2022    EGFR 56 04/04/2022    EGFR 65 02/28/2022    CREATININE 0 86 04/05/2022    CREATININE 0 98 04/04/2022    CREATININE 0 87 02/28/2022     · Creatinine stable at baseline  · Avoid hypotension and nephrotoxic agents  · Monitor BMP daily    Controlled type 2 diabetes with neuropathy [facility])  Assessment & Plan  Lab Results   Component Value Date    HGBA1C 6 4 12/22/2021       Recent Labs     04/04/22  1253 04/04/22  2058 04/05/22  0738 04/05/22  0811   POCGLU 116 79 64* 134       Blood Sugar Average: Last 72 hrs:  (P) 98 25     · Holding home oral medications-- currently on insulin sliding scale, q 6 hours and HS  · POC glucose-64 in a m , patient noted sweating but did not have any other symptoms like palpitations, tremors, repeat blood sugar after dextrose administration-134  · Start on SSI with accu checks  · Hypoglycemia protocol    Mild intermittent asthma  Assessment & Plan  · No acute exacerbation  · Continue pre-hospital inhalers    VTE Pharmacologic Prophylaxis:   Pharmacologic: Enoxaparin (Lovenox)  Mechanical VTE Prophylaxis in Place: Yes    Patient Centered Rounds: I have performed bedside rounds with nursing staff today  Discussions with Specialists or Other Care Team Provider:  General surgery    Education and Discussions with Family / Patient: Plan of care discussed with the patient at bedside     Time Spent for Care: 30 minutes  More than 50% of total time spent on counseling and coordination of care as described above  Current Length of Stay: 1 day(s)    Current Patient Status: Inpatient   Certification Statement: The patient will continue to require additional inpatient hospital stay due to Acute diverticulitis    Discharge Plan:  Pending clinical improvement  Code Status: Level 1 - Full Code      Subjective:   Patient was seen examined at the bedside  She currently notes Skeet Dibbles 7/10 pain, reported that she was in pain all night  Had episode of nausea overnight, no episodes of vomiting    Did not have a bowel movement since admission, however had multiple loose bowel movements prior to admission  Patient is curious as to what set her diverticulitis of  She does not see a GI as an outpatient  No chest pain or shortness of breath noted  24 hour events-afebrile/heart rate-70-80/blood pressure mildly elevated in a m -140s/60s likely attributable to pain, transient desaturation to 89%, otherwise maintaining saturation on room air  Labs-white count improved from 19 4-18 3, ALT decreased from 126-90, ALP decreased from 216-178, AST normalized  Albumin-2 9, normal bilirubin  Lipase at the time of admission-48  Hemoglobin decreased from 13-11 4, patient's baseline is around 11, 13 was likely due to hemoconcentration  POC glucose-64 in a m , treated per hypoglycemia protocol-repeat blood sugar-134  Urinalysis at the time of admission-trace blood, positive nitrite, negative leukocytes, 10-20 RBC, 4-10 WBC  Patient denies any urinary symptoms like dysuria  Objective:     Vitals:   Temp (24hrs), Av 9 °F (37 2 °C), Min:98 5 °F (36 9 °C), Max:99 2 °F (37 3 °C)    Temp:  [98 5 °F (36 9 °C)-99 2 °F (37 3 °C)] 98 5 °F (36 9 °C)  HR:  [66-82] 80  Resp:  [16-18] 16  BP: (127-163)/(65-79) 149/69  SpO2:  [89 %-96 %] 89 %  Body mass index is 35 52 kg/m²  Input and Output Summary (last 24 hours): Intake/Output Summary (Last 24 hours) at 2022 1329  Last data filed at 2022 0830  Gross per 24 hour   Intake 1500 ml   Output 300 ml   Net 1200 ml       Physical Exam:     Physical Exam  Vitals and nursing note reviewed  Constitutional:       General: She is not in acute distress  Appearance: She is well-developed  Comments: Mild distress due to pain    HENT:      Head: Normocephalic and atraumatic  Eyes:      General:         Right eye: No discharge  Left eye: No discharge  Conjunctiva/sclera: Conjunctivae normal    Cardiovascular:      Rate and Rhythm: Normal rate and regular rhythm  Pulses: Normal pulses  Heart sounds: Normal heart sounds   No murmur heard       Pulmonary:      Effort: Pulmonary effort is normal  No respiratory distress  Comments: Prolonged expiratory phase, diminished BS at bases   Abdominal:      General: Bowel sounds are normal  There is distension  Palpations: Abdomen is soft  Tenderness: There is abdominal tenderness (diffuse, worse in b/l LQ, normal BS )  Musculoskeletal:      Cervical back: Neck supple  Right lower leg: No edema  Left lower leg: No edema  Skin:     General: Skin is warm and dry  Capillary Refill: Capillary refill takes less than 2 seconds  Neurological:      Mental Status: She is alert and oriented to person, place, and time  Additional Data:     Labs:    Results from last 7 days   Lab Units 04/05/22  0501 04/04/22  1454 04/04/22  1454   WBC Thousand/uL 18 30*   < > 19 43*   HEMOGLOBIN g/dL 11 4*   < > 13 0   HEMATOCRIT % 35 8   < > 40 5   PLATELETS Thousands/uL 150   < > 170   NEUTROS PCT %  --   --  40*   LYMPHS PCT %  --   --  11*   MONOS PCT %  --   --  3*   EOS PCT %  --   --  45*    < > = values in this interval not displayed  Results from last 7 days   Lab Units 04/05/22  0501   SODIUM mmol/L 139   POTASSIUM mmol/L 4 5   CHLORIDE mmol/L 104   CO2 mmol/L 26   BUN mg/dL 13   CREATININE mg/dL 0 86   ANION GAP mmol/L 9   CALCIUM mg/dL 8 8   ALBUMIN g/dL 2 9*   TOTAL BILIRUBIN mg/dL 0 28   ALK PHOS U/L 178*   ALT U/L 90*   AST U/L 36   GLUCOSE RANDOM mg/dL 75         Results from last 7 days   Lab Units 04/05/22  1206 04/05/22  0811 04/05/22  0738 04/04/22  2058 04/04/22  1253   POC GLUCOSE mg/dl 94 134 64* 79 116         Results from last 7 days   Lab Units 04/04/22  1454   LACTIC ACID mmol/L 0 9           * I Have Reviewed All Lab Data Listed Above  * Additional Pertinent Lab Tests Reviewed:  [facility]      Recent Cultures (last 7 days):           Last 24 Hours Medication List:   Current Facility-Administered Medications Medication Dose Route Frequency Provider Last Rate    albuterol  1 puff Inhalation Q4H PRN Kendy Dang PA-C      amLODIPine  10 mg Oral Daily Kendy Dang PA-C      aspirin  81 mg Oral Daily Maxine Drake PA-C      cefTRIAXone  1,000 mg Intravenous Q24H Maxine Drake PA-C      enoxaparin  40 mg Subcutaneous Daily Maxine Drake PA-C      fluticasone-vilanterol  1 puff Inhalation Daily Maixne Drake PA-C      HYDROmorphone  0 5 mg Intravenous Q6H PRN Johnterine GAYLE Dang      HYDROmorphone  0 2 mg Intravenous Q3H PRN Kendy Dang PA-C      insulin lispro  1-5 Units Subcutaneous HS Maxine Drake PA-C      insulin lispro  1-6 Units Subcutaneous Q6H Paulino Mccoy MD      lactated ringers  125 mL/hr Intravenous Continuous Maxine Drake PA-C 125 mL/hr (04/05/22 0805)    lisinopril  20 mg Oral BID Kendy Dang PA-C      LORazepam  0 5 mg Oral Q8H PRN Kendy Dang PA-C      metroNIDAZOLE  500 mg Intravenous Q8H Maxine Drake PA-C 500 mg (04/05/22 0947)    ondansetron  4 mg Intravenous Q6H PRN Maxine Drake PA-C      sertraline  50 mg Oral Daily Maxine Drake PA-C      zolpidem  5 mg Oral HS PRN Kendy Dang PA-C          Today, Patient Was Seen By: Tammy Demarco MD    ** Please Note: Dictation voice to text software may have been used in the creation of this document   **

## 2022-04-05 NOTE — ASSESSMENT & PLAN NOTE
· Elevated LFTs on admission  · AST 58, , Alkaline phosphatase 216  · Likely in the setting of acute diverticulitis infection  Denies any right upper quadrant abdominal pain at this time  Denies any drug or alcohol use  · Continue to trend with daily CMP  · If patient develops right upper quadrant pain or worsening liver enzymes, consider further workup with ultrasound and lab studies

## 2022-04-05 NOTE — ASSESSMENT & PLAN NOTE
Lab Results   Component Value Date    EGFR 56 04/04/2022    EGFR 65 02/28/2022    EGFR 46 02/27/2022    CREATININE 0 98 04/04/2022    CREATININE 0 87 02/28/2022    CREATININE 1 16 02/27/2022     · Creatinine stable at baseline  · Avoid hypotension and nephrotoxic agents  · Monitor BMP daily

## 2022-04-05 NOTE — PLAN OF CARE
Problem: PHYSICAL THERAPY ADULT  Goal: Performs mobility at highest level of function for planned discharge setting  See evaluation for individualized goals  Description: Treatment/Interventions: Functional transfer training,LE strengthening/ROM,Therapeutic exercise,Endurance training,Patient/family training,Equipment eval/education,Bed mobility,Gait training,Spoke to nursing  Equipment Recommended: Ramon Landrum (SOTERO)       See flowsheet documentation for full assessment, interventions and recommendations  4/5/2022 1617 by Keyur Lipscomb PT  Note: Prognosis: Good  Problem List: Decreased strength,Decreased endurance,Impaired balance,Decreased mobility,Pain,Impaired sensation  Assessment: Pt is 78 y o  female seen for high-complexity PT evaluation on 4/5/2022 s/p [facility] on 4/[facility]/2022 w/ Acute diverticulitis  PT was consulted to assess pt's functional mobility and d/c needs  Order placed for PT eval and tx, w/ up w/ A order  PTA, pt resides with spouse and daughter in multi level home, 1+1+1 JUAN, ambulates with SPC recently - typically no AD, (+) fall history  At time of eval, pt performing bed mobility with min Ax1, transfers and level surface gait trial at [facility] with use of RW  Upon evaluation, pt presenting with impaired functional mobility d/t decreased strength, decreased endurance, impaired balance, decreased mobility, impaired sensation, obesity c BMI of 35 52 kg/m2 and activity intolerance  Pertinent PMHx and current co-morbidities affecting pt's physical performance at time of assessment include: acute diverticulitis, mild intermittent asthma, type 2 DM, HTN, transaminitis, HTN, depression with anxiety, chronic bronchitis, anxiety, bradycardia, nausea  Personal factors affecting pt at time of eval include: lives in multi story house, ambulating w/ assistive device, inability to navigate community distances and positive fall history   The following objective measures performed on IE also reveal limitations: Barthel Index: 55/100, Modified [place]: 3 (moderate disability) and AM-PAC 6-Clicks: 21/80  Pt's clinical presentation is currently unstable/unpredictable seen in pt's presentation of abnormal lab value(s), need for input for task focus and mobility technique and ongoing medical assessment  Overall, pt's rehab potential and prognosis to return to PLOF is good as impacted by objective findings, warranting pt to receive further skilled PT interventions to address identified impairments, activity limitation(s), and participation restriction(s)  Pt to benefit from continued PT tx to address deficits as defined above and maximize level of functional independent mobility and consistency  From PT/mobility standpoint, recommendation at time of d/c would be home with home health rehabilitation pending progress in order to facilitate return to PLOF  Barriers to Discharge: Inaccessible home environment,Decreased caregiver support        PT Discharge Recommendation: Home with home health rehabilitation          See flowsheet documentation for full assessment  4/5/2022 1617 by Tamiko Robbins PT  Note: Prognosis: Good  Problem List: Decreased strength,Decreased endurance,Impaired balance,Decreased mobility,Pain,Impaired sensation  Assessment: Pt is 78 y o  female seen for high-complexity PT evaluation on 4/5/2022 s/p admit to [facility] on 4/4/2022 w/ Acute diverticulitis  PT was consulted to assess pt's functional mobility and d/c needs  Order placed for PT eval and tx, w/ up w/ A order  PTA, pt resides with spouse and daughter in multi level home, 1+1+1 JUAN, ambulates with SPC recently - typically no AD, (+) fall history  At time of eval, pt performing bed mobility with min Ax1, transfers and level surface gait trial at [facility] with use of RW   Upon evaluation, pt presenting with impaired functional mobility d/t decreased strength, decreased endurance, impaired balance, decreased mobility, impaired sensation, obesity c BMI of 35 52 kg/m2 and activity intolerance  Pertinent PMHx and current co-morbidities affecting pt's physical performance at time of assessment include: acute diverticulitis, mild intermittent asthma, type 2 DM, HTN, transaminitis, HTN, depression with anxiety, chronic bronchitis, anxiety, bradycardia, nausea  Personal factors affecting pt at time of eval include: lives in multi story house, ambulating w/ assistive device, inability to navigate community distances and positive fall history  The following objective measures performed on IE also reveal limitations: Barthel Index: 55/100, Modified [place]: 3 (moderate disability) and AM-PAC 6-Clicks: 45/25  Pt's clinical presentation is currently unstable/unpredictable seen in pt's presentation of abnormal lab value(s), need for input for task focus and mobility technique and ongoing medical assessment  Overall, pt's rehab potential and prognosis to return to PLOF is good as impacted by objective findings, warranting pt to receive further skilled PT interventions to address identified impairments, activity limitation(s), and participation restriction(s)  Pt to benefit from continued PT tx to address deficits as defined above and maximize level of functional independent mobility and consistency  From PT/mobility standpoint, recommendation at time of d/c would be home with home health rehabilitation pending progress in order to facilitate return to PLOF  Barriers to Discharge: Inaccessible home environment,Decreased caregiver support        PT Discharge Recommendation: Home with home health rehabilitation          See flowsheet documentation for full assessment

## 2022-04-05 NOTE — CASE MANAGEMENT
Case Management Assessment & Discharge Planning Note    Patient name Tati Marroquin  Location /-01 MRN [id number]  : 1945 Date 2022       Current Admission Date: 2022  Current Admission Diagnosis:Acute diverticulitis   Patient Active Problem List    Diagnosis Date Noted    Acute diverticulitis 2022    Transaminitis 2022    Anxiety 2022    Hypertensive urgency 2022    Nausea 2022    Bradycardia 2022    Abnormal EKG 2022    Primary hypertension 2022    Stage 3a chronic kidney disease ([facility] ) 2022    Psychophysiological insomnia 2021    Exertional dyspnea     Medicare annual wellness visit, subsequent 2021    Right leg pain 2021    Acute cystitis with hematuria 2021    Chronic bronchitis ([facility] ) 04/15/2021    Obesity, morbid ([facility] ) 04/15/2021    BMI 37 0-37 9, adult 2020    Atypical chest pain 2020    Dermatitis 2020    Vaginal cyst 2019    Candidiasis of genitalia in female 2019    Encounter for gynecological examination without abnormal finding 2019    Controlled type 2 diabetes with neuropathy ([facility] ) 2019    Benign hypertension 2018    Depression with anxiety 2018    Mild intermittent asthma 2018      LOS (days): 1  Geometric Mean LOS (GMLOS) (days): 2 60  Days to GMLOS:2     OBJECTIVE:    Risk of Unplanned Readmission Score: 11         Current admission status: Inpatient       Preferred Pharmacy:   [address] [place] [address] [phone] R [phone]  [address] [facility]  Phone: [phone] Fax: [phone]- [place], [address] [place], [place] [address]chandan Moreira  [place] [address]  Phone: [phone] Fax: [phone]    Primary Care Provider: Jaci Kingston    Primary Insurance: MEDICARE  Secondary Insurance: [employer]    ASSESSMENT:  Flip Mejia [employer]     [place], [address] [employer] Representative - Daughter   Primary Phone: [phone] (Mobile)               Advance Directives  Does patient have a [address]?: Yes (Pt reports there is POA paperwork identifying daughters Pierson and Cortez as [facility])  Does patient have Advance Directives?: Yes  Advance Directives: Power of  for health care  Primary Contact: Yovana Wayne ([phone])    Readmission Root Cause  30 Day Readmission: No    Patient Information  Admitted from[de-identified] Home  Mental Status: Alert  During Assessment patient was accompanied by: Not accompanied during assessment  Assessment information provided by[de-identified] Patient  Primary Caregiver: Self  Support Systems: Spouse/significant [facility] of Residence: [place] [address] do you live in?: [employer] [place] [employer] entry access options   Select all that apply : Stairs  Number of steps to enter home : 3  Do the steps have railings?: No  Type of Current Residence: [address] [place] home  Upon entering residence, is there a bedroom on the main floor (no further steps)?: Yes  Upon entering residence, is there a bathroom on the main floor (no further steps)?: Yes  In the last 12 months, was there a time when you were not able to pay the mortgage or rent on time?: No  In the last 12 months, how many places have you lived?: 1  In the last 12 months, was there a time when you did not have a steady place to sleep or slept in a shelter (including now)?: No  Homeless/housing insecurity resource given?: N/A  Living Arrangements: Lives w/ Spouse/significant other,Lives w/ Daughter  Is patient a ?: No    Activities of Daily Living Prior to Admission  Functional Status: Independent  Completes ADLs independently?: Yes  Ambulates independently?: Yes  Does patient use assisted devices?: Yes  Assisted Devices (DME) used: Straight Cane  Does patient currently own DME?: Yes  What DME does the patient currently own?: Straight Cane  Does patient have a history of Outpatient Therapy (PT/OT)?: No  Does the patient have a history of Short-Term Rehab?: No  Does patient have a history of HHC?: No  Does patient currently have Kellen Morales?: No    Patient Information Continued  Income Source: Pension/correction  Does patient have prescription coverage?: Yes  Within the past 12 months, you worried that your food would run out before you got the money to buy more : Never true  Within the past 12 months, the food you bought just didnt last and you didnt have money to get more : Never true  Food insecurity resource given?: N/A  Does patient receive dialysis treatments?: No  Does patient have a history of substance abuse?: No  Does patient have a history of Mental Health Diagnosis?: Yes (Depression, anxiety)  Is patient receiving treatment for mental health?: Yes (Psychiatrist for med management)  Has patient received inpatient treatment related to mental health in the last 2 years?: No    Means of Transportation  Means of Transport to Appts[de-identified] Family transport  In the past 12 months, has lack of transportation kept you from medical appointments or from getting medications?: No  In the past 12 months, has lack of transportation kept you from meetings, work, or from getting things needed for daily living?: No  Was application for public transport provided?: N/A    DISCHARGE DETAILS:    Discharge planning discussed with[de-identified] Patient  Freedom of Choice: Yes  Comments - Freedom of Choice: Discussed freedom of choice as it relates to discharge planning based on treatment team recommendations    CM contacted family/caregiver?: No- see comments (Pt declined, reports she will update family herself)  Were Treatment Team discharge recommendations reviewed with patient/caregiver?: Yes  Did patient/caregiver verbalize understanding of patient care needs?: Yes  Were patient/caregiver advised of the risks associated with not following Treatment Team discharge recommendations?: Yes    Contacts  Patient Contacts: Patient  Contact Method: In Person  Reason/Outcome: Continuity of [address]         Is the patient interested in [facility] at discharge?: No    DME Referral Provided  Referral made for DME?: No    Other Referral/Resources/Interventions Provided:  Interventions: None Indicated  Referral Comments: Pt reports being open to VNA or STR if recommended, awaiting PT/OT evals  Treatment Team Recommendation: Home  Discharge Destination Plan[de-identified] Home  Transport at Discharge : Family     Additional Comments: Pt vaccinated for Covid but no booster  Pt open to VNA vs STR if recommended, awaiting PT/OT evals

## 2022-04-05 NOTE — PHYSICAL THERAPY NOTE
Physical Therapy Evaluation   Time in: 483  Time out: 845  Total evaluation time: 17 minutes    Patient's Name: Maria Isabel He    Admitting Diagnosis  Abdominal pain [R10 9]  Transaminitis [R74 01]  Acute diverticulitis [K57 92]    Problem List  Patient Active Problem List   Diagnosis    Benign hypertension    Depression with anxiety    Mild intermittent asthma    Controlled type 2 diabetes with neuropathy ([facility] )    Vaginal cyst    Candidiasis of genitalia in female    Encounter for gynecological examination without abnormal finding    Atypical chest pain    Dermatitis    BMI 37 0-37 9, adult    Chronic bronchitis ([facility] )    Obesity, morbid ([facility] )    Medicare annual wellness visit, subsequent    Right leg pain    Acute cystitis with hematuria    Exertional dyspnea    Psychophysiological insomnia    Stage 3a chronic kidney disease ([facility] )    Abnormal EKG    Primary hypertension    Hypertensive urgency    Nausea    Bradycardia    Anxiety    Acute diverticulitis    Transaminitis       Past Medical History  Past Medical History:   Diagnosis Date    Asthma     Benign hypertension 2018    Cardiac arrest ([facility] )     Diabetes mellitus ([facility] )     Glaucoma     Hypertension     Kidney stone     Neuropathy     Sleep apnea     no machine    SOB (shortness of breath)     Tubular adenoma of colon 10/2019    Wheezing        Past Surgical History  Past Surgical History:   Procedure Laterality Date     SECTION      COLONOSCOPY      HYSTERECTOMY  1985    TONSILLECTOMY         PT performed at least 2 patient identifiers during session: Name and wristband  22 0828   PT Last Visit   PT Visit Date 22   Note Type   Note type Evaluation   Pain Assessment   Pain Assessment Tool 0-10   Pain Score 7   Pain Location/Orientation Location: Abdomen   Restrictions/Precautions   Weight Bearing Precautions Per Order No   Other Precautions Chair Alarm; Bed Alarm; Fall Risk;Pain   Home Living   Type of [address] [place] [address] Multi-level; Laundry in basement;Able to live on main level with bedroom/bathroom; Performs ADLs on one level;Stairs to enter with rails  (1 JUAN + 1 + 1 steps up to kitchen level)   Bathroom Shower/Tub Tub/shower unit   Bathroom Toilet Standard   Bathroom Equipment Shower chair   [address]  (using SPC recently)   Prior Function   Level of [place] Independent with ADLs and functional mobility   Lives With Spouse;Daughter   Receives Help From Family   ADL Assistance Independent   IADLs Needs assistance   Falls in the last 6 months 1 to 4  ("1 slide" OOB)   Vocational Retired  ()   Comments pt enjoys to fish, socialize with friends/game night, dinners   General   Family/Caregiver Present No   Cognition   Overall Cognitive Status WFL   Arousal/Participation Alert   Orientation Level Oriented X4   Memory Within functional limits   Following Commands Follows all commands and directions without difficulty   Comments pt agreeable to PT eval   Subjective   Subjective "I feel weak"   RLE Assessment   RLE Assessment   (grossly 3+/5)   LLE Assessment   LLE Assessment   (grossly 3+/5)   Coordination   Movements are Fluid and Coordinated 1   Sensation X  (neuropathy B/L feet)   Bed Mobility   Supine to Sit 4  Minimal assistance   Additional items Assist x 1;HOB elevated; Increased time required   Transfers   Sit to Stand 4  Minimal assistance  (CGA)   Additional items Assist x 1; Increased time required;Verbal cues;Armrests   Stand to Sit 4  Minimal assistance  (CGA)   Additional items Assist x 1; Increased time required;Verbal cues;Armrests   Ambulation/Elevation   Gait pattern Decreased foot clearance; Short stride; Step to   Gait Assistance 4  Minimal assist  (CGA)   Additional items Assist x 1;Verbal cues   Assistive Device Rolling walker   Distance 15'   Balance   Static Sitting Good   Dynamic Sitting Fair +   Static Standing Fair Dynamic Standing Fair -   Ambulatory Fair -   Endurance Deficit   Endurance Deficit Yes   Activity Tolerance   Activity Tolerance Patient limited by fatigue   Medical Staff Made Aware PT Truman Sacks   Nurse Made Aware OLIVIA Clark   Assessment   Prognosis Good   Problem List Decreased strength;Decreased endurance; Impaired balance;Decreased mobility;Pain; Impaired sensation   Assessment Pt is 78 y o  female seen for high-complexity PT evaluation on 4/5/2022 s/p [facility] 4/4/2022 w/ Acute diverticulitis  PT was consulted to assess pt's functional mobility and d/c needs  Order placed for PT eval and tx, w/ up w/ A order  PTA, pt resides with spouse and daughter in multi level home, 1+1+1 JUAN, ambulates with SPC recently - typically no AD, (+) fall history  At time of eval, pt performing bed mobility with min Ax1, transfers and level surface gait trial at [facility] with use of RW  Upon evaluation, pt presenting with impaired functional mobility d/t decreased strength, decreased endurance, impaired balance, decreased mobility, impaired sensation, obesity c BMI of 35 52 kg/m2 and activity intolerance  Pertinent PMHx and current co-morbidities affecting pt's physical performance at time of assessment include: acute diverticulitis, mild intermittent asthma, type 2 DM, HTN, transaminitis, HTN, depression with anxiety, chronic bronchitis, anxiety, bradycardia, nausea  Personal factors affecting pt at time of eval include: lives in multi story house, ambulating w/ assistive device, inability to navigate community distances and positive fall history  The following objective measures performed on IE also reveal limitations: Barthel Index: 55/100, Modified [place]: 3 (moderate disability) and AM-PAC 6-Clicks: 34/56  Pt's clinical presentation is currently unstable/unpredictable seen in pt's presentation of abnormal lab value(s), need for input for task focus and mobility technique and ongoing medical assessment   Overall, pt's rehab potential and prognosis to return to PLOF is good as impacted by objective findings, warranting pt to receive further skilled PT interventions to address identified impairments, activity limitation(s), and participation restriction(s)  Pt to benefit from continued PT tx to address deficits as defined above and maximize level of functional independent mobility and consistency  From PT/mobility standpoint, recommendation at time of d/c would be home with home health rehabilitation pending progress in order to facilitate return to PLOF  Barriers to Discharge Inaccessible home environment;Decreased caregiver support   Goals   Patient Goals to go home   STG Expiration Date 04/15/22   Short Term Goal #1 In 7-10 days: Increase bilateral LE strength 1/2 grade to facilitate independent mobility, Perform all bed mobility tasks modified independent to decrease caregiver burden, Perform all transfers modified independent to improve independence, Ambulate > 150 ft  with least restrictive assistive device modified independent w/o LOB and w/ normalized gait pattern 100% of the time, Navigate 3 stairs modified independent with unilateral handrail to facilitate return to previous living environment, Increase all balance 1/2 grade to decrease risk for falls, Improve Barthel Index score to 70 or greater to facilitate independence and PT provider will perform functional balance assessment to determine fall risk   PT Treatment Day 0   Plan   Treatment/Interventions Functional transfer training;LE strengthening/ROM; Therapeutic exercise; Endurance training;Patient/family training;Equipment eval/education; Bed mobility;Gait training;Spoke to nursing   PT Frequency 3-5x/wk   Recommendation   PT Discharge Recommendation Home with home health rehabilitation   Equipment Recommended Walker  (RW)   Liana 8 in Bed Without Bedrails 3   Lying on Back to Sitting on Edge of Flat Bed 3   Moving Bed to Chair 3 Standing Up From Chair 3   Walk in Room 3   Climb 3-5 Stairs 3   Basic Mobility Inpatient Raw Score 18   Basic Mobility Standardized Score 41 05   Highest Level Of Mobility   -HL Goal 6: Walk 10 steps or more   JH-HLM Highest Level of Mobility 7: Walk 25 feet or more   JH-HLM Goal Achieved Yes   Modified [place] Scale   Modified Amparo Scale 3   Barthel Index   Feeding 10   Bathing 0   Grooming Score 5   Dressing Score 5   Bladder Score 10   Bowels Score 10   Toilet Use Score 5   Transfers (Bed/Chair) Score 10   Mobility (Level Surface) Score 0   Stairs Score 0   Barthel Index Score 55   Additional Treatment Session   Start Time 0845   End Time 0855   Treatment Assessment Pt seen for PT treatment session this date, consisting of gt training on level surfaces to improve pt safety in household environment  Pt able to navigate 61' distance with RW management, for increased level surface gait trial  Current goals and POC remain appropriate, pt continues to have rehab potential and is making progress towards STGs  Pt prognosis for achieving goals is good, pending pt progress with hospitalization/medical status improvements, and indicated by Stimulability, ability to follow directions, motivation, recent history of independence with functional skills/High PLOF and supportive family/caregivers  PT recommends home with home health rehabilitation upon discharge  Pt continues to be functioning below baseline level, and remains limited 2* factors listed above  PT will continue to see pt during current hospitalization in order to address the deficits listed above and provide interventions consistent w/ POC in effort to achieve STGs         Jh Smith, PT, DPT

## 2022-04-05 NOTE — ASSESSMENT & PLAN NOTE
Lab Results   Component Value Date    HGBA1C 6 4 12/22/2021       Recent Labs     04/04/22  1253 04/04/22 2058 04/05/22  0738 04/05/22  0811   POCGLU 116 79 64* 134       Blood Sugar Average: Last 72 hrs:  (P) 98 25     · Holding home oral medications-- currently on insulin sliding scale, q 6 hours and HS  · POC glucose-64 in a m , patient noted sweating but did not have any other symptoms like palpitations, tremors, repeat blood sugar after dextrose administration-134  · Start on SSI with accu checks  · Hypoglycemia protocol

## 2022-04-05 NOTE — TELEPHONE ENCOUNTER
----- Message from Luciano Reddy PA-C sent at 4/5/2022  3:13 PM EDT -----  Anibal Lopez! Please arrange a colonoscopy in 6 weeks with Doc for a patient of his who is admitted for diverticulitis, and an additional thickening in the ascending colon  She has history of large polyp there in 2019, and was recommended another colonoscopy in 1 year   Thanks!!

## 2022-04-06 LAB
ANION GAP SERPL CALCULATED.3IONS-SCNC: 9 MMOL/L (ref 4–13)
BASOPHILS # BLD AUTO: 0.07 THOUSANDS/ΜL (ref 0–0.1)
BASOPHILS NFR BLD AUTO: 0 % (ref 0–1)
BUN SERPL-MCNC: 9 MG/DL (ref 5–25)
CALCIUM SERPL-MCNC: 9.3 MG/DL (ref 8.3–10.1)
CHLORIDE SERPL-SCNC: 101 MMOL/L (ref 100–108)
CO2 SERPL-SCNC: 27 MMOL/L (ref 21–32)
CREAT SERPL-MCNC: 0.78 MG/DL (ref 0.6–1.3)
EOSINOPHIL # BLD AUTO: 11.81 THOUSAND/ΜL (ref 0–0.61)
EOSINOPHIL NFR BLD AUTO: 58 % (ref 0–6)
ERYTHROCYTE [DISTWIDTH] IN BLOOD BY AUTOMATED COUNT: 14.3 % (ref 11.6–15.1)
GFR SERPL CREATININE-BSD FRML MDRD: 74 ML/MIN/1.73SQ M
GLUCOSE SERPL-MCNC: 82 MG/DL (ref 65–140)
GLUCOSE SERPL-MCNC: 90 MG/DL (ref 65–140)
GLUCOSE SERPL-MCNC: 94 MG/DL (ref 65–140)
GLUCOSE SERPL-MCNC: 97 MG/DL (ref 65–140)
HCT VFR BLD AUTO: 38.4 % (ref 34.8–46.1)
HGB BLD-MCNC: 12.4 G/DL (ref 11.5–15.4)
IMM GRANULOCYTES # BLD AUTO: 0.22 THOUSAND/UL (ref 0–0.2)
IMM GRANULOCYTES NFR BLD AUTO: 1 % (ref 0–2)
LYMPHOCYTES # BLD AUTO: 2.13 THOUSANDS/ΜL (ref 0.6–4.47)
LYMPHOCYTES NFR BLD AUTO: 11 % (ref 14–44)
MCH RBC QN AUTO: 29.2 PG (ref 26.8–34.3)
MCHC RBC AUTO-ENTMCNC: 32.3 G/DL (ref 31.4–37.4)
MCV RBC AUTO: 91 FL (ref 82–98)
MONOCYTES # BLD AUTO: 0.45 THOUSAND/ΜL (ref 0.17–1.22)
MONOCYTES NFR BLD AUTO: 2 % (ref 4–12)
NEUTROPHILS # BLD AUTO: 5.62 THOUSANDS/ΜL (ref 1.85–7.62)
NEUTS SEG NFR BLD AUTO: 28 % (ref 43–75)
NRBC BLD AUTO-RTO: 0 /100 WBCS
PLATELET # BLD AUTO: 196 THOUSANDS/UL (ref 149–390)
PMV BLD AUTO: 9.5 FL (ref 8.9–12.7)
POTASSIUM SERPL-SCNC: 3.9 MMOL/L (ref 3.5–5.3)
RBC # BLD AUTO: 4.24 MILLION/UL (ref 3.81–5.12)
SODIUM SERPL-SCNC: 137 MMOL/L (ref 136–145)
WBC # BLD AUTO: 20.3 THOUSAND/UL (ref 4.31–10.16)

## 2022-04-06 PROCEDURE — 87493 C DIFF AMPLIFIED PROBE: CPT | Performed by: PHYSICIAN ASSISTANT

## 2022-04-06 PROCEDURE — 85025 COMPLETE CBC W/AUTO DIFF WBC: CPT | Performed by: INTERNAL MEDICINE

## 2022-04-06 PROCEDURE — 82948 REAGENT STRIP/BLOOD GLUCOSE: CPT

## 2022-04-06 PROCEDURE — 99233 SBSQ HOSP IP/OBS HIGH 50: CPT | Performed by: INTERNAL MEDICINE

## 2022-04-06 PROCEDURE — 80048 BASIC METABOLIC PNL TOTAL CA: CPT | Performed by: INTERNAL MEDICINE

## 2022-04-06 PROCEDURE — 99232 SBSQ HOSP IP/OBS MODERATE 35: CPT | Performed by: STUDENT IN AN ORGANIZED HEALTH CARE EDUCATION/TRAINING PROGRAM

## 2022-04-06 RX ORDER — LORAZEPAM 0.5 MG/1
0.5 TABLET ORAL EVERY 8 HOURS PRN
Status: DISCONTINUED | OUTPATIENT
Start: 2022-04-06 | End: 2022-04-19 | Stop reason: HOSPADM

## 2022-04-06 RX ORDER — DEXTROSE, SODIUM CHLORIDE, AND POTASSIUM CHLORIDE 5; .45; .15 G/100ML; G/100ML; G/100ML
100 INJECTION INTRAVENOUS CONTINUOUS
Status: DISCONTINUED | OUTPATIENT
Start: 2022-04-06 | End: 2022-04-11

## 2022-04-06 RX ORDER — HYDROMORPHONE HCL/PF 1 MG/ML
0.5 SYRINGE (ML) INJECTION EVERY 6 HOURS PRN
Status: DISCONTINUED | OUTPATIENT
Start: 2022-04-07 | End: 2022-04-07

## 2022-04-06 RX ORDER — OXYCODONE HYDROCHLORIDE 5 MG/1
5 TABLET ORAL ONCE
Status: COMPLETED | OUTPATIENT
Start: 2022-04-06 | End: 2022-04-06

## 2022-04-06 RX ADMIN — OXYCODONE HYDROCHLORIDE 5 MG: 5 TABLET ORAL at 22:16

## 2022-04-06 RX ADMIN — METRONIDAZOLE 500 MG: 500 INJECTION, SOLUTION INTRAVENOUS at 17:21

## 2022-04-06 RX ADMIN — METRONIDAZOLE 500 MG: 500 INJECTION, SOLUTION INTRAVENOUS at 10:19

## 2022-04-06 RX ADMIN — FLUTICASONE FUROATE AND VILANTEROL TRIFENATATE 1 PUFF: 200; 25 POWDER RESPIRATORY (INHALATION) at 10:27

## 2022-04-06 RX ADMIN — DEXTROSE, SODIUM CHLORIDE, AND POTASSIUM CHLORIDE 100 ML/HR: 5; .45; .15 INJECTION INTRAVENOUS at 22:54

## 2022-04-06 RX ADMIN — ASPIRIN 81 MG: 81 TABLET, CHEWABLE ORAL at 10:19

## 2022-04-06 RX ADMIN — LISINOPRIL 20 MG: 20 TABLET ORAL at 10:19

## 2022-04-06 RX ADMIN — AMLODIPINE BESYLATE 10 MG: 10 TABLET ORAL at 10:27

## 2022-04-06 RX ADMIN — CEFTRIAXONE SODIUM 1000 MG: 10 INJECTION, POWDER, FOR SOLUTION INTRAVENOUS at 22:54

## 2022-04-06 RX ADMIN — HYDROMORPHONE HYDROCHLORIDE 0.5 MG: 1 INJECTION, SOLUTION INTRAMUSCULAR; INTRAVENOUS; SUBCUTANEOUS at 06:38

## 2022-04-06 RX ADMIN — LISINOPRIL 20 MG: 20 TABLET ORAL at 22:16

## 2022-04-06 RX ADMIN — SERTRALINE HYDROCHLORIDE 50 MG: 50 TABLET ORAL at 10:19

## 2022-04-06 RX ADMIN — HYDROMORPHONE HYDROCHLORIDE 0.5 MG: 1 INJECTION, SOLUTION INTRAMUSCULAR; INTRAVENOUS; SUBCUTANEOUS at 14:56

## 2022-04-06 RX ADMIN — METRONIDAZOLE 500 MG: 500 INJECTION, SOLUTION INTRAVENOUS at 00:49

## 2022-04-06 NOTE — CASE MANAGEMENT
Case Management Discharge Planning Note    Patient name Maylin Eldridge  Location /-64 MRN [id number]  : 1945 Date 2022       Current Admission Date: 2022  Current Admission Diagnosis:Acute diverticulitis   Patient Active Problem List    Diagnosis Date Noted    Acute diverticulitis 2022    Transaminitis 2022    Anxiety 2022    Hypertensive urgency 2022    Nausea 2022    Bradycardia 2022    Abnormal EKG 2022    Primary hypertension 2022    Stage 3a chronic kidney disease ([facility] ) 2022    Psychophysiological insomnia 2021    Exertional dyspnea     Medicare annual wellness visit, subsequent 2021    Right leg pain 2021    Acute cystitis with hematuria 2021    Chronic bronchitis ([facility] ) 04/15/2021    Obesity, morbid ([facility] ) 04/15/2021    BMI 37 0-37 9, adult 2020    Atypical chest pain 2020    Dermatitis 2020    Vaginal cyst 2019    Candidiasis of genitalia in female 2019    Encounter for gynecological examination without abnormal finding 2019    Controlled type 2 diabetes with neuropathy ([facility] ) 2019    Benign hypertension 2018    Depression with anxiety 2018    Mild intermittent asthma 2018      LOS (days): 2  Geometric Mean LOS (GMLOS) (days): 2 60  Days to GMLOS:0 8     OBJECTIVE:  Risk of Unplanned Readmission Score: 10         Current admission status: Inpatient   Preferred Pharmacy:   [address] [place] [address] 9293 R R 1 [phone] R R 1 ([address]) [address] [facility]  Phone: [phone] Fax: [phone]    [id number] [place], [address] [facility] [address]  [facility]  [place] [address]  [place] [address]  Phone: [phone] Fax: [phone]    Primary Care Provider: Cally Wallace    Primary Insurance: MEDICARE  Secondary Insurance: [employer]    DISCHARGE DETAILS:    Additional Comments: Per chart review, pt recommended for VNA  Pt in agreement, referrals placed via [facility]  Per [employer], [facility] Foreman [facility] able to accept at time of discharge  Patient reviewed during care coordination rounds with Dr Eitan Evans who reports pt will likely be medically stable for discharge in 24-48 hours  CM department to follow

## 2022-04-06 NOTE — ASSESSMENT & PLAN NOTE
Lab Results   Component Value Date    HGBA1C 6 4 12/22/2021       Recent Labs     04/05/22  1748 04/05/22  2304 04/06/22  0536 04/06/22  1142   POCGLU 78 107 97 90       Blood Sugar Average: Last 72 hrs:  (P) 35 [id number]     · Holding home oral medications-- currently on insulin sliding scale, q 6 hours and HS  · POC glucose-64 in a m on 04/05/22 , patient noted sweating but did not have any other symptoms like palpitations, tremors, repeat blood sugar after dextrose administration-134  · POC glucose in the 90s  · Start on SSI with accu checks  · Hypoglycemia protocol

## 2022-04-06 NOTE — PLAN OF CARE
Problem: MOBILITY - ADULT  Goal: Maintain or return to baseline ADL function  Description: INTERVENTIONS:  -  Assess patient's ability to carry out ADLs; assess patient's baseline for ADL function and identify physical deficits which impact ability to perform ADLs (bathing, care of mouth/teeth, toileting, grooming, dressing, etc )  - Assess/evaluate cause of self-care deficits   - Assess range of motion  - Assess patient's mobility; develop plan if impaired  - Assess patient's need for assistive devices and provide as appropriate  - Encourage maximum independence but intervene and supervise when necessary  - Involve family in performance of ADLs  - Assess for home care needs following discharge   - Consider OT consult to assist with ADL evaluation and planning for discharge  - Provide patient education as appropriate  Outcome: Progressing  Goal: Maintains/Returns to pre admission functional level  Description: INTERVENTIONS:  - Perform BMAT or MOVE assessment daily    - Set and communicate daily mobility goal to care team and patient/family/caregiver  - Collaborate with rehabilitation services on mobility goals if consulted  - Perform Range of Motion  times a day  - Reposition patient every  hours    - Dangle patient  times a day  - Stand patient  times a day  - Ambulate patient  times a day  - Out of bed to chair times a day   - Out of bed for meals  times a day  - Out of bed for toileting  - Record patient progress and toleration of activity level   Outcome: Progressing     Problem: PAIN - ADULT  Goal: Verbalizes/displays adequate comfort level or baseline comfort level  Description: Interventions:  - Encourage patient to monitor pain and request assistance  - Assess pain using appropriate pain scale  - Administer analgesics based on type and severity of pain and evaluate response  - Implement non-pharmacological measures as appropriate and evaluate response  - Consider cultural and social influences on pain and pain management  - Notify physician/advanced practitioner if interventions unsuccessful or patient reports new pain  Outcome: Progressing     Problem: SAFETY ADULT  Goal: Maintain or return to baseline ADL function  Description: INTERVENTIONS:  -  Assess patient's ability to carry out ADLs; assess patient's baseline for ADL function and identify physical deficits which impact ability to perform ADLs (bathing, care of mouth/teeth, toileting, grooming, dressing, etc )  - Assess/evaluate cause of self-care deficits   - Assess range of motion  - Assess patient's mobility; develop plan if impaired  - Assess patient's need for assistive devices and provide as appropriate  - Encourage maximum independence but intervene and supervise when necessary  - Involve family in performance of ADLs  - Assess for home care needs following discharge   - Consider OT consult to assist with ADL evaluation and planning for discharge  - Provide patient education as appropriate  Outcome: Progressing  Goal: Maintains/Returns to pre admission functional level  Description: INTERVENTIONS:  - Perform BMAT or MOVE assessment daily    - Set and communicate daily mobility goal to care team and patient/family/caregiver  - Collaborate with rehabilitation services on mobility goals if consulted  - Perform Range of Motion  times a day  - Reposition patient every  hours    - Dangle patient  times a day  - Stand patient  times a day  - Ambulate patient  times a day  - Out of bed to chair  times a day   - Out of bed for meals  times a day  - Out of bed for toileting  - Record patient progress and toleration of activity level   Outcome: Progressing  Goal: Patient will remain free of falls  Description: INTERVENTIONS:  - Educate patient/family on patient safety including physical limitations  - Instruct patient to call for assistance with activity   - Consult OT/PT to assist with strengthening/mobility   - Keep Call bell within reach  - Keep bed low and locked with side rails adjusted as appropriate  - Keep care items and personal belongings within reach  - Initiate and maintain comfort rounds  - Make Fall Risk Sign visible to staff  - Offer Toileting every  Hours, in advance of need  - Initiate/Maintain alarm  - Obtain necessary fall risk management equipment:   - Apply yellow socks and bracelet for high fall risk patients  - Consider moving patient to room near nurses station  Outcome: Progressing     Problem: DISCHARGE PLANNING  Goal: Discharge to home or other facility with appropriate resources  Description: INTERVENTIONS:  - Identify barriers to discharge w/patient and caregiver  - Arrange for needed discharge resources and transportation as appropriate  - Identify discharge learning needs (meds, wound care, etc )  - Arrange for interpretive services to assist at discharge as needed  - Refer to Case Management Department for coordinating discharge planning if the patient needs post-hospital services based on physician/advanced practitioner order or complex needs related to functional status, cognitive ability, or social support system  Outcome: Progressing     Problem: Knowledge Deficit  Goal: Patient/family/caregiver demonstrates understanding of disease process, treatment plan, medications, and discharge instructions  Description: Complete learning assessment and assess knowledge base    Interventions:  - Provide teaching at level of understanding  - Provide teaching via preferred learning methods  Outcome: Progressing     Problem: GASTROINTESTINAL - ADULT  Goal: Minimal or absence of nausea and/or vomiting  Description: INTERVENTIONS:  - Administer IV fluids if ordered to ensure adequate hydration  - Maintain NPO status until nausea and vomiting are resolved  - Nasogastric tube if ordered  - Administer ordered antiemetic medications as needed  - Provide nonpharmacologic comfort measures as appropriate  - Advance diet as tolerated, if ordered  - Consider nutrition services referral to assist patient with adequate nutrition and appropriate food choices  Outcome: Progressing  Goal: Maintains or returns to baseline bowel function  Description: INTERVENTIONS:  - Assess bowel function  - Encourage oral fluids to ensure adequate hydration  - Administer IV fluids if ordered to ensure adequate hydration  - Administer ordered medications as needed  - Encourage mobilization and activity  - Consider nutritional services referral to assist patient with adequate nutrition and appropriate food choices  Outcome: Progressing  Goal: Maintains adequate nutritional intake  Description: INTERVENTIONS:  - Monitor percentage of each meal consumed  - Identify factors contributing to decreased intake, treat as appropriate  - Assist with meals as needed  - Monitor I&O, weight, and lab values if indicated  - Obtain nutrition services referral as needed  Outcome: Progressing

## 2022-04-06 NOTE — ASSESSMENT & PLAN NOTE
· Patient presented to the ED for left lower quadrant abdominal pain that started on 03/29  · Patient reported that over the 1 week prior to admission pain had been increasing and she had been having associated symptoms such as nausea, vomiting, diarrhea, poor appetite  Denies other symptoms such as fever/chills, headaches, dizziness chest pain, shortness of breath  · CT A/P: There is thickening of the splenic flexure with associated pericolonic infiltration, suggestive diverticulitis  Additional thickening of the ascending colon with some infiltration at its mesenteric margin may be due to additional segment of diverticulitis or colitis  Suggest follow-up with the colonoscopy when the acute illness subsides to exclude  focal colonic l lesion  There is no fluid collection seen  There is no free air  · WBC 19 43, afebrile on admission    Did not meet sepsis criteria, WBC-day-20 30  · Continue IV ceftriaxone and metronidazole  · Continuous IVF-- decrease rate to 75 ml/h  · Keep NPO for now, GI on board   · No surgical intervention   · Continue antibiotics  · F/u with colonoscopy in 6-8 weeks, GI signed off  · C difficile testing ordered -- but patient has not had a BM since admission -- will cancel testing   · Pain control

## 2022-04-06 NOTE — ASSESSMENT & PLAN NOTE
· Elevated LFTs on admission  · AST 58, , Alkaline phosphatase 216  · Likely in the setting of acute diverticulitis infection  Denies any right upper quadrant abdominal pain at this time  Denies any drug or alcohol use    · Continue to trend with daily CMP-- ALT decreased from 126-90, AST normalized, ALP decreased from to 822187, albumin-2 9, normal total bilirubin on 04/5/22 -- will repeat tomorrow  · If patient develops right upper quadrant pain or worsening liver enzymes, consider further workup with ultrasound and lab studies--currently denies any right upper quadrant pain, has diffuse abdominal pain secondary to diverticulitis

## 2022-04-06 NOTE — PROGRESS NOTES
[facility]  Progress Note - Robie Castleman 1945, 78 y o  female MRN: [id number]  Unit/Bed#: -01 Encounter: [id number]  Primary Care Provider: Sol Murphy   Date and time admitted to hospital: 4/4/2022  2:10 PM    * Acute diverticulitis  Assessment & Plan  · Patient presented to the ED for left lower quadrant abdominal pain that started on 03/29  · Patient reported that over the 1 week prior to admission pain had been increasing and she had been having associated symptoms such as nausea, vomiting, diarrhea, poor appetite  Denies other symptoms such as fever/chills, headaches, dizziness chest pain, shortness of breath  · CT A/P: There is thickening of the splenic flexure with associated pericolonic infiltration, suggestive diverticulitis  Additional thickening of the ascending colon with some infiltration at its mesenteric margin may be due to additional segment of diverticulitis or colitis  Suggest follow-up with the colonoscopy when the acute illness subsides to exclude  focal colonic l lesion  There is no fluid collection seen  There is no free air  · WBC 19 43, afebrile on admission  Did not meet sepsis criteria, WBC-day-20 30  · Continue IV ceftriaxone and metronidazole  · Continuous IVF-- decrease rate to 75 ml/h  · Keep NPO for now, GI on board   · No surgical intervention   · Continue antibiotics  · F/u with colonoscopy in 6-8 weeks, GI signed off  · C difficile testing ordered -- but patient has not had a BM since admission -- will cancel testing   · Pain control      Transaminitis  Assessment & Plan  · Elevated LFTs on admission  · AST 58, , Alkaline phosphatase 216  · Likely in the setting of acute diverticulitis infection  Denies any right upper quadrant abdominal pain at this time  Denies any drug or alcohol use    · Continue to trend with daily CMP-- ALT decreased from 126-90, AST normalized, ALP decreased from to 874228, albumin-2 9, normal total bilirubin on 04/5/22 -- will repeat tomorrow  · If patient develops right upper quadrant pain or worsening liver enzymes, consider further workup with ultrasound and lab studies--currently denies any right upper quadrant pain, has diffuse abdominal pain secondary to diverticulitis    Primary hypertension  Assessment & Plan  · /72 on admission-  · Continue pre-hospital amlodipine and lisinopril  · Monitor vitals per routine    Stage 3a chronic kidney disease [facility])  Assessment & Plan  Lab Results   Component Value Date    EGFR 74 04/06/2022    EGFR 66 04/05/2022    EGFR 56 04/04/2022    CREATININE 0 78 04/06/2022    CREATININE 0 86 04/05/2022    CREATININE 0 98 04/04/2022     · Creatinine stable at baseline  · Avoid hypotension and nephrotoxic agents  · Monitor BMP daily    Controlled type 2 diabetes with neuropathy [facility])  Assessment & Plan  Lab Results   Component Value Date    HGBA1C 6 4 12/22/2021       Recent Labs     04/05/22  1748 04/05/22  2304 04/06/22  0536 04/06/22  1142   POCGLU 78 107 97 90       Blood Sugar Average: Last 72 hrs:  (P) 92 [id number]     · Holding home oral medications-- currently on insulin sliding scale, q 6 hours and HS  · POC glucose-64 in a m on 04/05/22 , patient noted sweating but did not have any other symptoms like palpitations, tremors, repeat blood sugar after dextrose administration-134  · POC glucose in the 90s  · Start on SSI with accu checks  · Hypoglycemia protocol    Mild intermittent asthma  Assessment & Plan  · No acute exacerbation  · Continue pre-hospital inhalers      VTE Pharmacologic Prophylaxis:   Pharmacologic: Enoxaparin (Lovenox)  Mechanical VTE Prophylaxis in Place: Yes    Patient Centered Rounds: I have performed bedside rounds with nursing staff today      Discussions with Specialists or Other Care Team Provider: None      Education and Discussions with Family / Patient: Plan of care discussed with the patient at bedside     Time Spent for Care: 30 minutes  More than 50% of total time spent on counseling and coordination of care as described above  Current Length of Stay: 2 day(s)    Current Patient Status: Inpatient   Certification Statement: The patient will continue to require additional inpatient hospital stay due to acute diverticulitis     Discharge Plan: Pending clinical improvement     Code Status: Level 1 - Full Code      Subjective:   Patient was seen and examined at bedside  There is some improvement in abdominal pain, no nausea, vomiting  Patient did not have a BM since admission     Objective:     Vitals:   Temp (24hrs), Av 3 °F (36 8 °C), Min:98 1 °F (36 7 °C), Max:98 5 °F (36 9 °C)    Temp:  [98 1 °F (36 7 °C)-98 5 °F (36 9 °C)] 98 1 °F (36 7 °C)  HR:  [75-82] 82  Resp:  [17-18] 17  BP: (146-157)/(69-77) 157/77  SpO2:  [87 %-93 %] 90 %  Body mass index is 35 52 kg/m²  Input and Output Summary (last 24 hours): Intake/Output Summary (Last 24 hours) at 2022 1331  Last data filed at 2022 0618  Gross per 24 hour   Intake 2222 92 ml   Output 1300 ml   Net 922 92 ml       Physical Exam:     Physical Exam  Vitals and nursing note reviewed  Constitutional:       General: She is not in acute distress  Appearance: She is well-developed  HENT:      Head: Normocephalic and atraumatic  Eyes:      General:         Right eye: No discharge  Left eye: No discharge  Conjunctiva/sclera: Conjunctivae normal    Cardiovascular:      Rate and Rhythm: Normal rate and regular rhythm  Pulses: Normal pulses  Heart sounds: Normal heart sounds  No murmur heard  Pulmonary:      Effort: Pulmonary effort is normal  No respiratory distress  Breath sounds: Normal breath sounds  Abdominal:      Palpations: Abdomen is soft  Tenderness: There is abdominal tenderness (mild, diffuse)  Musculoskeletal:      Cervical back: Neck supple  Right lower leg: No edema  Left lower leg: No edema     Skin: General: Skin is warm and dry  Capillary Refill: Capillary refill takes less than 2 seconds  Neurological:      Mental Status: She is alert and oriented to person, place, and time  Additional Data:     Labs:    Results from last 7 days   Lab Units 04/06/22  0534   WBC Thousand/uL 20 30*   HEMOGLOBIN g/dL 12 4   HEMATOCRIT % 38 4   PLATELETS Thousands/uL 196   NEUTROS PCT % 28*   LYMPHS PCT % 11*   MONOS PCT % 2*   EOS PCT % 58*     Results from last 7 days   Lab Units 04/06/22  0534 04/05/22  0501 04/05/22  0501   SODIUM mmol/L 137   < > 139   POTASSIUM mmol/L 3 9   < > 4 5   CHLORIDE mmol/L 101   < > 104   CO2 mmol/L 27   < > 26   BUN mg/dL 9   < > 13   CREATININE mg/dL 0 78   < > 0 86   ANION GAP mmol/L 9   < > 9   CALCIUM mg/dL 9 3   < > 8 8   ALBUMIN g/dL  --   --  2 9*   TOTAL BILIRUBIN mg/dL  --   --  0 28   ALK PHOS U/L  --   --  178*   ALT U/L  --   --  90*   AST U/L  --   --  36   GLUCOSE RANDOM mg/dL 94   < > 75    < > = values in this interval not displayed  Results from last 7 days   Lab Units 04/06/22  1142 04/06/22  0536 04/05/22  2304 04/05/22  1748 04/05/22  1206 04/05/22  0811 04/05/22  0738 04/04/22  2058 04/04/22  1253   POC GLUCOSE mg/dl 90 97 107 78 94 134 64* 79 116         Results from last 7 days   Lab Units 04/04/22  1454   LACTIC ACID mmol/L 0 9           * I Have Reviewed All Lab Data Listed Above  * Additional Pertinent Lab Tests Reviewed:  Velma [facility] Admission Reviewed      Recent Cultures (last 7 days):           Last 24 Hours Medication List:   Current Facility-Administered Medications   Medication Dose Route Frequency Provider Last Rate    albuterol  1 puff Inhalation Q4H PRN Regan Montenegro PA-C      amLODIPine  10 mg Oral Daily Maxine Drake PA-C      aspirin  81 mg Oral Daily Maxine Drake PA-C      cefTRIAXone  1,000 mg Intravenous Q24H Maxine Drake PA-C 1,000 mg (04/05/22 1644)    enoxaparin  40 mg Subcutaneous Daily Regan Rounds, GAYLE      fluticasone-vilanterol  1 puff Inhalation Daily Maxine Drake PA-C      HYDROmorphone  0 5 mg Intravenous Q6H PRN Firman Jacque, GAYEL      HYDROmorphone  0 2 mg Intravenous Q3H PRN Firman Jacque, GAYLE      insulin lispro  1-5 Units Subcutaneous HS Maxine Drake PA-C      insulin lispro  1-6 Units Subcutaneous Q6H Otoniel Villegas MD      lactated ringers  75 mL/hr Intravenous Continuous Liliya Locke  mL/hr (04/05/22 2207)    lisinopril  20 mg Oral BID Firmcandi Santillan PA-C      LORazepam  0 5 mg Oral Q8H PRN Josie Santillan PA-C      metroNIDAZOLE  500 mg Intravenous Q8H Maxine Drake PA-C 500 mg (04/06/22 1019)    ondansetron  4 mg Intravenous Q6H PRN Maxine Drake PA-C      sertraline  50 mg Oral Daily Maxine Drake PA-C      zolpidem  5 mg Oral HS PRN Josie Santillan PA-C          Today, Patient Was Seen By: Liliya Locke MD    ** Please Note: Dictation voice to text software may have been used in the creation of this document   ** 98.5

## 2022-04-06 NOTE — PROGRESS NOTES
Progress Note -Surgery PA  Faustino 78 y o  female MRN: [id number]  Unit/Bed#: -01 Encounter: [id number]      Assessment   78y F w Acute diverticulitis of splenic flexure and ascending colon  - AVSS, leukoctyosis increased to 20   - passing flatus, no BM since admission  Plan   Continue with diet NPO, ok for sips of clears  IV fluids  Analgesia and antiemetics prn  Serial labs and abdominal exams  Continue with antibiotics  Medicine primary  ______________________________________________________________________  Subjective:   Patient states pain is better and rates 6/10 this morning  Denies BM  No CP or SOB  Has not been out of bed yet  Denies fevers or chils, nausea r vomiting    Objective:    Vitals:  /77   Pulse 82   Temp 98 1 °F (36 7 °C)   Resp 17   Ht 5' 2" (1 575 m)   Wt 88 1 kg (194 lb 3 6 oz)   SpO2 90%   BMI 35 52 kg/m²     I/Os:  I/O last 3 completed shifts: In: 3172 9 [I V :3172 9]  Out: 1600 [Urine:1600]    No intake/output data recorded      Invasive Devices  Report    Peripheral Intravenous Line            Peripheral IV 04/04/22 Right Forearm 1 day                Medications:  Current Facility-Administered Medications   Medication Dose Route Frequency    albuterol (PROVENTIL HFA,VENTOLIN HFA) inhaler 1 puff  1 puff Inhalation Q4H PRN    amLODIPine (NORVASC) tablet 10 mg  10 mg Oral Daily    aspirin chewable tablet 81 mg  81 mg Oral Daily    ceftriaxone (ROCEPHIN) 1 g/50 mL in dextrose IVPB  1,000 mg Intravenous Q24H    enoxaparin (LOVENOX) subcutaneous injection 40 mg  40 mg Subcutaneous Daily    fluticasone-vilanterol (BREO ELLIPTA) 200-25 MCG/INH inhaler 1 puff  1 puff Inhalation Daily    HYDROmorphone (DILAUDID) injection 0 5 mg  0 5 mg Intravenous Q6H PRN    HYDROmorphone HCl (DILAUDID) injection 0 2 mg  0 2 mg Intravenous Q3H PRN    insulin lispro (HumaLOG) 100 units/mL subcutaneous injection 1-5 Units  1-5 Units Subcutaneous HS    insulin lispro (HumaLOG) 100 units/mL subcutaneous injection 1-6 Units  1-6 Units Subcutaneous Q6H    lactated ringers infusion  75 mL/hr Intravenous Continuous    lisinopril (ZESTRIL) tablet 20 mg  20 mg Oral BID    LORazepam (ATIVAN) tablet 0 5 mg  0 5 mg Oral Q8H PRN    metroNIDAZOLE (FLAGYL) IVPB (premix) 500 mg 100 mL  500 mg Intravenous Q8H    ondansetron (ZOFRAN) injection 4 mg  4 mg Intravenous Q6H PRN    sertraline (ZOLOFT) tablet 50 mg  50 mg Oral Daily    zolpidem (AMBIEN) tablet 5 mg  5 mg Oral HS PRN                 Lab Results and Cultures:   CBC with diff:   Lab Results   Component Value Date    WBC 20 30 (H) 04/06/2022    HGB 12 4 04/06/2022    HCT 38 4 04/06/2022    MCV 91 04/06/2022     04/06/2022    MCH 29 2 04/06/2022    MCHC 32 3 04/06/2022    RDW 14 3 04/06/2022    MPV 9 5 04/06/2022    NRBC 0 04/06/2022       BMP/CMP:  Lab Results   Component Value Date    K 3 9 04/06/2022     04/06/2022    CO2 27 04/06/2022    BUN 9 04/06/2022    CREATININE 0 78 04/06/2022    CALCIUM 9 3 04/06/2022    AST 36 04/05/2022    ALT 90 (H) 04/05/2022    ALKPHOS 178 (H) 04/05/2022    EGFR 74 04/06/2022       Lipid Panel:   No results found for: CHOL    Coags:   No results found for: PT, PTT, INR     Urinalysis:   Lab Results   Component Value Date    COLORU Yellow 04/04/2022    COLORU Yellow 09/13/2017    CLARITYU Cloudy 04/04/2022    CLARITYU Transparent 09/13/2017    SPECGRAV 1 020 04/04/2022    SPECGRAV 1 005 09/13/2017    PHUR 5 5 04/04/2022    PHUR 6 0 09/13/2017    LEUKOCYTESUR Negative 04/04/2022    LEUKOCYTESUR - 09/13/2017    NITRITE Positive (A) 04/04/2022    NITRITE - 09/13/2017    PROTEINUA - 09/13/2017    GLUCOSEU Negative 04/04/2022    KETONESU Trace (A) 04/04/2022    KETONESU - 09/13/2017    BILIRUBINUR Negative 04/04/2022    BILIRUBINUR - 09/13/2017    BLOODU Trace-Intact (A) 04/04/2022    BLOODU trace 09/13/2017        Urine Culture:   Lab Results   Component Value Date    URINECX 50,000-59,000 cfu/ml 04/15/2021      Wound Culure: No results found for: WOUNDCULT  Blood Culture: No results found for: BLOODCX      Physical Exam:  General Appearance:    Alert and orientated x 3, cooperative, no distress, appears stated age   Lungs:     Clear to auscultation bilaterally, respirations unlabored, no wheezes    Heart:    Regular rate and rhythm, S1 and S2 normal, no murmur   Abdomen:    Normoactive BS, soft, generalized tenderness more centralized to lower abdomen, +abdomen distended, non rigid, no masses, no palpated organomegaly   Extremities:  Extremities normal, no calf tenderness, no cyanosis or edema   Pulses:   2+ and symmetric all extremities   Skin:   Skin color, texture, turgor normal, no rashes   Neurologic:   CNII-XII intact, normal strength, affect appropriate       Imaging:  CT abdomen pelvis with contrast    Result Date: 4/4/2022  Impression: There is thickening of the splenic flexure with associated pericolonic infiltration, suggestive diverticulitis  Additional thickening of the ascending colon with some infiltration at its mesenteric margin may be due to additional segment of diverticulitis or colitis  Suggest follow-up with the colonoscopy when the acute illness subsides to exclude  focal colonic lesion There is no fluid collection seen There is no free air Small mesenteric lymph nodes with largest measuring about 1 cm, short interval follow-up at 3 months suggested The study was marked in EPIC for immediate notification   Follow-up notification has been created in [employer] performed: [address] [place] [address], PAFlorencio   4/6/2022

## 2022-04-06 NOTE — ASSESSMENT & PLAN NOTE
Lab Results   Component Value Date    EGFR 74 04/06/2022    EGFR 66 04/05/2022    EGFR 56 04/04/2022    CREATININE 0 78 04/06/2022    CREATININE 0 86 04/05/2022    CREATININE 0 98 04/04/2022     · Creatinine stable at baseline  · Avoid hypotension and nephrotoxic agents  · Monitor BMP daily

## 2022-04-06 NOTE — ASSESSMENT & PLAN NOTE
· /72 on admission-  · Continue pre-hospital amlodipine and lisinopril  · Monitor vitals per routine

## 2022-04-07 ENCOUNTER — APPOINTMENT (INPATIENT)
Dept: CT IMAGING | Facility: HOSPITAL | Age: 77
DRG: 815 | End: 2022-04-07
Payer: MEDICARE

## 2022-04-07 PROBLEM — J44.9 COPD (CHRONIC OBSTRUCTIVE PULMONARY DISEASE) (HCC): Status: ACTIVE | Noted: 2022-04-07

## 2022-04-07 PROBLEM — D72.10 EOSINOPHILIA: Status: ACTIVE | Noted: 2022-04-07

## 2022-04-07 LAB
ANION GAP SERPL CALCULATED.3IONS-SCNC: 9 MMOL/L (ref 4–13)
ATRIAL RATE: 87 BPM
BASOPHILS # BLD AUTO: 0.09 THOUSANDS/ΜL (ref 0–0.1)
BASOPHILS NFR BLD AUTO: 0 % (ref 0–1)
BUN SERPL-MCNC: 8 MG/DL (ref 5–25)
C DIFF TOX GENS STL QL NAA+PROBE: NEGATIVE
CALCIUM SERPL-MCNC: 9.4 MG/DL (ref 8.3–10.1)
CHLORIDE SERPL-SCNC: 101 MMOL/L (ref 100–108)
CO2 SERPL-SCNC: 27 MMOL/L (ref 21–32)
CREAT SERPL-MCNC: 0.89 MG/DL (ref 0.6–1.3)
EOSINOPHIL # BLD AUTO: 13.69 THOUSAND/ΜL (ref 0–0.61)
EOSINOPHIL NFR BLD AUTO: 64 % (ref 0–6)
ERYTHROCYTE [DISTWIDTH] IN BLOOD BY AUTOMATED COUNT: 14.3 % (ref 11.6–15.1)
GFR SERPL CREATININE-BSD FRML MDRD: 63 ML/MIN/1.73SQ M
GLUCOSE SERPL-MCNC: 112 MG/DL (ref 65–140)
GLUCOSE SERPL-MCNC: 116 MG/DL (ref 65–140)
GLUCOSE SERPL-MCNC: 134 MG/DL (ref 65–140)
GLUCOSE SERPL-MCNC: 140 MG/DL (ref 65–140)
GLUCOSE SERPL-MCNC: 145 MG/DL (ref 65–140)
GLUCOSE SERPL-MCNC: 146 MG/DL (ref 65–140)
HCT VFR BLD AUTO: 36.4 % (ref 34.8–46.1)
HGB BLD-MCNC: 11.9 G/DL (ref 11.5–15.4)
IMM GRANULOCYTES # BLD AUTO: 0.23 THOUSAND/UL (ref 0–0.2)
IMM GRANULOCYTES NFR BLD AUTO: 1 % (ref 0–2)
LYMPHOCYTES # BLD AUTO: 1.93 THOUSANDS/ΜL (ref 0.6–4.47)
LYMPHOCYTES NFR BLD AUTO: 9 % (ref 14–44)
MCH RBC QN AUTO: 29.5 PG (ref 26.8–34.3)
MCHC RBC AUTO-ENTMCNC: 32.7 G/DL (ref 31.4–37.4)
MCV RBC AUTO: 90 FL (ref 82–98)
MONOCYTES # BLD AUTO: 0.53 THOUSAND/ΜL (ref 0.17–1.22)
MONOCYTES NFR BLD AUTO: 3 % (ref 4–12)
NEUTROPHILS # BLD AUTO: 4.95 THOUSANDS/ΜL (ref 1.85–7.62)
NEUTS SEG NFR BLD AUTO: 23 % (ref 43–75)
NRBC BLD AUTO-RTO: 0 /100 WBCS
P AXIS: 74 DEGREES
PLATELET # BLD AUTO: 159 THOUSANDS/UL (ref 149–390)
PMV BLD AUTO: 10.2 FL (ref 8.9–12.7)
POTASSIUM SERPL-SCNC: 3.7 MMOL/L (ref 3.5–5.3)
PR INTERVAL: 148 MS
QRS AXIS: -25 DEGREES
QRSD INTERVAL: 70 MS
QT INTERVAL: 346 MS
QTC INTERVAL: 416 MS
RBC # BLD AUTO: 4.04 MILLION/UL (ref 3.81–5.12)
SODIUM SERPL-SCNC: 137 MMOL/L (ref 136–145)
T WAVE AXIS: 80 DEGREES
VENTRICULAR RATE: 87 BPM
WBC # BLD AUTO: 21.42 THOUSAND/UL (ref 4.31–10.16)

## 2022-04-07 PROCEDURE — 82948 REAGENT STRIP/BLOOD GLUCOSE: CPT

## 2022-04-07 PROCEDURE — 99233 SBSQ HOSP IP/OBS HIGH 50: CPT | Performed by: HOSPITALIST

## 2022-04-07 PROCEDURE — 85025 COMPLETE CBC W/AUTO DIFF WBC: CPT | Performed by: INTERNAL MEDICINE

## 2022-04-07 PROCEDURE — 93010 ELECTROCARDIOGRAM REPORT: CPT | Performed by: INTERNAL MEDICINE

## 2022-04-07 PROCEDURE — 80048 BASIC METABOLIC PNL TOTAL CA: CPT | Performed by: INTERNAL MEDICINE

## 2022-04-07 PROCEDURE — 99232 SBSQ HOSP IP/OBS MODERATE 35: CPT | Performed by: STUDENT IN AN ORGANIZED HEALTH CARE EDUCATION/TRAINING PROGRAM

## 2022-04-07 PROCEDURE — 74177 CT ABD & PELVIS W/CONTRAST: CPT

## 2022-04-07 PROCEDURE — G1004 CDSM NDSC: HCPCS

## 2022-04-07 RX ORDER — HYDROMORPHONE HCL/PF 1 MG/ML
1 SYRINGE (ML) INJECTION EVERY 6 HOURS PRN
Status: DISCONTINUED | OUTPATIENT
Start: 2022-04-07 | End: 2022-04-08

## 2022-04-07 RX ORDER — HYDRALAZINE HYDROCHLORIDE 20 MG/ML
5 INJECTION INTRAMUSCULAR; INTRAVENOUS ONCE
Status: COMPLETED | OUTPATIENT
Start: 2022-04-07 | End: 2022-04-07

## 2022-04-07 RX ADMIN — ASPIRIN 81 MG: 81 TABLET, CHEWABLE ORAL at 08:26

## 2022-04-07 RX ADMIN — DEXTROSE, SODIUM CHLORIDE, AND POTASSIUM CHLORIDE 100 ML/HR: 5; .45; .15 INJECTION INTRAVENOUS at 10:18

## 2022-04-07 RX ADMIN — HYDROMORPHONE HYDROCHLORIDE 1 MG: 1 INJECTION, SOLUTION INTRAMUSCULAR; INTRAVENOUS; SUBCUTANEOUS at 13:56

## 2022-04-07 RX ADMIN — HYDROMORPHONE HYDROCHLORIDE 0.5 MG: 1 INJECTION, SOLUTION INTRAMUSCULAR; INTRAVENOUS; SUBCUTANEOUS at 08:26

## 2022-04-07 RX ADMIN — METRONIDAZOLE 500 MG: 500 INJECTION, SOLUTION INTRAVENOUS at 01:36

## 2022-04-07 RX ADMIN — HYDRALAZINE HYDROCHLORIDE 5 MG: 20 INJECTION INTRAMUSCULAR; INTRAVENOUS at 01:35

## 2022-04-07 RX ADMIN — METRONIDAZOLE 500 MG: 500 INJECTION, SOLUTION INTRAVENOUS at 17:48

## 2022-04-07 RX ADMIN — AMLODIPINE BESYLATE 10 MG: 10 TABLET ORAL at 08:27

## 2022-04-07 RX ADMIN — SERTRALINE HYDROCHLORIDE 50 MG: 50 TABLET ORAL at 08:27

## 2022-04-07 RX ADMIN — LISINOPRIL 20 MG: 20 TABLET ORAL at 23:10

## 2022-04-07 RX ADMIN — FLUTICASONE FUROATE AND VILANTEROL TRIFENATATE 1 PUFF: 200; 25 POWDER RESPIRATORY (INHALATION) at 08:30

## 2022-04-07 RX ADMIN — CEFTRIAXONE SODIUM 1000 MG: 10 INJECTION, POWDER, FOR SOLUTION INTRAVENOUS at 18:52

## 2022-04-07 RX ADMIN — HYDROMORPHONE HYDROCHLORIDE 1 MG: 1 INJECTION, SOLUTION INTRAMUSCULAR; INTRAVENOUS; SUBCUTANEOUS at 23:10

## 2022-04-07 RX ADMIN — IOHEXOL 100 ML: 350 INJECTION, SOLUTION INTRAVENOUS at 13:08

## 2022-04-07 RX ADMIN — DEXTROSE, SODIUM CHLORIDE, AND POTASSIUM CHLORIDE 100 ML/HR: 5; .45; .15 INJECTION INTRAVENOUS at 23:01

## 2022-04-07 RX ADMIN — LISINOPRIL 20 MG: 20 TABLET ORAL at 08:27

## 2022-04-07 RX ADMIN — METRONIDAZOLE 500 MG: 500 INJECTION, SOLUTION INTRAVENOUS at 10:17

## 2022-04-07 NOTE — ASSESSMENT & PLAN NOTE
· Patient presented to the ED for left lower quadrant abdominal pain that started on 03/29  · Patient reported that over the 1 week prior to admission pain had been increasing and she had been having associated symptoms such as nausea, vomiting, diarrhea, poor appetite  Denies other symptoms such as fever/chills, headaches, dizziness chest pain, shortness of breath  · CT A/P: There is thickening of the splenic flexure with associated pericolonic infiltration, suggestive diverticulitis  Additional thickening of the ascending colon with some infiltration at its mesenteric margin may be due to additional segment of diverticulitis or colitis  Suggest follow-up with the colonoscopy when the acute illness subsides to exclude  focal colonic l lesion  There is no fluid collection seen  There is no free air  · WBC 19 43, afebrile on admission    Did not meet sepsis criteria, [id number] mostly eosinophils  · Continue IV ceftriaxone and metronidazole  · Continuous IVF-- decrease rate to 75 ml/h  · Keep NPO for now, GI on board   · No surgical intervention   · Continue antibiotics  · F/u with colonoscopy in 6-8 weeks, GI signed off  · C difficile testing ordered -- but patient has not had a BM since admission -- will cancel testing   · Pain control--increase dilaudis

## 2022-04-07 NOTE — PROGRESS NOTES
[address] [facility]  Progress Note - Harjinder Raymond 1945, 78 y o  female MRN: [id number]  Unit/Bed#: [place] [address] Encounter: [id number]  Primary Care Provider: Jacinda Mackey   Date and time admitted to hospital: 4/4/2022  2:10 PM    * Acute diverticulitis  Assessment & Plan  · Patient presented to the ED for left lower quadrant abdominal pain that started on 03/29  · Patient reported that over the 1 week prior to admission pain had been increasing and she had been having associated symptoms such as nausea, vomiting, diarrhea, poor appetite  Denies other symptoms such as fever/chills, headaches, dizziness chest pain, shortness of breath  · CT A/P: There is thickening of the splenic flexure with associated pericolonic infiltration, suggestive diverticulitis  Additional thickening of the ascending colon with some infiltration at its mesenteric margin may be due to additional segment of diverticulitis or colitis  Suggest follow-up with the colonoscopy when the acute illness subsides to exclude  focal colonic l lesion  There is no fluid collection seen  There is no free air  · WBC 19 43, afebrile on admission  Did not meet sepsis criteria, [id number] mostly eosinophils  · Continue IV ceftriaxone and metronidazole  · Continuous IVF-- decrease rate to 75 ml/h  · Keep NPO for now, GI on board   · No surgical intervention   · Continue antibiotics  · F/u with colonoscopy in 6-8 weeks, GI signed off  · C difficile testing ordered -- but patient has not had a BM since admission -- will cancel testing   · Pain control--increase dilaudis      Eosinophilia  Assessment & Plan  Noted  Adding Ova and parasites    Transaminitis  Assessment & Plan  · Elevated LFTs on admission  · AST 58, , Alkaline phosphatase 216  · Likely in the setting of acute diverticulitis infection  Denies any right upper quadrant abdominal pain at this time  Denies any drug or alcohol use    · Continue to trend with daily CMP-- ALT decreased from 126-90, AST normalized, ALP decreased from to 870147, albumin-2 9, normal total bilirubin on 04/5/22 -- will repeat tomorrow  · If patient develops right upper quadrant pain or worsening liver enzymes, consider further workup with ultrasound and lab studies--currently denies any right upper quadrant pain, has diffuse abdominal pain secondary to diverticulitis    Primary hypertension  Assessment & Plan  · /72 on admission-  · Continue pre-hospital amlodipine and lisinopril  · Monitor vitals per routine    Stage 3a chronic kidney disease [facility])  Assessment & Plan  Lab Results   Component Value Date    EGFR 63 04/07/2022    EGFR 74 04/06/2022    EGFR 66 04/05/2022    CREATININE 0 89 04/07/2022    CREATININE 0 78 04/06/2022    CREATININE 0 86 04/05/2022     · Creatinine stable at baseline  · Avoid hypotension and nephrotoxic agents  · Monitor BMP daily    Controlled type 2 diabetes with neuropathy [facility])  Assessment & Plan  Lab Results   Component Value Date    HGBA1C 6 4 12/22/2021       Recent Labs     04/06/22  1142 04/06/22  1805 04/07/22  0050 04/07/22  0730   POCGLU 90 82 112 145*       Blood Sugar Average: Last 72 hrs:  (P) 47 [id number]     · Holding home oral medications-- currently on insulin sliding scale, q 6 hours and HS  · POC glucose-64 in a m on 04/05/22 , patient noted sweating but did not have any other symptoms like palpitations, tremors, repeat blood sugar after dextrose administration-134  · POC glucose in the 90s  · Start on SSI with accu checks  · Hypoglycemia protocol    Mild intermittent asthma  Assessment & Plan  · No acute exacerbation  · Continue pre-hospital inhalers        VTE Pharmacologic Prophylaxis: VTE Score: 4 Moderate Risk (Score 3-4) - Pharmacological DVT Prophylaxis Ordered: enoxaparin (Lovenox)  Patient Centered Rounds: I performed bedside rounds with nursing staff today    Discussions with Specialists or Other Care Team Provider: GI    Education and Discussions with Family / Patient: Updated  () via phone  Time Spent for Care: 45 minutes  More than 50% of total time spent on counseling and coordination of care as described above  Current Length of Stay: 3 day(s)  Current Patient Status: Inpatient   Certification Statement: The patient will continue to require additional inpatient hospital stay due to worsening symptoms  Discharge Plan: Anticipate discharge in 48-72 hrs to home with home services  Code Status: Level 1 - Full Code    Subjective:   Pt complains of worsening abdominal pain  She has not had a BM    Objective:     Vitals:   Temp (24hrs), Av 7 °F (37 1 °C), Min:98 3 °F (36 8 °C), Max:99 °F (37 2 °C)    Temp:  [98 3 °F (36 8 °C)-99 °F (37 2 °C)] 98 9 °F (37 2 °C)  HR:  [71-81] 71  Resp:  [16-20] 16  BP: (137-188)/(53-87) 137/56  SpO2:  [86 %-94 %] 94 %  Body mass index is 35 52 kg/m²  Input and Output Summary (last 24 hours): Intake/Output Summary (Last 24 hours) at 2022 1410  Last data filed at 2022 1018  Gross per 24 hour   Intake  08 ml   Output 0 ml   Net  08 ml       Physical Exam:   Physical Exam  Constitutional:       Appearance: She is ill-appearing  HENT:      Head: Normocephalic and atraumatic  Nose: Nose normal       Mouth/Throat:      Mouth: Mucous membranes are moist       Pharynx: Oropharynx is clear  Cardiovascular:      Rate and Rhythm: Normal rate  Abdominal:      Tenderness: There is abdominal tenderness  Musculoskeletal:         General: Normal range of motion  Skin:     General: Skin is warm and dry  Capillary Refill: Capillary refill takes less than 2 seconds  Neurological:      General: No focal deficit present     Psychiatric:         Mood and Affect: Mood normal           Additional Data:     Labs:  Results from last 7 days   Lab Units 22  0558   WBC Thousand/uL 21 42*   HEMOGLOBIN g/dL 11 9   HEMATOCRIT % 36 4   PLATELETS Thousands/uL 159   NEUTROS PCT % 23*   LYMPHS PCT % 9*   MONOS PCT % 3*   EOS PCT % 64*     Results from last 7 days   Lab Units 04/07/22  0558 04/06/22  0534 04/05/22  0501   SODIUM mmol/L 137   < > 139   POTASSIUM mmol/L 3 7   < > 4 5   CHLORIDE mmol/L 101   < > 104   CO2 mmol/L 27   < > 26   BUN mg/dL 8   < > 13   CREATININE mg/dL 0 89   < > 0 86   ANION GAP mmol/L 9   < > 9   CALCIUM mg/dL 9 4   < > 8 8   ALBUMIN g/dL  --   --  2 9*   TOTAL BILIRUBIN mg/dL  --   --  0 28   ALK PHOS U/L  --   --  178*   ALT U/L  --   --  90*   AST U/L  --   --  36   GLUCOSE RANDOM mg/dL 140   < > 75    < > = values in this interval not displayed  Results from last 7 days   Lab Units 04/07/22  1113 04/07/22  0730 04/07/22  0050 04/06/22  1805 04/06/22  1142 04/06/22  0536 04/05/22  2304 04/05/22  1748 04/05/22  1206 04/05/22  0811 04/05/22  0738 04/04/22  2058   POC GLUCOSE mg/dl 116 145* 112 82 90 97 107 78 94 134 64* 79         Results from last 7 days   Lab Units 04/04/22  1454   LACTIC ACID mmol/L 0 9       Lines/Drains:  Invasive Devices  Report    Peripheral Intravenous Line            Peripheral IV 04/06/22 Left;Ventral (anterior) Forearm <1 day                      Imaging: No pertinent imaging reviewed      Recent Cultures (last 7 days):         Last 24 Hours Medication List:   Current Facility-Administered Medications   Medication Dose Route Frequency Provider Last Rate    albuterol  1 puff Inhalation Q4H PRN Tavo Alexander PA-C      amLODIPine  10 mg Oral Daily Maxine Drake PA-C      aspirin  81 mg Oral Daily Maxine Drkae PA-C      cefTRIAXone  1,000 mg Intravenous Q24H Tavo Alexander PA-C 1,000 mg (04/06/22 2254)    dextrose 5 % and sodium chloride 0 45 % with KCl 20 mEq/L  100 mL/hr Intravenous Continuous Kasandra Robbins PA-C 100 mL/hr (04/07/22 1018)    enoxaparin  40 mg Subcutaneous Daily Maxine Drake PA-C      fluticasone-vilanterol  1 puff Inhalation Daily Maxine Drake PA-C      HYDROmorphone  1 mg Intravenous Q6H PRN Claudia Desai MD      insulin lispro  1-5 Units Subcutaneous HS Maxine Drake PA-C      insulin lispro  1-6 Units Subcutaneous Q6H Jemal Escalona MD      iohexol  50 mL Oral Once in imaging Claudia Desai MD      lisinopril  20 mg Oral BID Buck AntGAYLE coburn      LORazepam  0 5 mg Oral Q8H PRN Lynne Farr PA-C      metroNIDAZOLE  500 mg Intravenous Q8H Maxine Drake PA-C 500 mg (04/07/22 1017)    ondansetron  4 mg Intravenous Q6H PRN Buck AntGAYLE coburn      sertraline  50 mg Oral Daily Maxine Drake PA-C      zolpidem  5 mg Oral HS PRN Lynne Farr PA-C          Today, Patient Was Seen By: Claudia Desai MD    **Please Note: This note may have been constructed using a voice recognition system  **

## 2022-04-07 NOTE — ASSESSMENT & PLAN NOTE
· Elevated LFTs on admission  · AST 58, , Alkaline phosphatase 216  · Likely in the setting of acute diverticulitis infection  Denies any right upper quadrant abdominal pain at this time  Denies any drug or alcohol use    · Continue to trend with daily CMP-- ALT decreased from 126-90, AST normalized, ALP decreased from to 561085, albumin-2 9, normal total bilirubin on 04/5/22 -- will repeat tomorrow  · If patient develops right upper quadrant pain or worsening liver enzymes, consider further workup with ultrasound and lab studies--currently denies any right upper quadrant pain, has diffuse abdominal pain secondary to diverticulitis

## 2022-04-07 NOTE — PROGRESS NOTES
Progress Note - General Surgery   Evangelina Day 78 y o  female MRN: [id number]  Unit/Bed#: -[place] Encounter: [id number]    Assessment/Plan  Evangelina Day is a 78 y o  female     Acute diverticulitis of ascending colon and splenic flexure  AVSS, WBC 21 42 from 20  Cdiff panel pending     Given increased abdominal pain and uptrending WBC, will order repeat CT of the abdomen and pelvis to reassess diverticulitis  Follow up on Cdiff panel  Trend labs, monitor vitals  Serial abdominal exams  IV ceftriaxone/flagyl for antimicrobial coverage   Pain control/antiemetics PRN  Medicine primary     Subjective/Objective    Subjective: Worsening pain overnight, no further bowel movements, passing some flatus     Objective:     Blood pressure 137/56, pulse 71, temperature 98 9 °F (37 2 °C), resp  rate 16, height 5' 2" (1 575 m), weight 88 1 kg (194 lb 3 6 oz), SpO2 94 %, not currently breastfeeding  ,Body mass index is 35 52 kg/m²  Intake/Output Summary (Last 24 hours) at 4/7/2022 1144  Last data filed at 4/7/2022 1018  Gross per 24 hour   Intake 2057 08 ml   Output 300 ml   Net 1757 08 ml       Invasive Devices  Report    Peripheral Intravenous Line            Peripheral IV 04/06/22 Left;Ventral (anterior) Forearm <1 day                Physical Exam: /56   Pulse 71   Temp 98 9 °F (37 2 °C)   Resp 16   Ht 5' 2" (1 575 m)   Wt 88 1 kg (194 lb 3 6 oz)   SpO2 94%   BMI 35 52 kg/m²   General appearance: alert and oriented, in no acute distress  Lungs: clear to auscultation bilaterally  Heart: regular rate and rhythm, S1, S2 normal, no murmur, click, rub or gallop  Abdomen: firm, distended, diffuse abdominal tenderness most in RLQ, no guarding or rebound, no peritoneal signs  Extremities: extremities normal, warm and well-perfused; no cyanosis, clubbing, or edema    Lab, Imaging and other studies:I have personally reviewed pertinent lab results       VTE Pharmacologic Prophylaxis: Enoxaparin (Lovenox)  VTE Mechanical Prophylaxis: sequential compression device    Recent Results (from the past 36 hour(s))   CBC and differential    Collection Time: 04/06/22  5:34 AM   Result Value Ref Range    WBC 20 30 (H) 4 31 - 10 16 Thousand/uL    RBC 4 24 3 81 - 5 12 Million/uL    Hemoglobin 12 4 11 5 - 15 4 g/dL    Hematocrit 38 4 34 8 - 46 1 %    MCV 91 82 - 98 fL    MCH 29 2 26 8 - 34 3 pg    MCHC 32 3 31 4 - 37 4 g/dL    RDW 14 3 11 6 - 15 1 %    MPV 9 5 8 9 - 12 7 fL    Platelets 300 855 - 092 Thousands/uL    nRBC 0 /100 WBCs    Neutrophils Relative 28 (L) 43 - 75 %    Immat GRANS % 1 0 - 2 %    Lymphocytes Relative 11 (L) 14 - 44 %    Monocytes Relative 2 (L) 4 - 12 %    Eosinophils Relative 58 (H) 0 - 6 %    Basophils Relative 0 0 - 1 %    Neutrophils Absolute 5 62 1 85 - 7 62 Thousands/µL    Immature Grans Absolute 0 22 (H) 0 00 - 0 20 Thousand/uL    Lymphocytes Absolute 2 13 0 60 - 4 47 Thousands/µL    Monocytes Absolute 0 45 0 17 - 1 22 Thousand/µL    Eosinophils Absolute 11 81 (H) 0 00 - 0 61 Thousand/µL    Basophils Absolute 0 07 0 00 - 0 10 Thousands/µL   Basic metabolic panel    Collection Time: 04/06/22  5:34 AM   Result Value Ref Range    Sodium 137 136 - 145 mmol/L    Potassium 3 9 3 5 - 5 3 mmol/L    Chloride 101 100 - 108 mmol/L    CO2 27 21 - 32 mmol/L    ANION GAP 9 4 - 13 mmol/L    BUN 9 5 - 25 mg/dL    Creatinine 0 78 0 60 - 1 30 mg/dL    Glucose 94 65 - 140 mg/dL    Calcium 9 3 8 3 - 10 1 mg/dL    eGFR 74 ml/min/1 73sq m   Fingerstick Glucose (POCT)    Collection Time: 04/06/22  5:36 AM   Result Value Ref Range    POC Glucose 97 65 - 140 mg/dl   Fingerstick Glucose (POCT)    Collection Time: 04/06/22 11:42 AM   Result Value Ref Range    POC Glucose 90 65 - 140 mg/dl   Fingerstick Glucose (POCT)    Collection Time: 04/06/22  6:05 PM   Result Value Ref Range    POC Glucose 82 65 - 140 mg/dl   Fingerstick Glucose (POCT)    Collection Time: 04/07/22 12:50 AM   Result Value Ref Range    POC Glucose 112 65 - 140 mg/dl   CBC and differential    Collection Time: 04/07/22  5:58 AM   Result Value Ref Range    WBC 21 42 (H) 4 31 - 10 16 Thousand/uL    RBC 4 04 3 81 - 5 12 Million/uL    Hemoglobin 11 9 11 5 - 15 4 g/dL    Hematocrit 36 4 34 8 - 46 1 %    MCV 90 82 - 98 fL    MCH 29 5 26 8 - 34 3 pg    MCHC 32 7 31 4 - 37 4 g/dL    RDW 14 3 11 6 - 15 1 %    MPV 10 2 8 9 - 12 7 fL    Platelets 952 729 - 804 Thousands/uL    nRBC 0 /100 WBCs    Neutrophils Relative 23 (L) 43 - 75 %    Immat GRANS % 1 0 - 2 %    Lymphocytes Relative 9 (L) 14 - 44 %    Monocytes Relative 3 (L) 4 - 12 %    Eosinophils Relative 64 (H) 0 - 6 %    Basophils Relative 0 0 - 1 %    Neutrophils Absolute 4 95 1 85 - 7 62 Thousands/µL    Immature Grans Absolute 0 23 (H) 0 00 - 0 20 Thousand/uL    Lymphocytes Absolute 1 93 0 60 - 4 47 Thousands/µL    Monocytes Absolute 0 53 0 17 - 1 22 Thousand/µL    Eosinophils Absolute 13 69 (H) 0 00 - 0 61 Thousand/µL    Basophils Absolute 0 09 0 00 - 0 10 Thousands/µL   Basic metabolic panel    Collection Time: 04/07/22  5:58 AM   Result Value Ref Range    Sodium 137 136 - 145 mmol/L    Potassium 3 7 3 5 - 5 3 mmol/L    Chloride 101 100 - 108 mmol/L    CO2 27 21 - 32 mmol/L    ANION GAP 9 4 - 13 mmol/L    BUN 8 5 - 25 mg/dL    Creatinine 0 89 0 60 - 1 30 mg/dL    Glucose 140 65 - 140 mg/dL    Calcium 9 4 8 3 - 10 1 mg/dL    eGFR 63 ml/min/1 73sq m   Fingerstick Glucose (POCT)    Collection Time: 04/07/22  7:30 AM   Result Value Ref Range    POC Glucose 145 (H) 65 - 140 mg/dl   Fingerstick Glucose (POCT)    Collection Time: 04/07/22 11:13 AM   Result Value Ref Range    POC Glucose 116 65 - 140 mg/dl

## 2022-04-07 NOTE — ASSESSMENT & PLAN NOTE
Lab Results   Component Value Date    HGBA1C 6 4 12/22/2021       Recent Labs     04/06/22  1142 04/06/22  1805 04/07/22  0050 04/07/22  0730   POCGLU 90 82 112 145*       Blood Sugar Average: Last 72 hrs:  (P) 38 [id number]     · Holding home oral medications-- currently on insulin sliding scale, q 6 hours and HS  · POC glucose-64 in a m on 04/05/22 , patient noted sweating but did not have any other symptoms like palpitations, tremors, repeat blood sugar after dextrose administration-134  · POC glucose in the 90s  · Start on SSI with accu checks  · Hypoglycemia protocol

## 2022-04-08 LAB
ANION GAP SERPL CALCULATED.3IONS-SCNC: 9 MMOL/L (ref 4–13)
BASOPHILS # BLD AUTO: 0.07 THOUSANDS/ΜL (ref 0–0.1)
BASOPHILS NFR BLD AUTO: 0 % (ref 0–1)
BUN SERPL-MCNC: 5 MG/DL (ref 5–25)
CALCIUM SERPL-MCNC: 8.8 MG/DL (ref 8.3–10.1)
CHLORIDE SERPL-SCNC: 102 MMOL/L (ref 100–108)
CO2 SERPL-SCNC: 27 MMOL/L (ref 21–32)
CREAT SERPL-MCNC: 0.77 MG/DL (ref 0.6–1.3)
CRP SERPL QL: 67.1 MG/L
EOSINOPHIL # BLD AUTO: 15.23 THOUSAND/ΜL (ref 0–0.61)
EOSINOPHIL NFR BLD AUTO: 69 % (ref 0–6)
ERYTHROCYTE [DISTWIDTH] IN BLOOD BY AUTOMATED COUNT: 14.5 % (ref 11.6–15.1)
GFR SERPL CREATININE-BSD FRML MDRD: 75 ML/MIN/1.73SQ M
GLUCOSE SERPL-MCNC: 124 MG/DL (ref 65–140)
GLUCOSE SERPL-MCNC: 128 MG/DL (ref 65–140)
GLUCOSE SERPL-MCNC: 140 MG/DL (ref 65–140)
GLUCOSE SERPL-MCNC: 145 MG/DL (ref 65–140)
GLUCOSE SERPL-MCNC: 189 MG/DL (ref 65–140)
HCT VFR BLD AUTO: 34.5 % (ref 34.8–46.1)
HGB BLD-MCNC: 11.4 G/DL (ref 11.5–15.4)
IMM GRANULOCYTES # BLD AUTO: 0.24 THOUSAND/UL (ref 0–0.2)
IMM GRANULOCYTES NFR BLD AUTO: 1 % (ref 0–2)
LYMPHOCYTES # BLD AUTO: 2.08 THOUSANDS/ΜL (ref 0.6–4.47)
LYMPHOCYTES NFR BLD AUTO: 9 % (ref 14–44)
MCH RBC QN AUTO: 29.7 PG (ref 26.8–34.3)
MCHC RBC AUTO-ENTMCNC: 33 G/DL (ref 31.4–37.4)
MCV RBC AUTO: 90 FL (ref 82–98)
MONOCYTES # BLD AUTO: 0.63 THOUSAND/ΜL (ref 0.17–1.22)
MONOCYTES NFR BLD AUTO: 3 % (ref 4–12)
NEUTROPHILS # BLD AUTO: 3.89 THOUSANDS/ΜL (ref 1.85–7.62)
NEUTS SEG NFR BLD AUTO: 18 % (ref 43–75)
NRBC BLD AUTO-RTO: 0 /100 WBCS
PLATELET # BLD AUTO: 193 THOUSANDS/UL (ref 149–390)
PMV BLD AUTO: 9.6 FL (ref 8.9–12.7)
POTASSIUM SERPL-SCNC: 3.8 MMOL/L (ref 3.5–5.3)
RBC # BLD AUTO: 3.84 MILLION/UL (ref 3.81–5.12)
SODIUM SERPL-SCNC: 138 MMOL/L (ref 136–145)
WBC # BLD AUTO: 22.14 THOUSAND/UL (ref 4.31–10.16)

## 2022-04-08 PROCEDURE — 99233 SBSQ HOSP IP/OBS HIGH 50: CPT | Performed by: HOSPITALIST

## 2022-04-08 PROCEDURE — 86140 C-REACTIVE PROTEIN: CPT | Performed by: PHYSICIAN ASSISTANT

## 2022-04-08 PROCEDURE — 82948 REAGENT STRIP/BLOOD GLUCOSE: CPT

## 2022-04-08 PROCEDURE — 85025 COMPLETE CBC W/AUTO DIFF WBC: CPT | Performed by: HOSPITALIST

## 2022-04-08 PROCEDURE — 99232 SBSQ HOSP IP/OBS MODERATE 35: CPT | Performed by: INTERNAL MEDICINE

## 2022-04-08 PROCEDURE — 99232 SBSQ HOSP IP/OBS MODERATE 35: CPT | Performed by: STUDENT IN AN ORGANIZED HEALTH CARE EDUCATION/TRAINING PROGRAM

## 2022-04-08 PROCEDURE — 99223 1ST HOSP IP/OBS HIGH 75: CPT | Performed by: INTERNAL MEDICINE

## 2022-04-08 PROCEDURE — 97166 OT EVAL MOD COMPLEX 45 MIN: CPT

## 2022-04-08 PROCEDURE — 80048 BASIC METABOLIC PNL TOTAL CA: CPT | Performed by: HOSPITALIST

## 2022-04-08 RX ORDER — NALOXONE HYDROCHLORIDE 0.4 MG/ML
0.04 INJECTION, SOLUTION INTRAMUSCULAR; INTRAVENOUS; SUBCUTANEOUS
Status: DISCONTINUED | OUTPATIENT
Start: 2022-04-08 | End: 2022-04-19 | Stop reason: HOSPADM

## 2022-04-08 RX ORDER — HYDROMORPHONE HCL/PF 1 MG/ML
1 SYRINGE (ML) INJECTION EVERY 4 HOURS PRN
Status: DISCONTINUED | OUTPATIENT
Start: 2022-04-08 | End: 2022-04-19 | Stop reason: HOSPADM

## 2022-04-08 RX ORDER — HYDRALAZINE HYDROCHLORIDE 20 MG/ML
5 INJECTION INTRAMUSCULAR; INTRAVENOUS EVERY 6 HOURS SCHEDULED
Status: DISCONTINUED | OUTPATIENT
Start: 2022-04-08 | End: 2022-04-12

## 2022-04-08 RX ORDER — POLYETHYLENE GLYCOL 3350 17 G/17G
238 POWDER, FOR SOLUTION ORAL ONCE
Status: COMPLETED | OUTPATIENT
Start: 2022-04-08 | End: 2022-04-08

## 2022-04-08 RX ADMIN — HYDRALAZINE HYDROCHLORIDE 5 MG: 20 INJECTION INTRAMUSCULAR; INTRAVENOUS at 19:00

## 2022-04-08 RX ADMIN — HYDRALAZINE HYDROCHLORIDE 5 MG: 20 INJECTION INTRAMUSCULAR; INTRAVENOUS at 12:43

## 2022-04-08 RX ADMIN — BISACODYL 10 MG: 5 TABLET, COATED ORAL at 14:09

## 2022-04-08 RX ADMIN — SERTRALINE HYDROCHLORIDE 50 MG: 50 TABLET ORAL at 08:56

## 2022-04-08 RX ADMIN — AMLODIPINE BESYLATE 10 MG: 10 TABLET ORAL at 08:56

## 2022-04-08 RX ADMIN — HYDROMORPHONE HYDROCHLORIDE 1 MG: 1 INJECTION, SOLUTION INTRAMUSCULAR; INTRAVENOUS; SUBCUTANEOUS at 13:56

## 2022-04-08 RX ADMIN — HYDROMORPHONE HYDROCHLORIDE 1 MG: 1 INJECTION, SOLUTION INTRAMUSCULAR; INTRAVENOUS; SUBCUTANEOUS at 21:08

## 2022-04-08 RX ADMIN — METRONIDAZOLE 500 MG: 500 INJECTION, SOLUTION INTRAVENOUS at 19:09

## 2022-04-08 RX ADMIN — LISINOPRIL 20 MG: 20 TABLET ORAL at 08:56

## 2022-04-08 RX ADMIN — ASPIRIN 81 MG: 81 TABLET, CHEWABLE ORAL at 08:57

## 2022-04-08 RX ADMIN — CEFTRIAXONE SODIUM 1000 MG: 10 INJECTION, POWDER, FOR SOLUTION INTRAVENOUS at 17:29

## 2022-04-08 RX ADMIN — METRONIDAZOLE 500 MG: 500 INJECTION, SOLUTION INTRAVENOUS at 09:04

## 2022-04-08 RX ADMIN — POLYETHYLENE GLYCOL 3350 238 G: 17 POWDER, FOR SOLUTION ORAL at 15:36

## 2022-04-08 RX ADMIN — METRONIDAZOLE 500 MG: 500 INJECTION, SOLUTION INTRAVENOUS at 01:40

## 2022-04-08 RX ADMIN — HYDROMORPHONE HYDROCHLORIDE 1 MG: 1 INJECTION, SOLUTION INTRAMUSCULAR; INTRAVENOUS; SUBCUTANEOUS at 07:54

## 2022-04-08 RX ADMIN — FLUTICASONE FUROATE AND VILANTEROL TRIFENATATE 1 PUFF: 200; 25 POWDER RESPIRATORY (INHALATION) at 08:59

## 2022-04-08 RX ADMIN — DEXTROSE, SODIUM CHLORIDE, AND POTASSIUM CHLORIDE 100 ML/HR: 5; .45; .15 INJECTION INTRAVENOUS at 14:03

## 2022-04-08 NOTE — PLAN OF CARE
Problem: OCCUPATIONAL THERAPY ADULT  Goal: Performs self-care activities at highest level of function for planned discharge setting  See evaluation for individualized goals  Description: Treatment Interventions: ADL retraining,Functional transfer training,UE strengthening/ROM,Endurance training,Patient/family training,Equipment evaluation/education,Compensatory technique education,Continued evaluation,Activityengagement,Energy conservation          See flowsheet documentation for full assessment, interventions and recommendations  Note: Limitation: Decreased ADL status,Decreased UE strength,Decreased endurance,Decreased self-care trans,Decreased high-level ADLs  Prognosis: Good  Assessment: Patient is a 78 y o  female seen for OT evaluation s/p admit to [id number] [place] [address] on 4/4/2022 w/Acute diverticulitis  Commorbidities affecting patient's functional performance at time of assessment include: transaminitis, primary HTN, stage 3a CKD, controlled type 2 diabetes with neuropathy, and mild intermittent asthma  Patient  has a past medical history of Asthma, Benign hypertension (11/30/2018), Cardiac arrest ([facility] ), Diabetes mellitus ([facility] ), Glaucoma, Hypertension, Kidney stone, Neuropathy, Sleep apnea, SOB (shortness of breath), Tubular adenoma of colon (10/2019), and Wheezing  Orders placed for OT evaluation and treatment  Performed at least two patient identifiers during session including name and wristband  Prior to admission, patient was living with her spouse and daughter in a multi-level home with 1 JUAN and a first-floor setup  At baseline, patient reports that she is independent in ADLs and receives assistance for IADLs  Personal factors affecting patient at time of initial evaluation include: difficulty performing ADLs and difficulty performing IADLs   Upon evaluation, patient requires supervision and set up assist for UB ADLs, minimal assist for LB ADLs, transfers and functional ambulation in room with supervision assist, with the use of Rolling Walker  Patient is oriented x 4  Occupational performance is affected by the following deficits: decreased muscle strength, dynamic sit/ stand balance deficit with poor standing tolerance time for self care and functional mobility, decreased activity tolerance and increased pain, and decreased energy level  Patient to benefit from continued Occupational Therapy treatment while in the hospital to address deficits as defined above and maximize level of functional independence with ADLs and functional mobility  Occupational Performance areas to address include: grooming , bathing/ shower, dressing, toilet hygiene, transfer to all surfaces, functional mobility, IADLs: safety procedures and Leisure Participation  From OT standpoint, recommendation at time of d/c would be Home with family support and Home OT         OT Discharge Recommendation: Home with home health rehabilitation

## 2022-04-08 NOTE — PROGRESS NOTES
Progress Note - Mary Cisneros 78 y o  female MRN: [id number]    Unit/Bed#: -01 Encounter: [id number]        Subjective:     GI was asked to see patient for continued abdominal pain and eosinophilia  Patient reports that she has been requiring pain medication around the clock, and when her pain medication doses late she has over 10/10 pain she reports  She does not have diarrhea, had a soft stool yesterday  She reports that she is no longer vomiting and was able to tolerate clear liquids yesterday  ROS: As noted in the HPI, otherwise all others negative  Objective:     Vitals: Blood pressure 155/84, pulse 72, temperature 97 8 °F (36 6 °C), resp  rate 19, height 5' 2" (1 575 m), weight 88 1 kg (194 lb 3 6 oz), SpO2 93 %, not currently breastfeeding  ,Body mass index is 35 52 kg/m²  Intake/Output Summary (Last 24 hours) at 4/8/2022 1207  Last data filed at 4/8/2022 0715  Gross per 24 hour   Intake --   Output 1600 ml   Net -1600 ml       Physical Exam:     General Appearance: Alert and oriented x 3   In no respiratory distress  Lungs: Clear to auscultation bilaterally, no rales or rhonchi  Heart: Regular rate and rhythm, S1, S2 normal, no murmur, click, rub or gallop  Abdomen: Soft, non-tender, non-distended; bowel sounds normal; no masses or no organomegaly  Extremities: No cyanosis, edema    Invasive Devices  Report    Peripheral Intravenous Line            Peripheral IV 04/06/22 Left;Ventral (anterior) Forearm 1 day                Lab Results:  Results from last 7 days   Lab Units 04/08/22  0450   WBC Thousand/uL 22 14*   HEMOGLOBIN g/dL 11 4*   HEMATOCRIT % 34 5*   PLATELETS Thousands/uL 193   NEUTROS PCT % 18*   LYMPHS PCT % 9*   MONOS PCT % 3*   EOS PCT % 69*     Results from last 7 days   Lab Units 04/08/22  0450 04/06/22  0534 04/05/22  0501   POTASSIUM mmol/L 3 8   < > 4 5   CHLORIDE mmol/L 102   < > 104   CO2 mmol/L 27   < > 26   BUN mg/dL 5   < > 13   CREATININE mg/dL 0 77   < > 0 86 CALCIUM mg/dL 8 8   < > 8 8   ALK PHOS U/L  --   --  178*   ALT U/L  --   --  90*   AST U/L  --   --  36    < > = values in this interval not displayed  Results from last 7 days   Lab Units 04/04/22  1454   LIPASE u/L 48*       Imaging Studies: I have personally reviewed pertinent imaging studies  CT abdomen pelvis w contrast    Result Date: 4/7/2022  Impression: Overall similar appearance of colonic wall thickening centered at the cecum/ascending colon and at the splenic flexure, with similar pericolonic inflammatory stranding in the right colon and slight improvement on the left  No evidence of perforation or abscess formation  Workstation performed: [id number]     CT abdomen pelvis with contrast    Result Date: 4/4/2022  Impression: There is thickening of the splenic flexure with associated pericolonic infiltration, suggestive diverticulitis  Additional thickening of the ascending colon with some infiltration at its mesenteric margin may be due to additional segment of diverticulitis or colitis  Suggest follow-up with the colonoscopy when the acute illness subsides to exclude  focal colonic lesion There is no fluid collection seen There is no free air Small mesenteric lymph nodes with largest measuring about 1 cm, short interval follow-up at 3 months suggested The study was marked in EPIC for immediate notification  Follow-up notification has been created in [employer] performed: [id number]         Assessment and Plan:     1) Splenic flexure diverticulitis with additional thickening in the ascending colon; 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 - Patient was recommended to have a colonoscopy 1 year after she had 1 in 2019 revealing a 2 cm polyp in the ascending colon requiring piecemeal removal   CT scan revealing diverticulitis an additional thickening of the ascending colon which may represent either diverticulitis or colitis on admission    Of course, significant as patient does have history of precancerous polyp in this area  Patient had a repeat CT scan yesterday for continued abdominal pain, with no new changes such as perforation or abscess fortunately  Infectious Disease was consulted, and I spoke with Dr Vin Amdaor this AM regarding worsening eosinophilia  This was present on admission  Unclear etiology at this time   - Because patient's eosinophilia and continued pain are not consistent with typical diverticulitis, we discussed performing colonoscopy tomorrow to investigate and rule out IBD versus colon mass versus eosinophilic colitis    Patient agrees and voices understanding   - Appreciate Infectious Disease recommendations  - Serial abdominal exams  - Further recommendations based on above findings  - Discussed with surgery team as well

## 2022-04-08 NOTE — ASSESSMENT & PLAN NOTE
70-year-old female worsening abdominal pain found to have diverticulitis and significant eosinophilia  · Seen by surgery, signed off  · Being followed by ID, monitoring off antibiotics  · Diet advanced, still having abdominal discomfort  Add dicyclomine

## 2022-04-08 NOTE — CONSULTS
Consultation - Infectious Disease   Bharat Wilson 78 y o  female MRN: [id number]  Unit/Bed#: -01 Encounter: [id number]      IMPRESSION & RECOMMENDATIONS:   Impression/Recommendations: This is a 78 y o  female, with multiple medical problems including DM, CKD with a distant history of diverticulitis, admitted on 04/04 with abdominal pain  CT with multifocal colonic wall thickening  Abdominal pain is not improved with antibiotic for presumptive diverticulitis  Patient has high level eosinophilia  1  Multifocal diverticulitis with high level eosinophilia, 69% eosinophil on today's CBC  This level of eosinophilia is new, with last CBC in February of this year only with low level eosinophilia  This high level of eosinophilia is not consistent with intestinal parasites  With colitis and eosinophilia, consider inflammatory bowel disease  Also, consider idiopathic eosinophilic colitis but new onset in 70s is not common  Patient's abdominal pain has not improved with an antibiotic regimen which should have been appropriate and effective for diverticulitis  I suspect that her abdominal pain is not secondary to diverticulitis  At this point, it would be best to do colonoscopy, with biopsy of any of normal colonic mucosa  We can continue current antibiotic regimen for now  Can continue ceftriaxone/Flagyl for now  Recommend colonoscopy with biopsy of any abnormal colonic mucosa  2  High level eosinophilia  As in above, at this level of eosinophilia, intestinal parasite is unlikely  Consider starting July these hyper infection syndrome but clinical presentation is not consistent with syndrome and patient has very little risk factor for it, without any immunosuppression  She did live in the [place] but moved to the [facility] 30+ years ago  Recommend colonoscopy, as in above  Monitor eosinophilia  Check Strongyloides serologies  HIV screen, to be complete      3  Mildly elevated LFTs, likely secondary to colitis above  Patient has no RUQ abdominal pain  Monitor LFTs  4  CKD  Creatinine baseline  Antibiotics at full dose  Monitor creatinine  5  DM, with neuropathy  Management per primary service  Discussed with patient in detail regarding the above plan  Discussed with Dr Luz Figueroa from [facility] service  Discussed with GI Service  Discussed with surgery service  Thank you for this consultation  We will follow along with you  HISTORY OF PRESENT ILLNESS:  Reason for Consult:  Colitis  Eosinophilia  HPI: Clifford Riggins is a 78 y o  female, multiple medical problems including DM, CKD and distant history of diverticulitis, presented to ER for/4 with 1 week history of abdominal pain with intermittent diarrhea nausea/vomiting  On presentation, patient had leukocytosis but no fever  Abdomen/pelvis CT with multifocal colonic wall thickening  Patient was admitted and started on ceftriaxone/Flagyl for presumptive diverticulitis  Patient's nausea/vomiting and diarrhea have resolved but abdominal pain has not improved  Repeat CT with unchanged colitis  Patient was noted to have high level eosinophilia on admission  Is similar continues to increase  For all these reasons, we are asked to evaluate the patient  At present, patient states abdominal pain has not improved since admission  She has no further nausea/vomiting or diarrhea  As stated above, patient had 1 episode of diverticulitis 20 years ago  She had a recent colonoscopy with polypectomy  Biopsy showed tubular adenoma  Patient was born and raised in [place]  She has been a [place] for the last 40 years  No foreign travel  REVIEW OF SYSTEMS:  A complete system-based review was done  Except for what is noted in HPI above, ROS of systems is otherwise negative      PAST MEDICAL HISTORY:  Past Medical History:   Diagnosis Date    Asthma     Benign hypertension 11/30/2018    [facility]     Diabetes mellitus ([facility] )     Glaucoma     Hypertension     Kidney stone     Neuropathy     Sleep apnea     no machine    SOB (shortness of breath)     Tubular adenoma of colon 10/2019    Wheezing      Past Surgical History:   Procedure Laterality Date     SECTION      COLONOSCOPY      HYSTERECTOMY  1985    TONSILLECTOMY       Problem list reviewed  FAMILY HISTORY:  Non-contributory    SOCIAL HISTORY:  Social History     Substance and Sexual Activity   Alcohol Use Never     Social History     Substance and Sexual Activity   Drug Use No     Social History     Tobacco Use   Smoking Status Never Smoker   Smokeless Tobacco Never Used       ALLERGIES:  Allergies   Allergen Reactions    Ciprofloxacin Itching    Levaquin [Levofloxacin] Swelling    Penicillins GI Intolerance       MEDICATIONS:  All current active medications have been reviewed  Patient is currently on ceftriaxone/Flagyl  PHYSICAL EXAM:  Vitals:  Temp:  [97 8 °F (36 6 °C)-98 3 °F (36 8 °C)] 97 8 °F (36 6 °C)  HR:  [57-72] 72  Resp:  [18-19] 19  BP: (139-155)/(57-84) 155/84  SpO2:  [90 %-94 %] 93 %  Temp (24hrs), Av 1 °F (36 7 °C), Min:97 8 °F (36 6 °C), Max:98 3 °F (36 8 °C)  Current: Temperature: 97 8 °F (36 6 °C)     Physical Exam:  General:  Well-nourished, well-developed, in no acute distress  Awake, alert and oriented x 3  Eyes:  Conjunctive clear with no hemorrhages or effusions  Oropharynx:  No ulcers, no lesions, pharynx benign, no tonsillitis  Neck:  Supple, no lymphadenopathy, no mass, nontender  Lungs:  Expansion symmetric, no rales, no wheezing, no accessory muscle use  Cardiac:  Regular rate and rhythm, normal S1, normal S2, no murmurs  Abdomen:  Soft, mild distended, moderate periumbilical tenderness  No HSM  Extremities:  No edema, no erythema, nontender   No ulcers  Skin:  No rashes, no ulcers  Neurological:  Moves all four extremities spontaneously, sensation grossly intact    LABS, IMAGING, & OTHER STUDIES:  Lab Results:  I have personally reviewed pertinent labs  Results from last 7 days   Lab Units 04/08/22  0450 04/07/22  0558 04/06/22  0534 04/05/22  0501 04/05/22  0501 04/04/22  1454 04/04/22  1454   POTASSIUM mmol/L 3 8 3 7 3 9   < > 4 5   < > 4 2   CHLORIDE mmol/L 102 101 101   < > 104   < > 101   CO2 mmol/L 27 27 27   < > 26   < > 26   BUN mg/dL 5 8 9   < > 13   < > 13   CREATININE mg/dL 0 77 0 89 0 78   < > 0 86   < > 0 98   EGFR ml/min/1 73sq m 75 63 74   < > 66   < > 56   CALCIUM mg/dL 8 8 9 4 9 3   < > 8 8   < > 9 5   AST U/L  --   --   --   --  36  --  58*   ALT U/L  --   --   --   --  90*  --  126*   ALK PHOS U/L  --   --   --   --  178*  --  216*    < > = values in this interval not displayed  Results from last 7 days   Lab Units 04/08/22  0450 04/07/22  0558 04/06/22  0534   WBC Thousand/uL 22 14* 21 42* 20 30*   HEMOGLOBIN g/dL 11 4* 11 9 12 4   PLATELETS Thousands/uL 193 159 196     Results from last 7 days   Lab Units 04/06/22  1306   C DIFF TOXIN B BY PCR  Negative       Imaging Studies:   I have personally reviewed pertinent imaging study reports and images in PACS  Abdomen/pelvis CT reviewed personally  Multifocal colonic wall thickening  EKG, Pathology, and Other Studies:   I have personally reviewed pertinent reports

## 2022-04-08 NOTE — ASSESSMENT & PLAN NOTE
· Significant eosinophilia    Awaiting stool parasite studies  · Underwent bone marrow biopsy today    Results from last 7 days   Lab Units 04/12/22  0605 04/11/22  0533 04/10/22  1722 04/09/22  0548 04/08/22  0450 04/07/22  0558 04/06/22  0534   WBC Thousand/uL 25 42* 26 32* 25 80* 26 08* 22 14* 21 42* 20 30*   EOS PCT % 58* 53* 67* 72* 69* 64* 58*

## 2022-04-08 NOTE — OCCUPATIONAL THERAPY NOTE
Occupational Therapy Evaluation Note        Patient Name: Rishi GONZALES Date: 4/8/2022 04/08/22 0954   OT Last Visit   OT Visit Date 04/08/22   Note Type   Note type Evaluation   Restrictions/Precautions   Weight Bearing Precautions Per Order No   Braces or Orthoses Other (Comment)  (Elastic R ankle brace as needed)   Other Precautions Chair Alarm; Bed Alarm;Multiple lines; Fall Risk;Pain   Pain Assessment   Pain Assessment Tool 0-10   Pain Score 2   Pain Location/Orientation Location: Abdomen   Pain Onset/Description Onset: Ongoing   Hospital Pain Intervention(s) Repositioned; Ambulation/increased activity   Multiple Pain Sites No   Home Living   Type of Home House   Home Layout Multi-level; Laundry in basement;Able to live on main level with bedroom/bathroom; Performs ADLs on one level;Stairs to enter with rails; Other (Comment)  (1 JUAN + 1 + steps up to the kitchen level)   Bathroom Shower/Tub Tub/shower unit   Bathroom Toilet Standard   Bathroom Equipment Shower chair   Bathroom Accessibility Accessible   Home Equipment Cane   Additional Comments Per chart review, patient using SPC recently   Prior Function   Level of Baker Independent with ADLs and functional mobility   Lives With [employer] Help From Family   ADL Assistance Independent   IADLs Needs assistance   Falls in the last 6 months 1 to 4  ("1 slide" OOB)   Vocational Retired   [address] setup and PLOF obtained via previous PT evaluation on 4/5/22 and information confirmed by pt at time of IE  Lifestyle   Autonomy Per chart review and patient report, patient lives with her spouse and daughter in a multi-level home with 1 JUAN and a first-floor setup  At baseline, patient is independent in ADLs and receives assistance for IADLs  Patient has recently been ambulatory with a cane     Reciprocal Relationships Family   Service to Others Retired-    [address], socializing with friends   Psychosocial   Psychosocial (WDL) WDL   ADL   Eating Assistance 6  Modified independent   Eating Deficit NPO   Grooming Assistance 6  Modified Independent   UB Bathing Assistance 5  Supervision/Setup   LB Bathing Assistance 4  Minimal Assistance   UB Dressing Assistance 5  Supervision/Setup   LB Dressing Assistance 4  Minimal Assistance   Toileting Assistance  5  Supervision/Setup   Functional Assistance 4  Minimal Assistance   Additional Comments ADL assist levels based on pt's functional performance during OT evaluation   Bed Mobility   Additional Comments Patient received seated OOB to recliner chair upon OT arrival; at end of session: pt returned seated to recliner chair w/ all needs within reach and chair alarm activated   Transfers   Sit to Stand 5  Supervision   Additional items Assist x 1; Increased time required;Armrests; Verbal cues   Stand to Sit 5  Supervision   Additional items Assist x 1; Armrests; Increased time required;Verbal cues   Additional Comments Performed functional transfers using RW      Functional Mobility   Functional Mobility 5  Supervision   Additional Comments x1; Ambulation within pt room with no overt LOB or SOB   Additional items Rolling walker   Balance   Static Sitting Good   Dynamic Sitting Fair +   Static Standing Fair   Dynamic Standing Fair -   Ambulatory Fair -   Activity Tolerance   Activity Tolerance Patient limited by fatigue   Medical Staff Made Aware  [facility] confirmed pt appropriate for therapy   RUE Assessment   RUE Assessment X  (AROM WFL; strength grossly 4/5 proximally, 4-/5 distally)   LUE Assessment   LUE Assessment X  (AROM WFL; strength grossly 4/5 proximally, 4-/5 distally)   Hand Function   Gross Motor Coordination Functional  (Based on pt's functional performance)   Fine Motor Coordination Functional  (Based on pt's functional performance)   Sensation   Light Touch No apparent deficits  (BUEs)   Additional Comments Patient denies diminished sensation t/o BUEs   Vision-Basic Assessment   Current Vision Wears glasses only for reading   Cognition   Overall Cognitive Status [facility]   Arousal/Participation Alert; Responsive; Cooperative   Attention Within functional limits   Orientation Level Oriented X4   Memory Within functional limits   Following Commands Follows all commands and directions without difficulty   Comments Patient agreeable to OT evaluation   Assessment   Limitation Decreased ADL status; Decreased UE strength;Decreased endurance;Decreased self-care trans;Decreased high-level ADLs   Prognosis Good   Assessment Patient is a 78 y o  female seen for OT evaluation s/p admit to [address] [facility] on 4/4/2022 w/Acute diverticulitis  Commorbidities affecting patient's functional performance at time of assessment include: transaminitis, primary HTN, stage 3a CKD, controlled type 2 diabetes with neuropathy, and mild intermittent asthma  Patient  has a past medical history of Asthma, Benign hypertension (11/30/2018), Cardiac arrest ([facility] [address] ), Diabetes mellitus ([facility] [address] ), Glaucoma, Hypertension, Kidney stone, Neuropathy, Sleep apnea, SOB (shortness of breath), Tubular adenoma of colon (10/2019), and Wheezing  Orders placed for OT evaluation and treatment  Performed at least two patient identifiers during session including name and wristband  Prior to admission, patient was living with her spouse and daughter in a multi-level home with 1 [facility] and a first-floor setup  At baseline, patient reports that she is independent in ADLs and receives assistance for IADLs  Personal factors affecting patient at time of initial evaluation include: difficulty performing ADLs and difficulty performing IADLs  Upon evaluation, patient requires supervision and set up assist for UB ADLs, minimal assist for LB ADLs, transfers and functional ambulation in room with supervision assist, with the use of [facility]  Patient is oriented x 4   Occupational performance is affected by the following deficits: decreased muscle strength, dynamic sit/ stand balance deficit with poor standing tolerance time for self care and functional mobility, decreased activity tolerance and increased pain, and decreased energy level  Patient to benefit from continued Occupational Therapy treatment while in the hospital to address deficits as defined above and maximize level of functional independence with ADLs and functional mobility  Occupational Performance areas to address include: grooming , bathing/ shower, dressing, toilet hygiene, transfer to all surfaces, functional mobility, IADLs: safety procedures and Leisure Participation  From OT standpoint, recommendation at time of d/c would be Home with family support and Home OT  Goals   Patient Goals to return home, see a play on the 19th of the month   Plan   Treatment Interventions ADL retraining;Functional transfer training;UE strengthening/ROM; Endurance training;Patient/family training;Equipment evaluation/education; Compensatory technique education;Continued evaluation; Activityengagement; Energy conservation   Goal Expiration Date 04/18/22   OT Treatment Day 0   OT Frequency 2-3x/wk   Recommendation   OT Discharge Recommendation Home with home health rehabilitation   Additional Comments  The patient's raw score on the AM-PAC Daily Activity inpatient short form is 20, standardized score is 42 03, greater than 39 4  Patients at this level are likely to benefit from discharge to home  Please refer to the recommendation of the Occupational Therapist for safe discharge planning     AM-PAC Daily Activity Inpatient   Lower Body Dressing 3   Bathing 3   Toileting 3   Upper Body Dressing 3   Grooming 4   Eating 4   Daily Activity Raw Score 20   Daily Activity Standardized Score (Calc for Raw Score >=11) 42 03   AM-PAC Applied Cognition Inpatient   Following a Speech/Presentation 4   Understanding Ordinary Conversation 4   Taking Medications 4 Remembering Where Things Are Placed or Put Away 4   Remembering List of 4-5 Errands 4   Taking Care of Complicated Tasks 4   Applied Cognition Raw Score 24   Applied Cognition Standardized Score 62 21   Barthel Index   Feeding 10   Bathing 0   Grooming Score 5   Dressing Score 5   Bladder Score 10   Bowels Score 10   Toilet Use Score 5   Transfers (Bed/Chair) Score 10   Mobility (Level Surface) Score 0   Stairs Score 0   Barthel Index Score 55   Modified Amparo Scale   Modified Bruno Scale 3     Occupational Therapy Goals to be completed in 7-10 Days:    1 - Patient will verbalize and demonstrate use of energy conservation/ deep breathing technique and work simplification skills during functional activity with no verbal cues  2 - Patient will verbalize and demonstrate good body mechanics and joint protection techniques during  ADLs/ IADLs with no verbal cues  3 - Patient will increase OOB/ sitting tolerance to 4-6 hours per day for increased participation in self care and leisure tasks with no s/s of exertion  4 - Patient will increase standing tolerance time to 10 minutes with unilateral UE support to complete sink level ADLs@ mod I level  5 - Patient will increase sitting tolerance at edge of bed to 30 minutes to complete UB ADLs @ set up assist level  6 - Patient will transfer bed to Chair / toilet at mod I assist level with AD as indicated  7 - Patient will complete UB ADLs with Mod I assist      8 - Patient will complete LB ADLs with supervision/setup assist with the use of adaptive equipment as indicated  9 - Patient will complete toileting hygiene with Mod I assist/ supervision for thoroughness      10 - Patient/ Family will demonstrate competency with [address], OTR/L

## 2022-04-08 NOTE — PROGRESS NOTES
[address] [facility]  Progress Note - Chanda Pederson 1945, 78 y o  female MRN: [id number]  Unit/Bed#: -[facility]inn Encounter: [id number]  Primary Care Provider: Jacinda Crump   Date and time admitted to hospital: 4/4/2022  2:10 PM    * Acute diverticulitis  Assessment & Plan  · Patient presented to the ED for left lower quadrant abdominal pain that started on 03/29  · Patient reported that over the 1 week prior to admission pain had been increasing and she had been having associated symptoms such as nausea, vomiting, diarrhea, poor appetite  Denies other symptoms such as fever/chills, headaches, dizziness chest pain, shortness of breath  · CT A/P: There is thickening of the splenic flexure with associated pericolonic infiltration, suggestive diverticulitis  Additional thickening of the ascending colon with some infiltration at its mesenteric margin may be due to additional segment of diverticulitis or colitis  Suggest follow-up with the colonoscopy when the acute illness subsides to exclude  focal colonic l lesion  There is no fluid collection seen  There is no free air  · WBC 19 43, afebrile on admission  Did not meet sepsis criteria, PNV-abfmo-30 mostly eosinophils  · Continue IV ceftriaxone and metronidazole  · Continuous IVF-- decrease rate to 75 ml/h  · Keep NPO for now, GI, surgery, ID on board   · No surgical intervention   · Continue antibiotics  · F/u with colonoscopy in tommorrows, GI following  · Pain control--continue dilaudid      Eosinophilia  Assessment & Plan  GI, ID Surgery following    Transaminitis  Assessment & Plan  · Elevated LFTs on admission  · AST 58, , Alkaline phosphatase 216  · Likely in the setting of acute diverticulitis infection  Denies any right upper quadrant abdominal pain at this time  Denies any drug or alcohol use    · Continue to trend with daily CMP-- ALT decreased from 126-90, AST normalized, ALP decreased from to [id number], albumin-2 9, normal total bilirubin on 04/5/22 -- will repeat tomorrow  · If patient develops right upper quadrant pain or worsening liver enzymes, consider further workup with ultrasound and lab studies--currently denies any right upper quadrant pain, has diffuse abdominal pain secondary to diverticulitis    Primary hypertension  Assessment & Plan  · /72 on admission-  · Continue pre-hospital amlodipine and lisinopril  · Monitor vitals per routine    Stage 3a chronic kidney disease [facility])  Assessment & Plan  Lab Results   Component Value Date    EGFR 75 04/08/2022    EGFR 63 04/07/2022    EGFR 74 04/06/2022    CREATININE 0 77 04/08/2022    CREATININE 0 89 04/07/2022    CREATININE 0 78 04/06/2022     · Creatinine stable at baseline  · Avoid hypotension and nephrotoxic agents  · Monitor BMP daily    Controlled type 2 diabetes with neuropathy [facility])  Assessment & Plan  Lab Results   Component Value Date    HGBA1C 6 4 12/22/2021       Recent Labs     04/07/22  2110 04/08/22  0015 04/08/22  0711 04/08/22  1121   POCGLU 134 145* 128 140       Blood Sugar Average: Last 72 hrs:  (P) 113 25     · Holding home oral medications-- currently on insulin sliding scale, q 6 hours and HS  · POC glucose-64 in a m on 04/05/22 , patient noted sweating but did not have any other symptoms like palpitations, tremors, repeat blood sugar after dextrose administration-134  · POC glucose in the 90s  · Start on SSI with accu checks  · Hypoglycemia protocol    Mild intermittent asthma  Assessment & Plan  · No acute exacerbation  · Continue pre-hospital inhalers        VTE Pharmacologic Prophylaxis: VTE Score: 4 Moderate Risk (Score 3-4) - Pharmacological DVT Prophylaxis Ordered: enoxaparin (Lovenox)  Patient Centered Rounds: I performed bedside rounds with nursing staff today  Discussions with Specialists or Other Care Team Provider: GI    Education and Discussions with Family / Patient: Updated  () via phone      Time Spent for Care: 45 minutes  More than 50% of total time spent on counseling and coordination of care as described above  Current Length of Stay: 4 day(s)  Current Patient Status: Inpatient   Certification Statement: The patient will continue to require additional inpatient hospital stay due to worsening symptoms  Discharge Plan: Anticipate discharge in 48-72 hrs to home with home services  Code Status: Level 1 - Full Code    Subjective:   Pt complains of worsening abdominal pain  She has not had a BM    Objective:     Vitals:   Temp (24hrs), Av 1 °F (36 7 °C), Min:97 8 °F (36 6 °C), Max:98 3 °F (36 8 °C)    Temp:  [97 8 °F (36 6 °C)-98 3 °F (36 8 °C)] 97 8 °F (36 6 °C)  HR:  [57-72] 62  Resp:  [18-19] 19  BP: (139-155)/(57-84) 151/77  SpO2:  [90 %-94 %] 91 %  Body mass index is 35 52 kg/m²  Input and Output Summary (last 24 hours): Intake/Output Summary (Last 24 hours) at 2022 1328  Last data filed at 2022 0715  Gross per 24 hour   Intake --   Output 1600 ml   Net -1600 ml       Physical Exam:   Physical Exam  Constitutional:       Appearance: She is ill-appearing  HENT:      Head: Normocephalic and atraumatic  Nose: Nose normal       Mouth/Throat:      Mouth: Mucous membranes are moist       Pharynx: Oropharynx is clear  Cardiovascular:      Rate and Rhythm: Normal rate  Abdominal:      Tenderness: There is abdominal tenderness  Musculoskeletal:         General: Normal range of motion  Skin:     General: Skin is warm and dry  Capillary Refill: Capillary refill takes less than 2 seconds  Neurological:      General: No focal deficit present     Psychiatric:         Mood and Affect: Mood normal           Additional Data:     Labs:  Results from last 7 days   Lab Units 22  0450   WBC Thousand/uL 22 14*   HEMOGLOBIN g/dL 11 4*   HEMATOCRIT % 34 5*   PLATELETS Thousands/uL 193   NEUTROS PCT % 18*   LYMPHS PCT % 9*   MONOS PCT % 3*   EOS PCT % 69*     Results from last 7 days Lab Units 04/08/22  0450 04/06/22  0534 04/05/22  0501   SODIUM mmol/L 138   < > 139   POTASSIUM mmol/L 3 8   < > 4 5   CHLORIDE mmol/L 102   < > 104   CO2 mmol/L 27   < > 26   BUN mg/dL 5   < > 13   CREATININE mg/dL 0 77   < > 0 86   ANION GAP mmol/L 9   < > 9   CALCIUM mg/dL 8 8   < > 8 8   ALBUMIN g/dL  --   --  2 9*   TOTAL BILIRUBIN mg/dL  --   --  0 28   ALK PHOS U/L  --   --  178*   ALT U/L  --   --  90*   AST U/L  --   --  36   GLUCOSE RANDOM mg/dL 124   < > 75    < > = values in this interval not displayed  Results from last 7 days   Lab Units 04/08/22  1121 04/08/22  0711 04/08/22  0015 04/07/22  2110 04/07/22  1556 04/07/22  1113 04/07/22  0730 04/07/22  0050 04/06/22  1805 04/06/22  1142 04/06/22  0536 04/05/22  2304   POC GLUCOSE mg/dl 140 128 145* 134 146* 116 145* 112 82 90 97 107         Results from last 7 days   Lab Units 04/04/22  1454   LACTIC ACID mmol/L 0 9       Lines/Drains:  Invasive Devices  Report    Peripheral Intravenous Line            Peripheral IV 04/06/22 Left;Ventral (anterior) Forearm 1 day                      Imaging: No pertinent imaging reviewed      Recent Cultures (last 7 days):   Results from last 7 days   Lab Units 04/06/22  1306   C DIFF TOXIN B BY PCR  Negative       Last 24 Hours Medication List:   Current Facility-Administered Medications   Medication Dose Route Frequency Provider Last Rate    albuterol  1 puff Inhalation Q4H PRN Gina Mondragon PA-C      amLODIPine  10 mg Oral Daily Maxine Drake PA-C      aspirin  81 mg Oral Daily Maxine Drake PA-C      bisacodyl  10 mg Oral Once Virginia Ireland PA-C      cefTRIAXone  1,000 mg Intravenous Q24H Maxine Drake PA-C 1,000 mg (04/07/22 1852)    dextrose 5 % and sodium chloride 0 45 % with KCl 20 mEq/L  100 mL/hr Intravenous Continuous Austin Robbins PA-C 100 mL/hr (04/07/22 7996)    enoxaparin  40 mg Subcutaneous Daily Maxine Drake PA-C      fluticasone-vilanterol  1 puff Inhalation Daily Maxine GAYLE Drake      hydrALAZINE  5 mg Intravenous Q6H [address]kerson MD      HYDROmorphone  1 mg Intravenous Q4H PRN Sly Reusing GAYLE Robbins      insulin lispro  1-5 Units Subcutaneous HS Maxine Drake PA-C      insulin lispro  1-6 Units Subcutaneous Q6H Juan Rocha MD      iohexol  50 mL Oral Once in imaging Pratima Rosenberg MD      LORazepam  0 5 mg Oral Q8H PRN Umm Farah PA-C      metroNIDAZOLE  500 mg Intravenous Q8H Maxine Drake PA-C 500 mg (04/08/22 0904)    naloxone  0 04 mg Intravenous Q1MIN PRN Sly Reusing GAYLE Robbins      ondansetron  4 mg Intravenous Q6H PRN Allison White PA-C      polyethylene glycol  238 g Oral Once Randee Denis PA-C      zolpidem  5 mg Oral HS PRN Umm Farah PA-C          Today, Patient Was Seen By: Pratima Rosenberg MD    **Please Note: This note may have been constructed using a voice recognition system  **

## 2022-04-08 NOTE — CASE MANAGEMENT
Case Management Progress Note    Patient name Gonzales Thompson  Location /-01 MRN [id number]  : 1945 Date 2022       LOS (days): 4  Geometric Mean LOS (GMLOS) (days): 2 60  Days to GMLOS:-1 1        OBJECTIVE:        Current admission status: Inpatient  Preferred Pharmacy:   [address] [place], [place] [id number] R R 1 [phone] R R 1 ([address])  [address] [place] [address]  Phone: [phone] Fax: [phone]    [id number] [place], [address] [place], [place] [address]chandan Moreira  [place] [address]  Phone: [phone] Fax: [phone]    Primary Care Provider: Darlene Zamudio    Primary Insurance: MEDICARE  Secondary Insurance: [employer]    PROGRESS NOTE:    Per rounding with SLIM, ID and general surgery are still following, and work up is in process  Discharge is TBD  CM department will continue to follow patient through discharge

## 2022-04-08 NOTE — PLAN OF CARE
Problem: MOBILITY - ADULT  Goal: Maintain or return to baseline ADL function  Description: INTERVENTIONS:  -  Assess patient's ability to carry out ADLs; assess patient's baseline for ADL function and identify physical deficits which impact ability to perform ADLs (bathing, care of mouth/teeth, toileting, grooming, dressing, etc )  - Assess/evaluate cause of self-care deficits   - Assess range of motion  - Assess patient's mobility; develop plan if impaired  - Assess patient's need for assistive devices and provide as appropriate  - Encourage maximum independence but intervene and supervise when necessary  - Involve family in performance of ADLs  - Assess for home care needs following discharge   - Consider OT consult to assist with ADL evaluation and planning for discharge  - Provide patient education as appropriate  Outcome: Progressing  Goal: Maintains/Returns to pre admission functional level  Description: INTERVENTIONS:  - Perform BMAT or MOVE assessment daily    - Set and communicate daily mobility goal to care team and patient/family/caregiver  - Collaborate with rehabilitation services on mobility goals if consulted  - Perform Range of Motion  times a day  - Reposition patient every  hours    - Dangle patient  times a day  - Stand patient  times a day  - Ambulate patient  times a day  - Out of bed to chair  times a day   - Out of bed for meals  times a day  - Out of bed for toileting  - Record patient progress and toleration of activity level   Outcome: Progressing     Problem: PAIN - ADULT  Goal: Verbalizes/displays adequate comfort level or baseline comfort level  Description: Interventions:  - Encourage patient to monitor pain and request assistance  - Assess pain using appropriate pain scale  - Administer analgesics based on type and severity of pain and evaluate response  - Implement non-pharmacological measures as appropriate and evaluate response  - Consider cultural and social influences on pain and pain management  - Notify physician/advanced practitioner if interventions unsuccessful or patient reports new pain  Outcome: Progressing     Problem: SAFETY ADULT  Goal: Maintain or return to baseline ADL function  Description: INTERVENTIONS:  -  Assess patient's ability to carry out ADLs; assess patient's baseline for ADL function and identify physical deficits which impact ability to perform ADLs (bathing, care of mouth/teeth, toileting, grooming, dressing, etc )  - Assess/evaluate cause of self-care deficits   - Assess range of motion  - Assess patient's mobility; develop plan if impaired  - Assess patient's need for assistive devices and provide as appropriate  - Encourage maximum independence but intervene and supervise when necessary  - Involve family in performance of ADLs  - Assess for home care needs following discharge   - Consider OT consult to assist with ADL evaluation and planning for discharge  - Provide patient education as appropriate  Outcome: Progressing  Goal: Maintains/Returns to pre admission functional level  Description: INTERVENTIONS:  - Perform BMAT or MOVE assessment daily    - Set and communicate daily mobility goal to care team and patient/family/caregiver  - Collaborate with rehabilitation services on mobility goals if consulted  - Perform Range of Motion  times a day  - Reposition patient every  hours    - Dangle patient  times a day  - Stand patient  times a day  - Ambulate patient  times a day  - Out of bed to chair  times a day   - Out of bed for meals  times a day  - Out of bed for toileting  - Record patient progress and toleration of activity level   Outcome: Progressing  Goal: Patient will remain free of falls  Description: INTERVENTIONS:  - Educate patient/family on patient safety including physical limitations  - Instruct patient to call for assistance with activity   - Consult OT/PT to assist with strengthening/mobility   - Keep Call bell within reach  - Keep bed low and locked with side rails adjusted as appropriate  - Keep care items and personal belongings within reach  - Initiate and maintain comfort rounds  - Make Fall Risk Sign visible to staff  - Offer Toileting every  Hours, in advance of need  - Initiate/Maintain alarm  - Obtain necessary fall risk management equipment: Apply yellow socks and bracelet for high fall risk patients  - Consider moving patient to room near nurses station  Outcome: Progressing     Problem: DISCHARGE PLANNING  Goal: Discharge to home or other facility with appropriate resources  Description: INTERVENTIONS:  - Identify barriers to discharge w/patient and caregiver  - Arrange for needed discharge resources and transportation as appropriate  - Identify discharge learning needs (meds, wound care, etc )  - Arrange for interpretive services to assist at discharge as needed  - Refer to Case Management Department for coordinating discharge planning if the patient needs post-hospital services based on physician/advanced practitioner order or complex needs related to functional status, cognitive ability, or social support system  Outcome: Progressing     Problem: Knowledge Deficit  Goal: Patient/family/caregiver demonstrates understanding of disease process, treatment plan, medications, and discharge instructions  Description: Complete learning assessment and assess knowledge base    Interventions:  - Provide teaching at level of understanding  - Provide teaching via preferred learning methods  Outcome: Progressing     Problem: GASTROINTESTINAL - ADULT  Goal: Minimal or absence of nausea and/or vomiting  Description: INTERVENTIONS:  - Administer IV fluids if ordered to ensure adequate hydration  - Maintain NPO status until nausea and vomiting are resolved  - Nasogastric tube if ordered  - Administer ordered antiemetic medications as needed  - Provide nonpharmacologic comfort measures as appropriate  - Advance diet as tolerated, if ordered  - Consider nutrition services referral to assist patient with adequate nutrition and appropriate food choices  Outcome: Progressing  Goal: Maintains or returns to baseline bowel function  Description: INTERVENTIONS:  - Assess bowel function  - Encourage oral fluids to ensure adequate hydration  - Administer IV fluids if ordered to ensure adequate hydration  - Administer ordered medications as needed  - Encourage mobilization and activity  - Consider nutritional services referral to assist patient with adequate nutrition and appropriate food choices  Outcome: Progressing  Goal: Maintains adequate nutritional intake  Description: INTERVENTIONS:  - Monitor percentage of each meal consumed  - Identify factors contributing to decreased intake, treat as appropriate  - Assist with meals as needed  - Monitor I&O, weight, and lab values if indicated  - Obtain nutrition services referral as needed  Outcome: Progressing

## 2022-04-08 NOTE — ASSESSMENT & PLAN NOTE
· Mild transaminitis noted    Resolved    Results from last 7 days   Lab Units 04/12/22  0605 04/11/22  0533   AST U/L 23 31   ALT U/L 41 48   TOTAL BILIRUBIN mg/dL 0 29 0 30

## 2022-04-08 NOTE — ASSESSMENT & PLAN NOTE
· Prior to admission on janumet  Continue sliding scale insulin  · Did have episode of hypoglycemia    Stable and will discontinue IVF    Results from last 7 days   Lab Units 04/12/22  1548 04/12/22  1238 04/12/22  0852 04/12/22  0716 04/11/22  2032 04/11/22  1619 04/11/22  1134 04/11/22  0716   POC GLUCOSE mg/dl 289* 154* 164* 162* 207* 235* 266* 179*

## 2022-04-08 NOTE — ASSESSMENT & PLAN NOTE
· Stable at baseline    Results from last 7 days   Lab Units 04/12/22  0605 04/11/22  0533 04/10/22  0532 04/09/22  0548 04/08/22  0450 04/07/22  0558 04/06/22  0534   BUN mg/dL 13 11 6 6 5 8 9   CREATININE mg/dL 0 76 0 76 0 77 0 74 0 77 0 89 0 78   EGFR ml/min/1 73sq m 76 76 75 79 75 63 74

## 2022-04-08 NOTE — PROGRESS NOTES
Progress Note -Surgery ANDRZEJ Harmon 78 y o  female MRN: [id number]  Unit/Bed#: -01 Encounter: [id number]      Assessment   77y F w Acute diverticulitis of ascending colon and splenic flexure  - AVSS, WBC increase again today 22 14   - No BF the last 2 days, denies flatus  - abdomen soft tenderness and right mid abdomen and lower abdomen, diffusely   - Repeat CT AP with oral and IV contrast shows overall similar appearance of colonic wall thickening centered at the cecum/ascending colon at the splenic flexure with similar pericolonic inflammatory stranding in the right colon and slight improvement on the left  No evidence of perforation or abscess formation  ID consulted - noted that leukocytosis is mostly eosinophilic, raising possibility for IBD  GI will see again today  Patient does have history of tubular adenoma removed in the past   Plan   Continue with diet NPO and IV fluid  Continue IV antibiotics  Serial labs and abdominal exams  Follow-up with Infectious Disease and GI recommendation  Analgesia and antiemetics, p r n   ______________________________________________________________________  Subjective:   Patient states she feels the same  She denies any subjective fevers but has had on and off chills  Denies any nausea or vomiting  Denies flatus or bowel movement since the other day  Denies chest pain or shortness of breath  Objective:     Vitals:  /84   Pulse 72   Temp 97 8 °F (36 6 °C)   Resp 19   Ht 5' 2" (1 575 m)   Wt 88 1 kg (194 lb 3 6 oz)   SpO2 93%   BMI 35 52 kg/m²     I/Os:  I/O last 3 completed shifts:   In: 900 [I V :900]  Out: 1100 [Urine:1100]    I/O this shift:  In: -   Out: 500 [Urine:500]    Invasive Devices  Report    Peripheral Intravenous Line            Peripheral IV 04/06/22 Left;Ventral (anterior) Forearm 1 day                Medications:  Current Facility-Administered Medications   Medication Dose Route Frequency    albuterol (PROVENTIL HFA,VENTOLIN HFA) inhaler 1 puff  1 puff Inhalation Q4H PRN    amLODIPine (NORVASC) tablet 10 mg  10 mg Oral Daily    aspirin chewable tablet 81 mg  81 mg Oral Daily    ceftriaxone (ROCEPHIN) 1 g/50 mL in dextrose IVPB  1,000 mg Intravenous Q24H    dextrose 5 % and sodium chloride 0 45 % with KCl 20 mEq/L infusion  100 mL/hr Intravenous Continuous    enoxaparin (LOVENOX) subcutaneous injection 40 mg  40 mg Subcutaneous Daily    fluticasone-vilanterol (BREO ELLIPTA) 200-25 MCG/INH inhaler 1 puff  1 puff Inhalation Daily    hydrALAZINE (APRESOLINE) injection 5 mg  5 mg Intravenous Q6H [address]    HYDROmorphone (DILAUDID) injection 1 mg  1 mg Intravenous Q6H PRN    insulin lispro (HumaLOG) 100 units/mL subcutaneous injection 1-5 Units  1-5 Units Subcutaneous HS    insulin lispro (HumaLOG) 100 units/mL subcutaneous injection 1-6 Units  1-6 Units Subcutaneous Q6H    iohexol (OMNIPAQUE) 240 MG/ML solution 50 mL  50 mL Oral Once in imaging    LORazepam (ATIVAN) tablet 0 5 mg  0 5 mg Oral Q8H PRN    metroNIDAZOLE (FLAGYL) IVPB (premix) 500 mg 100 mL  500 mg Intravenous Q8H    ondansetron (ZOFRAN) injection 4 mg  4 mg Intravenous Q6H PRN    zolpidem (AMBIEN) tablet 5 mg  5 mg Oral HS PRN                 Lab Results and Cultures:   CBC with diff:   Lab Results   Component Value Date    WBC 22 14 (H) 04/08/2022    HGB 11 4 (L) 04/08/2022    HCT 34 5 (L) 04/08/2022    MCV 90 04/08/2022     04/08/2022    MCH 29 7 04/08/2022    MCHC 33 0 04/08/2022    RDW 14 5 04/08/2022    MPV 9 6 04/08/2022    NRBC 0 04/08/2022       BMP/CMP:  Lab Results   Component Value Date    K 3 8 04/08/2022     04/08/2022    CO2 27 04/08/2022    BUN 5 04/08/2022    CREATININE 0 77 04/08/2022    CALCIUM 8 8 04/08/2022    AST 36 04/05/2022    ALT 90 (H) 04/05/2022    ALKPHOS 178 (H) 04/05/2022    EGFR 75 04/08/2022       Lipid Panel:   No results found for: CHOL    Coags:   No results found for: PT, PTT, INR     Urinalysis:   Lab Results   Component Value Date    COLORU Yellow 04/04/2022    COLORU Yellow 09/13/2017    CLARITYU Cloudy 04/04/2022    CLARITYU Transparent 09/13/2017    SPECGRAV 1 020 04/04/2022    SPECGRAV 1 005 09/13/2017    PHUR 5 5 04/04/2022    PHUR 6 0 09/13/2017    LEUKOCYTESUR Negative 04/04/2022    LEUKOCYTESUR - 09/13/2017    NITRITE Positive (A) 04/04/2022    NITRITE - 09/13/2017    PROTEINUA - 09/13/2017    GLUCOSEU Negative 04/04/2022    KETONESU Trace (A) 04/04/2022    KETONESU - 09/13/2017    BILIRUBINUR Negative 04/04/2022    BILIRUBINUR - 09/13/2017    BLOODU Trace-Intact (A) 04/04/2022    BLOODU trace 09/13/2017        Urine Culture:   Lab Results   Component Value Date    URINECX 50,000-59,000 cfu/ml  04/15/2021      Wound Culure: No results found for: WOUNDCULT  Blood Culture: No results found for: BLOODCX      Physical Exam:  General Appearance:    Alert and orientated x 3, cooperative, no distress, appears stated age   Lungs:     Clear to auscultation bilaterally, respirations unlabored, no wheezes    Heart:    Regular rate and rhythm, S1 and S2 normal, no murmur   Abdomen:    Normoactive BS, moderate distention, diffuse lower abdomen would tenderness with palpation with focal area at right upper mid abdomen on palpation, non rigid, no masses, no palpated organomegaly   Extremities:  Extremities normal, no calf tenderness, no cyanosis or edema   Pulses:   2+ and symmetric all extremities   Skin:   Skin color, texture, turgor normal, no rashes   Neurologic:   CNII-XII intact, normal strength, affect appropriate       Imaging:  CT abdomen pelvis w contrast    Result Date: 4/7/2022  Impression: Overall similar appearance of colonic wall thickening centered at the cecum/ascending colon and at the splenic flexure, with similar pericolonic inflammatory stranding in the right colon and slight improvement on the left  No evidence of perforation or abscess formation   Workstation performed: [id number]     CT abdomen pelvis with contrast    Result Date: 4/4/2022  Impression: There is thickening of the splenic flexure with associated pericolonic infiltration, suggestive diverticulitis  Additional thickening of the ascending colon with some infiltration at its mesenteric margin may be due to additional segment of diverticulitis or colitis  Suggest follow-up with the colonoscopy when the acute illness subsides to exclude  focal colonic lesion There is no fluid collection seen There is no free air Small mesenteric lymph nodes with largest measuring about 1 cm, short interval follow-up at 3 months suggested The study was marked in EPIC for immediate notification   Follow-up notification has been created in [employer] performed: [id number]       VTE Pharmacologic Prophylaxis: Enoxaparin (Lovenox)  VTE Mechanical Prophylaxis: sequential compression device    Maryann Kaminski PA-C   4/8/2022

## 2022-04-09 ENCOUNTER — APPOINTMENT (INPATIENT)
Dept: GASTROENTEROLOGY | Facility: HOSPITAL | Age: 77
DRG: 815 | End: 2022-04-09
Payer: MEDICARE

## 2022-04-09 ENCOUNTER — ANESTHESIA (INPATIENT)
Dept: GASTROENTEROLOGY | Facility: HOSPITAL | Age: 77
DRG: 815 | End: 2022-04-09
Payer: MEDICARE

## 2022-04-09 ENCOUNTER — ANESTHESIA EVENT (INPATIENT)
Dept: GASTROENTEROLOGY | Facility: HOSPITAL | Age: 77
DRG: 815 | End: 2022-04-09
Payer: MEDICARE

## 2022-04-09 LAB
ANION GAP SERPL CALCULATED.3IONS-SCNC: 7 MMOL/L (ref 4–13)
BASOPHILS # BLD AUTO: 0.08 THOUSANDS/ΜL (ref 0–0.1)
BASOPHILS NFR BLD AUTO: 0 % (ref 0–1)
BUN SERPL-MCNC: 6 MG/DL (ref 5–25)
CALCIUM SERPL-MCNC: 8.9 MG/DL (ref 8.3–10.1)
CHLORIDE SERPL-SCNC: 103 MMOL/L (ref 100–108)
CO2 SERPL-SCNC: 26 MMOL/L (ref 21–32)
CREAT SERPL-MCNC: 0.74 MG/DL (ref 0.6–1.3)
EOSINOPHIL # BLD AUTO: 18.88 THOUSAND/ΜL (ref 0–0.61)
EOSINOPHIL NFR BLD AUTO: 72 % (ref 0–6)
ERYTHROCYTE [DISTWIDTH] IN BLOOD BY AUTOMATED COUNT: 14.6 % (ref 11.6–15.1)
GFR SERPL CREATININE-BSD FRML MDRD: 79 ML/MIN/1.73SQ M
GLUCOSE SERPL-MCNC: 139 MG/DL (ref 65–140)
GLUCOSE SERPL-MCNC: 147 MG/DL (ref 65–140)
GLUCOSE SERPL-MCNC: 148 MG/DL (ref 65–140)
GLUCOSE SERPL-MCNC: 176 MG/DL (ref 65–140)
GLUCOSE SERPL-MCNC: 179 MG/DL (ref 65–140)
HCT VFR BLD AUTO: 35.1 % (ref 34.8–46.1)
HGB BLD-MCNC: 11.6 G/DL (ref 11.5–15.4)
IMM GRANULOCYTES # BLD AUTO: 0.23 THOUSAND/UL (ref 0–0.2)
IMM GRANULOCYTES NFR BLD AUTO: 1 % (ref 0–2)
LYMPHOCYTES # BLD AUTO: 2.29 THOUSANDS/ΜL (ref 0.6–4.47)
LYMPHOCYTES NFR BLD AUTO: 9 % (ref 14–44)
MAGNESIUM SERPL-MCNC: 1.8 MG/DL (ref 1.6–2.6)
MCH RBC QN AUTO: 29.6 PG (ref 26.8–34.3)
MCHC RBC AUTO-ENTMCNC: 33 G/DL (ref 31.4–37.4)
MCV RBC AUTO: 90 FL (ref 82–98)
MONOCYTES # BLD AUTO: 0.54 THOUSAND/ΜL (ref 0.17–1.22)
MONOCYTES NFR BLD AUTO: 2 % (ref 4–12)
NEUTROPHILS # BLD AUTO: 4.06 THOUSANDS/ΜL (ref 1.85–7.62)
NEUTS SEG NFR BLD AUTO: 16 % (ref 43–75)
NRBC BLD AUTO-RTO: 0 /100 WBCS
PLATELET # BLD AUTO: 197 THOUSANDS/UL (ref 149–390)
PMV BLD AUTO: 9.4 FL (ref 8.9–12.7)
POTASSIUM SERPL-SCNC: 3.7 MMOL/L (ref 3.5–5.3)
RBC # BLD AUTO: 3.92 MILLION/UL (ref 3.81–5.12)
SODIUM SERPL-SCNC: 136 MMOL/L (ref 136–145)
WBC # BLD AUTO: 26.08 THOUSAND/UL (ref 4.31–10.16)

## 2022-04-09 PROCEDURE — 0DBK8ZZ EXCISION OF ASCENDING COLON, VIA NATURAL OR ARTIFICIAL OPENING ENDOSCOPIC: ICD-10-PCS | Performed by: INTERNAL MEDICINE

## 2022-04-09 PROCEDURE — 83735 ASSAY OF MAGNESIUM: CPT | Performed by: PHYSICIAN ASSISTANT

## 2022-04-09 PROCEDURE — 88341 IMHCHEM/IMCYTCHM EA ADD ANTB: CPT | Performed by: PATHOLOGY

## 2022-04-09 PROCEDURE — 88305 TISSUE EXAM BY PATHOLOGIST: CPT | Performed by: PATHOLOGY

## 2022-04-09 PROCEDURE — 99233 SBSQ HOSP IP/OBS HIGH 50: CPT | Performed by: HOSPITALIST

## 2022-04-09 PROCEDURE — 0DBN8ZX EXCISION OF SIGMOID COLON, VIA NATURAL OR ARTIFICIAL OPENING ENDOSCOPIC, DIAGNOSTIC: ICD-10-PCS | Performed by: INTERNAL MEDICINE

## 2022-04-09 PROCEDURE — 85025 COMPLETE CBC W/AUTO DIFF WBC: CPT | Performed by: PHYSICIAN ASSISTANT

## 2022-04-09 PROCEDURE — 87209 SMEAR COMPLEX STAIN: CPT | Performed by: INTERNAL MEDICINE

## 2022-04-09 PROCEDURE — 87209 SMEAR COMPLEX STAIN: CPT | Performed by: HOSPITALIST

## 2022-04-09 PROCEDURE — 87177 OVA AND PARASITES SMEARS: CPT | Performed by: INTERNAL MEDICINE

## 2022-04-09 PROCEDURE — 45380 COLONOSCOPY AND BIOPSY: CPT | Performed by: INTERNAL MEDICINE

## 2022-04-09 PROCEDURE — 99232 SBSQ HOSP IP/OBS MODERATE 35: CPT | Performed by: PHYSICIAN ASSISTANT

## 2022-04-09 PROCEDURE — 87389 HIV-1 AG W/HIV-1&-2 AB AG IA: CPT | Performed by: INTERNAL MEDICINE

## 2022-04-09 PROCEDURE — 0DBL8ZX EXCISION OF TRANSVERSE COLON, VIA NATURAL OR ARTIFICIAL OPENING ENDOSCOPIC, DIAGNOSTIC: ICD-10-PCS | Performed by: INTERNAL MEDICINE

## 2022-04-09 PROCEDURE — 82948 REAGENT STRIP/BLOOD GLUCOSE: CPT

## 2022-04-09 PROCEDURE — 88342 IMHCHEM/IMCYTCHM 1ST ANTB: CPT | Performed by: PATHOLOGY

## 2022-04-09 PROCEDURE — 99233 SBSQ HOSP IP/OBS HIGH 50: CPT | Performed by: INTERNAL MEDICINE

## 2022-04-09 PROCEDURE — 87329 GIARDIA AG IA: CPT | Performed by: HOSPITALIST

## 2022-04-09 PROCEDURE — 0DBK8ZX EXCISION OF ASCENDING COLON, VIA NATURAL OR ARTIFICIAL OPENING ENDOSCOPIC, DIAGNOSTIC: ICD-10-PCS | Performed by: INTERNAL MEDICINE

## 2022-04-09 PROCEDURE — 87177 OVA AND PARASITES SMEARS: CPT | Performed by: HOSPITALIST

## 2022-04-09 PROCEDURE — 80048 BASIC METABOLIC PNL TOTAL CA: CPT | Performed by: PHYSICIAN ASSISTANT

## 2022-04-09 RX ORDER — PROPOFOL 10 MG/ML
INJECTION, EMULSION INTRAVENOUS AS NEEDED
Status: DISCONTINUED | OUTPATIENT
Start: 2022-04-09 | End: 2022-04-09

## 2022-04-09 RX ORDER — LIDOCAINE HYDROCHLORIDE 10 MG/ML
INJECTION, SOLUTION EPIDURAL; INFILTRATION; INTRACAUDAL; PERINEURAL AS NEEDED
Status: DISCONTINUED | OUTPATIENT
Start: 2022-04-09 | End: 2022-04-09

## 2022-04-09 RX ORDER — HYDROMORPHONE HCL/PF 1 MG/ML
0.5 SYRINGE (ML) INJECTION EVERY 4 HOURS PRN
Status: DISCONTINUED | OUTPATIENT
Start: 2022-04-09 | End: 2022-04-19 | Stop reason: HOSPADM

## 2022-04-09 RX ORDER — SODIUM CHLORIDE, SODIUM LACTATE, POTASSIUM CHLORIDE, CALCIUM CHLORIDE 600; 310; 30; 20 MG/100ML; MG/100ML; MG/100ML; MG/100ML
INJECTION, SOLUTION INTRAVENOUS CONTINUOUS PRN
Status: DISCONTINUED | OUTPATIENT
Start: 2022-04-09 | End: 2022-04-09

## 2022-04-09 RX ADMIN — PROPOFOL 40 MG: 10 INJECTION, EMULSION INTRAVENOUS at 12:22

## 2022-04-09 RX ADMIN — ASPIRIN 81 MG: 81 TABLET, CHEWABLE ORAL at 09:06

## 2022-04-09 RX ADMIN — PROPOFOL 40 MG: 10 INJECTION, EMULSION INTRAVENOUS at 12:12

## 2022-04-09 RX ADMIN — HYDRALAZINE HYDROCHLORIDE 5 MG: 20 INJECTION INTRAMUSCULAR; INTRAVENOUS at 17:36

## 2022-04-09 RX ADMIN — HYDRALAZINE HYDROCHLORIDE 5 MG: 20 INJECTION INTRAMUSCULAR; INTRAVENOUS at 00:33

## 2022-04-09 RX ADMIN — DEXTROSE, SODIUM CHLORIDE, AND POTASSIUM CHLORIDE 100 ML/HR: 5; .45; .15 INJECTION INTRAVENOUS at 22:29

## 2022-04-09 RX ADMIN — METRONIDAZOLE 500 MG: 500 INJECTION, SOLUTION INTRAVENOUS at 01:20

## 2022-04-09 RX ADMIN — HYDROMORPHONE HYDROCHLORIDE 1 MG: 1 INJECTION, SOLUTION INTRAMUSCULAR; INTRAVENOUS; SUBCUTANEOUS at 13:34

## 2022-04-09 RX ADMIN — INSULIN LISPRO 1 UNITS: 100 INJECTION, SOLUTION INTRAVENOUS; SUBCUTANEOUS at 00:33

## 2022-04-09 RX ADMIN — PROPOFOL 40 MG: 10 INJECTION, EMULSION INTRAVENOUS at 12:16

## 2022-04-09 RX ADMIN — LIDOCAINE HYDROCHLORIDE 50 MG: 10 INJECTION, SOLUTION EPIDURAL; INFILTRATION; INTRACAUDAL; PERINEURAL at 12:09

## 2022-04-09 RX ADMIN — CEFTRIAXONE SODIUM 1000 MG: 10 INJECTION, POWDER, FOR SOLUTION INTRAVENOUS at 17:37

## 2022-04-09 RX ADMIN — HYDRALAZINE HYDROCHLORIDE 5 MG: 20 INJECTION INTRAMUSCULAR; INTRAVENOUS at 13:34

## 2022-04-09 RX ADMIN — METRONIDAZOLE 500 MG: 500 INJECTION, SOLUTION INTRAVENOUS at 09:06

## 2022-04-09 RX ADMIN — PROPOFOL 40 MG: 10 INJECTION, EMULSION INTRAVENOUS at 12:19

## 2022-04-09 RX ADMIN — DEXTROSE, SODIUM CHLORIDE, AND POTASSIUM CHLORIDE 100 ML/HR: 5; .45; .15 INJECTION INTRAVENOUS at 06:37

## 2022-04-09 RX ADMIN — HYDROMORPHONE HYDROCHLORIDE 1 MG: 1 INJECTION, SOLUTION INTRAMUSCULAR; INTRAVENOUS; SUBCUTANEOUS at 19:45

## 2022-04-09 RX ADMIN — FLUTICASONE FUROATE AND VILANTEROL TRIFENATATE 1 PUFF: 200; 25 POWDER RESPIRATORY (INHALATION) at 09:06

## 2022-04-09 RX ADMIN — HYDRALAZINE HYDROCHLORIDE 5 MG: 20 INJECTION INTRAMUSCULAR; INTRAVENOUS at 06:37

## 2022-04-09 RX ADMIN — PROPOFOL 80 MG: 10 INJECTION, EMULSION INTRAVENOUS at 12:09

## 2022-04-09 RX ADMIN — SODIUM CHLORIDE, SODIUM LACTATE, POTASSIUM CHLORIDE, AND CALCIUM CHLORIDE: .6; .31; .03; .02 INJECTION, SOLUTION INTRAVENOUS at 11:54

## 2022-04-09 RX ADMIN — AMLODIPINE BESYLATE 10 MG: 10 TABLET ORAL at 09:06

## 2022-04-09 RX ADMIN — METRONIDAZOLE 500 MG: 500 INJECTION, SOLUTION INTRAVENOUS at 18:44

## 2022-04-09 NOTE — PROGRESS NOTES
[facility]  Progress Note - Shikha Expose 1945, 78 y o  female MRN: [id number]  Unit/Bed#: -[facility] Encounter: [id number]  Primary Care Provider: Sol Vega   Date and time admitted to hospital: 4/4/2022  2:10 PM    * Acute diverticulitis  Assessment & Plan  · Patient presented to the ED for left lower quadrant abdominal pain that started on 03/29  · Patient reported that over the 1 week prior to admission pain had been increasing and she had been having associated symptoms such as nausea, vomiting, diarrhea, poor appetite  Denies other symptoms such as fever/chills, headaches, dizziness chest pain, shortness of breath  · CT A/P: There is thickening of the splenic flexure with associated pericolonic infiltration, suggestive diverticulitis  Additional thickening of the ascending colon with some infiltration at its mesenteric margin may be due to additional segment of diverticulitis or colitis  Suggest follow-up with the colonoscopy when the acute illness subsides to exclude  focal colonic l lesion  There is no fluid collection seen  There is no free air  · WBC 19 43, afebrile on admission  Did not meet sepsis criteria, [id number] mostly eosinophils  · Continue IV ceftriaxone and metronidazole  · Continuous IVF-- decrease rate to 75 ml/h  · Keep NPO for now, GI, surgery, ID on board   · No surgical intervention   · Continue antibiotics  · F/u with colonoscopy in tommorrows, GI following  · Pain control--continue dilaudid      Eosinophilia  Assessment & Plan  GI, ID Surgery following    Transaminitis  Assessment & Plan  · Elevated LFTs on admission  · AST 58, , Alkaline phosphatase 216  · Likely in the setting of acute diverticulitis infection  Denies any right upper quadrant abdominal pain at this time  Denies any drug or alcohol use    · Continue to trend with daily CMP-- ALT decreased from 126-90, AST normalized, ALP decreased from to 134290, albumin-2 9, normal total bilirubin on 04/5/22 -- will repeat tomorrow  · If patient develops right upper quadrant pain or worsening liver enzymes, consider further workup with ultrasound and lab studies--currently denies any right upper quadrant pain, has diffuse abdominal pain secondary to diverticulitis    Primary hypertension  Assessment & Plan  · /72 on admission-  · Continue pre-hospital amlodipine and lisinopril  · Monitor vitals per routine    Stage 3a chronic kidney disease [facility])  Assessment & Plan  Lab Results   Component Value Date    EGFR 75 04/08/2022    EGFR 63 04/07/2022    EGFR 74 04/06/2022    CREATININE 0 77 04/08/2022    CREATININE 0 89 04/07/2022    CREATININE 0 78 04/06/2022     · Creatinine stable at baseline  · Avoid hypotension and nephrotoxic agents  · Monitor BMP daily    Controlled type 2 diabetes with neuropathy [facility])  Assessment & Plan  Lab Results   Component Value Date    HGBA1C 6 4 12/22/2021       Recent Labs     04/07/22  2110 04/08/22  0015 04/08/22  0711 04/08/22  1121   POCGLU 134 145* 128 140       Blood Sugar Average: Last 72 hrs:  (P) 113 25     · Holding home oral medications-- currently on insulin sliding scale, q 6 hours and HS  · POC glucose-64 in a m on 04/05/22 , patient noted sweating but did not have any other symptoms like palpitations, tremors, repeat blood sugar after dextrose administration-134  · POC glucose in the 90s  · Start on SSI with accu checks  · Hypoglycemia protocol    Mild intermittent asthma  Assessment & Plan  · No acute exacerbation  · Continue pre-hospital inhalers        VTE Pharmacologic Prophylaxis: VTE Score: 4 Moderate Risk (Score 3-4) - Pharmacological DVT Prophylaxis Ordered: enoxaparin (Lovenox)  Patient Centered Rounds: I performed bedside rounds with nursing staff today  Discussions with Specialists or Other Care Team Provider: GI    Education and Discussions with Family / Patient: Updated  () via phone      Time Spent for Care: 45 minutes  More than 50% of total time spent on counseling and coordination of care as described above  Current Length of Stay: 5 day(s)  Current Patient Status: Inpatient   Certification Statement: The patient will continue to require additional inpatient hospital stay due to worsening symptoms  Discharge Plan: Anticipate discharge in 48-72 hrs to home with home services  Code Status: Level 1 - Full Code    Subjective:   Pt complains of worsening abdominal pain  She has not had a BM    Objective:     Vitals:   Temp (24hrs), Av 8 °F (36 6 °C), Min:97 7 °F (36 5 °C), Max:97 8 °F (36 6 °C)    Temp:  [97 7 °F (36 5 °C)-97 8 °F (36 6 °C)] 97 8 °F (36 6 °C)  HR:  [62-70] 70  Resp:  [17-20] 17  BP: (144-154)/(63-80) 154/64  SpO2:  [91 %-94 %] 94 %  Body mass index is 35 52 kg/m²  Input and Output Summary (last 24 hours): Intake/Output Summary (Last 24 hours) at 2022 1043  Last data filed at 2022 1015  Gross per 24 hour   Intake 1000 ml   Output 500 ml   Net 500 ml       Physical Exam:   Physical Exam  Constitutional:       Appearance: She is ill-appearing  HENT:      Head: Normocephalic and atraumatic  Nose: Nose normal       Mouth/Throat:      Mouth: Mucous membranes are moist       Pharynx: Oropharynx is clear  Cardiovascular:      Rate and Rhythm: Normal rate  Abdominal:      Tenderness: There is abdominal tenderness  Musculoskeletal:         General: Normal range of motion  Skin:     General: Skin is warm and dry  Capillary Refill: Capillary refill takes less than 2 seconds  Neurological:      General: No focal deficit present     Psychiatric:         Mood and Affect: Mood normal           Additional Data:     Labs:  Results from last 7 days   Lab Units 22  0548   WBC Thousand/uL 26 08*   HEMOGLOBIN g/dL 11 6   HEMATOCRIT % 35 1   PLATELETS Thousands/uL 197   NEUTROS PCT % 16*   LYMPHS PCT % 9*   MONOS PCT % 2*   EOS PCT % 72*     Results from last 7 days Lab Units 04/09/22  0548 04/06/22  0534 04/05/22  0501   SODIUM mmol/L 136   < > 139   POTASSIUM mmol/L 3 7   < > 4 5   CHLORIDE mmol/L 103   < > 104   CO2 mmol/L 26   < > 26   BUN mg/dL 6   < > 13   CREATININE mg/dL 0 74   < > 0 86   ANION GAP mmol/L 7   < > 9   CALCIUM mg/dL 8 9   < > 8 8   ALBUMIN g/dL  --   --  2 9*   TOTAL BILIRUBIN mg/dL  --   --  0 28   ALK PHOS U/L  --   --  178*   ALT U/L  --   --  90*   AST U/L  --   --  36   GLUCOSE RANDOM mg/dL 148*   < > 75    < > = values in this interval not displayed  Results from last 7 days   Lab Units 04/09/22  0736 04/09/22  0032 04/08/22  1835 04/08/22  1121 04/08/22  0711 04/08/22  0015 04/07/22  2110 04/07/22  1556 04/07/22  1113 04/07/22  0730 04/07/22  0050 04/06/22  1805   POC GLUCOSE mg/dl 147* 179* 189* 140 128 145* 134 146* 116 145* 112 82         Results from last 7 days   Lab Units 04/04/22  1454   LACTIC ACID mmol/L 0 9       Lines/Drains:  Invasive Devices  Report    Peripheral Intravenous Line            Peripheral IV 04/06/22 Left;Ventral (anterior) Forearm 2 days                      Imaging: No pertinent imaging reviewed      Recent Cultures (last 7 days):   Results from last 7 days   Lab Units 04/06/22  1306   C DIFF TOXIN B BY PCR  Negative       Last 24 Hours Medication List:   Current Facility-Administered Medications   Medication Dose Route Frequency Provider Last Rate    albuterol  1 puff Inhalation Q4H PRN Estevan Ann PA-C      amLODIPine  10 mg Oral Daily Maxine Drake PA-C      aspirin  81 mg Oral Daily Maxine Drake PA-C      cefTRIAXone  1,000 mg Intravenous Q24H Maxine Drake PA-C Stopped (04/08/22 1910)    dextrose 5 % and sodium chloride 0 45 % with KCl 20 mEq/L  100 mL/hr Intravenous Continuous Glenn Robbins PA-C 100 mL/hr (04/09/22 6937)    enoxaparin  40 mg Subcutaneous Daily Maxine Drake PA-C      fluticasone-vilanterol  1 puff Inhalation Daily Maxine Drake PA-C      hydrALAZINE  5 mg Intravenous Q6H [address] Dot Nicholas MD      HYDROmorphone  1 mg Intravenous Q4H PRN Pennie Robbins PA-C      insulin lispro  1-5 Units Subcutaneous HS Maxine Drake PA-C      insulin lispro  1-6 Units Subcutaneous Q6H Helene Rollins MD      iohexol  50 mL Oral Once in imaging Dot Nicholas MD      LORazepam  0 5 mg Oral Q8H PRN Howie Vidales PA-C      metroNIDAZOLE  500 mg Intravenous Q8H Maxine Drake PA-C 500 mg (04/09/22 0906)    naloxone  0 04 mg Intravenous Q1MIN PRN Pennie Robbins PA-C      ondansetron  4 mg Intravenous Q6H PRN Mary Harrington PA-C      zolpidem  5 mg Oral HS PRN Howie Vidales PA-C          Today, Patient Was Seen By: Dot Nicholas MD    **Please Note: This note may have been constructed using a voice recognition system  **

## 2022-04-09 NOTE — PLAN OF CARE
Problem: PAIN - ADULT  Goal: Verbalizes/displays adequate comfort level or baseline comfort level  Description: Interventions:  - Encourage patient to monitor pain and request assistance  - Assess pain using appropriate pain scale  - Administer analgesics based on type and severity of pain and evaluate response  - Implement non-pharmacological measures as appropriate and evaluate response  - Consider cultural and social influences on pain and pain management  - Notify physician/advanced practitioner if interventions unsuccessful or patient reports new pain  Outcome: Progressing     Problem: SAFETY ADULT  Goal: Maintain or return to baseline ADL function  Description: INTERVENTIONS:  -  Assess patient's ability to carry out ADLs; assess patient's baseline for ADL function and identify physical deficits which impact ability to perform ADLs (bathing, care of mouth/teeth, toileting, grooming, dressing, etc )  - Assess/evaluate cause of self-care deficits   - Assess range of motion  - Assess patient's mobility; develop plan if impaired  - Assess patient's need for assistive devices and provide as appropriate  - Encourage maximum independence but intervene and supervise when necessary  - Involve family in performance of ADLs  - Assess for home care needs following discharge   - Consider OT consult to assist with ADL evaluation and planning for discharge  - Provide patient education as appropriate  Outcome: Progressing

## 2022-04-09 NOTE — ANESTHESIA PREPROCEDURE EVALUATION
Procedure:  COLONOSCOPY    Relevant Problems   CARDIO   (+) Atypical chest pain   (+) Benign hypertension   (+) Exertional dyspnea   (+) Hypertensive urgency   (+) Primary hypertension      ENDO   (+) 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)      /RENAL   (+) Stage 3a chronic kidney disease (HCC)      NEURO/PSYCH   (+) Anxiety   (+) Depression with anxiety      PULMONARY   (+) COPD (chronic obstructive pulmonary disease) (HCC)   (+) Chronic bronchitis (HCC)   (+) Exertional dyspnea   (+) Mild intermittent asthma   (+) BOLA (obstructive sleep apnea)      Other   (+) Acute cystitis with hematuria   (+) BMI 37 0-37 9, adult   (+) Eosinophilia        Physical Exam    Airway    Mallampati score: II  TM Distance: >3 FB  Neck ROM: full     Dental   No notable dental hx     Cardiovascular  Cardiovascular exam normal    Pulmonary  Pulmonary exam normal     Other Findings        Anesthesia Plan  ASA Score- 3     Anesthesia Type- IV sedation with anesthesia with ASA Monitors  Additional Monitors:   Airway Plan:           Plan Factors-Exercise tolerance (METS): >4 METS  Chart reviewed  EKG reviewed  Imaging results reviewed  Existing labs reviewed  Patient summary reviewed  Patient is not a current smoker  Induction- intravenous  Postoperative Plan-     Informed Consent- Anesthetic plan and risks discussed with patient  I personally reviewed this patient with the CRNA  Discussed and agreed on the Anesthesia Plan with the CRNA  Delmy Clifford

## 2022-04-09 NOTE — PROGRESS NOTES
Progress Note - Infectious Disease   Abhay Byers 78 y o  female MRN: [id number]  Unit/Bed#: -01 Encounter: [id number]      Impression/Plan:  1  Abdominal pain/diarrhea with high level eosinophilia  This level of eosinophilia is new, with last CBC in February of this year only with low level eosinophilia  This high level of eosinophilia is not consistent with intestinal parasites  With colitis and eosinophilia, consider inflammatory bowel disease  Also, consider idiopathic eosinophilic colitis but new onset in 70s is not common  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  Continue ceftriaxone Flagyl awaiting colonoscopy  Colonoscopy for today   Close GI follow-up  Check stool for ova and parasite  Check Strongyloides serology  Check flow cytometry     2  High level eosinophilia  As in above, at this level of eosinophilia, intestinal parasite is unlikely  Consider starting July these hyper infection syndrome but clinical presentation is not consistent with syndrome and patient has very little risk factor for it, without any immunosuppression  She did live in the [place] but moved to the [place] 30+ years ago  Colonoscopy as above  Recheck CBC with diff  Check Strongyloides serologies  HIV screen, to be complete  Check flow cytometry to make sure not eosinophilia leukemia  Consider Hematology Oncology evaluation     3  Mildly elevated LFTs, likely secondary to colitis above  Patient has no RUQ abdominal pain  Monitor LFTs      4  CKD  Creatinine baseline  Antibiotics at full dose  Monitor creatinine        5  DM, with neuropathy  Management per primary service  Discussed the above management plan with the primary service    Antibiotics:  Ceftriaxone 6  Flagyl 6    Subjective:  Patient has no fever, chills, sweats; no nausea, vomiting, still having diarrhea; no cough, shortness of breath; still with some abdominal pain  No new symptoms      Objective:  Vitals:  Temp:  [97 7 °F (36 5 °C)-97 8 °F (36 6 °C)] 97 8 °F (36 6 °C)  HR:  [62-70] 70  Resp:  [17-20] 17  BP: (144-154)/(63-80) 154/64  SpO2:  [91 %-94 %] 94 %  Temp (24hrs), Av 8 °F (36 6 °C), Min:97 7 °F (36 5 °C), Max:97 8 °F (36 6 °C)  Current: Temperature: 97 8 °F (36 6 °C)    Physical Exam:   General Appearance:  Alert, interactive, nontoxic, no acute distress  Throat: Oropharynx moist without lesions  Lungs:   Clear to auscultation bilaterally; no wheezes, rhonchi or rales; respirations unlabored   Heart:  RRR; no murmur, rub or gallop   Abdomen:   Soft, non-tender, non-distended, positive bowel sounds  Extremities: No clubbing, cyanosis or edema   Skin: No new rashes or lesions  No draining wounds noted  Labs, Imaging, & Other studies:   All pertinent labs and imaging studies were personally reviewed  Results from last 7 days   Lab Units 22  0548 22  0450 22  0558   WBC Thousand/uL 26 08* 22 14* 21 42*   HEMOGLOBIN g/dL 11 6 11 4* 11 9   PLATELETS Thousands/uL 197 193 159     Results from last 7 days   Lab Units 22  0548 22  0450 22  0450 22  0558 22  0558 22  0534 22  0501 22  1454 22  1454   SODIUM mmol/L 136  --  138  --  137   < > 139   < > 138   POTASSIUM mmol/L 3 7   < > 3 8   < > 3 7   < > 4 5   < > 4 2   CHLORIDE mmol/L 103   < > 102   < > 101   < > 104   < > 101   CO2 mmol/L 26   < > 27   < > 27   < > 26   < > 26   BUN mg/dL 6   < > 5   < > 8   < > 13   < > 13   CREATININE mg/dL 0 74   < > 0 77   < > 0 89   < > 0 86   < > 0 98   EGFR ml/min/1 73sq m 79   < > 75   < > 63   < > 66   < > 56   CALCIUM mg/dL 8 9   < > 8 8   < > 9 4   < > 8 8   < > 9 5   AST U/L  --   --   --   --   --   --  36  --  58*   ALT U/L  --   --   --   --   --   --  90*   < > 126*   ALK PHOS U/L  --   --   --   --   --   --  178*   < > 216*    < > = values in this interval not displayed       Results from last 7 days   Lab Units 22  1306   C DIFF TOXIN B BY PCR Negative         Results from last 7 days   Lab Units 04/08/22  0450   CRP mg/L 67 1*

## 2022-04-09 NOTE — ANESTHESIA POSTPROCEDURE EVALUATION
Post-Op Assessment Note    CV Status:  Stable  Pain Score: 0    Pain management: adequate     Mental Status:  Alert and awake   Hydration Status:  Euvolemic   PONV Controlled:  Controlled   Airway Patency:  Patent      Post Op Vitals Reviewed: Yes      Staff: CRNA         No complications documented      /53 (04/09/22 1232)    Temp 97 8 °F (36 6 °C) (04/09/22 1232)    Pulse 65 (04/09/22 1232)   Resp 21 (04/09/22 1232)    SpO2 98 % (04/09/22 1232)

## 2022-04-09 NOTE — PLAN OF CARE
Problem: MOBILITY - ADULT  Goal: Maintain or return to baseline ADL function  Description: INTERVENTIONS:  -  Assess patient's ability to carry out ADLs; assess patient's baseline for ADL function and identify physical deficits which impact ability to perform ADLs (bathing, care of mouth/teeth, toileting, grooming, dressing, etc )  - Assess/evaluate cause of self-care deficits   - Assess range of motion  - Assess patient's mobility; develop plan if impaired  - Assess patient's need for assistive devices and provide as appropriate  - Encourage maximum independence but intervene and supervise when necessary  - Involve family in performance of ADLs  - Assess for home care needs following discharge   - Consider OT consult to assist with ADL evaluation and planning for discharge  - Provide patient education as appropriate  Outcome: Progressing  Goal: Maintains/Returns to pre admission functional level  Description: INTERVENTIONS:  - Perform BMAT or MOVE assessment daily    - Set and communicate daily mobility goal to care team and patient/family/caregiver  - Collaborate with rehabilitation services on mobility goals if consulted  - Perform Range of Motion  times a day  - Reposition patient every  hours    - Dangle patient  times a day  - Stand patient  times a day  - Ambulate patient times a day  - Out of bed to chair times a day   - Out of bed for meals  times a day  - Out of bed for toileting  - Record patient progress and toleration of activity level   Outcome: Progressing     Problem: PAIN - ADULT  Goal: Verbalizes/displays adequate comfort level or baseline comfort level  Description: Interventions:  - Encourage patient to monitor pain and request assistance  - Assess pain using appropriate pain scale  - Administer analgesics based on type and severity of pain and evaluate response  - Implement non-pharmacological measures as appropriate and evaluate response  - Consider cultural and social influences on pain and pain management  - Notify physician/advanced practitioner if interventions unsuccessful or patient reports new pain  Outcome: Progressing     Problem: SAFETY ADULT  Goal: Maintain or return to baseline ADL function  Description: INTERVENTIONS:  -  Assess patient's ability to carry out ADLs; assess patient's baseline for ADL function and identify physical deficits which impact ability to perform ADLs (bathing, care of mouth/teeth, toileting, grooming, dressing, etc )  - Assess/evaluate cause of self-care deficits   - Assess range of motion  - Assess patient's mobility; develop plan if impaired  - Assess patient's need for assistive devices and provide as appropriate  - Encourage maximum independence but intervene and supervise when necessary  - Involve family in performance of ADLs  - Assess for home care needs following discharge   - Consider OT consult to assist with ADL evaluation and planning for discharge  - Provide patient education as appropriate  Outcome: Progressing  Goal: Maintains/Returns to pre admission functional level  Description: INTERVENTIONS:  - Perform BMAT or MOVE assessment daily    - Set and communicate daily mobility goal to care team and patient/family/caregiver  - Collaborate with rehabilitation services on mobility goals if consulted  - Perform Range of Motion  times a day  - Reposition patient every  hours    - Dangle patient  times a day  - Stand patient  times a day  - Ambulate patient  times a day  - Out of bed to chair  times a day   - Out of bed for meals  times a day  - Out of bed for toileting  - Record patient progress and toleration of activity level   Outcome: Progressing  Goal: Patient will remain free of falls  Description: INTERVENTIONS:  - Educate patient/family on patient safety including physical limitations  - Instruct patient to call for assistance with activity   - Consult OT/PT to assist with strengthening/mobility   - Keep Call bell within reach  - Keep bed low and locked with side rails adjusted as appropriate  - Keep care items and personal belongings within reach  - Initiate and maintain comfort rounds  - Make Fall Risk Sign visible to staff  - Offer Toileting every  Hours, in advance of need  - Initiate/Maintain alarm  - Obtain necessary fall risk management equipment:   - Apply yellow socks and bracelet for high fall risk patients  - Consider moving patient to room near nurses station  Outcome: Progressing     Problem: DISCHARGE PLANNING  Goal: Discharge to home or other facility with appropriate resources  Description: INTERVENTIONS:  - Identify barriers to discharge w/patient and caregiver  - Arrange for needed discharge resources and transportation as appropriate  - Identify discharge learning needs (meds, wound care, etc )  - Arrange for interpretive services to assist at discharge as needed  - Refer to Case Management Department for coordinating discharge planning if the patient needs post-hospital services based on physician/advanced practitioner order or complex needs related to functional status, cognitive ability, or social support system  Outcome: Progressing     Problem: Knowledge Deficit  Goal: Patient/family/caregiver demonstrates understanding of disease process, treatment plan, medications, and discharge instructions  Description: Complete learning assessment and assess knowledge base    Interventions:  - Provide teaching at level of understanding  - Provide teaching via preferred learning methods  Outcome: Progressing     Problem: GASTROINTESTINAL - ADULT  Goal: Minimal or absence of nausea and/or vomiting  Description: INTERVENTIONS:  - Administer IV fluids if ordered to ensure adequate hydration  - Maintain NPO status until nausea and vomiting are resolved  - Nasogastric tube if ordered  - Administer ordered antiemetic medications as needed  - Provide nonpharmacologic comfort measures as appropriate  - Advance diet as tolerated, if ordered  - Consider nutrition services referral to assist patient with adequate nutrition and appropriate food choices  Outcome: Progressing  Goal: Maintains or returns to baseline bowel function  Description: INTERVENTIONS:  - Assess bowel function  - Encourage oral fluids to ensure adequate hydration  - Administer IV fluids if ordered to ensure adequate hydration  - Administer ordered medications as needed  - Encourage mobilization and activity  - Consider nutritional services referral to assist patient with adequate nutrition and appropriate food choices  Outcome: Progressing  Goal: Maintains adequate nutritional intake  Description: INTERVENTIONS:  - Monitor percentage of each meal consumed  - Identify factors contributing to decreased intake, treat as appropriate  - Assist with meals as needed  - Monitor I&O, weight, and lab values if indicated  - Obtain nutrition services referral as needed  Outcome: Progressing     Problem: Nutrition/Hydration-ADULT  Goal: Nutrient/Hydration intake appropriate for improving, restoring or maintaining nutritional needs  Description: Monitor and assess patient's nutrition/hydration status for malnutrition  Collaborate with interdisciplinary team and initiate plan and interventions as ordered  Monitor patient's weight and dietary intake as ordered or per policy  Utilize nutrition screening tool and intervene as necessary  Determine patient's food preferences and provide high-protein, high-caloric foods as appropriate       INTERVENTIONS:  - Monitor oral intake, urinary output, labs, and treatment plans  - Assess nutrition and hydration status and recommend course of action  - Evaluate amount of meals eaten  - Assist patient with eating if necessary   - Allow adequate time for meals  - Recommend/ encourage appropriate diets, oral nutritional supplements, and vitamin/mineral supplements  - Order, calculate, and assess calorie counts as needed  - Recommend, monitor, and adjust tube feedings and TPN/PPN based on assessed needs  - Assess need for intravenous fluids  - Provide specific nutrition/hydration education as appropriate  - Include patient/family/caregiver in decisions related to nutrition  Outcome: Progressing

## 2022-04-09 NOTE — PROGRESS NOTES
Progress Note -Surgery PA  Faustino 78 y o  female MRN: [id number]  Unit/Bed#: -01 Encounter: [id number]      Assessment   77y F w Acute diverticulitis of ascending colon and splenic flexure  - AVSS, WBC increase again today 26  - GI for colonoscopy today; she did tolerate bowel prep  - abdomen soft with continued lower abdominal tenderness without guarding  Plan   -- plan for colonoscopy today to evaluate for further etiology of abdominal pain and leukocytosis with eosinophilia which continued to increase  -- continue with IV antibiotics  -- continue with diet NPO, consider clear liquid diet after procedure  -- continue to follow labs and abdominal exams  -- follow-up colonoscopy for further recommendation per GI Infectious Disease, appreciate input  -- analgesia and antiemetics, malika hu n     ______________________________________________________________________  Subjective:   Patient tolerated colonoscopy prep  States she still has sharp cramping pains that come and go but has required pain medication less often  Overall pain is about the same  Did have bowel movements with bowel prep  Denies nausea or vomiting  Denies chest pain or shortness of breath  Denies fevers or chills  Objective:    Vitals:  /64   Pulse 70   Temp 97 8 °F (36 6 °C)   Resp 17   Ht 5' 2" (1 575 m)   Wt 88 1 kg (194 lb 3 6 oz)   SpO2 94%   BMI 35 52 kg/m²     I/Os:  I/O last 3 completed shifts: In: 1000 [I V :1000]  Out: 1400 [Urine:1400]    I/O this shift:   In: 0   Out: 500 [Stool:500]    Invasive Devices  Report    Peripheral Intravenous Line            Peripheral IV 04/06/22 Left;Ventral (anterior) Forearm 2 days                Medications:  Current Facility-Administered Medications   Medication Dose Route Frequency    albuterol (PROVENTIL HFA,VENTOLIN HFA) inhaler 1 puff  1 puff Inhalation Q4H PRN    amLODIPine (NORVASC) tablet 10 mg  10 mg Oral Daily    aspirin chewable tablet 81 mg  81 mg Oral Daily    ceftriaxone (ROCEPHIN) 1 g/50 mL in dextrose IVPB  1,000 mg Intravenous Q24H    dextrose 5 % and sodium chloride 0 45 % with KCl 20 mEq/L infusion  100 mL/hr Intravenous Continuous    enoxaparin (LOVENOX) subcutaneous injection 40 mg  40 mg Subcutaneous Daily    fluticasone-vilanterol (BREO ELLIPTA) 200-25 MCG/INH inhaler 1 puff  1 puff Inhalation Daily    hydrALAZINE (APRESOLINE) injection 5 mg  5 mg Intravenous Q6H [address]    HYDROmorphone (DILAUDID) injection 1 mg  1 mg Intravenous Q4H PRN    insulin lispro (HumaLOG) 100 units/mL subcutaneous injection 1-5 Units  1-5 Units Subcutaneous HS    insulin lispro (HumaLOG) 100 units/mL subcutaneous injection 1-6 Units  1-6 Units Subcutaneous Q6H    iohexol (OMNIPAQUE) 240 MG/ML solution 50 mL  50 mL Oral Once in imaging    LORazepam (ATIVAN) tablet 0 5 mg  0 5 mg Oral Q8H PRN    metroNIDAZOLE (FLAGYL) IVPB (premix) 500 mg 100 mL  500 mg Intravenous Q8H    naloxone (NARCAN) injection 0 04 mg  0 04 mg Intravenous Q1MIN PRN    ondansetron (ZOFRAN) injection 4 mg  4 mg Intravenous Q6H PRN    zolpidem (AMBIEN) tablet 5 mg  5 mg Oral HS PRN                 Lab Results and Cultures:   CBC with diff:   Lab Results   Component Value Date    WBC 26 08 (H) 04/09/2022    HGB 11 6 04/09/2022    HCT 35 1 04/09/2022    MCV 90 04/09/2022     04/09/2022    MCH 29 6 04/09/2022    MCHC 33 0 04/09/2022    RDW 14 6 04/09/2022    MPV 9 4 04/09/2022    NRBC 0 04/09/2022       BMP/CMP:  Lab Results   Component Value Date    K 3 7 04/09/2022     04/09/2022    CO2 26 04/09/2022    BUN 6 04/09/2022    CREATININE 0 74 04/09/2022    CALCIUM 8 9 04/09/2022    AST 36 04/05/2022    ALT 90 (H) 04/05/2022    ALKPHOS 178 (H) 04/05/2022    EGFR 79 04/09/2022       Lipid Panel:   No results found for: CHOL    Coags:   No results found for: PT, PTT, INR     Urinalysis:   Lab Results   Component Value Date    COLORU Yellow 04/04/2022    COLORU Yellow 09/13/2017    CLARITYU Cloudy 04/04/2022    CLARITYU Transparent 09/13/2017    SPECGRAV 1 020 04/04/2022    SPECGRAV 1 005 09/13/2017    PHUR 5 5 04/04/2022    PHUR 6 0 09/13/2017    LEUKOCYTESUR Negative 04/04/2022    LEUKOCYTESUR - 09/13/2017    NITRITE Positive (A) 04/04/2022    NITRITE - 09/13/2017    PROTEINUA - 09/13/2017    GLUCOSEU Negative 04/04/2022    KETONESU Trace (A) 04/04/2022    KETONESU - 09/13/2017    BILIRUBINUR Negative 04/04/2022    BILIRUBINUR - 09/13/2017    BLOODU Trace-Intact (A) 04/04/2022    BLOODU trace 09/13/2017        Urine Culture:   Lab Results   Component Value Date    URINECX 50,000-59,000 cfu/ml  04/15/2021      Wound Culure: No results found for: WOUNDCULT  Blood Culture: No results found for: BLOODCX      Physical Exam:  General Appearance:    Alert and orientated x 3, cooperative, no distress, appears stated age   Lungs:     Clear to auscultation bilaterally, respirations unlabored, no wheezes    Heart:    Regular rate and rhythm, S1 and S2 normal, no murmur   Abdomen:    Normoactive BS, soft, lower abdominal tenderness palpation, abdomen soft non rigid, no masses, no palpated organomegaly   Extremities:  Extremities normal, no calf tenderness, no cyanosis or edema   Pulses:   2+ and symmetric all extremities   Skin:   Skin color, texture, turgor normal, no rashes   Neurologic:   CNII-XII intact, normal strength, affect appropriate       Imaging:  CT abdomen pelvis w contrast    Result Date: 4/7/2022  Impression: Overall similar appearance of colonic wall thickening centered at the cecum/ascending colon and at the splenic flexure, with similar pericolonic inflammatory stranding in the right colon and slight improvement on the left  No evidence of perforation or abscess formation  Workstation performed: [id number]     CT abdomen pelvis with contrast    Result Date: 4/4/2022  Impression: There is thickening of the splenic flexure with associated pericolonic infiltration, suggestive diverticulitis  Additional thickening of the ascending colon with some infiltration at its mesenteric margin may be due to additional segment of diverticulitis or colitis  Suggest follow-up with the colonoscopy when the acute illness subsides to exclude  focal colonic lesion There is no fluid collection seen There is no free air Small mesenteric lymph nodes with largest measuring about 1 cm, short interval follow-up at 3 months suggested The study was marked in EPIC for immediate notification   Follow-up notification has been created in [employer] performed: [id number]       VTE Pharmacologic Prophylaxis: Enoxaparin (Lovenox)  VTE Mechanical Prophylaxis: sequential compression device    Bishop Chilango PA-C   4/9/2022

## 2022-04-10 LAB
ANION GAP SERPL CALCULATED.3IONS-SCNC: 9 MMOL/L (ref 4–13)
BASOPHILS # BLD AUTO: 0.09 THOUSANDS/ΜL (ref 0–0.1)
BASOPHILS NFR BLD AUTO: 0 % (ref 0–1)
BUN SERPL-MCNC: 6 MG/DL (ref 5–25)
CALCIUM SERPL-MCNC: 8.6 MG/DL (ref 8.3–10.1)
CHLORIDE SERPL-SCNC: 106 MMOL/L (ref 100–108)
CO2 SERPL-SCNC: 25 MMOL/L (ref 21–32)
CREAT SERPL-MCNC: 0.77 MG/DL (ref 0.6–1.3)
EOSINOPHIL # BLD AUTO: 17.55 THOUSAND/ΜL (ref 0–0.61)
EOSINOPHIL NFR BLD AUTO: 67 % (ref 0–6)
ERYTHROCYTE [DISTWIDTH] IN BLOOD BY AUTOMATED COUNT: 14.9 % (ref 11.6–15.1)
GFR SERPL CREATININE-BSD FRML MDRD: 75 ML/MIN/1.73SQ M
GLUCOSE SERPL-MCNC: 135 MG/DL (ref 65–140)
GLUCOSE SERPL-MCNC: 136 MG/DL (ref 65–140)
GLUCOSE SERPL-MCNC: 148 MG/DL (ref 65–140)
GLUCOSE SERPL-MCNC: 178 MG/DL (ref 65–140)
GLUCOSE SERPL-MCNC: 220 MG/DL (ref 65–140)
HCT VFR BLD AUTO: 33.9 % (ref 34.8–46.1)
HGB BLD-MCNC: 11.2 G/DL (ref 11.5–15.4)
IMM GRANULOCYTES # BLD AUTO: 0.21 THOUSAND/UL (ref 0–0.2)
IMM GRANULOCYTES NFR BLD AUTO: 1 % (ref 0–2)
LYMPHOCYTES # BLD AUTO: 2.75 THOUSANDS/ΜL (ref 0.6–4.47)
LYMPHOCYTES NFR BLD AUTO: 11 % (ref 14–44)
MCH RBC QN AUTO: 29.6 PG (ref 26.8–34.3)
MCHC RBC AUTO-ENTMCNC: 33 G/DL (ref 31.4–37.4)
MCV RBC AUTO: 90 FL (ref 82–98)
MONOCYTES # BLD AUTO: 0.66 THOUSAND/ΜL (ref 0.17–1.22)
MONOCYTES NFR BLD AUTO: 3 % (ref 4–12)
NEUTROPHILS # BLD AUTO: 4.54 THOUSANDS/ΜL (ref 1.85–7.62)
NEUTS SEG NFR BLD AUTO: 18 % (ref 43–75)
NRBC BLD AUTO-RTO: 0 /100 WBCS
PLATELET # BLD AUTO: 200 THOUSANDS/UL (ref 149–390)
PMV BLD AUTO: 9.2 FL (ref 8.9–12.7)
POTASSIUM SERPL-SCNC: 3.7 MMOL/L (ref 3.5–5.3)
RBC # BLD AUTO: 3.78 MILLION/UL (ref 3.81–5.12)
SODIUM SERPL-SCNC: 140 MMOL/L (ref 136–145)
WBC # BLD AUTO: 25.8 THOUSAND/UL (ref 4.31–10.16)

## 2022-04-10 PROCEDURE — 99232 SBSQ HOSP IP/OBS MODERATE 35: CPT | Performed by: INTERNAL MEDICINE

## 2022-04-10 PROCEDURE — 82948 REAGENT STRIP/BLOOD GLUCOSE: CPT

## 2022-04-10 PROCEDURE — 99232 SBSQ HOSP IP/OBS MODERATE 35: CPT | Performed by: STUDENT IN AN ORGANIZED HEALTH CARE EDUCATION/TRAINING PROGRAM

## 2022-04-10 PROCEDURE — 86682 HELMINTH ANTIBODY: CPT | Performed by: INTERNAL MEDICINE

## 2022-04-10 PROCEDURE — 85025 COMPLETE CBC W/AUTO DIFF WBC: CPT | Performed by: HOSPITALIST

## 2022-04-10 PROCEDURE — 99233 SBSQ HOSP IP/OBS HIGH 50: CPT | Performed by: HOSPITALIST

## 2022-04-10 PROCEDURE — 80048 BASIC METABOLIC PNL TOTAL CA: CPT | Performed by: HOSPITALIST

## 2022-04-10 RX ADMIN — INSULIN LISPRO 1 UNITS: 100 INJECTION, SOLUTION INTRAVENOUS; SUBCUTANEOUS at 22:36

## 2022-04-10 RX ADMIN — HYDRALAZINE HYDROCHLORIDE 5 MG: 20 INJECTION INTRAMUSCULAR; INTRAVENOUS at 18:26

## 2022-04-10 RX ADMIN — METRONIDAZOLE 500 MG: 500 INJECTION, SOLUTION INTRAVENOUS at 01:09

## 2022-04-10 RX ADMIN — FLUTICASONE FUROATE AND VILANTEROL TRIFENATATE 1 PUFF: 200; 25 POWDER RESPIRATORY (INHALATION) at 08:58

## 2022-04-10 RX ADMIN — METRONIDAZOLE 500 MG: 500 INJECTION, SOLUTION INTRAVENOUS at 08:57

## 2022-04-10 RX ADMIN — DEXTROSE, SODIUM CHLORIDE, AND POTASSIUM CHLORIDE 100 ML/HR: 5; .45; .15 INJECTION INTRAVENOUS at 19:58

## 2022-04-10 RX ADMIN — ASPIRIN 81 MG: 81 TABLET, CHEWABLE ORAL at 08:57

## 2022-04-10 RX ADMIN — AMLODIPINE BESYLATE 10 MG: 10 TABLET ORAL at 08:57

## 2022-04-10 RX ADMIN — ZOLPIDEM TARTRATE 5 MG: 5 TABLET, COATED ORAL at 01:11

## 2022-04-10 RX ADMIN — HYDRALAZINE HYDROCHLORIDE 5 MG: 20 INJECTION INTRAMUSCULAR; INTRAVENOUS at 05:36

## 2022-04-10 RX ADMIN — HYDRALAZINE HYDROCHLORIDE 5 MG: 20 INJECTION INTRAMUSCULAR; INTRAVENOUS at 01:08

## 2022-04-10 RX ADMIN — HYDROMORPHONE HYDROCHLORIDE 1 MG: 1 INJECTION, SOLUTION INTRAMUSCULAR; INTRAVENOUS; SUBCUTANEOUS at 22:35

## 2022-04-10 RX ADMIN — HYDRALAZINE HYDROCHLORIDE 5 MG: 20 INJECTION INTRAMUSCULAR; INTRAVENOUS at 12:23

## 2022-04-10 RX ADMIN — HYDROMORPHONE HYDROCHLORIDE 1 MG: 1 INJECTION, SOLUTION INTRAMUSCULAR; INTRAVENOUS; SUBCUTANEOUS at 05:35

## 2022-04-10 RX ADMIN — HYDROMORPHONE HYDROCHLORIDE 1 MG: 1 INJECTION, SOLUTION INTRAMUSCULAR; INTRAVENOUS; SUBCUTANEOUS at 15:06

## 2022-04-10 NOTE — PROGRESS NOTES
[address] [facility]  Progress Note - Vera Mccullough 1945, 78 y o  female MRN: [id number]  Unit/Bed#: -[facility]inn Encounter: [id number]  Primary Care Provider: Jacinda Monge   Date and time admitted to hospital: 4/4/2022  2:10 PM    * Acute diverticulitis  Assessment & Plan  · Patient presented to the ED for left lower quadrant abdominal pain that started on 03/29  · Patient reported that over the 1 week prior to admission pain had been increasing and she had been having associated symptoms such as nausea, vomiting, diarrhea, poor appetite  Denies other symptoms such as fever/chills, headaches, dizziness chest pain, shortness of breath  · CT A/P: There is thickening of the splenic flexure with associated pericolonic infiltration, suggestive diverticulitis  Additional thickening of the ascending colon with some infiltration at its mesenteric margin may be due to additional segment of diverticulitis or colitis  Suggest follow-up with the colonoscopy when the acute illness subsides to exclude  focal colonic l lesion  There is no fluid collection seen  There is no free air  · WBC 19 43, afebrile on admission  Did not meet sepsis criteria, [id number] mostly eosinophils  · ID would lke to monitor off IV ceftriaxone and metronidazole  · Continuous IVF-- decrease rate to 75 ml/h  · On clears, GI, surgery, ID on board   · No surgical intervention   · Monitor off antibiotics  · GI s/o with biopsies pending  · Pain control      Eosinophilia  Assessment & Plan  GI s/o  ID Surgery following  Consult Heme/onc    Transaminitis  Assessment & Plan  · Elevated LFTs on admission  · AST 58, , Alkaline phosphatase 216  · Likely in the setting of acute diverticulitis infection  Denies any right upper quadrant abdominal pain at this time  Denies any drug or alcohol use    · Continue to trend with daily CMP-- ALT decreased from 126-90, AST normalized, ALP decreased from to [id number], albumin-2 9, normal total bilirubin on 04/5/22 -- will repeat tomorrow  · If patient develops right upper quadrant pain or worsening liver enzymes, consider further workup with ultrasound and lab studies--currently denies any right upper quadrant pain, has diffuse abdominal pain secondary to diverticulitis    Primary hypertension  Assessment & Plan  · /72 on admission-  · Continue pre-hospital amlodipine and lisinopril  · Monitor vitals per routine    Stage 3a chronic kidney disease [facility])  Assessment & Plan  Lab Results   Component Value Date    EGFR 75 04/08/2022    EGFR 63 04/07/2022    EGFR 74 04/06/2022    CREATININE 0 77 04/08/2022    CREATININE 0 89 04/07/2022    CREATININE 0 78 04/06/2022     · Creatinine stable at baseline  · Avoid hypotension and nephrotoxic agents  · Monitor BMP daily    Controlled type 2 diabetes with neuropathy [facility])  Assessment & Plan  Lab Results   Component Value Date    HGBA1C 6 4 12/22/2021       Recent Labs     04/07/22  2110 04/08/22  0015 04/08/22  0711 04/08/22  1121   POCGLU 134 145* 128 140       Blood Sugar Average: Last 72 hrs:  (P) 113 25     · Holding home oral medications-- currently on insulin sliding scale, q 6 hours and HS  · POC glucose-64 in a m on 04/05/22 , patient noted sweating but did not have any other symptoms like palpitations, tremors, repeat blood sugar after dextrose administration-134  · POC glucose in the 90s  · Start on SSI with accu checks  · Hypoglycemia protocol    Mild intermittent asthma  Assessment & Plan  · No acute exacerbation  · Continue pre-hospital inhalers        VTE Pharmacologic Prophylaxis: VTE Score: 4 Moderate Risk (Score 3-4) - Pharmacological DVT Prophylaxis Ordered: enoxaparin (Lovenox)  Patient Centered Rounds: I performed bedside rounds with nursing staff today  Discussions with Specialists or Other Care Team Provider: GI      Time Spent for Care: 45 minutes   More than 50% of total time spent on counseling and coordination of care as described above  Current Length of Stay: 6 day(s)  Current Patient Status: Inpatient   Certification Statement: The patient will continue to require additional inpatient hospital stay due to worsening symptoms  Discharge Plan: Anticipate discharge in 48-72 hrs to home with home services  Code Status: Level 1 - Full Code    Subjective:   Pt complains of worsening abdominal pain  Tolerating clears    Objective:     Vitals:   Temp (24hrs), Av 5 °F (36 9 °C), Min:98 2 °F (36 8 °C), Max:98 6 °F (37 °C)    Temp:  [98 2 °F (36 8 °C)-98 6 °F (37 °C)] 98 2 °F (36 8 °C)  HR:  [66-84] 84  Resp:  [17-21] 17  BP: (137-168)/(60-77) 165/70  SpO2:  [93 %-94 %] 94 %  Body mass index is 35 52 kg/m²  Input and Output Summary (last 24 hours): Intake/Output Summary (Last 24 hours) at 4/10/2022 1547  Last data filed at 4/10/2022 1238  Gross per 24 hour   Intake 360 ml   Output 400 ml   Net -40 ml       Physical Exam:   Physical Exam  Constitutional:       Appearance: She is ill-appearing  HENT:      Head: Normocephalic and atraumatic  Nose: Nose normal       Mouth/Throat:      Mouth: Mucous membranes are moist       Pharynx: Oropharynx is clear  Cardiovascular:      Rate and Rhythm: Normal rate  Abdominal:      Tenderness: There is abdominal tenderness  Musculoskeletal:         General: Normal range of motion  Skin:     General: Skin is warm and dry  Capillary Refill: Capillary refill takes less than 2 seconds  Neurological:      General: No focal deficit present     Psychiatric:         Mood and Affect: Mood normal           Additional Data:     Labs:  Results from last 7 days   Lab Units 04/10/22  0648   WBC Thousand/uL 25 80*   HEMOGLOBIN g/dL 11 2*   HEMATOCRIT % 33 9*   PLATELETS Thousands/uL 200   NEUTROS PCT % 18*   LYMPHS PCT % 11*   MONOS PCT % 3*   EOS PCT % 67*     Results from last 7 days   Lab Units 04/10/22  0648 22  0534 22  0501   SODIUM mmol/L 140   < > 139   POTASSIUM mmol/L 3 7   < > 4 5   CHLORIDE mmol/L 106   < > 104   CO2 mmol/L 25   < > 26   BUN mg/dL 6   < > 13   CREATININE mg/dL 0 77   < > 0 86   ANION GAP mmol/L 9   < > 9   CALCIUM mg/dL 8 6   < > 8 8   ALBUMIN g/dL  --   --  2 9*   TOTAL BILIRUBIN mg/dL  --   --  0 28   ALK PHOS U/L  --   --  178*   ALT U/L  --   --  90*   AST U/L  --   --  36   GLUCOSE RANDOM mg/dL 148*   < > 75    < > = values in this interval not displayed  Results from last 7 days   Lab Units 04/10/22  1141 04/10/22  0800 04/09/22  2138 04/09/22  1107 04/09/22  0736 04/09/22  0032 04/08/22  1835 04/08/22  1121 04/08/22  0711 04/08/22  0015 04/07/22  2110 04/07/22  1556   POC GLUCOSE mg/dl 220* 136 139 176* 147* 179* 189* 140 128 145* 134 146*         Results from last 7 days   Lab Units 04/04/22  1454   LACTIC ACID mmol/L 0 9       Lines/Drains:  Invasive Devices  Report    Peripheral Intravenous Line            Peripheral IV 04/10/22 Dorsal (posterior); Left Hand <1 day                      Imaging: No pertinent imaging reviewed      Recent Cultures (last 7 days):   Results from last 7 days   Lab Units 04/06/22  1306   C DIFF TOXIN B BY PCR  Negative       Last 24 Hours Medication List:   Current Facility-Administered Medications   Medication Dose Route Frequency Provider Last Rate    albuterol  1 puff Inhalation Q4H PRN Johann Clonts, DO      amLODIPine  10 mg Oral Daily Johann Clonts, DO      aspirin  81 mg Oral Daily Siddhartha Cooper, DO      dextrose 5 % and sodium chloride 0 45 % with KCl 20 mEq/L  100 mL/hr Intravenous Continuous Johann Clonts,  mL/hr (04/10/22 1016)    enoxaparin  40 mg Subcutaneous Daily Johann Clonts, DO      fluticasone-vilanterol  1 puff Inhalation Daily Johann Clonts, [place]      hydrALAZINE  5 mg Intravenous Q6H [facility] Johann Clonts, [place]      HYDROmorphone  0 5 mg Intravenous Q4H PRN Johann Clonts, DO      HYDROmorphone  1 mg Intravenous Q4H PRN Johann Clonts, DO      insulin lispro  1-5 Units Subcutaneous HS Ana M Moneting, DO      insulin lispro  1-6 Units Subcutaneous TID With Meals Coby Carcamo MD      iohexol  50 mL Oral Once in imaging Acqudeangelo Moneting, DO      LORazepam  0 5 mg Oral Q8H PRN Haanetta Hamming, DO      naloxone  0 04 mg Intravenous Q1MIN PRN Martinezttprosper Moneting, DO      ondansetron  4 mg Intravenous Q6H PRN Haanetta Hamming, DO      zolpidem  5 mg Oral HS PRN Ana M Law, DO          Today, Patient Was Seen By: Coby Carcamo MD    **Please Note: This note may have been constructed using a voice recognition system  **

## 2022-04-10 NOTE — PROGRESS NOTES
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 although colonoscopy grossly normal without inflammatory changes  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  No diverticulitis seen  Discontinue ceftriaxone Flagyl  Monitor off all antibiotics  Close GI follow-up  Follow-up stool for ova and parasite  Follow-up Strongyloides serology  Follow-up flow cytometry     2  High level eosinophilia   As in above, at this level of eosinophilia, intestinal parasite is unlikely   Consider starting July these hyper infection syndrome but clinical presentation is not consistent with syndrome and patient has very little risk factor for it, without any immunosuppression  Patient information about where she grew up is discordant, but she tells me she lived in the [facility]     colonoscopy without evidence of inflammatory bowel disease or diverticulitis  Follow-up colonic biopsies  Recheck CBC with diff  Follow-up Strongyloides serologies     HIV screen, to be complete  Check flow cytometry to make sure not eosinophilia leukemia  Consult Hematology Oncology     3  Mildly elevated LFTs, likely secondary to colitis above   Patient has no RUQ abdominal pain  Recheck liver function tests     4  CKD   Creatinine baseline  Antibiotics at full dose     Recheck BMP      5  DM, with neuropathy     Management per primary service      Discussed the above management plan with the GI service and primary service    Antibiotics:  Ceftriaxone 7  Flagyl 7    Subjective:  Patient has no fever, chills, sweats; no nausea, vomiting, still with some diarrhea; no cough, shortness of breath; no increased abdominal pain  No new symptoms  Patient underwent colonoscopy yesterday without any evidence of diverticulitis or inflammatory bowel disease found  Objective:  Vitals:  Temp:  [97 7 °F (36 5 °C)-99 3 °F (37 4 °C)] 98 4 °F (36 9 °C)  HR:  [59-77] 74  Resp:  [16-21] 17  BP: (118-168)/(53-77) 168/77  SpO2:  [92 %-98 %] 93 %  Temp (24hrs), Av 3 °F (36 8 °C), Min:97 7 °F (36 5 °C), Max:99 3 °F (37 4 °C)  Current: Temperature: 98 4 °F (36 9 °C)    Physical Exam:   General Appearance:  Alert, interactive, nontoxic, no acute distress  Throat: Oropharynx moist without lesions  Lungs:   Clear to auscultation bilaterally; no wheezes, rhonchi or rales; respirations unlabored   Heart:  RRR; no murmur, rub or gallop   Abdomen:   Soft, non-tender, non-distended, positive bowel sounds  Extremities: No clubbing, cyanosis or edema   Skin: No new rashes or lesions  No draining wounds noted         Labs, Imaging, & Other studies:   All pertinent labs and imaging studies were personally reviewed  Results from last 7 days   Lab Units 04/10/22  0648 22  0548 22  0450   WBC Thousand/uL 25 80* 26 08* 22 14*   HEMOGLOBIN g/dL 11 2* 11 6 11 4*   PLATELETS Thousands/uL 200 197 193     Results from last 7 days   Lab Units 04/10/22  0648 22  0548 22  0548 22  0450 22  0450 22  0534 22  0501 22  1454 22  1454   SODIUM mmol/L 140  --  136  --  138   < > 139   < > 138   POTASSIUM mmol/L 3 7   < > 3 7   < > 3 8   < > 4 5   < > 4 2   CHLORIDE mmol/L 106   < > 103   < > 102   < > 104   < > 101   CO2 mmol/L 25   < > 26   < > 27   < > 26   < > 26   BUN mg/dL 6   < > 6   < > 5   < > 13   < > 13   CREATININE mg/dL 0 77   < > 0 74   < > 0 77   < > 0 86   < > 0 98   EGFR ml/min/1 73sq m 75   < > 79   < > 75   < > 66   < > 56   CALCIUM mg/dL 8 6   < > 8 9   < > 8 8   < > 8 8   < > 9 5   AST U/L  --   --   --   --   --   --  36  --  58*   ALT U/L  --   --   --   --   --   --  90*   < > 126*   ALK PHOS U/L  --   --   --   --   --   --  178*   < > 216*    < > = values in this interval not displayed       Results from last 7 days   Lab Units 04/06/22  1306   C DIFF TOXIN B BY PCR  Negative         Results from last 7 days   Lab Units 04/08/22  0450   CRP mg/L 67 1*

## 2022-04-10 NOTE — PLAN OF CARE
Problem: MOBILITY - ADULT  Goal: Maintain or return to baseline ADL function  Description: INTERVENTIONS:  -  Assess patient's ability to carry out ADLs; assess patient's baseline for ADL function and identify physical deficits which impact ability to perform ADLs (bathing, care of mouth/teeth, toileting, grooming, dressing, etc )  - Assess/evaluate cause of self-care deficits   - Assess range of motion  - Assess patient's mobility; develop plan if impaired  - Assess patient's need for assistive devices and provide as appropriate  - Encourage maximum independence but intervene and supervise when necessary  - Involve family in performance of ADLs  - Assess for home care needs following discharge   - Consider OT consult to assist with ADL evaluation and planning for discharge  - Provide patient education as appropriate  Outcome: Progressing     Problem: MOBILITY - ADULT  Goal: Maintains/Returns to pre admission functional level  Description: INTERVENTIONS:  - Perform BMAT or MOVE assessment daily    - Set and communicate daily mobility goal to care team and patient/family/caregiver  - Collaborate with rehabilitation services on mobility goals if consulted  - Perform Range of Motion 3 times a day  - Reposition patient every 2 hours    - Dangle patient 3 times a day  - Stand patient 3 times a day  - Ambulate patient 3 times a day  - Out of bed to chair 3 times a day   - Out of bed for meals 3 times a day  - Out of bed for toileting  - Record patient progress and toleration of activity level   Outcome: Progressing     Problem: PAIN - ADULT  Goal: Verbalizes/displays adequate comfort level or baseline comfort level  Description: Interventions:  - Encourage patient to monitor pain and request assistance  - Assess pain using appropriate pain scale  - Administer analgesics based on type and severity of pain and evaluate response  - Implement non-pharmacological measures as appropriate and evaluate response  - Consider cultural and social influences on pain and pain management  - Notify physician/advanced practitioner if interventions unsuccessful or patient reports new pain  Outcome: Progressing

## 2022-04-10 NOTE — PROGRESS NOTES
Progress Note - Shikha Andrew 78 y o  female MRN: [id number]    Unit/Bed#: -01 Encounter: [id number]        Subjective:     Patient eating breakfast at the bedside this morning  She was seen by us, defect his disease and surgery  No new complaints  We went over colonoscopy results again  ROS: As noted in the HPI, otherwise all others negative  Objective:     Vitals: Blood pressure 168/77, pulse 74, temperature 98 4 °F (36 9 °C), resp  rate 17, height 5' 2" (1 575 m), weight 88 1 kg (194 lb 3 6 oz), SpO2 93 %, not currently breastfeeding  ,Body mass index is 35 52 kg/m²  Intake/Output Summary (Last 24 hours) at 4/10/2022 0931  Last data filed at 4/10/2022 0818  Gross per 24 hour   Intake 320 ml   Output 900 ml   Net -580 ml       Physical Exam:     General Appearance: Alert and oriented x 3  In no respiratory distress  Lungs: Clear to auscultation bilaterally, no rales or rhonchi  Heart: Regular rate and rhythm, S1, S2 normal, no murmur, click, rub or gallop  Abdomen: Soft, non-tender, non-distended; bowel sounds normal; no masses or no organomegaly  Extremities: No cyanosis, edema    Invasive Devices  Report    Peripheral Intravenous Line            Peripheral IV 04/10/22 Dorsal (posterior); Left Hand <1 day                Lab Results:  Results from last 7 days   Lab Units 04/10/22  0648   WBC Thousand/uL 25 80*   HEMOGLOBIN g/dL 11 2*   HEMATOCRIT % 33 9*   PLATELETS Thousands/uL 200   NEUTROS PCT % 18*   LYMPHS PCT % 11*   MONOS PCT % 3*   EOS PCT % 67*     Results from last 7 days   Lab Units 04/10/22  0648 04/06/22  0534 04/05/22  0501   POTASSIUM mmol/L 3 7   < > 4 5   CHLORIDE mmol/L 106   < > 104   CO2 mmol/L 25   < > 26   BUN mg/dL 6   < > 13   CREATININE mg/dL 0 77   < > 0 86   CALCIUM mg/dL 8 6   < > 8 8   ALK PHOS U/L  --   --  178*   ALT U/L  --   --  90*   AST U/L  --   --  36    < > = values in this interval not displayed           Results from last 7 days   Lab Units 04/04/22  1454   LIPASE u/L 48*       Imaging Studies: I have personally reviewed pertinent imaging studies  Colonoscopy    Result Date: 4/9/2022  Impression: 1  Diverticulosis coli, ascending colon sigmoid colon  No evidence of diverticulitis 2  Diminutive polyp in the proximal ascending colon status post cold biopsy removal RECOMMENDATION: Repeat colonoscopy in 5 years due to a personal history of colon polyps High-fiber diet to include fruits, vegetables, whole grain foods, daily Will call the patient 1 week regarding the polyp results and biopsy results Gregory Raphael, DO Louvella Moritz, FACP    CT abdomen pelvis w contrast    Result Date: 4/7/2022  Impression: Overall similar appearance of colonic wall thickening centered at the cecum/ascending colon and at the splenic flexure, with similar pericolonic inflammatory stranding in the right colon and slight improvement on the left  No evidence of perforation or abscess formation  Workstation performed: [id number]     CT abdomen pelvis with contrast    Result Date: 4/4/2022  Impression: There is thickening of the splenic flexure with associated pericolonic infiltration, suggestive diverticulitis  Additional thickening of the ascending colon with some infiltration at its mesenteric margin may be due to additional segment of diverticulitis or colitis  Suggest follow-up with the colonoscopy when the acute illness subsides to exclude  focal colonic lesion There is no fluid collection seen There is no free air Small mesenteric lymph nodes with largest measuring about 1 cm, short interval follow-up at 3 months suggested The study was marked in EPIC for immediate notification   Follow-up notification has been created in [employer] performed: [id number]         Assessment and Plan:     1) Abdominal pain and eosinophilia; CT findings of splenic flexure diverticulitis with additional thickening in the ascending colon; 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 - Patient was recommended to have a colonoscopy 1 year after she had 1 in 2019 revealing a 2 cm polyp in the ascending colon requiring piecemeal removal   CT scan revealing diverticulitis an additional thickening of the ascending colon which may represent either diverticulitis or colitis on admission  Myriam Nguyen has history adenomatous polyp in the ascending colon as well  A repeat CT scan was done during admission for continued abdominal pain with no new changes such as perforation or abscess fortunately  Because patient's eosinophilia and continued pain were not consistent with typical diverticulitis, we performed colonoscopy yesterday revealing no visual inflammation  Biopsies were taken  Patient's white count is 25,000 today  Again, prominent eosinophilia  - Appreciate Infectious Disease recommendations, parasitic stool testing ordered as well as flow cytometry  - Agree with Hematology consultation  - Follow-up colonoscopy biopsy results  - Antibiotics are being discontinued    GI will sign off  Please call with questions    Thank you

## 2022-04-10 NOTE — PROGRESS NOTES
Progress Note -Surgery ANDRZEJ Harmon 78 y o  female MRN: [id number]  Unit/Bed#: -01 Encounter: [id number]      Assessment   77y F w Acute diverticulitis of ascending colon and splenic flexure  eosinophilia  - AVSS, WBC slightly improved to 25 today, and slight decrease in eosinophilia  - colonoscopy reveal no active diverticulitis, biopsies taken  - ID discontinuing antibiotics  - Additional blood tests and stool studies pending  - abdomen soft with mid abdominal tenderness  - advanced to soft diet and tolerating  Plan   -- Continue with diet as tolerated  If increased abdominal pain, advise to decrease to CLD  -- Monitor labs, WBC and abdominal exams  -- DVT prophylaxis  -- Analgesia and antiemetics  -- encourage OOB and ambulation  -- SLIM primary  ______________________________________________________________________  Subjective:   Patient eating breakfast and tolerating  Denies n/v, f/c  Denies CP SOB  Still with some abdominal discomfort    Objective:      Vitals:  /77   Pulse 74   Temp 98 4 °F (36 9 °C)   Resp 17   Ht 5' 2" (1 575 m)   Wt 88 1 kg (194 lb 3 6 oz)   SpO2 93%   BMI 35 52 kg/m²     I/Os:  I/O last 3 completed shifts: In: 320 [P O :120; I V :200]  Out: 900 [Urine:400; Stool:500]    No intake/output data recorded  Invasive Devices  Report    Peripheral Intravenous Line            Peripheral IV 04/10/22 Dorsal (posterior); Left Hand <1 day                Medications:  Current Facility-Administered Medications   Medication Dose Route Frequency    albuterol (PROVENTIL HFA,VENTOLIN HFA) inhaler 1 puff  1 puff Inhalation Q4H PRN    amLODIPine (NORVASC) tablet 10 mg  10 mg Oral Daily    aspirin chewable tablet 81 mg  81 mg Oral Daily    dextrose 5 % and sodium chloride 0 45 % with KCl 20 mEq/L infusion  100 mL/hr Intravenous Continuous    enoxaparin (LOVENOX) subcutaneous injection 40 mg  40 mg Subcutaneous Daily    fluticasone-vilanterol (BREO ELLIPTA) 200-25 MCG/INH inhaler 1 puff  1 puff Inhalation Daily    hydrALAZINE (APRESOLINE) injection 5 mg  5 mg Intravenous Q6H [address]    HYDROmorphone (DILAUDID) injection 0 5 mg  0 5 mg Intravenous Q4H PRN    HYDROmorphone (DILAUDID) injection 1 mg  1 mg Intravenous Q4H PRN    insulin lispro (HumaLOG) 100 units/mL subcutaneous injection 1-5 Units  1-5 Units Subcutaneous HS    insulin lispro (HumaLOG) 100 units/mL subcutaneous injection 1-6 Units  1-6 Units Subcutaneous Q6H    iohexol (OMNIPAQUE) 240 MG/ML solution 50 mL  50 mL Oral Once in imaging    LORazepam (ATIVAN) tablet 0 5 mg  0 5 mg Oral Q8H PRN    naloxone (NARCAN) injection 0 04 mg  0 04 mg Intravenous Q1MIN PRN    ondansetron (ZOFRAN) injection 4 mg  4 mg Intravenous Q6H PRN    zolpidem (AMBIEN) tablet 5 mg  5 mg Oral HS PRN                 Lab Results and Cultures:   CBC with diff:   Lab Results   Component Value Date    WBC 25 80 (H) 04/10/2022    HGB 11 2 (L) 04/10/2022    HCT 33 9 (L) 04/10/2022    MCV 90 04/10/2022     04/10/2022    MCH 29 6 04/10/2022    MCHC 33 0 04/10/2022    RDW 14 9 04/10/2022    MPV 9 2 04/10/2022    NRBC 0 04/10/2022       BMP/CMP:  Lab Results   Component Value Date    K 3 7 04/10/2022     04/10/2022    CO2 25 04/10/2022    BUN 6 04/10/2022    CREATININE 0 77 04/10/2022    CALCIUM 8 6 04/10/2022    AST 36 04/05/2022    ALT 90 (H) 04/05/2022    ALKPHOS 178 (H) 04/05/2022    EGFR 75 04/10/2022       Lipid Panel:   No results found for: CHOL    Coags:   No results found for: PT, PTT, INR     Urinalysis:   Lab Results   Component Value Date    COLORU Yellow 04/04/2022    COLORU Yellow 09/13/2017    CLARITYU Cloudy 04/04/2022    CLARITYU Transparent 09/13/2017    SPECGRAV 1 020 04/04/2022    SPECGRAV 1 005 09/13/2017    PHUR 5 5 04/04/2022    PHUR 6 0 09/13/2017    LEUKOCYTESUR Negative 04/04/2022    LEUKOCYTESUR - 09/13/2017    NITRITE Positive (A) 04/04/2022    NITRITE - 09/13/2017    PROTEINUA - 09/13/2017    GLUCOSEU Negative 04/04/2022    KETONESU Trace (A) 04/04/2022    KETONESU - 09/13/2017    BILIRUBINUR Negative 04/04/2022    BILIRUBINUR - 09/13/2017    BLOODU Trace-Intact (A) 04/04/2022    BLOODU trace 09/13/2017        Urine Culture:   Lab Results   Component Value Date    URINECX 50,000-59,000 cfu/ml  04/15/2021      Wound Culure: No results found for: WOUNDCULT  Blood Culture: No results found for: BLOODCX      Physical Exam:  General Appearance:    Alert and orientated x 3, cooperative, no distress, appears stated age   Lungs:     Clear to auscultation bilaterally, respirations unlabored, no wheezes    Heart:    Regular rate and rhythm, S1 and S2 normal, no murmur   Abdomen:    Normoactive BS, soft, mid abdominal tenderness, abdomen soft, non rigid, no masses, no palpated organomegaly   Extremities:  Extremities normal, no calf tenderness, no cyanosis or edema   Pulses:   2+ and symmetric all extremities   Skin:   Skin color, texture, turgor normal, no rashes   Neurologic:   CNII-XII intact, normal strength, affect appropriate       Imaging:  Colonoscopy    Result Date: 4/9/2022  Impression: 1  Diverticulosis coli, ascending colon sigmoid colon  No evidence of diverticulitis 2  Diminutive polyp in the proximal ascending colon status post cold biopsy removal RECOMMENDATION: Repeat colonoscopy in 5 years due to a personal history of colon polyps High-fiber diet to include fruits, vegetables, whole grain foods, daily Will call the patient 1 week regarding the polyp results and biopsy results DO Johanna Plummer, [facility]    CT abdomen pelvis w contrast    Result Date: 4/7/2022  Impression: Overall similar appearance of colonic wall thickening centered at the cecum/ascending colon and at the splenic flexure, with similar pericolonic inflammatory stranding in the right colon and slight improvement on the left  No evidence of perforation or abscess formation   Workstation performed: [id number]     CT abdomen pelvis with contrast    Result Date: 4/4/2022  Impression: There is thickening of the splenic flexure with associated pericolonic infiltration, suggestive diverticulitis  Additional thickening of the ascending colon with some infiltration at its mesenteric margin may be due to additional segment of diverticulitis or colitis  Suggest follow-up with the colonoscopy when the acute illness subsides to exclude  focal colonic lesion There is no fluid collection seen There is no free air Small mesenteric lymph nodes with largest measuring about 1 cm, short interval follow-up at 3 months suggested The study was marked in EP[employer] for immediate notification   Follow-up notification has been created in [employer] performed: [address] [facility], [place]-   4/10/2022

## 2022-04-10 NOTE — PLAN OF CARE
Problem: GASTROINTESTINAL - ADULT  Goal: Minimal or absence of nausea and/or vomiting  Description: INTERVENTIONS:  - Administer IV fluids if ordered to ensure adequate hydration  - Maintain NPO status until nausea and vomiting are resolved  - Nasogastric tube if ordered  - Administer ordered antiemetic medications as needed  - Provide nonpharmacologic comfort measures as appropriate  - Advance diet as tolerated, if ordered  - Consider nutrition services referral to assist patient with adequate nutrition and appropriate food choices  Outcome: Progressing  Goal: Maintains or returns to baseline bowel function  Description: INTERVENTIONS:  - Assess bowel function  - Encourage oral fluids to ensure adequate hydration  - Administer IV fluids if ordered to ensure adequate hydration  - Administer ordered medications as needed  - Encourage mobilization and activity  - Consider nutritional services referral to assist patient with adequate nutrition and appropriate food choices  Outcome: Progressing  Goal: Maintains adequate nutritional intake  Description: INTERVENTIONS:  - Monitor percentage of each meal consumed  - Identify factors contributing to decreased intake, treat as appropriate  - Assist with meals as needed  - Monitor I&O, weight, and lab values if indicated  - Obtain nutrition services referral as needed  Outcome: Progressing  Goal: Establish and maintain optimal ostomy function  Description: INTERVENTIONS:  - Assess bowel function  - Encourage oral fluids to ensure adequate hydration  - Administer IV fluids if ordered to ensure adequate hydration   - Administer ordered medications as needed  - Encourage mobilization and activity  - Nutrition services referral to assist patient with appropriate food choices  - Assess stoma site  - Consider wound care consult   Outcome: Progressing  Goal: Oral mucous membranes remain intact  Description: INTERVENTIONS  - Assess oral mucosa and hygiene practices  - Implement preventative oral hygiene regimen  - Implement oral medicated treatments as ordered  - Initiate Nutrition services referral as needed  Outcome: Progressing     Problem: PAIN - ADULT  Goal: Verbalizes/displays adequate comfort level or baseline comfort level  Description: Interventions:  - Encourage patient to monitor pain and request assistance  - Assess pain using appropriate pain scale  - Administer analgesics based on type and severity of pain and evaluate response  - Implement non-pharmacological measures as appropriate and evaluate response  - Consider cultural and social influences on pain and pain management  - Notify physician/advanced practitioner if interventions unsuccessful or patient reports new pain  Outcome: Progressing

## 2022-04-11 ENCOUNTER — TELEPHONE (OUTPATIENT)
Dept: HEMATOLOGY ONCOLOGY | Facility: CLINIC | Age: 77
End: 2022-04-11

## 2022-04-11 ENCOUNTER — APPOINTMENT (INPATIENT)
Dept: ULTRASOUND IMAGING | Facility: HOSPITAL | Age: 77
DRG: 815 | End: 2022-04-11
Payer: MEDICARE

## 2022-04-11 LAB
ALBUMIN SERPL BCP-MCNC: 2.9 G/DL (ref 3.5–5)
ALP SERPL-CCNC: 196 U/L (ref 46–116)
ALT SERPL W P-5'-P-CCNC: 48 U/L (ref 12–78)
ANION GAP SERPL CALCULATED.3IONS-SCNC: 8 MMOL/L (ref 4–13)
AST SERPL W P-5'-P-CCNC: 31 U/L (ref 5–45)
BILIRUB SERPL-MCNC: 0.3 MG/DL (ref 0.2–1)
BUN SERPL-MCNC: 11 MG/DL (ref 5–25)
CALCIUM ALBUM COR SERPL-MCNC: 9.4 MG/DL (ref 8.3–10.1)
CALCIUM SERPL-MCNC: 8.5 MG/DL (ref 8.3–10.1)
CHLORIDE SERPL-SCNC: 104 MMOL/L (ref 100–108)
CO2 SERPL-SCNC: 25 MMOL/L (ref 21–32)
CREAT SERPL-MCNC: 0.76 MG/DL (ref 0.6–1.3)
ERYTHROCYTE [DISTWIDTH] IN BLOOD BY AUTOMATED COUNT: 15.2 % (ref 11.6–15.1)
GFR SERPL CREATININE-BSD FRML MDRD: 76 ML/MIN/1.73SQ M
GLUCOSE SERPL-MCNC: 179 MG/DL (ref 65–140)
GLUCOSE SERPL-MCNC: 179 MG/DL (ref 65–140)
GLUCOSE SERPL-MCNC: 207 MG/DL (ref 65–140)
GLUCOSE SERPL-MCNC: 235 MG/DL (ref 65–140)
GLUCOSE SERPL-MCNC: 266 MG/DL (ref 65–140)
HCT VFR BLD AUTO: 34 % (ref 34.8–46.1)
HGB BLD-MCNC: 11.2 G/DL (ref 11.5–15.4)
IMM EOSINOPHIL NFR BLD MANUAL: 53 % (ref 0–6)
LYMPHOCYTES NFR BLD: 12 % (ref 14–44)
MCH RBC QN AUTO: 29.6 PG (ref 26.8–34.3)
MCHC RBC AUTO-ENTMCNC: 32.9 G/DL (ref 31.4–37.4)
MCV RBC AUTO: 90 FL (ref 82–98)
MONOCYTES NFR BLD AUTO: 8 % (ref 4–12)
NEUTS SEG NFR BLD AUTO: 27 % (ref 45–77)
NRBC BLD AUTO-RTO: 0 /100 WBCS
PLATELET # BLD AUTO: 198 THOUSANDS/UL (ref 149–390)
PMV BLD AUTO: 9 FL (ref 8.9–12.7)
POTASSIUM SERPL-SCNC: 3.9 MMOL/L (ref 3.5–5.3)
PROT SERPL-MCNC: 7.1 G/DL (ref 6.4–8.2)
RBC # BLD AUTO: 3.78 MILLION/UL (ref 3.81–5.12)
SODIUM SERPL-SCNC: 137 MMOL/L (ref 136–145)
TOTAL CELLS COUNTED SPEC: 100
WBC # BLD AUTO: 26.32 THOUSAND/UL (ref 4.31–10.16)

## 2022-04-11 PROCEDURE — 99232 SBSQ HOSP IP/OBS MODERATE 35: CPT | Performed by: STUDENT IN AN ORGANIZED HEALTH CARE EDUCATION/TRAINING PROGRAM

## 2022-04-11 PROCEDURE — NC001 PR NO CHARGE

## 2022-04-11 PROCEDURE — 85007 BL SMEAR W/DIFF WBC COUNT: CPT | Performed by: HOSPITALIST

## 2022-04-11 PROCEDURE — 76705 ECHO EXAM OF ABDOMEN: CPT

## 2022-04-11 PROCEDURE — 88185 FLOWCYTOMETRY/TC ADD-ON: CPT

## 2022-04-11 PROCEDURE — 97535 SELF CARE MNGMENT TRAINING: CPT

## 2022-04-11 PROCEDURE — 80053 COMPREHEN METABOLIC PANEL: CPT | Performed by: INTERNAL MEDICINE

## 2022-04-11 PROCEDURE — 88184 FLOWCYTOMETRY/ TC 1 MARKER: CPT | Performed by: INTERNAL MEDICINE

## 2022-04-11 PROCEDURE — 99233 SBSQ HOSP IP/OBS HIGH 50: CPT | Performed by: HOSPITALIST

## 2022-04-11 PROCEDURE — 99222 1ST HOSP IP/OBS MODERATE 55: CPT | Performed by: INTERNAL MEDICINE

## 2022-04-11 PROCEDURE — 82948 REAGENT STRIP/BLOOD GLUCOSE: CPT

## 2022-04-11 PROCEDURE — 85027 COMPLETE CBC AUTOMATED: CPT | Performed by: INTERNAL MEDICINE

## 2022-04-11 RX ORDER — DEXTROSE, SODIUM CHLORIDE, AND POTASSIUM CHLORIDE 5; .45; .15 G/100ML; G/100ML; G/100ML
75 INJECTION INTRAVENOUS CONTINUOUS
Status: DISCONTINUED | OUTPATIENT
Start: 2022-04-11 | End: 2022-04-12

## 2022-04-11 RX ADMIN — HYDRALAZINE HYDROCHLORIDE 5 MG: 20 INJECTION INTRAMUSCULAR; INTRAVENOUS at 17:39

## 2022-04-11 RX ADMIN — DEXTROSE, SODIUM CHLORIDE, AND POTASSIUM CHLORIDE 75 ML/HR: 5; .45; .15 INJECTION INTRAVENOUS at 11:26

## 2022-04-11 RX ADMIN — HYDRALAZINE HYDROCHLORIDE 5 MG: 20 INJECTION INTRAMUSCULAR; INTRAVENOUS at 23:56

## 2022-04-11 RX ADMIN — ZOLPIDEM TARTRATE 5 MG: 5 TABLET, COATED ORAL at 23:56

## 2022-04-11 RX ADMIN — DEXTROSE, SODIUM CHLORIDE, AND POTASSIUM CHLORIDE 75 ML/HR: 5; .45; .15 INJECTION INTRAVENOUS at 17:42

## 2022-04-11 RX ADMIN — ZOLPIDEM TARTRATE 5 MG: 5 TABLET, COATED ORAL at 00:25

## 2022-04-11 RX ADMIN — ASPIRIN 81 MG: 81 TABLET, CHEWABLE ORAL at 08:16

## 2022-04-11 RX ADMIN — HYDROMORPHONE HYDROCHLORIDE 0.5 MG: 1 INJECTION, SOLUTION INTRAMUSCULAR; INTRAVENOUS; SUBCUTANEOUS at 20:03

## 2022-04-11 RX ADMIN — AMLODIPINE BESYLATE 10 MG: 10 TABLET ORAL at 08:16

## 2022-04-11 RX ADMIN — HYDROMORPHONE HYDROCHLORIDE 0.5 MG: 1 INJECTION, SOLUTION INTRAMUSCULAR; INTRAVENOUS; SUBCUTANEOUS at 08:16

## 2022-04-11 RX ADMIN — HYDROMORPHONE HYDROCHLORIDE 0.5 MG: 1 INJECTION, SOLUTION INTRAMUSCULAR; INTRAVENOUS; SUBCUTANEOUS at 14:40

## 2022-04-11 RX ADMIN — HYDRALAZINE HYDROCHLORIDE 5 MG: 20 INJECTION INTRAMUSCULAR; INTRAVENOUS at 00:25

## 2022-04-11 RX ADMIN — DEXTROSE, SODIUM CHLORIDE, AND POTASSIUM CHLORIDE 100 ML/HR: 5; .45; .15 INJECTION INTRAVENOUS at 06:15

## 2022-04-11 RX ADMIN — HYDRALAZINE HYDROCHLORIDE 5 MG: 20 INJECTION INTRAMUSCULAR; INTRAVENOUS at 13:31

## 2022-04-11 RX ADMIN — HYDRALAZINE HYDROCHLORIDE 5 MG: 20 INJECTION INTRAMUSCULAR; INTRAVENOUS at 06:16

## 2022-04-11 RX ADMIN — FLUTICASONE FUROATE AND VILANTEROL TRIFENATATE 1 PUFF: 200; 25 POWDER RESPIRATORY (INHALATION) at 08:19

## 2022-04-11 RX ADMIN — INSULIN LISPRO 1 UNITS: 100 INJECTION, SOLUTION INTRAVENOUS; SUBCUTANEOUS at 22:37

## 2022-04-11 NOTE — CONSULTS
Medical Oncology/Hematology Consult Note  Eliezer Toth female, 78 y o , 1945,  /-[birth year], [id number]     Reason for admission:  Acute diverticulitis  Reason for consultation:  Eosinophilia    ASSESSMENT AND PLAN:     1  Eosinophilia    At baseline, 2022:  WBC 5 93, hemoglobin 11 1, MCV 88, platelets 074 K, eosinophil percentage 7  Neutrophils 41%  Otherwise normal diff   2022:  WBC 26 32, hemoglobin 11 2, platelets 702 K, absolute eosinophils yesterday 17 55  No diff drawn today   Per Infectious Disease, patient does not meet clinical picture for intestinal parasitic infection  Eosinophilic colitis seems unlikely since colonoscopy did not show inflammatory changes   Leukemia/lymphoma flow cytometry is pending  Strongyloides serology pending  Giardia testing pending  O&P exam pending   She does have night sweats that are drenching off/on since 2021  No other constitutional symptoms   Plan/Recommendations:  o Will follow above pending results  I discussed with patient and her family lab results in detail  Will also order BCR-ABL  Will consult IR for BMBx   o Will plan for tentative follow up in our office in 2-3 weeks with CBC-D prior if no urgent findings  Patient understands and is in agreement with this plan  Thank you for the opportunity to participate in this patient's care  Interval History: Patient sitting up comfortably in bed in no acute distress  She is seen with family members  She states since October she has increased night sweats which at times are drenching where she has to change her clothes  No enlarged lymphadenopathy, weight loss, fevers, chills  No unusual rashes or pruritus  She has never had a hx of cancer  Sister  from unknown bone cancer  No other 1st degree FHx of cancer  Does not smoke or drink  ECO    History of present illness:   This is a 74-year-old female with past medical history of hypertension, diabetes, asthma, chronic kidney disease who presented to the hospital with left lower quadrant abdominal pain  It was later found that she had acute diverticulitis  She was placed on antibiotics  No surgical intervention was recommended  She had leukocytosis on admission including eosinophilia, neutrophilia  It was thought this was related to the diverticulitis  However, this has continue to persist and worsen over hospitalization  It was thought that maybe her eosinophilia of was related to eosinophilic enteritis  However, her colonoscopy did not show any inflammatory changes  She also does not have eosinophilia consistent with intestinal parasites per ID  We are consulted to see if patient has a eosinophilic leukemia     71/89/6113:  WBC 5 93, hemoglobin 11 1, MCV 88, platelets 122 K, eosinophils 7%, neutrophils 41%, rest of diff normal     Admission labs:  4/4:  WBC 19 43, hemoglobin 13, platelets 017 K, neutrophils 40%, lymphocytes 11%, monocytes 3%, eosinophils 45%, ANC 7 66, absolute eosinophils 8 77       04/10/2022:  WBC 25 80, hemoglobin 11 2, MCV 90, platelets 919, neutrophils 18%, lymphocyte relative 11%, monocyte relative 3%, eosinophils 67%, immature granulocyte absolute 0 21, absolute eosinophils 17 55  Review of Systems:   Review of Systems   Constitutional: Positive for diaphoresis (at night time as described in interval hx) and fatigue  Negative for activity change, appetite change, chills, fever and unexpected weight change  HENT: Negative for nosebleeds  Eyes: Negative for visual disturbance  Respiratory: Positive for shortness of breath (chronic  per pt negative workup outpatient including ECHO  No current severe SOB  )  Negative for apnea, cough, choking, chest tightness, wheezing and stridor  Cardiovascular: Negative for chest pain, palpitations and leg swelling  Gastrointestinal: Positive for abdominal pain (LLQ abd pain)   Negative for anal bleeding, blood in stool, constipation, diarrhea, nausea and vomiting  Endocrine: Negative for cold intolerance  Genitourinary: Negative for hematuria, menstrual problem and vaginal bleeding  Musculoskeletal: Negative for arthralgias  Skin: Negative for color change, pallor, rash and wound  Neurological: Negative for dizziness, syncope, light-headedness and headaches  Hematological: Negative for adenopathy  Does not bruise/bleed easily  Psychiatric/Behavioral: Negative for agitation and sleep disturbance  PHYSICAL EXAM:    /60   Pulse 72   Temp 99 3 °F (37 4 °C)   Resp 17   Ht 5' 2" (1 575 m)   Wt 88 1 kg (194 lb 3 6 oz)   SpO2 96%   BMI 35 52 kg/m²     Physical Exam  Constitutional:       General: She is not in acute distress  Appearance: Normal appearance  She is not ill-appearing, toxic-appearing or diaphoretic  HENT:      Head: Normocephalic and atraumatic  Eyes:      General: No scleral icterus  Extraocular Movements: Extraocular movements intact  Conjunctiva/sclera: Conjunctivae normal       Pupils: Pupils are equal, round, and reactive to light  Cardiovascular:      Rate and Rhythm: Normal rate and regular rhythm  Heart sounds: Normal heart sounds  No murmur heard  Pulmonary:      Effort: Pulmonary effort is normal  No respiratory distress  Breath sounds: Normal breath sounds  No stridor  No wheezing, rhonchi or rales  Abdominal:      Palpations: Abdomen is soft  Tenderness: There is no abdominal tenderness  Musculoskeletal:         General: No tenderness  Normal range of motion  Cervical back: Normal range of motion and neck supple  Right lower leg: No edema  Left lower leg: No edema  Lymphadenopathy:      Cervical: No cervical adenopathy  Skin:     General: Skin is warm and dry  Coloration: Skin is not jaundiced or pale  Findings: No bruising, erythema, lesion or rash  Neurological:      General: No focal deficit present  Mental Status: She is alert and oriented to person, place, and time  Mental status is at baseline  Motor: No weakness  Psychiatric:         Mood and Affect: Mood normal          Behavior: Behavior normal          Thought Content:  Thought content normal          Judgment: Judgment normal          LABS:     Recent Results (from the past 48 hour(s))   Fingerstick Glucose (POCT)    Collection Time: 04/09/22  9:38 PM   Result Value Ref Range    POC Glucose 139 65 - 140 mg/dl   Basic metabolic panel    Collection Time: 04/10/22  6:48 AM   Result Value Ref Range    Sodium 140 136 - 145 mmol/L    Potassium 3 7 3 5 - 5 3 mmol/L    Chloride 106 100 - 108 mmol/L    CO2 25 21 - 32 mmol/L    ANION GAP 9 4 - 13 mmol/L    BUN 6 5 - 25 mg/dL    Creatinine 0 77 0 60 - 1 30 mg/dL    Glucose 148 (H) 65 - 140 mg/dL    Calcium 8 6 8 3 - 10 1 mg/dL    eGFR 75 ml/min/1 73sq m   CBC and differential    Collection Time: 04/10/22  6:48 AM   Result Value Ref Range    WBC 25 80 (H) 4 31 - 10 16 Thousand/uL    RBC 3 78 (L) 3 81 - 5 12 Million/uL    Hemoglobin 11 2 (L) 11 5 - 15 4 g/dL    Hematocrit 33 9 (L) 34 8 - 46 1 %    MCV 90 82 - 98 fL    MCH 29 6 26 8 - 34 3 pg    MCHC 33 0 31 4 - 37 4 g/dL    RDW 14 9 11 6 - 15 1 %    MPV 9 2 8 9 - 12 7 fL    Platelets 357 017 - 900 Thousands/uL    nRBC 0 /100 WBCs    Neutrophils Relative 18 (L) 43 - 75 %    Immat GRANS % 1 0 - 2 %    Lymphocytes Relative 11 (L) 14 - 44 %    Monocytes Relative 3 (L) 4 - 12 %    Eosinophils Relative 67 (H) 0 - 6 %    Basophils Relative 0 0 - 1 %    Neutrophils Absolute 4 54 1 85 - 7 62 Thousands/µL    Immature Grans Absolute 0 21 (H) 0 00 - 0 20 Thousand/uL    Lymphocytes Absolute 2 75 0 60 - 4 47 Thousands/µL    Monocytes Absolute 0 66 0 17 - 1 22 Thousand/µL    Eosinophils Absolute 17 55 (H) 0 00 - 0 61 Thousand/µL    Basophils Absolute 0 09 0 00 - 0 10 Thousands/µL   Fingerstick Glucose (POCT)    Collection Time: 04/10/22  8:00 AM   Result Value Ref Range POC Glucose 136 65 - 140 mg/dl   Fingerstick Glucose (POCT)    Collection Time: 04/10/22 11:41 AM   Result Value Ref Range    POC Glucose 220 (H) 65 - 140 mg/dl   Fingerstick Glucose (POCT)    Collection Time: 04/10/22  4:43 PM   Result Value Ref Range    POC Glucose 135 65 - 140 mg/dl   Fingerstick Glucose (POCT)    Collection Time: 04/10/22 10:30 PM   Result Value Ref Range    POC Glucose 178 (H) 65 - 140 mg/dl   CBC and differential    Collection Time: 04/11/22  5:33 AM   Result Value Ref Range    WBC 26 32 (H) 4 31 - 10 16 Thousand/uL    RBC 3 78 (L) 3 81 - 5 12 Million/uL    Hemoglobin 11 2 (L) 11 5 - 15 4 g/dL    Hematocrit 34 0 (L) 34 8 - 46 1 %    MCV 90 82 - 98 fL    MCH 29 6 26 8 - 34 3 pg    MCHC 32 9 31 4 - 37 4 g/dL    RDW 15 2 (H) 11 6 - 15 1 %    MPV 9 0 8 9 - 12 7 fL    Platelets 281 502 - 782 Thousands/uL    nRBC 0 /100 WBCs   Comprehensive metabolic panel    Collection Time: 04/11/22  5:33 AM   Result Value Ref Range    Sodium 137 136 - 145 mmol/L    Potassium 3 9 3 5 - 5 3 mmol/L    Chloride 104 100 - 108 mmol/L    CO2 25 21 - 32 mmol/L    ANION GAP 8 4 - 13 mmol/L    BUN 11 5 - 25 mg/dL    Creatinine 0 76 0 60 - 1 30 mg/dL    Glucose 179 (H) 65 - 140 mg/dL    Calcium 8 5 8 3 - 10 1 mg/dL    Corrected Calcium 9 4 8 3 - 10 1 mg/dL    AST 31 5 - 45 U/L    ALT 48 12 - 78 U/L    Alkaline Phosphatase 196 (H) 46 - 116 U/L    Total Protein 7 1 6 4 - 8 2 g/dL    Albumin 2 9 (L) 3 5 - 5 0 g/dL    Total Bilirubin 0 30 0 20 - 1 00 mg/dL    eGFR 76 ml/min/1 73sq m   Peripheral Smear    Collection Time: 04/11/22  5:33 AM   Result Value Ref Range    Segmented Neutrophils Manual 27 (L) 45 - 77 %    Lymphocytes Manual 12 (L) 14 - 44 %    Monocytes Manual 8 4 - 12 %    Eosinophils Manual 53 (H) 0 - 6 %    Total Counted 100     RBC Morphology     Fingerstick Glucose (POCT)    Collection Time: 04/11/22  7:16 AM   Result Value Ref Range    POC Glucose 179 (H) 65 - 140 mg/dl   Fingerstick Glucose (POCT)    Collection Time: 04/11/22 11:34 AM   Result Value Ref Range    POC Glucose 266 (H) 65 - 140 mg/dl       Colonoscopy    Result Date: 4/9/2022  Narrative:  [facility] [address] [phone] DATE OF SERVICE: 4/09/22 PHYSICIAN(S): Attending: Gregory Raphael DO Fellow: No Staff Documented INDICATION: Acute diverticulitis, Eosinophilia, unspecified type POST-OP DIAGNOSIS: See the impression below  HISTORY: Prior colonoscopy: 3 years ago  BOWEL PREPARATION: Miralax/Dulcolax PREPROCEDURE: Informed consent was obtained for the procedure, including sedation  Risks including but not limited to bleeding, infection, perforation, adverse drug reaction and aspiration were explained in detail  Also explained about less than 100% sensitivity with the exam and other alternatives  The patient was placed in the left lateral decubitus position  DETAILS OF PROCEDURE: Patient was taken to the procedure room where a time out was performed to confirm correct patient and correct procedure  The patient underwent monitored anesthesia care, which was administered by an anesthesia professional  The patient's blood pressure, heart rate, level of consciousness, oxygen and respirations were monitored throughout the procedure  A digital rectal exam was performed  The scope was introduced through the anus and advanced to the cecum  Retroflexion was performed in the rectum  The quality of bowel preparation was evaluated using the [facility] Bowel Preparation Scale with scores of: right colon = 2, transverse colon = 2, left colon = 2  The total BBPS score was 6  Bowel prep was adequate  The patient's estimated blood loss was minimal (<5 mL)  The procedure was not difficult  The patient tolerated the procedure well  There were no apparent complications  ANESTHESIA INFORMATION: ASA: III Anesthesia Type: Anesthesia type not filed in the log   MEDICATIONS: No administrations occurring from 1206 to 1229 on 04/09/22 FINDINGS: Multiple small diverticula with no bleeding in the proximal ascending colon, mid ascending colon, proximal sigmoid colon, mid sigmoid colon and distal sigmoid colon One sessile polyp measuring smaller than 5 mm in the proximal ascending colon; performed complete removal by cold forceps biopsy The cecum, hepatic flexure, transverse colon, splenic flexure, descending colon, rectosigmoid and rectum appeared normal  Performed 12 biopsies in the ascending colon, transverse colon and sigmoid colon  Rule out eosinophilic colitis  EVENTS: Procedure Events Event Event Time ENDO CECUM REACHED 4/9/2022 12:21 PM ENDO SCOPE OUT TIME 4/9/2022 12:28 PM SPECIMENS: ID Type Source Tests Collected by Time Destination 1 : ascending, transverse and sigmoid Tissue Colon TISSUE EXAM Ana M Law,  4/9/2022 12:22 PM  2 : proximal ascending colon polyp x 1 Tissue Polyp, Colorectal TISSUE EXAM Ana M Law DO 4/9/2022 12:25 PM  EQUIPMENT: Colonoscope -[id number]     Impression: 1  Diverticulosis coli, ascending colon sigmoid colon  No evidence of diverticulitis 2  Diminutive polyp in the proximal ascending colon status post cold biopsy removal RECOMMENDATION: Repeat colonoscopy in 5 years due to a personal history of colon polyps High-fiber diet to include fruits, vegetables, whole grain foods, daily Will call the patient 1 week regarding the polyp results and biopsy results Ana M Law DO [place], [place]    CT abdomen pelvis w contrast    Result Date: 4/7/2022  Narrative: CT ABDOMEN AND PELVIS WITH IV CONTRAST INDICATION:   LLQ abdominal pain RLQ abdominal pain (Age >= 14y) LUQ abdominal pain Eosinophila and worsening abdominal pain  Leukocytosis  COMPARISON:  CT abdomen/pelvis 4/4/2022  TECHNIQUE:  CT examination of the abdomen and pelvis was performed  In addition to portal venous phase postcontrast scanning through the abdomen and pelvis, delayed phase postcontrast scanning was performed through the upper abdominal viscera  Axial, sagittal, and coronal 2D reformatted images were created from the source data and submitted for interpretation  Radiation dose length product (DLP) for this visit:  1077 mGy-cm   This examination, like all CT scans performed in the [facility], was performed utilizing techniques to minimize radiation dose exposure, including the use of iterative reconstruction and automated exposure control  IV Contrast:  100 mL of iohexol (OMNIPAQUE) Enteric Contrast:  Enteric contrast was not administered  FINDINGS: ABDOMEN LOWER CHEST: No clinically significant abnormality is identified in the visualized lower chest  No consolidation or effusion  LIVER: Normal size and morphology  No suspicious lesion  There are stable geographic areas of portal venous phase hypoattenuation and delayed phase isoattenuation which are traversed by undistorted vessels, the larger in segment 2/4a superior to the middle hepatic vein, the smaller segment 1 adjacent to the IVC, findings compatible with focal fatty infiltration  BILIARY: No intrahepatic biliary ductal dilatation  Normal caliber common bile duct  GALLBLADDER: No calcified gallstones  There is vicarious excretion of contrast from prior study  Normal wall thickness  No pericholecystic inflammatory changes  SPLEEN: Within normal limits  No suspicious lesion  Normal spleen size  PANCREAS: Pancreatic parenchyma is within normal limits  No main pancreatic ductal dilatation  No peripancreatic inflammation  ADRENAL GLANDS: Within normal limits  KIDNEYS/URETERS: Normal size and position  Symmetric enhancement  No suspicious lesion  Bilateral simple cysts  Mild multifocal cortical scarring  Nonobstructing calculi are noted  No hydronephrosis  Ureters within normal limits  STOMACH AND BOWEL: Stomach is grossly within normal limits  Normal caliber small bowel  Normal caliber large bowel  Colonic diverticulosis   Again seen is severe wall thickening of the colon centered on the cecum/proximal ascending colon as well as the splenic flexure  Pericolonic inflammatory fat stranding about the right colon appears unchanged, and is slightly improved in appearance in the left upper quadrant  No evidence of perforation or abscess    APPENDIX: Normal appendix  ABDOMINOPELVIC CAVITY: Pericolic stranding as described  Trace pelvic free fluid, similar  No intraperitoneal free air  Multiple enlarged mesenteric lymph nodes measuring up to 1 cm short axis in the right lower quadrant which are presumably reactive, unchanged  No retroperitoneal hematoma  VESSELS: Normal caliber abdominal aorta with no detectable atherosclerotic plaque  The celiac, SMA, and HENRIQUE are patent  The main, right, and left portal veins are patent  The SMV and splenic vein are patent  The hepatic veins are patent  The renal arteries and veins are patent  PELVIS REPRODUCTIVE ORGANS:  Hysterectomy  No evidence of pelvic or adnexal mass  A 1 7 cm crescentic well-circumscribed hypodensity in the right aspect of the urethra is unchanged significantly since 2014, 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  URINARY BLADDER:  Within normal limits  No calculi  ABDOMINAL WALL/INGUINAL REGIONS:  Diastases recti with small fat-containing umbilical hernia  Small right-sided fat-containing indirect inguinal hernia  BONES:  Mild multilevel degenerative changes in the spine  Vertebral body height is maintained  No acute fracture or destructive osseous lesion  Grade 1 degenerative anterolisthesis performed  Impression: Overall similar appearance of colonic wall thickening centered at the cecum/ascending colon and at the splenic flexure, with similar pericolonic inflammatory stranding in the right colon and slight improvement on the left  No evidence of perforation or abscess formation   Workstation performed: [id number]     CT abdomen pelvis with contrast    Result Date: 4/4/2022  Narrative: CT ABDOMEN AND PELVIS WITH IV CONTRAST INDICATION: Abdominal pain, acute, nonlocalized periumbilical, generalized pain  WBC count of 19,000 COMPARISON:  CT from August 9, 2014 TECHNIQUE:  CT examination of the abdomen and pelvis was performed  Axial, sagittal, and coronal 2D reformatted images were created from the source data and submitted for interpretation  Radiation dose length product (DLP) for this visit:  863 mGy-cm   This examination, like all CT scans performed in the [facility], was performed utilizing techniques to minimize radiation dose exposure, including the use of iterative reconstruction and automated exposure control  IV Contrast:  100 mL of iohexol (OMNIPAQUE) Enteric Contrast:  Enteric contrast was not administered  FINDINGS: ABDOMEN LOWER CHEST:  No clinically significant abnormality identified in the visualized lower chest  LIVER/BILIARY TREE:  There is a new geographic area in the liver is interposed between the middle hepatic vein and left hepatic vein, measuring about 3 3 cm, indeterminate probably due to focal fatty infiltration Additional hypodensity seen just into the IVC, segment 1, measuring 2 cm GALLBLADDER:  No calcified gallstones  No pericholecystic inflammatory change  No biliary dilation seen SPLEEN:  Unremarkable  PANCREAS:  Unremarkable  ADRENAL GLANDS:  Unremarkable  KIDNEYS/URETERS:  No hydronephrosis or urinary tract calculus  One or more sharply circumscribed subcentimeter renal hypodensities are present, too small to accurately characterize, and statistically most likely benign findings  According to recent literature (Radiology 2019) no further workup of these findings is recommended  Nonobstructing bilateral renal calculi are noted STOMACH AND BOWEL:  There is pericolonic infiltration in relation to the splenic flexure with wall thickening, likely due to diverticulitis    Additional thickening of the adjacent proximal descending colon seen Mild thickening of the descending colon with some surrounding infiltration at its mesenteric margin, image 76 series 601 Ileocecal junction appear unremarkable APPENDIX:  No findings to suggest appendicitis  Appendix is identified in image 72 series 601, image 79 series 601 The cecum is mobile ABDOMINOPELVIC CAVITY:  No ascites  No pneumoperitoneum  No lymphadenopathy  Mesenteric lymph nodes are seen measuring about 1 cm in the right lower quadrant, image 45 series 2 VESSELS:  Celiac trunk, SMA appear unremarkable Iliac vessels are patent PELVIS REPRODUCTIVE ORGANS:  A rounded the hypodense area seen within the cervix, stable URINARY BLADDER:  Unremarkable  ABDOMINAL WALL/INGUINAL REGIONS:  Rectal muscle diastasis seen OSSEOUS STRUCTURES:  No acute compression collapse vertebra No gross lytic lesion     Impression: There is thickening of the splenic flexure with associated pericolonic infiltration, suggestive diverticulitis  Additional thickening of the ascending colon with some infiltration at its mesenteric margin may be due to additional segment of diverticulitis or colitis  Suggest follow-up with the colonoscopy when the acute illness subsides to exclude  focal colonic lesion There is no fluid collection seen There is no free air Small mesenteric lymph nodes with largest measuring about 1 cm, short interval follow-up at 3 months suggested The study was marked in EPIC for immediate notification   Follow-up notification has been created in Epic Workstation performed: [id number]         HISTORY:    Past Medical History:   Diagnosis Date    Asthma     Benign hypertension 2018    [facility] [address]     Diabetes mellitus [address] [address]     Glaucoma     Hypertension     Kidney stone     Neuropathy     Sleep apnea     no machine    SOB (shortness of breath)     Tubular adenoma of colon 10/2019    Wheezing        Past Surgical History:   Procedure Laterality Date     SECTION      COLONOSCOPY      HYSTERECTOMY  1985    TONSILLECTOMY         Family History   Problem Relation Age of Onset    Diabetes Mother     Hypertension Father     Prostate cancer Father     Diabetes Sister     Hypertension Sister     Breast cancer Sister 62    Diabetes Brother     Hypertension Brother     Asthma Family     No Known Problems Daughter     No Known Problems Sister     No Known Problems Daughter     No Known Problems Daughter     No Known Problems Maternal Aunt     Breast cancer Maternal Aunt 58    No Known Problems Maternal Aunt     No Known Problems Maternal Aunt     No Known Problems Paternal Aunt     No Known Problems Paternal Aunt     No Known Problems Paternal Aunt     Colon cancer Neg Hx     Ovarian cancer Neg Hx     Uterine cancer Neg Hx     Cervical cancer Neg Hx        Social History     Socioeconomic History    Marital status: /Civil Union     Spouse name: None    Number of children: None    Years of education: None    Highest education level: None   Occupational History    Occupation: retired    Tobacco Use    Smoking status: Never Smoker    Smokeless tobacco: Never Used   Vaping Use    Vaping Use: None   Substance and Sexual Activity    Alcohol use: Never    Drug use: No    Sexual activity: Yes     Birth control/protection: Surgical   Other Topics Concern    None   Social History Narrative    None     Social Determinants of Health     Financial Resource Strain: Not on file   Food Insecurity: No Food Insecurity    Worried About Running Out of Food in the Last Year: Never true    Latisha of Food in the Last Year: Never true   Transportation Needs: No Transportation Needs    Lack of Transportation (Medical): No    Lack of Transportation (Non-Medical):  No   Physical Activity: Not on file   Stress: Not on file   Social Connections: Not on file   Intimate Partner Violence: Not on file   Housing Stability: Low Risk     Unable to Pay for Housing in the Last Year: No    Number of Places Lived in the Last Year: 1    Unstable Housing in the Last Year: No         Current Facility-Administered Medications:     albuterol (PROVENTIL HFA,VENTOLIN HFA) inhaler 1 puff, 1 puff, Inhalation, Q4H PRN, Aisha Gomez DO    amLODIPine (NORVASC) tablet 10 mg, 10 mg, Oral, Daily, [place], DO, 10 mg at 04/11/22 0816    aspirin chewable tablet 81 mg, 81 mg, Oral, Daily, Aisha Gomez, DO, 81 mg at 04/11/22 0816    dextrose 5 % and sodium chloride 0 45 % with KCl 20 mEq/L infusion, 75 mL/hr, Intravenous, Continuous, Feliberto Carlos MD, Last Rate: 75 mL/hr at 04/11/22 1126, 75 mL/hr at 04/11/22 1126    enoxaparin (LOVENOX) subcutaneous injection 40 mg, 40 mg, Subcutaneous, Daily, Siddhartha Cooper DO    fluticasone-vilanterol (BREO ELLIPTA) 200-25 MCG/INH inhaler 1 puff, 1 puff, Inhalation, Daily, Aisha Gomez DO, 1 puff at 04/11/22 0819    hydrALAZINE (APRESOLINE) injection 5 mg, 5 mg, Intravenous, Q6H [facility], Aisha Gomez, DO, 5 mg at 04/11/22 0616    HYDROmorphone (DILAUDID) injection 0 5 mg, 0 5 mg, Intravenous, Q4H PRN, [place], DO, 0 5 mg at 04/11/22 0816    HYDROmorphone (DILAUDID) injection 1 mg, 1 mg, Intravenous, Q4H PRN, Aisha Gomez, DO, 1 mg at 04/10/22 2235    insulin lispro (HumaLOG) 100 units/mL subcutaneous injection 1-5 Units, 1-5 Units, Subcutaneous, HS, Aisha Gomez DO, 1 Units at 04/10/22 2236    insulin lispro (HumaLOG) 100 units/mL subcutaneous injection 1-6 Units, 1-6 Units, Subcutaneous, TID With Meals **AND** [CANCELED] Fingerstick Glucose (POCT), , , Q6H, Aisha Gomez DO    iohexol (OMNIPAQUE) 240 MG/ML solution 50 mL, 50 mL, Oral, Once in imaging, Aisha Gomez, DO    LORazepam (ATIVAN) tablet 0 5 mg, 0 5 mg, Oral, Q8H PRN, [place], DO    naloxone [facility]) injection 0 04 mg, 0 04 mg, Intravenous, Q1MIN PRN, Aisha Gomez, DO    ondansetron [facility]) injection 4 mg, 4 mg, Intravenous, Q6H PRN, Aisha Gomez, DO    zolpidem (AMBIEN) tablet 5 mg, 5 mg, Oral, HS PRN, Aisha Gomez, DO, 5 mg at 04/11/22 0025    Medications Prior to Admission   Medication    Advair Diskus 250-50 MCG/DOSE inhaler    Aspirin Low Dose 81 MG chewable tablet    hydrOXYzine HCL (ATARAX) 10 mg tablet    Janumet -1000 MG TB24    latanoprost (XALATAN) 0 005 % ophthalmic solution    lisinopril (ZESTRIL) 20 mg tablet    LORazepam (ATIVAN) 1 mg tablet    meloxicam (MOBIC) 15 mg tablet    montelukast (SINGULAIR) 10 mg tablet    Pregabalin ER (Lyrica CR) 165 MG TB24    sertraline (ZOLOFT) 50 mg tablet    zolpidem (AMBIEN) 5 mg tablet    amLODIPine (NORVASC) 10 mg tablet    cyclobenzaprine (FLEXERIL) 5 mg tablet    glucose blood (ONETOUCH VERIO) test strip    mometasone (ELOCON) 0 1 % cream    ONETOUCH DELICA LANCETS 28G MISC    ProAir  (90 Base) MCG/ACT inhaler    tobramycin-dexamethasone (TOBRADEX) ophthalmic suspension    triamcinolone (KENALOG) 0 1 % cream       Allergies   Allergen Reactions    Ciprofloxacin Itching    Levaquin [Levofloxacin] Swelling    Penicillins GI Intolerance       Labs and pertinent reports reviewed  This note has been generated by voice recognition software system  Therefore, there may be spelling, grammar, and or syntax errors  Please contact if questions arise

## 2022-04-11 NOTE — CASE MANAGEMENT
Case Management Discharge Planning Note    Patient name Maylin Eldridge  Location /-83 MRN [id number]  : 1945 Date 2022       Current Admission Date: 2022  Current Admission Diagnosis:Acute diverticulitis   Patient Active Problem List    Diagnosis Date Noted    COPD (chronic obstructive pulmonary disease) ([facility] ) 2022    Eosinophilia 2022    Acute diverticulitis 2022    Transaminitis 2022    Anxiety 2022    Hypertensive urgency 2022    Nausea 2022    Bradycardia 2022    Abnormal EKG 2022    Primary hypertension 2022    Stage 3a chronic kidney disease ([facility] ) 2022    Psychophysiological insomnia 2021    Exertional dyspnea     Medicare annual wellness visit, subsequent 2021    Right leg pain 2021    Acute cystitis with hematuria 2021    Chronic bronchitis ([facility] ) 04/15/2021    Obesity, morbid ([place] [address] ) 04/15/2021    BMI 37 0-37 9, adult 2020    Atypical chest pain 2020    Dermatitis 2020    Vaginal cyst 2019    Candidiasis of genitalia in female 2019    Encounter for gynecological examination without abnormal finding 2019    Controlled type 2 diabetes with neuropathy ([facility] ) 2019    Benign hypertension 2018    Depression with anxiety 2018    Mild intermittent asthma 2018    BOLA (obstructive sleep apnea) 10/17/2016      LOS (days): 7  Geometric Mean LOS (GMLOS) (days): 2 60  Days to GMLOS:-4 2     OBJECTIVE:  Risk of Unplanned Readmission Score: 12         Current admission status: Inpatient   Preferred Pharmacy:   [address] [place] [address] 9293 [address] R 1 [phone] [address] 1 ([address]) [address] [facility]  Phone: [phone] Fax: [phone]    [id number] [place] [address] [facility] [place] [address]  Phone: [phone] Fax: [phone]    [facility] Provider: ALEKSANDER Doss    Primary Insurance: MEDICARE  Secondary Insurance: [employer]    DISCHARGE DETAILS:    Additional Comments: Patient reviewed during care coordination rounds with Dr Jacoby Jeffery who reports pt will likely require an additional 48-72 hours inpatient  Hem/Onc to see pt  Plan remains for discharge home with [facility] once medically stable  CM department to remain available

## 2022-04-11 NOTE — PLAN OF CARE
Problem: OCCUPATIONAL THERAPY ADULT  Goal: Performs self-care activities at highest level of function for planned discharge setting  See evaluation for individualized goals  Description: Treatment Interventions: ADL retraining,Functional transfer training,UE strengthening/ROM,Endurance training,Patient/family training,Equipment evaluation/education,Compensatory technique education,Continued evaluation,Activityengagement,Energy conservation          See flowsheet documentation for full assessment, interventions and recommendations  Note: Limitation: Decreased ADL status,Decreased UE strength,Decreased endurance,Decreased self-care trans,Decreased high-level ADLs  Prognosis: Good  Assessment: Pt participated in skilled OT session today addressing the following interventions ADL retraining with proper body mechanics, Patient / Family Education, Transfer Training, Safety Awareness, Fall Prevention, Activity tolerance training and Standing tolerance training  Upon arrival, OT completed 2 pt identifiers  Pt agreeable to OT treatment session, upon arrival patient was found alert, responsive and in no apparent distress  Pt completed sit to stand with Seema/CGA  Pt was able to complete UB ADLS with S and LB ADLS with min A  Pt completed functional mobility with CGA/min A and RW  Pt requiring VCs and A throughout and edu in pacing  Pt continues to be functioning below baseline level, occupational performance remains limited secondary to factors listed above and increased risk for falls and injury  From OT standpoint, recommendation at time of d/c would be home with OT  Pt to benefit from continued Occupational Therapy treatment while in the hospital to address deficits as defined above and maximize level of functional independence with ADLs and functional mobility       OT Discharge Recommendation: Home with home health rehabilitation  OT - OK to Discharge: Yes (when medically cleared)

## 2022-04-11 NOTE — OCCUPATIONAL THERAPY NOTE
Occupational Therapy Treatment Note      Angela Marcy    2022    Principal Problem:    Acute diverticulitis  Active Problems:    Mild intermittent asthma    Controlled type 2 diabetes with neuropathy ([facility] )    Stage 3a chronic [facility] disease ([facility] )    Primary hypertension    Transaminitis    Eosinophilia      Past Medical History:   Diagnosis Date    Asthma     Benign hypertension 2018    Cardiac arrest ([facility] )     Diabetes mellitus ([facility] )     Glaucoma     Hypertension     Kidney stone     Neuropathy     Sleep apnea     no machine    SOB (shortness of breath)     Tubular adenoma of colon 10/2019    Wheezing        Past Surgical History:   Procedure Laterality Date     SECTION      COLONOSCOPY      HYSTERECTOMY  1985    TONSILLECTOMY        22 1441   OT Last Visit   OT Visit Date 22   Note Type   Note Type Treatment   Restrictions/Precautions   Weight Bearing Precautions Per Order No   Braces or Orthoses Other (Comment)  (Elastic R ankle brace as needed)   Other Precautions Chair Alarm; Bed Alarm; Fall Risk;Pain   Lifestyle   Autonomy Per chart review and patient report, patient lives with her spouse and daughter in a multi-level home with [address] [facility] and a first-floor setup  At baseline, patient is independent in ADLs and receives assistance for IADLs  Patient has recently been ambulatory with a cane  Reciprocal Relationships Family   Service to Others Retired-    [address] [place] [address] Rd, socializing with friends   Pain Assessment   Pain Assessment Tool 0-10   Pain Score 7   Pain Location/Orientation Location: Abdomen   ADL   Grooming Assistance 5  Supervision/Setup   Grooming Deficit Supervision/safety; Increased time to complete;Setup   UB Bathing Assistance 5  Supervision/Setup   UB Bathing Deficit Supervision/safety; Increased time to complete;Setup   LB Bathing Assistance 4  Minimal Assistance   LB Bathing Deficit Setup;Steadying;Verbal cueing;Supervision/safety; Increased time to complete   UB Dressing Assistance 5  Supervision/Setup   UB Dressing Deficit Setup;Supervision/safety; Increased time to complete   LB Dressing Assistance 4  Minimal Assistance   LB Dressing Deficit Increased time to complete;Supervision/safety;Verbal cueing;Setup   Toileting Assistance  5  Supervision/Setup   Toileting Deficit Setup;Supervison/safety; Increased time to complete   Bed Mobility   Additional Comments Pt was oob to chair when OT arrived   Transfers   Sit to Stand 4  Minimal assistance  (CGA)   Additional items Assist x 1; Increased time required;Verbal cues;Armrests   Stand to Sit 4  Minimal assistance  (CGA)   Additional items Assist x 1; Armrests; Increased time required;Verbal cues   Functional Mobility   Functional Mobility 4  Minimal assistance  (CGA)   Additional items Rolling walker   Cognition   Overall Cognitive Status WFL   Arousal/Participation Alert; Responsive; Cooperative   Attention Within functional limits   Orientation Level Oriented X4   Memory Within functional limits   Following Commands Follows all commands and directions without difficulty   Comments Pt was agreeable to OT session   Activity Tolerance   Activity Tolerance Patient tolerated treatment well   Medical Staff Made Aware Yes spoke with pt's RN who stated pt was appropriate for OT and made aware of outcomes   Assessment   Assessment Pt participated in skilled OT session today addressing the following interventions ADL retraining with proper body mechanics, Patient / Family Education, Transfer Training, Safety Awareness, Fall Prevention, Activity tolerance training and Standing tolerance training  Upon arrival, OT completed 2 pt identifiers  Pt agreeable to OT treatment session, upon arrival patient was found alert, responsive and in no apparent distress  Pt completed sit to stand with Seema/CGA  Pt was able to complete UB ADLS with S and LB ADLS with min A    Pt completed functional mobility with CGA/min A and RW  Pt requiring VCs and A throughout and edu in pacing  Pt continues to be functioning below baseline level, occupational performance remains limited secondary to factors listed above and increased risk for falls and injury  From OT standpoint, recommendation at time of d/c would be home with OT  Pt to benefit from continued Occupational Therapy treatment while in the hospital to address deficits as defined above and maximize level of functional independence with ADLs and functional mobility  Plan   Treatment Interventions ADL retraining;Functional transfer training; Activityengagement; Energy conservation; Compensatory technique education;Patient/family training   Goal Expiration Date 04/18/22   OT Frequency 2-3x/wk   Recommendation   OT Discharge Recommendation Home with home health rehabilitation   OT - OK to Discharge Yes  (when medically cleared)   AM-PAC Daily Activity Inpatient   Lower Body Dressing 3   Bathing 3   Toileting 3   Upper Body Dressing 3   Grooming 4   Eating 4   Daily Activity Raw Score 20   Daily Activity Standardized Score (Calc for Raw Score >=11) 42 03   AM-PAC Applied Cognition Inpatient   Following a Speech/Presentation 4   Understanding Ordinary Conversation 4   Taking Medications 4   Remembering Where Things Are Placed or Put Away 4   Remembering List of 4-5 Errands 4   Taking Care of Complicated Tasks 4   Applied Cognition Raw Score 24   Applied Cognition Standardized Score 62 21   Barthel Index   Grooming Score 5   Dressing Score 5   Toilet Use Score 5   Transfers (Bed/Chair) Score 10   Mobility (Level Surface) Score 0     Regis Sanders MS OTR/L

## 2022-04-11 NOTE — PROGRESS NOTES
Progress Note - Infectious Disease   [facility] 78 y o  female MRN: [id number]  Unit/Bed#: -01 Encounter: [id number]    Impression/Plan:  1   Abdominal pain/diarrhea with high level eosinophilia   This level of eosinophilia is new, with last CBC in February of this year only with low level eosinophilia   This high level of eosinophilia is not consistent with intestinal parasites  Possible eosinophilic enteritis although colonoscopy grossly normal without inflammatory changes  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  No diverticulitis seen  Patient's antibiotics have been discontinued and she remains clinically stable  A hematology/oncology consult is pending  Monitor off all antibiotics  Monitor CBCD  Close GI follow-up  Follow-up stool for ova and parasite  Follow-up Strongyloides serology  Follow-up flow cytometry  Follow-up hematology/oncology consult     2  High level eosinophilia   As in above, at this level of eosinophilia, intestinal parasite is unlikely  Consider strongyloides hyperinfection syndrome but clinical presentation is not consistent with syndrome and patient has very little risk factor for it, without any immunosuppression  Patient information about where she grew up is discordant, but patient told ID it was [facility]  Colonoscopy without evidence of inflammatory bowel disease or diverticulitis, biopsies are pending  Follow-up colonic biopsies  Monitor CBCD  Follow-up Strongyloides serologies     Follow-up HIV screen  Follow-up flow cytometry to make sure not eosinophilia leukemia  Follow-up Hematology Oncology consult     3  Mildly elevated LFTs, likely secondary to colitis above   Patient has no RUQ abdominal pain  Recheck liver function tests     4  CKD   Creatinine baseline  Antibiotics at full dose     Recheck BMP      5  DM, with neuropathy     Management per primary service  Above plan was discussed in detail with patient at the bedside    Above plan was discussed in detail with SLIM  Antibiotics:  No current antibiotic use    Subjective:  Patient reports she's still having a lot of abdominal pain this morning  Reports she just got a dose of pain medication from her RN and is hoping it kicks in soon  Reports poor sleep quality due to pain, frequent urination from IV fluids, and a BM this morning  She reports it was first stool since her colonoscopy, was soft  She has no nausea or vomiting  She was able to eat her breakfast this morning without difficulty  She has no fever, chills, sweats, shakes; no cough, shortness of breath, or chest pain  No new symptoms  Objective:  Vitals:  Temp:  [98 2 °F (36 8 °C)-99 3 °F (37 4 °C)] 99 3 °F (37 4 °C)  HR:  [68-84] 72  Resp:  [17] 17  BP: (143-168)/(48-77) 150/60  SpO2:  [93 %-96 %] 96 %  Temp (24hrs), Av 7 °F (37 1 °C), Min:98 2 °F (36 8 °C), Max:99 3 °F (37 4 °C)  Current: Temperature: 99 3 °F (37 4 °C)    Physical Exam:   General Appearance:  Alert, interactive, nontoxic, in no acute distress  She appears comfortable sitting out of bed in her chair  Throat: Oropharynx moist without lesions  Lungs:   Clear to auscultation bilaterally; no wheezes, rhonchi or rales; respirations unlabored on room air  Heart:  RRR; no murmur, rub or gallop  Abdomen:   Soft, obese, non-distended, positive bowel sounds  She does report discomfort with palpation of lower middle abdomen  Extremities: No clubbing or cyanosis, no edema  Skin: No new rashes, lesions, or draining wounds noted on exposed skin       Labs, Imaging, & Other studies:   All pertinent labs and imaging studies were personally reviewed  Results from last 7 days   Lab Units 22  0533 04/10/22  0648 22  0548   WBC Thousand/uL 26 32* 25 80* 26 08*   HEMOGLOBIN g/dL 11 2* 11 2* 11 6   PLATELETS Thousands/uL 198 200 197     Results from last 7 days   Lab Units 22  0533 22  0534 22  0501 22  1454 22  1454   POTASSIUM mmol/L 3 9   < > 4 5   < > 4 2   CHLORIDE mmol/L 104   < > 104   < > 101   CO2 mmol/L 25   < > 26   < > 26   BUN mg/dL 11   < > 13   < > 13   CREATININE mg/dL 0 76   < > 0 86   < > 0 98   EGFR ml/min/1 73sq m 76   < > 66   < > 56   CALCIUM mg/dL 8 5   < > 8 8   < > 9 5   AST U/L 31  --  36  --  58*   ALT U/L 48  --  90*   < > 126*   ALK PHOS U/L 196*  --  178*   < > 216*    < > = values in this interval not displayed       Results from last 7 days   Lab Units 04/06/22  1306   C DIFF TOXIN B BY PCR  Negative

## 2022-04-11 NOTE — PLAN OF CARE
Problem: PAIN - ADULT  Goal: Verbalizes/displays adequate comfort level or baseline comfort level  Description: Interventions:  - Encourage patient to monitor pain and request assistance  - Assess pain using appropriate pain scale  - Administer analgesics based on type and severity of pain and evaluate response  - Implement non-pharmacological measures as appropriate and evaluate response  - Consider cultural and social influences on pain and pain management  - Notify physician/advanced practitioner if interventions unsuccessful or patient reports new pain  Outcome: Progressing     Problem: SAFETY ADULT  Goal: Maintain or return to baseline ADL function  Description: INTERVENTIONS:  -  Assess patient's ability to carry out ADLs; assess patient's baseline for ADL function and identify physical deficits which impact ability to perform ADLs (bathing, care of mouth/teeth, toileting, grooming, dressing, etc )  - Assess/evaluate cause of self-care deficits   - Assess range of motion  - Assess patient's mobility; develop plan if impaired  - Assess patient's need for assistive devices and provide as appropriate  - Encourage maximum independence but intervene and supervise when necessary  - Involve family in performance of ADLs  - Assess for home care needs following discharge   - Consider OT consult to assist with ADL evaluation and planning for discharge  - Provide patient education as appropriate  Outcome: Progressing  Goal: Maintains/Returns to pre admission functional level  Description: INTERVENTIONS:  - Perform BMAT or MOVE assessment daily    - Set and communicate daily mobility goal to care team and patient/family/caregiver     - Collaborate with rehabilitation services on mobility goals if consulted  - Out of bed for toileting  - Record patient progress and toleration of activity level   Outcome: Progressing  Goal: Patient will remain free of falls  Description: INTERVENTIONS:  - Educate patient/family on patient safety including physical limitations  - Instruct patient to call for assistance with activity   - Consult OT/PT to assist with strengthening/mobility   - Keep Call bell within reach  - Keep bed low and locked with side rails adjusted as appropriate  - Keep care items and personal belongings within reach  - Initiate and maintain comfort rounds  - Make Fall Risk Sign visible to staff  - Offer Toileting every 2 Hours, in advance of need  - Initiate/Maintain bed alarm  - Obtain necessary fall risk management equipment:   - Apply yellow socks and bracelet for high fall risk patients  - Consider moving patient to room near nurses station  Outcome: Progressing     Problem: Knowledge Deficit  Goal: Patient/family/caregiver demonstrates understanding of disease process, treatment plan, medications, and discharge instructions  Description: Complete learning assessment and assess knowledge base    Interventions:  - Provide teaching at level of understanding  - Provide teaching via preferred learning methods  Outcome: Progressing     Problem: GASTROINTESTINAL - ADULT  Goal: Minimal or absence of nausea and/or vomiting  Description: INTERVENTIONS:  - Administer IV fluids if ordered to ensure adequate hydration  - Maintain NPO status until nausea and vomiting are resolved  - Nasogastric tube if ordered  - Administer ordered antiemetic medications as needed  - Provide nonpharmacologic comfort measures as appropriate  - Advance diet as tolerated, if ordered  - Consider nutrition services referral to assist patient with adequate nutrition and appropriate food choices  Outcome: Progressing  Goal: Maintains or returns to baseline bowel function  Description: INTERVENTIONS:  - Assess bowel function  - Encourage oral fluids to ensure adequate hydration  - Administer IV fluids if ordered to ensure adequate hydration  - Administer ordered medications as needed  - Encourage mobilization and activity  - Consider nutritional services referral to assist patient with adequate nutrition and appropriate food choices  Outcome: Progressing  Goal: Maintains adequate nutritional intake  Description: INTERVENTIONS:  - Monitor percentage of each meal consumed  - Identify factors contributing to decreased intake, treat as appropriate  - Assist with meals as needed  - Monitor I&O, weight, and lab values if indicated  - Obtain nutrition services referral as needed  Outcome: Progressing  Goal: Establish and maintain optimal ostomy function  Description: INTERVENTIONS:  - Assess bowel function  - Encourage oral fluids to ensure adequate hydration  - Administer IV fluids if ordered to ensure adequate hydration   - Administer ordered medications as needed  - Encourage mobilization and activity  - Nutrition services referral to assist patient with appropriate food choices  - Assess stoma site  - Consider wound care consult   Outcome: Progressing  Goal: Oral mucous membranes remain intact  Description: INTERVENTIONS  - Assess oral mucosa and hygiene practices  - Implement preventative oral hygiene regimen  - Implement oral medicated treatments as ordered  - Initiate Nutrition services referral as needed  Outcome: Progressing

## 2022-04-11 NOTE — TELEMEDICINE
e-Consult (IPC)  - Interventional Radiology  Mary Cisneros 78 y o  female MRN: [id number]  Unit/Bed#: -01 Encounter: [id number]    IR has been consulted to evaluate the patient, determine the appropriate procedure, and whether or not a procedure can and should be performed regarding the care of Mary Cisneros  We were consulted by Internal Medicine concerning Mary Cisneros, and to possibly perform a Bone Marrow Biopsy if medically appropriate for the patient  IP Consult to IR  Consult performed by: [facility], ALEKSANDER  Consult ordered by: Re Orta PA-C        04/11/22      Assessment/Recommendation:   Mary Cisneros, 77y female with a history of T2DM, HTN, Asthma, CKD-3 and Eosinophilia  WBC count 5 93 on 2/28 and has been increasing now 26 32  Afebrile, normal heart rate  Patient does have abdominal pain and does have a diverticulitis  Colonoscopy 4/9/22 several biopsies taken, Pathology pending  Diverticulosis coli ascending colon sigmoid colon  Also c/o night sweats since November 2021  No weight loss or lymphadenopathy  Per infectious disease - not intestinal parasitic infection    Plan -   Bone marrow biopsy tomorrow - timing determined by team availability  NPO after midnight      Total time spent in review of data, discussion with requesting provider and rendering advice was 22 minutes       Patient or appropriate family member was verbally informed by Internal Medicine of this consultative service on their behalf to provide more timely access to specialty care in lieu of an in person consultation  Verbal consent was obtained  Thank you for allowing Interventional Radiology to participate in the care of Mary Cisneros  Please don't hesitate to call or TigerText us with any questions       [facility]

## 2022-04-11 NOTE — PROGRESS NOTES
[facility]  Progress Note - Stormy Distad 1945, 78 y o  female MRN: [id number]  Unit/Bed#: MS [address] Encounter: [id number]  Primary Care Provider: Sol Donnelly   Date and time admitted to hospital: 4/4/2022  2:10 PM    * Acute diverticulitis  Assessment & Plan  · Patient presented to the ED for left lower quadrant abdominal pain that started on 03/29  · Patient reported that over the 1 week prior to admission pain had been increasing and she had been having associated symptoms such as nausea, vomiting, diarrhea, poor appetite  Denies other symptoms such as fever/chills, headaches, dizziness chest pain, shortness of breath  · CT A/P: There is thickening of the splenic flexure with associated pericolonic infiltration, suggestive diverticulitis  Additional thickening of the ascending colon with some infiltration at its mesenteric margin may be due to additional segment of diverticulitis or colitis  Suggest follow-up with the colonoscopy when the acute illness subsides to exclude  focal colonic l lesion  There is no fluid collection seen  There is no free air  · WBC 19 43, afebrile on admission  Did not meet sepsis criteria, [id number] 32 mostly eosinophils  · ID would lke to monitor off IV ceftriaxone and metronidazole  · Continuous IVF-- decrease rate to 75 ml/h  · On clears ID on board   · No surgical intervention, surgery s/o  · Monitor off antibiotics  · GI s/o with biopsies pending  · Pain control      Eosinophilia  Assessment & Plan  GI s/o  ID Surgery following  Consult Heme/onc    Transaminitis  Assessment & Plan  · Elevated LFTs on admission  · AST 58, , Alkaline phosphatase 216  · Likely in the setting of acute diverticulitis infection  Denies any right upper quadrant abdominal pain at this time  Denies any drug or alcohol use    · Continue to trend with daily CMP-- ALT decreased from 126-48, AST normalized, ALP decreased from to 219 to 196, albumin-2 9, normal total bilirubin on 04/5/22 -- will repeat tomorrow  · RUQ U/S    Primary hypertension  Assessment & Plan  · /72 on admission-  · Continue pre-hospital amlodipine and lisinopril  · Monitor vitals per routine    Stage 3a chronic kidney disease [facility])  Assessment & Plan  Lab Results   Component Value Date    EGFR 75 04/08/2022    EGFR 63 04/07/2022    EGFR 74 04/06/2022    CREATININE 0 77 04/08/2022    CREATININE 0 89 04/07/2022    CREATININE 0 78 04/06/2022     · Creatinine stable at baseline  · Avoid hypotension and nephrotoxic agents  · Monitor BMP daily    Controlled type 2 diabetes with neuropathy [facility])  Assessment & Plan  Lab Results   Component Value Date    HGBA1C 6 4 12/22/2021       Recent Labs     04/07/22  2110 04/08/22  0015 04/08/22  0711 04/08/22  1121   POCGLU 134 145* 128 140       Blood Sugar Average: Last 72 hrs:  (P) 113 25     · Holding home oral medications-- currently on insulin sliding scale, q 6 hours and HS  · POC glucose-64 in a m on 04/05/22 , patient noted sweating but did not have any other symptoms like palpitations, tremors, repeat blood sugar after dextrose administration-134  · POC glucose in the 90s  · Start on SSI with accu checks  · Hypoglycemia protocol    Mild intermittent asthma  Assessment & Plan  · No acute exacerbation  · Continue pre-hospital inhalers        VTE Pharmacologic Prophylaxis: VTE Score: 4 Moderate Risk (Score 3-4) - Pharmacological DVT Prophylaxis Ordered: enoxaparin (Lovenox)  Patient Centered Rounds: I performed bedside rounds with nursing staff today  Discussions with Specialists or Other Care Team Provider: GI      Time Spent for Care: 45 minutes  More than 50% of total time spent on counseling and coordination of care as described above      Current Length of Stay: 7 day(s)  Current Patient Status: Inpatient   Certification Statement: The patient will continue to require additional inpatient hospital stay due to worsening symptoms  Discharge Plan: Anticipate discharge in 48-72 hrs to home with home services  Code Status: Level 1 - Full Code    Subjective:   Pt complains of worsening abdominal pain  Tolerating clears    Objective:     Vitals:   Temp (24hrs), Av 8 °F (37 1 °C), Min:98 2 °F (36 8 °C), Max:99 3 °F (37 4 °C)    Temp:  [98 2 °F (36 8 °C)-99 3 °F (37 4 °C)] 99 3 °F (37 4 °C)  HR:  [68-84] 72  BP: (143-165)/(48-74) 150/60  SpO2:  [93 %-96 %] 96 %  Body mass index is 35 52 kg/m²  Input and Output Summary (last 24 hours): Intake/Output Summary (Last 24 hours) at 2022 1022  Last data filed at 2022 0816  Gross per 24 hour   Intake 360 ml   Output 600 ml   Net -240 ml       Physical Exam:   Physical Exam  Constitutional:       Appearance: She is ill-appearing  HENT:      Head: Normocephalic and atraumatic  Nose: Nose normal       Mouth/Throat:      Mouth: Mucous membranes are moist       Pharynx: Oropharynx is clear  Cardiovascular:      Rate and Rhythm: Normal rate  Abdominal:      Tenderness: There is abdominal tenderness  Musculoskeletal:         General: Normal range of motion  Skin:     General: Skin is warm and dry  Capillary Refill: Capillary refill takes less than 2 seconds  Neurological:      General: No focal deficit present  Psychiatric:         Mood and Affect: Mood normal           Additional Data:     Labs:  Results from last 7 days   Lab Units 22  0533 04/10/22  0648 04/10/22  0648   WBC Thousand/uL 26 32*   < > 25 80*   HEMOGLOBIN g/dL 11 2*   < > 11 2*   HEMATOCRIT % 34 0*   < > 33 9*   PLATELETS Thousands/uL 198   < > 200   NEUTROS PCT %  --   --  18*   LYMPHS PCT %  --   --  11*   MONOS PCT %  --   --  3*   EOS PCT % 53*  --  67*    < > = values in this interval not displayed       Results from last 7 days   Lab Units 22  0533   SODIUM mmol/L 137   POTASSIUM mmol/L 3 9   CHLORIDE mmol/L 104   CO2 mmol/L 25   BUN mg/dL 11   CREATININE mg/dL 0 76   ANION GAP mmol/L 8 CALCIUM mg/dL 8 5   ALBUMIN g/dL 2 9*   TOTAL BILIRUBIN mg/dL 0 30   ALK PHOS U/L 196*   ALT U/L 48   AST U/L 31   GLUCOSE RANDOM mg/dL 179*         Results from last 7 days   Lab Units 04/11/22  0716 04/10/22  2230 04/10/22  1643 04/10/22  1141 04/10/22  0800 04/09/22  2138 04/09/22  1107 04/09/22  0736 04/09/22  0032 04/08/22  1835 04/08/22  1121 04/08/22  0711   POC GLUCOSE mg/dl 179* 178* 135 220* 136 139 176* 147* 179* 189* 140 128         Results from last 7 days   Lab Units 04/04/22  1454   LACTIC ACID mmol/L 0 9       Lines/Drains:  Invasive Devices  Report    Peripheral Intravenous Line            Peripheral IV 04/10/22 Dorsal (posterior); Left Hand 1 day                      Imaging: No pertinent imaging reviewed      Recent Cultures (last 7 days):   Results from last 7 days   Lab Units 04/06/22  1306   C DIFF TOXIN B BY PCR  Negative       Last 24 Hours Medication List:   Current Facility-Administered Medications   Medication Dose Route Frequency Provider Last Rate    albuterol  1 puff Inhalation Q4H PRN Yanely Sommers, DO      amLODIPine  10 mg Oral Daily Yanely Sommers, DO      aspirin  81 mg Oral Daily Siddhartha Cooper DO      dextrose 5 % and sodium chloride 0 45 % with KCl 20 mEq/L  75 mL/hr Intravenous Continuous Mark Webber MD      enoxaparin  40 mg Subcutaneous Daily Yanely Sommers DO      fluticasone-vilanterol  1 puff Inhalation Daily Ramy Bull      hydrALAZINE  5 mg Intravenous Q6H [address] Alyssia Bulla      HYDROmorphone  0 5 mg Intravenous Q4H PRN Yanely Sommers, DO      HYDROmorphone  1 mg Intravenous Q4H PRN Yanely Sommers, DO      insulin lispro  1-5 Units Subcutaneous HS Yanely Sommers, DO      insulin lispro  1-6 Units Subcutaneous TID With Meals Mark Webber MD      iohexol  50 mL Oral Once in imaging Yanely Sommers, DO      LORazepam  0 5 mg Oral Q8H PRN Yanely Sommers, DO      naloxone  0 04 mg Intravenous Q1MIN PRN Yanely Sommers DO      ondansetron  4 mg Intravenous Q6H PRN Musa Ogren, DO      zolpidem  5 mg Oral HS PRN Musa Ogren, DO          Today, Patient Was Seen By: Tomás Benton MD    **Please Note: This note may have been constructed using a voice recognition system  **

## 2022-04-11 NOTE — TELEPHONE ENCOUNTER
New Patient Intake Form   Patient Details:    Alphonso Treviño  1945    Appointment Information   Who is calling to schedule? In-Basket   If not self, what is the caller's name? Please put name of RBC nurse as well  In-basket    DID YOU CONFIRM INSURANCE WITH PATIENT? n/a   Referring provider Hospital follow up Silvia Jacobson)   What is the diagnosis? Eosinophilia      Is there a confirmed tissue diagnosis? Yes   Is there a biopsy ordered or pending? Please specify dates   4/9/22 in process     Is patient aware of diagnosis? Did Not Speak to Patient / Received via In Basket   Have you had any imaging or labs done? If yes, where? (If imaging done outside of [facility], please remind patient to bring a disk ) Did Not Speak to Patient / Received via In Basket   Yes, [facility]     If imaging done at outside facility, did you instruct patient to obtain discs and bring to visit? n/a   Have you been seen by another Oncologist/Hematologist?  If so, who and where? no   Are the records in [facility] or Care Everywhere? yes   Does the patient have records at another facility/hospital? no   If yes, Name of facility, city and state where facility is located  Did you instruct patient to have records faxed to rightfax and provide rightfax number? n/a   Preferred [place]   Is the patient willing to be seen by another provider? (This is for breast patients only) n/a     Did you send new patient paperwork?   Email or mail? n/a   Miscellaneous Information: Scheduled appointment 5/4/22 10:00 am

## 2022-04-12 ENCOUNTER — APPOINTMENT (INPATIENT)
Dept: CT IMAGING | Facility: HOSPITAL | Age: 77
DRG: 815 | End: 2022-04-12
Payer: MEDICARE

## 2022-04-12 LAB
ALBUMIN SERPL BCP-MCNC: 2.8 G/DL (ref 3.5–5)
ALP SERPL-CCNC: 197 U/L (ref 46–116)
ALT SERPL W P-5'-P-CCNC: 41 U/L (ref 12–78)
ANION GAP SERPL CALCULATED.3IONS-SCNC: 8 MMOL/L (ref 4–13)
AST SERPL W P-5'-P-CCNC: 23 U/L (ref 5–45)
BASOPHILS # BLD MANUAL: 0 THOUSAND/UL (ref 0–0.1)
BASOPHILS NFR MAR MANUAL: 0 % (ref 0–1)
BILIRUB SERPL-MCNC: 0.29 MG/DL (ref 0.2–1)
BUN SERPL-MCNC: 13 MG/DL (ref 5–25)
CALCIUM ALBUM COR SERPL-MCNC: 9.6 MG/DL (ref 8.3–10.1)
CALCIUM SERPL-MCNC: 8.6 MG/DL (ref 8.3–10.1)
CHLORIDE SERPL-SCNC: 103 MMOL/L (ref 100–108)
CO2 SERPL-SCNC: 25 MMOL/L (ref 21–32)
CREAT SERPL-MCNC: 0.76 MG/DL (ref 0.6–1.3)
EOSINOPHIL # BLD MANUAL: 14.74 THOUSAND/UL (ref 0–0.4)
EOSINOPHIL NFR BLD MANUAL: 58 % (ref 0–6)
ERYTHROCYTE [DISTWIDTH] IN BLOOD BY AUTOMATED COUNT: 15.3 % (ref 11.6–15.1)
GFR SERPL CREATININE-BSD FRML MDRD: 76 ML/MIN/1.73SQ M
GLUCOSE SERPL-MCNC: 154 MG/DL (ref 65–140)
GLUCOSE SERPL-MCNC: 154 MG/DL (ref 65–140)
GLUCOSE SERPL-MCNC: 162 MG/DL (ref 65–140)
GLUCOSE SERPL-MCNC: 164 MG/DL (ref 65–140)
GLUCOSE SERPL-MCNC: 207 MG/DL (ref 65–140)
GLUCOSE SERPL-MCNC: 289 MG/DL (ref 65–140)
HCT VFR BLD AUTO: 32.2 % (ref 34.8–46.1)
HGB BLD-MCNC: 10.7 G/DL (ref 11.5–15.4)
HIV 1+2 AB+HIV1 P24 AG SERPL QL IA: NORMAL
LYMPHOCYTES # BLD AUTO: 16 % (ref 14–44)
LYMPHOCYTES # BLD AUTO: 4.07 THOUSAND/UL (ref 0.6–4.47)
MCH RBC QN AUTO: 29.7 PG (ref 26.8–34.3)
MCHC RBC AUTO-ENTMCNC: 33.2 G/DL (ref 31.4–37.4)
MCV RBC AUTO: 89 FL (ref 82–98)
MONOCYTES # BLD AUTO: 0.51 THOUSAND/UL (ref 0–1.22)
MONOCYTES NFR BLD: 2 % (ref 4–12)
NEUTROPHILS # BLD MANUAL: 6.1 THOUSAND/UL (ref 1.85–7.62)
NEUTS SEG NFR BLD AUTO: 24 % (ref 43–75)
PLATELET # BLD AUTO: 204 THOUSANDS/UL (ref 149–390)
PLATELET BLD QL SMEAR: ADEQUATE
PMV BLD AUTO: 9.4 FL (ref 8.9–12.7)
POTASSIUM SERPL-SCNC: 3.9 MMOL/L (ref 3.5–5.3)
PROT SERPL-MCNC: 7.1 G/DL (ref 6.4–8.2)
RBC # BLD AUTO: 3.6 MILLION/UL (ref 3.81–5.12)
SCAN RESULT: NORMAL
SODIUM SERPL-SCNC: 136 MMOL/L (ref 136–145)
WBC # BLD AUTO: 25.42 THOUSAND/UL (ref 4.31–10.16)

## 2022-04-12 PROCEDURE — 85007 BL SMEAR W/DIFF WBC COUNT: CPT | Performed by: HOSPITALIST

## 2022-04-12 PROCEDURE — 77012 CT SCAN FOR NEEDLE BIOPSY: CPT | Performed by: RADIOLOGY

## 2022-04-12 PROCEDURE — 99231 SBSQ HOSP IP/OBS SF/LOW 25: CPT | Performed by: STUDENT IN AN ORGANIZED HEALTH CARE EDUCATION/TRAINING PROGRAM

## 2022-04-12 PROCEDURE — 88305 TISSUE EXAM BY PATHOLOGIST: CPT | Performed by: PATHOLOGY

## 2022-04-12 PROCEDURE — 99152 MOD SED SAME PHYS/QHP 5/>YRS: CPT | Performed by: RADIOLOGY

## 2022-04-12 PROCEDURE — 81206 BCR/ABL1 GENE MAJOR BP: CPT | Performed by: HOSPITALIST

## 2022-04-12 PROCEDURE — 88313 SPECIAL STAINS GROUP 2: CPT | Performed by: PATHOLOGY

## 2022-04-12 PROCEDURE — 88311 DECALCIFY TISSUE: CPT | Performed by: PATHOLOGY

## 2022-04-12 PROCEDURE — 81207 BCR/ABL1 GENE MINOR BP: CPT | Performed by: HOSPITALIST

## 2022-04-12 PROCEDURE — 38222 DX BONE MARROW BX & ASPIR: CPT

## 2022-04-12 PROCEDURE — 85097 BONE MARROW INTERPRETATION: CPT | Performed by: PATHOLOGY

## 2022-04-12 PROCEDURE — 99232 SBSQ HOSP IP/OBS MODERATE 35: CPT | Performed by: INTERNAL MEDICINE

## 2022-04-12 PROCEDURE — 82948 REAGENT STRIP/BLOOD GLUCOSE: CPT

## 2022-04-12 PROCEDURE — 88360 TUMOR IMMUNOHISTOCHEM/MANUAL: CPT | Performed by: PATHOLOGY

## 2022-04-12 PROCEDURE — 81270 JAK2 GENE: CPT | Performed by: INTERNAL MEDICINE

## 2022-04-12 PROCEDURE — 88185 FLOWCYTOMETRY/TC ADD-ON: CPT

## 2022-04-12 PROCEDURE — 80053 COMPREHEN METABOLIC PANEL: CPT | Performed by: HOSPITALIST

## 2022-04-12 PROCEDURE — 88341 IMHCHEM/IMCYTCHM EA ADD ANTB: CPT | Performed by: PATHOLOGY

## 2022-04-12 PROCEDURE — 88184 FLOWCYTOMETRY/ TC 1 MARKER: CPT | Performed by: INTERNAL MEDICINE

## 2022-04-12 PROCEDURE — 88342 IMHCHEM/IMCYTCHM 1ST ANTB: CPT | Performed by: PATHOLOGY

## 2022-04-12 PROCEDURE — 85027 COMPLETE CBC AUTOMATED: CPT | Performed by: HOSPITALIST

## 2022-04-12 PROCEDURE — 38222 DX BONE MARROW BX & ASPIR: CPT | Performed by: RADIOLOGY

## 2022-04-12 PROCEDURE — 99152 MOD SED SAME PHYS/QHP 5/>YRS: CPT

## 2022-04-12 RX ORDER — HYDRALAZINE HYDROCHLORIDE 20 MG/ML
5 INJECTION INTRAMUSCULAR; INTRAVENOUS EVERY 6 HOURS PRN
Status: DISCONTINUED | OUTPATIENT
Start: 2022-04-12 | End: 2022-04-19 | Stop reason: HOSPADM

## 2022-04-12 RX ORDER — FENTANYL CITRATE 50 UG/ML
25 INJECTION, SOLUTION INTRAMUSCULAR; INTRAVENOUS EVERY 2 HOUR PRN
Status: ACTIVE | OUTPATIENT
Start: 2022-04-12 | End: 2022-04-14

## 2022-04-12 RX ORDER — FENTANYL CITRATE 50 UG/ML
INJECTION, SOLUTION INTRAMUSCULAR; INTRAVENOUS CODE/TRAUMA/SEDATION MEDICATION
Status: COMPLETED | OUTPATIENT
Start: 2022-04-12 | End: 2022-04-12

## 2022-04-12 RX ORDER — MONTELUKAST SODIUM 10 MG/1
10 TABLET ORAL
Status: DISCONTINUED | OUTPATIENT
Start: 2022-04-12 | End: 2022-04-19 | Stop reason: HOSPADM

## 2022-04-12 RX ORDER — ACETAMINOPHEN 325 MG/1
650 TABLET ORAL EVERY 4 HOURS PRN
Status: DISCONTINUED | OUTPATIENT
Start: 2022-04-12 | End: 2022-04-19 | Stop reason: HOSPADM

## 2022-04-12 RX ORDER — DICYCLOMINE HYDROCHLORIDE 10 MG/1
10 CAPSULE ORAL
Status: DISCONTINUED | OUTPATIENT
Start: 2022-04-12 | End: 2022-04-19 | Stop reason: HOSPADM

## 2022-04-12 RX ORDER — LIDOCAINE WITH 8.4% SOD BICARB 0.9%(10ML)
SYRINGE (ML) INJECTION CODE/TRAUMA/SEDATION MEDICATION
Status: COMPLETED | OUTPATIENT
Start: 2022-04-12 | End: 2022-04-12

## 2022-04-12 RX ORDER — LISINOPRIL 20 MG/1
20 TABLET ORAL 2 TIMES DAILY
Status: DISCONTINUED | OUTPATIENT
Start: 2022-04-12 | End: 2022-04-16

## 2022-04-12 RX ORDER — OXYCODONE HYDROCHLORIDE 10 MG/1
10 TABLET ORAL EVERY 4 HOURS PRN
Status: DISCONTINUED | OUTPATIENT
Start: 2022-04-12 | End: 2022-04-19 | Stop reason: HOSPADM

## 2022-04-12 RX ORDER — OXYCODONE HYDROCHLORIDE 5 MG/1
5 TABLET ORAL EVERY 4 HOURS PRN
Status: DISCONTINUED | OUTPATIENT
Start: 2022-04-12 | End: 2022-04-19 | Stop reason: HOSPADM

## 2022-04-12 RX ORDER — MIDAZOLAM HYDROCHLORIDE 2 MG/2ML
INJECTION, SOLUTION INTRAMUSCULAR; INTRAVENOUS CODE/TRAUMA/SEDATION MEDICATION
Status: COMPLETED | OUTPATIENT
Start: 2022-04-12 | End: 2022-04-12

## 2022-04-12 RX ADMIN — MIDAZOLAM HYDROCHLORIDE 0.5 MG: 1 INJECTION, SOLUTION INTRAMUSCULAR; INTRAVENOUS at 11:51

## 2022-04-12 RX ADMIN — FLUTICASONE FUROATE AND VILANTEROL TRIFENATATE 1 PUFF: 200; 25 POWDER RESPIRATORY (INHALATION) at 08:45

## 2022-04-12 RX ADMIN — AMLODIPINE BESYLATE 10 MG: 10 TABLET ORAL at 08:43

## 2022-04-12 RX ADMIN — FENTANYL CITRATE 25 MCG: 50 INJECTION, SOLUTION INTRAMUSCULAR; INTRAVENOUS at 11:48

## 2022-04-12 RX ADMIN — INSULIN LISPRO 1 UNITS: 100 INJECTION, SOLUTION INTRAVENOUS; SUBCUTANEOUS at 21:34

## 2022-04-12 RX ADMIN — HYDRALAZINE HYDROCHLORIDE 5 MG: 20 INJECTION INTRAMUSCULAR; INTRAVENOUS at 21:51

## 2022-04-12 RX ADMIN — Medication 20 ML: at 11:50

## 2022-04-12 RX ADMIN — HYDRALAZINE HYDROCHLORIDE 5 MG: 20 INJECTION INTRAMUSCULAR; INTRAVENOUS at 12:49

## 2022-04-12 RX ADMIN — HYDROMORPHONE HYDROCHLORIDE 0.5 MG: 1 INJECTION, SOLUTION INTRAMUSCULAR; INTRAVENOUS; SUBCUTANEOUS at 13:02

## 2022-04-12 RX ADMIN — HYDROMORPHONE HYDROCHLORIDE 0.5 MG: 1 INJECTION, SOLUTION INTRAMUSCULAR; INTRAVENOUS; SUBCUTANEOUS at 19:31

## 2022-04-12 RX ADMIN — DICYCLOMINE HYDROCHLORIDE 10 MG: 10 CAPSULE ORAL at 17:49

## 2022-04-12 RX ADMIN — HYDRALAZINE HYDROCHLORIDE 5 MG: 20 INJECTION INTRAMUSCULAR; INTRAVENOUS at 06:10

## 2022-04-12 RX ADMIN — ASPIRIN 81 MG: 81 TABLET, CHEWABLE ORAL at 08:43

## 2022-04-12 RX ADMIN — DICYCLOMINE HYDROCHLORIDE 10 MG: 10 CAPSULE ORAL at 21:34

## 2022-04-12 RX ADMIN — MONTELUKAST 10 MG: 10 TABLET, FILM COATED ORAL at 21:34

## 2022-04-12 RX ADMIN — MIDAZOLAM HYDROCHLORIDE 1 MG: 1 INJECTION, SOLUTION INTRAMUSCULAR; INTRAVENOUS at 11:48

## 2022-04-12 RX ADMIN — LISINOPRIL 20 MG: 20 TABLET ORAL at 17:43

## 2022-04-12 RX ADMIN — FENTANYL CITRATE 50 MCG: 50 INJECTION, SOLUTION INTRAMUSCULAR; INTRAVENOUS at 11:43

## 2022-04-12 NOTE — PROGRESS NOTES
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 although colonoscopy grossly normal without inflammatory changes  No diverticulitis seen  Patient's antibiotics have been discontinued and she remains clinically stable  Scheduled for bone marrow biopsy today  -Monitor off all antibiotics  -Monitor CBC with diff  -Close GI follow-up  -Follow-up stool ova and parasite  -Follow-up Strongyloides serology  -Follow-up flow cytometry, BCR-ABL  -Follow-up bone marrow biopsy results     2  High level eosinophilia   As in above, at this level of eosinophilia, intestinal parasite is unlikely  Consider strongyloides hyperinfection syndrome but clinical presentation is not consistent with this and the patient is not immunosuppressed  Patient information about where she grew up is discordant, but patient told ID it was [facility]  Colonoscopy without evidence of inflammatory bowel disease or diverticulitis, biopsies are pending  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   Bone marrow biopsy also pending   -Follow-up colonic biopsies  -Follow up bone marrow biopsy  -Monitor CBC with diff  -Follow-up Strongyloides serology, HIV, stool ova and parasite   -Follow-up flow cytometry, BCR-ABL  -will need outpatient hematology/oncology follow up     3  Mildly elevated LFTs, likely secondary to colitis above   Patient has no RUQ abdominal pain  AST/ALT now normalized  -monitor LFTs     4  CKD  Creatinine at baseline   Continue to monitor      5  DM, with neuropathy     -Management per primary service      Above plan was discussed in detail with patient at the bedside  ID will follow  Antibiotics:  Off antibiotics     Subjective:  Patient feeling okay today  States she is still having abdominal pain, but this is better controlled  No fever, chills, rashes  She is scheduled for bone marrow biopsy today  Objective:  Vitals:  Temp:  [97 9 °F (36 6 °C)-99 4 °F (37 4 °C)] 98 3 °F (36 8 °C)  HR:  [71-85] 73  Resp:  [17-20] 20  BP: (135-169)/(43-86) 156/73  SpO2:  [93 %-100 %] 95 %  Temp (24hrs), Av 6 °F (37 °C), Min:97 9 °F (36 6 °C), Max:99 4 °F (37 4 °C)  Current: Temperature: 98 3 °F (36 8 °C)    Physical Exam:   General: no acute distress, nontoxic  CV: RRR, no murmurs  Lungs: clear to auscultation bilaterally  Abdomen: mild diffuse tenderness to palpation  Extremities: no joint swelling  Skin: no rashes, erythema  Neuro: awake and alert, moving all extremities spontaneously       Labs: All pertinent labs and imaging studies were personally reviewed  Results from last 7 days   Lab Units 22  0605 22  0533 04/10/22  0648   WBC Thousand/uL 25 42* 26 32* 25 80*   HEMOGLOBIN g/dL 10 7* 11 2* 11 2*   PLATELETS Thousands/uL 204 198 200     Results from last 7 days   Lab Units 22  0605 22  0533 22  0533 04/10/22  0648 04/10/22  0648   SODIUM mmol/L 136  --  137  --  140   POTASSIUM mmol/L 3 9   < > 3 9   < > 3 7   CHLORIDE mmol/L 103   < > 104   < > 106   CO2 mmol/L 25   < > 25   < > 25   BUN mg/dL 13   < > 11   < > 6   CREATININE mg/dL 0 76   < > 0 76   < > 0 77   EGFR ml/min/1 73sq m 76   < > 76   < > 75   CALCIUM mg/dL 8 6   < > 8 5   < > 8 6   AST U/L 23  --  31  --   --    ALT U/L 41   < > 48  --   --    ALK PHOS U/L 197*   < > 196*  --   --     < > = values in this interval not displayed           Results from last 7 days   Lab Units 22  0450   CRP mg/L 67 1*               Micro:  Results from last 7 days   Lab Units 22  1306   C DIFF TOXIN B BY PCR  Negative       Imaging:  I have personally reviewed pertinent imaging reports and images in PACS

## 2022-04-12 NOTE — BRIEF OP NOTE (RAD/CATH)
INTERVENTIONAL RADIOLOGY PROCEDURE NOTE    Date: 4/12/2022    Procedure: IR BIOPSY BONE MARROW     Preoperative diagnosis:   1  Acute diverticulitis    2  Transaminitis    3  Eosinophilia, unspecified type       Postoperative diagnosis: Same  Surgeon: Zhao Hdz MD     Assistant: None  No qualified resident was available  Blood loss: minimal    Specimens: core and aspirate    Findings: Successful bone marrow biopsy  Complications: None immediate      Anesthesia: conscious sedation

## 2022-04-12 NOTE — CASE MANAGEMENT
Case Management Discharge Planning Note    Patient name Harjinder Raymond  Location /-40 MRN [id number]  : 1945 Date 2022       Current Admission Date: 2022  Current Admission Diagnosis:Acute diverticulitis   Patient Active Problem List    Diagnosis Date Noted    COPD (chronic obstructive pulmonary disease) ([facility] ) 2022    Eosinophilia 2022    Acute diverticulitis 2022    Transaminitis 2022    Anxiety 2022    Hypertensive urgency 2022    Nausea 2022    Bradycardia 2022    Abnormal EKG 2022    Primary hypertension 2022    Stage 3a chronic kidney disease ([facility] ) 2022    Psychophysiological insomnia 2021    Exertional dyspnea     Medicare annual wellness visit, subsequent 2021    Right leg pain 2021    Acute cystitis with hematuria 2021    Chronic bronchitis ([place] [address] ) 04/15/2021    Obesity, morbid ([place] [address] ) 04/15/2021    BMI 37 0-37 9, adult 2020    Atypical chest pain 2020    Dermatitis 2020    Vaginal cyst 2019    Candidiasis of genitalia in female 2019    Encounter for gynecological examination without abnormal finding 2019    Controlled type 2 diabetes with neuropathy ([facility] ) 2019    Benign hypertension 2018    Depression with anxiety 2018    Mild intermittent asthma 2018    BOLA (obstructive sleep apnea) 10/17/2016      LOS (days): 8  Geometric Mean LOS (GMLOS) (days): 2 90  Days to GMLOS:-4 9     OBJECTIVE:  Risk of Unplanned Readmission Score: 12         Current admission status: Inpatient   Preferred Pharmacy:   [address] [place], [place] [id number] R R 1 [phone] R R [address]) [address] [facility]  Phone: [phone] Fax: [phone]    [id number] [place], [address] [place] [address]  Phone: [phone] Fax: [phone]    [facility] Provider: ALEKSANDER Ricks    Primary Insurance: MEDICARE  Secondary Insurance: [employer]    DISCHARGE DETAILS:    Additional Comments: IMM reviewed with pt at bedside who expressed verbal confirmation of understanding  IMM in scan bin to be entered into pt's EHR, copy at bedside

## 2022-04-12 NOTE — CASE MANAGEMENT
Case Management Discharge Planning Note    Patient name Vitor Angeles  Location /-85 MRN [id number]  : 1945 Date 2022       Current Admission Date: 2022  Current Admission Diagnosis:Acute diverticulitis   Patient Active Problem List    Diagnosis Date Noted    COPD (chronic obstructive pulmonary disease) ([facility] ) 2022    Eosinophilia 2022    Acute diverticulitis 2022    Transaminitis 2022    Anxiety 2022    Hypertensive urgency 2022    Nausea 2022    Bradycardia 2022    Abnormal EKG 2022    Primary hypertension 2022    Stage [facility] ) 2022    Psychophysiological insomnia 2021    Exertional dyspnea     Medicare annual wellness visit, subsequent 2021    Right leg pain 2021    Acute cystitis with hematuria 2021    Chronic bronchitis ([facility] ) 04/15/2021    Obesity, morbid ([place] [address] ) 04/15/2021    BMI 37 0-37 9, adult 2020    Atypical chest pain 2020    Dermatitis 2020    Vaginal cyst 2019    Candidiasis of genitalia in female 2019    Encounter for gynecological examination without abnormal finding 2019    Controlled type 2 diabetes with neuropathy ([facility] ) 2019    Benign hypertension 2018    Depression with anxiety 2018    Mild intermittent asthma 2018    BOLA (obstructive sleep apnea) 10/17/2016      LOS (days): 8  Geometric Mean LOS (GMLOS) (days): 2 90  Days to GMLOS:-4 9     OBJECTIVE:  Risk of Unplanned Readmission Score: 12         Current admission status: Inpatient   Preferred Pharmacy:   [address] [place] [address] 9293 R R 1 [phone] [address]) [address]  Phone: [phone] Fax: [phone]    [id number] [place], [address] [facility] [address]  [facility] [address]  Phone: [phone] Fax: [phone]    [facility] Provider: ALEKSANDER Dukes    Primary Insurance: MEDICARE  Secondary Insurance: [employer]    DISCHARGE DETAILS:    Additional Comments: Patient reviewed during care coordination rounds with Dr Nicole Silva who reports that pt is to get a bone marrow biopsy today and may be medically stable for discharge in 24-48 hours  Plan is for discharge home with [facility] at time of discharge

## 2022-04-12 NOTE — PROGRESS NOTES
[facility]  Progress Note - [employer] 1945, 78 y o  female MRN: [id number]  Unit/Bed#: -01 Encounter: [id number]  Primary Care Provider: ALEKSANDER Howard   Date and time admitted to hospital: 4/4/2022  2:10 PM    * Acute diverticulitis  Assessment & Plan  72-year-old female worsening abdominal pain found to have diverticulitis and significant eosinophilia  · Seen by surgery, signed off  · Being followed by ID, monitoring off antibiotics  · Diet advanced, still having abdominal discomfort  Add dicyclomine  Eosinophilia  Assessment & Plan  · Significant eosinophilia  Awaiting stool parasite studies  · Underwent bone marrow biopsy today    Results from last 7 days   Lab Units 04/12/22  0605 04/11/22  0533 04/10/22  6444 04/09/22  0548 04/08/22  0450 04/07/22  0558 04/06/22  0534   WBC Thousand/uL 25 42* 26 32* 25 80* 26 08* 22 14* 21 42* 20 30*   EOS PCT % 58* 53* 67* 72* 69* 64* 58*       Transaminitis  Assessment & Plan  · Mild transaminitis noted  Resolved    Results from last 7 days   Lab Units 04/12/22  0605 04/11/22  0533   AST U/L 23 31   ALT U/L 41 48   TOTAL BILIRUBIN mg/dL 0 29 0 30       Primary hypertension  Assessment & Plan  · Continue amlodipine and restart lisinopril    Stage 3a chronic kidney disease ([facility] )  Assessment & Plan  · Stable at baseline    Results from last 7 days   Lab Units 04/12/22  0605 04/11/22  0533 04/10/22  0648 04/09/22  0548 04/08/22  0450 04/07/22  0558 04/06/22  0534   BUN mg/dL 13 11 6 6 5 8 9   CREATININE mg/dL 0 76 0 76 0 77 0 74 0 77 0 89 0 78   EGFR ml/min/1 73sq m 76 76 75 79 75 63 74       Controlled type 2 diabetes with neuropathy ([facility] )  Assessment & Plan  · Prior to admission on janumet  Continue sliding scale insulin  · Did have episode of hypoglycemia    Stable and will discontinue IVF    Results from last 7 days   Lab Units 04/12/22  1548 04/12/22  1238 04/12/22  9373 04/12/22  0716 04/11/22  2032 04/11/22  1616 04/11/22  1134 04/11/22  0716   POC GLUCOSE mg/dl 289* 154* 164* 162* 207* 235* 266* 179*       Mild intermittent asthma  Assessment & Plan  · No exacerbation continue breo  · Restart singulair      VTE Pharmacologic Prophylaxis: VTE Score: 4 Moderate Risk (Score 3-4) - Pharmacological DVT Prophylaxis Ordered: Enoxaparin (Lovenox)  Patient Centered Rounds: I have performed bedside rounds with nursing staff today  Discussions with Specialists or Other Care Team Provider:  Case management    Education and Discussions with Family / Patient:     Time Spent for Care: 20 mins  More than 50% of total time spent on counseling and coordination of care as described above  Current Length of Stay: 8 day(s)  Current Patient Status: Inpatient   Certification Statement: The patient will continue to require additional inpatient hospital stay due to eosinophilia  Discharge Plan / Estimated Discharge Date: Anticipate discharge in 48 hrs to discharge location to be determined pending rehab evaluations  Code Status: Level 1 - Full Code      Subjective:   Patient seen and examined  Returns from bone marrow biopsy  Attempting to eat solid food    Objective:   Vitals: Blood pressure (!) 178/95, pulse 84, temperature 98 3 °F (36 8 °C), resp  rate 20, height 5' 2" (1 575 m), weight 88 1 kg (194 lb 3 6 oz), SpO2 94 %, not currently breastfeeding  Physical Exam  Vitals reviewed  Constitutional:       General: She is not in acute distress  Appearance: Normal appearance  HENT:      Head: Atraumatic  Cardiovascular:      Rate and Rhythm: Regular rhythm  Pulmonary:      Breath sounds: Normal breath sounds  No wheezing  Abdominal:      General: Bowel sounds are normal       Palpations: Abdomen is soft  Tenderness: There is abdominal tenderness (Lower quadrants)  There is no rebound  Musculoskeletal:         General: No swelling  Skin:     General: Skin is warm     Neurological:      Mental Status: She is alert and oriented to person, place, and time  Motor: No weakness  Psychiatric:         Mood and Affect: Mood normal        Additional Data:   Labs:  Results from last 7 days   Lab Units 04/12/22  0605 04/11/22  0533 04/10/22  0648 04/09/22  0548 04/08/22  0450 04/07/22  0558 04/06/22  0534   WBC Thousand/uL 25 42* 26 32* 25 80* 26 08* 22 14* 21 42* 20 30*   HEMOGLOBIN g/dL 10 7* 11 2* 11 2* 11 6 11 4* 11 9 12 4   HEMATOCRIT % 32 2* 34 0* 33 9* 35 1 34 5* 36 4 38 4   MCV fL 89 90 90 90 90 90 91   TOTAL NEUT ABS Thousand/uL 6 10  --   --   --   --   --   --    PLATELETS Thousands/uL 204 198 200 197 193 159 196     Results from last 7 days   Lab Units 04/12/22  0605 04/11/22  0533 04/10/22  0648 04/09/22  0548 04/08/22  0450 04/07/22  0558 04/06/22  0534   SODIUM mmol/L 136 137 140 136 138 137 137   POTASSIUM mmol/L 3 9 3 9 3 7 3 7 3 8 3 7 3 9   CHLORIDE mmol/L 103 104 106 103 102 101 101   CO2 mmol/L 25 25 25 26 27 27 27   ANION GAP mmol/L 8 8 9 7 9 9 9   BUN mg/dL 13 11 6 6 5 8 9   CREATININE mg/dL 0 76 0 76 0 77 0 74 0 77 0 89 0 78   CALCIUM mg/dL 8 6 8 5 8 6 8 9 8 8 9 4 9 3   ALBUMIN g/dL 2 8* 2 9*  --   --   --   --   --    TOTAL BILIRUBIN mg/dL 0 29 0 30  --   --   --   --   --    ALK PHOS U/L 197* 196*  --   --   --   --   --    ALT U/L 41 48  --   --   --   --   --    AST U/L 23 31  --   --   --   --   --    EGFR ml/min/1 73sq m 76 76 75 79 75 63 74   GLUCOSE RANDOM mg/dL 154* 179* 148* 148* 124 140 94     Results from last 7 days   Lab Units 04/09/22  0548   MAGNESIUM mg/dL 1 8                      Results from last 7 days   Lab Units 04/12/22  1238 04/12/22  0852 04/12/22  0716 04/11/22  2032 04/11/22  1619 04/11/22  1134 04/11/22  0716 04/10/22  2230 04/10/22  1643 04/10/22  1141 04/10/22  0800 04/09/22  2138   POC GLUCOSE mg/dl 154* 164* 162* 207* 235* 266* 179* 178* 135 220* 136 139             * I Have Reviewed All Lab Data Listed Above      Cultures:   Results from last 7 days   Lab Units 04/06/22  1306 C DIFF TOXIN B BY PCR  Negative                 Lines/Drains:  Invasive Devices  Report    Peripheral Intravenous Line            Peripheral IV 04/12/22 Proximal;Right;Ventral (anterior) Forearm <1 day              Telemetry:      Imaging:  Imaging Reports Reviewed Today Include:   Colonoscopy    Result Date: 4/9/2022  Impression: 1  Diverticulosis coli, ascending colon sigmoid colon  No evidence of diverticulitis 2  Diminutive polyp in the proximal ascending colon status post cold biopsy removal RECOMMENDATION: Repeat colonoscopy in 5 years due to a personal history of colon polyps High-fiber diet to include fruits, vegetables, whole grain foods, daily Will call the patient 1 week regarding the polyp results and biopsy results DO Dora Isaac Records, FACP    CT abdomen pelvis w contrast    Result Date: 4/7/2022  Impression: Overall similar appearance of colonic wall thickening centered at the cecum/ascending colon and at the splenic flexure, with similar pericolonic inflammatory stranding in the right colon and slight improvement on the left  No evidence of perforation or abscess formation  Workstation performed: [id number]     CT abdomen pelvis with contrast    Result Date: 4/4/2022  Impression: There is thickening of the splenic flexure with associated pericolonic infiltration, suggestive diverticulitis  Additional thickening of the ascending colon with some infiltration at its mesenteric margin may be due to additional segment of diverticulitis or colitis  Suggest follow-up with the colonoscopy when the acute illness subsides to exclude  focal colonic lesion There is no fluid collection seen There is no free air Small mesenteric lymph nodes with largest measuring about 1 cm, short interval follow-up at 3 months suggested The study was marked in EPIC for immediate notification   Follow-up notification has been created in [facility] performed: [id number]       Scheduled Meds:  Current Facility-Administered Medications   Medication Dose Route Frequency Provider Last Rate    acetaminophen  650 mg Oral Q4H PRN Gosia Restrepo MD      albuterol  1 puff Inhalation Q4H PRN [facility], DO      amLODIPine  10 mg Oral Daily [facility], DO      aspirin  81 mg Oral Daily Siddhartha Cooper, DO      dextrose 5 % and sodium chloride 0 45 % with KCl 20 mEq/L  75 mL/hr Intravenous Continuous Dot Nicholas MD 75 mL/hr (04/11/22 1742)    enoxaparin  40 mg Subcutaneous Daily [facility], DO      fentanyl citrate (PF)  25 mcg Intravenous Q2H PRN Gosia Restrepo MD      fluticasone-vilanterol  1 puff Inhalation Daily [facility], DO      hydrALAZINE  5 mg Intravenous Q6H [facility] [place] [place]      HYDROmorphone  0 5 mg Intravenous Q4H PRN [facility], DO      HYDROmorphone  1 mg Intravenous Q4H PRN [facility], DO      insulin lispro  1-5 Units Subcutaneous HS [facility], DO      insulin lispro  1-6 Units Subcutaneous TID With Meals Dot Nicholas MD      iohexol  50 mL Oral Once in imaging [facility], DO      LORazepam  0 5 mg Oral Q8H PRN [facility], DO      naloxone  0 04 mg Intravenous Q1MIN PRN [facility], DO      ondansetron  4 mg Intravenous Q6H PRN [facility], DO      oxyCODONE  10 mg Oral Q4H PRN Gosia Restrepo MD      oxyCODONE  5 mg Oral Q4H PRN Gosia Restrepo MD      zolpidem  5 mg Oral HS PRN Aranzaprosper Banegas, DO         Today, Patient Was Seen By: Fady Martinez DO    ** Please Note: Dictation voice to text software may have been used in the creation of this document   **

## 2022-04-12 NOTE — DISCHARGE INSTRUCTIONS
Bone Marrow Biopsy     WHAT YOU NEED TO KNOW:   A bone marrow biopsy is a procedure to remove a small amount of bone marrow from your bone  Bone marrow is the soft tissue inside your bone that helps to make blood cells  The sample is tested for disease or infection  DISCHARGE INSTRUCTIONS:     1  Limit your activities day of biopsy as directed by your doctor  2  Use medication as ordered  3  Return to your normal diet  Small sips of flat soda will help with nausea  4  Remove band-aid or dressing 24 hours after procedure  Contact Interventional Radiology at [phone] Kelley PATIENTS: Contact Interventional Radiology at [phone]) Daniel Edu PATIENTS: Contact Interventional Radiology at [phone]) if:    1  Difficulty breathing, nausea or vomiting  2  Chills or fever above 101 F     3  Pain at biopsy site not relieved by medication  4  Develop any redness, swelling, heat, unusual drainage, heavy bruising or bleeding from biopsy site

## 2022-04-13 ENCOUNTER — APPOINTMENT (INPATIENT)
Dept: CT IMAGING | Facility: HOSPITAL | Age: 77
DRG: 815 | End: 2022-04-13
Payer: MEDICARE

## 2022-04-13 PROBLEM — E87.5 HYPERKALEMIA: Status: ACTIVE | Noted: 2022-04-13

## 2022-04-13 LAB
ALBUMIN SERPL BCP-MCNC: 2.9 G/DL (ref 3.5–5)
ALP SERPL-CCNC: 217 U/L (ref 46–116)
ALT SERPL W P-5'-P-CCNC: 41 U/L (ref 12–78)
ANION GAP SERPL CALCULATED.3IONS-SCNC: 9 MMOL/L (ref 4–13)
AST SERPL W P-5'-P-CCNC: 36 U/L (ref 5–45)
BILIRUB SERPL-MCNC: 0.44 MG/DL (ref 0.2–1)
BUN SERPL-MCNC: 16 MG/DL (ref 5–25)
CALCIUM ALBUM COR SERPL-MCNC: 9.9 MG/DL (ref 8.3–10.1)
CALCIUM SERPL-MCNC: 9 MG/DL (ref 8.3–10.1)
CHLORIDE SERPL-SCNC: 102 MMOL/L (ref 100–108)
CO2 SERPL-SCNC: 25 MMOL/L (ref 21–32)
CREAT SERPL-MCNC: 0.82 MG/DL (ref 0.6–1.3)
ERYTHROCYTE [DISTWIDTH] IN BLOOD BY AUTOMATED COUNT: 15.5 % (ref 11.6–15.1)
GFR SERPL CREATININE-BSD FRML MDRD: 70 ML/MIN/1.73SQ M
GLUCOSE SERPL-MCNC: 155 MG/DL (ref 65–140)
GLUCOSE SERPL-MCNC: 197 MG/DL (ref 65–140)
GLUCOSE SERPL-MCNC: 197 MG/DL (ref 65–140)
GLUCOSE SERPL-MCNC: 296 MG/DL (ref 65–140)
HCT VFR BLD AUTO: 32.8 % (ref 34.8–46.1)
HGB BLD-MCNC: 10.8 G/DL (ref 11.5–15.4)
MCH RBC QN AUTO: 29.4 PG (ref 26.8–34.3)
MCHC RBC AUTO-ENTMCNC: 32.9 G/DL (ref 31.4–37.4)
MCV RBC AUTO: 89 FL (ref 82–98)
PLATELET # BLD AUTO: 223 THOUSANDS/UL (ref 149–390)
PMV BLD AUTO: 9.2 FL (ref 8.9–12.7)
POTASSIUM SERPL-SCNC: 5.4 MMOL/L (ref 3.5–5.3)
PROT SERPL-MCNC: 7.6 G/DL (ref 6.4–8.2)
RBC # BLD AUTO: 3.67 MILLION/UL (ref 3.81–5.12)
SODIUM SERPL-SCNC: 136 MMOL/L (ref 136–145)
WBC # BLD AUTO: 24.75 THOUSAND/UL (ref 4.31–10.16)

## 2022-04-13 PROCEDURE — 99232 SBSQ HOSP IP/OBS MODERATE 35: CPT | Performed by: INTERNAL MEDICINE

## 2022-04-13 PROCEDURE — 82948 REAGENT STRIP/BLOOD GLUCOSE: CPT

## 2022-04-13 PROCEDURE — 74177 CT ABD & PELVIS W/CONTRAST: CPT

## 2022-04-13 PROCEDURE — G1004 CDSM NDSC: HCPCS

## 2022-04-13 PROCEDURE — 99231 SBSQ HOSP IP/OBS SF/LOW 25: CPT | Performed by: STUDENT IN AN ORGANIZED HEALTH CARE EDUCATION/TRAINING PROGRAM

## 2022-04-13 PROCEDURE — 80053 COMPREHEN METABOLIC PANEL: CPT | Performed by: INTERNAL MEDICINE

## 2022-04-13 PROCEDURE — 85027 COMPLETE CBC AUTOMATED: CPT | Performed by: INTERNAL MEDICINE

## 2022-04-13 RX ORDER — PREGABALIN 50 MG/1
50 CAPSULE ORAL 3 TIMES DAILY
Status: DISCONTINUED | OUTPATIENT
Start: 2022-04-13 | End: 2022-04-19 | Stop reason: HOSPADM

## 2022-04-13 RX ADMIN — DICYCLOMINE HYDROCHLORIDE 10 MG: 10 CAPSULE ORAL at 12:11

## 2022-04-13 RX ADMIN — IOHEXOL 50 ML: 240 INJECTION, SOLUTION INTRATHECAL; INTRAVASCULAR; INTRAVENOUS; ORAL at 16:44

## 2022-04-13 RX ADMIN — ZOLPIDEM TARTRATE 5 MG: 5 TABLET, COATED ORAL at 00:38

## 2022-04-13 RX ADMIN — IOHEXOL 100 ML: 350 INJECTION, SOLUTION INTRAVENOUS at 16:44

## 2022-04-13 RX ADMIN — HYDROMORPHONE HYDROCHLORIDE 0.5 MG: 1 INJECTION, SOLUTION INTRAMUSCULAR; INTRAVENOUS; SUBCUTANEOUS at 05:53

## 2022-04-13 RX ADMIN — DICYCLOMINE HYDROCHLORIDE 10 MG: 10 CAPSULE ORAL at 05:53

## 2022-04-13 RX ADMIN — LISINOPRIL 20 MG: 20 TABLET ORAL at 22:45

## 2022-04-13 RX ADMIN — DICYCLOMINE HYDROCHLORIDE 10 MG: 10 CAPSULE ORAL at 22:45

## 2022-04-13 RX ADMIN — OXYCODONE HYDROCHLORIDE 10 MG: 10 TABLET ORAL at 16:22

## 2022-04-13 RX ADMIN — OXYCODONE HYDROCHLORIDE 10 MG: 10 TABLET ORAL at 20:22

## 2022-04-13 RX ADMIN — PREGABALIN 50 MG: 50 CAPSULE ORAL at 22:45

## 2022-04-13 RX ADMIN — FLUTICASONE FUROATE AND VILANTEROL TRIFENATATE 1 PUFF: 200; 25 POWDER RESPIRATORY (INHALATION) at 08:40

## 2022-04-13 RX ADMIN — MONTELUKAST 10 MG: 10 TABLET, FILM COATED ORAL at 22:45

## 2022-04-13 RX ADMIN — PREGABALIN 50 MG: 50 CAPSULE ORAL at 16:22

## 2022-04-13 RX ADMIN — LISINOPRIL 20 MG: 20 TABLET ORAL at 08:39

## 2022-04-13 RX ADMIN — ASPIRIN 81 MG: 81 TABLET, CHEWABLE ORAL at 08:39

## 2022-04-13 RX ADMIN — HYDROMORPHONE HYDROCHLORIDE 0.5 MG: 1 INJECTION, SOLUTION INTRAMUSCULAR; INTRAVENOUS; SUBCUTANEOUS at 00:38

## 2022-04-13 RX ADMIN — AMLODIPINE BESYLATE 10 MG: 10 TABLET ORAL at 08:39

## 2022-04-13 RX ADMIN — DICYCLOMINE HYDROCHLORIDE 10 MG: 10 CAPSULE ORAL at 16:22

## 2022-04-13 RX ADMIN — OXYCODONE HYDROCHLORIDE 10 MG: 10 TABLET ORAL at 10:10

## 2022-04-13 NOTE — ASSESSMENT & PLAN NOTE
· Significant eosinophilia    Awaiting stool parasite studies  · Flow cytometry of blood does not demonstrate any concern for dyscrasia  · Underwent bone marrow biopsy awaiting interpretation    Results from last 7 days   Lab Units 04/13/22  0602 04/12/22  0605 04/11/22  0533 04/10/22  0648 04/09/22  0548 04/08/22  0450 04/07/22  0558   WBC Thousand/uL 24 75* 25 42* 26 32* 25 80* 26 08* 22 14* 21 42*   EOS PCT %  --  58* 53* 67* 72* 69* 64*

## 2022-04-13 NOTE — CASE MANAGEMENT
Case Management Discharge Planning Note    Patient name Shikha Expose  Location /-01 MRN [id number]  : 1945 Date 2022       Current Admission Date: 2022  Current Admission Diagnosis:Acute diverticulitis   Patient Active Problem List    Diagnosis Date Noted    Hyperkalemia 2022    COPD [facility]ronic obstructive [facility] 2022    Eosinophilia 2022    Acute diverticulitis 2022    Transaminitis 2022    Anxiety 2022    Hypertensive urgency 2022    Nausea 2022    Bradycardia 2022    Abnormal EKG 2022    Primary hypertension 2022    Stage 3a chronic kidney disease ([facility] [address] ) 2022    Psychophysiological insomnia 2021    Exertional dyspnea     Medicare annual wellness visit, subsequent 2021    Right leg pain 2021    Acute cystitis with hematuria 2021    Chronic bronchitis ([facility] 04/15/2021    Obesity, morbid ([facility] ) 04/15/2021    BMI 37 0-37 9, adult 2020    Atypical chest pain 2020    Dermatitis 2020    Vaginal cyst 2019    Candidiasis of genitalia in female 2019    Encounter for gynecological examination without abnormal finding 2019    Controlled type 2 diabetes with neuropathy ([facility] 2019    Benign hypertension 2018    Depression with anxiety 2018    Mild intermittent asthma 2018    BOLA (obstructive sleep apnea) 10/17/2016      LOS (days): 9  Geometric Mean LOS (GMLOS) (days): 2 90  Days to GMLOS:-5 9     OBJECTIVE:  Risk of Unplanned Readmission Score: 13         Current admission status: Inpatient   Preferred Pharmacy:   [address] [place] [address] R R 1 [phone] R R [address] ([address])  [facility]  Phone: [phone] Fax: [phone]    [id number], [address] [facility] Jeannie Amaro [address] [place] [address]  Phone: [phone] Fax: [phone]    Primary Care Provider: ALEKSANDER Crump    Primary Insurance: MEDICARE  Secondary Insurance: [employer]    DISCHARGE DETAILS:    Additional Comments: Patient reviewed during care coordination rounds with Dr Huber Faulkner who reports pt had an elevated WBC and a stool parasite study is pending  Pt likely medically stable for discharge in 24 hours pending ID clearance  Plan remains for discharge home with [facility] once medically stable

## 2022-04-13 NOTE — PROGRESS NOTES
[facility]  Progress Note - Dayle Cabot 1945, 78 y o  female MRN: [id number]  Unit/Bed#: [place]-01 Encounter: [id number]  Primary Care Provider: ALEKSANDER Monge   Date and time admitted to hospital: 4/4/2022  2:10 PM    * Acute diverticulitis  Assessment & Plan  72-year-old female worsening abdominal pain found to have diverticulitis and significant eosinophilia  · Seen by surgery, signed off  · Being followed by ID, monitoring off antibiotics  · Add dicyclomine  · Diet advanced, still having abdominal discomfort  Recheck imaging and have GI re-evaluate given persistence of symptoms    Eosinophilia  Assessment & Plan  · Significant eosinophilia  Awaiting stool parasite studies  · Flow cytometry of blood does not demonstrate any concern for dyscrasia  · Underwent bone marrow biopsy awaiting interpretation    Results from last 7 days   Lab Units 04/13/22  0602 04/12/22  0605 04/11/22  0533 04/10/22  0648 04/09/22  0548 04/08/22  0450 04/07/22  0558   WBC Thousand/uL 24 75* 25 42* 26 32* 25 80* 26 08* 22 14* 21 42*   EOS PCT %  --  58* 53* 67* 72* 69* 64*       Hyperkalemia  Assessment & Plan  · Sample was hemolyzed  Recheck in a m     If still elevated will stop lisinopril    Results from last 7 days   Lab Units 04/13/22  0602 04/12/22  0605 04/11/22  0533 04/10/22  0648 04/09/22  0548 04/08/22  0450 04/07/22  0558   POTASSIUM mmol/L 5 4* 3 9 3 9 3 7 3 7 3 8 3 7       Transaminitis  Assessment & Plan  · Mild transaminitis noted    Resolved    Results from last 7 days   Lab Units 04/13/22  0602 04/12/22  0605 04/11/22  0533   AST U/L 36 23 31   ALT U/L 41 41 48   TOTAL BILIRUBIN mg/dL 0 44 0 29 0 30       Primary hypertension  Assessment & Plan  · Continue amlodipine and lisinopril    Stage 3a chronic kidney disease ([address] )  Assessment & Plan  · Stable at baseline    Results from last 7 days   Lab Units 04/13/22  0602 04/12/22  5177 04/11/22  0533 04/10/22  1164 04/09/22  8330 04/08/22  0450 04/07/22  0558   BUN mg/dL 16 13 11 6 6 5 8   CREATININE mg/dL 0 82 0 76 0 76 0 77 0 74 0 77 0 89   EGFR ml/min/1 73sq m 70 76 76 75 79 75 63       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)  Assessment & Plan  · Prior to admission on janumet  Continue sliding scale insulin  · Did have episode of hypoglycemia  Now stable off dextrose infusion  · Will restart pregabalin    Results from last 7 days   Lab Units 04/13/22  1052 04/13/22  0735 04/12/22  2042 04/12/22  1548 04/12/22  1238 04/12/22  0852 04/12/22  0716 04/11/22 2032   POC GLUCOSE mg/dl 296* 197* 207* 289* 154* 164* 162* 207*       Mild intermittent asthma  Assessment & Plan  · No exacerbation continue breo  · Continue singulair      VTE Pharmacologic Prophylaxis: VTE Score: 4 Moderate Risk (Score 3-4) - Pharmacological DVT Prophylaxis Ordered: Enoxaparin (Lovenox)  Patient Centered Rounds: I have performed bedside rounds with nursing staff today  Discussions with Specialists or Other Care Team Provider:  Case management hematology and Infectious Disease    Education and Discussions with Family / Patient: Discussed with daughter and spouse    Time Spent for Care: 25 mins  More than 50% of total time spent on counseling and coordination of care as described above  Current Length of Stay: 9 day(s)  Current Patient Status: Inpatient   Certification Statement: The patient will continue to require additional inpatient hospital stay due to abdominal pain  Discharge Plan / Estimated Discharge Date: Anticipate discharge in 48-72 hrs to home  Code Status: Level 1 - Full Code      Subjective:   Patient seen and examined  Still having abdominal pain  Objective:   Vitals: Blood pressure 136/57, pulse 81, temperature 98 9 °F (37 2 °C), resp  rate 18, height 5' 2" (1 575 m), weight 88 1 kg (194 lb 3 6 oz), SpO2 91 %, not currently breastfeeding  Physical Exam  Vitals reviewed     Constitutional:       General: She is not in acute distress  Appearance: Normal appearance  She is obese  HENT:      Head: Atraumatic  Cardiovascular:      Rate and Rhythm: Regular rhythm  Pulmonary:      Breath sounds: Decreased breath sounds present  No wheezing  Abdominal:      General: Bowel sounds are normal       Palpations: Abdomen is soft  Tenderness: There is no guarding or rebound  Musculoskeletal:         General: No swelling  Skin:     General: Skin is warm  Neurological:      Mental Status: She is alert and oriented to person, place, and time  Motor: No weakness     Psychiatric:         Mood and Affect: Mood normal        Additional Data:   Labs:  Results from last 7 days   Lab Units 04/13/22  0602 04/12/22  0605 04/11/22  0533 04/10/22  0648 04/09/22  0548 04/08/22  0450 04/07/22  0558   WBC Thousand/uL 24 75* 25 42* 26 32* 25 80* 26 08* 22 14* 21 42*   HEMOGLOBIN g/dL 10 8* 10 7* 11 2* 11 2* 11 6 11 4* 11 9   HEMATOCRIT % 32 8* 32 2* 34 0* 33 9* 35 1 34 5* 36 4   MCV fL 89 89 90 90 90 90 90   TOTAL NEUT ABS Thousand/uL  --  6 10  --   --   --   --   --    PLATELETS Thousands/uL 223 204 198 200 197 193 159     Results from last 7 days   Lab Units 04/13/22  0602 04/12/22  0605 04/11/22  0533 04/10/22  0648 04/09/22  0548 04/08/22  0450 04/07/22  0558   SODIUM mmol/L 136 136 137 140 136 138 137   POTASSIUM mmol/L 5 4* 3 9 3 9 3 7 3 7 3 8 3 7   CHLORIDE mmol/L 102 103 104 106 103 102 101   CO2 mmol/L 25 25 25 25 26 27 27   ANION GAP mmol/L 9 8 8 9 7 9 9   BUN mg/dL 16 13 11 6 6 5 8   CREATININE mg/dL 0 82 0 76 0 76 0 77 0 74 0 77 0 89   CALCIUM mg/dL 9 0 8 6 8 5 8 6 8 9 8 8 9 4   ALBUMIN g/dL 2 9* 2 8* 2 9*  --   --   --   --    TOTAL BILIRUBIN mg/dL 0 44 0 29 0 30  --   --   --   --    ALK PHOS U/L 217* 197* 196*  --   --   --   --    ALT U/L 41 41 48  --   --   --   --    AST U/L 36 23 31  --   --   --   --    EGFR ml/min/1 73sq m 70 76 76 75 79 75 63   GLUCOSE RANDOM mg/dL 155* 154* 179* 148* 148* 124 140     Results from last 7 days   Lab Units 04/09/22  0548   MAGNESIUM mg/dL 1 8                      Results from last 7 days   Lab Units 04/13/22  1052 04/13/22  0735 04/12/22  2042 04/12/22  1548 04/12/22  1238 04/12/22  0852 04/12/22  0716 04/11/22  2032 04/11/22  1619 04/11/22  1134 04/11/22  0716 04/10/22  2230   POC GLUCOSE mg/dl 296* 197* 207* 289* 154* 164* 162* 207* 235* 266* 179* 178*             * I Have Reviewed All Lab Data Listed Above  Cultures:   Results from last 7 days   Lab Units 04/06/22  1306   C DIFF TOXIN B BY PCR  Negative                 Lines/Drains:  Invasive Devices  Report    Peripheral Intravenous Line            Peripheral IV 04/12/22 Proximal;Right;Ventral (anterior) Forearm 1 day              Telemetry:      Imaging:  Imaging Reports Reviewed Today Include:   Colonoscopy    Result Date: 4/9/2022  Impression: 1  Diverticulosis coli, ascending colon sigmoid colon  No evidence of diverticulitis 2  Diminutive polyp in the proximal ascending colon status post cold biopsy removal RECOMMENDATION: Repeat colonoscopy in 5 years due to a personal history of colon polyps High-fiber diet to include fruits, vegetables, whole grain foods, daily Will call the patient 1 week regarding the polyp results and biopsy results DO Aakash Archer, HANY    CT abdomen pelvis w contrast    Result Date: 4/7/2022  Impression: Overall similar appearance of colonic wall thickening centered at the cecum/ascending colon and at the splenic flexure, with similar pericolonic inflammatory stranding in the right colon and slight improvement on the left  No evidence of perforation or abscess formation  Workstation performed: [id number]     CT abdomen pelvis with contrast    Result Date: 4/4/2022  Impression: There is thickening of the splenic flexure with associated pericolonic infiltration, suggestive diverticulitis    Additional thickening of the ascending colon with some infiltration at its mesenteric margin may be due to additional segment of diverticulitis or colitis  Suggest follow-up with the colonoscopy when the acute illness subsides to exclude  focal colonic lesion There is no fluid collection seen There is no free air Small mesenteric lymph nodes with largest measuring about 1 cm, short interval follow-up at 3 months suggested The study was marked in EPIC for immediate notification   Follow-up notification has been created in [employer] performed: [id number]       Scheduled Meds:  Current Facility-Administered Medications   Medication Dose Route Frequency Provider Last Rate    acetaminophen  650 mg Oral Q4H PRN Lula Ramon MD      albuterol  1 puff Inhalation Q4H PRN Nessa Ene, DO      amLODIPine  10 mg Oral Daily Nessa Ene, DO      aspirin  81 mg Oral Daily Nessa Ene, [place]      dicyclomine  10 mg Oral 4x Daily (AC & HS) Gary Anglin, DO      enoxaparin  40 mg Subcutaneous Daily Nessa Ene, DO      fentanyl citrate (PF)  25 mcg Intravenous Q2H PRN Lula Ramon MD      fluticasone-vilanterol  1 puff Inhalation Daily Nessa Ene, DO      hydrALAZINE  5 mg Intravenous Q6H PRN William Stubbs PA-C      HYDROmorphone  0 5 mg Intravenous Q4H PRN Nessa Ene, DO      HYDROmorphone  1 mg Intravenous Q4H PRN Nessa Ene, DO      insulin lispro  1-5 Units Subcutaneous HS Nessa Ene, DO      insulin lispro  1-6 Units Subcutaneous TID With Meals Fabiola Ragsdale MD      iohexol  50 mL Oral Once in imaging Nessa Ene, DO      lisinopril  20 mg Oral BID Gary Anglin, DO      LORazepam  0 5 mg Oral Q8H PRN Nessa Ene, DO      montelukast  10 mg Oral HS Chance Porter, DO      naloxone  0 04 mg Intravenous Q1MIN PRN Nessa Ene, DO      ondansetron  4 mg Intravenous Q6H PRN Nessa Ene, DO      oxyCODONE  10 mg Oral Q4H PRN Lula aRmon MD      oxyCODONE  5 mg Oral Q4H PRN Lula Ramon MD      zolpidem  5 mg Oral HS PRN Nessa Ene, DO         Today, Patient Was Seen By: Sade Sesay DO    ** Please Note: Dictation voice to text software may have been used in the creation of this document   **

## 2022-04-13 NOTE — ASSESSMENT & PLAN NOTE
· Stable at baseline    Results from last 7 days   Lab Units 04/13/22  0602 04/12/22  0605 04/11/22  0533 04/10/22  0648 04/09/22  0548 04/08/22  0450 04/07/22  0558   BUN mg/dL 16 13 11 6 6 5 8   CREATININE mg/dL 0 82 0 76 0 76 0 77 0 74 0 77 0 89   EGFR ml/min/1 73sq m 70 76 76 75 79 75 63

## 2022-04-13 NOTE — ASSESSMENT & PLAN NOTE
· Mild transaminitis noted    Resolved    Results from last 7 days   Lab Units 04/13/22  0602 04/12/22  0605 04/11/22  0533   AST U/L 36 23 31   ALT U/L 41 41 48   TOTAL BILIRUBIN mg/dL 0 44 0 29 0 30

## 2022-04-13 NOTE — ASSESSMENT & PLAN NOTE
· Sample was hemolyzed  Recheck in a m     If still elevated will stop lisinopril    Results from last 7 days   Lab Units 04/13/22  0602 04/12/22  0605 04/11/22  0533 04/10/22  0648 04/09/22  0548 04/08/22  0450 04/07/22  0558   POTASSIUM mmol/L 5 4* 3 9 3 9 3 7 3 7 3 8 3 7

## 2022-04-13 NOTE — PROGRESS NOTES
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 although colonoscopy grossly normal without inflammatory changes  No diverticulitis seen  Patient's antibiotics have been discontinued and she remains clinically stable  Status post bone marrow biopsy yesterday  -Monitor off all antibiotics  -Monitor CBC with diff  -Close GI follow-up  -repeat CT A/P ordered today  -Follow-up stool ova and parasite  -Follow-up Strongyloides serology  -Follow-up bone marrow biopsy results     2  High level eosinophilia  As in above, at this level of eosinophilia, intestinal parasite is unlikely  Consider strongyloides hyperinfection syndrome but clinical presentation is not consistent with this and the patient is not immunosuppressed  HIV negative  Patient information about where she grew up is discordant, but patient told ID it was [facility]  Colonoscopy without evidence of inflammatory bowel disease or diverticulitis, biopsies are pending  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  Flow cytometry negative  Bone marrow biopsy also pending   -Follow-up colonic biopsies  -Follow up bone marrow biopsy  -Monitor CBC with diff  -Follow-up Strongyloides serology, stool ova and parasite   -will need outpatient hematology/oncology follow up     3  Mildly elevated LFTs, likely secondary to colitis above   Patient has no RUQ abdominal pain  AST/ALT now normalized  -monitor LFTs     4  CKD  Creatinine at baseline   Continue to monitor      5  DM, with neuropathy     -Management per primary service      Above plan was discussed in detail with patient at the bedside  ID will follow  Antibiotics:  Off antibiotics     Subjective:  Patient feeling the same today  Still reporting abdominal pain  Having formed BMs, but states her stools are dark  No nausea, vomiting, fever, chills  Objective:  Vitals:  Temp:  [98 3 °F (36 8 °C)-99 °F (37 2 °C)] 98 9 °F (37 2 °C)  HR:  [74-84] 81  Resp:  [18-20] 18  BP: (135-178)/(56-95) 136/57  SpO2:  [91 %-94 %] 91 %  Temp (24hrs), Av 7 °F (37 1 °C), Min:98 3 °F (36 8 °C), Max:99 °F (37 2 °C)  Current: Temperature: 98 9 °F (37 2 °C)    Physical Exam:   General: no acute distress, nontoxic  CV: RRR, no murmurs  Lungs: clear to auscultation bilaterally  Abdomen: mild diffuse tenderness to palpation  Extremities: no joint swelling  Skin: no rashes, erythema  Neuro: awake and alert, moving all extremities spontaneously       Labs: All pertinent labs and imaging studies were personally reviewed  Results from last 7 days   Lab Units 22  0602 22  0605 22  0533   WBC Thousand/uL 24 75* 25 42* 26 32*   HEMOGLOBIN g/dL 10 8* 10 7* 11 2*   PLATELETS Thousands/uL 223 204 198     Results from last 7 days   Lab Units 22  0602 22  0605 22  0605 22  0533 22  0533   SODIUM mmol/L 136  --  136  --  137   POTASSIUM mmol/L 5 4*   < > 3 9   < > 3 9   CHLORIDE mmol/L 102   < > 103   < > 104   CO2 mmol/L 25   < > 25   < > 25   BUN mg/dL 16   < > 13   < > 11   CREATININE mg/dL 0 82   < > 0 76   < > 0 76   EGFR ml/min/1 73sq m 70   < > 76   < > 76   CALCIUM mg/dL 9 0   < > 8 6   < > 8 5   AST U/L 36  --  23  --  31   ALT U/L 41   < > 41   < > 48   ALK PHOS U/L 217*   < > 197*   < > 196*    < > = values in this interval not displayed  Results from last 7 days   Lab Units 22  0450   CRP mg/L 67 1*       Imaging:  I have personally reviewed pertinent imaging reports and images in PACS

## 2022-04-13 NOTE — ASSESSMENT & PLAN NOTE
65-year-old female worsening abdominal pain found to have diverticulitis and significant eosinophilia  · Seen by surgery, signed off  · Being followed by ID, monitoring off antibiotics  · Add dicyclomine  · Diet advanced, still having abdominal discomfort    Recheck imaging and have GI re-evaluate given persistence of symptoms

## 2022-04-13 NOTE — ASSESSMENT & PLAN NOTE
· Prior to admission on janumet  Continue sliding scale insulin  · Did have episode of hypoglycemia    Now stable off dextrose infusion  · Will restart pregabalin    Results from last 7 days   Lab Units 04/13/22  1052 04/13/22  0735 04/12/22  2042 04/12/22  1548 04/12/22  1238 04/12/22  0852 04/12/22  0716 04/11/22 2032   POC GLUCOSE mg/dl 296* 197* 207* 289* 154* 164* 162* 207*

## 2022-04-14 LAB
ALBUMIN SERPL BCP-MCNC: 3.3 G/DL (ref 3.5–5)
ALP SERPL-CCNC: 255 U/L (ref 46–116)
ALT SERPL W P-5'-P-CCNC: 48 U/L (ref 12–78)
ANION GAP SERPL CALCULATED.3IONS-SCNC: 9 MMOL/L (ref 4–13)
AST SERPL W P-5'-P-CCNC: 32 U/L (ref 5–45)
BILIRUB SERPL-MCNC: 0.36 MG/DL (ref 0.2–1)
BUN SERPL-MCNC: 17 MG/DL (ref 5–25)
CALCIUM ALBUM COR SERPL-MCNC: 9.9 MG/DL (ref 8.3–10.1)
CALCIUM SERPL-MCNC: 9.3 MG/DL (ref 8.3–10.1)
CHLORIDE SERPL-SCNC: 98 MMOL/L (ref 100–108)
CO2 SERPL-SCNC: 26 MMOL/L (ref 21–32)
CREAT SERPL-MCNC: 1 MG/DL (ref 0.6–1.3)
ERYTHROCYTE [DISTWIDTH] IN BLOOD BY AUTOMATED COUNT: 15.7 % (ref 11.6–15.1)
G LAMBLIA AG STL QL IA: NEGATIVE
GFR SERPL CREATININE-BSD FRML MDRD: 55 ML/MIN/1.73SQ M
GLUCOSE SERPL-MCNC: 169 MG/DL (ref 65–140)
GLUCOSE SERPL-MCNC: 179 MG/DL (ref 65–140)
GLUCOSE SERPL-MCNC: 192 MG/DL (ref 65–140)
GLUCOSE SERPL-MCNC: 203 MG/DL (ref 65–140)
GLUCOSE SERPL-MCNC: 212 MG/DL (ref 65–140)
GLUCOSE SERPL-MCNC: 218 MG/DL (ref 65–140)
HCT VFR BLD AUTO: 35.8 % (ref 34.8–46.1)
HGB BLD-MCNC: 11.6 G/DL (ref 11.5–15.4)
MCH RBC QN AUTO: 29.3 PG (ref 26.8–34.3)
MCHC RBC AUTO-ENTMCNC: 32.4 G/DL (ref 31.4–37.4)
MCV RBC AUTO: 90 FL (ref 82–98)
MISCELLANEOUS LAB TEST RESULT: NORMAL
O+P STL CONC: NORMAL
PLATELET # BLD AUTO: 275 THOUSANDS/UL (ref 149–390)
PMV BLD AUTO: 9 FL (ref 8.9–12.7)
POTASSIUM SERPL-SCNC: 4.6 MMOL/L (ref 3.5–5.3)
PROT SERPL-MCNC: 8.5 G/DL (ref 6.4–8.2)
RBC # BLD AUTO: 3.96 MILLION/UL (ref 3.81–5.12)
SCAN RESULT: NORMAL
SODIUM SERPL-SCNC: 133 MMOL/L (ref 136–145)
WBC # BLD AUTO: 26.65 THOUSAND/UL (ref 4.31–10.16)

## 2022-04-14 PROCEDURE — 99232 SBSQ HOSP IP/OBS MODERATE 35: CPT | Performed by: INTERNAL MEDICINE

## 2022-04-14 PROCEDURE — 0DB98ZX EXCISION OF DUODENUM, VIA NATURAL OR ARTIFICIAL OPENING ENDOSCOPIC, DIAGNOSTIC: ICD-10-PCS | Performed by: INTERNAL MEDICINE

## 2022-04-14 PROCEDURE — 82948 REAGENT STRIP/BLOOD GLUCOSE: CPT

## 2022-04-14 PROCEDURE — 85027 COMPLETE CBC AUTOMATED: CPT | Performed by: INTERNAL MEDICINE

## 2022-04-14 PROCEDURE — NC001 PR NO CHARGE: Performed by: PHYSICIAN ASSISTANT

## 2022-04-14 PROCEDURE — 0DB68ZX EXCISION OF STOMACH, VIA NATURAL OR ARTIFICIAL OPENING ENDOSCOPIC, DIAGNOSTIC: ICD-10-PCS | Performed by: INTERNAL MEDICINE

## 2022-04-14 PROCEDURE — 80053 COMPREHEN METABOLIC PANEL: CPT | Performed by: INTERNAL MEDICINE

## 2022-04-14 RX ORDER — PANTOPRAZOLE SODIUM 40 MG/1
40 INJECTION, POWDER, FOR SOLUTION INTRAVENOUS EVERY 12 HOURS SCHEDULED
Status: DISCONTINUED | OUTPATIENT
Start: 2022-04-14 | End: 2022-04-14

## 2022-04-14 RX ORDER — PANTOPRAZOLE SODIUM 40 MG/1
40 TABLET, DELAYED RELEASE ORAL
Status: DISCONTINUED | OUTPATIENT
Start: 2022-04-14 | End: 2022-04-19 | Stop reason: HOSPADM

## 2022-04-14 RX ADMIN — FLUTICASONE FUROATE AND VILANTEROL TRIFENATATE 1 PUFF: 200; 25 POWDER RESPIRATORY (INHALATION) at 09:06

## 2022-04-14 RX ADMIN — LISINOPRIL 20 MG: 20 TABLET ORAL at 09:01

## 2022-04-14 RX ADMIN — OXYCODONE HYDROCHLORIDE 10 MG: 10 TABLET ORAL at 15:16

## 2022-04-14 RX ADMIN — ZOLPIDEM TARTRATE 5 MG: 5 TABLET, COATED ORAL at 00:03

## 2022-04-14 RX ADMIN — OXYCODONE HYDROCHLORIDE 10 MG: 10 TABLET ORAL at 19:58

## 2022-04-14 RX ADMIN — DICYCLOMINE HYDROCHLORIDE 10 MG: 10 CAPSULE ORAL at 11:58

## 2022-04-14 RX ADMIN — OXYCODONE HYDROCHLORIDE 10 MG: 10 TABLET ORAL at 10:22

## 2022-04-14 RX ADMIN — DICYCLOMINE HYDROCHLORIDE 10 MG: 10 CAPSULE ORAL at 22:03

## 2022-04-14 RX ADMIN — DICYCLOMINE HYDROCHLORIDE 10 MG: 10 CAPSULE ORAL at 06:17

## 2022-04-14 RX ADMIN — MONTELUKAST 10 MG: 10 TABLET, FILM COATED ORAL at 22:03

## 2022-04-14 RX ADMIN — PANTOPRAZOLE SODIUM 40 MG: 40 TABLET, DELAYED RELEASE ORAL at 17:09

## 2022-04-14 RX ADMIN — PREGABALIN 50 MG: 50 CAPSULE ORAL at 09:01

## 2022-04-14 RX ADMIN — DICYCLOMINE HYDROCHLORIDE 10 MG: 10 CAPSULE ORAL at 17:09

## 2022-04-14 RX ADMIN — INSULIN LISPRO 1 UNITS: 100 INJECTION, SOLUTION INTRAVENOUS; SUBCUTANEOUS at 22:05

## 2022-04-14 RX ADMIN — OXYCODONE HYDROCHLORIDE 10 MG: 10 TABLET ORAL at 06:17

## 2022-04-14 RX ADMIN — LISINOPRIL 20 MG: 20 TABLET ORAL at 22:03

## 2022-04-14 RX ADMIN — AMLODIPINE BESYLATE 10 MG: 10 TABLET ORAL at 09:00

## 2022-04-14 RX ADMIN — ASPIRIN 81 MG: 81 TABLET, CHEWABLE ORAL at 09:02

## 2022-04-14 RX ADMIN — PREGABALIN 50 MG: 50 CAPSULE ORAL at 17:08

## 2022-04-14 RX ADMIN — PREGABALIN 50 MG: 50 CAPSULE ORAL at 22:03

## 2022-04-14 NOTE — PROGRESS NOTES
[address] [facility]  Progress Note - Gonzales Thompson 1945, 78 y o  female MRN: [id number]  Unit/Bed#: MS Gurinder-01 Encounter: [id number]  Primary Care Provider: Adonis Holter, CRNP   Date and time admitted to hospital: 4/4/2022  2:10 PM    * Acute diverticulitis  Assessment & Plan  53-year-old female worsening abdominal pain found to have diverticulitis and significant eosinophilia  · Seen by surgery, signed off  · Being followed by ID, monitoring off antibiotics  · Add dicyclomine  · Diet advanced, still having abdominal discomfort  Recheck him imaging demonstrates resolution of diverticulitis of presence of constipation  · Due to concerns of melanotic stools and persistent pain, GI reconsulted  Of note, hemoglobin is stable    Eosinophilia  Assessment & Plan  · Significant eosinophilia  Awaiting stool parasite studies  · Flow cytometry of blood does not demonstrate any concern for dyscrasia  · Underwent bone marrow biopsy awaiting interpretation    Results from last 7 days   Lab Units 04/14/22  0616 04/13/22  0602 04/12/22  0605 04/11/22  0533 04/10/22  0648 04/09/22  0548 04/08/22  0450   WBC Thousand/uL 26 65* 24 75* 25 42* 26 32* 25 80* 26 08* 22 14*   EOS PCT %  --   --  58* 53* 67* 72* 69*       Hyperkalemia  Assessment & Plan  · Sample with 5 4 was hemolyzed  If still elevated will stop lisinopril    Results from last 7 days   Lab Units 04/14/22  0616 04/13/22  0602 04/12/22  0605 04/11/22  0533 04/10/22  0648 04/09/22  0548 04/08/22  0450   POTASSIUM mmol/L 4 6 5 4* 3 9 3 9 3 7 3 7 3 8       Transaminitis  Assessment & Plan  · Mild transaminitis noted    Resolved    Results from last 7 days   Lab Units 04/14/22  0616 04/13/22  0602 04/12/22  0605 04/11/22  0533   AST U/L 32 36 23 31   ALT U/L 48 41 41 48   TOTAL BILIRUBIN mg/dL 0 36 0 44 0 29 0 30       Primary hypertension  Assessment & Plan  · Continue amlodipine and lisinopril    Stage 3a chronic kidney disease ([address] )  Assessment & Plan  · Stable at baseline    Results from last 7 days   Lab Units 04/14/22  0616 04/13/22  0602 04/12/22  0605 04/11/22  0533 04/10/22  0648 04/09/22  0548 04/08/22  0450   BUN mg/dL 17 16 13 11 6 6 5   CREATININE mg/dL 1 00 0 82 0 76 0 76 0 77 0 74 0 77   EGFR ml/min/1 73sq m 55 70 76 76 75 79 75       Controlled type 2 diabetes with neuropathy ([facility] [address] )  Assessment & Plan  · Prior to admission on janumet  Continue sliding scale insulin  · Did have episode of hypoglycemia  Now stable off dextrose infusion  · Restarted pregabalin    Results from last 7 days   Lab Units 04/13/22  2359 04/13/22  1547 04/13/22  1052 04/13/22  0735 04/12/22  2042 04/12/22  1548 04/12/22  1238 04/12/22  0852   POC GLUCOSE mg/dl 203* 197* 296* 197* 207* 289* 154* 164*       Mild intermittent asthma  Assessment & Plan  · No exacerbation continue breo  · Continue singulair      VTE Pharmacologic Prophylaxis: VTE Score: 4 Moderate Risk (Score 3-4) - Pharmacological DVT Prophylaxis Ordered: Enoxaparin (Lovenox)  Patient Centered Rounds: I have performed bedside rounds with nursing staff today  Discussions with Specialists or Other Care Team Provider:  Case management    Education and Discussions with Family / Patient:  Spouse on telephone    Time Spent for Care: 25 mins  More than 50% of total time spent on counseling and coordination of care as described above  Current Length of Stay: 10 day(s)  Current Patient Status: Inpatient   Certification Statement: The patient will continue to require additional inpatient hospital stay due to abdominal pain and dark stools  Discharge Plan / Estimated Discharge Date: To be determined    Code Status: Level 1 - Full Code      Subjective:   Patient seen and examined  Still having diffuse abdominal pain, had bowel movement yesterday and today  Now reports bowel movements are black    Objective:   Vitals: Blood pressure 163/76, pulse 85, temperature 97 6 °F (36 4 °C), resp   rate 18, height 5' 2" (1 575 m), weight 88 1 kg (194 lb 3 6 oz), SpO2 96 %, not currently breastfeeding  Physical Exam  Vitals reviewed  Constitutional:       General: She is not in acute distress  Appearance: Normal appearance  HENT:      Head: Atraumatic  Cardiovascular:      Rate and Rhythm: Regular rhythm  Pulmonary:      Breath sounds: Decreased breath sounds present  No wheezing  Abdominal:      General: Bowel sounds are normal       Palpations: Abdomen is soft  Tenderness: There is abdominal tenderness (Diffusely)  There is no guarding or rebound  Musculoskeletal:         General: No swelling or tenderness  Skin:     General: Skin is warm  Neurological:      Mental Status: She is alert and oriented to person, place, and time     Psychiatric:         Mood and Affect: Mood normal        Additional Data:   Labs:  Results from last 7 days   Lab Units 04/14/22  0616 04/13/22  0602 04/12/22  0605 04/11/22  0533 04/10/22  0648 04/09/22  0548 04/08/22  0450   WBC Thousand/uL 26 65* 24 75* 25 42* 26 32* 25 80* 26 08* 22 14*   HEMOGLOBIN g/dL 11 6 10 8* 10 7* 11 2* 11 2* 11 6 11 4*   HEMATOCRIT % 35 8 32 8* 32 2* 34 0* 33 9* 35 1 34 5*   MCV fL 90 89 89 90 90 90 90   TOTAL NEUT ABS Thousand/uL  --   --  6 10  --   --   --   --    PLATELETS Thousands/uL 275 223 204 198 200 197 193     Results from last 7 days   Lab Units 04/14/22  0616 04/13/22  0602 04/12/22  0605 04/11/22  0533 04/10/22  0648 04/09/22  0548 04/08/22  0450   SODIUM mmol/L 133* 136 136 137 140 136 138   POTASSIUM mmol/L 4 6 5 4* 3 9 3 9 3 7 3 7 3 8   CHLORIDE mmol/L 98* 102 103 104 106 103 102   CO2 mmol/L 26 25 25 25 25 26 27   ANION GAP mmol/L 9 9 8 8 9 7 9   BUN mg/dL 17 16 13 11 6 6 5   CREATININE mg/dL 1 00 0 82 0 76 0 76 0 77 0 74 0 77   CALCIUM mg/dL 9 3 9 0 8 6 8 5 8 6 8 9 8 8   ALBUMIN g/dL 3 3* 2 9* 2 8* 2 9*  --   --   --    TOTAL BILIRUBIN mg/dL 0 36 0 44 0 29 0 30  --   --   --    ALK PHOS U/L 255* 217* 197* 196*  --   --   --    ALT U/L 48 41 41 48  --   --   --    AST U/L 32 36 23 31  --   --   --    EGFR ml/min/1 73sq m 55 70 76 76 75 79 75   GLUCOSE RANDOM mg/dL 169* 155* 154* 179* 148* 148* 124     Results from last 7 days   Lab Units 04/09/22  0548   MAGNESIUM mg/dL 1 8                      Results from last 7 days   Lab Units 04/13/22  2359 04/13/22  1547 04/13/22  1052 04/13/22  0735 04/12/22  2042 04/12/22  1548 04/12/22  1238 04/12/22  0852 04/12/22  0716 04/11/22  2032 04/11/22  1619 04/11/22  1134   POC GLUCOSE mg/dl 203* 197* 296* 197* 207* 289* 154* 164* 162* 207* 235* 266*             * I Have Reviewed All Lab Data Listed Above  Cultures:                   Lines/Drains:  Invasive Devices  Report    Peripheral Intravenous Line            Peripheral IV 04/12/22 Proximal;Right;Ventral (anterior) Forearm 2 days              Telemetry:      Imaging:  Imaging Reports Reviewed Today Include:   Colonoscopy    Result Date: 4/9/2022  Impression: 1  Diverticulosis coli, ascending colon sigmoid colon  No evidence of diverticulitis 2  Diminutive polyp in the proximal ascending colon status post cold biopsy removal RECOMMENDATION: Repeat colonoscopy in 5 years due to a personal history of colon polyps High-fiber diet to include fruits, vegetables, whole grain foods, daily Will call the patient 1 week regarding the polyp results and biopsy results DO Dora Isaac, FACP    CT abdomen pelvis w contrast    Result Date: 4/7/2022  Impression: Overall similar appearance of colonic wall thickening centered at the cecum/ascending colon and at the splenic flexure, with similar pericolonic inflammatory stranding in the right colon and slight improvement on the left  No evidence of perforation or abscess formation  Workstation performed: [id number]     CT abdomen pelvis with contrast    Result Date: 4/4/2022  Impression: There is thickening of the splenic flexure with associated pericolonic infiltration, suggestive diverticulitis    Additional thickening of the ascending colon with some infiltration at its mesenteric margin may be due to additional segment of diverticulitis or colitis  Suggest follow-up with the colonoscopy when the acute illness subsides to exclude  focal colonic lesion There is no fluid collection seen There is no free air Small mesenteric lymph nodes with largest measuring about 1 cm, short interval follow-up at 3 months suggested The study was marked in EPIC for immediate notification   Follow-up notification has been created in [employer] performed: [id number]       Scheduled Meds:  Current Facility-Administered Medications   Medication Dose Route Frequency Provider Last Rate    acetaminophen  650 mg Oral Q4H PRN Juan Murrieta MD      albuterol  1 puff Inhalation Q4H PRN Sheng Paul,       amLODIPine  10 mg Oral Daily Sheng Paul, DO      aspirin  81 mg Oral Daily Alyssia Francea      dicyclomine  10 mg Oral 4x Daily (AC & HS) Great amy, DO      enoxaparin  40 mg Subcutaneous Daily Sheng Paul,       fentanyl citrate (PF)  25 mcg Intravenous Q2H PRN Juan Murrieta MD      fluticasone-vilanterol  1 puff Inhalation Daily Sheng Paul, DO      hydrALAZINE  5 mg Intravenous Q6H PRN Gerson Sanchez PA-C      HYDROmorphone  0 5 mg Intravenous Q4H PRN Sheng Paul, DO      HYDROmorphone  1 mg Intravenous Q4H PRN Sheng Paul,       insulin lispro  1-5 Units Subcutaneous HS Sheng Paul, DO      insulin lispro  1-6 Units Subcutaneous TID With Meals Teresita Duong MD      iohexol  50 mL Oral Once in imaging Sheng Paul,       lisinopril  20 mg Oral BID [facility] amy, DO      LORazepam  0 5 mg Oral Q8H PRN Sheng Paul, DO      montelukast  10 mg Oral HS Chance Porter, DO      naloxone  0 04 mg Intravenous Q1MIN PRN Sheng Paul, DO      ondansetron  4 mg Intravenous Q6H PRN Sheng Paul, DO      oxyCODONE  10 mg Oral Q4H PRN Juan Murrieta MD      oxyCODONE  5 mg Oral Q4H PRN Guy Fernandez MD      pregabalin  50 mg Oral TID Shelly Rebolledo DO      zolpidem  5 mg Oral HS PRN Hayden Mike, DO         Today, Patient Was Seen By: Shelly Rebolledo DO    ** Please Note: Dictation voice to text software may have been used in the creation of this document   **

## 2022-04-14 NOTE — ASSESSMENT & PLAN NOTE
· Significant eosinophilia    Awaiting stool parasite studies  · Flow cytometry of blood does not demonstrate any concern for dyscrasia  · Underwent bone marrow biopsy awaiting interpretation    Results from last 7 days   Lab Units 04/14/22  0616 04/13/22  0602 04/12/22  0605 04/11/22  0533 04/10/22  0648 04/09/22  0548 04/08/22  0450   WBC Thousand/uL 26 65* 24 75* 25 42* 26 32* 25 80* 26 08* 22 14*   EOS PCT %  --   --  58* 53* 67* 72* 69*

## 2022-04-14 NOTE — CASE MANAGEMENT
Case Management Discharge Planning Note    Patient name Manuel Mccartney  Location /-93 MRN [id number]  : 1945 Date 2022       Current Admission Date: 2022  Current Admission Diagnosis:Acute diverticulitis   Patient Active Problem List    Diagnosis Date Noted    Hyperkalemia 2022    COPD (chronic obstructive pulmonary disease) ([facility] ) 2022    Eosinophilia 2022    Acute diverticulitis 2022    Transaminitis 2022    Anxiety 2022    Hypertensive urgency 2022    Nausea 2022    Bradycardia 2022    Abnormal EKG 2022    Primary hypertension 2022    Stage 3a chronic kidney disease ([facility] [address] ) 2022    Psychophysiological insomnia 2021    Exertional dyspnea     Medicare annual wellness visit, subsequent 2021    Right leg pain 2021    Acute cystitis with hematuria 2021    [facility] 04/15/2021    Obesity, morbid ([facility] ) 04/15/2021    BMI 37 0-37 9, adult 2020    Atypical chest pain 2020    Dermatitis 2020    Vaginal cyst 2019    Candidiasis of genitalia in female 2019    Encounter for gynecological examination without abnormal finding 2019    Controlled type 2 diabetes with neuropathy ([facility] ) 2019    Benign hypertension 2018    Depression with anxiety 2018    Mild intermittent asthma 2018    BOLA (obstructive sleep apnea) 10/17/2016      LOS (days): 10  Geometric Mean LOS (GMLOS) (days): 2 90  Days to GMLOS:-6 9     OBJECTIVE:  Risk of Unplanned Readmission Score: 14         Current admission status: Inpatient   Preferred Pharmacy:   [address] [place] [address] R R 1 [phone] R [address] ([address])  [address] [facility]  Phone: [phone] Fax: [phone]    [id number], [address] [facility] [address] [facility] [address]  [place] [address]  Phone: [phone] Fax: [phone]    Primary Care Provider: ALEKSANDER Norton    Primary Insurance: MEDICARE  Secondary Insurance: [employer]    DISCHARGE DETAILS:    Additional Comments: Patient reviewed during care coordination rounds with Dr Mary Stewart who informed that pt is complaining of black stools  Discharge is pending GI clearance  Plan is for discharge home with [facility] once medically stable

## 2022-04-14 NOTE — NURSING NOTE
Patient's IV access was no longer working when attempted to flush  Patient has poor venous access  Charge nurse attempted IV with ultrasound, as well as ICU nurse, but still unable to get working IV at this time

## 2022-04-14 NOTE — PROGRESS NOTES
Progress Note  Maylin Eldridge 78 y o  female MRN: [id number]  Unit/Bed#: -[facility] Encounter: [id number]    Subjective:  Patient reports her abdominal pain is somewhat improved today  She reports abdominal pain x 2 weeks  She reports initially she has N/V/D which resolved  She reports black stools for 2 weeks  No rectal bleeding  She reports pain in the center of her abdomen and lower  She reports she is having bowel movements today  She denies any travel out of the country  She reports she went to [place] in February  Review of vitals show she has been afebrile  Objective:     Vitals:   Vitals:    04/14/22 0857   BP: 163/76   Pulse: 85   Resp: 18   Temp:    SpO2: 96%   ,Body mass index is 35 52 kg/m²  Intake/Output Summary (Last 24 hours) at 4/14/2022 1120  Last data filed at 4/14/2022 0912  Gross per 24 hour   Intake 600 ml   Output --   Net 600 ml       Physical Exam:     General Appearance: Alert, appears stated age and cooperative  Lungs: Clear to auscultation bilaterally, no rales or rhonchi, no labored breathing/accessory muscle use  Heart: Regular rate and rhythm, S1, S2 normal, no murmur, click, rub or gallop  Abdomen: Soft, non-tender, non-distended; bowel sounds normal; no masses or no organomegaly  Extremities: No cyanosis, edema    Invasive Devices  Report    Peripheral Intravenous Line            Peripheral IV 04/12/22 Proximal;Right;Ventral (anterior) Forearm 2 days                Lab Results:  No results displayed because visit has over 200 results  Imaging Studies:   I have personally reviewed pertinent imaging studies  Colonoscopy    Result Date: 4/9/2022  Narrative:  [facility] [address] [phone] DATE OF SERVICE: 4/09/22 PHYSICIAN(S): Attending: Martha Delatorre DO Fellow: No Staff Documented INDICATION: Acute diverticulitis, Eosinophilia, unspecified type POST-OP DIAGNOSIS: See the impression below  HISTORY: Prior colonoscopy: 3 years ago  BOWEL PREPARATION: Miralax/Dulcolax PREPROCEDURE: Informed consent was obtained for the procedure, including sedation  Risks including but not limited to bleeding, infection, perforation, adverse drug reaction and aspiration were explained in detail  Also explained about less than 100% sensitivity with the exam and other alternatives  The patient was placed in the left lateral decubitus position  DETAILS OF PROCEDURE: Patient was taken to the procedure room where a time out was performed to confirm correct patient and correct procedure  The patient underwent monitored anesthesia care, which was administered by an anesthesia professional  The patient's blood pressure, heart rate, level of consciousness, oxygen and respirations were monitored throughout the procedure  A digital rectal exam was performed  The scope was introduced through the anus and advanced to the cecum  Retroflexion was performed in the rectum  The quality of bowel preparation was evaluated using the [facility] Bowel Preparation Scale with scores of: right colon = 2, transverse colon = 2, left colon = 2  The total BBPS score was 6  Bowel prep was adequate  The patient's estimated blood loss was minimal (<5 mL)  The procedure was not difficult  The patient tolerated the procedure well  There were no apparent complications  ANESTHESIA INFORMATION: ASA: III Anesthesia Type: Anesthesia type not filed in the log   MEDICATIONS: No administrations occurring from 1206 to 1229 on 04/09/22 FINDINGS: Multiple small diverticula with no bleeding in the proximal ascending colon, mid ascending colon, proximal sigmoid colon, mid sigmoid colon and distal sigmoid colon One sessile polyp measuring smaller than 5 mm in the proximal ascending colon; performed complete removal by cold forceps biopsy The cecum, hepatic flexure, transverse colon, splenic flexure, descending colon, rectosigmoid and rectum appeared normal  Performed 12 biopsies in the ascending colon, transverse colon and sigmoid colon  Rule out eosinophilic colitis  EVENTS: Procedure Events Event Event Time ENDO CECUM REACHED 4/9/2022 12:21 PM ENDO SCOPE OUT TIME 4/9/2022 12:28 PM SPECIMENS: ID Type Source Tests Collected by Time Destination 1 : ascending, transverse and sigmoid Tissue Colon TISSUE EXAM Hayden Mike, DO 4/9/2022 12:22 PM  2 : proximal ascending colon polyp x 1 Tissue Polyp, Colorectal TISSUE EXAM Hayden Mike, DO 4/9/2022 12:25 PM  EQUIPMENT: Colonoscope -[id number]     Impression: 1  Diverticulosis coli, ascending colon sigmoid colon  No evidence of diverticulitis 2  Diminutive polyp in the proximal ascending colon status post cold biopsy removal RECOMMENDATION: Repeat colonoscopy in 5 years due to a personal history of colon polyps High-fiber diet to include fruits, vegetables, whole grain foods, daily Will call the patient 1 week regarding the polyp results and biopsy results Hayden Mike, DO [facility] right upper quadrant    Result Date: 4/11/2022  Narrative: RIGHT UPPER QUADRANT ULTRASOUND INDICATION:     elevated alkaline phosphate  COMPARISON:  4/7/2022 TECHNIQUE:   Real-time ultrasound of the right upper quadrant was performed with a curvilinear transducer with both volumetric sweeps and still imaging techniques  FINDINGS: PANCREAS:  Visualized portions of the pancreas are within normal limits  AORTA AND IVC:  Visualized portions are normal for patient age  LIVER: Size:  Mildly enlarged  The liver measures 17 7 cm in the midclavicular line  Contour:  Surface contour is smooth  Parenchyma:  Echogenicity and echotexture are within normal limits  No liver mass identified  Limited imaging of the main portal vein shows it to be patent and hepatopetal   BILIARY: No gallbladder findings  No intrahepatic biliary dilatation  CBD measures 4 0 mm  No choledocholithiasis  KIDNEY: Right kidney measures 11 5 x 4 9 x 5 3 cm   Volume 157 8 mL Kidney within normal limits  ASCITES:   None  Impression: 1  No biliary ductal dilatation  2   Mild hepatomegaly though otherwise unremarkable appearance  Workstation performed: [id number]     CT abdomen pelvis w contrast    Result Date: 4/13/2022  Narrative: CT ABDOMEN AND PELVIS WITH IV CONTRAST INDICATION:   Abdominal pain, acute, nonlocalized persistent abdominal pain  COMPARISON:  None  TECHNIQUE:  CT examination of the abdomen and pelvis was performed  Axial, sagittal, and coronal 2D reformatted images were created from the source data and submitted for interpretation  Radiation dose length product (DLP) for this visit:  676 mGy-cm   This examination, like all CT scans performed in the [facility], was performed utilizing techniques to minimize radiation dose exposure, including the use of iterative reconstruction and automated exposure control  IV Contrast:  50 mL of iohexol (OMNIPAQUE) 100 mL of iohexol (OMNIPAQUE) Enteric Contrast:  Enteric contrast was not administered  FINDINGS: ABDOMEN LOWER CHEST:  No clinically significant abnormality identified in the visualized lower chest  LIVER/BILIARY TREE:  Unremarkable  GALLBLADDER:  No calcified gallstones  No pericholecystic inflammatory change  SPLEEN:  Unremarkable  PANCREAS:  Unremarkable  ADRENAL GLANDS:  Unremarkable  KIDNEYS/URETERS:  3 6 mm calculus in the left kidney upper pole and 2 mm right kidney calculus     Subcentimeter hypodensities in both kidneys suggesting cysts  No hydronephrosis  STOMACH AND BOWEL:  Stool filled colon with possible impaction in the ascending and transverse colon  Sigmoid diverticulosis  Large duodenal diverticulum  APPENDIX:  No findings to suggest appendicitis  ABDOMINOPELVIC CAVITY:  No ascites  No pneumoperitoneum  No lymphadenopathy  VESSELS:  Unremarkable for patient's age  PELVIS REPRODUCTIVE ORGANS:  Post hysterectomy  Cystic area in the vaginal, urethral region is unchanged and benign   URINARY BLADDER: Unremarkable  ABDOMINAL WALL/INGUINAL REGIONS:  Small fat-containing inguinal hernias  OSSEOUS STRUCTURES:  No acute fracture or destructive osseous lesion  Impression: Stool filled colon with possible impaction in the ascending and transverse colon  Sigmoid diverticulosis  No acute intra-abdominal finding  Workstation performed: [id number]     CT abdomen pelvis w contrast    Result Date: 4/7/2022  Narrative: CT ABDOMEN AND PELVIS WITH IV CONTRAST INDICATION:   LLQ abdominal pain RLQ abdominal pain (Age >= 14y) LUQ abdominal pain Eosinophila and worsening abdominal pain  Leukocytosis  COMPARISON:  CT abdomen/pelvis 4/4/2022  TECHNIQUE:  CT examination of the abdomen and pelvis was performed  In addition to portal venous phase postcontrast scanning through the abdomen and pelvis, delayed phase postcontrast scanning was performed through the upper abdominal viscera  Axial, sagittal, and coronal 2D reformatted images were created from the source data and submitted for interpretation  Radiation dose length product (DLP) for this visit:  1077 mGy-cm   This examination, like all CT scans performed in the [facility], was performed utilizing techniques to minimize radiation dose exposure, including the use of iterative reconstruction and automated exposure control  IV Contrast:  100 mL of iohexol (OMNIPAQUE) Enteric Contrast:  Enteric contrast was not administered  FINDINGS: ABDOMEN LOWER CHEST: No clinically significant abnormality is identified in the visualized lower chest  No consolidation or effusion  LIVER: Normal size and morphology  No suspicious lesion  There are stable geographic areas of portal venous phase hypoattenuation and delayed phase isoattenuation which are traversed by undistorted vessels, the larger in segment 2/4a superior to the middle hepatic vein, the smaller segment 1 adjacent to the IVC, findings compatible with focal fatty infiltration   BILIARY: No intrahepatic biliary ductal dilatation  Normal caliber common bile duct  GALLBLADDER: No calcified gallstones  There is vicarious excretion of contrast from prior study  Normal wall thickness  No pericholecystic inflammatory changes  SPLEEN: Within normal limits  No suspicious lesion  Normal spleen size  PANCREAS: Pancreatic parenchyma is within normal limits  No main pancreatic ductal dilatation  No peripancreatic inflammation  ADRENAL GLANDS: Within normal limits  KIDNEYS/URETERS: Normal size and position  Symmetric enhancement  No suspicious lesion  Bilateral simple cysts  Mild multifocal cortical scarring  Nonobstructing calculi are noted  No hydronephrosis  Ureters within normal limits  STOMACH AND BOWEL: Stomach is grossly within normal limits  Normal caliber small bowel  Normal caliber large bowel  Colonic diverticulosis  Again seen is severe wall thickening of the colon centered on the cecum/proximal ascending colon as well as the splenic flexure  Pericolonic inflammatory fat stranding about the right colon appears unchanged, and is slightly improved in appearance in the left upper quadrant  No evidence of perforation or abscess    APPENDIX: Normal appendix  ABDOMINOPELVIC CAVITY: Pericolic stranding as described  Trace pelvic free fluid, similar  No intraperitoneal free air  Multiple enlarged mesenteric lymph nodes measuring up to 1 cm short axis in the right lower quadrant which are presumably reactive, unchanged  No retroperitoneal hematoma  VESSELS: Normal caliber abdominal aorta with no detectable atherosclerotic plaque  The celiac, SMA, and HENRIQUE are patent  The main, right, and left portal veins are patent  The SMV and splenic vein are patent  The hepatic veins are patent  The renal arteries and veins are patent  PELVIS REPRODUCTIVE ORGANS:  Hysterectomy  No evidence of pelvic or adnexal mass   A 1 7 cm crescentic well-circumscribed hypodensity in the right aspect of the urethra is unchanged significantly since 2014, 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  URINARY BLADDER:  Within normal limits  No calculi  ABDOMINAL WALL/INGUINAL REGIONS:  Diastases recti with small fat-containing umbilical hernia  Small right-sided fat-containing indirect inguinal hernia  BONES:  Mild multilevel degenerative changes in the spine  Vertebral body height is maintained  No acute fracture or destructive osseous lesion  Grade 1 degenerative anterolisthesis performed  Impression: Overall similar appearance of colonic wall thickening centered at the cecum/ascending colon and at the splenic flexure, with similar pericolonic inflammatory stranding in the right colon and slight improvement on the left  No evidence of perforation or abscess formation  Workstation performed: [id number]     CT abdomen pelvis with contrast    Result Date: 4/4/2022  Narrative: CT ABDOMEN AND PELVIS WITH IV CONTRAST INDICATION:   Abdominal pain, acute, nonlocalized periumbilical, generalized pain  WBC count of 19,000 COMPARISON:  CT from August 9, 2014 TECHNIQUE:  CT examination of the abdomen and pelvis was performed  Axial, sagittal, and coronal 2D reformatted images were created from the source data and submitted for interpretation  Radiation dose length product (DLP) for this visit:  863 mGy-cm   This examination, like all CT scans performed in the [facility], was performed utilizing techniques to minimize radiation dose exposure, including the use of iterative reconstruction and automated exposure control  IV Contrast:  100 mL of iohexol (OMNIPAQUE) Enteric Contrast:  Enteric contrast was not administered   FINDINGS: ABDOMEN LOWER CHEST:  No clinically significant abnormality identified in the visualized lower chest  LIVER/BILIARY TREE:  There is a new geographic area in the liver is interposed between the middle hepatic vein and left hepatic vein, measuring about 3 3 cm, indeterminate probably due to focal fatty infiltration Additional hypodensity seen just into the IVC, segment 1, measuring 2 cm GALLBLADDER:  No calcified gallstones  No pericholecystic inflammatory change  No biliary dilation seen SPLEEN:  Unremarkable  PANCREAS:  Unremarkable  ADRENAL GLANDS:  Unremarkable  KIDNEYS/URETERS:  No hydronephrosis or urinary tract calculus  One or more sharply circumscribed subcentimeter renal hypodensities are present, too small to accurately characterize, and statistically most likely benign findings  According to recent literature (Radiology 2019) no further workup of these findings is recommended  Nonobstructing bilateral renal calculi are noted STOMACH AND BOWEL:  There is pericolonic infiltration in relation to the splenic flexure with wall thickening, likely due to diverticulitis  Additional thickening of the adjacent proximal descending colon seen Mild thickening of the descending colon with some surrounding infiltration at its mesenteric margin, image 76 series 601 Ileocecal junction appear unremarkable APPENDIX:  No findings to suggest appendicitis  Appendix is identified in image 72 series 601, image 79 series 601 The cecum is mobile ABDOMINOPELVIC CAVITY:  No ascites  No pneumoperitoneum  No lymphadenopathy  Mesenteric lymph nodes are seen measuring about 1 cm in the right lower quadrant, image 45 series 2 VESSELS:  Celiac trunk, SMA appear unremarkable Iliac vessels are patent PELVIS REPRODUCTIVE ORGANS:  A rounded the hypodense area seen within the cervix, stable URINARY BLADDER:  Unremarkable  ABDOMINAL WALL/INGUINAL REGIONS:  Rectal muscle diastasis seen OSSEOUS STRUCTURES:  No acute compression collapse vertebra No gross lytic lesion     Impression: There is thickening of the splenic flexure with associated pericolonic infiltration, suggestive diverticulitis  Additional thickening of the ascending colon with some infiltration at its mesenteric margin may be due to additional segment of diverticulitis or colitis    Suggest follow-up with the colonoscopy when the acute illness subsides to exclude  focal colonic lesion There is no fluid collection seen There is no free air Small mesenteric lymph nodes with largest measuring about 1 cm, short interval follow-up at 3 months suggested The study was marked in EPIC for immediate notification  Follow-up notification has been created in [employer] performed: [id number]     IR biopsy bone marrow    Result Date: 4/12/2022  Narrative: CT-scan guided diagnostic bone marrow aspiration and core biopsy History: Eosinophilia Conscious sedation time:  15 Radiation Dose:  132 mGy Technique: This examination, like all CT scans performed in the [facility], was performed utilizing techniques to minimize radiation dose exposure, including the use of iterative reconstruction and automated exposure control  The patient was brought to the CT scanner and placed prone on the table  After axial images were obtained through the pelvis, an area of the skin was then marked, prepped, and draped in usual sterile fashion  Lidocaine was administered to the skin, and subcutaneous tissues down to the periosteum of the right ileum, and a small skin incision was made  An OnControl needle was advanced percutaneously through the cortex, and a bone marrow aspiration was performed  The specimen was given to the cytotech to prepare, and was found to be adequate  A core biopsy was then performed  The bone core was placed in formalin  The patient tolerated the procedure well and suffered no complications  Impression: Impression: Successful percutaneous diagnostic bone marrow aspiration and bone core biopsy of right ileum, yielding a specimen adequate for evaluation   A full pathology report will follow  Workstation performed: [id number]         Assessment & Plan    Abdominal pain  Eosinophilia  Melena    - Patient reports abdominal pain x 2 weeks  Initially, she reports nausea/vomiting/diarrhea which subsided    - Initially imaging suggested diverticulitis and she was put on treatment with antibiotics  However, given the severe eosinophilia and continued abdominal pain which were not consistent with diverticulitis, she underwent a colonoscopy for further evaluation   - Colonoscopy 4/9 with Dr Charly Bowman showed diverticulosis, no evidence of diverticulitis and a polyp in the ascending colon that was removed  Biopsies were taken to evaluate for eosinophilic colitis which are pending   - Patient also reports she has been having black stool x 2 weeks  HGB is stable today at 11 6   - Repeat CT Scan A/P 4/13 suggests a stool filled colon/constipation   - Follow up pathology to evaluate for eosinophilic colitis  I will call path to ask for the results to be expedited  - Will plan for EGD tomorrow to investigate  NPO after midnight for procedure  - ID input appreciated  Follow up stool ova and parasite testing  Follow up strongyloides serology  - Hematology input appreciated  Follow up bone marrow biopsy results  - Pantoprazole 40mg IV BID  - Bentyl prn, serial abdominal exams, supportive care        The patient will be seen by Dr Nicolasa Silverio PA-C  4/14/2022,11:20 AM

## 2022-04-14 NOTE — PROGRESS NOTES
Progress Note - Infectious Disease   [facility] MRN: [id number]  Unit/Bed#: -01 Encounter: [id number]      Impression/Recommendations:  1   Abdominal pain/diarrhea with high level eosinophilia   This level of eosinophilia is new, with last CBC in February of this year only with low level eosinophilia   This high level of eosinophilia is not consistent with intestinal parasites   Possible eosinophilic enteritis although colonoscopy grossly normal without inflammatory changes  No diverticulitis seen  Patient's antibiotics have been discontinued and she remains clinically stable  Status post bone marrow biopsy    -continue to monitor off all antibiotics  -Monitor CBC with diff  -monitor abdominal pain  -GI follow-up     2  High level eosinophilia  As in above, at this level of eosinophilia, intestinal parasite is unlikely  Consider strongyloides hyperinfection syndrome but clinical presentation is not consistent with this and the patient is not immunosuppressed  Regardless, Strongyloides serology is negative  HIV negative  Stool O&P negative   Colonoscopy without evidence of inflammatory bowel disease or diverticulitis, biopsies are pending  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  Flow cytometry negative  Bone marrow biopsy was done, pathology also pending   -Follow-up colonic biopsies  -Follow up bone marrow biopsy  -Monitor CBC with diff     3  Mildly elevated LFTs, likely secondary to colitis above   Patient has no RUQ abdominal pain  AST/ALT now normalized  -monitor LFTs     4  CKD  Creatinine at baseline  Continue to monitor      5  DM, with neuropathy     -Management per primary service      Discussed with patient in detail regarding the above plan  Discussed Dr Taco Hinkle from [facility] service        Antibiotics:  Off antibiotics      Subjective:  Patient is comfortable  Abdominal pain mild and improved  Temperature stays down  No chills    No diarrhea      Objective:  Vitals:  Temp:  [97 6 °F (36 4 °C)-99 4 °F (37 4 °C)] 99 4 °F (37 4 °C)  HR:  [66-85] 72  Resp:  [18] 18  BP: (125-163)/(58-76) 125/58  SpO2:  [90 %-96 %] 95 %  Temp (24hrs), Av 5 °F (36 9 °C), Min:97 6 °F (36 4 °C), Max:99 4 °F (37 4 °C)  Current: Temperature: 99 4 °F (37 4 °C)    Physical Exam:     General: Awake, alert, cooperative, no distress  Neck:  Supple  No mass  No lymphadenopathy  Lungs: Expansion symmetric, no rales, no wheezing, respirations unlabored  Heart:  Regular rate and rhythm, S1 and S2 normal, no murmur  Abdomen: Soft, nondistended, mild and improved periumbilical tenderness, bowel sounds active all four quadrants, no masses, no organomegaly  Extremities: No edema  No erythema/warmth  No ulcer  Nontender to palpation  Skin:  No rash  Neuro: Moves all extremities  Invasive Devices  Report    Peripheral Intravenous Line            Peripheral IV 22 Proximal;Right;Ventral (anterior) Forearm 2 days                Labs studies:   I have personally reviewed pertinent labs  Results from last 7 days   Lab Units 22  0616 22  0602 22  0605   POTASSIUM mmol/L 4 6 5 4* 3 9   CHLORIDE mmol/L 98* 102 103   CO2 mmol/L 26 25 25   BUN mg/dL 17 16 13   CREATININE mg/dL 1 00 0 82 0 76   EGFR ml/min/1 73sq m 55 70 76   CALCIUM mg/dL 9 3 9 0 8 6   AST U/L 32 36 23   ALT U/L 48 41 41   ALK PHOS U/L 255* 217* 197*     Results from last 7 days   Lab Units 22  0616 22  0602 22  0605   WBC Thousand/uL 26 65* 24 75* 25 42*   HEMOGLOBIN g/dL 11 6 10 8* 10 7*   PLATELETS Thousands/uL 275 223 204           Imaging Studies:   I have personally reviewed pertinent imaging study reports and images in PACS   abdomen/pelvis CT reviewed personally  No acute intra-abdominal pathology  There was diverticulosis without diverticulitis  EKG, Pathology, and Other Studies:   I have personally reviewed pertinent reports

## 2022-04-14 NOTE — ASSESSMENT & PLAN NOTE
· Sample with 5 4 was hemolyzed    If still elevated will stop lisinopril    Results from last 7 days   Lab Units 04/14/22  0616 04/13/22  0602 04/12/22  0605 04/11/22  0533 04/10/22  0648 04/09/22  0548 04/08/22  0450   POTASSIUM mmol/L 4 6 5 4* 3 9 3 9 3 7 3 7 3 8

## 2022-04-14 NOTE — ASSESSMENT & PLAN NOTE
· Mild transaminitis noted    Resolved    Results from last 7 days   Lab Units 04/14/22  0616 04/13/22  0602 04/12/22  0605 04/11/22  0533   AST U/L 32 36 23 31   ALT U/L 48 41 41 48   TOTAL BILIRUBIN mg/dL 0 36 0 44 0 29 0 30

## 2022-04-14 NOTE — ASSESSMENT & PLAN NOTE
· Prior to admission on janumet  Continue sliding scale insulin  · Did have episode of hypoglycemia    Now stable off dextrose infusion  · Restarted pregabalin    Results from last 7 days   Lab Units 04/13/22  2359 04/13/22  1547 04/13/22  1052 04/13/22  0735 04/12/22  2042 04/12/22  1548 04/12/22  1238 04/12/22  0852   POC GLUCOSE mg/dl 203* 197* 296* 197* 207* 289* 154* 164*

## 2022-04-14 NOTE — NURSING NOTE
Patient refusing blood sugar check this morning  She is stating that it was checked earlier this morning before shift change  Blood sugar is not showing up in results, educated the patient on needing the blood sugar to give her proper dose of insulin  Patient refusing blood sugar check and insulin coverage at this time  CMP from this morning showed blood glucose 169

## 2022-04-14 NOTE — ASSESSMENT & PLAN NOTE
79-year-old female worsening abdominal pain found to have diverticulitis and significant eosinophilia  · Seen by surgery, signed off  · Being followed by ID, monitoring off antibiotics  · Add dicyclomine  · Diet advanced, still having abdominal discomfort  Recheck him imaging demonstrates resolution of diverticulitis of presence of constipation  · Due to concerns of melanotic stools and persistent pain, GI reconsulted    Of note, hemoglobin is stable

## 2022-04-15 ENCOUNTER — APPOINTMENT (INPATIENT)
Dept: CT IMAGING | Facility: HOSPITAL | Age: 77
DRG: 815 | End: 2022-04-15
Payer: MEDICARE

## 2022-04-15 ENCOUNTER — TELEPHONE (OUTPATIENT)
Dept: GASTROENTEROLOGY | Facility: CLINIC | Age: 77
End: 2022-04-15

## 2022-04-15 ENCOUNTER — APPOINTMENT (INPATIENT)
Dept: GASTROENTEROLOGY | Facility: HOSPITAL | Age: 77
DRG: 815 | End: 2022-04-15
Payer: MEDICARE

## 2022-04-15 ENCOUNTER — ANESTHESIA EVENT (INPATIENT)
Dept: GASTROENTEROLOGY | Facility: HOSPITAL | Age: 77
DRG: 815 | End: 2022-04-15
Payer: MEDICARE

## 2022-04-15 ENCOUNTER — ANESTHESIA (INPATIENT)
Dept: GASTROENTEROLOGY | Facility: HOSPITAL | Age: 77
DRG: 815 | End: 2022-04-15
Payer: MEDICARE

## 2022-04-15 LAB
ALBUMIN SERPL BCP-MCNC: 3.1 G/DL (ref 3.5–5)
ALP SERPL-CCNC: 248 U/L (ref 46–116)
ALT SERPL W P-5'-P-CCNC: 39 U/L (ref 12–78)
ANION GAP SERPL CALCULATED.3IONS-SCNC: 10 MMOL/L (ref 4–13)
AST SERPL W P-5'-P-CCNC: 25 U/L (ref 5–45)
BILIRUB SERPL-MCNC: 0.29 MG/DL (ref 0.2–1)
BUN SERPL-MCNC: 20 MG/DL (ref 5–25)
CALCIUM ALBUM COR SERPL-MCNC: 9.8 MG/DL (ref 8.3–10.1)
CALCIUM SERPL-MCNC: 9.1 MG/DL (ref 8.3–10.1)
CHLORIDE SERPL-SCNC: 101 MMOL/L (ref 100–108)
CO2 SERPL-SCNC: 25 MMOL/L (ref 21–32)
CREAT SERPL-MCNC: 0.98 MG/DL (ref 0.6–1.3)
GFR SERPL CREATININE-BSD FRML MDRD: 56 ML/MIN/1.73SQ M
GLUCOSE SERPL-MCNC: 121 MG/DL (ref 65–140)
GLUCOSE SERPL-MCNC: 154 MG/DL (ref 65–140)
GLUCOSE SERPL-MCNC: 154 MG/DL (ref 65–140)
GLUCOSE SERPL-MCNC: 155 MG/DL (ref 65–140)
GLUCOSE SERPL-MCNC: 203 MG/DL (ref 65–140)
POTASSIUM SERPL-SCNC: 4.9 MMOL/L (ref 3.5–5.3)
PROT SERPL-MCNC: 7.9 G/DL (ref 6.4–8.2)
SODIUM SERPL-SCNC: 136 MMOL/L (ref 136–145)

## 2022-04-15 PROCEDURE — 43239 EGD BIOPSY SINGLE/MULTIPLE: CPT | Performed by: INTERNAL MEDICINE

## 2022-04-15 PROCEDURE — 99232 SBSQ HOSP IP/OBS MODERATE 35: CPT | Performed by: INTERNAL MEDICINE

## 2022-04-15 PROCEDURE — 88305 TISSUE EXAM BY PATHOLOGIST: CPT | Performed by: SPECIALIST

## 2022-04-15 PROCEDURE — 88313 SPECIAL STAINS GROUP 2: CPT | Performed by: SPECIALIST

## 2022-04-15 PROCEDURE — 82948 REAGENT STRIP/BLOOD GLUCOSE: CPT

## 2022-04-15 PROCEDURE — 99231 SBSQ HOSP IP/OBS SF/LOW 25: CPT | Performed by: INTERNAL MEDICINE

## 2022-04-15 PROCEDURE — 80053 COMPREHEN METABOLIC PANEL: CPT | Performed by: INTERNAL MEDICINE

## 2022-04-15 RX ORDER — PROPOFOL 10 MG/ML
INJECTION, EMULSION INTRAVENOUS AS NEEDED
Status: DISCONTINUED | OUTPATIENT
Start: 2022-04-15 | End: 2022-04-15

## 2022-04-15 RX ORDER — SODIUM CHLORIDE, SODIUM LACTATE, POTASSIUM CHLORIDE, CALCIUM CHLORIDE 600; 310; 30; 20 MG/100ML; MG/100ML; MG/100ML; MG/100ML
INJECTION, SOLUTION INTRAVENOUS CONTINUOUS PRN
Status: DISCONTINUED | OUTPATIENT
Start: 2022-04-15 | End: 2022-04-15

## 2022-04-15 RX ORDER — LIDOCAINE HYDROCHLORIDE 10 MG/ML
INJECTION, SOLUTION EPIDURAL; INFILTRATION; INTRACAUDAL; PERINEURAL AS NEEDED
Status: DISCONTINUED | OUTPATIENT
Start: 2022-04-15 | End: 2022-04-15

## 2022-04-15 RX ADMIN — PANTOPRAZOLE SODIUM 40 MG: 40 TABLET, DELAYED RELEASE ORAL at 16:54

## 2022-04-15 RX ADMIN — LISINOPRIL 20 MG: 20 TABLET ORAL at 08:08

## 2022-04-15 RX ADMIN — DICYCLOMINE HYDROCHLORIDE 10 MG: 10 CAPSULE ORAL at 16:54

## 2022-04-15 RX ADMIN — PROPOFOL 80 MG: 10 INJECTION, EMULSION INTRAVENOUS at 10:58

## 2022-04-15 RX ADMIN — PROPOFOL 20 MG: 10 INJECTION, EMULSION INTRAVENOUS at 11:04

## 2022-04-15 RX ADMIN — PANTOPRAZOLE SODIUM 40 MG: 40 TABLET, DELAYED RELEASE ORAL at 06:37

## 2022-04-15 RX ADMIN — PROPOFOL 20 MG: 10 INJECTION, EMULSION INTRAVENOUS at 11:00

## 2022-04-15 RX ADMIN — PREGABALIN 50 MG: 50 CAPSULE ORAL at 21:54

## 2022-04-15 RX ADMIN — OXYCODONE HYDROCHLORIDE 10 MG: 10 TABLET ORAL at 06:37

## 2022-04-15 RX ADMIN — PREGABALIN 50 MG: 50 CAPSULE ORAL at 08:08

## 2022-04-15 RX ADMIN — OXYCODONE HYDROCHLORIDE 10 MG: 10 TABLET ORAL at 12:05

## 2022-04-15 RX ADMIN — DICYCLOMINE HYDROCHLORIDE 10 MG: 10 CAPSULE ORAL at 12:05

## 2022-04-15 RX ADMIN — AMLODIPINE BESYLATE 10 MG: 10 TABLET ORAL at 08:08

## 2022-04-15 RX ADMIN — LIDOCAINE HYDROCHLORIDE 50 MG: 10 INJECTION, SOLUTION EPIDURAL; INFILTRATION; INTRACAUDAL; PERINEURAL at 10:58

## 2022-04-15 RX ADMIN — FLUTICASONE FUROATE AND VILANTEROL TRIFENATATE 1 PUFF: 200; 25 POWDER RESPIRATORY (INHALATION) at 08:09

## 2022-04-15 RX ADMIN — ZOLPIDEM TARTRATE 5 MG: 5 TABLET, COATED ORAL at 00:33

## 2022-04-15 RX ADMIN — OXYCODONE HYDROCHLORIDE 5 MG: 5 TABLET ORAL at 22:03

## 2022-04-15 RX ADMIN — MONTELUKAST 10 MG: 10 TABLET, FILM COATED ORAL at 21:54

## 2022-04-15 RX ADMIN — DICYCLOMINE HYDROCHLORIDE 10 MG: 10 CAPSULE ORAL at 06:37

## 2022-04-15 RX ADMIN — LISINOPRIL 20 MG: 20 TABLET ORAL at 22:03

## 2022-04-15 RX ADMIN — DICYCLOMINE HYDROCHLORIDE 10 MG: 10 CAPSULE ORAL at 21:54

## 2022-04-15 RX ADMIN — PREGABALIN 50 MG: 50 CAPSULE ORAL at 16:54

## 2022-04-15 RX ADMIN — ASPIRIN 81 MG: 81 TABLET, CHEWABLE ORAL at 08:08

## 2022-04-15 RX ADMIN — PROPOFOL 20 MG: 10 INJECTION, EMULSION INTRAVENOUS at 11:02

## 2022-04-15 RX ADMIN — OXYCODONE HYDROCHLORIDE 10 MG: 10 TABLET ORAL at 16:54

## 2022-04-15 RX ADMIN — PROPOFOL 20 MG: 10 INJECTION, EMULSION INTRAVENOUS at 11:06

## 2022-04-15 RX ADMIN — SODIUM CHLORIDE, SODIUM LACTATE, POTASSIUM CHLORIDE, AND CALCIUM CHLORIDE: .6; .31; .03; .02 INJECTION, SOLUTION INTRAVENOUS at 10:47

## 2022-04-15 NOTE — ASSESSMENT & PLAN NOTE
· Sample with 5 4 was hemolyzed    If still elevated will stop lisinopril    Results from last 7 days   Lab Units 04/15/22  0954 04/14/22  0616 04/13/22  0602 04/12/22  0605 04/11/22  0533 04/10/22  0648 04/09/22  0548   POTASSIUM mmol/L 4 9 4 6 5 4* 3 9 3 9 3 7 3 7

## 2022-04-15 NOTE — TELEPHONE ENCOUNTER
----- Message from Sheng Paul DO sent at 4/15/2022 12:24 PM EDT -----  Please call the patient and make her aware that the biopsies of the transverse colon do show a mild increase in eosinophils which is consistent with her peripheral eosinophilia  There is no evidence of cancer

## 2022-04-15 NOTE — ANESTHESIA POSTPROCEDURE EVALUATION
Post-Op Assessment Note    CV Status:  Stable    Pain management: adequate     Mental Status:  Alert and awake   Hydration Status:  Euvolemic   PONV Controlled:  Controlled   Airway Patency:  Patent      Post Op Vitals Reviewed: Yes            No complications documented      BP (!) 84/46 (04/15/22 1120)    Temp 98 8 °F (37 1 °C) (04/15/22 1110)    Pulse 68 (04/15/22 1120)   Resp 16 (04/15/22 1120)    SpO2 93 % (04/15/22 1120)

## 2022-04-15 NOTE — ASSESSMENT & PLAN NOTE
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

## 2022-04-15 NOTE — PROGRESS NOTES
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    At baseline, 02/28/2022:  WBC 5 93, hemoglobin 11 1, MCV 88, platelets 788 K, eosinophil percentage 7  Neutrophils 41%  Otherwise normal diff   During hospitalization, WBC in 20s  Absolute eosinophils 10-17K   4/14: WBC 26 65, Hgb 11 6, MCV 90, PLT 275K, last Absolute eosinophils 4/12 was 14 74     ID state that this high level eosinophilia is not consistent with intestinal parasites  There is possible eosinophilic enteritis  However, colonoscopy was grossly normal without inflammatory changes  Stronglyoides serology, HIV testing negative  Stool for O&P negative  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   Patient status post bone marrow biopsy  Peripheral leukemia/lymphoma flow cytometry was negative  Pathologist informed us marrow has prominent eosinophilia, but no significant myelo blast population  Does not appear to be AML  Awaiting genetic results to rule out clonal myeloid disease  BCR-ABL still pending   She does have night sweats that are drenching off/on since 11/2021  No other constitutional symptoms   Plan/Recommendations:  o Patient was discussed and seen with attending Dr Manjeet Rivas     o Specific etiology of eosinophilia is unclear at this time since BMBx so far unrevealing  No blasts seen  Unlikely acute leukemia related  Per ID, also unlikely infectious related  o Patient did start lisinopril, amlodipine, and montelukast 2/2022  She also began having asthma symptoms including cough at that time    o Differential currently includes medication induced eosinophilia or DRESS vs  Asthma or other pulmonary related etiology vs  Primary hypereosinophilic syndromes vs  Idiopathic hypereosinophilic syndrome vs  Systemic mastocytosis  o Ordered CT of chest  o Ordered tryptase, LDH  o Ordered miscellaneous testing for: PDGFRA, PDGFRB, FGFR1 rearrangements (to evaluate for primary hypereosinophilic syndromes)  Patient understands and is in agreement with this plan  Thank you for the opportunity to participate in this patient's care  Interval History: Patient sitting up comfortably in chair in no acute distress  Present with   States she recently began new medications as above  Feels lethargic  Still having abdominal pain  Also has cough, asthma like symptoms increased since February  History of present illness: This is a 79-year-old female with past medical history of hypertension, diabetes, asthma, chronic kidney disease who presented to the hospital with left lower quadrant abdominal pain  It was later found that she had acute diverticulitis  She was placed on antibiotics  No surgical intervention was recommended  She had leukocytosis on admission including eosinophilia, neutrophilia  It was thought this was related to the diverticulitis  However, this has continue to persist and worsen over hospitalization  It was thought that maybe her eosinophilia of was related to eosinophilic enteritis  However, her colonoscopy did not show any inflammatory changes  She also does not have eosinophilia consistent with intestinal parasites per ID    We are consulted to see if patient has a eosinophilic leukemia     48/55/7789:  WBC 5 93, hemoglobin 11 1, MCV 88, platelets 752 K, eosinophils 7%, neutrophils 41%, rest of diff normal     Admission labs:  4/4:  WBC 19 43, hemoglobin 13, platelets 993 K, neutrophils 40%, lymphocytes 11%, monocytes 3%, eosinophils 45%, ANC 7 66, absolute eosinophils 8 77       04/10/2022:  WBC 25 80, hemoglobin 11 2, MCV 90, platelets 388, neutrophils 18%, lymphocyte relative 11%, monocyte relative 3%, eosinophils 67%, immature granulocyte absolute 0 21, absolute eosinophils 17 55  Review of Systems:   Review of Systems   Constitutional: Positive for diaphoresis (at night time as described in interval hx) and fatigue (appears tired  feels tired  )  Negative for activity change, appetite change, chills, fever and unexpected weight change  HENT: Negative for nosebleeds  Eyes: Negative for visual disturbance  Respiratory: Positive for cough and shortness of breath (chronic  per pt negative workup outpatient including ECHO  No current severe SOB  )  Negative for apnea, choking, chest tightness, wheezing and stridor  Cardiovascular: Negative for chest pain, palpitations and leg swelling  Gastrointestinal: Positive for abdominal pain (LLQ abd pain still present)  Negative for anal bleeding, blood in stool, constipation, diarrhea, nausea and vomiting  Endocrine: Negative for cold intolerance  Genitourinary: Negative for hematuria, menstrual problem and vaginal bleeding  Musculoskeletal: Negative for arthralgias  Skin: Negative for color change, pallor, rash and wound  Neurological: Negative for dizziness, syncope, light-headedness and headaches  Hematological: Negative for adenopathy  Does not bruise/bleed easily  Psychiatric/Behavioral: Negative for agitation and sleep disturbance  PHYSICAL EXAM:    /50   Pulse 73   Temp 98 3 °F (36 8 °C)   Resp 18   Ht 5' 2" (1 575 m)   Wt 88 1 kg (194 lb 3 6 oz)   SpO2 90%   BMI 35 52 kg/m²     Physical Exam  Constitutional:       General: She is not in acute distress  Appearance: Normal appearance  She is not ill-appearing, toxic-appearing or diaphoretic  Comments: Appears fatigued  HENT:      Head: Normocephalic and atraumatic  Eyes:      General: No scleral icterus  Extraocular Movements: Extraocular movements intact  Conjunctiva/sclera: Conjunctivae normal       Pupils: Pupils are equal, round, and reactive to light     Cardiovascular:      Rate and Rhythm: Normal rate and regular rhythm  Heart sounds: Normal heart sounds  No murmur heard  Pulmonary:      Effort: Pulmonary effort is normal  No respiratory distress  Breath sounds: Normal breath sounds  No stridor  No wheezing, rhonchi or rales  Abdominal:      Palpations: Abdomen is soft  Musculoskeletal:         General: No tenderness  Normal range of motion  Cervical back: Normal range of motion and neck supple  Right lower leg: No edema  Left lower leg: No edema  Lymphadenopathy:      Cervical: No cervical adenopathy  Skin:     General: Skin is warm and dry  Coloration: Skin is not jaundiced or pale  Findings: No bruising, erythema, lesion or rash  Neurological:      General: No focal deficit present  Mental Status: She is alert and oriented to person, place, and time  Mental status is at baseline  Motor: No weakness  Psychiatric:         Mood and Affect: Mood normal          Behavior: Behavior normal          Thought Content:  Thought content normal          Judgment: Judgment normal          LABS:     Recent Results (from the past 48 hour(s))   Fingerstick Glucose (POCT)    Collection Time: 04/13/22 11:59 PM   Result Value Ref Range    POC Glucose 203 (H) 65 - 140 mg/dl   CBC    Collection Time: 04/14/22  6:16 AM   Result Value Ref Range    WBC 26 65 (H) 4 31 - 10 16 Thousand/uL    RBC 3 96 3 81 - 5 12 Million/uL    Hemoglobin 11 6 11 5 - 15 4 g/dL    Hematocrit 35 8 34 8 - 46 1 %    MCV 90 82 - 98 fL    MCH 29 3 26 8 - 34 3 pg    MCHC 32 4 31 4 - 37 4 g/dL    RDW 15 7 (H) 11 6 - 15 1 %    Platelets 546 567 - 205 Thousands/uL    MPV 9 0 8 9 - 12 7 fL   Comprehensive metabolic panel    Collection Time: 04/14/22  6:16 AM   Result Value Ref Range    Sodium 133 (L) 136 - 145 mmol/L    Potassium 4 6 3 5 - 5 3 mmol/L    Chloride 98 (L) 100 - 108 mmol/L    CO2 26 21 - 32 mmol/L    ANION GAP 9 4 - 13 mmol/L    BUN 17 5 - 25 mg/dL    Creatinine 1 00 0 60 - 1 30 mg/dL Glucose 169 (H) 65 - 140 mg/dL    Calcium 9 3 8 3 - 10 1 mg/dL    Corrected Calcium 9 9 8 3 - 10 1 mg/dL    AST 32 5 - 45 U/L    ALT 48 12 - 78 U/L    Alkaline Phosphatase 255 (H) 46 - 116 U/L    Total Protein 8 5 (H) 6 4 - 8 2 g/dL    Albumin 3 3 (L) 3 5 - 5 0 g/dL    Total Bilirubin 0 36 0 20 - 1 00 mg/dL    eGFR 55 ml/min/1 73sq m   Fingerstick Glucose (POCT)    Collection Time: 04/14/22  6:19 AM   Result Value Ref Range    POC Glucose 179 (H) 65 - 140 mg/dl   Fingerstick Glucose (POCT)    Collection Time: 04/14/22 11:09 AM   Result Value Ref Range    POC Glucose 218 (H) 65 - 140 mg/dl   Fingerstick Glucose (POCT)    Collection Time: 04/14/22  3:25 PM   Result Value Ref Range    POC Glucose 192 (H) 65 - 140 mg/dl   Fingerstick Glucose (POCT)    Collection Time: 04/14/22  9:05 PM   Result Value Ref Range    POC Glucose 212 (H) 65 - 140 mg/dl   Fingerstick Glucose (POCT)    Collection Time: 04/15/22  7:39 AM   Result Value Ref Range    POC Glucose 154 (H) 65 - 140 mg/dl   Fingerstick Glucose (POCT)    Collection Time: 04/15/22  9:36 AM   Result Value Ref Range    POC Glucose 155 (H) 65 - 140 mg/dl   Comprehensive metabolic panel    Collection Time: 04/15/22  9:54 AM   Result Value Ref Range    Sodium 136 136 - 145 mmol/L    Potassium 4 9 3 5 - 5 3 mmol/L    Chloride 101 100 - 108 mmol/L    CO2 25 21 - 32 mmol/L    ANION GAP 10 4 - 13 mmol/L    BUN 20 5 - 25 mg/dL    Creatinine 0 98 0 60 - 1 30 mg/dL    Glucose 154 (H) 65 - 140 mg/dL    Calcium 9 1 8 3 - 10 1 mg/dL    Corrected Calcium 9 8 8 3 - 10 1 mg/dL    AST 25 5 - 45 U/L    ALT 39 12 - 78 U/L    Alkaline Phosphatase 248 (H) 46 - 116 U/L    Total Protein 7 9 6 4 - 8 2 g/dL    Albumin 3 1 (L) 3 5 - 5 0 g/dL    Total Bilirubin 0 29 0 20 - 1 00 mg/dL    eGFR 56 ml/min/1 73sq m   Fingerstick Glucose (POCT)    Collection Time: 04/15/22 12:03 PM   Result Value Ref Range    POC Glucose 121 65 - 140 mg/dl   Fingerstick Glucose (POCT)    Collection Time: 04/15/22  3:35 PM   Result Value Ref Range    POC Glucose 203 (H) 65 - 140 mg/dl       Colonoscopy    Result Date: 4/9/2022  Narrative:  [id number] [facility] [address] [phone] DATE OF SERVICE: 4/09/22 PHYSICIAN(S): Attending: Rashard Lagunas DO Fellow: No Staff Documented INDICATION: Acute diverticulitis, Eosinophilia, unspecified type POST-OP DIAGNOSIS: See the impression below  HISTORY: Prior colonoscopy: 3 years ago  BOWEL PREPARATION: Miralax/Dulcolax PREPROCEDURE: Informed consent was obtained for the procedure, including sedation  Risks including but not limited to bleeding, infection, perforation, adverse drug reaction and aspiration were explained in detail  Also explained about less than 100% sensitivity with the exam and other alternatives  The patient was placed in the left lateral decubitus position  DETAILS OF PROCEDURE: Patient was taken to the procedure room where a time out was performed to confirm correct patient and correct procedure  The patient underwent monitored anesthesia care, which was administered by an anesthesia professional  The patient's blood pressure, heart rate, level of consciousness, oxygen and respirations were monitored throughout the procedure  A digital rectal exam was performed  The scope was introduced through the anus and advanced to the cecum  Retroflexion was performed in the rectum  The quality of bowel preparation was evaluated using the [facility] Bowel Preparation Scale with scores of: right colon = 2, transverse colon = 2, left colon = 2  The total BBPS score was 6  Bowel prep was adequate  The patient's estimated blood loss was minimal (<5 mL)  The procedure was not difficult  The patient tolerated the procedure well  There were no apparent complications  ANESTHESIA INFORMATION: ASA: III Anesthesia Type: Anesthesia type not filed in the log   MEDICATIONS: No administrations occurring from 1206 to 1229 on 04/09/22 FINDINGS: Multiple small diverticula with no bleeding in the proximal ascending colon, mid ascending colon, proximal sigmoid colon, mid sigmoid colon and distal sigmoid colon One sessile polyp measuring smaller than 5 mm in the proximal ascending colon; performed complete removal by cold forceps biopsy The cecum, hepatic flexure, transverse colon, splenic flexure, descending colon, rectosigmoid and rectum appeared normal  Performed 12 biopsies in the ascending colon, transverse colon and sigmoid colon  Rule out eosinophilic colitis  EVENTS: Procedure Events Event Event Time ENDO CECUM REACHED 4/9/2022 12:21 PM ENDO SCOPE OUT TIME 4/9/2022 12:28 PM SPECIMENS: ID Type Source Tests Collected by Time Destination 1 : ascending, transverse and sigmoid Tissue Colon TISSUE EXAM Tiffanie Fortune DO 4/9/2022 12:22 PM  2 : proximal ascending colon polyp x 1 Tissue Polyp, Colorectal TISSUE EXAM Tiffanie Fortune DO 4/9/2022 12:25 PM  EQUIPMENT: Colonoscope -[id number]     Impression: 1  Diverticulosis coli, ascending colon sigmoid colon  No evidence of diverticulitis 2  Diminutive polyp in the proximal ascending colon status post cold biopsy removal RECOMMENDATION: Repeat colonoscopy in 5 years due to a personal history of colon polyps High-fiber diet to include fruits, vegetables, whole grain foods, daily Will call the patient 1 week regarding the polyp results and biopsy results Tiffanie Fortune DO [place], [place]    CT abdomen pelvis w contrast    Result Date: 4/7/2022  Narrative: CT ABDOMEN AND PELVIS WITH IV CONTRAST INDICATION:   LLQ abdominal pain RLQ abdominal pain (Age >= 14y) LUQ abdominal pain Eosinophila and worsening abdominal pain  Leukocytosis  COMPARISON:  CT abdomen/pelvis 4/4/2022  TECHNIQUE:  CT examination of the abdomen and pelvis was performed  In addition to portal venous phase postcontrast scanning through the abdomen and pelvis, delayed phase postcontrast scanning was performed through the upper abdominal viscera    Axial, sagittal, and coronal 2D reformatted images were created from the source data and submitted for interpretation  Radiation dose length product (DLP) for this visit:  1077 mGy-cm   This examination, like all CT scans performed in the [facility], was performed utilizing techniques to minimize radiation dose exposure, including the use of iterative reconstruction and automated exposure control  IV Contrast:  100 mL of iohexol (OMNIPAQUE) Enteric Contrast:  Enteric contrast was not administered  FINDINGS: ABDOMEN LOWER CHEST: No clinically significant abnormality is identified in the visualized lower chest  No consolidation or effusion  LIVER: Normal size and morphology  No suspicious lesion  There are stable geographic areas of portal venous phase hypoattenuation and delayed phase isoattenuation which are traversed by undistorted vessels, the larger in segment 2/4a superior to the middle hepatic vein, the smaller segment 1 adjacent to the IVC, findings compatible with focal fatty infiltration  BILIARY: No intrahepatic biliary ductal dilatation  Normal caliber common bile duct  GALLBLADDER: No calcified gallstones  There is vicarious excretion of contrast from prior study  Normal wall thickness  No pericholecystic inflammatory changes  SPLEEN: Within normal limits  No suspicious lesion  Normal spleen size  PANCREAS: Pancreatic parenchyma is within normal limits  No main pancreatic ductal dilatation  No peripancreatic inflammation  ADRENAL GLANDS: Within normal limits  KIDNEYS/URETERS: Normal size and position  Symmetric enhancement  No suspicious lesion  Bilateral simple cysts  Mild multifocal cortical scarring  Nonobstructing calculi are noted  No hydronephrosis  Ureters within normal limits  STOMACH AND BOWEL: Stomach is grossly within normal limits  Normal caliber small bowel  Normal caliber large bowel  Colonic diverticulosis   Again seen is severe wall thickening of the colon centered on the cecum/proximal ascending colon as well as the splenic flexure  Pericolonic inflammatory fat stranding about the right colon appears unchanged, and is slightly improved in appearance in the left upper quadrant  No evidence of perforation or abscess    APPENDIX: Normal appendix  ABDOMINOPELVIC CAVITY: Pericolic stranding as described  Trace pelvic free fluid, similar  No intraperitoneal free air  Multiple enlarged mesenteric lymph nodes measuring up to 1 cm short axis in the right lower quadrant which are presumably reactive, unchanged  No retroperitoneal hematoma  VESSELS: Normal caliber abdominal aorta with no detectable atherosclerotic plaque  The celiac, SMA, and HENRIQUE are patent  The main, right, and left portal veins are patent  The SMV and splenic vein are patent  The hepatic veins are patent  The renal arteries and veins are patent  PELVIS REPRODUCTIVE ORGANS:  Hysterectomy  No evidence of pelvic or adnexal mass  A 1 7 cm crescentic well-circumscribed hypodensity in the right aspect of the urethra is unchanged significantly since 2014, 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  URINARY BLADDER:  Within normal limits  No calculi  ABDOMINAL WALL/INGUINAL REGIONS:  Diastases recti with small fat-containing umbilical hernia  Small right-sided fat-containing indirect inguinal hernia  BONES:  Mild multilevel degenerative changes in the spine  Vertebral body height is maintained  No acute fracture or destructive osseous lesion  Grade 1 degenerative anterolisthesis performed  Impression: Overall similar appearance of colonic wall thickening centered at the cecum/ascending colon and at the splenic flexure, with similar pericolonic inflammatory stranding in the right colon and slight improvement on the left  No evidence of perforation or abscess formation   Workstation performed: [id number]     CT abdomen pelvis with contrast    Result Date: 4/4/2022  Narrative: CT ABDOMEN AND PELVIS WITH IV CONTRAST INDICATION:   Abdominal pain, acute, nonlocalized periumbilical, generalized pain  WBC count of 19,000 COMPARISON:  CT from August 9, 2014 TECHNIQUE:  CT examination of the abdomen and pelvis was performed  Axial, sagittal, and coronal 2D reformatted images were created from the source data and submitted for interpretation  Radiation dose length product (DLP) for this visit:  863 mGy-cm   This examination, like all CT scans performed in the [facility], was performed utilizing techniques to minimize radiation dose exposure, including the use of iterative reconstruction and automated exposure control  IV Contrast:  100 mL of iohexol (OMNIPAQUE) Enteric Contrast:  Enteric contrast was not administered  FINDINGS: ABDOMEN LOWER CHEST:  No clinically significant abnormality identified in the visualized lower chest  LIVER/BILIARY TREE:  There is a new geographic area in the liver is interposed between the middle hepatic vein and left hepatic vein, measuring about 3 3 cm, indeterminate probably due to focal fatty infiltration Additional hypodensity seen just into the IVC, segment 1, measuring 2 cm GALLBLADDER:  No calcified gallstones  No pericholecystic inflammatory change  No biliary dilation seen SPLEEN:  Unremarkable  PANCREAS:  Unremarkable  ADRENAL GLANDS:  Unremarkable  KIDNEYS/URETERS:  No hydronephrosis or urinary tract calculus  One or more sharply circumscribed subcentimeter renal hypodensities are present, too small to accurately characterize, and statistically most likely benign findings  According to recent literature (Radiology 2019) no further workup of these findings is recommended  Nonobstructing bilateral renal calculi are noted STOMACH AND BOWEL:  There is pericolonic infiltration in relation to the splenic flexure with wall thickening, likely due to diverticulitis    Additional thickening of the adjacent proximal descending colon seen Mild thickening of the descending colon with some surrounding infiltration at its mesenteric margin, image 76 series 601 Ileocecal junction appear unremarkable APPENDIX:  No findings to suggest appendicitis  Appendix is identified in image 72 series 601, image 79 series 601 The cecum is mobile ABDOMINOPELVIC CAVITY:  No ascites  No pneumoperitoneum  No lymphadenopathy  Mesenteric lymph nodes are seen measuring about 1 cm in the right lower quadrant, image 45 series 2 VESSELS:  Celiac trunk, SMA appear unremarkable Iliac vessels are patent PELVIS REPRODUCTIVE ORGANS:  A rounded the hypodense area seen within the cervix, stable URINARY BLADDER:  Unremarkable  ABDOMINAL WALL/INGUINAL REGIONS:  Rectal muscle diastasis seen OSSEOUS STRUCTURES:  No acute compression collapse vertebra No gross lytic lesion     Impression: There is thickening of the splenic flexure with associated pericolonic infiltration, suggestive diverticulitis  Additional thickening of the ascending colon with some infiltration at its mesenteric margin may be due to additional segment of diverticulitis or colitis  Suggest follow-up with the colonoscopy when the acute illness subsides to exclude  focal colonic lesion There is no fluid collection seen There is no free air Small mesenteric lymph nodes with largest measuring about 1 cm, short interval follow-up at 3 months suggested The study was marked in EPIC for immediate notification   Follow-up notification has been created in Epic Workstation performed: [id number]         HISTORY:    Past Medical History:   Diagnosis Date    Asthma     Benign hypertension 2018    [facility] arrest [facility]     Diabetes mellitus ([facility]     Glaucoma     Hypertension     Kidney stone     Neuropathy     Sleep apnea     no machine    SOB (shortness of breath)     Tubular adenoma of colon 10/2019    Wheezing        Past Surgical History:   Procedure Laterality Date     SECTION      COLONOSCOPY      HYSTERECTOMY  1985    IR BIOPSY BONE MARROW  2022    TONSILLECTOMY Family History   Problem Relation Age of Onset    Diabetes Mother     Hypertension Father     Prostate cancer Father     Diabetes Sister     Hypertension Sister     Breast cancer Sister 62    Diabetes Brother     Hypertension Brother     Asthma Family     No Known Problems Daughter     No Known Problems Sister     No Known Problems Daughter     No Known Problems Daughter     No Known Problems Maternal Aunt     Breast cancer Maternal Aunt 58    No Known Problems Maternal Aunt     No Known Problems Maternal Aunt     No Known Problems Paternal Aunt     No Known Problems Paternal Aunt     No Known Problems Paternal Aunt     Colon cancer Neg Hx     Ovarian cancer Neg Hx     Uterine cancer Neg Hx     Cervical cancer Neg Hx        Social History     Socioeconomic History    Marital status: /Civil Union     Spouse name: None    Number of children: None    Years of education: None    Highest education level: None   Occupational History    Occupation: retired    Tobacco Use    Smoking status: Never Smoker    Smokeless tobacco: Never Used   Vaping Use    Vaping Use: None   Substance and Sexual Activity    Alcohol use: Never    Drug use: No    Sexual activity: Yes     Birth control/protection: Surgical   Other Topics Concern    None   Social History Narrative    None     Social Determinants of Health     Financial Resource Strain: Not on file   Food Insecurity: No Food Insecurity    Worried About Running Out of Food in the Last Year: Never true    Latisha of Food in the Last Year: Never true   Transportation Needs: No Transportation Needs    Lack of Transportation (Medical): No    Lack of Transportation (Non-Medical):  No   Physical Activity: Not on file   Stress: Not on file   Social Connections: Not on file   Intimate Partner Violence: Not on file   Housing Stability: Low Risk     Unable to Pay for Housing in the Last Year: No    Number of Places Lived in the Last Year: 1    Unstable Housing in the Last Year: No         Current Facility-Administered Medications:     acetaminophen (TYLENOL) tablet 650 mg, 650 mg, Oral, Q4H PRN, Tobi Felton MD    albuterol (PROVENTIL HFA,VENTOLIN HFA) inhaler 1 puff, 1 puff, Inhalation, Q4H PRN,  Loop, DO    amLODIPine (NORVASC) tablet 10 mg, 10 mg, Oral, Daily,  Loop, DO, 10 mg at 04/15/22 0808    aspirin chewable tablet 81 mg, 81 mg, Oral, Daily,  Loop, DO, 81 mg at 04/15/22 0808    dicyclomine (BENTYL) capsule 10 mg, 10 mg, Oral, 4x Daily (AC & HS), Chance Porter DO, 10 mg at 04/15/22 1205    enoxaparin (LOVENOX) subcutaneous injection 40 mg, 40 mg, Subcutaneous, Daily, Siddhartha Cooper DO    fluticasone-vilanterol (BREO ELLIPTA) 200-25 MCG/INH inhaler 1 puff, 1 puff, Inhalation, Daily,  Loop, DO, 1 puff at 04/15/22 0809    hydrALAZINE (APRESOLINE) injection 5 mg, 5 mg, Intravenous, Q6H PRN, Kendy Dang PA-C, 5 mg at 04/12/22 2151    HYDROmorphone (DILAUDID) injection 0 5 mg, 0 5 mg, Intravenous, Q4H PRN,  Loop, DO, 0 5 mg at 04/13/22 0553    HYDROmorphone (DILAUDID) injection 1 mg, 1 mg, Intravenous, Q4H PRN,  Loop, DO, 1 mg at 04/10/22 2235    [START ON 4/16/2022] insulin lispro (HumaLOG) 100 units/mL subcutaneous injection 1-5 Units, 1-5 Units, Subcutaneous, Daily With Breakfast, Chance Porter DO    iohexol (OMNIPAQUE) 240 MG/ML solution 50 mL, 50 mL, Oral, Once in imaging, Judge Wade DO    lisinopril (ZESTRIL) tablet 20 mg, 20 mg, Oral, BID, Chance Porter DO, 20 mg at 04/15/22 0808    LORazepam (ATIVAN) tablet 0 5 mg, 0 5 mg, Oral, Q8H PRN,  Loop, DO    montelukast (SINGULAIR) tablet 10 mg, 10 mg, Oral, HS, Chance German, DO, 10 mg at 04/14/22 2203    naloxone (NARCAN) injection 0 04 mg, 0 04 mg, Intravenous, Q1MIN PRN,  Loop, DO    ondansetron [facility]) injection 4 mg, 4 mg, Intravenous, Q6H PRN,  Loop, DO    oxyCODONE (ROXICODONE) immediate release tablet 10 mg, 10 mg, Oral, Q4H PRN, Verena Ordaz MD, 10 mg at 04/15/22 1205    oxyCODONE (ROXICODONE) IR tablet 5 mg, 5 mg, Oral, Q4H PRN, Verena Ordaz MD    pantoprazole (PROTONIX) EC tablet 40 mg, 40 mg, Oral, BID AC, Chance Fallonng, DO, 40 mg at 04/15/22 6572    pregabalin (LYRICA) capsule 50 mg, 50 mg, Oral, TID, Chance Fallonng, DO, 50 mg at 04/15/22 0808    zolpidem (AMBIEN) tablet 5 mg, 5 mg, Oral, HS PRN, Jennifera Lexy, DO, 5 mg at 04/15/22 0033    Medications Prior to Admission   Medication    Advair Diskus 250-50 MCG/DOSE inhaler    Aspirin Low Dose 81 MG chewable tablet    hydrOXYzine HCL (ATARAX) 10 mg tablet    Janumet -1000 MG TB24    latanoprost (XALATAN) 0 005 % ophthalmic solution    lisinopril (ZESTRIL) 20 mg tablet    LORazepam (ATIVAN) 1 mg tablet    meloxicam (MOBIC) 15 mg tablet    montelukast (SINGULAIR) 10 mg tablet    Pregabalin ER (Lyrica CR) 165 MG TB24    sertraline (ZOLOFT) 50 mg tablet    zolpidem (AMBIEN) 5 mg tablet    amLODIPine (NORVASC) 10 mg tablet    cyclobenzaprine (FLEXERIL) 5 mg tablet    glucose blood (ONETOUCH VERIO) test strip    mometasone (ELOCON) 0 1 % cream    [facility]ETS [facility]    ProAir  (90 Base) MCG/ACT inhaler    tobramycin-dexamethasone (TOBRADEX) ophthalmic suspension    triamcinolone (KENALOG) 0 1 % cream       Allergies   Allergen Reactions    Ciprofloxacin Itching    Levaquin [Levofloxacin] Swelling    Penicillins GI Intolerance       Labs and pertinent reports reviewed  This note has been generated by voice recognition software system  Therefore, there may be spelling, grammar, and or syntax errors  Please contact if questions arise

## 2022-04-15 NOTE — ASSESSMENT & PLAN NOTE
· Stable at baseline    Results from last 7 days   Lab Units 04/15/22  0954 04/14/22  0616 04/13/22  0602 04/12/22  0605 04/11/22  0533 04/10/22  0648 04/09/22  0548   BUN mg/dL 20 17 16 13 11 6 6   CREATININE mg/dL 0 98 1 00 0 82 0 76 0 76 0 77 0 74   EGFR ml/min/1 73sq m 56 55 70 76 76 75 79

## 2022-04-15 NOTE — PROGRESS NOTES
[id number]  Progress Note - Greg Leonard 1945, 78 y o  female MRN: [id number]  Unit/Bed#: -01 Encounter: [id number]  Primary Care Provider: ALEKSANDER Davidson   Date and time admitted to hospital: 4/4/2022  2:10 PM    * Acute diverticulitis  Assessment & Plan  72-year-old female worsening abdominal pain found to have diverticulitis and significant eosinophilia  · Has had two initial CT scans demonstrating diverticulitis/colitis  Subsequent colonoscopy and repeat CT scan without evidence  · Seen by surgery, signed off  · Being followed by ID, monitoring off antibiotics  · Due to concerns of melanotic stools and persistent pain underwent EGD today no significant findings  · Top differential likely hypereosinophilic syndrome:  Hematology to re-evaluate today  Eosinophilia  Assessment & Plan  · Significant eosinophilia  Awaiting stool parasite studies  · Flow cytometry of blood does not demonstrate any concern for dyscrasia  · Underwent bone marrow biopsy awaiting interpretation    Results from last 7 days   Lab Units 04/14/22  0616 04/13/22  0602 04/12/22  0605 04/11/22  0533 04/10/22  0648 04/09/22  0548   WBC Thousand/uL 26 65* 24 75* 25 42* 26 32* 25 80* 26 08*   EOS PCT %  --   --  58* 53* 67* 72*       Hyperkalemia  Assessment & Plan  · Sample with 5 4 was hemolyzed  If still elevated will stop lisinopril    Results from last 7 days   Lab Units 04/15/22  0954 04/14/22  0616 04/13/22  0602 04/12/22  0605 04/11/22  0533 04/10/22  0648 04/09/22  0548   POTASSIUM mmol/L 4 9 4 6 5 4* 3 9 3 9 3 7 3 7       Transaminitis  Assessment & Plan  · Mild transaminitis noted    Resolved    Results from last 7 days   Lab Units 04/15/22  0954 04/14/22  0616 04/13/22  0602 04/12/22  0605 04/11/22  0533   AST U/L 25 32 36 23 31   ALT U/L 39 48 41 41 48   TOTAL BILIRUBIN mg/dL 0 29 0 36 0 44 0 29 0 30       Primary hypertension  Assessment & Plan  · Continue amlodipine and lisinopril    Stage 3a chronic kidney disease [facility])  Assessment & Plan  · Stable at baseline    Results from last 7 days   Lab Units 04/15/22  0954 04/14/22  0616 04/13/22  0602 04/12/22  0605 04/11/22  0533 04/10/22  0648 04/09/22  0548   BUN mg/dL 20 17 16 13 11 6 6   CREATININE mg/dL 0 98 1 00 0 82 0 76 0 76 0 77 0 74   EGFR ml/min/1 73sq m 56 55 70 76 76 75 79       Controlled type 2 diabetes with neuropathy ([facility] [address] )  Assessment & Plan  · Prior to admission on janumet  Continue sliding scale insulin  · Did have episode of hypoglycemia  Now stable off dextrose infusion  · Restarted pregabalin    Results from last 7 days   Lab Units 04/15/22  1203 04/15/22  0936 04/15/22  0739 04/14/22  2105 04/14/22  1525 04/14/22  1109 04/14/22  0619 04/13/22  2359   POC GLUCOSE mg/dl 121 155* 154* 212* 192* 218* 179* 203*       Mild intermittent asthma  Assessment & Plan  · No exacerbation continue breo  · Continue singulair      VTE Pharmacologic Prophylaxis: VTE Score: 4 Moderate Risk (Score 3-4) - Pharmacological DVT Prophylaxis Ordered: Enoxaparin (Lovenox)  Patient Centered Rounds: I have performed bedside rounds with nursing staff today  Discussions with Specialists or Other Care Team Provider:  Infectious disease and hematology    Education and Discussions with Family / Patient:  Daughter on telephone    Time Spent for Care: 20 mins  More than 50% of total time spent on counseling and coordination of care as described above  Current Length of Stay: 11 day(s)  Current Patient Status: Inpatient   Certification Statement: The patient will continue to require additional inpatient hospital stay due to hypereosinophilia  Discharge Plan / Estimated Discharge Date: Anticipate discharge in 48 hrs to home  Code Status: Level 1 - Full Code      Subjective:   Patient seen and examined  Still having abdominal pain  Returns from EGD      Objective:   Vitals: Blood pressure 144/69, pulse 71, temperature 98 2 °F (36 8 °C), resp  rate 18, height 5' 2" (1 575 m), weight 88 1 kg (194 lb 3 6 oz), SpO2 96 %, not currently breastfeeding  Physical Exam  Vitals reviewed  Constitutional:       General: She is not in acute distress  Appearance: Normal appearance  Cardiovascular:      Rate and Rhythm: Regular rhythm  Heart sounds: Normal heart sounds  Pulmonary:      Breath sounds: Decreased breath sounds present  No wheezing  Abdominal:      General: Bowel sounds are normal       Palpations: Abdomen is soft  Tenderness: There is abdominal tenderness  Musculoskeletal:         General: No swelling  Skin:     General: Skin is warm  Neurological:      Mental Status: She is alert     Psychiatric:         Mood and Affect: Mood normal        Additional Data:   Labs:  Results from last 7 days   Lab Units 04/14/22  0616 04/13/22  0602 04/12/22  0605 04/11/22  0533 04/10/22  0648 04/09/22  0548   WBC Thousand/uL 26 65* 24 75* 25 42* 26 32* 25 80* 26 08*   HEMOGLOBIN g/dL 11 6 10 8* 10 7* 11 2* 11 2* 11 6   HEMATOCRIT % 35 8 32 8* 32 2* 34 0* 33 9* 35 1   MCV fL 90 89 89 90 90 90   TOTAL NEUT ABS Thousand/uL  --   --  6 10  --   --   --    PLATELETS Thousands/uL 275 223 204 198 200 197     Results from last 7 days   Lab Units 04/15/22  0954 04/14/22  0616 04/13/22  0602 04/12/22  0605 04/11/22  0533 04/10/22  0648 04/09/22  0548   SODIUM mmol/L 136 133* 136 136 137 140 136   POTASSIUM mmol/L 4 9 4 6 5 4* 3 9 3 9 3 7 3 7   CHLORIDE mmol/L 101 98* 102 103 104 106 103   CO2 mmol/L 25 26 25 25 25 25 26   ANION GAP mmol/L 10 9 9 8 8 9 7   BUN mg/dL 20 17 16 13 11 6 6   CREATININE mg/dL 0 98 1 00 0 82 0 76 0 76 0 77 0 74   CALCIUM mg/dL 9 1 9 3 9 0 8 6 8 5 8 6 8 9   ALBUMIN g/dL 3 1* 3 3* 2 9* 2 8* 2 9*  --   --    TOTAL BILIRUBIN mg/dL 0 29 0 36 0 44 0 29 0 30  --   --    ALK PHOS U/L 248* 255* 217* 197* 196*  --   --    ALT U/L 39 48 41 41 48  --   --    AST U/L 25 32 36 23 31  --   --    EGFR ml/min/1 73sq m 56 55 70 76 76 75 79 GLUCOSE RANDOM mg/dL 154* 169* 155* 154* 179* 148* 148*     Results from last 7 days   Lab Units 04/09/22  0548   MAGNESIUM mg/dL 1 8                      Results from last 7 days   Lab Units 04/15/22  1203 04/15/22  0936 04/15/22  0739 04/14/22  2105 04/14/22  1525 04/14/22  1109 04/14/22  0619 04/13/22  2359 04/13/22  1547 04/13/22  1052 04/13/22  0735 04/12/22  2042   POC GLUCOSE mg/dl 121 155* 154* 212* 192* 218* 179* 203* 197* 296* 197* 207*             * I Have Reviewed All Lab Data Listed Above  Cultures:                   Lines/Drains:  Invasive Devices  Report    Peripheral Intravenous Line            Peripheral IV 04/15/22 Left Wrist <1 day              Telemetry:      Imaging:  Imaging Reports Reviewed Today Include:   Colonoscopy    Result Date: 4/9/2022  Impression: 1  Diverticulosis coli, ascending colon sigmoid colon  No evidence of diverticulitis 2  Diminutive polyp in the proximal ascending colon status post cold biopsy removal RECOMMENDATION: Repeat colonoscopy in 5 years due to a personal history of colon polyps High-fiber diet to include fruits, vegetables, whole grain foods, daily Will call the patient 1 week regarding the polyp results and biopsy results DO Jamil Odonnell FACP    CT abdomen pelvis w contrast    Result Date: 4/7/2022  Impression: Overall similar appearance of colonic wall thickening centered at the cecum/ascending colon and at the splenic flexure, with similar pericolonic inflammatory stranding in the right colon and slight improvement on the left  No evidence of perforation or abscess formation  Workstation performed: [id number]     CT abdomen pelvis with contrast    Result Date: 4/4/2022  Impression: There is thickening of the splenic flexure with associated pericolonic infiltration, suggestive diverticulitis    Additional thickening of the ascending colon with some infiltration at its mesenteric margin may be due to additional segment of diverticulitis or colitis  Suggest follow-up with the colonoscopy when the acute illness subsides to exclude  focal colonic lesion There is no fluid collection seen There is no free air Small mesenteric lymph nodes with largest measuring about 1 cm, short interval follow-up at 3 months suggested The study was marked in EPIC for immediate notification   Follow-up notification has been created in [employer] performed: [id number]       Scheduled Meds:  Current Facility-Administered Medications   Medication Dose Route Frequency Provider Last Rate    acetaminophen  650 mg Oral Q4H PRN Shavonne Willingham MD      albuterol  1 puff Inhalation Q4H PRN [place], DO      amLODIPine  10 mg Oral Daily [place], DO      aspirin  81 mg Oral Daily [place], [place]      dicyclomine  10 mg Oral 4x Daily (AC & HS) [place], DO      enoxaparin  40 mg Subcutaneous Daily [place], DO      fluticasone-vilanterol  1 puff Inhalation Daily [place], [place]      hydrALAZINE  5 mg Intravenous Q6H PRN Tavo Alexander PA-C      HYDROmorphone  0 5 mg Intravenous Q4H PRN [place], DO      HYDROmorphone  1 mg Intravenous Q4H PRN [place], DO      insulin lispro  1-5 Units Subcutaneous HS [place], DO      insulin lispro  1-6 Units Subcutaneous TID With Meals Al Landau, MD      iohexol  50 mL Oral Once in imaging [place], DO      lisinopril  20 mg Oral BID [place], DO      LORazepam  0 5 mg Oral Q8H PRN [place], DO      montelukast  10 mg Oral HS Chance Porter, DO      naloxone  0 04 mg Intravenous Q1MIN PRN [place], DO      ondansetron  4 mg Intravenous Q6H PRN [place], DO      oxyCODONE  10 mg Oral Q4H PRN Shavonne Willingham MD      oxyCODONE  5 mg Oral Q4H PRN Shavonne Willingham MD      pantoprazole  40 mg Oral BID AC [place], DO      pregabalin  50 mg Oral TID [place], DO      zolpidem  5 mg Oral HS PRN [place], DO         Today, Patient Was Seen By: Oumou Lu, DO    ** Please Note: Dictation voice to text software may have been used in the creation of this document   **

## 2022-04-15 NOTE — ANESTHESIA PREPROCEDURE EVALUATION
Procedure:  EGD    Relevant Problems   CARDIO   (+) Atypical chest pain   (+) Benign hypertension   (+) Exertional dyspnea   (+) Hypertensive urgency   (+) Primary hypertension      ENDO   (+) 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)      /RENAL   (+) Stage 3a chronic kidney disease (HCC)      NEURO/PSYCH   (+) Anxiety   (+) Depression with anxiety      PULMONARY   (+) COPD (chronic obstructive pulmonary disease) (HCC)   (+) Chronic bronchitis (HCC)   (+) Exertional dyspnea   (+) Mild intermittent asthma   (+) BOLA (obstructive sleep apnea)        Physical Exam    Airway    Mallampati score: II  TM Distance: >3 FB  Neck ROM: full     Dental   No notable dental hx     Cardiovascular  Cardiovascular exam normal    Pulmonary  Pulmonary exam normal     Other Findings        Anesthesia Plan  ASA Score- 3     Anesthesia Type- IV sedation with anesthesia with ASA Monitors  Additional Monitors:   Airway Plan:           Plan Factors-Exercise tolerance (METS): >4 METS  Chart reviewed  EKG reviewed  Imaging results reviewed  Existing labs reviewed  Patient summary reviewed  Patient is not a current smoker  Induction- intravenous  Postoperative Plan-     Informed Consent- Anesthetic plan and risks discussed with patient  I personally reviewed this patient with the CRNA  Discussed and agreed on the Anesthesia Plan with the CRNA  Jv Pereyra

## 2022-04-15 NOTE — PROGRESS NOTES
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 although colonoscopy grossly normal without inflammatory changes  No diverticulitis seen  Patient's antibiotics have been discontinued and she remains clinically stable  Status post bone marrow biopsy  Pathology is pending   -continue to monitor off all antibiotics  -Monitor CBC with diff  -monitor abdominal pain  -GI follow-up     2  High level eosinophilia  As in above, at this level of eosinophilia, intestinal parasite is unlikely  Consider strongyloides hyperinfection syndrome but clinical presentation is not consistent with this and the patient is not immunosuppressed  Regardless, Strongyloides serology is negative   HIV negative  Stool O&P negative   Colonoscopy without evidence of inflammatory bowel disease or diverticulitis, biopsies are pending  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  Flow cytometry negative  Bone marrow biopsy was done, pathology also pending  Consider idiopathic hypereosinophilic syndrome (HES)  -Follow up bone marrow biopsy  -Monitor CBC with diff  -hematology/oncology follow-up     3  Mildly elevated LFTs, likely secondary to colitis above   Patient has no RUQ abdominal pain  AST/ALT now normalized  -monitor LFTs     4  CKD  Creatinine at baseline  Continue to monitor      5  DM, with neuropathy     -Management per primary service      Discussed with patient in detail regarding the above plan  Discussed Dr Amy Zavala from [facility] service        Antibiotics:  Off antibiotics      Subjective:  Patient is comfortable    Abdominal pain mild and continues to improve  She is tolerating diet  Temperature stays down  No chills  No diarrhea      Objective:  Vitals:  Temp:  [98 2 °F (36 8 °C)-99 4 °F (37 4 °C)] 98 2 °F (36 8 °C)  HR:  [65-89] 71  Resp:  [16-18] 18  BP: ()/(46-69) 144/69  SpO2:  [83 %-100 %] 96 %  Temp (24hrs), Av 8 °F (37 1 °C), Min:98 2 °F (36 8 °C), Max:99 4 °F (37 4 °C)  Current: Temperature: 98 2 °F (36 8 °C)    Physical Exam:     General: Awake, alert, cooperative, no distress  Neck:  Supple  No mass  No lymphadenopathy  Lungs: Expansion symmetric, no rales, no wheezing, respirations unlabored  Heart:  Regular rate and rhythm, S1 and S2 normal, no murmur  Abdomen: Soft, nondistended, mild and improving periumbilical tenderness, bowel sounds active all four quadrants, no masses, no organomegaly  Extremities: No edema  No erythema/warmth  No ulcer  Nontender to palpation  Skin:  No rash  Neuro: Moves all extremities  Invasive Devices  Report    Peripheral Intravenous Line            Peripheral IV 04/15/22 Left Wrist <1 day                Labs studies:   I have personally reviewed pertinent labs  Results from last 7 days   Lab Units 04/15/22  0954 22  0616 22  0602   POTASSIUM mmol/L 4 9 4 6 5 4*   CHLORIDE mmol/L 101 98* 102   CO2 mmol/L 25 26 25   BUN mg/dL 20 17 16   CREATININE mg/dL 0 98 1 00 0 82   EGFR ml/min/1 73sq m 56 55 70   CALCIUM mg/dL 9 1 9 3 9 0   AST U/L 25 32 36   ALT U/L 39 48 41   ALK PHOS U/L 248* 255* 217*     Results from last 7 days   Lab Units 22  0616 22  0602 22  0605   WBC Thousand/uL 26 65* 24 75* 25 42*   HEMOGLOBIN g/dL 11 6 10 8* 10 7*   PLATELETS Thousands/uL 275 223 204           Imaging Studies:   I have personally reviewed pertinent imaging study reports and images in PACS  EKG, Pathology, and Other Studies:   I have personally reviewed pertinent reports

## 2022-04-15 NOTE — ASSESSMENT & PLAN NOTE
· Prior to admission on janumet  Continue sliding scale insulin  · Did have episode of hypoglycemia    Now stable off dextrose infusion  · Restarted pregabalin    Results from last 7 days   Lab Units 04/15/22  1203 04/15/22  0936 04/15/22  0739 04/14/22  2105 04/14/22  1525 04/14/22  1109 04/14/22  0619 04/13/22  2359   POC GLUCOSE mg/dl 121 155* 154* 212* 192* 218* 179* 203*

## 2022-04-15 NOTE — ASSESSMENT & PLAN NOTE
· Mild transaminitis noted    Resolved    Results from last 7 days   Lab Units 04/15/22  0954 04/14/22  0616 04/13/22  0602 04/12/22  0605 04/11/22  0533   AST U/L 25 32 36 23 31   ALT U/L 39 48 41 41 48   TOTAL BILIRUBIN mg/dL 0 29 0 36 0 44 0 29 0 30

## 2022-04-15 NOTE — ASSESSMENT & PLAN NOTE
· Significant eosinophilia    Awaiting stool parasite studies  · Flow cytometry of blood does not demonstrate any concern for dyscrasia  · Underwent bone marrow biopsy awaiting interpretation    Results from last 7 days   Lab Units 04/14/22  0616 04/13/22  0602 04/12/22  0605 04/11/22  0533 04/10/22  0648 04/09/22  0548   WBC Thousand/uL 26 65* 24 75* 25 42* 26 32* 25 80* 26 08*   EOS PCT %  --   --  58* 53* 67* 72*

## 2022-04-16 ENCOUNTER — APPOINTMENT (INPATIENT)
Dept: CT IMAGING | Facility: HOSPITAL | Age: 77
DRG: 815 | End: 2022-04-16
Payer: MEDICARE

## 2022-04-16 PROBLEM — K52.82 EOSINOPHILIC COLITIS: Status: ACTIVE | Noted: 2022-04-04

## 2022-04-16 LAB
ALBUMIN SERPL BCP-MCNC: 1.8 G/DL (ref 3.5–5)
ALP SERPL-CCNC: 142 U/L (ref 46–116)
ALT SERPL W P-5'-P-CCNC: 24 U/L (ref 12–78)
ANION GAP SERPL CALCULATED.3IONS-SCNC: 8 MMOL/L (ref 4–13)
AST SERPL W P-5'-P-CCNC: 14 U/L (ref 5–45)
BASOPHILS # BLD AUTO: 0.1 THOUSANDS/ΜL (ref 0–0.1)
BASOPHILS NFR BLD AUTO: 1 % (ref 0–1)
BILIRUB SERPL-MCNC: 0.14 MG/DL (ref 0.2–1)
BUN SERPL-MCNC: 17 MG/DL (ref 5–25)
CALCIUM ALBUM COR SERPL-MCNC: 7.9 MG/DL (ref 8.3–10.1)
CALCIUM SERPL-MCNC: 6.1 MG/DL (ref 8.3–10.1)
CHLORIDE SERPL-SCNC: 111 MMOL/L (ref 100–108)
CO2 SERPL-SCNC: 20 MMOL/L (ref 21–32)
CREAT SERPL-MCNC: 0.67 MG/DL (ref 0.6–1.3)
EOSINOPHIL # BLD AUTO: 7.95 THOUSAND/ΜL (ref 0–0.61)
EOSINOPHIL NFR BLD AUTO: 57 % (ref 0–6)
ERYTHROCYTE [DISTWIDTH] IN BLOOD BY AUTOMATED COUNT: 15.6 % (ref 11.6–15.1)
GFR SERPL CREATININE-BSD FRML MDRD: 86 ML/MIN/1.73SQ M
GLUCOSE SERPL-MCNC: 111 MG/DL (ref 65–140)
GLUCOSE SERPL-MCNC: 145 MG/DL (ref 65–140)
GLUCOSE SERPL-MCNC: 216 MG/DL (ref 65–140)
HCT VFR BLD AUTO: 27.3 % (ref 34.8–46.1)
HGB BLD-MCNC: 9 G/DL (ref 11.5–15.4)
IMM GRANULOCYTES # BLD AUTO: 0.03 THOUSAND/UL (ref 0–0.2)
IMM GRANULOCYTES NFR BLD AUTO: 0 % (ref 0–2)
LDH SERPL-CCNC: 139 U/L (ref 81–234)
LYMPHOCYTES # BLD AUTO: 1.76 THOUSANDS/ΜL (ref 0.6–4.47)
LYMPHOCYTES NFR BLD AUTO: 13 % (ref 14–44)
MCH RBC QN AUTO: 29.7 PG (ref 26.8–34.3)
MCHC RBC AUTO-ENTMCNC: 33 G/DL (ref 31.4–37.4)
MCV RBC AUTO: 90 FL (ref 82–98)
MONOCYTES # BLD AUTO: 0.8 THOUSAND/ΜL (ref 0.17–1.22)
MONOCYTES NFR BLD AUTO: 6 % (ref 4–12)
NEUTROPHILS # BLD AUTO: 3.1 THOUSANDS/ΜL (ref 1.85–7.62)
NEUTS SEG NFR BLD AUTO: 23 % (ref 43–75)
NRBC BLD AUTO-RTO: 0 /100 WBCS
PLATELET # BLD AUTO: 256 THOUSANDS/UL (ref 149–390)
PMV BLD AUTO: 9.4 FL (ref 8.9–12.7)
POTASSIUM SERPL-SCNC: 3.2 MMOL/L (ref 3.5–5.3)
PROT SERPL-MCNC: 5.1 G/DL (ref 6.4–8.2)
RBC # BLD AUTO: 3.03 MILLION/UL (ref 3.81–5.12)
SODIUM SERPL-SCNC: 139 MMOL/L (ref 136–145)
WBC # BLD AUTO: 13.74 THOUSAND/UL (ref 4.31–10.16)

## 2022-04-16 PROCEDURE — NC001 PR NO CHARGE: Performed by: PHYSICIAN ASSISTANT

## 2022-04-16 PROCEDURE — G1004 CDSM NDSC: HCPCS

## 2022-04-16 PROCEDURE — 99232 SBSQ HOSP IP/OBS MODERATE 35: CPT | Performed by: INTERNAL MEDICINE

## 2022-04-16 PROCEDURE — 85025 COMPLETE CBC W/AUTO DIFF WBC: CPT | Performed by: INTERNAL MEDICINE

## 2022-04-16 PROCEDURE — 71260 CT THORAX DX C+: CPT

## 2022-04-16 PROCEDURE — 82948 REAGENT STRIP/BLOOD GLUCOSE: CPT

## 2022-04-16 PROCEDURE — 83615 LACTATE (LD) (LDH) ENZYME: CPT | Performed by: INTERNAL MEDICINE

## 2022-04-16 PROCEDURE — 80053 COMPREHEN METABOLIC PANEL: CPT | Performed by: INTERNAL MEDICINE

## 2022-04-16 PROCEDURE — 83520 IMMUNOASSAY QUANT NOS NONAB: CPT | Performed by: INTERNAL MEDICINE

## 2022-04-16 RX ORDER — CARVEDILOL 6.25 MG/1
6.25 TABLET ORAL 2 TIMES DAILY WITH MEALS
Status: DISCONTINUED | OUTPATIENT
Start: 2022-04-16 | End: 2022-04-19 | Stop reason: HOSPADM

## 2022-04-16 RX ADMIN — PREGABALIN 50 MG: 50 CAPSULE ORAL at 08:03

## 2022-04-16 RX ADMIN — DICYCLOMINE HYDROCHLORIDE 10 MG: 10 CAPSULE ORAL at 16:57

## 2022-04-16 RX ADMIN — CARVEDILOL 6.25 MG: 6.25 TABLET, FILM COATED ORAL at 17:01

## 2022-04-16 RX ADMIN — MONTELUKAST 10 MG: 10 TABLET, FILM COATED ORAL at 21:15

## 2022-04-16 RX ADMIN — PANTOPRAZOLE SODIUM 40 MG: 40 TABLET, DELAYED RELEASE ORAL at 16:57

## 2022-04-16 RX ADMIN — DICYCLOMINE HYDROCHLORIDE 10 MG: 10 CAPSULE ORAL at 21:15

## 2022-04-16 RX ADMIN — PREGABALIN 50 MG: 50 CAPSULE ORAL at 16:03

## 2022-04-16 RX ADMIN — OXYCODONE HYDROCHLORIDE 10 MG: 10 TABLET ORAL at 08:06

## 2022-04-16 RX ADMIN — OXYCODONE HYDROCHLORIDE 10 MG: 10 TABLET ORAL at 16:03

## 2022-04-16 RX ADMIN — DICYCLOMINE HYDROCHLORIDE 10 MG: 10 CAPSULE ORAL at 12:07

## 2022-04-16 RX ADMIN — OXYCODONE HYDROCHLORIDE 10 MG: 10 TABLET ORAL at 21:23

## 2022-04-16 RX ADMIN — FLUTICASONE FUROATE AND VILANTEROL TRIFENATATE 1 PUFF: 200; 25 POWDER RESPIRATORY (INHALATION) at 08:07

## 2022-04-16 RX ADMIN — IOHEXOL 85 ML: 350 INJECTION, SOLUTION INTRAVENOUS at 09:42

## 2022-04-16 RX ADMIN — PANTOPRAZOLE SODIUM 40 MG: 40 TABLET, DELAYED RELEASE ORAL at 06:11

## 2022-04-16 RX ADMIN — PREGABALIN 50 MG: 50 CAPSULE ORAL at 21:16

## 2022-04-16 RX ADMIN — LISINOPRIL 20 MG: 20 TABLET ORAL at 08:03

## 2022-04-16 RX ADMIN — ASPIRIN 81 MG: 81 TABLET, CHEWABLE ORAL at 08:02

## 2022-04-16 RX ADMIN — DICYCLOMINE HYDROCHLORIDE 10 MG: 10 CAPSULE ORAL at 06:11

## 2022-04-16 RX ADMIN — AMLODIPINE BESYLATE 10 MG: 10 TABLET ORAL at 08:03

## 2022-04-16 NOTE — PLAN OF CARE
Problem: MOBILITY - ADULT  Goal: Maintain or return to baseline ADL function  Description: INTERVENTIONS:  -  Assess patient's ability to carry out ADLs; assess patient's baseline for ADL function and identify physical deficits which impact ability to perform ADLs (bathing, care of mouth/teeth, toileting, grooming, dressing, etc )  - Assess/evaluate cause of self-care deficits   - Assess range of motion  - Assess patient's mobility; develop plan if impaired  - Assess patient's need for assistive devices and provide as appropriate  - Encourage maximum independence but intervene and supervise when necessary  - Involve family in performance of ADLs  - Assess for home care needs following discharge   - Consider OT consult to assist with ADL evaluation and planning for discharge  - Provide patient education as appropriate  Outcome: Progressing  Goal: Maintains/Returns to pre admission functional level  Description: INTERVENTIONS:  - Perform BMAT or MOVE assessment daily    - Set and communicate daily mobility goal to care team and patient/family/caregiver  - Collaborate with rehabilitation services on mobility goals if consulted  - Perform Range of Motion  times a day  - Reposition patient every  hours    - Dangle patient  times a day  - Stand patient  times a day  - Ambulate patient  times a day  - Out of bed to chair  times a day   - Out of bed for meals  times a day  - Out of bed for toileting  - Record patient progress and toleration of activity level   Outcome: Progressing     Problem: PAIN - ADULT  Goal: Verbalizes/displays adequate comfort level or baseline comfort level  Description: Interventions:  - Encourage patient to monitor pain and request assistance  - Assess pain using appropriate pain scale  - Administer analgesics based on type and severity of pain and evaluate response  - Implement non-pharmacological measures as appropriate and evaluate response  - Consider cultural and social influences on pain and pain management  - Notify physician/advanced practitioner if interventions unsuccessful or patient reports new pain  Outcome: Progressing     Problem: SAFETY ADULT  Goal: Maintain or return to baseline ADL function  Description: INTERVENTIONS:  -  Assess patient's ability to carry out ADLs; assess patient's baseline for ADL function and identify physical deficits which impact ability to perform ADLs (bathing, care of mouth/teeth, toileting, grooming, dressing, etc )  - Assess/evaluate cause of self-care deficits   - Assess range of motion  - Assess patient's mobility; develop plan if impaired  - Assess patient's need for assistive devices and provide as appropriate  - Encourage maximum independence but intervene and supervise when necessary  - Involve family in performance of ADLs  - Assess for home care needs following discharge   - Consider OT consult to assist with ADL evaluation and planning for discharge  - Provide patient education as appropriate  Outcome: Progressing  Goal: Maintains/Returns to pre admission functional level  Description: INTERVENTIONS:  - Perform BMAT or MOVE assessment daily    - Set and communicate daily mobility goal to care team and patient/family/caregiver  - Collaborate with rehabilitation services on mobility goals if consulted  - Perform Range of Motion  times a day  - Reposition patient every  hours    - Dangle patient  times a day  - Stand patient  times a day  - Ambulate patient  times a day  - Out of bed to chair  times a day   - Out of bed for meals  times a day  - Out of bed for toileting  - Record patient progress and toleration of activity level   Outcome: Progressing  Goal: Patient will remain free of falls  Description: INTERVENTIONS:  - Educate patient/family on patient safety including physical limitations  - Instruct patient to call for assistance with activity   - Consult OT/PT to assist with strengthening/mobility   - Keep Call bell within reach  - Keep bed low and locked with side rails adjusted as appropriate  - Keep care items and personal belongings within reach  - Initiate and maintain comfort rounds  - Make Fall Risk Sign visible to staff  - Offer Toileting every  Hours, in advance of need  - Initiate/Maintain alarm  - Obtain necessary fall risk management equipment:   - Apply yellow socks and bracelet for high fall risk patients  - Consider moving patient to room near nurses station  Outcome: Progressing     Problem: DISCHARGE PLANNING  Goal: Discharge to home or other facility with appropriate resources  Description: INTERVENTIONS:  - Identify barriers to discharge w/patient and caregiver  - Arrange for needed discharge resources and transportation as appropriate  - Identify discharge learning needs (meds, wound care, etc )  - Arrange for interpretive services to assist at discharge as needed  - Refer to Case Management Department for coordinating discharge planning if the patient needs post-hospital services based on physician/advanced practitioner order or complex needs related to functional status, cognitive ability, or social support system  Outcome: Progressing     Problem: Knowledge Deficit  Goal: Patient/family/caregiver demonstrates understanding of disease process, treatment plan, medications, and discharge instructions  Description: Complete learning assessment and assess knowledge base    Interventions:  - Provide teaching at level of understanding  - Provide teaching via preferred learning methods  Outcome: Progressing     Problem: GASTROINTESTINAL - ADULT  Goal: Minimal or absence of nausea and/or vomiting  Description: INTERVENTIONS:  - Administer IV fluids if ordered to ensure adequate hydration  - Maintain NPO status until nausea and vomiting are resolved  - Nasogastric tube if ordered  - Administer ordered antiemetic medications as needed  - Provide nonpharmacologic comfort measures as appropriate  - Advance diet as tolerated, if ordered  - Consider nutrition services referral to assist patient with adequate nutrition and appropriate food choices  Outcome: Progressing  Goal: Maintains or returns to baseline bowel function  Description: INTERVENTIONS:  - Assess bowel function  - Encourage oral fluids to ensure adequate hydration  - Administer IV fluids if ordered to ensure adequate hydration  - Administer ordered medications as needed  - Encourage mobilization and activity  - Consider nutritional services referral to assist patient with adequate nutrition and appropriate food choices  Outcome: Progressing  Goal: Maintains adequate nutritional intake  Description: INTERVENTIONS:  - Monitor percentage of each meal consumed  - Identify factors contributing to decreased intake, treat as appropriate  - Assist with meals as needed  - Monitor I&O, weight, and lab values if indicated  - Obtain nutrition services referral as needed  Outcome: Progressing  Goal: Establish and maintain optimal ostomy function  Description: INTERVENTIONS:  - Assess bowel function  - Encourage oral fluids to ensure adequate hydration  - Administer IV fluids if ordered to ensure adequate hydration   - Administer ordered medications as needed  - Encourage mobilization and activity  - Nutrition services referral to assist patient with appropriate food choices  - Assess stoma site  - Consider wound care consult   Outcome: Progressing  Goal: Oral mucous membranes remain intact  Description: INTERVENTIONS  - Assess oral mucosa and hygiene practices  - Implement preventative oral hygiene regimen  - Implement oral medicated treatments as ordered  - Initiate Nutrition services referral as needed  Outcome: Progressing     Problem: Potential for Falls  Goal: Patient will remain free of falls  Description: INTERVENTIONS:  - Educate patient/family on patient safety including physical limitations  - Instruct patient to call for assistance with activity   - Consult OT/PT to assist with strengthening/mobility   - Keep Call bell within reach  - Keep bed low and locked with side rails adjusted as appropriate  - Keep care items and personal belongings within reach  - Initiate and maintain comfort rounds  - Make Fall Risk Sign visible to staff  - Offer Toileting every  Hours, in advance of need  - Initiate/Maintain alarm  - Obtain necessary fall risk management equipment:  - Apply yellow socks and bracelet for high fall risk patients  - Consider moving patient to room near nurses station  Outcome: Progressing     Problem: Prexisting or High Potential for Compromised Skin Integrity  Goal: Skin integrity is maintained or improved  Description: INTERVENTIONS:  - Identify patients at risk for skin breakdown  - Assess and monitor skin integrity  - Assess and monitor nutrition and hydration status  - Monitor labs   - Assess for incontinence   - Turn and reposition patient  - Assist with mobility/ambulation  - Relieve pressure over bony prominences  - Avoid friction and shearing  - Provide appropriate hygiene as needed including keeping skin clean and dry  - Evaluate need for skin moisturizer/barrier cream  - Collaborate with interdisciplinary team   - Patient/family teaching  - Consider wound care consult   Outcome: Progressing

## 2022-04-16 NOTE — ASSESSMENT & PLAN NOTE
· Continue amlodipine but discontinue lisinopril due to concerns of allergic reaction given eosinophilia  · Will start carvedilol 6 25 mg b i d

## 2022-04-16 NOTE — ASSESSMENT & PLAN NOTE
· Sample with 5 4 was hemolyzed       Results from last 7 days   Lab Units 04/16/22  0446 04/15/22  0954 04/14/22  0616 04/13/22  0602 04/12/22  0605 04/11/22  0533 04/10/22  0648   POTASSIUM mmol/L 3 2* 4 9 4 6 5 4* 3 9 3 9 3 7

## 2022-04-16 NOTE — ASSESSMENT & PLAN NOTE
· Mild transaminitis noted    Resolved    Results from last 7 days   Lab Units 04/16/22  0446 04/15/22  0954 04/14/22  0616 04/13/22  0602 04/12/22  0605 04/11/22  0533   AST U/L 14 25 32 36 23 31   ALT U/L 24 39 48 41 41 48   TOTAL BILIRUBIN mg/dL 0 14* 0 29 0 36 0 44 0 29 0 30

## 2022-04-16 NOTE — PROGRESS NOTES
Progress Note - Infectious Disease   [facility] 78 y o  female MRN: [id number]  Unit/Bed#: -01 Encounter: [id number]      Impression/Recommendations:  1   Abdominal pain/diarrhea with high level eosinophilia   This level of eosinophilia is new, with last CBC in February of this year only with low level eosinophilia   This high level of eosinophilia is not consistent with intestinal parasites   Possible eosinophilic enteritis although colonoscopy grossly normal without inflammatory changes  No diverticulitis seen  Patient's antibiotics have been discontinued and she remains clinically stable  Status post bone marrow biopsy  Pathology is pending   -continue to monitor off all antibiotics  -Monitor CBC with diff  -monitor abdominal pain  -GI follow-up     2  High level eosinophilia  As in above, at this level of eosinophilia, intestinal parasite is unlikely  Consider strongyloides hyperinfection syndrome but clinical presentation is not consistent with this and the patient is not immunosuppressed   Regardless, Strongyloides serology is negative    HIV negative   Stool O&P negative   Colonoscopy without evidence of inflammatory bowel disease or diverticulitis, biopsies are pending  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  Flow cytometry negative  Bone marrow biopsy was done, with preliminary pathology not showing leukemia  Consider idiopathic hypereosinophilic syndrome (HES)  -Follow up final bone marrow biopsy  -Monitor CBC with diff  -hematology/oncology follow-up     3  Mildly elevated LFTs, likely secondary to colitis above   Patient has no RUQ abdominal pain  AST/ALT now normalized  -monitor LFTs     4  CKD  Creatinine at baseline  Continue to monitor      5  DM, with neuropathy     -Management per primary service      Discussed with patient in detail regarding the above plan      Antibiotics:  Off antibiotics      Subjective:  Patient is comfortable  Abdominal pain continues to improve    She is tolerating diet well  Temperature stays down   No chills  No diarrhea      Objective:  Vitals:  Temp:  [98 5 °F (36 9 °C)-98 7 °F (37 1 °C)] 98 7 °F (37 1 °C)  HR:  [71-89] 82  Resp:  [18-21] 18  BP: (106-152)/(62-69) 106/67  SpO2:  [91 %-94 %] 93 %  Temp (24hrs), Av 6 °F (37 °C), Min:98 5 °F (36 9 °C), Max:98 7 °F (37 1 °C)  Current: Temperature: 98 7 °F (37 1 °C)    Physical Exam:     General: Awake, alert, cooperative, no distress  Neck:  Supple  No mass  No lymphadenopathy  Lungs: Expansion symmetric, no rales, no wheezing, respirations unlabored  Heart:  Regular rate and rhythm, S1 and S2 normal, no murmur  Abdomen: Soft, nondistended, mild and improved periumbilical tenderness, bowel sounds active all four quadrants, no masses, no organomegaly  Extremities: No edema  No erythema/warmth  No ulcer  Nontender to palpation  Skin:  No rash  Neuro: Moves all extremities  Invasive Devices  Report    Peripheral Intravenous Line            Peripheral IV 22 Right Antecubital <1 day                Labs studies:   I have personally reviewed pertinent labs  Results from last 7 days   Lab Units 22  0446 04/15/22  0954 22  0616   POTASSIUM mmol/L 3 2* 4 9 4 6   CHLORIDE mmol/L 111* 101 98*   CO2 mmol/L 20* 25 26   BUN mg/dL 17 20 17   CREATININE mg/dL 0 67 0 98 1 00   EGFR ml/min/1 73sq m 86 56 55   CALCIUM mg/dL 6 1* 9 1 9 3   AST U/L 14 25 32   ALT U/L 24 39 48   ALK PHOS U/L 142* 248* 255*     Results from last 7 days   Lab Units 22  0500 22  0616 22  0602   WBC Thousand/uL 13 74* 26 65* 24 75*   HEMOGLOBIN g/dL 9 0* 11 6 10 8*   PLATELETS Thousands/uL 256 275 223           Imaging Studies:   I have personally reviewed pertinent imaging study reports and images in PACS  EKG, Pathology, and Other Studies:   I have personally reviewed pertinent reports

## 2022-04-16 NOTE — ASSESSMENT & PLAN NOTE
· Prior to admission on janumet  Continue sliding scale insulin  · Did have episode of hypoglycemia    Now stable off dextrose infusion  · Restarted pregabalin    Results from last 7 days   Lab Units 04/16/22  0751 04/15/22  1535 04/15/22  1203 04/15/22  0936 04/15/22  0739 04/14/22  2105 04/14/22  1525 04/14/22  1109   POC GLUCOSE mg/dl 145* 203* 121 155* 154* 212* 192* 218*

## 2022-04-16 NOTE — PROGRESS NOTES
[address] [facility]  Progress Note - Jewels Mock 1945, 78 y o  female MRN: [id number]  Unit/Bed#: MS [address] Encounter: [id number]  Primary Care Provider: Sol Chávez   Date and time admitted to hospital: 4/4/2022  2:10 PM    * Eosinophilic colitis  Assessment & Plan  61-year-old female worsening abdominal pain found to have diverticulitis and significant eosinophilia  · Has had two initial CT scans demonstrating diverticulitis/colitis  Subsequent colonoscopy and repeat CT scan without evidence  · Seen by surgery, signed off  · Being followed by ID, monitoring off antibiotics  · Due to concerns of melanotic stools and persistent pain underwent EGD no significant findings  · Top differential likely hypereosinophilic syndrome vs eosinophilic colitis  GI recommends common allergic food elimination diet    Eosinophilia  Assessment & Plan  · Significant eosinophilia  stool parasite studies negative  · Flow cytometry of blood does not demonstrate any concern for dyscrasia  · Underwent bone marrow biopsy which did not have evidence of leukemia  · Further workup ordered her hematology  GI eliminating common food allergies  Will also discontinue lisinopril    Results from last 7 days   Lab Units 04/16/22  0500 04/14/22  0616 04/13/22  0602 04/12/22  0605 04/11/22  0533 04/10/22  0648   WBC Thousand/uL 13 74* 26 65* 24 75* 25 42* 26 32* 25 80*   EOS PCT % 57*  --   --  58* 53* 67*       Hyperkalemia  Assessment & Plan  · Sample with 5 4 was hemolyzed  Results from last 7 days   Lab Units 04/16/22  0446 04/15/22  0954 04/14/22  0616 04/13/22  0602 04/12/22  0605 04/11/22  0533 04/10/22  0648   POTASSIUM mmol/L 3 2* 4 9 4 6 5 4* 3 9 3 9 3 7       Transaminitis  Assessment & Plan  · Mild transaminitis noted    Resolved    Results from last 7 days   Lab Units 04/16/22  0446 04/15/22  0954 04/14/22  0616 04/13/22  0602 04/12/22  0605 04/11/22  0533   AST U/L 14 25 32 36 23 31   ALT U/L 24 39 48 41 41 48   TOTAL BILIRUBIN mg/dL 0 14* 0 29 0 36 0 44 0 29 0 30       Primary hypertension  Assessment & Plan  · Continue amlodipine but discontinue lisinopril due to concerns of allergic reaction given eosinophilia  · Will start carvedilol 6 25 mg b i d  Stage 3a chronic kidney disease [facility])  Assessment & Plan  · Stable at baseline    Results from last 7 days   Lab Units 04/16/22  0446 04/15/22  0954 04/14/22  0616 04/13/22  0602 04/12/22  0605 04/11/22  0533 04/10/22  0648   BUN mg/dL 17 20 17 16 13 11 6   CREATININE mg/dL 0 67 0 98 1 00 0 82 0 76 0 76 0 77   EGFR ml/min/1 73sq m 86 56 55 70 76 76 75       Controlled type 2 diabetes with neuropathy ([facility] [address] )  Assessment & Plan  · Prior to admission on janumet  Continue sliding scale insulin  · Did have episode of hypoglycemia  Now stable off dextrose infusion  · Restarted pregabalin    Results from last 7 days   Lab Units 04/16/22  0751 04/15/22  1535 04/15/22  1203 04/15/22  0936 04/15/22  0739 04/14/22  2105 04/14/22  1525 04/14/22  1109   POC GLUCOSE mg/dl 145* 203* 121 155* 154* 212* 192* 218*       Mild intermittent asthma  Assessment & Plan  · No exacerbation continue breo  · Continue singulair      VTE Pharmacologic Prophylaxis: VTE Score: 4 Moderate Risk (Score 3-4) - Pharmacological DVT Prophylaxis Ordered: Enoxaparin (Lovenox)  Patient Centered Rounds: I have performed bedside rounds with nursing staff today  Discussions with Specialists or Other Care Team Provider:     Education and Discussions with Family / Patient:     Time Spent for Care: 20 mins  More than 50% of total time spent on counseling and coordination of care as described above  Current Length of Stay: 12 day(s)  Current Patient Status: Inpatient   Certification Statement: The patient will continue to require additional inpatient hospital stay due to eosinophilia  Discharge Plan / Estimated Discharge Date: Anticipate discharge in 48 hrs to home      Code Status: Level 1 - Full Code      Subjective:   Patient seen and examined  Feeling better today, less abdominal pain  Objective:   Vitals: Blood pressure 152/69, pulse 71, temperature 98 7 °F (37 1 °C), resp  rate 20, height 5' 2" (1 575 m), weight 88 1 kg (194 lb 3 6 oz), SpO2 91 %, not currently breastfeeding  Physical Exam  Vitals reviewed  Constitutional:       General: She is not in acute distress  Appearance: Normal appearance  HENT:      Head: Atraumatic  Eyes:      General: No scleral icterus  Cardiovascular:      Rate and Rhythm: Regular rhythm  Heart sounds: Normal heart sounds  Pulmonary:      Breath sounds: Decreased breath sounds present  No wheezing  Abdominal:      General: Bowel sounds are normal       Palpations: Abdomen is soft  Tenderness: There is abdominal tenderness  There is no guarding or rebound  Musculoskeletal:         General: No swelling  Skin:     General: Skin is warm  Neurological:      Mental Status: She is alert     Psychiatric:         Mood and Affect: Mood normal        Additional Data:   Labs:  Results from last 7 days   Lab Units 04/16/22  0500 04/14/22  0616 04/13/22  0602 04/12/22  0605 04/11/22  0533 04/10/22  0648   WBC Thousand/uL 13 74* 26 65* 24 75* 25 42* 26 32* 25 80*   HEMOGLOBIN g/dL 9 0* 11 6 10 8* 10 7* 11 2* 11 2*   HEMATOCRIT % 27 3* 35 8 32 8* 32 2* 34 0* 33 9*   MCV fL 90 90 89 89 90 90   TOTAL NEUT ABS Thousand/uL  --   --   --  6 10  --   --    PLATELETS Thousands/uL 256 275 223 204 198 200     Results from last 7 days   Lab Units 04/16/22  0446 04/15/22  0954 04/14/22  0616 04/13/22  0602 04/12/22  0605 04/11/22  0533 04/10/22  0648   SODIUM mmol/L 139 136 133* 136 136 137 140   POTASSIUM mmol/L 3 2* 4 9 4 6 5 4* 3 9 3 9 3 7   CHLORIDE mmol/L 111* 101 98* 102 103 104 106   CO2 mmol/L 20* 25 26 25 25 25 25   ANION GAP mmol/L 8 10 9 9 8 8 9   BUN mg/dL 17 20 17 16 13 11 6   CREATININE mg/dL 0 67 0 98 1 00 0 82 0 76 0 76 0 77   CALCIUM mg/dL 6  1* 9 1 9 3 9 0 8 6 8 5 8 6   ALBUMIN g/dL 1 8* 3 1* 3 3* 2 9* 2 8* 2 9*  --    TOTAL BILIRUBIN mg/dL 0 14* 0 29 0 36 0 44 0 29 0 30  --    ALK PHOS U/L 142* 248* 255* 217* 197* 196*  --    ALT U/L 24 39 48 41 41 48  --    AST U/L 14 25 32 36 23 31  --    EGFR ml/min/1 73sq m 86 56 55 70 76 76 75   GLUCOSE RANDOM mg/dL 111 154* 169* 155* 154* 179* 148*                          Results from last 7 days   Lab Units 04/16/22  0751 04/15/22  1535 04/15/22  1203 04/15/22  0936 04/15/22  0739 04/14/22  2105 04/14/22  1525 04/14/22  1109 04/14/22  0619 04/13/22  2359 04/13/22  1547 04/13/22  1052   POC GLUCOSE mg/dl 145* 203* 121 155* 154* 212* 192* 218* 179* 203* 197* 296*             * I Have Reviewed All Lab Data Listed Above  Cultures:                   Lines/Drains:  Invasive Devices  Report    Peripheral Intravenous Line            Peripheral IV 04/16/22 Right Antecubital <1 day              Telemetry:      Imaging:  Imaging Reports Reviewed Today Include:   Colonoscopy    Result Date: 4/9/2022  Impression: 1  Diverticulosis coli, ascending colon sigmoid colon  No evidence of diverticulitis 2  Diminutive polyp in the proximal ascending colon status post cold biopsy removal RECOMMENDATION: Repeat colonoscopy in 5 years due to a personal history of colon polyps High-fiber diet to include fruits, vegetables, whole grain foods, daily Will call the patient 1 week regarding the polyp results and biopsy results Tiffanie Fortune, DO Lena Sesay, [facility]    CT abdomen pelvis w contrast    Result Date: 4/7/2022  Impression: Overall similar appearance of colonic wall thickening centered at the cecum/ascending colon and at the splenic flexure, with similar pericolonic inflammatory stranding in the right colon and slight improvement on the left  No evidence of perforation or abscess formation   Workstation performed: [id number]     CT abdomen pelvis with contrast    Result Date: 4/4/2022  Impression: There is thickening of the splenic flexure with associated pericolonic infiltration, suggestive diverticulitis  Additional thickening of the ascending colon with some infiltration at its mesenteric margin may be due to additional segment of diverticulitis or colitis  Suggest follow-up with the colonoscopy when the acute illness subsides to exclude  focal colonic lesion There is no fluid collection seen There is no free air Small mesenteric lymph nodes with largest measuring about 1 cm, short interval follow-up at 3 months suggested The study was marked in EPIC for immediate notification   Follow-up notification has been created in [employer] performed: [id number]       Scheduled Meds:  Current Facility-Administered Medications   Medication Dose Route Frequency Provider Last Rate    acetaminophen  650 mg Oral Q4H PRN Zhao Hdz MD      albuterol  1 puff Inhalation Q4H PRN Ione Gamboa, DO      amLODIPine  10 mg Oral Daily Ione Gamboa, DO      aspirin  81 mg Oral Daily [facility]      dicyclomine  10 mg Oral 4x Daily (AC & HS) Sherol Margot, DO      enoxaparin  40 mg Subcutaneous Daily Ione Gamboa, DO      fluticasone-vilanterol  1 puff Inhalation Daily Dave [facility]      hydrALAZINE  5 mg Intravenous Q6H PRN Virgil Stahl PA-C      HYDROmorphone  0 5 mg Intravenous Q4H PRN Ione Gamboa, DO      HYDROmorphone  1 mg Intravenous Q4H PRN Ione Gamboa, DO      insulin lispro  1-5 Units Subcutaneous Daily With Breakfast Chance Porter, DO      iohexol  50 mL Oral Once in imaging Ionemaren Gamboa, DO      lisinopril  20 mg Oral BID Sherol Margot, DO      LORazepam  0 5 mg Oral Q8H PRN Ione Gamboa, DO      montelukast  10 mg Oral HS Chance Porter, DO      naloxone  0 04 mg Intravenous Q1MIN PRN Ione Gamboa, DO      ondansetron  4 mg Intravenous Q6H PRN Ione Gamboa, DO      oxyCODONE  10 mg Oral Q4H PRN Zhao Hdz MD      oxyCODONE  5 mg Oral Q4H PRN Zhao Hdz MD      pantoprazole  40 mg Oral BID AC Chance Porter DO      pregabalin  50 mg Oral TID Erendira Coker DO      zolpidem  5 mg Oral HS PRN Meenakshi Blanchard DO         Today, Patient Was Seen By: Erendira Coker DO    ** Please Note: Dictation voice to text software may have been used in the creation of this document   **

## 2022-04-16 NOTE — NURSING NOTE
Pt is refusing her blood sugar to be checked QID  ROBYN is made aware and is okay with checking once a day

## 2022-04-16 NOTE — NURSING NOTE
Pt sent to get CT  IV access was lost and patient was sent back to the floor  After multiple failed attempts, ED nurse came and was able to get IV access  Labs collected  Will pass onto day RN to set up future CT time

## 2022-04-16 NOTE — ASSESSMENT & PLAN NOTE
· Significant eosinophilia  stool parasite studies negative  · Flow cytometry of blood does not demonstrate any concern for dyscrasia  · Underwent bone marrow biopsy which did not have evidence of leukemia  · Further workup ordered her hematology  GI eliminating common food allergies    Will also discontinue lisinopril    Results from last 7 days   Lab Units 04/16/22  0500 04/14/22  0616 04/13/22  0602 04/12/22  0605 04/11/22  0533 04/10/22  0648   WBC Thousand/uL 13 74* 26 65* 24 75* 25 42* 26 32* 25 80*   EOS PCT % 57*  --   --  58* 53* 67*

## 2022-04-16 NOTE — PROGRESS NOTES
Progress Note  Salty Adame 78 y o  female MRN: [id number]  Unit/Bed#: -01 Encounter: [id number]    Subjective:  Patient reports her abdominal pain is less today  She reports frequent bowel movements but they are formed  No fevers  No vomiting  Objective:     Vitals:   Vitals:    04/16/22 0753   BP: 152/69   Pulse: 71   Resp: 20   Temp: 98 7 °F (37 1 °C)   SpO2: 92%   ,Body mass index is 35 52 kg/m²  Intake/Output Summary (Last 24 hours) at 4/16/2022 0950  Last data filed at 4/16/2022 2774  Gross per 24 hour   Intake 320 ml   Output 900 ml   Net -580 ml       Physical Exam:     General Appearance: Alert, appears stated age and cooperative  Lungs: Clear to auscultation bilaterally, no rales or rhonchi, no labored breathing/accessory muscle use  Heart: Regular rate and rhythm, S1, S2 normal, no murmur, click, rub or gallop  Abdomen: Soft, non-tender, non-distended; bowel sounds normal; no masses or no organomegaly  Extremities: No cyanosis, edema    Invasive Devices  Report    Peripheral Intravenous Line            Peripheral IV 04/16/22 Right Antecubital <1 day                Lab Results:  No results displayed because visit has over 200 results  Imaging Studies:   I have personally reviewed pertinent imaging studies  EGD    Result Date: 4/15/2022  Narrative:  [address] [place] [address] Endoscopy [address] [place] [phone] DATE OF SERVICE: 4/15/22 PHYSICIAN(S): Attending: Anh Upton MD Fellow: No Staff Documented INDICATION: Melena POST-OP DIAGNOSIS: See the impression below  PREPROCEDURE: Informed consent was obtained for the procedure, including sedation  Risks of perforation, hemorrhage, adverse drug reaction and aspiration were discussed  The patient was placed in the left lateral decubitus position  Patient was explained about the risks and benefits of the procedure   Risks including but not limited to bleeding, infection, and perforation were explained in detail  Also explained about less than 100% sensitivity with the exam and other alternatives  DETAILS OF PROCEDURE: Patient was taken to the procedure room where a time out was performed to confirm correct patient and correct procedure  The patient underwent monitored anesthesia care, which was administered by an anesthesia professional  The patient's blood pressure, heart rate, level of consciousness, respirations and oxygen were monitored throughout the procedure  The scope was advanced to the second part of the duodenum  Retroflexion was performed in the fundus  The patient experienced no blood loss  The procedure was not difficult  The patient tolerated the procedure well  There were no apparent complications  ANESTHESIA INFORMATION: ASA: III Anesthesia Type: Anesthesia type not filed in the log  MEDICATIONS: No administrations occurring from 1054 to 1110 on 04/15/22 FINDINGS: Regular Z-line 40 cm from the incisors The esophagus appeared normal  Patchy abnormal mucosa with erosion in the antrum; performed cold forceps biopsy The cardia, fundus of the stomach and body of the stomach appeared normal  Performed random biopsy  The duodenum appeared normal  Performed random biopsy  SPECIMENS: ID Type Source Tests Collected by Time Destination 1 : duodenum Tissue Duodenum TISSUE EXAM Sean Conti MD 4/15/2022 11:06 AM  2 : gastric erosion Tissue Stomach TISSUE EXAM Sean Conti MD 4/15/2022 11:07 AM  3 : gastric Tissue Stomach TISSUE EXAM Sean Conti MD 4/15/2022 11:08 AM      Impression: Mild gastric erosion Random biopsies taken from stomach and duodenum to evaluate for eosinophilia No ulcers or signs of recent bleeding RECOMMENDATION: Await pathology results  Hgb stable, no signs of ongoing bleeding Will continue to observe    Sean Conti MD     Colonoscopy    Result Date: 4/9/2022  Narrative:  [id number] [facility] Endoscopy [address] [place] [address] [phone] DATE OF SERVICE: 4/09/22 PHYSICIAN(S): Attending: Romeo Meehan DO Fellow: No Staff Documented INDICATION: Acute diverticulitis, Eosinophilia, unspecified type POST-OP DIAGNOSIS: See the impression below  HISTORY: Prior colonoscopy: 3 years ago  BOWEL PREPARATION: Miralax/Dulcolax PREPROCEDURE: Informed consent was obtained for the procedure, including sedation  Risks including but not limited to bleeding, infection, perforation, adverse drug reaction and aspiration were explained in detail  Also explained about less than 100% sensitivity with the exam and other alternatives  The patient was placed in the left lateral decubitus position  DETAILS OF PROCEDURE: Patient was taken to the procedure room where a time out was performed to confirm correct patient and correct procedure  The patient underwent monitored anesthesia care, which was administered by an anesthesia professional  The patient's blood pressure, heart rate, level of consciousness, oxygen and respirations were monitored throughout the procedure  A digital rectal exam was performed  The scope was introduced through the anus and advanced to the cecum  Retroflexion was performed in the rectum  The quality of bowel preparation was evaluated using the [facility] Bowel Preparation Scale with scores of: right colon = 2, transverse colon = 2, left colon = 2  The total BBPS score was 6  Bowel prep was adequate  The patient's estimated blood loss was minimal (<5 mL)  The procedure was not difficult  The patient tolerated the procedure well  There were no apparent complications  ANESTHESIA INFORMATION: ASA: III Anesthesia Type: Anesthesia type not filed in the log   MEDICATIONS: No administrations occurring from 1206 to 1229 on 04/09/22 FINDINGS: Multiple small diverticula with no bleeding in the proximal ascending colon, mid ascending colon, proximal sigmoid colon, mid sigmoid colon and distal sigmoid colon One sessile polyp measuring smaller than 5 mm in the proximal ascending colon; performed complete removal by cold forceps biopsy The cecum, hepatic flexure, transverse colon, splenic flexure, descending colon, rectosigmoid and rectum appeared normal  Performed 12 biopsies in the ascending colon, transverse colon and sigmoid colon  Rule out eosinophilic colitis  EVENTS: Procedure Events Event Event Time ENDO CECUM REACHED 4/9/2022 12:21 PM ENDO SCOPE OUT TIME 4/9/2022 12:28 PM SPECIMENS: ID Type Source Tests Collected by Time Destination 1 : ascending, transverse and sigmoid Tissue Colon TISSUE EXAM Colby Blake DO 4/9/2022 12:22 PM  2 : proximal ascending colon polyp x 1 Tissue Polyp, Colorectal TISSUE EXAM Colby Blake DO 4/9/2022 12:25 PM  EQUIPMENT: Colonoscope -[id number]     Impression: 1  Diverticulosis coli, ascending colon sigmoid colon  No evidence of diverticulitis 2  Diminutive polyp in the proximal ascending colon status post cold biopsy removal RECOMMENDATION: Repeat colonoscopy in 5 years due to a personal history of colon polyps High-fiber diet to include fruits, vegetables, whole grain foods, daily Will call the patient 1 week regarding the polyp results and biopsy results Colby Blake DO [facility] right upper quadrant    Result Date: 4/11/2022  Narrative: RIGHT UPPER QUADRANT ULTRASOUND INDICATION:     elevated alkaline phosphate  COMPARISON:  4/7/2022 TECHNIQUE:   Real-time ultrasound of the right upper quadrant was performed with a curvilinear transducer with both volumetric sweeps and still imaging techniques  FINDINGS: PANCREAS:  Visualized portions of the pancreas are within normal limits  AORTA AND IVC:  Visualized portions are normal for patient age  LIVER: Size:  Mildly enlarged  The liver measures 17 7 cm in the midclavicular line  Contour:  Surface contour is smooth  Parenchyma:  Echogenicity and echotexture are within normal limits  No liver mass identified   Limited imaging of the main portal vein shows it to be patent and hepatopetal   BILIARY: No gallbladder findings  No intrahepatic biliary dilatation  CBD measures 4 0 mm  No choledocholithiasis  KIDNEY: Right kidney measures 11 5 x 4 9 x 5 3 cm  Volume 157 8 mL Kidney within normal limits  ASCITES:   None  Impression: 1  No biliary ductal dilatation  2   Mild hepatomegaly though otherwise unremarkable appearance  Workstation performed: [id number]     CT abdomen pelvis w contrast    Result Date: 4/13/2022  Narrative: CT ABDOMEN AND PELVIS WITH IV CONTRAST INDICATION:   Abdominal pain, acute, nonlocalized persistent abdominal pain  COMPARISON:  None  TECHNIQUE:  CT examination of the abdomen and pelvis was performed  Axial, sagittal, and coronal 2D reformatted images were created from the source data and submitted for interpretation  Radiation dose length product (DLP) for this visit:  676 mGy-cm   This examination, like all CT scans performed in the [facility], was performed utilizing techniques to minimize radiation dose exposure, including the use of iterative reconstruction and automated exposure control  IV Contrast:  50 mL of iohexol (OMNIPAQUE) 100 mL of iohexol (OMNIPAQUE) Enteric Contrast:  Enteric contrast was not administered  FINDINGS: ABDOMEN LOWER CHEST:  No clinically significant abnormality identified in the visualized lower chest  LIVER/BILIARY TREE:  Unremarkable  GALLBLADDER:  No calcified gallstones  No pericholecystic inflammatory change  SPLEEN:  Unremarkable  PANCREAS:  Unremarkable  ADRENAL GLANDS:  Unremarkable  KIDNEYS/URETERS:  3 6 mm calculus in the left kidney upper pole and 2 mm right kidney calculus     Subcentimeter hypodensities in both kidneys suggesting cysts  No hydronephrosis  STOMACH AND BOWEL:  Stool filled colon with possible impaction in the ascending and transverse colon  Sigmoid diverticulosis  Large duodenal diverticulum  APPENDIX:  No findings to suggest appendicitis  ABDOMINOPELVIC CAVITY:  No ascites  No pneumoperitoneum    No lymphadenopathy  VESSELS:  Unremarkable for patient's age  PELVIS REPRODUCTIVE ORGANS:  Post hysterectomy  Cystic area in the vaginal, urethral region is unchanged and benign  URINARY BLADDER:  Unremarkable  ABDOMINAL WALL/INGUINAL REGIONS:  Small fat-containing inguinal hernias  OSSEOUS STRUCTURES:  No acute fracture or destructive osseous lesion  Impression: Stool filled colon with possible impaction in the ascending and transverse colon  Sigmoid diverticulosis  No acute intra-abdominal finding  Workstation performed: [id number]     CT abdomen pelvis w contrast    Result Date: 4/7/2022  Narrative: CT ABDOMEN AND PELVIS WITH IV CONTRAST INDICATION:   LLQ abdominal pain RLQ abdominal pain (Age >= 14y) LUQ abdominal pain Eosinophila and worsening abdominal pain  Leukocytosis  COMPARISON:  CT abdomen/pelvis 4/4/2022  TECHNIQUE:  CT examination of the abdomen and pelvis was performed  In addition to portal venous phase postcontrast scanning through the abdomen and pelvis, delayed phase postcontrast scanning was performed through the upper abdominal viscera  Axial, sagittal, and coronal 2D reformatted images were created from the source data and submitted for interpretation  Radiation dose length product (DLP) for this visit:  1077 mGy-cm   This examination, like all CT scans performed in the [facility], was performed utilizing techniques to minimize radiation dose exposure, including the use of iterative reconstruction and automated exposure control  IV Contrast:  100 mL of iohexol (OMNIPAQUE) Enteric Contrast:  Enteric contrast was not administered  FINDINGS: ABDOMEN LOWER CHEST: No clinically significant abnormality is identified in the visualized lower chest  No consolidation or effusion  LIVER: Normal size and morphology  No suspicious lesion   There are stable geographic areas of portal venous phase hypoattenuation and delayed phase isoattenuation which are traversed by undistorted vessels, the larger in segment 2/4a superior to the middle hepatic vein, the smaller segment 1 adjacent to the IVC, findings compatible with focal fatty infiltration  BILIARY: No intrahepatic biliary ductal dilatation  Normal caliber common bile duct  GALLBLADDER: No calcified gallstones  There is vicarious excretion of contrast from prior study  Normal wall thickness  No pericholecystic inflammatory changes  SPLEEN: Within normal limits  No suspicious lesion  Normal spleen size  PANCREAS: Pancreatic parenchyma is within normal limits  No main pancreatic ductal dilatation  No peripancreatic inflammation  ADRENAL GLANDS: Within normal limits  KIDNEYS/URETERS: Normal size and position  Symmetric enhancement  No suspicious lesion  Bilateral simple cysts  Mild multifocal cortical scarring  Nonobstructing calculi are noted  No hydronephrosis  Ureters within normal limits  STOMACH AND BOWEL: Stomach is grossly within normal limits  Normal caliber small bowel  Normal caliber large bowel  Colonic diverticulosis  Again seen is severe wall thickening of the colon centered on the cecum/proximal ascending colon as well as the splenic flexure  Pericolonic inflammatory fat stranding about the right colon appears unchanged, and is slightly improved in appearance in the left upper quadrant  No evidence of perforation or abscess    APPENDIX: Normal appendix  ABDOMINOPELVIC CAVITY: Pericolic stranding as described  Trace pelvic free fluid, similar  No intraperitoneal free air  Multiple enlarged mesenteric lymph nodes measuring up to 1 cm short axis in the right lower quadrant which are presumably reactive, unchanged  No retroperitoneal hematoma  VESSELS: Normal caliber abdominal aorta with no detectable atherosclerotic plaque  The celiac, SMA, and HENRIQUE are patent  The main, right, and left portal veins are patent  The SMV and splenic vein are patent  The hepatic veins are patent  The renal arteries and veins are patent   PELVIS REPRODUCTIVE ORGANS:  Hysterectomy  No evidence of pelvic or adnexal mass  A 1 7 cm crescentic well-circumscribed hypodensity in the right aspect of the urethra is unchanged significantly since 2014, 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  URINARY BLADDER:  Within normal limits  No calculi  ABDOMINAL WALL/INGUINAL REGIONS:  Diastases recti with small fat-containing umbilical hernia  Small right-sided fat-containing indirect inguinal hernia  BONES:  Mild multilevel degenerative changes in the spine  Vertebral body height is maintained  No acute fracture or destructive osseous lesion  Grade 1 degenerative anterolisthesis performed  Impression: Overall similar appearance of colonic wall thickening centered at the cecum/ascending colon and at the splenic flexure, with similar pericolonic inflammatory stranding in the right colon and slight improvement on the left  No evidence of perforation or abscess formation  Workstation performed: [id number]     CT abdomen pelvis with contrast    Result Date: 4/4/2022  Narrative: CT ABDOMEN AND PELVIS WITH IV CONTRAST INDICATION:   Abdominal pain, acute, nonlocalized periumbilical, generalized pain  WBC count of 19,000 COMPARISON:  CT from August 9, 2014 TECHNIQUE:  CT examination of the abdomen and pelvis was performed  Axial, sagittal, and coronal 2D reformatted images were created from the source data and submitted for interpretation  Radiation dose length product (DLP) for this visit:  863 mGy-cm   This examination, like all CT scans performed in the [facility], was performed utilizing techniques to minimize radiation dose exposure, including the use of iterative reconstruction and automated exposure control  IV Contrast:  100 mL of iohexol (OMNIPAQUE) Enteric Contrast:  Enteric contrast was not administered   FINDINGS: ABDOMEN LOWER CHEST:  No clinically significant abnormality identified in the visualized lower chest  LIVER/BILIARY TREE:  There is a new geographic area in the liver is interposed between the middle hepatic vein and left hepatic vein, measuring about 3 3 cm, indeterminate probably due to focal fatty infiltration Additional hypodensity seen just into the IVC, segment 1, measuring 2 cm GALLBLADDER:  No calcified gallstones  No pericholecystic inflammatory change  No biliary dilation seen SPLEEN:  Unremarkable  PANCREAS:  Unremarkable  ADRENAL GLANDS:  Unremarkable  KIDNEYS/URETERS:  No hydronephrosis or urinary tract calculus  One or more sharply circumscribed subcentimeter renal hypodensities are present, too small to accurately characterize, and statistically most likely benign findings  According to recent literature (Radiology 2019) no further workup of these findings is recommended  Nonobstructing bilateral renal calculi are noted STOMACH AND BOWEL:  There is pericolonic infiltration in relation to the splenic flexure with wall thickening, likely due to diverticulitis  Additional thickening of the adjacent proximal descending colon seen Mild thickening of the descending colon with some surrounding infiltration at its mesenteric margin, image 76 series 601 Ileocecal junction appear unremarkable APPENDIX:  No findings to suggest appendicitis  Appendix is identified in image 72 series 601, image 79 series 601 The cecum is mobile ABDOMINOPELVIC CAVITY:  No ascites  No pneumoperitoneum  No lymphadenopathy  Mesenteric lymph nodes are seen measuring about 1 cm in the right lower quadrant, image 45 series 2 VESSELS:  Celiac trunk, SMA appear unremarkable Iliac vessels are patent PELVIS REPRODUCTIVE ORGANS:  A rounded the hypodense area seen within the cervix, stable URINARY BLADDER:  Unremarkable   ABDOMINAL WALL/INGUINAL REGIONS:  Rectal muscle diastasis seen OSSEOUS STRUCTURES:  No acute compression collapse vertebra No gross lytic lesion     Impression: There is thickening of the splenic flexure with associated pericolonic infiltration, suggestive diverticulitis  Additional thickening of the ascending colon with some infiltration at its mesenteric margin may be due to additional segment of diverticulitis or colitis  Suggest follow-up with the colonoscopy when the acute illness subsides to exclude  focal colonic lesion There is no fluid collection seen There is no free air Small mesenteric lymph nodes with largest measuring about 1 cm, short interval follow-up at 3 months suggested The study was marked in EPIC for immediate notification  Follow-up notification has been created in [employer] performed: [id number]     IR biopsy bone marrow    Result Date: 4/12/2022  Narrative: CT-scan guided diagnostic bone marrow aspiration and core biopsy History: Eosinophilia Conscious sedation time:  15 Radiation Dose:  132 mGy Technique: This examination, like all CT scans performed in the [facility], was performed utilizing techniques to minimize radiation dose exposure, including the use of iterative reconstruction and automated exposure control  The patient was brought to the CT scanner and placed prone on the table  After axial images were obtained through the pelvis, an area of the skin was then marked, prepped, and draped in usual sterile fashion  Lidocaine was administered to the skin, and subcutaneous tissues down to the periosteum of the right ileum, and a small skin incision was made  An OnControl needle was advanced percutaneously through the cortex, and a bone marrow aspiration was performed  The specimen was given to the cytotech to prepare, and was found to be adequate  A core biopsy was then performed  The bone core was placed in formalin  The patient tolerated the procedure well and suffered no complications  Impression: Impression: Successful percutaneous diagnostic bone marrow aspiration and bone core biopsy of right ileum, yielding a specimen adequate for evaluation   A full pathology report will follow    Workstation performed: [id number]         Assessment & Plan    Abdominal pain  Eosinophilia    - Patient developed abdominal pain about 2 weeks ago  Initially, she reports nausea/vomiting/diarrhea which subsided  - Initially CT imaging suggested diverticulitis and she was put on treatment with antibiotics  However, given her severe peripheral eosinophilia and continued abdominal pain (which were not consistent with diverticulitis), she underwent a colonoscopy for further evaluation   - Colonoscopy 4/9 with Dr Cyrus Haskins showed diverticulosis, no evidence of diverticulitis and a polyp in the ascending colon that was removed; 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  - Repeat CT Scan A/P 4/13 suggests a stool filled colon/constipation   - EGD 4/14 showed a mild gastric erosion; biopsies were taken and are pending   - Pathology from the colon biopsies 4/9 do show a patchy mild increase in eosinophils involving the mucosa and submucosa with focal intraepithelial eosinophils    - Strongyloides serology was negative and O&P and giardia testing were negative  HIV negative  ID input appreciated - intestinal parasite unlikely  - Hematology input appreciated  Follow up bone marrow biopsy results  Follow up chest CT  Follow up PDGFRA, PDGFRB, and FGFR1 rearrangements to evaluate for primary hypereosinophilic syndromes  - Continue supportive care measures, PPI course, etc   - Recommend trial of an empiric elimination diet (avoidance of soy, wheat, eggs, milk, peanuts, fish/shellfish)  She would like to start with elimination of the soy/milk/peanuts/fish but continue the eggs and wheat at this time  - Consideration for a Prednisone course pending review of the return of the additional work up as noted above  Patient will be seen by Dr Jane Roberts PA-C  4/16/2022,9:50 AM

## 2022-04-16 NOTE — ASSESSMENT & PLAN NOTE
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

## 2022-04-16 NOTE — ASSESSMENT & PLAN NOTE
· Stable at baseline    Results from last 7 days   Lab Units 04/16/22  0446 04/15/22  0954 04/14/22  0616 04/13/22  0602 04/12/22  0605 04/11/22  0533 04/10/22  0648   BUN mg/dL 17 20 17 16 13 11 6   CREATININE mg/dL 0 67 0 98 1 00 0 82 0 76 0 76 0 77   EGFR ml/min/1 73sq m 86 56 55 70 76 76 75

## 2022-04-17 PROBLEM — R05.9 COUGH: Status: ACTIVE | Noted: 2022-04-17

## 2022-04-17 PROBLEM — E87.5 HYPERKALEMIA: Status: RESOLVED | Noted: 2022-04-13 | Resolved: 2022-04-17

## 2022-04-17 LAB
ALBUMIN SERPL BCP-MCNC: 3.2 G/DL (ref 3.5–5)
ALP SERPL-CCNC: 256 U/L (ref 46–116)
ALT SERPL W P-5'-P-CCNC: 45 U/L (ref 12–78)
ANION GAP SERPL CALCULATED.3IONS-SCNC: 8 MMOL/L (ref 4–13)
AST SERPL W P-5'-P-CCNC: 34 U/L (ref 5–45)
BILIRUB SERPL-MCNC: 0.26 MG/DL (ref 0.2–1)
BUN SERPL-MCNC: 28 MG/DL (ref 5–25)
CALCIUM ALBUM COR SERPL-MCNC: 9.7 MG/DL (ref 8.3–10.1)
CALCIUM SERPL-MCNC: 9.1 MG/DL (ref 8.3–10.1)
CHLORIDE SERPL-SCNC: 96 MMOL/L (ref 100–108)
CO2 SERPL-SCNC: 28 MMOL/L (ref 21–32)
CREAT SERPL-MCNC: 1.1 MG/DL (ref 0.6–1.3)
FLUAV RNA RESP QL NAA+PROBE: NEGATIVE
FLUBV RNA RESP QL NAA+PROBE: NEGATIVE
GFR SERPL CREATININE-BSD FRML MDRD: 49 ML/MIN/1.73SQ M
GLUCOSE SERPL-MCNC: 126 MG/DL (ref 65–140)
GLUCOSE SERPL-MCNC: 130 MG/DL (ref 65–140)
POTASSIUM SERPL-SCNC: 5 MMOL/L (ref 3.5–5.3)
PROT SERPL-MCNC: 8.7 G/DL (ref 6.4–8.2)
RSV RNA RESP QL NAA+PROBE: NEGATIVE
SARS-COV-2 RNA RESP QL NAA+PROBE: NEGATIVE
SODIUM SERPL-SCNC: 132 MMOL/L (ref 136–145)

## 2022-04-17 PROCEDURE — 82948 REAGENT STRIP/BLOOD GLUCOSE: CPT

## 2022-04-17 PROCEDURE — 99232 SBSQ HOSP IP/OBS MODERATE 35: CPT | Performed by: INTERNAL MEDICINE

## 2022-04-17 PROCEDURE — 80053 COMPREHEN METABOLIC PANEL: CPT | Performed by: INTERNAL MEDICINE

## 2022-04-17 PROCEDURE — 94664 DEMO&/EVAL PT USE INHALER: CPT

## 2022-04-17 PROCEDURE — 0241U HB NFCT DS VIR RESP RNA 4 TRGT: CPT | Performed by: FAMILY MEDICINE

## 2022-04-17 PROCEDURE — 99233 SBSQ HOSP IP/OBS HIGH 50: CPT | Performed by: FAMILY MEDICINE

## 2022-04-17 RX ORDER — BENZONATATE 100 MG/1
100 CAPSULE ORAL 3 TIMES DAILY PRN
Status: DISCONTINUED | OUTPATIENT
Start: 2022-04-17 | End: 2022-04-19 | Stop reason: HOSPADM

## 2022-04-17 RX ORDER — BUDESONIDE 0.5 MG/2ML
0.5 INHALANT ORAL
Status: DISCONTINUED | OUTPATIENT
Start: 2022-04-17 | End: 2022-04-17

## 2022-04-17 RX ADMIN — AMLODIPINE BESYLATE 10 MG: 10 TABLET ORAL at 08:20

## 2022-04-17 RX ADMIN — BENZONATATE 100 MG: 100 CAPSULE ORAL at 11:36

## 2022-04-17 RX ADMIN — ASPIRIN 81 MG: 81 TABLET, CHEWABLE ORAL at 08:14

## 2022-04-17 RX ADMIN — PREGABALIN 50 MG: 50 CAPSULE ORAL at 08:14

## 2022-04-17 RX ADMIN — DICYCLOMINE HYDROCHLORIDE 10 MG: 10 CAPSULE ORAL at 21:23

## 2022-04-17 RX ADMIN — DICYCLOMINE HYDROCHLORIDE 10 MG: 10 CAPSULE ORAL at 06:35

## 2022-04-17 RX ADMIN — MONTELUKAST 10 MG: 10 TABLET, FILM COATED ORAL at 21:23

## 2022-04-17 RX ADMIN — ZOLPIDEM TARTRATE 5 MG: 5 TABLET, COATED ORAL at 21:23

## 2022-04-17 RX ADMIN — CARVEDILOL 6.25 MG: 6.25 TABLET, FILM COATED ORAL at 16:50

## 2022-04-17 RX ADMIN — DICYCLOMINE HYDROCHLORIDE 10 MG: 10 CAPSULE ORAL at 16:50

## 2022-04-17 RX ADMIN — PREGABALIN 50 MG: 50 CAPSULE ORAL at 16:50

## 2022-04-17 RX ADMIN — PREGABALIN 50 MG: 50 CAPSULE ORAL at 21:23

## 2022-04-17 RX ADMIN — PANTOPRAZOLE SODIUM 40 MG: 40 TABLET, DELAYED RELEASE ORAL at 16:49

## 2022-04-17 RX ADMIN — CARVEDILOL 6.25 MG: 6.25 TABLET, FILM COATED ORAL at 08:20

## 2022-04-17 RX ADMIN — OXYCODONE HYDROCHLORIDE 5 MG: 5 TABLET ORAL at 16:50

## 2022-04-17 RX ADMIN — PANTOPRAZOLE SODIUM 40 MG: 40 TABLET, DELAYED RELEASE ORAL at 06:34

## 2022-04-17 RX ADMIN — OXYCODONE HYDROCHLORIDE 5 MG: 5 TABLET ORAL at 08:15

## 2022-04-17 RX ADMIN — DICYCLOMINE HYDROCHLORIDE 10 MG: 10 CAPSULE ORAL at 11:36

## 2022-04-17 RX ADMIN — FLUTICASONE FUROATE AND VILANTEROL TRIFENATATE 1 PUFF: 200; 25 POWDER RESPIRATORY (INHALATION) at 08:16

## 2022-04-17 NOTE — PROGRESS NOTES
[address] [facility]  Progress Note - Robie Castleman 1945, 78 y o  female MRN: [id number]  Unit/Bed#: -[facility] Encounter: [id number]  Primary Care Provider: Sol Murphy   Date and time admitted to hospital: 4/4/2022  2:10 PM    * Eosinophilic colitis  Assessment & Plan  59-year-old female worsening abdominal pain found to have diverticulitis and significant eosinophilia  · Has had two initial CT scans demonstrating diverticulitis/colitis  Subsequent colonoscopy and repeat CT scan without evidence  · Seen by surgery, signed off  · Being followed by ID, monitoring off antibiotics  · Due to concerns of melanotic stools and persistent pain underwent EGD no significant findings  · Top differential likely hypereosinophilic syndrome vs eosinophilic colitis  GI recommends common allergic food elimination diet  GI to follow up tomorrow  · Pain has improved to 5-6/10    Cough  Assessment & Plan  · CT chest reviewed, right upper lobe shows patchy opacity with ground-glass thing  · Obtain COVID test  · Less likelihood of eosinophilic pneumonitis secondary to unilateral involvement  · Continue steroid containing inhalers, if unable to do, switch to Pulmicort nebulizer  · No signs of bacteria  · Obtain hypersensitivity pneumonitis panel  · Outpatient pulmonary follow-up    Eosinophilia  Assessment & Plan  · Improving  Has not received systemic steroid treatment yet  · Significant eosinophilia  stool parasite studies negative  · Flow cytometry of blood does not demonstrate any concern for dyscrasia  Some hematological studies pending  · Underwent bone marrow biopsy which did not have evidence of leukemia  · Further workup ordered her hematology  GI eliminating common food allergies  Will also discontinue lisinopril        Transaminitis  Assessment & Plan  · Mild transaminitis noted    Resolved    Results from last 7 days   Lab Units 04/17/22  0627 04/16/22  0446 04/15/22  7641 04/14/22  0616 04/13/22  0602 04/12/22  0605 04/11/22  0533   AST U/L 34 14 25 32 36 23 31   ALT U/L 45 24 39 48 41 41 48   TOTAL BILIRUBIN mg/dL 0 26 0 14* 0 29 0 36 0 44 0 29 0 30       Stage 3a chronic kidney disease (HCC)  Assessment & Plan  · Stable at baseline    Results from last 7 days   Lab Units 04/17/22  0627 04/16/22  0446 04/15/22  0954 04/14/22  0616 04/13/22  0602 04/12/22  0605 04/11/22  0533   BUN mg/dL 28* 17 20 17 16 13 11   CREATININE mg/dL 1 10 0 67 0 98 1 00 0 82 0 76 0 76   EGFR ml/min/1 73sq m 49 86 56 55 70 76 76       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)  Assessment & Plan  · Stable BG  · Prior to admission on janumet  Continue sliding scale insulin  · Did have episode of hypoglycemia  Now stable off dextrose infusion  · Restarted pregabalin    Results from last 7 days   Lab Units 04/17/22  0755 04/16/22  2131 04/16/22  0751 04/15/22  1535 04/15/22  1203 04/15/22  0936 04/15/22  0739 04/14/22  2105   POC GLUCOSE mg/dl 130 216* 145* 203* 121 155* 154* 212*       Mild intermittent asthma  Assessment & Plan  · No exacerbation continue breo  · Continue singulair    Hyperkalemia-resolved as of 4/17/2022  Assessment & Plan  resolved        VTE Pharmacologic Prophylaxis: VTE Score: 4 Lovenox    Patient Centered Rounds: I performed bedside rounds with nursing staff today  Discussions with Specialists or Other Care Team Provider:  Yes, nursing    Education and Discussions with Family / Patient: Attempted to update  () via phone  Unable to contact  Time Spent for Care: 20 minutes  More than 50% of total time spent on counseling and coordination of care as described above      Current Length of Stay: 13 day(s)  Current Patient Status: Inpatient   Certification Statement: The patient will continue to require additional inpatient hospital stay due to need for further recommendations from GI and better pain control prior to DC  Discharge Plan: Anticipate discharge in 24-48 hrs to home  Code Status: Level 1 - Full Code    Subjective:   Seen and examined at bedside  Continues to have abd pain but it has improved  No NVD or fever  Now has cough non productive, no hemptysis, NO SOB      Objective:     Vitals:   Temp (24hrs), Av 1 °F (36 7 °C), Min:97 3 °F (36 3 °C), Max:98 7 °F (37 1 °C)    Temp:  [97 3 °F (36 3 °C)-98 7 °F (37 1 °C)] 97 3 °F (36 3 °C)  HR:  [60-82] 80  Resp:  [18] 18  BP: (104-160)/(50-79) 160/71  SpO2:  [93 %-95 %] 93 %  Body mass index is 35 52 kg/m²  Input and Output Summary (last 24 hours):      Intake/Output Summary (Last 24 hours) at 2022 1332  Last data filed at 2022 0900  Gross per 24 hour   Intake 600 ml   Output --   Net 600 ml       Physical Exam:   Physical Exam General- Awake, alert and oriented x 3, looks comfortable  HEENT- Normocephalic, atraumatic, oral mucosa- moist  Neck- Supple, No carotid bruit, no JVD  CVS- Normal S1/ S2, Regular rate and rhythm, No murmur, No edema  Respiratory system- B/L clear breath sounds, no wheezing  Abdomen- Soft, Non distended, moderate tenderness, Bowel sound- present 4 quads  Genitourinary- No suprapubic tenderness, No CVA tenderness  Skin- No new bruise or rash  Musculoskeletal- No gross deformity  Psych- No acute psychosis  CNS- CN II- XII grossly intact, No acute focal neurologic deficit noted      Additional Data:     Labs:  Results from last 7 days   Lab Units 22  0500   WBC Thousand/uL 13 74*   HEMOGLOBIN g/dL 9 0*   HEMATOCRIT % 27 3*   PLATELETS Thousands/uL 256   NEUTROS PCT % 23*   LYMPHS PCT % 13*   MONOS PCT % 6   EOS PCT % 57*     Results from last 7 days   Lab Units 22  0627   SODIUM mmol/L 132*   POTASSIUM mmol/L 5 0   CHLORIDE mmol/L 96*   CO2 mmol/L 28   BUN mg/dL 28*   CREATININE mg/dL 1 10   ANION GAP mmol/L 8   CALCIUM mg/dL 9 1   ALBUMIN g/dL 3 2*   TOTAL BILIRUBIN mg/dL 0 26   ALK PHOS U/L 256*   ALT U/L 45   AST U/L 34   GLUCOSE RANDOM mg/dL 126 Results from last 7 days   Lab Units 04/17/22  0755 04/16/22  2131 04/16/22  0751 04/15/22  1535 04/15/22  1203 04/15/22  0936 04/15/22  0739 04/14/22  2105 04/14/22  1525 04/14/22  1109 04/14/22  0619 04/13/22  2359   POC GLUCOSE mg/dl 130 216* 145* 203* 121 155* 154* 212* 192* 218* 179* 203*               Lines/Drains:  Invasive Devices  Report    Peripheral Intravenous Line            Peripheral IV 04/16/22 Right Antecubital 1 day                      Imaging: Reviewed radiology reports from this admission including: chest CT scan    Recent Cultures (last 7 days):         Last 24 Hours Medication List:   Current Facility-Administered Medications   Medication Dose Route Frequency Provider Last Rate    acetaminophen  650 mg Oral Q4H PRN Laura Campos MD      albuterol  1 puff Inhalation Q4H PRN Meenakshi Blanchard, DO      amLODIPine  10 mg Oral Daily Meenakshi Blanchard, DO      aspirin  81 mg Oral Daily Meenakshi Blanchard, DO      benzonatate  100 mg Oral TID PRN Pauline Paez MD      carvedilol  6 25 mg Oral BID With Meals Pleasant Maple, DO      dicyclomine  10 mg Oral 4x Daily (AC & HS) Pleasant Maple, DO      enoxaparin  40 mg Subcutaneous Daily Meenakshi Blanchard, DO      fluticasone-vilanterol  1 puff Inhalation Daily Meenakshi Blanchard, DO      hydrALAZINE  5 mg Intravenous Q6H PRN Sam Beach PA-C      HYDROmorphone  0 5 mg Intravenous Q4H PRN Meenakshi Blanchard, DO      HYDROmorphone  1 mg Intravenous Q4H PRN Meenakshi Blanchard, DO      insulin lispro  1-5 Units Subcutaneous Daily With Breakfast Chance Porter, DO      iohexol  50 mL Oral Once in imaging Meenakshi Blanchard, DO      LORazepam  0 5 mg Oral Q8H PRN Meenakshi Blanchard, DO      montelukast  10 mg Oral HS Chance Porter, DO      naloxone  0 04 mg Intravenous Q1MIN PRN Meenakshi Blanchard, DO      ondansetron  4 mg Intravenous Q6H PRN Meenakshi Blanchard, DO      oxyCODONE  10 mg Oral Q4H PRN Laura Campos MD      oxyCODONE  5 mg Oral Q4H PRN MD Mickey Chavez pantoprazole  40 mg Oral BID AC Chance Porter DO      pregabalin  50 mg Oral TID Aleksandr Whelan,       zolpidem  5 mg Oral HS PRN Yohana Pugh DO          Today, Patient Was Seen By: Zoraida Barriga MD    **Please Note: This note may have been constructed using a voice recognition system  **

## 2022-04-17 NOTE — ASSESSMENT & PLAN NOTE
· Improving  Has not received systemic steroid treatment yet  · Significant eosinophilia  stool parasite studies negative  · Flow cytometry of blood does not demonstrate any concern for dyscrasia  Some hematological studies pending  · Underwent bone marrow biopsy which did not have evidence of leukemia  · Further workup ordered her hematology  GI eliminating common food allergies    Will also discontinue lisinopril

## 2022-04-17 NOTE — ASSESSMENT & PLAN NOTE
71-year-old female worsening abdominal pain found to have diverticulitis and significant eosinophilia  · Has had two initial CT scans demonstrating diverticulitis/colitis  Subsequent colonoscopy and repeat CT scan without evidence  · Seen by surgery, signed off  · Being followed by ID, monitoring off antibiotics  · Due to concerns of melanotic stools and persistent pain underwent EGD no significant findings  · Top differential likely hypereosinophilic syndrome vs eosinophilic colitis  GI recommends common allergic food elimination diet   GI to follow up tomorrow  · Pain has improved to 5-6/10

## 2022-04-17 NOTE — NURSING NOTE
Pt refused to have blood work drawn around 1600  Will attempt again overnight and if pt still refuses, will collect with morning labs

## 2022-04-17 NOTE — ASSESSMENT & PLAN NOTE
· CT chest reviewed, right upper lobe shows patchy opacity with ground-glass thing  · Obtain COVID test  · Less likelihood of eosinophilic pneumonitis secondary to unilateral involvement  · Continue steroid containing inhalers, if unable to do, switch to Pulmicort nebulizer  · No signs of bacteria  · Obtain hypersensitivity pneumonitis panel  · Outpatient pulmonary follow-up

## 2022-04-17 NOTE — ASSESSMENT & PLAN NOTE
· Stable BG  · Prior to admission on janumet  Continue sliding scale insulin  · Did have episode of hypoglycemia    Now stable off dextrose infusion  · Restarted pregabalin    Results from last 7 days   Lab Units 04/17/22  0755 04/16/22  2131 04/16/22  0751 04/15/22  1535 04/15/22  1203 04/15/22  0936 04/15/22  0739 04/14/22  2105   POC GLUCOSE mg/dl 130 216* 145* 203* 121 155* 154* 212*

## 2022-04-17 NOTE — ASSESSMENT & PLAN NOTE
· Stable at baseline    Results from last 7 days   Lab Units 04/17/22  0627 04/16/22  0446 04/15/22  0954 04/14/22  0616 04/13/22  0602 04/12/22  0605 04/11/22  0533   BUN mg/dL 28* 17 20 17 16 13 11   CREATININE mg/dL 1 10 0 67 0 98 1 00 0 82 0 76 0 76   EGFR ml/min/1 73sq m 49 86 56 55 70 76 76

## 2022-04-17 NOTE — PROGRESS NOTES
Progress Note - Infectious Disease   [facility] 78 y o  female MRN: [id number]  Unit/Bed#: -01 Encounter: [id number]      Impression/Recommendations:  1   Abdominal pain/diarrhea with high level eosinophilia   This level of eosinophilia is new, with last CBC in February of this year only with low level eosinophilia   This high level of eosinophilia is not consistent with intestinal parasites   Possible eosinophilic enteritis although colonoscopy grossly normal without inflammatory changes  No diverticulitis seen  Patient had been on antibiotic earlier in the hospitalization but these have been discontinued  She remains clinically well off antibiotic  Status post bone marrow biopsy   Pathology is pending   -continue to monitor off all antibiotics  -Monitor CBC with diff  -monitor abdominal pain  -GI follow-up     2  High level eosinophilia  As in above, at this level of eosinophilia, intestinal parasite is unlikely  Consider strongyloides hyperinfection syndrome but clinical presentation is not consistent with this and the patient is not immunosuppressed   Regardless, Strongyloides serology is negative    HIV negative   Stool O&P negative   Colonoscopy without evidence of inflammatory bowel disease or diverticulitis, biopsies are pending  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  Flow cytometry negative  Bone marrow biopsy was done, with preliminary pathology not showing leukemia   Consider idiopathic hypereosinophilic syndrome (HES)  -Follow up final bone marrow biopsy  -Monitor CBC with diff  -hematology/oncology follow-up     3  Mildly elevated LFTs, likely secondary to colitis above   Patient has no RUQ abdominal pain  AST/ALT now normalized  -monitor LFTs     4  CKD  Creatinine at baseline  Continue to monitor      5  DM, with neuropathy     -Management per primary service      Discussed with patient in detail regarding the above plan  Discharge plan per primary service    Okay for discharge from ID viewpoint  Hematology follow-up after discharge      Antibiotics:  Off antibiotics      Subjective:  Patient is comfortable  Abdominal pain continues to improve, only mild now     She is tolerating diet well  Temperature stays down   No chills  No diarrhea      Objective:  Vitals:  Temp:  [97 3 °F (36 3 °C)-98 7 °F (37 1 °C)] 97 3 °F (36 3 °C)  HR:  [60-82] 80  Resp:  [18] 18  BP: (104-160)/(50-79) 160/71  SpO2:  [93 %-95 %] 93 %  Temp (24hrs), Av 1 °F (36 7 °C), Min:97 3 °F (36 3 °C), Max:98 7 °F (37 1 °C)  Current: Temperature: (!) 97 3 °F (36 3 °C)    Physical Exam:     General: Awake, alert, cooperative, no distress  Neck:  Supple  No mass  No lymphadenopathy  Lungs: Expansion symmetric, no rales, no wheezing, respirations unlabored  Heart:  Regular rate and rhythm, S1 and S2 normal, no murmur  Abdomen: Soft, nondistended, very mild periumbilical tenderness, bowel sounds active all four quadrants, no masses, no organomegaly  Extremities: No edema  No erythema/warmth  No ulcer  Nontender to palpation  Skin:  No rash  Neuro: Moves all extremities  Invasive Devices  Report    Peripheral Intravenous Line            Peripheral IV 22 Right Antecubital 1 day                Labs studies:   I have personally reviewed pertinent labs  Results from last 7 days   Lab Units 22  0627 22  0446 04/15/22  0954   POTASSIUM mmol/L 5 0 3 2* 4 9   CHLORIDE mmol/L 96* 111* 101   CO2 mmol/L 28 20* 25   BUN mg/dL 28* 17 20   CREATININE mg/dL 1 10 0 67 0 98   EGFR ml/min/1 73sq m 49 86 56   CALCIUM mg/dL 9 1 6 1* 9 1   AST U/L 34 14 25   ALT U/L 45 24 39   ALK PHOS U/L 256* 142* 248*     Results from last 7 days   Lab Units 22  0500 22  0616 22  0602   WBC Thousand/uL 13 74* 26 65* 24 75*   HEMOGLOBIN g/dL 9 0* 11 6 10 8*   PLATELETS Thousands/uL 256 275 223           Imaging Studies:   I have personally reviewed pertinent imaging study reports and images in PACS      EKG, Pathology, and Other Studies:   I have personally reviewed pertinent reports

## 2022-04-17 NOTE — RESPIRATORY THERAPY NOTE
RT Protocol Note  Ellie Serna 78 y o  female MRN: [id number]  Unit/Bed#: -01 Encounter: [id number]    Assessment    Principal Problem:    Eosinophilic colitis  Active Problems:    Mild intermittent asthma    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)    Stage 3a chronic kidney disease ([facility] [address] )    Primary hypertension    Transaminitis    Eosinophilia    Hyperkalemia      Home Pulmonary Medications:     04/17/22 1209   Respiratory Protocol   Protocol Initiated? Yes   Protocol Selection Respiratory   Language Barrier? No   Medical & Social History Reviewed? Yes   Diagnostic Studies Reviewed? Yes   Physical Assessment Performed? Yes   Home Devices/Therapy Other (Comment)  (albuterol as needed and advair)   Respiratory Plan No distress/Pulmonary history   Respiratory Assessment   Assessment Type Assess only   General Appearance Alert; Awake   Respiratory Pattern Normal   Chest Assessment Chest expansion symmetrical   Bilateral Breath Sounds Diminished   Cough Non-productive   O2 Device ra     Home Devices/Therapy: Other (Comment) (albuterol as needed and advair)    Past Medical History:   Diagnosis Date    Asthma     Benign hypertension 11/30/2018    Cardiac arrest ([facility] [place] [facility] )     Diabetes mellitus ([facility] [place] [facility] )     Glaucoma     Hypertension     Kidney stone     Neuropathy     Sleep apnea     no machine    SOB (shortness of breath)     Tubular adenoma of colon 10/2019    Wheezing      Social History     Socioeconomic History    Marital status: /Civil Union     Spouse name: None    Number of children: None    Years of education: None    Highest education level: None   Occupational History    Occupation: retired    Tobacco Use    Smoking status: Never Smoker    Smokeless tobacco: Never Used   Vaping Use    Vaping Use: None   Substance and Sexual Activity    Alcohol use: Never    Drug use: No    Sexual activity: Yes     Birth control/protection: Surgical   Other Topics Concern    None Social History Narrative    None     Social Determinants of Health     Financial Resource Strain: Not on file   Food Insecurity: No Food Insecurity    Worried About Running Out of Food in the Last Year: Never true    Latisha of Food in the Last Year: Never true   Transportation Needs: No Transportation Needs    Lack of Transportation (Medical): No    Lack of Transportation (Non-Medical): No   Physical Activity: Not on file   Stress: Not on file   Social Connections: Not on file   Intimate Partner Violence: Not on file   Housing Stability: Low Risk     Unable to Pay for Housing in the Last Year: No    Number of Places Lived in the Last Year: 1    Unstable Housing in the Last Year: No       Subjective         Objective    Physical Exam:   Assessment Type: Assess only  General Appearance: Alert,Awake  Respiratory Pattern: Normal  Chest Assessment: Chest expansion symmetrical  Bilateral Breath Sounds: Diminished  Cough: Non-productive  O2 Device: ra    Vitals:  Blood pressure 160/71, pulse 80, temperature (!) 97 3 °F (36 3 °C), resp  rate 18, height 5' 2" (1 575 m), weight 88 1 kg (194 lb 3 6 oz), SpO2 93 %, not currently breastfeeding  Imaging and other studies: I have personally reviewed pertinent reports        O2 Device: ra     Plan    Respiratory Plan: No distress/Pulmonary history

## 2022-04-17 NOTE — ASSESSMENT & PLAN NOTE
· Mild transaminitis noted    Resolved    Results from last 7 days   Lab Units 04/17/22  0627 04/16/22  0446 04/15/22  0954 04/14/22  0616 04/13/22  0602 04/12/22  0605 04/11/22  0533   AST U/L 34 14 25 32 36 23 31   ALT U/L 45 24 39 48 41 41 48   TOTAL BILIRUBIN mg/dL 0 26 0 14* 0 29 0 36 0 44 0 29 0 30

## 2022-04-18 LAB
ANION GAP SERPL CALCULATED.3IONS-SCNC: 7 MMOL/L (ref 4–13)
BASOPHILS # BLD AUTO: 0.06 THOUSANDS/ΜL (ref 0–0.1)
BASOPHILS NFR BLD AUTO: 1 % (ref 0–1)
BUN SERPL-MCNC: 31 MG/DL (ref 5–25)
CALCIUM SERPL-MCNC: 9.1 MG/DL (ref 8.3–10.1)
CHLORIDE SERPL-SCNC: 97 MMOL/L (ref 100–108)
CO2 SERPL-SCNC: 28 MMOL/L (ref 21–32)
CREAT SERPL-MCNC: 1.15 MG/DL (ref 0.6–1.3)
EOSINOPHIL # BLD AUTO: 8.22 THOUSAND/ΜL (ref 0–0.61)
EOSINOPHIL NFR BLD AUTO: 63 % (ref 0–6)
ERYTHROCYTE [DISTWIDTH] IN BLOOD BY AUTOMATED COUNT: 15.2 % (ref 11.6–15.1)
GFR SERPL CREATININE-BSD FRML MDRD: 46 ML/MIN/1.73SQ M
GLUCOSE SERPL-MCNC: 158 MG/DL (ref 65–140)
GLUCOSE SERPL-MCNC: 227 MG/DL (ref 65–140)
HCT VFR BLD AUTO: 33.4 % (ref 34.8–46.1)
HGB BLD-MCNC: 10.7 G/DL (ref 11.5–15.4)
IMM GRANULOCYTES # BLD AUTO: 0.03 THOUSAND/UL (ref 0–0.2)
IMM GRANULOCYTES NFR BLD AUTO: 0 % (ref 0–2)
LYMPHOCYTES # BLD AUTO: 2.46 THOUSANDS/ΜL (ref 0.6–4.47)
LYMPHOCYTES NFR BLD AUTO: 19 % (ref 14–44)
MCH RBC QN AUTO: 28.9 PG (ref 26.8–34.3)
MCHC RBC AUTO-ENTMCNC: 32 G/DL (ref 31.4–37.4)
MCV RBC AUTO: 90 FL (ref 82–98)
MISCELLANEOUS LAB TEST RESULT: NORMAL
MONOCYTES # BLD AUTO: 0.58 THOUSAND/ΜL (ref 0.17–1.22)
MONOCYTES NFR BLD AUTO: 4 % (ref 4–12)
NEUTROPHILS # BLD AUTO: 1.72 THOUSANDS/ΜL (ref 1.85–7.62)
NEUTS SEG NFR BLD AUTO: 13 % (ref 43–75)
NRBC BLD AUTO-RTO: 0 /100 WBCS
PLATELET # BLD AUTO: 316 THOUSANDS/UL (ref 149–390)
PMV BLD AUTO: 9.2 FL (ref 8.9–12.7)
POTASSIUM SERPL-SCNC: 4.8 MMOL/L (ref 3.5–5.3)
RBC # BLD AUTO: 3.7 MILLION/UL (ref 3.81–5.12)
SCAN RESULT: NORMAL
SODIUM SERPL-SCNC: 132 MMOL/L (ref 136–145)
TRYPTASE SERPL-MCNC: 7 UG/L (ref 2.2–13.2)
WBC # BLD AUTO: 13.07 THOUSAND/UL (ref 4.31–10.16)

## 2022-04-18 PROCEDURE — 99232 SBSQ HOSP IP/OBS MODERATE 35: CPT | Performed by: INTERNAL MEDICINE

## 2022-04-18 PROCEDURE — 82948 REAGENT STRIP/BLOOD GLUCOSE: CPT

## 2022-04-18 PROCEDURE — 99232 SBSQ HOSP IP/OBS MODERATE 35: CPT | Performed by: HOSPITALIST

## 2022-04-18 PROCEDURE — 86671 FUNGUS NES ANTIBODY: CPT | Performed by: FAMILY MEDICINE

## 2022-04-18 PROCEDURE — 80048 BASIC METABOLIC PNL TOTAL CA: CPT | Performed by: FAMILY MEDICINE

## 2022-04-18 PROCEDURE — 86331 IMMUNODIFFUSION OUCHTERLONY: CPT | Performed by: FAMILY MEDICINE

## 2022-04-18 PROCEDURE — 86602 ANTINOMYCES ANTIBODY: CPT | Performed by: FAMILY MEDICINE

## 2022-04-18 PROCEDURE — 86606 ASPERGILLUS ANTIBODY: CPT | Performed by: FAMILY MEDICINE

## 2022-04-18 PROCEDURE — 85025 COMPLETE CBC W/AUTO DIFF WBC: CPT | Performed by: INTERNAL MEDICINE

## 2022-04-18 RX ADMIN — PANTOPRAZOLE SODIUM 40 MG: 40 TABLET, DELAYED RELEASE ORAL at 08:57

## 2022-04-18 RX ADMIN — OXYCODONE HYDROCHLORIDE 10 MG: 10 TABLET ORAL at 22:21

## 2022-04-18 RX ADMIN — MONTELUKAST 10 MG: 10 TABLET, FILM COATED ORAL at 22:21

## 2022-04-18 RX ADMIN — AMLODIPINE BESYLATE 10 MG: 10 TABLET ORAL at 08:51

## 2022-04-18 RX ADMIN — CARVEDILOL 6.25 MG: 6.25 TABLET, FILM COATED ORAL at 08:52

## 2022-04-18 RX ADMIN — DICYCLOMINE HYDROCHLORIDE 10 MG: 10 CAPSULE ORAL at 18:08

## 2022-04-18 RX ADMIN — BENZONATATE 100 MG: 100 CAPSULE ORAL at 22:25

## 2022-04-18 RX ADMIN — DICYCLOMINE HYDROCHLORIDE 10 MG: 10 CAPSULE ORAL at 08:57

## 2022-04-18 RX ADMIN — PREGABALIN 50 MG: 50 CAPSULE ORAL at 08:52

## 2022-04-18 RX ADMIN — BENZONATATE 100 MG: 100 CAPSULE ORAL at 02:01

## 2022-04-18 RX ADMIN — PANTOPRAZOLE SODIUM 40 MG: 40 TABLET, DELAYED RELEASE ORAL at 18:09

## 2022-04-18 RX ADMIN — CARVEDILOL 6.25 MG: 6.25 TABLET, FILM COATED ORAL at 18:09

## 2022-04-18 RX ADMIN — FLUTICASONE FUROATE AND VILANTEROL TRIFENATATE 1 PUFF: 200; 25 POWDER RESPIRATORY (INHALATION) at 08:52

## 2022-04-18 RX ADMIN — OXYCODONE HYDROCHLORIDE 10 MG: 10 TABLET ORAL at 11:31

## 2022-04-18 RX ADMIN — OXYCODONE HYDROCHLORIDE 10 MG: 10 TABLET ORAL at 05:21

## 2022-04-18 RX ADMIN — DICYCLOMINE HYDROCHLORIDE 10 MG: 10 CAPSULE ORAL at 11:31

## 2022-04-18 RX ADMIN — PREGABALIN 50 MG: 50 CAPSULE ORAL at 18:09

## 2022-04-18 RX ADMIN — PREGABALIN 50 MG: 50 CAPSULE ORAL at 22:25

## 2022-04-18 RX ADMIN — DICYCLOMINE HYDROCHLORIDE 10 MG: 10 CAPSULE ORAL at 22:21

## 2022-04-18 RX ADMIN — ASPIRIN 81 MG: 81 TABLET, CHEWABLE ORAL at 08:49

## 2022-04-18 NOTE — ASSESSMENT & PLAN NOTE
· CT chest reviewed, right upper lobe shows patchy opacity with ground-glass thing  · covid test neg  · Improved with tessalon pearles   · Less likelihood of eosinophilic pneumonitis secondary to unilateral involvement  · Continue steroid containing inhalers, if unable to do, switch to Pulmicort nebulizer  · No signs of bacteria  · Obtain hypersensitivity pneumonitis panel  · Outpatient pulmonary follow-up

## 2022-04-18 NOTE — ASSESSMENT & PLAN NOTE
· Stable at baseline    Results from last 7 days   Lab Units 04/17/22  0627 04/16/22  0446 04/15/22  0954 04/14/22  0616 04/13/22  0602 04/12/22  0605   BUN mg/dL 28* 17 20 17 16 13   CREATININE mg/dL 1 10 0 67 0 98 1 00 0 82 0 76   EGFR ml/min/1 73sq m 49 86 56 55 70 76

## 2022-04-18 NOTE — ASSESSMENT & PLAN NOTE
· Continue amlodipine but discontinue lisinopril due to concerns of allergic reaction given eosinophilia  · Cont carvedilol 6 25 mg b i d

## 2022-04-18 NOTE — ASSESSMENT & PLAN NOTE
· Stable BG  · Prior to admission on janumet  Continue sliding scale insulin  · Did have episode of hypoglycemia    Now stable off dextrose infusion  · Restarted pregabalin    Results from last 7 days   Lab Units 04/18/22  0844 04/17/22  0755 04/16/22  2131 04/16/22  0751 04/15/22  1535 04/15/22  1203 04/15/22  0936 04/15/22  0739   POC GLUCOSE mg/dl 227* 130 216* 145* 203* 121 155* 154*

## 2022-04-18 NOTE — PROGRESS NOTES
[address] [facility]  Progress Note - Lawernce Cheese 1945, 78 y o  female MRN: [id number]  Unit/Bed#: MS Gurinder-Quinn Encounter: [id number]  Primary Care Provider: ALEKSANDER Beltran   Date and time admitted to hospital: 4/4/2022  2:10 PM    Cough  Assessment & Plan  · CT chest reviewed, right upper lobe shows patchy opacity with ground-glass thing  · covid test neg  · Improved with tessalon pearles   · Less likelihood of eosinophilic pneumonitis secondary to unilateral involvement  · Continue steroid containing inhalers, if unable to do, switch to Pulmicort nebulizer  · No signs of bacteria  · Obtain hypersensitivity pneumonitis panel  · Outpatient pulmonary follow-up    Eosinophilia  Assessment & Plan  · Improving  Has not received systemic steroid treatment yet  · Significant eosinophilia  stool parasite studies negative  · Flow cytometry of blood does not demonstrate any concern for dyscrasia  Some hematological studies pending  · Underwent bone marrow biopsy which did not have evidence of leukemia  · Further workup ordered her hematology  GI eliminating common food allergies  Will also discontinue lisinopril        Transaminitis  Assessment & Plan  · Mild transaminitis noted  Resolved    Results from last 7 days   Lab Units 04/17/22  0627 04/16/22  0446 04/15/22  0954 04/14/22  0616 04/13/22  0602 04/12/22  0605   AST U/L 34 14 25 32 36 23   ALT U/L 45 24 39 48 41 41   TOTAL BILIRUBIN mg/dL 0 26 0 14* 0 29 0 36 0 44 0 29       Primary hypertension  Assessment & Plan  · Continue amlodipine but discontinue lisinopril due to concerns of allergic reaction given eosinophilia  · Cont carvedilol 6 25 mg b i d      Stage 3a chronic kidney disease [facility])  Assessment & Plan  · Stable at baseline    Results from last 7 days   Lab Units 04/17/22  0627 04/16/22  0446 04/15/22  0954 04/14/22  0616 04/13/22  0602 04/12/22  0605   BUN mg/dL 28* 17 20 17 16 13   CREATININE mg/dL 1 10 0 67 0 98 1 00 0 82 0 76 EGFR ml/min/1 73sq m 49 86 56 55 70 76       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)  Assessment & Plan  · Stable BG  · Prior to admission on janumet  Continue sliding scale insulin  · Did have episode of hypoglycemia  Now stable off dextrose infusion  · Restarted pregabalin    Results from last 7 days   Lab Units 22  0844 22  0755 22  2131 22  0751 04/15/22  1535 04/15/22  1203 04/15/22  0936 04/15/22  0739   POC GLUCOSE mg/dl 227* 130 216* 145* 203* 121 155* 154*       Mild intermittent asthma  Assessment & Plan  · No exacerbation continue breo  · Continue singulair    * Eosinophilic colitis  Assessment & Plan  22-year-old female worsening abdominal pain found to have diverticulitis and significant eosinophilia  · Has had two initial CT scans demonstrating diverticulitis/colitis  Subsequent colonoscopy and repeat CT scan without evidence  · Seen by surgery, signed off  · Being followed by ID, monitoring off antibiotics  · Due to concerns of melanotic stools and persistent pain underwent EGD no significant findings  · Top differential likely hypereosinophilic syndrome vs eosinophilic colitis  GI recommends common allergic food elimination diet  GI to follow up tomorrow  · Pain has improved to 5-6/10         VTE  Prophylaxis:   Pharmacologic: in place    Patient Centered Rounds: I have performed bedside rounds with nursing staff today  Discussions with Specialists or Other Care Team Provider: case management    Education and Discussions with Family / Patient: pt    Current Length of Stay: 14 day(s)    Current Patient Status: Inpatient        Code Status: Level 1 - Full Code      Subjective:   Reports abd pain better  Still requiring significant narcotics  Denies n/v  Thinks pain is 5/10    Patient is seen and examined at bedside  All other ROS are negative      Objective:     Vitals:   Temp (24hrs), Av °F (36 7 °C), Min:97 7 °F (36 5 °C), Max:98 2 °F (36 8 °C)    Temp:  [97 7 °F (36 5 °C)-98 2 °F (36 8 °C)] 98 2 °F (36 8 °C)  HR:  [58-74] 67  Resp:  [18] 18  BP: (112-133)/(53-70) 117/53  SpO2:  [93 %-96 %] 96 %  Body mass index is 35 12 kg/m²  Input and Output Summary (last 24 hours): Intake/Output Summary (Last 24 hours) at 4/18/2022 1313  Last data filed at 4/18/2022 0846  Gross per 24 hour   Intake 500 ml   Output 500 ml   Net 0 ml       Physical Exam:       GEN: No acute distress, comfortable, obese  HEEENT: No JVD, PERRLA, no scleral icterus  RESP: Lungs clear to auscultation bilaterally  CV: RRR, +s1/s2   ABD: SOFT NON TENDER, POSITIVE BOWEL SOUNDS, NO DISTENTION  PSYCH: CALM  NEURO: A X O X 3, NO FOCAL DEFICITS  SKIN: NO RASH  EXTREM: NO EDEMA    Additional Data:     Labs:    Results from last 7 days   Lab Units 04/16/22  0500   WBC Thousand/uL 13 74*   HEMOGLOBIN g/dL 9 0*   HEMATOCRIT % 27 3*   PLATELETS Thousands/uL 256   NEUTROS PCT % 23*   LYMPHS PCT % 13*   MONOS PCT % 6   EOS PCT % 57*     Results from last 7 days   Lab Units 04/17/22  0627   SODIUM mmol/L 132*   POTASSIUM mmol/L 5 0   CHLORIDE mmol/L 96*   CO2 mmol/L 28   BUN mg/dL 28*   CREATININE mg/dL 1 10   ANION GAP mmol/L 8   CALCIUM mg/dL 9 1   ALBUMIN g/dL 3 2*   TOTAL BILIRUBIN mg/dL 0 26   ALK PHOS U/L 256*   ALT U/L 45   AST U/L 34   GLUCOSE RANDOM mg/dL 126         Results from last 7 days   Lab Units 04/18/22  0844 04/17/22  0755 04/16/22  2131 04/16/22  0751 04/15/22  1535 04/15/22  1203 04/15/22  0936 04/15/22  0739 04/14/22  2105 04/14/22  1525 04/14/22  1109 04/14/22  0619   POC GLUCOSE mg/dl 227* 130 216* 145* 203* 121 155* 154* 212* 192* 218* 179*                   * I Have Reviewed All Lab Data Listed Above  Imaging:     Results for orders placed during the hospital encounter of 12/18/21    XR chest 1 view portable    Narrative  CHEST    INDICATION:   chest pain      COMPARISON:  None    EXAM PERFORMED/VIEWS:  XR CHEST PORTABLE      FINDINGS:    Cardiomediastinal silhouette appears unremarkable  The lungs are clear  No pneumothorax or pleural effusion  Osseous structures appear within normal limits for patient age  Impression  No acute cardiopulmonary disease  Workstation performed: [id number]    Results for orders placed during the hospital encounter of 06/16/21    XR chest pa & lateral    Narrative  CHEST    INDICATION:   J42: Unspecified chronic bronchitis  R06 02: Shortness of breath  COMPARISON:  Chest CT from 11/3/2016  EXAM PERFORMED/VIEWS:  XR CHEST PA & LATERAL  FINDINGS:    Cardiomediastinal silhouette normal     Lungs clear  No effusion or pneumothorax  Osseous structures normal for age  Impression  No acute cardiopulmonary disease            Workstation performed: [id number]      *I have reviewed all imaging reports listed above      Recent Cultures (last 7 days):           Last 24 Hours Medication List:   Current Facility-Administered Medications   Medication Dose Route Frequency Provider Last Rate    acetaminophen  650 mg Oral Q4H PRN Tara Loco MD      amLODIPine  10 mg Oral Daily Tomasz Davis, DO      aspirin  81 mg Oral Daily Tomasz Davis [facility]      benzonatate  100 mg Oral TID PRN Gradie Bloch, MD      carvedilol  6 25 mg Oral BID With Meals [place], DO      dicyclomine  10 mg Oral 4x Daily (AC & HS) Great amy, DO      enoxaparin  40 mg Subcutaneous Daily Tomasz Davis, DO      fluticasone-vilanterol  1 puff Inhalation Daily [facility] [place]      hydrALAZINE  5 mg Intravenous Q6H PRN Yfn Ortega PA-C      HYDROmorphone  0 5 mg Intravenous Q4H PRN Tomasz Davis, DO      HYDROmorphone  1 mg Intravenous Q4H PRN Tomasz Davis, DO      insulin lispro  1-5 Units Subcutaneous Daily With Breakfast Chance Porter, DO      iohexol  50 mL Oral Once in imaging Tomasz Davis, DO      LORazepam  0 5 mg Oral Q8H PRN Tomasz Davis, DO      montelukast  10 mg Oral HS Chance Porter, DO      naloxone  0 04 mg Intravenous Q1MIN PRN Birtha Lexy, DO      ondansetron  4 mg Intravenous Q6H PRN Birthprosper Pugh, DO      oxyCODONE  10 mg Oral Q4H PRN Verena Ordaz MD      oxyCODONE  5 mg Oral Q4H PRN Verena Ordaz MD      pantoprazole  40 mg Oral BID AC Aleksandr Games, DO      pregabalin  50 mg Oral TID Aleksandr Games, DO      zolpidem  5 mg Oral HS PRN Yohana Pugh, DO          Today, Patient Was Seen By: Shae Elliott MD    ** Please Note: Dictation voice to text software may have been used in the creation of this document   **

## 2022-04-18 NOTE — ASSESSMENT & PLAN NOTE
· Mild transaminitis noted    Resolved    Results from last 7 days   Lab Units 04/17/22  0627 04/16/22  0446 04/15/22  0954 04/14/22  0616 04/13/22  0602 04/12/22  0605   AST U/L 34 14 25 32 36 23   ALT U/L 45 24 39 48 41 41   TOTAL BILIRUBIN mg/dL 0 26 0 14* 0 29 0 36 0 44 0 29

## 2022-04-18 NOTE — CASE MANAGEMENT
Case Management Discharge Planning Note    Patient name Maria Isabel He  Location /-23 MRN [id number]  : 1945 Date 2022       Current Admission Date: 2022  Current Admission Diagnosis:Eosinophilic colitis   Patient Active Problem List    Diagnosis Date Noted    Cough 2022    COPD (chronic obstructive pulmonary disease) ([facility] ) 2022    Eosinophilia     Eosinophilic colitis     Transaminitis 2022    Anxiety 2022    Hypertensive urgency 2022    Nausea 2022    Bradycardia 2022    Abnormal EKG 2022    Primary hypertension 2022    Stage 3a chronic kidney disease ([facility] ) 2022    Psychophysiological insomnia 2021    Exertional dyspnea     Medicare annual wellness visit, subsequent 2021    Right leg pain 2021    Acute cystitis with hematuria 2021    [facility] 04/15/2021    Obesity, morbid ([facility] ) 04/15/2021    BMI 37 0-37 9, adult 2020    Atypical chest pain 2020    Dermatitis 2020    Vaginal cyst 2019    Candidiasis of genitalia in female 2019    Encounter for gynecological examination without abnormal finding 2019    Controlled type 2 diabetes with neuropathy ([facility] ) 2019    Benign hypertension 2018    Depression with anxiety 2018    Mild intermittent asthma 2018    BOLA (obstructive sleep apnea) 10/17/2016      LOS (days): 14  Geometric Mean LOS (GMLOS) (days): 2 90  Days to GMLOS:-11     OBJECTIVE:  Risk of Unplanned Readmission Score: 16         Current admission status: Inpatient   Preferred Pharmacy:   [address] [place] [address] [id number] R R [phone] R [phone])  [facility]  Phone: [phone] Fax: [phone]    [id number] [place], [address]  [place] [id number]  Phone: [phone] Fax: [phone]    Primary Care Provider: ALEKSANDER Howard    Primary Insurance: MEDICARE  Secondary Insurance: [employer]    DISCHARGE DETAILS:     Additional Comments: Patient reviewed during care coordination rounds with Dr Haris Cruz who reports that pt will likely require an additional 24-48 hours inpatient to manage pain and for GI clearance  Plan remains for discharge home with [facility] once medically stable

## 2022-04-18 NOTE — ASSESSMENT & PLAN NOTE
28-year-old female worsening abdominal pain found to have diverticulitis and significant eosinophilia  · Has had two initial CT scans demonstrating diverticulitis/colitis  Subsequent colonoscopy and repeat CT scan without evidence  · Seen by surgery, signed off  · Being followed by ID, monitoring off antibiotics  · Due to concerns of melanotic stools and persistent pain underwent EGD no significant findings  · Top differential likely hypereosinophilic syndrome vs eosinophilic colitis  GI recommends common allergic food elimination diet   GI to follow up tomorrow  · Pain has improved to 5-6/10

## 2022-04-18 NOTE — PROGRESS NOTES
Progress Note - Infectious Disease   [facility] 78 [facility] MR[facility]: [id number]  Unit/Bed#: -01 Encounter: [id number]      Impression/Recommendations:  1   Abdominal pain/diarrhea with high level eosinophilia   This level of eosinophilia is new, with last CBC in February of this year only with low level eosinophilia   This high level of eosinophilia is not consistent with intestinal parasites   Possible eosinophilic enteritis although colonoscopy grossly normal without inflammatory changes  No diverticulitis seen  Patient had been on antibiotic earlier in the hospitalization but these have been discontinued  She remains clinically well off antibiotic  Status post bone marrow biopsy   Pathology is pending   -continue to monitor off all antibiotics  -Monitor CBC with diff  -monitor abdominal pain  -GI follow-up     2  High level eosinophilia  As in above, at this level of eosinophilia, intestinal parasite is unlikely  Consider strongyloides hyperinfection syndrome but clinical presentation is not consistent with this and the patient is not immunosuppressed   Regardless, Strongyloides serology is negative    HIV negative   Stool O&P negative   Colonoscopy without evidence of inflammatory bowel disease or diverticulitis, biopsies are pending  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  Flow cytometry negative  Bone marrow biopsy was done, with preliminary pathology not showing leukemia   Consider idiopathic hypereosinophilic syndrome (HES)  -Follow up final bone marrow biopsy  -Monitor CBC with diff  -hematology/oncology follow-up     3  Mildly elevated LFTs, likely secondary to colitis above   Patient has no RUQ abdominal pain  AST/ALT now normalized  -monitor LFTs     4  CKD  Creatinine at baseline  Continue to monitor      5  DM, with neuropathy     -Management per primary service      Discussed with patient in detail regarding the above plan  Discharge plan per primary service    Okay for discharge from ID viewpoint  Hematology follow-up after discharge  With no active infection, I will sign off  Thank you for the consultation  Please do not hesitate to reconsult us for any questions or issues      Antibiotics:  Off antibiotics      Subjective:  Patient is comfortable  Abdominal pain continues to improve slowly     She is tolerating diet well  Temperature stays down   No chills  No diarrhea      Objective:  Vitals:  Temp:  [97 7 °F (36 5 °C)-98 2 °F (36 8 °C)] 98 2 °F (36 8 °C)  HR:  [58-74] 67  Resp:  [18] 18  BP: (112-133)/(53-70) 117/53  SpO2:  [93 %-96 %] 96 %  Temp (24hrs), Av °F (36 7 °C), Min:97 7 °F (36 5 °C), Max:98 2 °F (36 8 °C)  Current: Temperature: 98 2 °F (36 8 °C)    Physical Exam:     General: Awake, alert, cooperative, no distress  Neck:  Supple  No mass  No lymphadenopathy  Lungs: Expansion symmetric, no rales, no wheezing, respirations unlabored  Heart:  Regular rate and rhythm, S1 and S2 normal, no murmur  Abdomen: Soft, nondistended, mild and improving lower abdominal tenderness, bowel sounds active all four quadrants, no masses, no organomegaly  Extremities: No edema  No erythema/warmth  No ulcer  Nontender to palpation  Skin:  No rash  Neuro: Moves all extremities  Invasive Devices  Report    Peripheral Intravenous Line            Peripheral IV 22 Right Antecubital 2 days                Labs studies:   I have personally reviewed pertinent labs    Results from last 7 days   Lab Units 22  0627 22  0446 04/15/22  0954   POTASSIUM mmol/L 5 0 3 2* 4 9   CHLORIDE mmol/L 96* 111* 101   CO2 mmol/L 28 20* 25   BUN mg/dL 28* 17 20   CREATININE mg/dL 1 10 0 67 0 98   EGFR ml/min/1 73sq m 49 86 56   CALCIUM mg/dL 9 1 6 1* 9 1   AST U/L 34 14 25   ALT U/L 45 24 39   ALK PHOS U/L 256* 142* 248*     Results from last 7 days   Lab Units 22  0500 22  0616 22  0602   WBC Thousand/uL 13 74* 26 65* 24 75*   HEMOGLOBIN g/dL 9 0* 11 6 10 8* PLATELETS Thousands/uL 256 275 223           Imaging Studies:   I have personally reviewed pertinent imaging study reports and images in PACS  EKG, Pathology, and Other Studies:   I have personally reviewed pertinent reports

## 2022-04-18 NOTE — PROGRESS NOTES
Progress Note  Petty Wong 78 y o  female MRN: [id number]  Unit/Bed#: -01 Encounter: [id number]    Subjective:  Patient reports that her diarrhea has stopped  Patient denies any episodes of nausea or vomiting  Patient reports her abdominal pain is improving  Patient hopes that she will be discharged in the next 24 hours  Objective:     Vitals:   Vitals:    04/18/22 0846   BP: 117/53   Pulse: 67   Resp: 18   Temp: 98 2 °F (36 8 °C)   SpO2: 96%   ,Body mass index is 35 12 kg/m²  Intake/Output Summary (Last 24 hours) at 4/18/2022 0943  Last data filed at 4/18/2022 0501  Gross per 24 hour   Intake 260 ml   Output 500 ml   Net -240 ml       Physical Exam:     General Appearance: Alert, appears stated age and cooperative  Lungs: Clear to auscultation bilaterally, no rales or rhonchi, no labored breathing/accessory muscle use  Heart: Regular rate and rhythm, S1, S2 normal, no murmur, click, rub or gallop  Abdomen: Soft, non-tender, non-distended; bowel sounds normal; no masses or no organomegaly  Extremities: No cyanosis, edema    Invasive Devices  Report    Peripheral Intravenous Line            Peripheral IV 04/16/22 Right Antecubital 2 days                Lab Results:  No results displayed because visit has over 200 results  Imaging Studies:   I have personally reviewed pertinent imaging studies  EGD    Result Date: 4/15/2022  Narrative:  [facility] [address] [phone] DATE OF SERVICE: 4/15/22 PHYSICIAN(S): Attending: Guillermo Carnes MD Fellow: No Staff Documented INDICATION: Melena POST-OP DIAGNOSIS: See the impression below  PREPROCEDURE: Informed consent was obtained for the procedure, including sedation  Risks of perforation, hemorrhage, adverse drug reaction and aspiration were discussed  The patient was placed in the left lateral decubitus position  Patient was explained about the risks and benefits of the procedure   Risks including but not limited to bleeding, infection, and perforation were explained in detail  Also explained about less than 100% sensitivity with the exam and other alternatives  DETAILS OF PROCEDURE: Patient was taken to the procedure room where a time out was performed to confirm correct patient and correct procedure  The patient underwent monitored anesthesia care, which was administered by an anesthesia professional  The patient's blood pressure, heart rate, level of consciousness, respirations and oxygen were monitored throughout the procedure  The scope was advanced to the second part of the duodenum  Retroflexion was performed in the fundus  The patient experienced no blood loss  The procedure was not difficult  The patient tolerated the procedure well  There were no apparent complications  ANESTHESIA INFORMATION: ASA: III Anesthesia Type: Anesthesia type not filed in the log  MEDICATIONS: No administrations occurring from 1054 to 1110 on 04/15/22 FINDINGS: Regular Z-line 40 cm from the incisors The esophagus appeared normal  Patchy abnormal mucosa with erosion in the antrum; performed cold forceps biopsy The cardia, fundus of the stomach and body of the stomach appeared normal  Performed random biopsy  The duodenum appeared normal  Performed random biopsy  SPECIMENS: ID Type Source Tests Collected by Time Destination 1 : duodenum Tissue Duodenum TISSUE EXAM Kaity Mina MD 4/15/2022 11:06 AM  2 : gastric erosion Tissue Stomach TISSUE EXAM Kaity Mina MD 4/15/2022 11:07 AM  3 : gastric Tissue Stomach TISSUE EXAM Kaity Mina MD 4/15/2022 11:08 AM      Impression: Mild gastric erosion Random biopsies taken from stomach and duodenum to evaluate for eosinophilia No ulcers or signs of recent bleeding RECOMMENDATION: Await pathology results  Hgb stable, no signs of ongoing bleeding Will continue to observe    Kaity Mina MD     Colonoscopy    Result Date: 4/9/2022  Narrative:  [address] [facility] [address] [place] [address]  [address] [facility] [address] [phone] DATE OF SERVICE: 4/09/22 PHYSICIAN(S): Attending: Inocencio Krishnan DO Fellow: No Staff Documented INDICATION: Acute diverticulitis, Eosinophilia, unspecified type POST-OP DIAGNOSIS: See the impression below  HISTORY: Prior colonoscopy: 3 years ago  BOWEL PREPARATION: Miralax/Dulcolax PREPROCEDURE: Informed consent was obtained for the procedure, including sedation  Risks including but not limited to bleeding, infection, perforation, adverse drug reaction and aspiration were explained in detail  Also explained about less than 100% sensitivity with the exam and other alternatives  The patient was placed in the left lateral decubitus position  DETAILS OF PROCEDURE: Patient was taken to the procedure room where a time out was performed to confirm correct patient and correct procedure  The patient underwent monitored anesthesia care, which was administered by an anesthesia professional  The patient's blood pressure, heart rate, level of consciousness, oxygen and respirations were monitored throughout the procedure  A digital rectal exam was performed  The scope was introduced through the anus and advanced to the cecum  Retroflexion was performed in the rectum  The quality of bowel preparation was evaluated using the Andrei Bowel Preparation Scale with scores of: right colon = 2, transverse colon = 2, left colon = 2  The total BBPS score was 6  Bowel prep was adequate  The patient's estimated blood loss was minimal (<5 mL)  The procedure was not difficult  The patient tolerated the procedure well  There were no apparent complications  ANESTHESIA INFORMATION: ASA: III Anesthesia Type: Anesthesia type not filed in the log   MEDICATIONS: No administrations occurring from 1206 to 1229 on 04/09/22 FINDINGS: Multiple small diverticula with no bleeding in the proximal ascending colon, mid ascending colon, proximal sigmoid colon, mid sigmoid colon and distal sigmoid colon One sessile polyp measuring smaller than 5 mm in the proximal ascending colon; performed complete removal by cold forceps biopsy The cecum, hepatic flexure, transverse colon, splenic flexure, descending colon, rectosigmoid and rectum appeared normal  Performed 12 biopsies in the ascending colon, transverse colon and sigmoid colon  Rule out eosinophilic colitis  EVENTS: Procedure Events Event Event Time ENDO CECUM REACHED 4/9/2022 12:21 PM ENDO SCOPE OUT TIME 4/9/2022 12:28 PM SPECIMENS: ID Type Source Tests Collected by Time Destination 1 : ascending, transverse and sigmoid Tissue Colon TISSUE EXAM Cassie Manzanares DO 4/9/2022 12:22 PM  2 : proximal ascending colon polyp x 1 Tissue Polyp, Colorectal TISSUE EXAM Cassie Manzanares DO 4/9/2022 12:25 PM  EQUIPMENT: Colonoscope -[employer]-[id number]     Impression: 1  Diverticulosis coli, ascending colon sigmoid colon  No evidence of diverticulitis 2  Diminutive polyp in the proximal ascending colon status post cold biopsy removal RECOMMENDATION: Repeat colonoscopy in 5 years due to a personal history of colon polyps High-fiber diet to include fruits, vegetables, whole grain foods, daily Will call the patient 1 week regarding the polyp results and biopsy results Cassie Manzanares DO [employer] [place], [place]    CT chest w contrast    Result Date: 4/16/2022  Narrative: CT CHEST WITH IV CONTRAST INDICATION:   eosinophilia with pulmonary symptoms    COMPARISON:  Compared with 11/3/2016 TECHNIQUE: CT examination of the chest was performed  Axial, sagittal, and coronal 2D reformatted images were created from the source data and submitted for interpretation  Radiation dose length product (DLP) for this visit:  296 mGy-cm   This examination, like all CT scans performed in the [facility], was performed utilizing techniques to minimize radiation dose exposure, including the use of iterative reconstruction and automated exposure control   IV Contrast:  85 mL of iohexol (OMNIPAQUE) FINDINGS: LUNGS:  Patchy groundglass opacities predominantly in the right upper lobe superiorly and anteriorly     There is no tracheal or endobronchial lesion  PLEURA:  Unremarkable  HEART/GREAT VESSELS: Heart is unremarkable for patient's age  No thoracic aortic aneurysm  Trace pericardial fluid  MEDIASTINUM AND YAMIL:  Unremarkable  CHEST WALL AND LOWER NECK:  Unremarkable  VISUALIZED STRUCTURES IN THE UPPER ABDOMEN:  3 2 mm calculus in the upper pole of the left kidney  Poorly marginated hypodensity in the liver superiorly of known focal fat infiltration OSSEOUS STRUCTURES:  No acute fracture or destructive osseous lesion  Impression: New right upper lobe patchy groundglass infiltrates  Trace pericardial fluid  Workstation performed: [id number]     US right upper quadrant    Result Date: 4/11/2022  Narrative: RIGHT UPPER QUADRANT ULTRASOUND INDICATION:     elevated alkaline phosphate  COMPARISON:  4/7/2022 TECHNIQUE:   Real-time ultrasound of the right upper quadrant was performed with a curvilinear transducer with both volumetric sweeps and still imaging techniques  FINDINGS: PANCREAS:  Visualized portions of the pancreas are within normal limits  AORTA AND IVC:  Visualized portions are normal for patient age  LIVER: Size:  Mildly enlarged  The liver measures 17 7 cm in the midclavicular line  Contour:  Surface contour is smooth  Parenchyma:  Echogenicity and echotexture are within normal limits  No liver mass identified  Limited imaging of the main portal vein shows it to be patent and hepatopetal   BILIARY: No gallbladder findings  No intrahepatic biliary dilatation  CBD measures 4 0 mm  No choledocholithiasis  KIDNEY: Right kidney measures 11 5 x 4 9 x 5 3 cm  Volume 157 8 mL Kidney within normal limits  ASCITES:   None  Impression: 1  No biliary ductal dilatation  2   Mild hepatomegaly though otherwise unremarkable appearance   Workstation performed: [id number]     CT abdomen pelvis w contrast    Result Date: 4/13/2022  Narrative: CT ABDOMEN AND PELVIS WITH IV CONTRAST INDICATION:   Abdominal pain, acute, nonlocalized persistent abdominal pain  COMPARISON:  None  TECHNIQUE:  CT examination of the abdomen and pelvis was performed  Axial, sagittal, and coronal 2D reformatted images were created from the source data and submitted for interpretation  Radiation dose length product (DLP) for this visit:  676 mGy-cm   This examination, like all CT scans performed in the [facility], was performed utilizing techniques to minimize radiation dose exposure, including the use of iterative reconstruction and automated exposure control  IV Contrast:  50 mL of iohexol (OMNIPAQUE) 100 mL of iohexol (OMNIPAQUE) Enteric Contrast:  Enteric contrast was not administered  FINDINGS: ABDOMEN LOWER CHEST:  No clinically significant abnormality identified in the visualized lower chest  LIVER/BILIARY TREE:  Unremarkable  GALLBLADDER:  No calcified gallstones  No pericholecystic inflammatory change  SPLEEN:  Unremarkable  PANCREAS:  Unremarkable  ADRENAL GLANDS:  Unremarkable  KIDNEYS/URETERS:  3 6 mm calculus in the left kidney upper pole and 2 mm right kidney calculus     Subcentimeter hypodensities in both kidneys suggesting cysts  No hydronephrosis  STOMACH AND BOWEL:  Stool filled colon with possible impaction in the ascending and transverse colon  Sigmoid diverticulosis  Large duodenal diverticulum  APPENDIX:  No findings to suggest appendicitis  ABDOMINOPELVIC CAVITY:  No ascites  No pneumoperitoneum  No lymphadenopathy  VESSELS:  Unremarkable for patient's age  PELVIS REPRODUCTIVE ORGANS:  Post hysterectomy  Cystic area in the vaginal, urethral region is unchanged and benign  URINARY BLADDER:  Unremarkable  ABDOMINAL WALL/INGUINAL REGIONS:  Small fat-containing inguinal hernias  OSSEOUS STRUCTURES:  No acute fracture or destructive osseous lesion       Impression: Stool filled colon with possible impaction in the ascending and transverse colon  Sigmoid diverticulosis  No acute intra-abdominal finding  Workstation performed: [id number]     CT abdomen pelvis w contrast    Result Date: 4/7/2022  Narrative: CT ABDOMEN AND PELVIS WITH IV CONTRAST INDICATION:   LLQ abdominal pain RLQ abdominal pain (Age >= 14y) LUQ abdominal pain Eosinophila and worsening abdominal pain  Leukocytosis  COMPARISON:  CT abdomen/pelvis 4/4/2022  TECHNIQUE:  CT examination of the abdomen and pelvis was performed  In addition to portal venous phase postcontrast scanning through the abdomen and pelvis, delayed phase postcontrast scanning was performed through the upper abdominal viscera  Axial, sagittal, and coronal 2D reformatted images were created from the source data and submitted for interpretation  Radiation dose length product (DLP) for this visit:  1077 mGy-cm   This examination, like all CT scans performed in the [facility], was performed utilizing techniques to minimize radiation dose exposure, including the use of iterative reconstruction and automated exposure control  IV Contrast:  100 mL of iohexol (OMNIPAQUE) Enteric Contrast:  Enteric contrast was not administered  FINDINGS: ABDOMEN LOWER CHEST: No clinically significant abnormality is identified in the visualized lower chest  No consolidation or effusion  LIVER: Normal size and morphology  No suspicious lesion  There are stable geographic areas of portal venous phase hypoattenuation and delayed phase isoattenuation which are traversed by undistorted vessels, the larger in segment 2/4a superior to the middle hepatic vein, the smaller segment 1 adjacent to the IVC, findings compatible with focal fatty infiltration  BILIARY: No intrahepatic biliary ductal dilatation  Normal caliber common bile duct  GALLBLADDER: No calcified gallstones  There is vicarious excretion of contrast from prior study  Normal wall thickness  No pericholecystic inflammatory changes   SPLEEN: Within normal limits  No suspicious lesion  Normal spleen size  PANCREAS: Pancreatic parenchyma is within normal limits  No main pancreatic ductal dilatation  No peripancreatic inflammation  ADRENAL GLANDS: Within normal limits  KIDNEYS/URETERS: Normal size and position  Symmetric enhancement  No suspicious lesion  Bilateral simple cysts  Mild multifocal cortical scarring  Nonobstructing calculi are noted  No hydronephrosis  Ureters within normal limits  STOMACH AND BOWEL: Stomach is grossly within normal limits  Normal caliber small bowel  Normal caliber large bowel  Colonic diverticulosis  Again seen is severe wall thickening of the colon centered on the cecum/proximal ascending colon as well as the splenic flexure  Pericolonic inflammatory fat stranding about the right colon appears unchanged, and is slightly improved in appearance in the left upper quadrant  No evidence of perforation or abscess    APPENDIX: Normal appendix  ABDOMINOPELVIC CAVITY: Pericolic stranding as described  Trace pelvic free fluid, similar  No intraperitoneal free air  Multiple enlarged mesenteric lymph nodes measuring up to 1 cm short axis in the right lower quadrant which are presumably reactive, unchanged  No retroperitoneal hematoma  VESSELS: Normal caliber abdominal aorta with no detectable atherosclerotic plaque  The celiac, SMA, and HENRIQUE are patent  The main, right, and left portal veins are patent  The SMV and splenic vein are patent  The hepatic veins are patent  The renal arteries and veins are patent  PELVIS REPRODUCTIVE ORGANS:  Hysterectomy  No evidence of pelvic or adnexal mass  A 1 7 cm crescentic well-circumscribed hypodensity in the right aspect of the urethra is unchanged significantly since 2014, 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  URINARY BLADDER:  Within normal limits  No calculi  ABDOMINAL WALL/INGUINAL REGIONS:  Diastases recti with small fat-containing umbilical hernia   Small right-sided fat-containing indirect inguinal hernia  BONES:  Mild multilevel degenerative changes in the spine  Vertebral body height is maintained  No acute fracture or destructive osseous lesion  Grade 1 degenerative anterolisthesis performed  Impression: Overall similar appearance of colonic wall thickening centered at the cecum/ascending colon and at the splenic flexure, with similar pericolonic inflammatory stranding in the right colon and slight improvement on the left  No evidence of perforation or abscess formation  Workstation performed: [id number]     CT abdomen pelvis with contrast    Result Date: 4/4/2022  Narrative: CT ABDOMEN AND PELVIS WITH IV CONTRAST INDICATION:   Abdominal pain, acute, nonlocalized periumbilical, generalized pain  WBC count of 19,000 COMPARISON:  CT from August 9, 2014 TECHNIQUE:  CT examination of the abdomen and pelvis was performed  Axial, sagittal, and coronal 2D reformatted images were created from the source data and submitted for interpretation  Radiation dose length product (DLP) for this visit:  863 mGy-cm   This examination, like all CT scans performed in the [facility], was performed utilizing techniques to minimize radiation dose exposure, including the use of iterative reconstruction and automated exposure control  IV Contrast:  100 mL of iohexol (OMNIPAQUE) Enteric Contrast:  Enteric contrast was not administered  FINDINGS: ABDOMEN LOWER CHEST:  No clinically significant abnormality identified in the visualized lower chest  LIVER/BILIARY TREE:  There is a new geographic area in the liver is interposed between the middle hepatic vein and left hepatic vein, measuring about 3 3 cm, indeterminate probably due to focal fatty infiltration Additional hypodensity seen just into the IVC, segment 1, measuring 2 cm GALLBLADDER:  No calcified gallstones  No pericholecystic inflammatory change  No biliary dilation seen SPLEEN:  Unremarkable  PANCREAS:  Unremarkable   ADRENAL GLANDS:  Unremarkable  KIDNEYS/URETERS:  No hydronephrosis or urinary tract calculus  One or more sharply circumscribed subcentimeter renal hypodensities are present, too small to accurately characterize, and statistically most likely benign findings  According to recent literature (Radiology 2019) no further workup of these findings is recommended  Nonobstructing bilateral renal calculi are noted STOMACH AND BOWEL:  There is pericolonic infiltration in relation to the splenic flexure with wall thickening, likely due to diverticulitis  Additional thickening of the adjacent proximal descending colon seen Mild thickening of the descending colon with some surrounding infiltration at its mesenteric margin, image 76 series 601 Ileocecal junction appear unremarkable APPENDIX:  No findings to suggest appendicitis  Appendix is identified in image 72 series 601, image 79 series 601 The cecum is mobile ABDOMINOPELVIC CAVITY:  No ascites  No pneumoperitoneum  No lymphadenopathy  Mesenteric lymph nodes are seen measuring about 1 cm in the right lower quadrant, image 45 series 2 VESSELS:  Celiac trunk, SMA appear unremarkable Iliac vessels are patent PELVIS REPRODUCTIVE ORGANS:  A rounded the hypodense area seen within the cervix, stable URINARY BLADDER:  Unremarkable  ABDOMINAL WALL/INGUINAL REGIONS:  Rectal muscle diastasis seen OSSEOUS STRUCTURES:  No acute compression collapse vertebra No gross lytic lesion     Impression: There is thickening of the splenic flexure with associated pericolonic infiltration, suggestive diverticulitis  Additional thickening of the ascending colon with some infiltration at its mesenteric margin may be due to additional segment of diverticulitis or colitis    Suggest follow-up with the colonoscopy when the acute illness subsides to exclude  focal colonic lesion There is no fluid collection seen There is no free air Small mesenteric lymph nodes with largest measuring about 1 cm, short interval follow-up at 3 months suggested The study was marked in [facility] for immediate notification  Follow-up notification has been created in [employer] performed: [id number]     IR biopsy bone marrow    Result Date: 4/12/2022  Narrative: CT-scan guided diagnostic bone marrow aspiration and core biopsy History: Eosinophilia Conscious sedation time:  15 Radiation Dose:  132 mGy Technique: This examination, like all CT scans performed in the [facility], was performed utilizing techniques to minimize radiation dose exposure, including the use of iterative reconstruction and automated exposure control  The patient was brought to the CT scanner and placed prone on the table  After axial images were obtained through the pelvis, an area of the skin was then marked, prepped, and draped in usual sterile fashion  Lidocaine was administered to the skin, and subcutaneous tissues down to the periosteum of the right ileum, and a small skin incision was made  An OnControl needle was advanced percutaneously through the cortex, and a bone marrow aspiration was performed  The specimen was given to the cytotech to prepare, and was found to be adequate  A core biopsy was then performed  The bone core was placed in formalin  The patient tolerated the procedure well and suffered no complications  Impression: Impression: Successful percutaneous diagnostic bone marrow aspiration and bone core biopsy of right ileum, yielding a specimen adequate for evaluation   A full pathology report will follow    Workstation performed: [id number]         Assessment & Plan  Abdominal pain; improved  Eosinophilic colitis  -Patient developed abdominal pain about 2 weeks ago  Nithya Stewart, she reports nausea/vomiting/diarrhea which subsided   -Initially CT imaging suggested diverticulitis and she was put on treatment with antibiotics   However, given her severe peripheral eosinophilia and continued abdominal pain (which were not consistent with diverticulitis), she underwent a colonoscopy for further evaluation   -Colonoscopy 4/9 with Dr Guillermo Cantu showed diverticulosis, no evidence of diverticulitis and a polyp in the ascending colon that was removed; 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  -Repeat CT Scan A/P 4/13 suggests a stool filled colon/constipation  -EGD 4/14 showed a mild gastric erosion; biopsies were taken and are pending   -Pathology from the colon biopsies 4/9 do show a patchy mild increase in eosinophils involving the mucosa and submucosa with focal intraepithelial eosinophils    -Strongyloides serology was negative and O&P and giardia testing were negative  HIV negative  ID input appreciated - intestinal parasite unlikely   -Hematology input appreciated   Follow up bone marrow biopsy results  Follow up PDGFRA, PDGFRB, and FGFR1 rearrangements to evaluate for primary hypereosinophilic syndromes   -Continue supportive care measures, PPI course, etc   -Recommend trial of an empiric elimination diet (avoidance of soy, wheat, eggs, milk, peanuts, fish/shellfish)  She would like to start with elimination of the soy/milk/peanuts/fish but continue the eggs and wheat at this time   -Patient will follow-up outpatient with allergist     Patient will need close outpatient GI follow-up  At this time GI will be signing off please re-consult malika Diggs PA-C  4/18/2022,9:43 AM

## 2022-04-19 VITALS
HEIGHT: 62 IN | WEIGHT: 192.02 LBS | BODY MASS INDEX: 35.34 KG/M2 | OXYGEN SATURATION: 94 % | RESPIRATION RATE: 20 BRPM | DIASTOLIC BLOOD PRESSURE: 85 MMHG | HEART RATE: 83 BPM | TEMPERATURE: 98.1 F | SYSTOLIC BLOOD PRESSURE: 136 MMHG

## 2022-04-19 LAB
ALBUMIN SERPL BCP-MCNC: 3.4 G/DL (ref 3.5–5)
ALP SERPL-CCNC: 275 U/L (ref 46–116)
ALT SERPL W P-5'-P-CCNC: 36 U/L (ref 12–78)
ANION GAP SERPL CALCULATED.3IONS-SCNC: 8 MMOL/L (ref 4–13)
AST SERPL W P-5'-P-CCNC: 23 U/L (ref 5–45)
BASOPHILS # BLD AUTO: 0.07 THOUSANDS/ΜL (ref 0–0.1)
BASOPHILS NFR BLD AUTO: 1 % (ref 0–1)
BILIRUB SERPL-MCNC: 0.3 MG/DL (ref 0.2–1)
BUN SERPL-MCNC: 32 MG/DL (ref 5–25)
CALCIUM ALBUM COR SERPL-MCNC: 9.9 MG/DL (ref 8.3–10.1)
CALCIUM SERPL-MCNC: 9.4 MG/DL (ref 8.3–10.1)
CHLORIDE SERPL-SCNC: 100 MMOL/L (ref 100–108)
CO2 SERPL-SCNC: 28 MMOL/L (ref 21–32)
CREAT SERPL-MCNC: 1.11 MG/DL (ref 0.6–1.3)
EOSINOPHIL # BLD AUTO: 8.45 THOUSAND/ΜL (ref 0–0.61)
EOSINOPHIL NFR BLD AUTO: 62 % (ref 0–6)
ERYTHROCYTE [DISTWIDTH] IN BLOOD BY AUTOMATED COUNT: 15.3 % (ref 11.6–15.1)
GFR SERPL CREATININE-BSD FRML MDRD: 48 ML/MIN/1.73SQ M
GLUCOSE SERPL-MCNC: 135 MG/DL (ref 65–140)
GLUCOSE SERPL-MCNC: 227 MG/DL (ref 65–140)
HCT VFR BLD AUTO: 36.2 % (ref 34.8–46.1)
HGB BLD-MCNC: 11.6 G/DL (ref 11.5–15.4)
IMM GRANULOCYTES # BLD AUTO: 0.02 THOUSAND/UL (ref 0–0.2)
IMM GRANULOCYTES NFR BLD AUTO: 0 % (ref 0–2)
LYMPHOCYTES # BLD AUTO: 2.88 THOUSANDS/ΜL (ref 0.6–4.47)
LYMPHOCYTES NFR BLD AUTO: 21 % (ref 14–44)
MCH RBC QN AUTO: 29.1 PG (ref 26.8–34.3)
MCHC RBC AUTO-ENTMCNC: 32 G/DL (ref 31.4–37.4)
MCV RBC AUTO: 91 FL (ref 82–98)
MONOCYTES # BLD AUTO: 0.49 THOUSAND/ΜL (ref 0.17–1.22)
MONOCYTES NFR BLD AUTO: 4 % (ref 4–12)
NEUTROPHILS # BLD AUTO: 1.61 THOUSANDS/ΜL (ref 1.85–7.62)
NEUTS SEG NFR BLD AUTO: 12 % (ref 43–75)
NRBC BLD AUTO-RTO: 0 /100 WBCS
PLATELET # BLD AUTO: 312 THOUSANDS/UL (ref 149–390)
PMV BLD AUTO: 9 FL (ref 8.9–12.7)
POTASSIUM SERPL-SCNC: 4.8 MMOL/L (ref 3.5–5.3)
PROT SERPL-MCNC: 8.9 G/DL (ref 6.4–8.2)
RBC # BLD AUTO: 3.98 MILLION/UL (ref 3.81–5.12)
SODIUM SERPL-SCNC: 136 MMOL/L (ref 136–145)
WBC # BLD AUTO: 13.52 THOUSAND/UL (ref 4.31–10.16)

## 2022-04-19 PROCEDURE — 97530 THERAPEUTIC ACTIVITIES: CPT

## 2022-04-19 PROCEDURE — 99239 HOSP IP/OBS DSCHRG MGMT >30: CPT | Performed by: FAMILY MEDICINE

## 2022-04-19 PROCEDURE — 97116 GAIT TRAINING THERAPY: CPT

## 2022-04-19 PROCEDURE — 80053 COMPREHEN METABOLIC PANEL: CPT | Performed by: HOSPITALIST

## 2022-04-19 PROCEDURE — 97110 THERAPEUTIC EXERCISES: CPT

## 2022-04-19 PROCEDURE — 85025 COMPLETE CBC W/AUTO DIFF WBC: CPT | Performed by: HOSPITALIST

## 2022-04-19 PROCEDURE — 82948 REAGENT STRIP/BLOOD GLUCOSE: CPT

## 2022-04-19 RX ORDER — OXYCODONE HYDROCHLORIDE 10 MG/1
5 TABLET ORAL EVERY 4 HOURS PRN
Qty: 15 TABLET | Refills: 0 | Status: SHIPPED | OUTPATIENT
Start: 2022-04-19 | End: 2022-04-29

## 2022-04-19 RX ORDER — DICYCLOMINE HYDROCHLORIDE 10 MG/1
10 CAPSULE ORAL
Refills: 0
Start: 2022-04-19 | End: 2022-05-18 | Stop reason: SDUPTHER

## 2022-04-19 RX ORDER — ACETAMINOPHEN 325 MG/1
650 TABLET ORAL EVERY 4 HOURS PRN
Refills: 0
Start: 2022-04-19

## 2022-04-19 RX ORDER — PANTOPRAZOLE SODIUM 40 MG/1
40 TABLET, DELAYED RELEASE ORAL
Refills: 0
Start: 2022-04-19 | End: 2022-06-13

## 2022-04-19 RX ORDER — ZOLPIDEM TARTRATE 5 MG/1
5 TABLET ORAL
Qty: 15 TABLET | Refills: 0 | Status: SHIPPED | OUTPATIENT
Start: 2022-04-19 | End: 2022-06-13 | Stop reason: ALTCHOICE

## 2022-04-19 RX ORDER — CARVEDILOL 6.25 MG/1
6.25 TABLET ORAL 2 TIMES DAILY WITH MEALS
Refills: 0
Start: 2022-04-19 | End: 2022-05-18 | Stop reason: SDUPTHER

## 2022-04-19 RX ORDER — PREGABALIN 50 MG/1
50 CAPSULE ORAL 3 TIMES DAILY
Qty: 30 CAPSULE | Refills: 0 | Status: SHIPPED | OUTPATIENT
Start: 2022-04-19 | End: 2022-06-01 | Stop reason: SDUPTHER

## 2022-04-19 RX ORDER — LORAZEPAM 1 MG/1
0.5 TABLET ORAL EVERY 8 HOURS PRN
Qty: 15 TABLET | Refills: 0 | Status: SHIPPED | OUTPATIENT
Start: 2022-04-19 | End: 2022-04-26

## 2022-04-19 RX ORDER — BENZONATATE 100 MG/1
100 CAPSULE ORAL 3 TIMES DAILY PRN
Qty: 20 CAPSULE | Refills: 0
Start: 2022-04-19 | End: 2022-06-13 | Stop reason: ALTCHOICE

## 2022-04-19 RX ADMIN — CARVEDILOL 6.25 MG: 6.25 TABLET, FILM COATED ORAL at 08:21

## 2022-04-19 RX ADMIN — PANTOPRAZOLE SODIUM 40 MG: 40 TABLET, DELAYED RELEASE ORAL at 15:47

## 2022-04-19 RX ADMIN — OXYCODONE HYDROCHLORIDE 10 MG: 10 TABLET ORAL at 07:54

## 2022-04-19 RX ADMIN — DICYCLOMINE HYDROCHLORIDE 10 MG: 10 CAPSULE ORAL at 15:46

## 2022-04-19 RX ADMIN — ASPIRIN 81 MG: 81 TABLET, CHEWABLE ORAL at 08:20

## 2022-04-19 RX ADMIN — MONTELUKAST 10 MG: 10 TABLET, FILM COATED ORAL at 20:51

## 2022-04-19 RX ADMIN — PREGABALIN 50 MG: 50 CAPSULE ORAL at 15:46

## 2022-04-19 RX ADMIN — AMLODIPINE BESYLATE 10 MG: 10 TABLET ORAL at 08:21

## 2022-04-19 RX ADMIN — CARVEDILOL 6.25 MG: 6.25 TABLET, FILM COATED ORAL at 15:46

## 2022-04-19 RX ADMIN — OXYCODONE HYDROCHLORIDE 10 MG: 10 TABLET ORAL at 20:51

## 2022-04-19 RX ADMIN — DICYCLOMINE HYDROCHLORIDE 10 MG: 10 CAPSULE ORAL at 08:30

## 2022-04-19 RX ADMIN — OXYCODONE HYDROCHLORIDE 10 MG: 10 TABLET ORAL at 14:14

## 2022-04-19 RX ADMIN — FLUTICASONE FUROATE AND VILANTEROL TRIFENATATE 1 PUFF: 200; 25 POWDER RESPIRATORY (INHALATION) at 08:24

## 2022-04-19 RX ADMIN — DICYCLOMINE HYDROCHLORIDE 10 MG: 10 CAPSULE ORAL at 20:51

## 2022-04-19 RX ADMIN — PANTOPRAZOLE SODIUM 40 MG: 40 TABLET, DELAYED RELEASE ORAL at 08:30

## 2022-04-19 RX ADMIN — DICYCLOMINE HYDROCHLORIDE 10 MG: 10 CAPSULE ORAL at 11:06

## 2022-04-19 RX ADMIN — PREGABALIN 50 MG: 50 CAPSULE ORAL at 08:21

## 2022-04-19 RX ADMIN — ZOLPIDEM TARTRATE 5 MG: 5 TABLET, COATED ORAL at 00:24

## 2022-04-19 RX ADMIN — PREGABALIN 50 MG: 50 CAPSULE ORAL at 20:51

## 2022-04-19 NOTE — PHYSICAL THERAPY NOTE
Physical Therapy Treatment Note    Patient Name: Greg Leonard    Diagnosis: Abdominal pain [R10 9]  Transaminitis [R74 01]  Acute diverticulitis [K57 92]     04/19/22 1325   PT Last Visit   PT Visit Date 04/19/22   Note Type   Note Type Treatment   Pain Assessment   Pain Assessment Tool 0-10   Pain Score 5   Pain Location/Orientation Location: Abdomen   Restrictions/Precautions   Weight Bearing Precautions Per Order No   Other Precautions Chair Alarm; Bed Alarm; Fall Risk;Pain   General   Chart Reviewed Yes   Response to Previous Treatment Patient with no complaints from previous session  Family/Caregiver Present No   Cognition   Overall Cognitive Status WFL   Arousal/Participation Alert; Responsive; Cooperative   Attention Within functional limits   Memory Within functional limits   Following Commands Follows all commands and directions without difficulty   Comments Pt agreeable to PT  Subjective   Subjective "I feel good, but haven't been moving much "   Bed Mobility   Additional Comments Pt was received seated in recliner in NAD  Transfers   Sit to Stand 5  Supervision   Additional items Assist x 1; Armrests; Increased time required;Verbal cues   Stand to Sit 5  Supervision   Additional items Assist x 1; Armrests; Increased time required;Verbal cues   Ambulation/Elevation   Gait pattern Decreased toe off;Decreased heel strike;Decreased hip extension; Excessively slow; Short stride; Shuffling; Foward flexed   Gait Assistance 5  Supervision   Additional items Assist x 1;Verbal cues   Assistive Device Rolling walker   Distance 100 feet  (2 standing rest breaks)   Stair Management Assistance 5  Supervision   Additional items Assist x 1;Verbal cues   Stair Management Technique Step to pattern; Foreward; With walker  (b/l UE support on walker)   Number of Stairs 2  (6 inch practice step)   Balance   Static Sitting Good   Dynamic Sitting Fair +   Static Standing [address] [facility]   Ambulatory [facility] Endurance Deficit   Endurance Deficit Yes   Endurance Deficit Description decreased activity tolerance   Activity Tolerance   Activity Tolerance Patient tolerated treatment well   Medical Staff Made Aware BIRDIE Deleon; MD Kris Beaulieu made aware of session outcomes   Nurse Made Aware Discussed case with OLIVIA Mart Mini   Exercises   Glute Sets Sitting;10 reps;AROM; Bilateral   Hip Flexion Sitting;20 reps;AROM; Bilateral   Knee AROM Long Arc Quad Sitting;20 reps;AROM; Bilateral   Ankle Pumps Sitting;20 reps;AROM; Bilateral   Assessment   Prognosis Good   Problem List Decreased strength;Decreased endurance; Impaired balance;Decreased mobility; Impaired sensation;Pain   Assessment Chart reviewed  Patient was received seated in recliner in NAD and agreeable to PT session  Today's PT treatment session consisted of therapeutic activity for facilitation of transitional movements and safe performance of correct technique for sit to stand transfers, therapeutic exercise to increase lower extremity muscle strength, gait training to promote safe and functional ambulation on level surfaces, and stair negotation  In comparison to the previous session the patient has made progress as evident by requiring decreased assistance with transfers and functional mobility  Pt was able to ambulate an increased distance with use of RW on level surface  Pt exhibits decreased marion, decreased stride length, slightly forward flexed trunk, and decreased heel strike when ambulating  Pt required two standing rest breaks during ambulation  Pt was able to perform stair negotiation with use of bilateral upper extremity support  Pt was able to negotiate six inch practice step with supervision  Pt performed all therapeutic exercise well, but required seated rest breaks between sets of 10  Overall, patient tolerated today's session well and continues to be making progress towards achieving her STG's  Pt's STG's were updated this session   Patient's prognosis for achieving their STG's is good as evident by pt's motivation  PT intervention continues to be appropriate as the patient continues to be limited by pain, decreased lower extremity strength, impaired balance, decreased endurance, gait deviations, and decreased functional mobility  Continue to recommend home PT  Pt educated on home PT services  PT to continue to see patient in order to address the deficits listed above and provide interventions consistent with the POC in order to achieve STG's and optimize the patient's independence with functional mobility  Barriers to Discharge None   Goals   STG Expiration Date 04/29/22   Short Term Goal #1 In 7-10 days: Increase bilateral LE strength 1/2 grade to facilitate independent mobility, Perform all bed mobility tasks modified independent to decrease caregiver burden, Perform all transfers modified independent to improve independence, Ambulate > 150 ft  with least restrictive assistive device modified independent w/o LOB and w/ normalized gait pattern 100% of the time, Navigate 3 stairs modified independent with unilateral handrail to facilitate return to previous living environment and Increase all balance 1/2 grade to decrease risk for falls   Plan   Treatment/Interventions Functional transfer training;LE strengthening/ROM; Elevations; Therapeutic exercise; Endurance training;Patient/family training;Bed mobility;Gait training;Spoke to MD;Spoke to nursing;Spoke to case management;OT   Progress Progressing toward goals   PT Frequency 2-3x/wk   Recommendation   PT Discharge Recommendation Home with home health rehabilitation   Equipment Recommended [facility] Recommended Wheeled walker   Change/add to [employer]?  No   AM-PAC Basic Mobility Inpatient   Turning in Bed Without Bedrails 3   Lying on Back to Sitting on Edge of Flat Bed 3   Moving Bed to Chair 3   Standing Up From Chair 3   Walk in Room 3   Climb 3-5 Stairs 3   Basic Mobility Inpatient Raw Score 18   Basic Mobility Standardized Score 41 05   Highest Level Of Mobility   -HL Goal 6: Walk 10 steps or more   JH-HLM Highest Level of Mobility 7: Walk 25 feet or more   -HL Goal Achieved Yes   Education   Education Provided Mobility training;Home exercise program;Assistive device   Patient Demonstrates acceptance/verbal understanding;Reinforcement needed   End of Consult   Patient Position at End of Consult Bedside chair;Bed/Chair alarm activated; All needs within reach   End of Consult Comments RN aware     Chio Bower, PT, DPT    Time of PT treatment session: [id number]  39 minutes

## 2022-04-19 NOTE — PLAN OF CARE
Problem: MOBILITY - ADULT  Goal: Maintain or return to baseline ADL function  Description: INTERVENTIONS:  -  Assess patient's ability to carry out ADLs; assess patient's baseline for ADL function and identify physical deficits which impact ability to perform ADLs (bathing, care of mouth/teeth, toileting, grooming, dressing, etc )  - Assess/evaluate cause of self-care deficits   - Assess range of motion  - Assess patient's mobility; develop plan if impaired  - Assess patient's need for assistive devices and provide as appropriate  - Encourage maximum independence but intervene and supervise when necessary  - Involve family in performance of ADLs  - Assess for home care needs following discharge   - Consider OT consult to assist with ADL evaluation and planning for discharge  - Provide patient education as appropriate  Outcome: Adequate for Discharge  Goal: Maintains/Returns to pre admission functional level  Description: INTERVENTIONS:  - Perform BMAT or MOVE assessment daily    - Set and communicate daily mobility goal to care team and patient/family/caregiver     - Collaborate with rehabilitation services on mobility goals if consulted  - Out of bed for toileting  - Record patient progress and toleration of activity level   Outcome: Adequate for Discharge     Problem: PAIN - ADULT  Goal: Verbalizes/displays adequate comfort level or baseline comfort level  Description: Interventions:  - Encourage patient to monitor pain and request assistance  - Assess pain using appropriate pain scale  - Administer analgesics based on type and severity of pain and evaluate response  - Implement non-pharmacological measures as appropriate and evaluate response  - Consider cultural and social influences on pain and pain management  - Notify physician/advanced practitioner if interventions unsuccessful or patient reports new pain  Outcome: Adequate for Discharge     Problem: SAFETY ADULT  Goal: Maintain or return to baseline ADL function  Description: INTERVENTIONS:  -  Assess patient's ability to carry out ADLs; assess patient's baseline for ADL function and identify physical deficits which impact ability to perform ADLs (bathing, care of mouth/teeth, toileting, grooming, dressing, etc )  - Assess/evaluate cause of self-care deficits   - Assess range of motion  - Assess patient's mobility; develop plan if impaired  - Assess patient's need for assistive devices and provide as appropriate  - Encourage maximum independence but intervene and supervise when necessary  - Involve family in performance of ADLs  - Assess for home care needs following discharge   - Consider OT consult to assist with ADL evaluation and planning for discharge  - Provide patient education as appropriate  Outcome: Adequate for Discharge  Goal: Maintains/Returns to pre admission functional level  Description: INTERVENTIONS:  - Perform BMAT or MOVE assessment daily    - Set and communicate daily mobility goal to care team and patient/family/caregiver     - Collaborate with rehabilitation services on mobility goals if consulted  - Out of bed for toileting  - Record patient progress and toleration of activity level   Outcome: Adequate for Discharge  Goal: Patient will remain free of falls  Description: INTERVENTIONS:  - Educate patient/family on patient safety including physical limitations  - Instruct patient to call for assistance with activity   - Consult OT/PT to assist with strengthening/mobility   - Keep Call bell within reach  - Keep bed low and locked with side rails adjusted as appropriate  - Keep care items and personal belongings within reach  - Initiate and maintain comfort rounds  - Make Fall Risk Sign visible to staff  - Apply yellow socks and bracelet for high fall risk patients  - Consider moving patient to room near nurses station  Outcome: Adequate for Discharge     Problem: DISCHARGE PLANNING  Goal: Discharge to home or other facility with appropriate resources  Description: INTERVENTIONS:  - Identify barriers to discharge w/patient and caregiver  - Arrange for needed discharge resources and transportation as appropriate  - Identify discharge learning needs (meds, wound care, etc )  - Arrange for interpretive services to assist at discharge as needed  - Refer to Case Management Department for coordinating discharge planning if the patient needs post-hospital services based on physician/advanced practitioner order or complex needs related to functional status, cognitive ability, or social support system  Outcome: Adequate for Discharge     Problem: Knowledge Deficit  Goal: Patient/family/caregiver demonstrates understanding of disease process, treatment plan, medications, and discharge instructions  Description: Complete learning assessment and assess knowledge base    Interventions:  - Provide teaching at level of understanding  - Provide teaching via preferred learning methods  Outcome: Adequate for Discharge     Problem: GASTROINTESTINAL - ADULT  Goal: Minimal or absence of nausea and/or vomiting  Description: INTERVENTIONS:  - Administer IV fluids if ordered to ensure adequate hydration  - Maintain NPO status until nausea and vomiting are resolved  - Nasogastric tube if ordered  - Administer ordered antiemetic medications as needed  - Provide nonpharmacologic comfort measures as appropriate  - Advance diet as tolerated, if ordered  - Consider nutrition services referral to assist patient with adequate nutrition and appropriate food choices  Outcome: Adequate for Discharge  Goal: Maintains or returns to baseline bowel function  Description: INTERVENTIONS:  - Assess bowel function  - Encourage oral fluids to ensure adequate hydration  - Administer IV fluids if ordered to ensure adequate hydration  - Administer ordered medications as needed  - Encourage mobilization and activity  - Consider nutritional services referral to assist patient with adequate nutrition and appropriate food choices  Outcome: Adequate for Discharge  Goal: Maintains adequate nutritional intake  Description: INTERVENTIONS:  - Monitor percentage of each meal consumed  - Identify factors contributing to decreased intake, treat as appropriate  - Assist with meals as needed  - Monitor I&O, weight, and lab values if indicated  - Obtain nutrition services referral as needed  Outcome: Adequate for Discharge  Goal: Establish and maintain optimal ostomy function  Description: INTERVENTIONS:  - Assess bowel function  - Encourage oral fluids to ensure adequate hydration  - Administer IV fluids if ordered to ensure adequate hydration   - Administer ordered medications as needed  - Encourage mobilization and activity  - Nutrition services referral to assist patient with appropriate food choices  - Assess stoma site  - Consider wound care consult   Outcome: Adequate for Discharge  Goal: Oral mucous membranes remain intact  Description: INTERVENTIONS  - Assess oral mucosa and hygiene practices  - Implement preventative oral hygiene regimen  - Implement oral medicated treatments as ordered  - Initiate Nutrition services referral as needed  Outcome: Adequate for Discharge     Problem: Potential for Falls  Goal: Patient will remain free of falls  Description: INTERVENTIONS:  - Educate patient/family on patient safety including physical limitations  - Instruct patient to call for assistance with activity   - Consult OT/PT to assist with strengthening/mobility   - Keep Call bell within reach  - Keep bed low and locked with side rails adjusted as appropriate  - Keep care items and personal belongings within reach  - Initiate and maintain comfort rounds  - Make Fall Risk Sign visible to staff  - Apply yellow socks and bracelet for high fall risk patients  - Consider moving patient to room near nurses station  Outcome: Adequate for Discharge     Problem: Prexisting or High Potential for Compromised Skin Integrity  Goal: Skin integrity is maintained or improved  Description: INTERVENTIONS:  - Identify patients at risk for skin breakdown  - Assess and monitor skin integrity  - Assess and monitor nutrition and hydration status  - Monitor labs   - Assess for incontinence   - Turn and reposition patient  - Assist with mobility/ambulation  - Relieve pressure over bony prominences  - Avoid friction and shearing  - Provide appropriate hygiene as needed including keeping skin clean and dry  - Evaluate need for skin moisturizer/barrier cream  - Collaborate with interdisciplinary team   - Patient/family teaching  - Consider wound care consult   Outcome: Adequate for Discharge

## 2022-04-19 NOTE — DISCHARGE SUMMARY
[address] [facility]  Discharge- Jf Gearing [facility] 1945, 78 y o  female MRN: [id number]  Unit/Bed#: -[facility] Encounter: [id number]  Primary Care Provider: Sol Norton   Date and time admitted to hospital: 4/4/2022  2:10 PM    * Eosinophilic colitis  Assessment & Plan  · Patient presented to the ED with worsening abdominal pain found to have diverticulitis and significant eosinophilia  · Has had two initial CT scans demonstrating diverticulitis/colitis  Subsequent colonoscopy and repeat CT scan without evidence  · Seen by surgery, signed off  · Being followed by ID, monitoring off antibiotics  · Hematology consult, appreciate input  · Follow up on bone marrow biopsy  · Currently awaiting work up hypereosinophilic syndrome  · GI consult, appreciate input  · Recommending empiric elimination diet - avoiding common allergens such as soy, wheat, eggs, milk, peanuts, and fish/shellfish  · Follow up outpatient with allergist, GI  Cough  Assessment & Plan  · CT chest reviewed, right upper lobe shows patchy opacity with ground-glass thing  · covid test neg  · Improved with tessalon pearles   · Less likelihood of eosinophilic pneumonitis secondary to unilateral involvement  · Continue steroid containing inhalers, if unable to do, switch to Pulmicort nebulizer  · No signs of bacterial infection  · Obtain hypersensitivity pneumonitis panel- followup OP  · Outpatient pulmonary follow-up    Eosinophilia  Assessment & Plan  · Improving  Has not received systemic steroid treatment yet  · Significant eosinophilia  stool parasite studies negative  · Flow cytometry of blood does not demonstrate any concern for dyscrasia  Some hematological studies pending  · Underwent bone marrow biopsy which did not have evidence of leukemia  · Further workup ordered her hematology  GI eliminating common food allergies    Will also discontinue lisinopril        Transaminitis  Assessment & Plan  · Mild transaminitis noted  Resolved    Results from last 7 days   Lab Units 04/19/22  0551 04/17/22  0627 04/16/22  0446 04/15/22  0954 04/14/22  0616 04/13/22  0602   AST U/L 23 34 14 25 32 36   ALT U/L 36 45 24 39 48 41   TOTAL BILIRUBIN mg/dL 0 30 0 26 0 14* 0 29 0 36 0 44       Primary hypertension  Assessment & Plan  · Continue amlodipine but discontinue lisinopril due to concerns of allergic reaction given eosinophilia  · Cont carvedilol 6 25 mg b i d  Stage 3a chronic kidney disease [facility])  Assessment & Plan  · Stable at baseline    Results from last 7 days   Lab Units 04/19/22  0551 04/18/22  1415 04/17/22  0627 04/16/22  0446 04/15/22  0954 04/14/22  0616 04/13/22  0602   BUN mg/dL 32* 31* 28* 17 20 17 16   CREATININE mg/dL 1 11 1 15 1 10 0 67 0 98 1 00 0 82   EGFR ml/min/1 73sq m 48 46 49 86 56 55 70       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)  Assessment & Plan  · Stable BG  · Prior to admission on janumet  Continue sliding scale insulin  · Did have episode of hypoglycemia    Now stable off dextrose infusion  · Restarted pregabalin    Results from last 7 days   Lab Units 04/19/22  0927 04/18/22  0844 04/17/22  0755 04/16/22  2131 04/16/22  0751 04/15/22  1535 04/15/22  1203 04/15/22  0936   POC GLUCOSE mg/dl 227* 227* 130 216* 145* 203* 121 155*       Mild intermittent asthma  Assessment & Plan  · No exacerbation continue breo  · Continue singulair        Medical Problems             Resolved Problems  Date Reviewed: 4/19/2022          Resolved    Hyperkalemia 4/17/2022     Resolved by  Debbie Felton MD              Discharging Physician / Practitioner: Debbie Felton MD  PCP: Sol Mackey  Admission Date:   Admission Orders (From admission, onward)     Ordered        04/04/22 1903  Inpatient Admission  Once                      Discharge Date: 04/19/22    Consultations During Hospital Stay:  [address] TO CASE MANAGEMENT  IP CONSULT TO GASTROENTEROLOGY  IP CONSULT TO INFECTIOUS DISEASES  IP CONSULT TO ONCOLOGY  · INPATIENT CONSULT TO IR      Procedures Performed:    EGD:    IMPRESSION:  Mild gastric erosion  Random biopsies taken from stomach and duodenum to evaluate for eosinophilia  No ulcers or signs of recent bleeding     RECOMMENDATION:  Await pathology results    Colonoscopy:       IMPRESSION:  1  Diverticulosis coli, ascending colon sigmoid colon  No evidence of diverticulitis  2  Diminutive polyp in the proximal ascending colon status post cold biopsy removal      RECOMMENDATION:  Repeat colonoscopy in 5 years due to a personal history of colon polyps     High-fiber diet to include fruits, vegetables, whole grain foods, daily     Will call the patient 1 week regarding the polyp results and biopsy results        Significant Findings / Test Results:   · As above    Incidental Findings:   · none     Test Results Pending at Discharge (will require follow up): · Hematological blood work  · GI biopsies     Outpatient Tests Requested:  · Cbc bmp in 1 week    Complications:  none    Reason for Admission: abd pain    Hospital Course:   Vera Mccullough is a 78 y o  female patient who originally presented to the hospital on 4/4/2022 due to eosinophilia associated with abdominal pain  Infectious Disease did not recommend antibiotics as it was not felt that this is infectious  Leukemia lymphoma flow cytometric was negative  There was no significant myeloblasts population  Does not appear to be AM L  There are still awaiting genetic results to rule out coronal myeloid disease  BCR-ABL is still pending  Meanwhile eosinophils are gradually down trending  Patient has not been started on steroids by the specialists  Gastroenterology also saw the patient underwent EGD and colonoscopy results as dictated above  Patient will need outpatient follow-up with Hematology and Gastroenterology    Patient is requiring narcotic pain medications for abdominal pain control in addition to Bentyl  Please see above list of diagnoses and related plan for additional information  Condition at Discharge: stable    Discharge Day Visit / Exam:   Subjective:  I still abdominal pain but it is much better  Vitals: Blood Pressure: 139/69 (04/19/22 0801)  Pulse: 60 (04/19/22 0801)  Temperature: 97 8 °F (36 6 °C) (04/19/22 0801)  Temp Source: Oral (04/16/22 1517)  Respirations: 16 (04/19/22 0801)  Height: 5' 2" (157 5 cm) (04/05/22 0800)  Weight - Scale: 87 1 kg (192 lb 0 3 oz) (04/17/22 2100)  SpO2: 99 % (04/19/22 0801)  Exam:   Physical Exam General- Awake, alert and oriented x 3, looks comfortable  HEENT- Normocephalic, atraumatic, oral mucosa- moist  Neck- Supple, No carotid bruit, no JVD  CVS- Normal S1/ S2, Regular rate and rhythm, No murmur, No edema  Respiratory system- B/L clear breath sounds, no wheezing  Abdomen- Soft, Non distended, mild tenderness, Bowel sound- present 4 quads  Genitourinary- No suprapubic tenderness, No CVA tenderness  Skin- No new bruise or rash  Musculoskeletal- No gross deformity  Psych- No acute psychosis  CNS- CN II- XII grossly intact, No acute focal neurologic deficit noted      Discussion with Family: Updated  () via phone  Discharge instructions/Information to patient and family:   See after visit summary for information provided to patient and family  Provisions for Follow-Up Care:  See after visit summary for information related to follow-up care and any pertinent home health orders  Disposition:   Joao Brandon Lawrence at [facility] Readmission: no     Discharge Statement:  I spent 35 minutes discharging the patient  This time was spent on the day of discharge  I had direct contact with the patient on the day of discharge   Greater than 50% of the total time was spent examining patient, answering all patient questions, arranging and discussing plan of care with patient as well as directly providing post-discharge instructions  Additional time then spent on discharge activities  Discharge Medications:  See after visit summary for reconciled discharge medications provided to patient and/or family        **Please Note: This note may have been constructed using a voice recognition system**

## 2022-04-19 NOTE — ASSESSMENT & PLAN NOTE
· Stable BG  · Prior to admission on janumet  Continue sliding scale insulin  · Did have episode of hypoglycemia    Now stable off dextrose infusion  · Restarted pregabalin    Results from last 7 days   Lab Units 04/19/22  0927 04/18/22  0844 04/17/22  0755 04/16/22  2131 04/16/22  0751 04/15/22  1535 04/15/22  1203 04/15/22  0936   POC GLUCOSE mg/dl 227* 227* 130 216* 145* 203* 121 155*

## 2022-04-19 NOTE — CASE MANAGEMENT
Case Management Discharge Planning Note    Patient name Rishi Jaramillo  Location /-48 MRN [id number]  : 1945 Date 2022       Current Admission Date: 2022  Current Admission Diagnosis:Eosinophilic colitis   Patient Active Problem List    Diagnosis Date Noted    Cough 2022    COPD (chronic obstructive pulmonary disease) ([facility] ) 2022    Eosinophilia     Eosinophilic colitis     Transaminitis 2022    Anxiety 2022    Hypertensive urgency 2022    Nausea 2022    Bradycardia 2022    Abnormal EKG 2022    Primary hypertension 2022    Stage 3a chronic [facility] disease [facility] ) 2022    Psychophysiological insomnia 2021    Exertional dyspnea     Medicare annual wellness visit, subsequent 2021    Right leg pain 2021    Acute cystitis with hematuria 2021    Chronic bronchitis ([facility] ) 04/15/2021    Obesity, morbid ([facility] ) 04/15/2021    BMI 37 0-37 9, adult 2020    Atypical chest pain 2020    Dermatitis 2020    Vaginal cyst 2019    Candidiasis of genitalia in female 2019    Encounter for gynecological examination without abnormal finding 2019    Controlled type 2 diabetes with neuropathy ([facility] ) 2019    Benign hypertension 2018    Depression with anxiety 2018    Mild intermittent asthma 2018    BOLA (obstructive sleep apnea) 10/17/2016      LOS (days): 15  Geometric Mean LOS (GMLOS) (days): 2 90  Days to GMLOS:-11 9     OBJECTIVE:  Risk of Unplanned Readmission Score: 15         Current admission status: Inpatient   Preferred Pharmacy:   [address] [place], [place] [id number] R R 1 [phone] R R 1 ([address]) [address] [facility]  Phone: [phone] Fax: [phone]    [id number] [place], [address] [facility]   [address] [address]  Phone: [phone] Fax: [phone]    Primary Care Provider: ALEKSANDER Arce    Primary Insurance: MEDICARE  Secondary Insurance: [employer]    DISCHARGE DETAILS:     Additional Comments: CM sopke with [facility] liaison who reports they are able to accept pt today and that no new Covid test is required  CM spoke with pt who is willing to accept bed  CM sent [facility] transport request to [email or web address] via [facility] and provided RN phone number to call once a time is arranged  Pt aware and agreeable to out of pocket cost associated with [facility] transportation  Accepting Facility Name, [address] :  [facility]  Receiving Facility/Agency Phone Number: [phone]  Facility/Agency Fax Number: [phone]

## 2022-04-19 NOTE — ASSESSMENT & PLAN NOTE
· Mild transaminitis noted    Resolved    Results from last 7 days   Lab Units 04/19/22  0551 04/17/22  0627 04/16/22  0446 04/15/22  0954 04/14/22  0616 04/13/22  0602   AST U/L 23 34 14 25 32 36   ALT U/L 36 45 24 39 48 41   TOTAL BILIRUBIN mg/dL 0 30 0 26 0 14* 0 29 0 36 0 44

## 2022-04-19 NOTE — ASSESSMENT & PLAN NOTE
· CT chest reviewed, right upper lobe shows patchy opacity with ground-glass thing  · covid test neg  · Improved with tessalon pearles   · Less likelihood of eosinophilic pneumonitis secondary to unilateral involvement  · Continue steroid containing inhalers, if unable to do, switch to Pulmicort nebulizer  · No signs of bacterial infection  · Obtain hypersensitivity pneumonitis panel- followup OP  · Outpatient pulmonary follow-up

## 2022-04-19 NOTE — PLAN OF CARE
Problem: PHYSICAL THERAPY ADULT  Goal: Performs mobility at highest level of function for planned discharge setting  See evaluation for individualized goals  Description: Treatment/Interventions: Functional transfer training,LE strengthening/ROM,Therapeutic exercise,Endurance training,Patient/family training,Equipment eval/education,Bed mobility,Gait training,Spoke to nursing  Equipment Recommended: Johnnie Estrada (RW)       See flowsheet documentation for full assessment, interventions and recommendations  Outcome: Progressing  Note: Prognosis: Good  Problem List: Decreased strength,Decreased endurance,Impaired balance,Decreased mobility,Impaired sensation,Pain  Assessment: Chart reviewed  Patient was received seated in recliner in NAD and agreeable to PT session  Today's PT treatment session consisted of therapeutic activity for facilitation of transitional movements and safe performance of correct technique for sit to stand transfers, therapeutic exercise to increase lower extremity muscle strength, gait training to promote safe and functional ambulation on level surfaces, and stair negotation  In comparison to the previous session the patient has made progress as evident by requiring decreased assistance with transfers and functional mobility  Pt was able to ambulate an increased distance with use of RW on level surface  Pt exhibits decreased marion, decreased stride length, slightly forward flexed trunk, and decreased heel strike when ambulating  Pt required two standing rest breaks during ambulation  Pt was able to perform stair negotiation with use of bilateral upper extremity support  Pt was able to negotiate six inch practice step with supervision  Pt performed all therapeutic exercise well, but required seated rest breaks between sets of 10  Overall, patient tolerated today's session well and continues to be making progress towards achieving her STG's  Pt's STG's were updated this session   Patient's prognosis for achieving their STG's is good as evident by pt's motivation  PT intervention continues to be appropriate as the patient continues to be limited by pain, decreased lower extremity strength, impaired balance, decreased endurance, gait deviations, and decreased functional mobility  Continue to recommend home PT  Pt educated on home PT services  PT to continue to see patient in order to address the deficits listed above and provide interventions consistent with the POC in order to achieve STG's and optimize the patient's independence with functional mobility  Barriers to Discharge: None     PT Discharge Recommendation: Home with home health rehabilitation    See flowsheet documentation for full assessment

## 2022-04-19 NOTE — ASSESSMENT & PLAN NOTE
· Patient presented to the ED with worsening abdominal pain found to have diverticulitis and significant eosinophilia  · Has had two initial CT scans demonstrating diverticulitis/colitis  Subsequent colonoscopy and repeat CT scan without evidence  · Seen by surgery, signed off  · Being followed by ID, monitoring off antibiotics  · Hematology consult, appreciate input  · Follow up on bone marrow biopsy  · Currently awaiting work up hypereosinophilic syndrome  · GI consult, appreciate input  · Recommending empiric elimination diet - avoiding common allergens such as soy, wheat, eggs, milk, peanuts, and fish/shellfish    · Follow up outpatient with allergist, GI

## 2022-04-20 ENCOUNTER — TELEPHONE (OUTPATIENT)
Dept: SURGICAL ONCOLOGY | Facility: CLINIC | Age: 77
End: 2022-04-20

## 2022-04-20 ENCOUNTER — TRANSITIONAL CARE MANAGEMENT (OUTPATIENT)
Dept: FAMILY MEDICINE CLINIC | Facility: CLINIC | Age: 77
End: 2022-04-20

## 2022-04-20 LAB — MISCELLANEOUS LAB TEST RESULT: NORMAL

## 2022-04-20 NOTE — TELEPHONE ENCOUNTER
Called to let patient know about appointment  There was no answer so I left a message with appointment date, time, and location

## 2022-04-20 NOTE — CASE MANAGEMENT
Case Management Discharge Planning Note    Patient name Justo Smalls  Location /-63 MRN [id number]  : 1945 Date 2022       Current Admission Date: 2022  Current Admission Diagnosis:Eosinophilic colitis   Patient Active Problem List    Diagnosis Date Noted    Cough 2022    [facility] 2022    Eosinophilia     Eosinophilic colitis     Transaminitis 2022    Anxiety 2022    Hypertensive urgency 2022    Nausea 2022    Bradycardia 2022    Abnormal EKG 2022    Primary hypertension 2022    Stage 3a chronic kidney disease [facility] ) 2022    Psychophysiological insomnia 2021    Exertional dyspnea     Medicare annual wellness visit, subsequent 2021    Right leg pain 2021    Acute cystitis with hematuria 2021    Chronic bronchitis ([facility] ) 04/15/2021    Obesity, morbid [facility] ) 04/15/2021    BMI 37 0-37 9, adult 2020    Atypical chest pain 2020    Dermatitis 2020    Vaginal cyst 2019    Candidiasis of genitalia in female 2019    Encounter for gynecological examination without abnormal finding 2019    Controlled type 2 diabetes with neuropathy [facility] ) 2019    Benign hypertension 2018    Depression with anxiety 2018    Mild intermittent asthma 2018    BOLA (obstructive sleep apnea) 10/17/2016      LOS (days): 15  Geometric Mean LOS (GMLOS) (days): 2 90  Days to GMLOS:-12 2     OBJECTIVE:  Risk of Unplanned Readmission Score: 16         Current admission status: Inpatient   Preferred Pharmacy:   [address] [place] [address] R R 1 [phone] R R 1 ([address]) [address] [facility]  Phone: [phone] Fax: [phone]    [id number] [place], [address] [facility] [address] [facility] [address] [address]  Phone: [phone] Fax: [phone]    Primary Care Provider: ALEKSANDER Monge    Primary Insurance: MEDICARE  Secondary Insurance: [employer]    DISCHARGE DETAILS:    Additional Comments: CM was informed by physician that pt is interested in STR as she feels she would prefer STR  CM met with pt who reports she would prefer STR  Referrals placed via [facility]

## 2022-04-22 LAB
A FUMIGATUS1 AB SER QL ID: NEGATIVE
A PULLULANS AB SER QL: NEGATIVE
LACEYELLA SACCHARI AB SER QL: NEGATIVE
PIGEON SERUM AB QL ID: NEGATIVE
S RECTIVIRGULA AB SER QL ID: NEGATIVE
T VULGARIS AB SER QL ID: NEGATIVE

## 2022-05-02 ENCOUNTER — TELEPHONE (OUTPATIENT)
Dept: GASTROENTEROLOGY | Facility: CLINIC | Age: 77
End: 2022-05-02

## 2022-05-02 NOTE — TELEPHONE ENCOUNTER
Dr Juany Ortiz pt  Patient has an appointment on 5/26  She wants to know should she get an [facility] and MRSA test before her visit  Please call

## 2022-05-02 NOTE — TELEPHONE ENCOUNTER
----- Message from Bertha Quijano MD sent at 5/2/2022  9:29 AM EDT -----  Patient's EGD showed no signs of eosinophilic gastritis, she has a follow up with Dr Yaz Matos on 5/26

## 2022-05-03 ENCOUNTER — TELEPHONE (OUTPATIENT)
Dept: GASTROENTEROLOGY | Facility: CLINIC | Age: 77
End: 2022-05-03

## 2022-05-03 NOTE — TELEPHONE ENCOUNTER
----- Message from Avery Anthony MD sent at 5/3/2022 11:46 AM EDT -----  Biopsy was negative for eosinophilic gastritis

## 2022-05-08 NOTE — PROGRESS NOTES
Hematology Outpatient Office Note    Date of Service: 5/18/2022    [facility] HEMATOLOGY SPECIALISTS GRETCHEN [place]  [facility]  [phone]    Reason for Consultation:   Chief Complaint   Patient presents with    Follow-up     EOSINOPHILIA     HOSP F/U       Referral Physician: No ref  provider found    Primary Care Physician:  ALEKSANDER Mcintyre     Nickname: Rojasmandeep Galicia      ASSESSMENT & PLAN      Diagnosis ICD-10-CM Associated Orders   1  Stage 3a chronic kidney disease (HCC)  N18 31    2  Eosinophilia, unspecified type  D72 10          This is a 78 y o  c PMHx notable for CKDIIIA, DM, asthma, HTN, BOLA, being seen in consultation for eosinophilia    As per GI assessment, 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 with WBC improving after elimination diet and elimination of 6 common food allergens ( Eggs, wheat, milk, shellfish, peanuts,soy)  Eosinophil elevation: new since 4/4/2022, last WNL 2/28/2022, now WBC has normalized on 5/13/22 to 7 45k with absolute eosinophil count of 1 97k, still above 1 5k  Results for Ambrocio Wiggins (MRN [id number]) as of 5/8/2022 14:31   Ref  Range 2/28/2022 05:52 4/4/2022 14:54 4/6/2022 05:34 4/7/2022 05:58 4/8/2022 04:50 4/9/2022 05:48 4/10/2022 06:48 4/12/2022 06:05 4/16/2022 05:00 4/18/2022 14:14 4/19/2022 05:51   Absolute Eosinophils Latest Ref Range: 0 00 - 0 61 Thousand/µL 0 39 8 77 (H) 11 81 (H) 13 69 (H) 15 23 (H) 18 88 (H) 17 55 (H) 14 74 (H) 7 95 (H) 8 22 (H) 8 45 (H)     The differential of eosinophilia consists mostly of drug reactions, atopic diseases, infections, and bone marrow disorders  We note that sickle cell disease can also cause eosinophilia  Mild eosinophilia is relatively common with Abs Eo counts (AEC) between 500-1000 found in 3-10% of individuals studied  Patients with AEC >1500 are less common, and investigation is warranted for potential etiologies and end organ damage     Possible specific etiologies include asthma/atopic dermatitis, strongyloidiasis, trichinellosis, filariasis, schistomiasis, hookworm, isosporiasis, histoplasmosis, coccidiomycosis, HIV, IBD, IgG4 disease, connective tissue disorders, hypoadrenalism, sarcoidosis, leukemia/lymphomas (specifically ALL), and immunodeficiency syndromes (Ommen syndrome, DOCK8 deficiency, Hyper IgE syndrome)  Any medication, herbal remedy or dietary supplement can cause drug induced eosinophilia, common offending agents are NSAIDS, aspirin, tetracyclines,  PCN and synthetic penacillins, allopurinol, HCTZ    Features of aggressive disease include: AEC> 100,000, CHF, suggestion of underlying MPN (splenomegaly, marked serum tryptase, early myeloid precursors on smear, grossly elevated B12), and corticosteroid resistance  In those with life-threatening illness emperic steroids (1g methylprednisolone qday) should be administered, if strongyloides is suspected concomitant ivermectin to prevent hyperinfection syndrome is an option (200mcg/kg daily x2)  The majority of patients with MPN-related disease had DYD4K2-DHOSRT fusions, although LPA4P-CRIQYG and ETV6-PDGFRB fusions are described as well as PDGFRA point mutations  Lymphoid-related HES patients have a high prevalence of skin and tissue manefestations, elevated IgE  Episodic angioedema and eosinophilia (Gleich syndrome) is a subset of L-HES with monthly flares of eosinophilia  For recurrence of eosinophilia (idiopathic HES) after steroid minimization (10mg/day) other agents are used for control including hydrea (500-1000mg daily, 72% response rate), or IFN (1M units three times weekly SC with escalation to 3-4M units TIW, 50% response monotherapy, 75% with steroid dual therapy)  Weekly PEG-INF formulations can be initiated at 1-1 5mcg/kg weekly and uptitrated  Imatinib at 400mg daily may also produce responses, but doses may have to be escalated to 800mg TXU Irvin, 2009, Leuk Res)   In a prospective trial Chi Castillo 2007, Hematologica) responses to PDGFR negative fusion patients were low (3-11%) but doses were capped at 400mg imatinib  One of my prior patients has experienced >6 years of excellent disease control on imatinib 400mg daily  Lymphocyte variant HES (aberrant CD3-CD4+ phenotype) often responds well to steroid therapy, but sparing agents are common  Hydrea (1/3) and imatinib (1/4) are less likely to be efficacious, but  3/5 responded to cyclosporine, and 4/5 to mepolizumab (not FDA approved for HES)  Arnie Ozuna 2014, Medicine)  PDGFR fusion HES responds well to imatinib as shown in a prospective trial Chi Castillo 2007, Hematologica)  Doses were between 100-400mg with complete responses in all patients (N=27)  Others note even lower dosages being effective, such as 100-200mg weekly (Kristina, 2008, Br J Hem)  JAK2 V617F with reflex testing was negative on 4/12/22  Ova and parasite, Giardia, Strongyloides negative on 4/9/22  HIV testing was negative as well  9/16/2021 PFT: mild total lung capacity reduction consistent with mild restrictive pattern, moderate diffiuse capacity impairment  2/28/22 Echo Stress test showed LVEF 60%  4/16/2022 CT Chest and abd/pelv: New right upper lobe patchy groundglass infiltrates, no intra-abdominal pathology or lymphadenopathy    4/12/2022 BMBx: Hypercellular bone marrow with prominent eosinophilia with atypia, consistent with at least hypereosinophilia (see note)  -  Background maturing trilineage hematopoiesis without significant increase in myeloblasts    -  Decreased stainable storage iron in a limited sample    5/2/22    Evaluation: search for secondary causes of infectious etiology  -Toxocara canis and candido ([facility] misc test), Trichinellosis ([facility] misc test), histoplasma: all otherwise ordered as miscellaneous [facility] lab tests  -serum tryptase (normal at 7)  -serum immunoglobins (IgA, IgM, IgG, IgE): insurance won't cover  -EBV PCR  -US Abdomen to assess for splenomegaly  -BCR-ABL (already done and negative), [id number] (Miscellaneous [facility] test being ordered) and JAK2 V617F FISH with reflex to exon 12/13  -FISH for , PDGFRb (was negataive), PDGFRa, FGFR1 (was negative) (all ordered as [facility] miscallenous tests)    -at this time we do not recommend T cell gene rearrangement, IL5 level, cardiac MRI, EGD, NCS/EMG as it is unlikely that her eosinophilia is from a myeloproliferative neoplasm; she does not appear to have hypereosinophilic syndrome as there is no evidence of end organ damage and both WBC and eosinophil both improved with diet changes after GI input  · Discussion of decision making    I personally reviewed the following lab results, the image studies, pathology, other specialty/physicians consult notes and recommendations, and outside medical records from [address] [place] [address]  I had a lengthy discussion with the patient and shared the work-up findings  We discussed the diagnosis and management plan as below  I spent 50 minutes reviewing the records (labs, clinician notes, outside records, medical history, ordering medicine/tests/procedures, interpreting the imaging/labs previously done) and coordination of care as well as direct time with the patient today, of which greater than 50% of the time was spent in counseling and coordination of care with the patient/family  Follow Up: 6 months weeks to trend CBC    All questions were answered to the patient's satisfaction during this encounter  The patient knows the contact information for our office and knows to reach out for any relevant concerns related to this encounter  They are to call for any temperature 100 4 or higher, new symptoms including but not restricted to shaking chills, decreased appetite, nausea, vomiting, diarrhea, increased fatigue, shortness of breath or chest pain, confusion, and not feeling the strength to come to the clinic   For all other listed problems and medical diagnosis in their chart - they are managed by PCP and/or other specialists, which the patient acknowledges  Thank you very much for your consultation and making us a part of this patient's care  We are continuing to follow closely with you  Please do not hesitate to reach out to me with any additional questions or concerns  Suresh Sharif MD  Hematology & Medical Oncology Staff Physician             Disclaimer: This document was prepared using [employer] Fluency Direct technology  If a word or phrase is confusing, or does not make sense, this is likely due to recognition error which was not discovered during this clinician's review  If you believe an error has occurred, please contact me through [address] [place] line for cindy? cation  HEMATOLOGICAL HISTORY OF PRESENT ILLNESS      Clotting History None   Bleeding History Menstrual period blood loss that stopped after hysterectomy   Cancer History None   Family Cancer History Prostate (dad), sister (breast),  mAunt (breast), mAunt (pancreatic age 61), pAunt (pancreatic age 61)   H/O Blood/Plt Transfusion None   Tobacco/etoh/drug abuse No nicotine use, etoh abuse, or rec drug use   Hx COVID19 Infection and Vaccine Status No infection  Vaccinated x2   Cancer Screening history n/a   Occupation  work (retired),  (retired)   New medications in the last month: Diflucan  Pain: mild residual abd pain    She was admitted 4/4/22 for worsening abdominal pain that had been ongoing for 2 weeks previous to the admission  SUBJECTIVE  (INTERVAL HISTORY)        I have reviewed the relevant past medical, surgical, social and family history  I have also reviewed allergies and medications for this patient  Review of Systems  Baseline weight: 200 lbs    She has 1 month of fatigue  She has a yeast infection that she has been treated for the past 2 weeks      Denies night sweats, daytime sweating, weight change, N/V, LH, HA, F/C, SOB at rest, falls, generalized weakness, rash, itching, hematuria, melena, hematochezia, dysuria, or change in urinary frequency  She has nearly 1 year of YANG since summer 2021  A 10-point review of system was performed, pertinent positive and negative were detailed as above  Otherwise, the 10-point review of system was negative        Past Medical History:   Diagnosis Date    Asthma     Benign hypertension 2018    Cardiac arrest ([facility] [address] )     Diabetes mellitus ([facility] [address] )     Glaucoma     Hypertension     Kidney stone     Neuropathy     Sleep apnea     no machine    SOB (shortness of breath)     Tubular adenoma of colon 10/2019    Wheezing        Past Surgical History:   Procedure Laterality Date     SECTION      COLONOSCOPY      HYSTERECTOMY      IR BIOPSY BONE MARROW  2022    TONSILLECTOMY         Family History   Problem Relation Age of Onset    Diabetes Mother     Hypertension Father     Prostate cancer Father     Diabetes Sister     Hypertension Sister     Breast cancer Sister 62    Diabetes Brother     Hypertension Brother     Asthma Family     No Known Problems Daughter     No Known Problems Sister     No Known Problems Daughter     No Known Problems Daughter     No Known Problems Maternal Aunt     Breast cancer Maternal Aunt 58    No Known Problems Maternal Aunt     No Known Problems Maternal Aunt     No Known Problems Paternal Aunt     No Known Problems Paternal Aunt     No Known Problems Paternal Aunt     Colon cancer Neg Hx     Ovarian cancer Neg Hx     Uterine cancer Neg Hx     Cervical cancer Neg Hx        Social History     Socioeconomic History    Marital status: /Civil Union     Spouse name: Not on file    Number of children: Not on file    Years of education: Not on file    Highest education level: Not on file   Occupational History    Occupation: retired    Tobacco Use    Smoking status: Never Smoker    Smokeless tobacco: Never Used   Vaping Use    Vaping Use: Not on file   Substance and Sexual Activity    Alcohol use: Never    Drug use: No    Sexual activity: Yes     Birth control/protection: Surgical   Other Topics Concern    Not on file   Social History Narrative    Not on file     Social Determinants of Health     Financial Resource Strain: Not on file   Food Insecurity: No Food Insecurity    Worried About Running Out of Food in the Last Year: Never true    Latisha of Food in the Last Year: Never true   Transportation Needs: No Transportation Needs    Lack of Transportation (Medical): No    Lack of Transportation (Non-Medical): No   Physical Activity: Not on file   Stress: Not on file   Social Connections: Not on file   Intimate Partner Violence: Not on file   Housing Stability: Low Risk     Unable to Pay for Housing in the Last Year: No    Number of Places Lived in the Last Year: 1    Unstable Housing in the Last Year: No       Allergies   Allergen Reactions    Ciprofloxacin Itching    Levaquin [Levofloxacin] Swelling    Penicillins GI Intolerance       Current Outpatient Medications   Medication Sig Dispense Refill    acetaminophen (TYLENOL) 325 mg tablet Take 2 tablets (650 mg total) by mouth every 4 (four) hours as needed for mild pain  0    Aspirin Low Dose 81 MG chewable tablet CHEW 1 TABLET BY MOUTH DAILY 90 tablet 1    benzonatate (TESSALON PERLES) 100 mg capsule Take 1 capsule (100 mg total) by mouth 3 (three) times a day as needed for cough 20 capsule 0    bisacodyl (DULCOLAX) 5 mg EC tablet Take 1 tablet (5 mg total) by mouth as needed in the morning for constipation  30 tablet 0    carvedilol (COREG) 6 25 mg tablet Take 1 tablet (6 25 mg total) by mouth in the morning and 1 tablet (6 25 mg total) in the evening  Take with meals    0    dicyclomine (BENTYL) 10 mg capsule Take 1 capsule (10 mg total) by mouth 4 (four) times a day (before meals and at bedtime)  0    fluconazole (DIFLUCAN) 150 mg tablet Take 1 tablet (150 mg total) by mouth once for 1 dose 2 tablet 0    glucose blood test strip E11 9 / testing daily      hydrOXYzine HCL (ATARAX) 10 mg tablet       insulin lispro (HumaLOG) 100 units/mL injection Inject 1-5 Units under the skin daily with breakfast  0    latanoprost (XALATAN) 0 005 % ophthalmic solution       meloxicam (MOBIC) 15 mg tablet       mometasone (ELOCON) 0 1 % cream Apply topically daily 45 g 0    montelukast (SINGULAIR) 10 mg tablet TAKE 1 TABLET BY MOUTH EVERYDAY AT BEDTIME 90 tablet 1    ONETOUCH DELICA LANCETS 90D MISC E11 9/ testing daily      pantoprazole (PROTONIX) 40 mg tablet Take 1 tablet (40 mg total) by mouth 2 (two) times a day before meals  0    polyethylene glycol (GLYCOLAX) 17 GM/SCOOP powder Take 17 g by mouth in the morning  578 g 3    ProAir  (90 Base) MCG/ACT inhaler INHALE 2 PUFFS BY MOUTH EVERY 6 HOURS AS NEEDED FOR WHEEZE 8 5 g 3    sertraline (ZOLOFT) 50 mg tablet TAKE 1 TABLET BY MOUTH EVERY DAY 90 tablet 1    traMADol (Ultram) 50 mg tablet Take 1 tablet (50 mg total) by mouth every 6 (six) hours as needed for moderate pain 20 tablet 0    triamcinolone (KENALOG) 0 1 % cream       Wixela Inhub 250-50 MCG/ACT inhaler INHALE 1 PUFF 2 TIMES A DAY RINSE MOUTH AFTER USE  60 blister 3    amLODIPine (NORVASC) 10 mg tablet Take 1 tablet (10 mg total) by mouth daily 30 tablet 0    LORazepam (ATIVAN) 1 mg tablet Take 0 5 tablets (0 5 mg total) by mouth every 8 (eight) hours as needed for anxiety for up to 7 days 15 tablet 0    pregabalin (LYRICA) 50 mg capsule Take 1 capsule (50 mg total) by mouth 3 (three) times a day for 10 days 30 capsule 0    zolpidem (AMBIEN) 5 mg tablet Take 1 tablet (5 mg total) by mouth daily at bedtime as needed for sleep for up to 10 days 15 tablet 0     No current facility-administered medications for this visit         (Not in a hospital admission)        Objective:     24 Hour Vitals Assessment:     Vitals: 05/18/22 1300   BP: 124/68   Pulse: 63   Resp: 18   Temp: 97 6 °F (36 4 °C)   SpO2: 99%       PHYSICIAN EXAM:    General: Appearance: alert, cooperative, no distress  HEENT: Normocephalic, atraumatic  No scleral icterus  conjunctivae clear  EOMI  Chest: No tenderness to palpation  No open wound noted  Lungs: No cyanosis, Respirations unlabored  Cardiac: Regular rate, +S1and S2  Abdomen: Soft, non-tender, non-distended  No splenomegaly noted  Extremities:  No edema, cyanosis, clubbing  Skin: Skin color, turgor are normal  No rashes  Lymphatics: no palpable supra-cervical, axillary, or inguinal adenopathy  Neurologic: Awake, Alert, and oriented, no gross focal deficits noted b/l  DATA REVIEW:    Pathology Result:    Final Diagnosis   Date Value Ref Range Status   04/15/2022   Final    A  Duodenum:  -  Benign duodenal mucosa  -  No villous atrophy, intraepithelial lymphocytosis or crypt hyperplasia; negative for features of malabsorptive enteropathy   -  Negative for chronic or active duodenitis, dysplasia or malignancy  B  Stomach, gastric erosion:  -  Chronic inactive antral gastritis and reactive changes  -  Negative for Helicobacter pylori, by H&E stain   -  Negative for intestinal metaplasia (by AB/PAS), dysplasia or carcinoma  C  Stomach, gastric:  -  Chronic inactive antral gastritis  -  Negative for Helicobacter pylori, by H&E stain   -  Negative for  intestinal metaplasia, dysplasia or carcinoma  04/12/2022   Final    A -C  Bone marrow,  right iliac crest,  biopsy and aspirate:  -  Hypercellular bone marrow with prominent eosinophilia with atypia, consistent with at least hypereosinophilia (see note)  -  Background maturing trilineage hematopoiesis without significant increase in myeloblasts  -  Decreased stainable storage iron in a limited sample  -  Mildly patchy increased reticulin fibers without overt fibrosis    -  Negative for collagen fibrosis, granulomata, vasculitis, necrosis  04/09/2022   Final    A  Ascending, transverse, sigmoid colon (biopsy):  - Colonic mucosa with mildly increased patchy clusters of eosinophils    Comment: The biopsy shows a patchy mild increase in eosinophils involving the mucosa and submucosa with focal intraepithelial eosinophils  Helminth infection, vasculitis and granulomas are not appreciated  The patient is noted to have a peripheral eosinophilia  The clinical and biopsy features may be seen in association with helminth infection, hypereosinophilic syndrome, Churg-Kevin and polyarteritis nodosa, drug hypersensitivity  Suggest clinical correlation and follow-up as indicated  -  stains hematopoietic cells, interstitial cells of Cajal  CK AE1/3 stains crypt epithelium  B  Proximal ascending colon polyp (biopsy):  - Tubular adenoma  - No high grade dysplasia        10/24/2019   Final    A  Gastric antrum (biopsy):     - Minimal chronic inactive gastritis involving antral and oxyntic mucosa  - No definitive morphologic evidence of Helicobacter on routine sections      - No intestinal metaplasia, dysplasia or neoplasia identified  B  Gastric body (biopsy):     - Gastric oxyntic mucosa with no significant pathologic abnormality      - No definitive morphologic evidence of Helicobacter on routine sections      - No intestinal metaplasia, dysplasia or neoplasia identified  C  Gastric fundus (biopsy):     - Gastric oxyntic mucosa with no significant pathologic abnormality      - No definitive morphologic evidence of Helicobacter on routine sections      - No intestinal metaplasia, dysplasia or neoplasia identified  D  Ascending colon polyp (hot snare polypectomy):     - Portions of tubular adenoma / tubulovillous adenoma  - Fecal material noted  - No high-grade dysplasia or malignancy identified  Image Results:   Image result are reviewed and documented in Hematology/Oncology history   I personally reviewed these images  CT chest w contrast  Narrative: CT CHEST WITH IV CONTRAST    INDICATION:   eosinophilia with pulmonary symptoms       COMPARISON:  Compared with 11/3/2016    TECHNIQUE: CT examination of the chest was performed  Axial, sagittal, and coronal 2D reformatted images were created from the source data and submitted for interpretation  Radiation dose length product (DLP) for this visit:  296 mGy-cm   This examination, like all CT scans performed in the [facility], was performed utilizing techniques to minimize radiation dose exposure, including the use of iterative   reconstruction and automated exposure control  IV Contrast:  85 mL of iohexol (OMNIPAQUE)    FINDINGS:    LUNGS:  Patchy groundglass opacities predominantly in the right upper lobe superiorly and anteriorly     There is no tracheal or endobronchial lesion  PLEURA:  Unremarkable  HEART/GREAT VESSELS: Heart is unremarkable for patient's age  No thoracic aortic aneurysm  Trace pericardial fluid  MEDIASTINUM AND YAMIL:  Unremarkable  CHEST WALL AND LOWER NECK:  Unremarkable  VISUALIZED STRUCTURES IN THE UPPER ABDOMEN:  3 2 mm calculus in the upper pole of the left kidney  Poorly marginated hypodensity in the liver superiorly of known focal fat infiltration    OSSEOUS STRUCTURES:  No acute fracture or destructive osseous lesion  Impression: New right upper lobe patchy groundglass infiltrates  Trace pericardial fluid  Workstation performed: [id number]      LABS:  Lab data are reviewed and documented in HemOnc history         Lab Results   Component Value Date    HGB 11 3 (L) 05/13/2022    HCT 36 0 05/13/2022    MCV 93 05/13/2022     05/13/2022    WBC 7 45 05/13/2022    NRBC 0 05/13/2022     Lab Results   Component Value Date    K 4 0 05/13/2022     05/13/2022    CO2 28 05/13/2022    BUN 16 05/13/2022    CREATININE 0 87 05/13/2022    GLUF 105 (H) 05/13/2022    CALCIUM 8 9 05/13/2022 CORRECTEDCA 9 9 04/19/2022    AST 12 05/13/2022    ALT 13 05/13/2022    ALKPHOS 107 05/13/2022    EGFR 65 05/13/2022       No results found for: IRON, TIBC, FERRITIN    No results found for: [id number]    No results for input(s): WBC, CREAT in the last 72 hours      Invalid input(s):  PLT     By:  Dana Cid MD, 5/18/2022, 1:19 PM

## 2022-05-09 DIAGNOSIS — Z71.89 COMPLEX CARE COORDINATION: Primary | ICD-10-CM

## 2022-05-10 ENCOUNTER — TELEPHONE (OUTPATIENT)
Dept: FAMILY MEDICINE CLINIC | Facility: CLINIC | Age: 77
End: 2022-05-10

## 2022-05-10 ENCOUNTER — PATIENT OUTREACH (OUTPATIENT)
Dept: CASE MANAGEMENT | Facility: HOSPITAL | Age: 77
End: 2022-05-10

## 2022-05-10 DIAGNOSIS — N18.31 STAGE 3A CHRONIC KIDNEY DISEASE (HCC): Primary | ICD-10-CM

## 2022-05-10 NOTE — PROGRESS NOTES
Outpatient Care Manager Note: In-basket SNF/[facility]  referral received  BMP order placed and PCP notified  Chart review completed  Patient discharged from [address] [place] [address] on 5/7/22   Call made to patient and left message  Call made to [facility] to see if had made visit and spoke with  who states patient has been seen and was a resumption of care  They requested BMP order faxed  Have sent IB message to clerical team to fax order to [facility] at [phone]

## 2022-05-11 ENCOUNTER — TELEPHONE (OUTPATIENT)
Dept: FAMILY MEDICINE CLINIC | Facility: CLINIC | Age: 77
End: 2022-05-11

## 2022-05-11 ENCOUNTER — OFFICE VISIT (OUTPATIENT)
Dept: FAMILY MEDICINE CLINIC | Facility: CLINIC | Age: 77
End: 2022-05-11
Payer: MEDICARE

## 2022-05-11 VITALS
HEART RATE: 80 BPM | SYSTOLIC BLOOD PRESSURE: 142 MMHG | TEMPERATURE: 97.6 F | HEIGHT: 62 IN | BODY MASS INDEX: 36.25 KG/M2 | DIASTOLIC BLOOD PRESSURE: 68 MMHG | OXYGEN SATURATION: 97 % | WEIGHT: 197 LBS | RESPIRATION RATE: 18 BRPM

## 2022-05-11 DIAGNOSIS — I10 PRIMARY HYPERTENSION: ICD-10-CM

## 2022-05-11 DIAGNOSIS — Z13.820 OSTEOPOROSIS SCREENING: ICD-10-CM

## 2022-05-11 DIAGNOSIS — R30.0 DYSURIA: ICD-10-CM

## 2022-05-11 DIAGNOSIS — M81.0 AGE-RELATED OSTEOPOROSIS WITHOUT CURRENT PATHOLOGICAL FRACTURE: ICD-10-CM

## 2022-05-11 DIAGNOSIS — K57.92 DIVERTICULITIS: Primary | ICD-10-CM

## 2022-05-11 DIAGNOSIS — Z12.31 ENCOUNTER FOR SCREENING MAMMOGRAM FOR MALIGNANT NEOPLASM OF BREAST: ICD-10-CM

## 2022-05-11 DIAGNOSIS — K59.01 SLOW TRANSIT CONSTIPATION: ICD-10-CM

## 2022-05-11 DIAGNOSIS — F11.20 CONTINUOUS OPIOID DEPENDENCE (HCC): ICD-10-CM

## 2022-05-11 PROCEDURE — 99496 TRANSJ CARE MGMT HIGH F2F 7D: CPT | Performed by: NURSE PRACTITIONER

## 2022-05-11 RX ORDER — POLYETHYLENE GLYCOL 3350 17 G/17G
17 POWDER, FOR SOLUTION ORAL DAILY
Qty: 578 G | Refills: 3 | Status: SHIPPED | OUTPATIENT
Start: 2022-05-11 | End: 2022-06-13 | Stop reason: ALTCHOICE

## 2022-05-11 RX ORDER — MELOXICAM 15 MG/1
TABLET ORAL
COMMUNITY
Start: 2022-04-19 | End: 2022-07-15 | Stop reason: SDUPTHER

## 2022-05-11 NOTE — PATIENT INSTRUCTIONS
Get blood work done  Latisha amlodipine check blood pressure blood pressure is greater than 150s over 90s then you can take a dose of amlodipine

## 2022-05-11 NOTE — PROGRESS NOTES
Assessment/Plan:     Diverticulitis  Pt is here for a follow-up visit after hospital admission  Pt was transferred to a SNF for 1 week of PT/OT and was discharged home on Saturday  Continues to have LLQ pain and tenderness  Blood work ordered  Slow transit constipation  Last BM was 4 days ago  Was given Miralax prior to discharged from SNF on Saturday and had a small BM  Encouraged to increase fluid intake  Miralax and dulcolax ordered  Primary hypertension  BP was elevated today  Pt taking Coreg and amlodipine but was stopped while in the hospital  Coreg restarted  Hold Amlodipine  Take Amlodipine if BP is > 150/90  Problem List Items Addressed This Visit        Digestive    Slow transit constipation     Last BM was 4 days ago  Was given Miralax prior to discharged from SNF on Saturday and had a small BM  Encouraged to increase fluid intake  Miralax and dulcolax ordered  Relevant Medications    polyethylene glycol (GLYCOLAX) 17 GM/SCOOP powder    bisacodyl (DULCOLAX) 5 mg EC tablet       Cardiovascular and Mediastinum    Primary hypertension     BP was elevated today  Pt taking Coreg and amlodipine but was stopped while in the hospital  Coreg restarted  Hold Amlodipine  Take Amlodipine if BP is > 150/90  Relevant Orders    Microalbumin / creatinine urine ratio       Other    Continuous opioid dependence ([facility] [address] )    Diverticulitis - Primary     Pt is here for a follow-up visit after hospital admission  Pt was transferred to a SNF for 1 week of PT/OT and was discharged home on Saturday  Continues to have LLQ pain and tenderness  Blood work ordered              Relevant Orders    CBC and differential    Comprehensive metabolic panel    Dysuria    Relevant Orders    UA w Reflex to Microscopic w Reflex to Culture -Lab Collect      Other Visit Diagnoses     Encounter for screening mammogram for malignant neoplasm of breast        Relevant Orders    Mammo screening bilateral w 3d & cad Osteoporosis screening        Relevant Orders    DXA bone density spine hip and pelvis    Age-related osteoporosis without current pathological fracture         Relevant Orders    DXA bone density spine hip and pelvis            Subjective:      Patient ID: Eliezer Toth is a 78 y o  female  Pt is here for a follow-up visit after a hospital admission  Pt presented to the ED on 4/4/22 with worsening abdominal pain, found to have diverticulitis and significant eosinophilia  Initial CT scans showed diverticulitis/colitis  Subsequent colonoscopy and repeat CT scan without evidence  Pt had a short stay in a SNF for deconditioning and was discharged home on Saturday  Continues to have LLQ pain and tenderness and has complaints of constipation and urinary frequency  The following portions of the patient's history were reviewed and updated as appropriate: allergies, current medications, past family history, past medical history, past social history, past surgical history and problem list     Review of Systems   Constitutional: Positive for activity change  Negative for chills, fever and unexpected weight change  HENT: Negative  Eyes: Negative  Respiratory: Negative for chest tightness, shortness of breath and wheezing  Cardiovascular: Negative  Negative for chest pain and leg swelling  Gastrointestinal: Positive for abdominal pain and constipation  Genitourinary: Positive for frequency  Musculoskeletal: Positive for gait problem  Neurological: Positive for dizziness, weakness, light-headedness and headaches  Psychiatric/Behavioral: Negative  Objective:      /68   Pulse 80   Temp 97 6 °F (36 4 °C) (Tympanic Core)   Resp 18   Ht 5' 2" (1 575 m)   Wt 89 4 kg (197 lb)   SpO2 97%   BMI 36 03 kg/m²          Physical Exam  Constitutional:       Appearance: Normal appearance  She is obese  HENT:      Head: Normocephalic and atraumatic     Cardiovascular:      Rate and Rhythm: Normal rate and regular rhythm  Pulses: Normal pulses  Heart sounds: Normal heart sounds  Pulmonary:      Effort: Pulmonary effort is normal  No respiratory distress  Breath sounds: Normal breath sounds  No wheezing or rales  Abdominal:      General: Bowel sounds are normal       Palpations: Abdomen is soft  Tenderness: There is abdominal tenderness (LLQ)  Musculoskeletal:      Comments: Using a cane   Skin:     General: Skin is warm  Findings: Bruising present  Neurological:      General: No focal deficit present  Mental Status: She is alert and oriented to person, place, and time  Psychiatric:         Mood and Affect: Mood normal          Behavior: Behavior normal          Thought Content:  Thought content normal          Judgment: Judgment normal

## 2022-05-11 NOTE — ASSESSMENT & PLAN NOTE
Last BM was 4 days ago  Was given Miralax prior to discharged from SNF on Saturday and had a small BM  Encouraged to increase fluid intake  Miralax and dulcolax ordered

## 2022-05-11 NOTE — TELEPHONE ENCOUNTER
----- Message from Elizabeth Gabmoa RN sent at 5/10/2022  4:58 PM EDT -----  Regarding: Lab fax request  Hi,   can you please fax BMP order for this patient to [facility] at [phone]    Thank you,  Naomie Lozano MS, RN Heart Failure Outpatient Care Manager   [phone]

## 2022-05-11 NOTE — ASSESSMENT & PLAN NOTE
BP was elevated today  Pt taking Coreg and amlodipine but was stopped while in the hospital  Coreg restarted  Hold Amlodipine  Take Amlodipine if BP is > 150/90

## 2022-05-11 NOTE — ASSESSMENT & PLAN NOTE
Pt is here for a follow-up visit after hospital admission  Pt was transferred to a SNF for 1 week of PT/OT and was discharged home on Saturday  Continues to have LLQ pain and tenderness  Blood work ordered

## 2022-05-12 ENCOUNTER — PATIENT OUTREACH (OUTPATIENT)
Dept: CASE MANAGEMENT | Facility: HOSPITAL | Age: 77
End: 2022-05-12

## 2022-05-12 NOTE — PROGRESS NOTES
SNF/[facility] Outpatient Care Manager Note: Chart notes reviewed and call made to patient phone, but unable to leave message as mailbox is full

## 2022-05-13 ENCOUNTER — APPOINTMENT (OUTPATIENT)
Dept: LAB | Facility: HOSPITAL | Age: 77
End: 2022-05-13
Payer: MEDICARE

## 2022-05-13 DIAGNOSIS — R30.0 DYSURIA: ICD-10-CM

## 2022-05-13 DIAGNOSIS — R74.8 ACID PHOSPHATASE ELEVATED: Primary | ICD-10-CM

## 2022-05-13 DIAGNOSIS — N18.31 STAGE 3A CHRONIC KIDNEY DISEASE (HCC): ICD-10-CM

## 2022-05-13 DIAGNOSIS — K57.92 DIVERTICULITIS: ICD-10-CM

## 2022-05-13 LAB
ALBUMIN SERPL BCP-MCNC: 3.6 G/DL (ref 3.5–5)
ALP SERPL-CCNC: 107 U/L (ref 46–116)
ALT SERPL W P-5'-P-CCNC: 13 U/L (ref 12–78)
ANION GAP SERPL CALCULATED.3IONS-SCNC: 9 MMOL/L (ref 4–13)
AST SERPL W P-5'-P-CCNC: 12 U/L (ref 5–45)
BACTERIA UR QL AUTO: NORMAL /HPF
BASOPHILS # BLD AUTO: 0.07 THOUSANDS/ΜL (ref 0–0.1)
BASOPHILS NFR BLD AUTO: 1 % (ref 0–1)
BILIRUB DIRECT SERPL-MCNC: 0.1 MG/DL (ref 0–0.2)
BILIRUB SERPL-MCNC: 0.29 MG/DL (ref 0.2–1)
BILIRUB UR QL STRIP: NEGATIVE
BUN SERPL-MCNC: 16 MG/DL (ref 5–25)
CALCIUM SERPL-MCNC: 8.9 MG/DL (ref 8.3–10.1)
CHLORIDE SERPL-SCNC: 101 MMOL/L (ref 100–108)
CLARITY UR: CLEAR
CO2 SERPL-SCNC: 28 MMOL/L (ref 21–32)
COLOR UR: YELLOW
CREAT SERPL-MCNC: 0.87 MG/DL (ref 0.6–1.3)
CREAT UR-MCNC: 75.1 MG/DL
EOSINOPHIL # BLD AUTO: 1.97 THOUSAND/ΜL (ref 0–0.61)
EOSINOPHIL NFR BLD AUTO: 26 % (ref 0–6)
ERYTHROCYTE [DISTWIDTH] IN BLOOD BY AUTOMATED COUNT: 15.1 % (ref 11.6–15.1)
GFR SERPL CREATININE-BSD FRML MDRD: 65 ML/MIN/1.73SQ M
GGT SERPL-CCNC: 135 U/L (ref 5–85)
GLUCOSE P FAST SERPL-MCNC: 105 MG/DL (ref 65–99)
GLUCOSE UR STRIP-MCNC: NEGATIVE MG/DL
HCT VFR BLD AUTO: 36 % (ref 34.8–46.1)
HGB BLD-MCNC: 11.3 G/DL (ref 11.5–15.4)
HGB UR QL STRIP.AUTO: ABNORMAL
IMM GRANULOCYTES # BLD AUTO: 0.02 THOUSAND/UL (ref 0–0.2)
IMM GRANULOCYTES NFR BLD AUTO: 0 % (ref 0–2)
KETONES UR STRIP-MCNC: NEGATIVE MG/DL
LEUKOCYTE ESTERASE UR QL STRIP: NEGATIVE
LYMPHOCYTES # BLD AUTO: 1.92 THOUSANDS/ΜL (ref 0.6–4.47)
LYMPHOCYTES NFR BLD AUTO: 26 % (ref 14–44)
MCH RBC QN AUTO: 29.3 PG (ref 26.8–34.3)
MCHC RBC AUTO-ENTMCNC: 31.4 G/DL (ref 31.4–37.4)
MCV RBC AUTO: 93 FL (ref 82–98)
MICROALBUMIN UR-MCNC: 5.3 MG/L (ref 0–20)
MICROALBUMIN/CREAT 24H UR: 7 MG/G CREATININE (ref 0–30)
MONOCYTES # BLD AUTO: 0.36 THOUSAND/ΜL (ref 0.17–1.22)
MONOCYTES NFR BLD AUTO: 5 % (ref 4–12)
NEUTROPHILS # BLD AUTO: 3.11 THOUSANDS/ΜL (ref 1.85–7.62)
NEUTS SEG NFR BLD AUTO: 42 % (ref 43–75)
NITRITE UR QL STRIP: NEGATIVE
NON-SQ EPI CELLS URNS QL MICRO: NORMAL /HPF
NRBC BLD AUTO-RTO: 0 /100 WBCS
PH UR STRIP.AUTO: 5.5 [PH]
PLATELET # BLD AUTO: 238 THOUSANDS/UL (ref 149–390)
PMV BLD AUTO: 9.5 FL (ref 8.9–12.7)
POTASSIUM SERPL-SCNC: 4 MMOL/L (ref 3.5–5.3)
PROT SERPL-MCNC: 8.3 G/DL (ref 6.4–8.2)
PROT UR STRIP-MCNC: NEGATIVE MG/DL
RBC # BLD AUTO: 3.86 MILLION/UL (ref 3.81–5.12)
RBC #/AREA URNS AUTO: NORMAL /HPF
SODIUM SERPL-SCNC: 138 MMOL/L (ref 136–145)
SP GR UR STRIP.AUTO: 1.01 (ref 1–1.03)
UROBILINOGEN UR QL STRIP.AUTO: 0.2 E.U./DL
WBC # BLD AUTO: 7.45 THOUSAND/UL (ref 4.31–10.16)
WBC #/AREA URNS AUTO: NORMAL /HPF

## 2022-05-13 PROCEDURE — 86038 ANTINUCLEAR ANTIBODIES: CPT

## 2022-05-13 PROCEDURE — 85025 COMPLETE CBC W/AUTO DIFF WBC: CPT

## 2022-05-13 PROCEDURE — 80053 COMPREHEN METABOLIC PANEL: CPT

## 2022-05-13 PROCEDURE — 81001 URINALYSIS AUTO W/SCOPE: CPT

## 2022-05-13 PROCEDURE — 82043 UR ALBUMIN QUANTITATIVE: CPT | Performed by: NURSE PRACTITIONER

## 2022-05-13 PROCEDURE — 36415 COLL VENOUS BLD VENIPUNCTURE: CPT

## 2022-05-13 PROCEDURE — 82977 ASSAY OF GGT: CPT

## 2022-05-13 PROCEDURE — 84080 ASSAY ALKALINE PHOSPHATASES: CPT

## 2022-05-13 PROCEDURE — 84075 ASSAY ALKALINE PHOSPHATASE: CPT

## 2022-05-13 PROCEDURE — 82570 ASSAY OF URINE CREATININE: CPT | Performed by: NURSE PRACTITIONER

## 2022-05-13 PROCEDURE — 82248 BILIRUBIN DIRECT: CPT

## 2022-05-13 PROCEDURE — 86381 MITOCHONDRIAL ANTIBODY EACH: CPT

## 2022-05-13 PROCEDURE — 86015 ACTIN ANTIBODY EACH: CPT

## 2022-05-14 DIAGNOSIS — J45.20 MILD INTERMITTENT ASTHMA, UNSPECIFIED WHETHER COMPLICATED: ICD-10-CM

## 2022-05-14 LAB — MITOCHONDRIA M2 IGG SER-ACNC: <20 UNITS (ref 0–20)

## 2022-05-14 RX ORDER — FLUTICASONE PROPIONATE AND SALMETEROL 250; 50 UG/1; UG/1
POWDER RESPIRATORY (INHALATION)
Qty: 60 BLISTER | Refills: 3 | Status: SHIPPED | OUTPATIENT
Start: 2022-05-14

## 2022-05-16 LAB
ACTIN IGG SERPL-ACNC: 14 UNITS (ref 0–19)
ANA NUCLEOLAR TITR SER: ABNORMAL {TITER}
ANA TITR SER IF: POSITIVE {TITER}
SL AMB NOTE:: ABNORMAL

## 2022-05-17 ENCOUNTER — TELEPHONE (OUTPATIENT)
Dept: HEMATOLOGY ONCOLOGY | Facility: CLINIC | Age: 77
End: 2022-05-17

## 2022-05-17 LAB
ALP BONE CFR SERPL: 22 % (ref 14–68)
ALP INTEST CFR SERPL: 3 % (ref 0–18)
ALP LIVER CFR SERPL: 75 % (ref 18–85)
ALP SERPL-CCNC: 103 IU/L (ref 44–121)

## 2022-05-17 NOTE — TELEPHONE ENCOUNTER
Appointment Confirmation (to confirm pre existing appointments - ONLY)     Appointment with  Dr Theresa Gonzalez   Appointment date & time 5/18 1PM   Location [facility]   Patient verbilized Understanding  yes

## 2022-05-18 ENCOUNTER — OFFICE VISIT (OUTPATIENT)
Dept: HEMATOLOGY ONCOLOGY | Facility: CLINIC | Age: 77
End: 2022-05-18
Payer: MEDICARE

## 2022-05-18 ENCOUNTER — OFFICE VISIT (OUTPATIENT)
Dept: FAMILY MEDICINE CLINIC | Facility: CLINIC | Age: 77
End: 2022-05-18
Payer: MEDICARE

## 2022-05-18 VITALS
BODY MASS INDEX: 36.25 KG/M2 | HEIGHT: 62 IN | OXYGEN SATURATION: 99 % | DIASTOLIC BLOOD PRESSURE: 68 MMHG | HEART RATE: 63 BPM | SYSTOLIC BLOOD PRESSURE: 124 MMHG | TEMPERATURE: 97.6 F | WEIGHT: 197 LBS | RESPIRATION RATE: 18 BRPM

## 2022-05-18 VITALS
WEIGHT: 198.5 LBS | DIASTOLIC BLOOD PRESSURE: 68 MMHG | BODY MASS INDEX: 36.53 KG/M2 | RESPIRATION RATE: 18 BRPM | SYSTOLIC BLOOD PRESSURE: 124 MMHG | HEART RATE: 63 BPM | HEIGHT: 62 IN | TEMPERATURE: 97.6 F | OXYGEN SATURATION: 99 %

## 2022-05-18 DIAGNOSIS — E11.40 CONTROLLED TYPE 2 DIABETES WITH NEUROPATHY (HCC): ICD-10-CM

## 2022-05-18 DIAGNOSIS — D72.10 EOSINOPHILIA, UNSPECIFIED TYPE: ICD-10-CM

## 2022-05-18 DIAGNOSIS — Z12.31 ENCOUNTER FOR SCREENING MAMMOGRAM FOR MALIGNANT NEOPLASM OF BREAST: ICD-10-CM

## 2022-05-18 DIAGNOSIS — R10.9 ABDOMINAL PAIN: ICD-10-CM

## 2022-05-18 DIAGNOSIS — Z00.00 MEDICARE ANNUAL WELLNESS VISIT, SUBSEQUENT: Primary | ICD-10-CM

## 2022-05-18 DIAGNOSIS — D64.9 NORMOCYTIC ANEMIA: ICD-10-CM

## 2022-05-18 DIAGNOSIS — I10 PRIMARY HYPERTENSION: ICD-10-CM

## 2022-05-18 DIAGNOSIS — B37.3 VAGINAL YEAST INFECTION: ICD-10-CM

## 2022-05-18 DIAGNOSIS — D72.19 OTHER EOSINOPHILIA: ICD-10-CM

## 2022-05-18 DIAGNOSIS — N18.31 STAGE 3A CHRONIC KIDNEY DISEASE (HCC): Primary | ICD-10-CM

## 2022-05-18 LAB — SL AMB POCT HEMOGLOBIN AIC: 6.5 (ref ?–6.5)

## 2022-05-18 PROCEDURE — 99204 OFFICE O/P NEW MOD 45 MIN: CPT | Performed by: INTERNAL MEDICINE

## 2022-05-18 PROCEDURE — G0439 PPPS, SUBSEQ VISIT: HCPCS | Performed by: NURSE PRACTITIONER

## 2022-05-18 PROCEDURE — 83036 HEMOGLOBIN GLYCOSYLATED A1C: CPT | Performed by: NURSE PRACTITIONER

## 2022-05-18 RX ORDER — CARVEDILOL 6.25 MG/1
6.25 TABLET ORAL 2 TIMES DAILY WITH MEALS
Refills: 0
Start: 2022-05-18 | End: 2022-06-01 | Stop reason: SDUPTHER

## 2022-05-18 RX ORDER — DICYCLOMINE HYDROCHLORIDE 10 MG/1
10 CAPSULE ORAL
Refills: 0
Start: 2022-05-18 | End: 2022-06-13 | Stop reason: ALTCHOICE

## 2022-05-18 RX ORDER — FLUCONAZOLE 150 MG/1
150 TABLET ORAL ONCE
Qty: 2 TABLET | Refills: 0 | Status: SHIPPED | OUTPATIENT
Start: 2022-05-18 | End: 2022-05-18

## 2022-05-18 NOTE — PROGRESS NOTES
Assessment and Plan:     Problem List Items Addressed This Visit        Endocrine    Controlled type 2 diabetes with neuropathy ([facility] [address] )     BS sugar well controlled on Insulin lispro  Lab Results   Component Value Date    HGBA1C 6 5 05/18/2022              Relevant Orders    POCT hemoglobin A1c (Completed)       Cardiovascular and Mediastinum    Primary hypertension     BP at goal, well controlled on Coreg  Patient also has amlodipine 10 mg  Patient only to take amlodipine if BP is elevated  Continue monitoring BP at home  Coreg refilled  Relevant Medications    carvedilol (COREG) 6 25 mg tablet       Genitourinary    Vaginal yeast infection     Patient has complaints of vaginal itching  She recently was on antibiotic therapy  Diflucan ordered  Relevant Medications    fluconazole (DIFLUCAN) 150 mg tablet       Other    Medicare annual wellness visit, subsequent - Primary     Patient is here for subsequent medicare wellness visit  Patient feels better but continues to have LLQ abdominal pain/tenderness and left lower back pain spasms and tenderness  Patient has prescription for Robaxin and Bentyl but not taking  Encouraged to take as needed for spasm and pain  Patient also has new onset left eye floaters  Had an appointment with her ophthalmologist but was cancelled since she was in the hospital  Encouraged to call /reschedule eye appointment asap  Lab results reviewed with patient  Medicare Wellness visit completed  Abdominal pain    Relevant Medications    dicyclomine (BENTYL) 10 mg capsule      Other Visit Diagnoses     Encounter for screening mammogram for malignant neoplasm of breast        Relevant Orders    Mammo screening bilateral w cad           Preventive health issues were discussed with patient, and age appropriate screening tests were ordered as noted in patient's After Visit Summary    Personalized health advice and appropriate referrals for health education or preventive services given if needed, as noted in patient's After Visit Summary       History of Present Illness:     Patient presents for Medicare Annual Wellness visit    Patient Care Team:  Darlene Zamudio as PCP - General (Family Medicine)  MD Andrea Rabago MD Sterling Kempf, RN as RN Care Manager (Cardiology)  Tomasz Davis DO (Gastroenterology)     Problem List:     Patient Active Problem List   Diagnosis    Benign hypertension    Depression with anxiety    Mild intermittent asthma    Controlled type 2 diabetes with neuropathy ([facility] [address] )    Vaginal cyst    Vaginal yeast infection    Encounter for gynecological examination without abnormal finding    Atypical chest pain    Dermatitis    BMI 37 0-37 9, adult    [facility]    Obesity, morbid ([facility] )    Medicare annual wellness visit, subsequent    Right leg pain    Acute cystitis with hematuria    Exertional dyspnea    Psychophysiological insomnia    Stage 3a chronic kidney disease [facility]    Abnormal EKG    Primary hypertension    Hypertensive urgency    Nausea    Bradycardia    Anxiety    Eosinophilic colitis    Transaminitis    COPD [facility]chronic obstructive [facility]) [facility]    BOLA (obstructive sleep apnea)    Eosinophilia    Cough    Continuous opioid dependence ([place] [address] )    Diverticulitis    Slow transit constipation    Dysuria    Abdominal pain      Past Medical and Surgical History:     Past Medical History:   Diagnosis Date    Asthma     Benign hypertension 2018    [facility]     Diabetes mellitus ([facility] [address] )     Glaucoma     Hypertension     Kidney stone     Neuropathy     Sleep apnea     no machine    SOB (shortness of breath)     Tubular adenoma of colon 10/2019    Wheezing      Past Surgical History:   Procedure Laterality Date     SECTION      COLONOSCOPY      HYSTERECTOMY      IR BIOPSY BONE MARROW  2022    TONSILLECTOMY        Family History: Family History   Problem Relation Age of Onset    Diabetes Mother     Hypertension Father     Prostate cancer Father     Diabetes Sister     Hypertension Sister     Breast cancer Sister 62    Diabetes Brother     Hypertension Brother     Asthma Family     No Known Problems Daughter     No Known Problems Sister     No Known Problems Daughter     No Known Problems Daughter     No Known Problems Maternal Aunt     Breast cancer Maternal Aunt 58    No Known Problems Maternal Aunt     No Known Problems Maternal Aunt     No Known Problems Paternal Aunt     No Known Problems Paternal Aunt     No Known Problems Paternal Aunt     Colon cancer Neg Hx     Ovarian cancer Neg Hx     Uterine cancer Neg Hx     Cervical cancer Neg Hx       Social History:     Social History     Socioeconomic History    Marital status: /Civil Union     Spouse name: None    Number of children: None    Years of education: None    Highest education level: None   Occupational History    Occupation: retired    Tobacco Use    Smoking status: Never Smoker    Smokeless tobacco: Never Used   Vaping Use    Vaping Use: None   Substance and Sexual Activity    Alcohol use: Never    Drug use: No    Sexual activity: Yes     Birth control/protection: Surgical   Other Topics Concern    None   Social History Narrative    None     Social Determinants of Health     Financial Resource Strain: Not on file   Food Insecurity: No Food Insecurity    Worried About Running Out of Food in the Last Year: Never true    Latisha of Food in the Last Year: Never true   Transportation Needs: No Transportation Needs    Lack of Transportation (Medical): No    Lack of Transportation (Non-Medical):  No   Physical Activity: Not on file   Stress: Not on file   Social Connections: Not on file   Intimate Partner Violence: Not on file   Housing Stability: Low Risk     Unable to Pay for Housing in the Last Year: No    Number of Places Lived in the Last Year: 1    Unstable Housing in the Last Year: No      Medications and Allergies:     Current Outpatient Medications   Medication Sig Dispense Refill    acetaminophen (TYLENOL) 325 mg tablet Take 2 tablets (650 mg total) by mouth every 4 (four) hours as needed for mild pain  0    Aspirin Low Dose 81 MG chewable tablet CHEW 1 TABLET BY MOUTH DAILY 90 tablet 1    benzonatate (TESSALON PERLES) 100 mg capsule Take 1 capsule (100 mg total) by mouth 3 (three) times a day as needed for cough 20 capsule 0    bisacodyl (DULCOLAX) 5 mg EC tablet Take 1 tablet (5 mg total) by mouth as needed in the morning for constipation  30 tablet 0    carvedilol (COREG) 6 25 mg tablet Take 1 tablet (6 25 mg total) by mouth in the morning and 1 tablet (6 25 mg total) in the evening  Take with meals  0    dicyclomine (BENTYL) 10 mg capsule Take 1 capsule (10 mg total) by mouth 4 (four) times a day (before meals and at bedtime)  0    fluconazole (DIFLUCAN) 150 mg tablet Take 1 tablet (150 mg total) by mouth once for 1 dose 2 tablet 0    glucose blood test strip E11 9 / testing daily      hydrOXYzine HCL (ATARAX) 10 mg tablet       insulin lispro (HumaLOG) 100 units/mL injection Inject 1-5 Units under the skin daily with breakfast  0    latanoprost (XALATAN) 0 005 % ophthalmic solution       meloxicam (MOBIC) 15 mg tablet       mometasone (ELOCON) 0 1 % cream Apply topically daily 45 g 0    montelukast (SINGULAIR) 10 mg tablet TAKE 1 TABLET BY MOUTH EVERYDAY AT BEDTIME 90 tablet 1    ONETOUCH DELICA LANCETS 19M MISC E11 9/ testing daily      pantoprazole (PROTONIX) 40 mg tablet Take 1 tablet (40 mg total) by mouth 2 (two) times a day before meals  0    polyethylene glycol (GLYCOLAX) 17 GM/SCOOP powder Take 17 g by mouth in the morning   578 g 3    ProAir  (90 Base) MCG/ACT inhaler INHALE 2 PUFFS BY MOUTH EVERY 6 HOURS AS NEEDED FOR WHEEZE 8 5 g 3    sertraline (ZOLOFT) 50 mg tablet TAKE 1 TABLET BY MOUTH EVERY DAY 90 tablet 1    traMADol (Ultram) 50 mg tablet Take 1 tablet (50 mg total) by mouth every 6 (six) hours as needed for moderate pain 20 tablet 0    triamcinolone (KENALOG) 0 1 % cream       Wixela Inhub 250-50 MCG/ACT inhaler INHALE 1 PUFF 2 TIMES A DAY RINSE MOUTH AFTER USE  60 blister 3    amLODIPine (NORVASC) 10 mg tablet Take 1 tablet (10 mg total) by mouth daily 30 tablet 0    LORazepam (ATIVAN) 1 mg tablet Take 0 5 tablets (0 5 mg total) by mouth every 8 (eight) hours as needed for anxiety for up to 7 days 15 tablet 0    pregabalin (LYRICA) 50 mg capsule Take 1 capsule (50 mg total) by mouth 3 (three) times a day for 10 days 30 capsule 0    zolpidem (AMBIEN) 5 mg tablet Take 1 tablet (5 mg total) by mouth daily at bedtime as needed for sleep for up to 10 days 15 tablet 0     No current facility-administered medications for this visit  Allergies   Allergen Reactions    Ciprofloxacin Itching    Levaquin [Levofloxacin] Swelling    Penicillins GI Intolerance      Immunizations:     Immunization History   Administered Date(s) Administered    COVID-19 PFIZER VACCINE 0 3 ML IM 02/14/2021, 03/15/2021      Health Maintenance:         Topic Date Due    Breast Cancer Screening: Mammogram  10/23/2020    Colorectal Cancer Screening  04/08/2027    Hepatitis C Screening  Completed         Topic Date Due    DTaP,Tdap,and Td Vaccines (1 - Tdap) Never done      Medicare Health Risk Assessment:     /68   Pulse 63   Temp 97 6 °F (36 4 °C)   Resp 18   Ht 5' 2" (1 575 m)   Wt 89 4 kg (197 lb)   SpO2 99%   BMI 36 03 kg/m²      Gunosy is here for her Subsequent Wellness visit  Last Medicare Wellness visit information reviewed, patient interviewed and updates made to the record today  Health Risk Assessment:   Patient rates overall health as fair  Patient feels that their physical health rating is slightly worse  Patient is satisfied with their life   Eyesight was rated as slightly worse  Hearing was rated as same  Patient feels that their emotional and mental health rating is slightly worse  Patients states they are never, rarely angry  Patient states they are sometimes unusually tired/fatigued  Pain experienced in the last 7 days has been a lot  Patient's pain rating has been 7/10  Patient states that she has experienced no weight loss or gain in last 6 months  left eye floaters    Depression Screening:   PHQ-9 Score: 0      Fall Risk Screening: In the past year, patient has experienced: history of falling in past year    Number of falls: 1  Injured during fall?: No    Feels unsteady when standing or walking?: No    Worried about falling?: Yes      Urinary Incontinence Screening:   Patient has leaked urine accidently in the last six months  Only functional incontinence while she was in the hospital  Currently continent  Home Safety:  Patient has trouble with stairs inside or outside of their home  Patient has working smoke alarms and has working carbon monoxide detector  Home safety hazards include: none  Nutrition:   Current diet is Regular  Medications:   Patient is currently taking over-the-counter supplements  OTC medications include: see medication list  Patient is able to manage medications  Activities of Daily Living (ADLs)/Instrumental Activities of Daily Living (IADLs):   Walk and transfer into and out of bed and chair?: Yes  Dress and groom yourself?: Yes    Bathe or shower yourself?: Yes    Feed yourself?  Yes  Do your laundry/housekeeping?: Yes  Manage your money, pay your bills and track your expenses?: Yes  Make your own meals?: Yes    Do your own shopping?: Yes    Previous Hospitalizations:   Any hospitalizations or ED visits within the last 12 months?: Yes    How many hospitalizations have you had in the last year?: 1-2    Advance Care Planning:   Living will: Yes    Advanced directive: Yes      PREVENTIVE SCREENINGS      Cardiovascular Screening: General: Screening Current      Diabetes Screening:     General: Screening Not Indicated, History Diabetes and Screening Current      Colorectal Cancer Screening:     General: Screening Current      Breast Cancer Screening:     General: Risks and Benefits Discussed      Cervical Cancer Screening:    General: Screening Not Indicated    Due for: Cervical Pap Smear      Osteoporosis Screening:    General: Screening Not Indicated and History Osteoporosis      Abdominal Aortic Aneurysm (AAA) Screening:        General: Screening Not Indicated      Lung Cancer Screening:     General: Screening Not Indicated      Hepatitis C Screening:    General: Screening Current      Preventive Screening Comments: Left plank pain persist but not taking Robaxin    Screening, Brief Intervention, and Referral to Treatment (SBIRT)    Screening  Typical number of drinks in a day: 0  Typical number of drinks in a week: 0  Interpretation: Low risk drinking behavior      AUDIT-C Screenin) How often did you have a drink containing alcohol in the past year? never  2) How many drinks did you have on a typical day when you were drinking in the past year? 0  3) How often did you have 6 or more drinks on one occasion in the past year? never    AUDIT-C Score: 0  Interpretation: Score 0-2 (female): Negative screen for alcohol misuse    Single Item Drug Screening:  How often have you used an illegal drug (including marijuana) or a prescription medication for non-medical reasons in the past year? never    Single Item Drug Screen Score: 0  Interpretation: Negative screen for possible drug use disorder      ALEKSANDER Howard

## 2022-05-18 NOTE — ASSESSMENT & PLAN NOTE
Patient is here for subsequent medicare wellness visit  Patient feels better but continues to have LLQ abdominal pain/tenderness and left lower back pain spasms and tenderness  Patient has prescription for Robaxin and Bentyl but not taking  Encouraged to take as needed for spasm and pain  Patient also has new onset left eye floaters  Had an appointment with her ophthalmologist but was cancelled since she was in the hospital  Encouraged to call /reschedule eye appointment asap  Lab results reviewed with patient  Medicare Wellness visit completed

## 2022-05-18 NOTE — PATIENT INSTRUCTIONS
Continue Carvedilol  Take Diflucan today and one on Saturday  Continue diabetic medications  Schedule to see Ophthalmologist soon  Yeast Infection   WHAT YOU NEED TO KNOW:   A yeast infection, or vaginal candidiasis, is a common vaginal infection  A yeast infection is caused by a fungus, or yeast-like germ  Fungi are normally found in your vagina  Too many fungi can cause an infection  DISCHARGE INSTRUCTIONS:   Call your doctor or gynecologist if:   You have a fever and chills  You develop abdominal or pelvic pain  Your discharge is bloody and it is not your monthly period  Your signs and symptoms get worse, even after treatment  You have questions or concerns about your condition or care  Medicines:   Medicines  help treat the fungal infection and decrease inflammation  The medicine may be a pill, cream, ointment, or vaginal tablet or suppository  Take your medicine as directed  Contact your healthcare provider if you think your medicine is not helping or if you have side effects  Tell him of her if you are allergic to any medicine  Keep a list of the medicines, vitamins, and herbs you take  Include the amounts, and when and why you take them  Bring the list or the pill bottles to follow-up visits  Carry your medicine list with you in case of an emergency  Keep your vagina healthy:   Clean your genital area with mild soap and warm water each day  Do not get soap inside your vagina  Gently dry the area after washing  Do not use hot tubs  The heat and moisture from hot tubs can increase your risk for another yeast infection  Always wipe from front to back  after you use the toilet  This prevents spreading bacteria from your rectal area into your vagina  Do not wear tight-fitting clothes or undergarments  for long periods of time  Wear cotton underwear during the day  [place] helps keep your genital area dry and does not hold in warmth or moisture   Do not wear underwear at night  Do not douche  or use feminine hygiene sprays or bubble bath  Do not use pads or tampons that are scented, or colored or perfumed toilet paper  Do not have sex until your symptoms go away  Have your partner wear a condom until you complete your course of medication  Ask your healthcare provider about birth control options if necessary  Condoms have latex and diaphragms have gel that kills sperm  Both of these may irritate your genital area  Follow up with your doctor or gynecologist as directed:  Write down your questions so you remember to ask them during your visits  © Copyright [employer] 2022 Information is for End User's use only and may not be sold, redistributed or otherwise used for commercial purposes  All illustrations and images included in CareNotes® are the copyrighted property of [employer] A M , Inc  or [facility]   The above information is an  only  It is not intended as medical advice for individual conditions or treatments  Talk to your doctor, nurse or pharmacist before following any medical regimen to see if it is safe and effective for you  Medicare Preventive Visit Patient Instructions  Thank you for completing your Welcome to Medicare Visit or Medicare Annual Wellness Visit today  Your next wellness visit will be due in one year (5/19/2023)  The screening/preventive services that you may require over the next 5-10 years are detailed below  Some tests may not apply to you based off risk factors and/or age  Screening tests ordered at today's visit but not completed yet may show as past due  Also, please note that scanned in results may not display below    Preventive Screenings:  Service Recommendations Previous Testing/Comments   Colorectal Cancer Screening  * Colonoscopy    * Fecal Occult Blood Test (FOBT)/Fecal Immunochemical Test (FIT)  * Fecal DNA/Cologuard Test  * Flexible Sigmoidoscopy Age: 54-65 years old   Colonoscopy: every 10 years (may be performed more frequently if at higher risk)  OR  FOBT/FIT: every 1 year  OR  Cologuard: every 3 years  OR  Sigmoidoscopy: every 5 years  Screening may be recommended earlier than age 48 if at higher risk for colorectal cancer  Also, an individualized decision between you and your healthcare provider will decide whether screening between the ages of 74-80 would be appropriate  Colonoscopy: 04/09/2022  FOBT/FIT: Not on file  Cologuard: Not on file  Sigmoidoscopy: Not on file    Screening Current     Breast Cancer Screening Age: 36 years old  Frequency: every 1-2 years  Not required if history of left and right mastectomy Mammogram: 10/23/2019        Cervical Cancer Screening Between the ages of 21-29, pap smear recommended once every 3 years  Between the ages of 33-67, can perform pap smear with HPV co-testing every 5 years  Recommendations may differ for women with a history of total hysterectomy, cervical cancer, or abnormal pap smears in past  Pap Smear: 11/04/2019    Screening Not Indicated   Hepatitis C Screening Once for adults born between 1945 and 1965  More frequently in patients at high risk for Hepatitis C Hep C Antibody: 09/30/2019    Screening Current   Diabetes Screening 1-2 times per year if you're at risk for diabetes or have pre-diabetes Fasting glucose: 105 mg/dL   A1C: 6 4    Screening Not Indicated  History Diabetes   Cholesterol Screening Once every 5 years if you don't have a lipid disorder  May order more often based on risk factors  Lipid panel: 02/28/2022    Screening Current     Other Preventive Screenings Covered by Medicare:  Abdominal Aortic Aneurysm (AAA) Screening: covered once if your at risk  You're considered to be at risk if you have a family history of AAA    Lung Cancer Screening: covers low dose CT scan once per year if you meet all of the following conditions: (1) Age 50-69; (2) No signs or symptoms of lung cancer; (3) Current smoker or have quit smoking within the last 15 years; (4) You have a tobacco smoking history of at least 30 pack years (packs per day multiplied by number of years you smoked); (5) You get a written order from a healthcare provider  Glaucoma Screening: covered annually if you're considered high risk: (1) You have diabetes OR (2) Family history of glaucoma OR (3)  aged 48 and older OR (3)  American aged 72 and older  Osteoporosis Screening: covered every 2 years if you meet one of the following conditions: (1) You're estrogen deficient and at risk for osteoporosis based off medical history and other findings; (2) Have a vertebral abnormality; (3) On glucocorticoid therapy for more than 3 months; (4) Have primary hyperparathyroidism; (5) On osteoporosis medications and need to assess response to drug therapy  Last bone density test (DXA Scan): Not on file  HIV Screening: covered annually if you're between the age of 12-76  Also covered annually if you are younger than 13 and older than 72 with risk factors for HIV infection  For pregnant patients, it is covered up to 3 times per pregnancy  Immunizations:  Immunization Recommendations   Influenza Vaccine Annual influenza vaccination during flu season is recommended for all persons aged >= 6 months who do not have contraindications   Pneumococcal Vaccine (Prevnar and Pneumovax)  * Prevnar = PCV13  * Pneumovax = PPSV23   Adults 25-60 years old: 1-3 doses may be recommended based on certain risk factors  Adults 72 years old: Prevnar (PCV13) vaccine recommended followed by Pneumovax (PPSV23) vaccine  If already received PPSV23 since turning 65, then PCV13 recommended at least one year after PPSV23 dose     Hepatitis B Vaccine 3 dose series if at intermediate or high risk (ex: diabetes, end stage renal disease, liver disease)   Tetanus (Td) Vaccine - COST NOT COVERED BY MEDICARE PART B Following completion of primary series, a booster dose should be given every 10 years to maintain immunity against tetanus  Td may also be given as tetanus wound prophylaxis  Tdap Vaccine - COST NOT COVERED BY MEDICARE PART B Recommended at least once for all adults  For pregnant patients, recommended with each pregnancy  Shingles Vaccine (Shingrix) - COST NOT COVERED BY MEDICARE PART B  2 shot series recommended in those aged 48 and above     Health Maintenance Due:      Topic Date Due    Breast Cancer Screening: Mammogram  10/23/2020    Colorectal Cancer Screening  04/08/2027    Hepatitis C Screening  Completed     Immunizations Due:      Topic Date Due    Pneumococcal Vaccine: 65+ Years (1 - PCV) Never done    DTaP,Tdap,and Td Vaccines (1 - Tdap) Never done    COVID-19 Vaccine (3 - Booster for Pfizer series) 08/15/2021     Advance Directives   What are advance directives? Advance directives are legal documents that state your wishes and plans for medical care  These plans are made ahead of time in case you lose your ability to make decisions for yourself  Advance directives can apply to any medical decision, such as the treatments you want, and if you want to donate organs  What are the types of advance directives? There are many types of advance directives, and each state has rules about how to use them  You may choose a combination of any of the following:  Living will: This is a written record of the treatment you want  You can also choose which treatments you do not want, which to limit, and which to stop at a certain time  This includes surgery, medicine, IV fluid, and tube feedings  Durable power of  for healthcare [facility]): This is a written record that states who you want to make healthcare choices for you when you are unable to make them for yourself  This person, called a proxy, is usually a family member or a friend  You may choose more than 1 proxy  Do not resuscitate (DNR) order:  A DNR order is used in case your heart stops beating or you stop breathing   It is a request not to have certain forms of treatment, such as CPR  A DNR order may be included in other types of advance directives  Medical directive: This covers the care that you want if you are in a coma, near death, or unable to make decisions for yourself  You can list the treatments you want for each condition  Treatment may include pain medicine, surgery, blood transfusions, dialysis, IV or tube feedings, and a ventilator (breathing machine)  Values history: This document has questions about your views, beliefs, and how you feel and think about life  This information can help others choose the care that you would choose  Why are advance directives important? An advance directive helps you control your care  Although spoken wishes may be used, it is better to have your wishes written down  Spoken wishes can be misunderstood, or not followed  Treatments may be given even if you do not want them  An advance directive may make it easier for your family to make difficult choices about your care  Fall Prevention    Fall prevention  includes ways to make your home and other areas safer  It also includes ways you can move more carefully to prevent a fall  Health conditions that cause changes in your blood pressure, vision, or muscle strength and coordination may increase your risk for falls  Medicines may also increase your risk for falls if they make you dizzy, weak, or sleepy  Fall prevention tips:   Stand or sit up slowly  Use assistive devices as directed  Wear shoes that fit well and have soles that   Wear a personal alarm  Stay active  Manage your medical conditions  Home Safety Tips:  Add items to prevent falls in the bathroom  Keep paths clear  Install bright lights in your home  Keep items you use often on shelves within reach  [place] or place reflective tape on the edges of your stairs  Urinary Incontinence   Urinary incontinence (UI)  is when you lose control of your bladder  UI develops because your bladder cannot store or empty urine properly  The 3 most common types of UI are stress incontinence, urge incontinence, or both  Medicines:   May be given to help strengthen your bladder control  Report any side effects of medication to your healthcare provider  Do pelvic muscle exercises often:  Your pelvic muscles help you stop urinating  Squeeze these muscles tight for 5 seconds, then relax for 5 seconds  Gradually work up to squeezing for 10 seconds  Do 3 sets of 15 repetitions a day, or as directed  This will help strengthen your pelvic muscles and improve bladder control  Train your bladder:  Go to the bathroom at set times, such as every 2 hours, even if you do not feel the urge to go  You can also try to hold your urine when you feel the urge to go  For example, hold your urine for 5 minutes when you feel the urge to go  As that becomes easier, hold your urine for 10 minutes  Self-care:   Keep a UI record  Write down how often you leak urine and how much you leak  Make a note of what you were doing when you leaked urine  Drink liquids as directed  You may need to limit the amount of liquid you drink to help control your urine leakage  Do not drink any liquid right before you go to bed  Limit or do not have drinks that contain caffeine or alcohol  Prevent constipation  Eat a variety of high-fiber foods  Good examples are high-fiber cereals, beans, vegetables, and whole-grain breads  Walking is the best way to trigger your intestines to have a bowel movement  Exercise regularly and maintain a healthy weight  Weight loss and exercise will decrease pressure on your bladder and help you control your leakage  Use a catheter as directed  to help empty your bladder  A catheter is a tiny, plastic tube that is put into your bladder to drain your urine  Go to behavior therapy as directed  Behavior therapy may be used to help you learn to control your urge to urinate      Weight Management   Why it is important to manage your weight:  Being overweight increases your risk of health conditions such as heart disease, high blood pressure, type 2 diabetes, and certain types of cancer  It can also increase your risk for osteoarthritis, sleep apnea, and other respiratory problems  Aim for a slow, steady weight loss  Even a small amount of weight loss can lower your risk of health problems  How to lose weight safely:  A safe and healthy way to lose weight is to eat fewer calories and get regular exercise  You can lose up about 1 pound a week by decreasing the number of calories you eat by 500 calories each day  Healthy meal plan for weight management:  A healthy meal plan includes a variety of foods, contains fewer calories, and helps you stay healthy  A healthy meal plan includes the following:  Eat whole-grain foods more often  A healthy meal plan should contain fiber  Fiber is the part of grains, fruits, and vegetables that is not broken down by your body  Whole-grain foods are healthy and provide extra fiber in your diet  Some examples of whole-grain foods are whole-wheat breads and pastas, oatmeal, brown rice, and bulgur  Eat a variety of vegetables every day  Include dark, leafy greens such as spinach, kale, rowena greens, and mustard greens  Eat yellow and orange vegetables such as carrots, sweet potatoes, and winter squash  Eat a variety of fruits every day  Choose fresh or canned fruit (canned in its own juice or light syrup) instead of juice  Fruit juice has very little or no fiber  Eat low-fat dairy foods  Drink fat-free (skim) milk or 1% milk  Eat fat-free yogurt and low-fat cottage cheese  Try low-fat cheeses such as mozzarella and other reduced-fat cheeses  Choose meat and other protein foods that are low in fat  Choose beans or other legumes such as split peas or lentils  Choose fish, skinless poultry (chicken or turkey), or lean cuts of red meat (beef or pork)   Before you cook meat or poultry, cut off any visible fat  Use less fat and oil  Try baking foods instead of frying them  Add less fat, such as margarine, sour cream, regular salad dressing and mayonnaise to foods  Eat fewer high-fat foods  Some examples of high-fat foods include french fries, doughnuts, ice cream, and cakes  Eat fewer sweets  Limit foods and drinks that are high in sugar  This includes candy, cookies, regular soda, and sweetened drinks  Exercise:  Exercise at least 30 minutes per day on most days of the week  Some examples of exercise include walking, biking, dancing, and swimming  You can also fit in more physical activity by taking the stairs instead of the elevator or parking farther away from stores  Ask your healthcare provider about the best exercise plan for you  © Copyright [employer] 2018 Information is for End User's use only and may not be sold, redistributed or otherwise used for commercial purposes   All illustrations and images included in CareNotes® are the copyrighted property of A D A M , Inc  or [address] [place] [address]

## 2022-05-18 NOTE — ASSESSMENT & PLAN NOTE
BS sugar well controlled on Insulin lispro       Lab Results   Component Value Date    HGBA1C 6 5 05/18/2022

## 2022-05-18 NOTE — ASSESSMENT & PLAN NOTE
Patient has complaints of vaginal itching  She recently was on antibiotic therapy  Diflucan ordered

## 2022-05-19 ENCOUNTER — TELEPHONE (OUTPATIENT)
Dept: GENETICS | Facility: CLINIC | Age: 77
End: 2022-05-19

## 2022-05-19 NOTE — TELEPHONE ENCOUNTER
I called [facility] to schedule a new patient appointment with the [facility]       Outcome:   I left a voice message encouraging the patient to call the genetics team at [phone] to schedule this appointment  Follow-up:   At this time the referral will be closed and we will wait to hear back from the patient regarding scheduling this appointment

## 2022-05-23 ENCOUNTER — TELEPHONE (OUTPATIENT)
Dept: FAMILY MEDICINE CLINIC | Facility: CLINIC | Age: 77
End: 2022-05-23

## 2022-05-23 NOTE — TELEPHONE ENCOUNTER
[facility] called in about blood pressure medication     carvedilol (COREG) 6 25 mg tablet [[id number]   stated the pharmacy does not have it     Called pharmacy confirmed they have not received script   Can you please resend it in

## 2022-05-24 ENCOUNTER — TELEPHONE (OUTPATIENT)
Dept: HEMATOLOGY ONCOLOGY | Facility: CLINIC | Age: 77
End: 2022-05-24

## 2022-05-24 ENCOUNTER — TELEPHONE (OUTPATIENT)
Dept: FAMILY MEDICINE CLINIC | Facility: CLINIC | Age: 77
End: 2022-05-24

## 2022-05-24 DIAGNOSIS — I16.0 HYPERTENSIVE URGENCY: ICD-10-CM

## 2022-05-24 RX ORDER — AMLODIPINE BESYLATE 10 MG/1
10 TABLET ORAL DAILY
Qty: 30 TABLET | Refills: 0 | Status: SHIPPED | OUTPATIENT
Start: 2022-05-24 | End: 2022-06-01

## 2022-05-24 NOTE — TELEPHONE ENCOUNTER
Pt called back she said you told her to stop taking the amlodipine but refilled that one for her blood pressure she said she was taking the carvedilol

## 2022-05-26 ENCOUNTER — PATIENT OUTREACH (OUTPATIENT)
Dept: CASE MANAGEMENT | Facility: HOSPITAL | Age: 77
End: 2022-05-26

## 2022-05-26 NOTE — PROGRESS NOTES
SNF/[facility] Outpatient Care Manager Note: Chart notes reviewed  Patient has seen PCP and heme/onc  BMP 5/13 with stable renal function  Will follow chart notes for remainder of pathway

## 2022-05-31 DIAGNOSIS — R06.00 DOE (DYSPNEA ON EXERTION): ICD-10-CM

## 2022-05-31 DIAGNOSIS — J45.990 EXERCISE INDUCED BRONCHOSPASM: ICD-10-CM

## 2022-05-31 DIAGNOSIS — I10 PRIMARY HYPERTENSION: ICD-10-CM

## 2022-05-31 RX ORDER — CARVEDILOL 6.25 MG/1
6.25 TABLET ORAL 2 TIMES DAILY WITH MEALS
Refills: 0 | Status: CANCELLED
Start: 2022-05-31

## 2022-05-31 RX ORDER — MONTELUKAST SODIUM 10 MG/1
10 TABLET ORAL
Qty: 90 TABLET | Refills: 1 | Status: CANCELLED | OUTPATIENT
Start: 2022-05-31

## 2022-05-31 NOTE — TELEPHONE ENCOUNTER
Med refill   Carvedilol,  [facility] never received script on 05/18/202    Pregabalin ( lyrica) would like new script sent in for 150 mg and to take it twice a day ( nurse is stating that 50 mg 3 times a day is not working)    Montelukast (singulair )

## 2022-06-01 DIAGNOSIS — I16.0 HYPERTENSIVE URGENCY: ICD-10-CM

## 2022-06-01 DIAGNOSIS — E11.40 CONTROLLED TYPE 2 DIABETES WITH NEUROPATHY (HCC): ICD-10-CM

## 2022-06-01 DIAGNOSIS — R06.09 DOE (DYSPNEA ON EXERTION): ICD-10-CM

## 2022-06-01 DIAGNOSIS — J45.990 EXERCISE INDUCED BRONCHOSPASM: ICD-10-CM

## 2022-06-01 DIAGNOSIS — I10 PRIMARY HYPERTENSION: ICD-10-CM

## 2022-06-01 RX ORDER — AMLODIPINE BESYLATE 10 MG/1
10 TABLET ORAL DAILY
Qty: 90 TABLET | Refills: 1 | Status: SHIPPED | OUTPATIENT
Start: 2022-06-01 | End: 2022-07-01

## 2022-06-01 RX ORDER — CARVEDILOL 6.25 MG/1
6.25 TABLET ORAL 2 TIMES DAILY WITH MEALS
Refills: 0
Start: 2022-06-01 | End: 2022-10-13

## 2022-06-01 RX ORDER — MONTELUKAST SODIUM 10 MG/1
10 TABLET ORAL
Qty: 90 TABLET | Refills: 1 | Status: SHIPPED | OUTPATIENT
Start: 2022-06-01 | End: 2022-11-27

## 2022-06-01 RX ORDER — PREGABALIN 50 MG/1
50 CAPSULE ORAL 3 TIMES DAILY
Qty: 30 CAPSULE | Refills: 0 | Status: SHIPPED | OUTPATIENT
Start: 2022-06-01 | End: 2022-06-02 | Stop reason: DRUGHIGH

## 2022-06-02 ENCOUNTER — TELEPHONE (OUTPATIENT)
Dept: FAMILY MEDICINE CLINIC | Facility: CLINIC | Age: 77
End: 2022-06-02

## 2022-06-02 DIAGNOSIS — E11.40 CONTROLLED TYPE 2 DIABETES WITH NEUROPATHY (HCC): Primary | ICD-10-CM

## 2022-06-02 NOTE — TELEPHONE ENCOUNTER
Ever since her lyrica was d/c'd and started on the pregabulin she has had increased pain especially in her lower legs and feet  Burning and coldness    Please call patient

## 2022-06-02 NOTE — TELEPHONE ENCOUNTER
Left message for her, please find me  Thanks      Spoke with pt  Normally takes 150mg lyrica twice a day  Unable to take generic  Needs to take the brand   Refill sent in for 150mg bid

## 2022-06-03 ENCOUNTER — TELEPHONE (OUTPATIENT)
Dept: FAMILY MEDICINE CLINIC | Facility: CLINIC | Age: 77
End: 2022-06-03

## 2022-06-03 DIAGNOSIS — E11.40 CONTROLLED TYPE 2 DIABETES WITH NEUROPATHY (HCC): Primary | ICD-10-CM

## 2022-06-03 RX ORDER — PREGABALIN 165 MG/1
1 TABLET, FILM COATED, EXTENDED RELEASE ORAL 2 TIMES DAILY
Qty: 60 TABLET | Refills: 0 | Status: SHIPPED | OUTPATIENT
Start: 2022-06-03 | End: 2022-08-25 | Stop reason: SDUPTHER

## 2022-06-03 NOTE — TELEPHONE ENCOUNTER
Patient insurance won't fill the Lyrica 150, it needs to be changed to Lyrica 165 twice a day  She is willing to try the higher dosage and will come in if there are any side effects  She was also made aware it needs to preauthorized but did state that the insurance company said  If it was sent in that they would fill that dosage

## 2022-06-07 ENCOUNTER — PATIENT OUTREACH (OUTPATIENT)
Dept: CASE MANAGEMENT | Facility: HOSPITAL | Age: 77
End: 2022-06-07

## 2022-06-13 ENCOUNTER — TELEMEDICINE (OUTPATIENT)
Dept: FAMILY MEDICINE CLINIC | Facility: CLINIC | Age: 77
End: 2022-06-13
Payer: MEDICARE

## 2022-06-13 DIAGNOSIS — R05.9 COUGH: ICD-10-CM

## 2022-06-13 DIAGNOSIS — Z20.822 EXPOSURE TO COVID-19 VIRUS: ICD-10-CM

## 2022-06-13 DIAGNOSIS — B34.9 VIRAL INFECTION, UNSPECIFIED: Primary | ICD-10-CM

## 2022-06-13 PROCEDURE — 99213 OFFICE O/P EST LOW 20 MIN: CPT | Performed by: NURSE PRACTITIONER

## 2022-06-13 PROCEDURE — U0003 INFECTIOUS AGENT DETECTION BY NUCLEIC ACID (DNA OR RNA); SEVERE ACUTE RESPIRATORY SYNDROME CORONAVIRUS 2 (SARS-COV-2) (CORONAVIRUS DISEASE [COVID-19]), AMPLIFIED PROBE TECHNIQUE, MAKING USE OF HIGH THROUGHPUT TECHNOLOGIES AS DESCRIBED BY CMS-2020-01-R: HCPCS | Performed by: NURSE PRACTITIONER

## 2022-06-13 PROCEDURE — U0005 INFEC AGEN DETEC AMPLI PROBE: HCPCS | Performed by: NURSE PRACTITIONER

## 2022-06-13 RX ORDER — PREDNISONE 20 MG/1
40 TABLET ORAL DAILY
Qty: 10 TABLET | Refills: 0 | Status: SHIPPED | OUTPATIENT
Start: 2022-06-13 | End: 2022-06-18

## 2022-06-13 NOTE — PATIENT INSTRUCTIONS
Viral Syndrome   AMBULATORY CARE:   Viral syndrome  is a term used for symptoms of an infection caused by a virus  Viruses are spread easily from person to person through the air and on shared items  Signs and symptoms  may start slowly or suddenly and last hours to days  They can be mild to severe and can change over days or hours  You may have any of the following:  Fever and chills    A runny or stuffy nose    Cough, sore throat, or hoarseness    Headache, or pain and pressure around your eyes    Muscle aches and joint pain    Shortness of breath or wheezing    Abdominal pain, cramps, and diarrhea    Nausea, vomiting, or loss of appetite    Call your local emergency number (911 in the [address] [facility]) or have someone else call if:   You have a seizure  You cannot be woken  You have chest pain or trouble breathing  Seek care immediately if:   You have a stiff neck, a bad headache, and sensitivity to light  You feel weak, dizzy, or confused  You stop urinating or urinate a lot less than usual     You cough up blood or thick yellow or green mucus  You have severe abdominal pain or your abdomen is larger than usual     Call your doctor if:   Your symptoms do not get better with treatment or get worse after 3 days  You have a rash or ear pain  You have burning when you urinate  You have questions or concerns about your condition or care  Treatment for viral syndrome  may include medicines to manage your symptoms  Antibiotics are not given for a viral infection  You may  need any of the following:  Acetaminophen  decreases pain and fever  It is available without a doctor's order  Ask how much to take and how often to take it  Follow directions  Read the labels of all other medicines you are using to see if they also contain acetaminophen, or ask your doctor or pharmacist  Acetaminophen can cause liver damage if not taken correctly   Do not use more than 4 grams (4,000 milligrams) total of acetaminophen in one day  NSAIDs , such as ibuprofen, help decrease swelling, pain, and fever  NSAIDs can cause stomach bleeding or kidney problems in certain people  If you take blood thinner medicine, always ask your healthcare provider if NSAIDs are safe for you  Always read the medicine label and follow directions  Cold medicine  helps decrease swelling, control a cough, and relieve chest or nasal congestion  Saline nasal spray  helps decrease nasal congestion  Manage your symptoms:   Drink liquids as directed to prevent dehydration  Ask how much liquid to drink each day and which liquids are best for you  Ask if you should drink an oral rehydration solution (ORS)  An ORS has the right amounts of water, salts, and sugar you need to replace body fluids  This may help prevent dehydration caused by vomiting or diarrhea  Do not drink liquids with caffeine  Liquids with caffeine can make dehydration worse  Get plenty of rest to help your body heal   Take naps throughout the day  Ask your healthcare provider when you can return to work and your normal activities  Use a cool mist humidifier to help you breathe easier  Ask your healthcare provider how to use a cool mist humidifier  Eat honey or use cough drops for a sore throat  Cough drops are available without a doctor's order  Follow directions for taking cough drops  Do not smoke or be close to anyone who is smoking  Nicotine and other chemicals in cigarettes and cigars can cause lung damage  Smoking can also delay healing  Ask your healthcare provider for information if you currently smoke and need help to quit  E-cigarettes or smokeless tobacco still contain nicotine  Talk to your healthcare provider before you use these products  Prevent the spread of germs:       Wash your hands often  Wash your hands several times each day  Wash after you use the bathroom, change a child's diaper, and before you prepare or eat food   Use soap and water every time  Rub your soapy hands together, lacing your fingers  Wash the front and back of your hands, and in between your fingers  Use the fingers of one hand to scrub under the fingernails of the other hand  Wash for at least 20 seconds  Rinse with warm, running water for several seconds  Then dry your hands with a clean towel or paper towel  Use hand  that contains alcohol if soap and water are not available  Do not touch your eyes, nose, or mouth without washing your hands first          Cover a sneeze or cough  Use a tissue that covers your mouth and nose  Throw the tissue away in a trash can right away  Use the bend of your arm if a tissue is not available  Wash your hands well with soap and water or use a hand   Stay away from others while you are sick  Avoid crowds as much as possible  Ask about vaccines you may need  Talk to your healthcare provider about your vaccine history  He or she will tell you which vaccines you need, and when to get them  Get the influenza (flu) vaccine as soon as recommended each year  The flu vaccine is available starting in September or October  Flu viruses change, so it is important to get a flu vaccine every year  Get the pneumonia vaccine if recommended  This vaccine is usually recommended every 5 years  Your provider will tell you when to get this vaccine, if needed  Follow up with your doctor as directed:  Write down your questions so you remember to ask them during your visits  © Copyright [employer] 2022 Information is for End User's use only and may not be sold, redistributed or otherwise used for commercial purposes  All illustrations and images included in CareNotes® are the copyrighted property of A D A M , Inc  or Noelle Tafoya  The above information is an  only  It is not intended as medical advice for individual conditions or treatments   Talk to your doctor, nurse or pharmacist before following any medical regimen to see if it is safe and effective for you

## 2022-06-13 NOTE — PROGRESS NOTES
COVID-19 Outpatient Progress Note    Assessment/Plan:    Problem List Items Addressed This Visit        Other    Cough    Relevant Medications    predniSONE 20 mg tablet      Other Visit Diagnoses     Viral infection, unspecified    -  Primary    Advised increased rest, hydration, adequate nutrition  Can take vitamin c, d, zinc  To monitor for SOB, fever, and seek care if they develop  Relevant Orders    COVID Only - Office Collect    Exposure to COVID-19 virus        Wishes to get swabbed for Covid  Discussed quarantine duration if positive  To quarantine until results return    Relevant Orders    COVID Only - Office Collect         Disposition:     Recommended patient to come to the office to test for COVID-19  I have spent 15 minutes directly with the patient  Greater than 50% of this time was spent in counseling/coordination of care regarding: instructions for management and patient and family education  Discussed with patient even if she tests positive for Covid, she falls out the window of oral antivirals due to symptom onset date  Encounter provider ALEKSANDER Monterroso    Provider located at [facility] [address] [facility]  [address] [place] [id number] [phone]    Recent Visits  No visits were found meeting these conditions  Showing recent visits within past 7 days and meeting all other requirements  Today's Visits  Date Type Provider Dept   06/13/22 [address] [facility], ALEKSANDER [facility] [address] [facility]   Showing today's visits and meeting all other requirements  Future Appointments  No visits were found meeting these conditions  Showing future appointments within next 150 days and meeting all other requirements     This virtual check-in was done via [email or web address] and patient was informed that this is a secure, HIPAA-compliant platform  She agrees to proceed      Patient agrees to participate in a virtual check in via telephone or video visit instead of presenting to the office to address urgent/immediate medical needs  Patient is aware this is a billable service  After connecting through [facility], the patient was identified by name and date of birth  Greg Leonard was informed that this was a telemedicine visit and that the exam was being conducted confidentially over secure lines  My office door was closed  No one else was in the room  Greg Leonard acknowledged consent and understanding of privacy and security of the telemedicine visit  I informed the patient that I have reviewed her record in Epic and presented the opportunity for her to ask any questions regarding the visit today  The patient agreed to participate  Verification of patient location:  Patient is located in the following state in which I hold an active license: [place]    Subjective:   Greg Leonard is a 78 y o  female who is concerned about COVID-19  Patient's symptoms include nasal congestion, cough and chest tightness  Patient denies fever, chills, fatigue, malaise, rhinorrhea, sore throat, anosmia, loss of taste, shortness of breath, abdominal pain, nausea, vomiting, diarrhea, myalgias and headaches  - Date of symptom onset: 6/4/2022  - Date of exposure: 6/7/2022      COVID-19 vaccination status: Fully vaccinated (primary series)    Exposure:   Contact with a person who is under investigation (PUI) for or who is positive for COVID-19 within the last 14 days?: Yes    Hospitalized recently for fever and/or lower respiratory symptoms?: No      Currently a healthcare worker that is involved in direct patient care?: No      Works in a special setting where the risk of COVID-19 transmission may be high? (this may include long-term care, correctional and senior care facilities; homeless shelters; assisted-living facilities and group homes ): No      Resident in a special setting where the risk of COVID-19 transmission may be high? (this may include long-term care, correctional and alf facilities; homeless shelters; assisted-living facilities and group homes ): No      Lab Results   Component Value Date    SARSCOV2 Negative 2022     Past Medical History:   Diagnosis Date    Asthma     Benign hypertension 2018    Cardiac arrest ([facility] [address] )     Diabetes mellitus ([facility] [address] )     Glaucoma     Hypertension     Kidney stone     Neuropathy     Sleep apnea     no machine    SOB (shortness of breath)     Tubular adenoma of colon 10/2019    Wheezing      Past Surgical History:   Procedure Laterality Date     SECTION      COLONOSCOPY      HYSTERECTOMY      IR BIOPSY BONE MARROW  2022    TONSILLECTOMY       Current Outpatient Medications   Medication Sig Dispense Refill    acetaminophen (TYLENOL) 325 mg tablet Take 2 tablets (650 mg total) by mouth every 4 (four) hours as needed for mild pain  0    bisacodyl (DULCOLAX) 5 mg EC tablet Take 1 tablet (5 mg total) by mouth as needed in the morning for constipation   30 tablet 0    carvedilol (COREG) 6 25 mg tablet Take 1 tablet (6 25 mg total) by mouth 2 (two) times a day with meals  0    glucose blood test strip E11 9 / testing daily      Lyrica  MG TB24 Take 1 tablet by mouth 2 (two) times a day 60 tablet 0    meloxicam (MOBIC) 15 mg tablet       montelukast (SINGULAIR) 10 mg tablet Take 1 tablet (10 mg total) by mouth daily at bedtime 90 tablet 1    predniSONE 20 mg tablet Take 2 tablets (40 mg total) by mouth daily for 5 days 10 tablet 0    ProAir  (90 Base) MCG/ACT inhaler INHALE 2 PUFFS BY MOUTH EVERY 6 HOURS AS NEEDED FOR WHEEZE 8 5 g 3    sertraline (ZOLOFT) 50 mg tablet TAKE 1 TABLET BY MOUTH EVERY DAY 90 tablet 1    sitaGLIPtin-metFORMIN (JANUMET)  MG per tablet Take 1 tablet by mouth daily at bedtime      traMADol (Ultram) 50 mg tablet Take 1 tablet (50 mg total) by mouth every 6 (six) hours as needed for moderate pain 20 tablet 0    Wixela Inhub 250-50 MCG/ACT inhaler INHALE 1 PUFF 2 TIMES A DAY RINSE MOUTH AFTER USE  60 blister 3    amLODIPine (NORVASC) 10 mg tablet TAKE 1 TABLET (10 MG TOTAL) BY MOUTH IN THE MORNING  (Patient not taking: Reported on 6/13/2022) 90 tablet 1    Aspirin Low Dose 81 MG chewable tablet CHEW 1 TABLET BY MOUTH DAILY (Patient not taking: Reported on 6/13/2022) 90 tablet 1    hydrOXYzine HCL (ATARAX) 10 mg tablet Take 10 mg by mouth every 6 (six) hours as needed      latanoprost (XALATAN) 0 005 % ophthalmic solution       LORazepam (ATIVAN) 1 mg tablet Take 0 5 tablets (0 5 mg total) by mouth every 8 (eight) hours as needed for anxiety for up to 7 days 15 tablet 0     No current facility-administered medications for this visit  Allergies   Allergen Reactions    Ciprofloxacin Itching    Levaquin [Levofloxacin] Swelling    Penicillins GI Intolerance       Review of Systems   Constitutional: Negative for chills, fatigue and fever  HENT: Positive for congestion  Negative for ear pain, rhinorrhea, sinus pressure, sinus pain and sore throat  Eyes: Negative for pain and itching  Respiratory: Positive for cough and chest tightness  Negative for shortness of breath and wheezing  Cardiovascular: Negative for chest pain and palpitations  Gastrointestinal: Negative for abdominal pain, diarrhea, nausea and vomiting  Genitourinary: Negative for decreased urine volume  Musculoskeletal: Negative for arthralgias and myalgias  Skin: Negative for color change and rash  Neurological: Negative for dizziness, weakness, numbness and headaches  Psychiatric/Behavioral: Negative for confusion  Objective: There were no vitals filed for this visit  Physical Exam  Constitutional:       General: She is not in acute distress  Appearance: Normal appearance  She is not ill-appearing  HENT:      Head: Normocephalic        Right Ear: External ear normal       Left Ear: External ear normal  Pulmonary:      Effort: Pulmonary effort is normal  No respiratory distress  Musculoskeletal:         General: Normal range of motion  Cervical back: Normal range of motion  Skin:     Coloration: Skin is not pale  Neurological:      General: No focal deficit present  Mental Status: She is alert and oriented to person, place, and time  Psychiatric:         Mood and Affect: Mood normal          Behavior: Behavior normal          VIRTUAL VISIT DISCLAIMER    [facility] verbally agrees to participate in [employer]  Pt is aware that [employer] could be limited without vital signs or the ability to perform a full hands-on physical exam  [facility] understands she or the provider may request at any time to terminate the video visit and request the patient to seek care or treatment in person

## 2022-06-14 ENCOUNTER — TELEPHONE (OUTPATIENT)
Dept: FAMILY MEDICINE CLINIC | Facility: CLINIC | Age: 77
End: 2022-06-14

## 2022-06-14 LAB — SARS-COV-2 RNA RESP QL NAA+PROBE: NEGATIVE

## 2022-06-14 NOTE — TELEPHONE ENCOUNTER
Please make patient aware Covid swab is negative  She is to continue taking the prednsione, as she only was given it yesterday

## 2022-06-15 ENCOUNTER — TELEPHONE (OUTPATIENT)
Dept: FAMILY MEDICINE CLINIC | Facility: CLINIC | Age: 77
End: 2022-06-15

## 2022-06-15 NOTE — TELEPHONE ENCOUNTER
Spoke with patient  Reports that she tested negative for COVID  Does have a cough  Is taking prednisone and cough medicine  Encouraged to increase fluid hydration, rest, nutrition    Advised to call office if symptoms get worse

## 2022-06-22 ENCOUNTER — OFFICE VISIT (OUTPATIENT)
Dept: FAMILY MEDICINE CLINIC | Facility: CLINIC | Age: 77
End: 2022-06-22
Payer: MEDICARE

## 2022-06-22 VITALS
RESPIRATION RATE: 18 BRPM | HEIGHT: 62 IN | SYSTOLIC BLOOD PRESSURE: 160 MMHG | TEMPERATURE: 97.7 F | OXYGEN SATURATION: 97 % | WEIGHT: 195 LBS | DIASTOLIC BLOOD PRESSURE: 80 MMHG | HEART RATE: 93 BPM | BODY MASS INDEX: 35.88 KG/M2

## 2022-06-22 DIAGNOSIS — R07.89 CHEST TIGHTNESS: ICD-10-CM

## 2022-06-22 DIAGNOSIS — H61.23 IMPACTED CERUMEN OF BOTH EARS: ICD-10-CM

## 2022-06-22 DIAGNOSIS — J06.9 ACUTE URI: Primary | ICD-10-CM

## 2022-06-22 DIAGNOSIS — R05.9 COUGH: ICD-10-CM

## 2022-06-22 LAB
SARS-COV-2 AG UPPER RESP QL IA: NEGATIVE
VALID CONTROL: NORMAL

## 2022-06-22 PROCEDURE — 99214 OFFICE O/P EST MOD 30 MIN: CPT | Performed by: NURSE PRACTITIONER

## 2022-06-22 PROCEDURE — 87811 SARS-COV-2 COVID19 W/OPTIC: CPT | Performed by: NURSE PRACTITIONER

## 2022-06-22 RX ORDER — FLUCONAZOLE 150 MG/1
150 TABLET ORAL ONCE
COMMUNITY
Start: 2022-05-18

## 2022-06-22 RX ORDER — PREDNISONE 20 MG/1
20 TABLET ORAL DAILY
Qty: 5 TABLET | Refills: 0 | Status: SHIPPED | OUTPATIENT
Start: 2022-06-22

## 2022-06-22 RX ORDER — BENZONATATE 100 MG/1
100 CAPSULE ORAL 3 TIMES DAILY PRN
Qty: 20 CAPSULE | Refills: 0 | Status: SHIPPED | OUTPATIENT
Start: 2022-06-22

## 2022-06-22 NOTE — ASSESSMENT & PLAN NOTE
Pt presents w/ nasal congestion, cough, ear pain, sore throat  Pt's spouse tested + COVID a week ago  Pt tested negative on home kit but continues with upper respiratory symptoms  Denies fever, chills, Headaches  Prednisone and Tessalon perles ordered  Encourage rest, increasing fluid hydration, vaporizer or steam, humidifier, hot tea with lemon

## 2022-06-22 NOTE — PROGRESS NOTES
Assessment/Plan:     Acute URI  Pt presents w/ nasal congestion, cough, ear pain, sore throat  Pt's spouse tested + COVID a week ago  Pt tested negative on home kit but continues with upper respiratory symptoms  Denies fever, chills, Headaches  Prednisone and Tessalon perles ordered  Encourage rest, increasing fluid hydration, vaporizer or steam, humidifier, hot tea with lemon  Impacted cerumen of both ears  Pt also c/o earache and feeling "plugged"  Both ears covered with cerumen, R > L  Debrox ordered  Problem List Items Addressed This Visit        Respiratory    Acute URI - Primary     Pt presents w/ nasal congestion, cough, ear pain, sore throat  Pt's spouse tested + COVID a week ago  Pt tested negative on home kit but continues with upper respiratory symptoms  Denies fever, chills, Headaches  Prednisone and Tessalon perles ordered  Encourage rest, increasing fluid hydration, vaporizer or steam, humidifier, hot tea with lemon  Relevant Medications    predniSONE 20 mg tablet    Other Relevant Orders    POCT Rapid Covid Ag (Completed)       Nervous and Auditory    Impacted cerumen of both ears     Pt also c/o earache and feeling "plugged"  Both ears covered with cerumen, R > L  Debrox ordered  Relevant Medications    carbamide peroxide (DEBROX) 6 5 % otic solution       Other    Cough    Relevant Medications    benzonatate (TESSALON PERLES) 100 mg capsule      Other Visit Diagnoses     Chest tightness        Relevant Medications    predniSONE 20 mg tablet            Subjective:      Patient ID: Mary Cisneros is a 78 y o  female  HPI    The following portions of the patient's history were reviewed and updated as appropriate: allergies, current medications, past family history, past medical history, past social history, past surgical history and problem list     Review of Systems   Constitutional: Positive for chills and fatigue     HENT: Positive for congestion, ear pain and sore throat  Negative for hearing loss, sinus pressure, sinus pain and tinnitus  Respiratory: Positive for cough and chest tightness  Cardiovascular: Negative for chest pain  Objective:      /80   Pulse 93   Temp 97 7 °F (36 5 °C)   Resp 18   Ht 5' 2" (1 575 m)   Wt 88 5 kg (195 lb)   SpO2 97%   BMI 35 67 kg/m²          Physical Exam  Constitutional:       Appearance: Normal appearance  HENT:      Head: Normocephalic and atraumatic  Right Ear: There is impacted cerumen  Left Ear: There is impacted cerumen  Cardiovascular:      Rate and Rhythm: Normal rate and regular rhythm  Heart sounds: Normal heart sounds  Pulmonary:      Effort: Pulmonary effort is normal       Breath sounds: Normal breath sounds  No wheezing or rales  Neurological:      Mental Status: She is alert and oriented to person, place, and time  Psychiatric:         Mood and Affect: Mood normal          Behavior: Behavior normal          Thought Content:  Thought content normal          Judgment: Judgment normal

## 2022-06-22 NOTE — PATIENT INSTRUCTIONS
Treat symptoms  Hot tea with lemon, warm drinks and soups  Vaporizer or steam for congestion  Acute Bronchitis   WHAT YOU NEED TO KNOW:   Acute bronchitis is swelling and irritation in your lungs  It is usually caused by a virus and most often happens in the winter  Bronchitis may also be caused by bacteria or by a chemical irritant, such as smoke  DISCHARGE INSTRUCTIONS:   Return to the emergency department if:   You cough up blood  Your lips or fingernails turn blue  You feel like you are not getting enough air when you breathe  Call your doctor if:   Your symptoms do not go away or get worse, even after treatment  Your cough does not get better within 4 weeks  You have questions or concerns about your condition or care  Medicines: You may  need any of the following:  Cough suppressants  decrease your urge to cough  Decongestants  help loosen mucus in your lungs and make it easier to cough up  This can help you breathe easier  Inhalers  may be given  Your healthcare provider may give you one or more inhalers to help you breathe easier and cough less  An inhaler gives your medicine to open your airways  Ask your healthcare provider to show you how to use your inhaler correctly  Antibiotics  may be given for up to 5 days if your bronchitis is caused by bacteria  Acetaminophen  decreases pain and fever  It is available without a doctor's order  Ask how much to take and how often to take it  Follow directions  Read the labels of all other medicines you are using to see if they also contain acetaminophen, or ask your doctor or pharmacist  Acetaminophen can cause liver damage if not taken correctly  Do not use more than 4 grams (4,000 milligrams) total of acetaminophen in one day  NSAIDs  help decrease swelling and pain or fever  This medicine is available with or without a doctor's order  NSAIDs can cause stomach bleeding or kidney problems in certain people   If you take blood thinner medicine, always ask your healthcare provider if NSAIDs are safe for you  Always read the medicine label and follow directions  Take your medicine as directed  Contact your healthcare provider if you think your medicine is not helping or if you have side effects  Tell him of her if you are allergic to any medicine  Keep a list of the medicines, vitamins, and herbs you take  Include the amounts, and when and why you take them  Bring the list or the pill bottles to follow-up visits  Carry your medicine list with you in case of an emergency  Self-care:   Drink liquids as directed  You may need to drink more liquids than usual to stay hydrated  Ask how much liquid to drink each day and which liquids are best for you  Use a cool mist humidifier  to increase air moisture in your home  This may make it easier for you to breathe and help decrease your cough  Get more rest   Rest helps your body to heal  Slowly start to do more each day  Rest when you feel it is needed  Avoid irritants in the air  Avoid chemicals, fumes, and dust  Wear a face mask if you must work around dust or fumes  Stay inside on days when air pollution levels are high  If you have allergies, stay inside when pollen counts are high  Do not use aerosol products, such as spray-on deodorant, bug spray, and hair spray  Do not smoke or be around others who are smoking  Nicotine and other chemicals in cigarettes and cigars can cause lung damage  Ask your healthcare provider for information if you currently smoke and need help to quit  E-cigarettes or smokeless tobacco still contain nicotine  Talk to your healthcare provider before you use these products  Prevent acute bronchitis:       Ask about vaccines you may need  Get a flu vaccine each year as soon as recommended, usually in September or October  Ask your healthcare provider if you should also get a pneumonia or COVID-19 vaccine   Your healthcare provider can tell you if you should also get other vaccines, and when to get them  Prevent the spread of germs  You can decrease your risk for acute bronchitis and other illnesses by doing the following:     Wash your hands often with soap and water  Carry germ-killing hand lotion or gel with you  You can use the lotion or gel to clean your hands when soap and water are not available  Do not touch your eyes, nose, or mouth unless you have washed your hands first     Always cover your mouth when you cough to prevent the spread of germs  It is best to cough into a tissue or your shirt sleeve instead of into your hand  Ask those around you to cover their mouths when they cough  Try to avoid people who have a cold or the flu  If you are sick, stay away from others as much as possible  Follow up with your doctor as directed:  Write down questions you have so you will remember to ask them during your follow-up visits  © Copyright [employer] 2022 Information is for End User's use only and may not be sold, redistributed or otherwise used for commercial purposes  All illustrations and images included in CareNotes® are the copyrighted property of [employer]  or Noelle Abrams   The above information is an  only  It is not intended as medical advice for individual conditions or treatments  Talk to your doctor, nurse or pharmacist before following any medical regimen to see if it is safe and effective for you

## 2022-06-24 ENCOUNTER — TELEPHONE (OUTPATIENT)
Dept: FAMILY MEDICINE CLINIC | Facility: CLINIC | Age: 77
End: 2022-06-24

## 2022-06-24 NOTE — TELEPHONE ENCOUNTER
Work with patient  Discussed management of high blood pressure, rest   Take cough medicine  Patient reports that she is feeling some improvement

## 2022-06-24 NOTE — TELEPHONE ENCOUNTER
Patient blood pressure has been elevated   197/95  199/105  177/87  208/97  195/94    Please advise what you would like her to do  She has increased her BP medication

## 2022-06-27 ENCOUNTER — OFFICE VISIT (OUTPATIENT)
Dept: GASTROENTEROLOGY | Facility: CLINIC | Age: 77
End: 2022-06-27
Payer: MEDICARE

## 2022-06-27 VITALS
SYSTOLIC BLOOD PRESSURE: 134 MMHG | HEART RATE: 88 BPM | OXYGEN SATURATION: 99 % | WEIGHT: 193.6 LBS | HEIGHT: 62 IN | DIASTOLIC BLOOD PRESSURE: 68 MMHG | BODY MASS INDEX: 35.63 KG/M2

## 2022-06-27 DIAGNOSIS — K21.9 GASTROESOPHAGEAL REFLUX DISEASE, UNSPECIFIED WHETHER ESOPHAGITIS PRESENT: ICD-10-CM

## 2022-06-27 DIAGNOSIS — K52.82 EOSINOPHILIC COLITIS: Primary | ICD-10-CM

## 2022-06-27 PROCEDURE — 99213 OFFICE O/P EST LOW 20 MIN: CPT | Performed by: PHYSICIAN ASSISTANT

## 2022-06-27 RX ORDER — PANTOPRAZOLE SODIUM 40 MG/1
40 TABLET, DELAYED RELEASE ORAL DAILY
Qty: 30 TABLET | Refills: 3 | Status: SHIPPED | OUTPATIENT
Start: 2022-06-27 | End: 2022-07-24

## 2022-06-27 NOTE — PROGRESS NOTES
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  She is following with hematology    2  Gastroesophageal reflux disease, unspecified whether esophagitis present  Restart Pantoprazole 40mg daily    ______________________________________________________________________    SUBJECTIVE:  62-year-old female presents for hospital follow-up of eosinophilic colitis  She was hospitalized in April with an acute onset of severe abdominal pain, vomiting and diarrhea  Initial testing suggested diverticulitis however despite antibiotic therapy or symptoms worsened and she was prepped for a colonoscopy  Her colonoscopy showed diverticula and a small polyp which was removed  The biopsies came back showing a high number of eosinophils  She was advised to start an elimination diet  At discharge she was sent to [facility] for rehab  She fall the elimination diet while there which was for approximately 1 month however when she went home she went back to eating normally  She reports that she continues to feel well despite advancing her diet  She has no abdominal pain, vomiting, diarrhea or rectal bleeding  She denies any history of severe recurrent gastrointestinal symptoms  REVIEW OF SYSTEMS IS OTHERWISE NEGATIVE        Historical Information   Past Medical History:   Diagnosis Date    Asthma     Benign hypertension 11/30/2018    [facility] [address]     [facility]     Glaucoma     Hypertension     Kidney stone     Neuropathy     Sleep apnea     no machine    SOB (shortness of breath)     Tubular adenoma of colon 10/2019    Wheezing      Past Surgical History:   Procedure Laterality Date     SECTION      COLONOSCOPY      HYSTERECTOMY  1985    IR BIOPSY BONE MARROW  2022    TONSILLECTOMY       Social History   Social History     Substance and Sexual Activity   Alcohol Use Never     Social History     Substance and Sexual Activity   Drug Use No     Social History     Tobacco Use   Smoking Status Never Smoker   Smokeless Tobacco Never Used     Family History   Problem Relation Age of Onset    Diabetes Mother     Hypertension Father     Prostate cancer Father     Diabetes Sister     Hypertension Sister     Breast cancer Sister 62    Diabetes Brother     Hypertension Brother     Asthma Family     No Known Problems Daughter     No Known Problems Sister     No Known Problems Daughter     No Known Problems Daughter     No Known Problems Maternal Aunt     Breast cancer Maternal Aunt 58    No Known Problems Maternal Aunt     No Known Problems Maternal Aunt     No Known Problems Paternal Aunt     No Known Problems Paternal Aunt     No Known Problems Paternal Aunt     Colon cancer Neg Hx     Ovarian cancer Neg Hx     Uterine cancer Neg Hx     Cervical cancer Neg Hx        Meds/Allergies       Current Outpatient Medications:     acetaminophen (TYLENOL) 325 mg tablet    amLODIPine (NORVASC) 10 mg tablet    Aspirin Low Dose 81 MG chewable tablet    benzonatate (TESSALON PERLES) 100 mg capsule    bisacodyl (DULCOLAX) 5 mg EC tablet    carbamide peroxide (DEBROX) 6 5 % otic solution    carvedilol (COREG) 6 25 mg tablet    fluconazole (DIFLUCAN) 150 mg tablet    glucose blood test strip    hydrOXYzine HCL (ATARAX) 10 mg tablet    latanoprost (XALATAN) 0 005 % ophthalmic solution    Lyrica  MG TB24    meloxicam (MOBIC) 15 mg tablet    montelukast (SINGULAIR) 10 mg tablet    pantoprazole (PROTONIX) 40 mg tablet    predniSONE 20 mg tablet   ProAir  (90 Base) MCG/ACT inhaler    sertraline (ZOLOFT) 50 mg tablet    sitaGLIPtin-metFORMIN (JANUMET)  MG per tablet    traMADol (Ultram) 50 mg tablet    Wixela Inhub 250-50 MCG/ACT inhaler    LORazepam (ATIVAN) 1 mg tablet    Allergies   Allergen Reactions    Ciprofloxacin Itching    Levaquin [Levofloxacin] Swelling    Penicillins GI Intolerance           Objective     Blood pressure 134/68, pulse 88, height 5' 2" (1 575 m), weight 87 8 kg (193 lb 9 6 oz), SpO2 99 %, not currently breastfeeding  Body mass index is 35 41 kg/m²  PHYSICAL EXAM:      General Appearance:   Alert, cooperative, no distress   HEENT:   Normocephalic, atraumatic, anicteric      Neck:  Supple, symmetrical, trachea midline   Lungs:   Clear to auscultation bilaterally; no rales, rhonchi or wheezing; respirations unlabored    Heart[de-identified]   Regular rate and rhythm; no murmur, rub, or gallop  Abdomen:   Soft, non-tender, non-distended; normal bowel sounds; no masses, no organomegaly    Genitalia:   Deferred    Rectal:   Deferred    Extremities:  No cyanosis, clubbing or edema    Pulses:  2+ and symmetric    Skin:  No jaundice, rashes, or lesions    Lymph nodes:  No palpable cervical lymphadenopathy        Lab Results:   No visits with results within 1 Day(s) from this visit  Latest known visit with results is:   Office Visit on 06/22/2022   Component Date Value    POCT SARS-CoV-2 Ag 06/22/2022 Negative     VALID CONTROL 06/22/2022 Valid          Radiology Results:   No results found

## 2022-06-29 LAB
LEFT EYE DIABETIC RETINOPATHY: NORMAL
RIGHT EYE DIABETIC RETINOPATHY: NORMAL

## 2022-07-15 DIAGNOSIS — M19.90 ARTHRITIS: Primary | ICD-10-CM

## 2022-07-15 RX ORDER — MELOXICAM 15 MG/1
TABLET ORAL
Status: CANCELLED | OUTPATIENT
Start: 2022-07-15

## 2022-07-15 NOTE — TELEPHONE ENCOUNTER
T/c from pt to check on status of refills of meloxicam - pt states she called several times during the week and also contacted her pharmacy to request refill  Rx'd by historical provider in 5/2022  No action has been taken as of yet - pt is very concerned because she is completely out of mkeds and in pain -  and needs his medications before he leaves  As such, forwarded directly to pts provider and to Anabella Ybarra @ [phone]  Please approve or refuse refill

## 2022-07-19 RX ORDER — MELOXICAM 15 MG/1
15 TABLET ORAL DAILY
Qty: 90 TABLET | Refills: 0 | Status: SHIPPED | OUTPATIENT
Start: 2022-07-19 | End: 2022-08-25 | Stop reason: SDUPTHER

## 2022-07-22 ENCOUNTER — TELEPHONE (OUTPATIENT)
Dept: FAMILY MEDICINE CLINIC | Facility: CLINIC | Age: 77
End: 2022-07-22

## 2022-07-22 NOTE — TELEPHONE ENCOUNTER
Beata called from [facility] called to let you know pt refused todays visit said to come next week

## 2022-07-24 DIAGNOSIS — K21.9 GASTROESOPHAGEAL REFLUX DISEASE, UNSPECIFIED WHETHER ESOPHAGITIS PRESENT: ICD-10-CM

## 2022-07-24 RX ORDER — PANTOPRAZOLE SODIUM 40 MG/1
TABLET, DELAYED RELEASE ORAL
Qty: 90 TABLET | Refills: 2 | Status: SHIPPED | OUTPATIENT
Start: 2022-07-24

## 2022-08-24 ENCOUNTER — TELEPHONE (OUTPATIENT)
Dept: GASTROENTEROLOGY | Facility: CLINIC | Age: 77
End: 2022-08-24

## 2022-08-24 NOTE — TELEPHONE ENCOUNTER
Rach Figueroa, DO  Jeanne Boothe PA-C  No colonoscopy at this time    Could have been eosinophilic colitis              Previous Messages       ----- Message -----   From: Jeanne Boothe PA-C   Sent: 6/27/2022   2:49 PM EDT   To: Rach Figueroa, DO     Hi there   Do you think all of Pebbles's symptoms were related to eosinophilic colitis?  Its a dramatic story which came on abruptly and went away as quickly  She is feeling normal now   She did an elimination diet for about 1 month and has reintroduced all foods now and is feeling well  Should we still be sending her to an allergist?   Does she need another colonoscopy for re-biopsy?

## 2022-08-25 DIAGNOSIS — E11.40 CONTROLLED TYPE 2 DIABETES WITH NEUROPATHY (HCC): ICD-10-CM

## 2022-08-25 DIAGNOSIS — M19.90 ARTHRITIS: ICD-10-CM

## 2022-08-25 NOTE — TELEPHONE ENCOUNTER
Patient called requesting a refill for Ambien  After looking in her chart, I saw this was discontinued  She states Jovana used to send this in for her  She requested that I send a request to [facility]  She is unable to make an appointment with Jovana or to re-establish with another provider at this time due to her just coming home from the hospital and having many people care for her during the day  I let her know that it may not be filled for this reason  [facility] send in Ambien for the patient to [facility] in [address] [place] [address]?

## 2022-08-26 DIAGNOSIS — F51.01 PRIMARY INSOMNIA: Primary | ICD-10-CM

## 2022-08-26 RX ORDER — MELOXICAM 15 MG/1
15 TABLET ORAL DAILY
Qty: 90 TABLET | Refills: 0 | Status: SHIPPED | OUTPATIENT
Start: 2022-08-26 | End: 2022-10-07 | Stop reason: SDUPTHER

## 2022-08-26 RX ORDER — ZOLPIDEM TARTRATE 5 MG/1
5 TABLET ORAL
Qty: 30 TABLET | Refills: 0 | Status: SHIPPED | OUTPATIENT
Start: 2022-08-26

## 2022-08-26 RX ORDER — PREGABALIN 165 MG/1
1 TABLET, FILM COATED, EXTENDED RELEASE ORAL 2 TIMES DAILY
Qty: 60 TABLET | Refills: 0 | Status: SHIPPED | OUTPATIENT
Start: 2022-08-26 | End: 2022-10-25

## 2022-09-26 DIAGNOSIS — F51.01 PRIMARY INSOMNIA: Primary | ICD-10-CM

## 2022-09-26 RX ORDER — QUETIAPINE FUMARATE 25 MG/1
25 TABLET, FILM COATED ORAL
Qty: 30 TABLET | Refills: 0 | Status: SHIPPED | OUTPATIENT
Start: 2022-09-26 | End: 2022-10-18

## 2022-10-06 DIAGNOSIS — E11.40 CONTROLLED TYPE 2 DIABETES WITH NEUROPATHY (HCC): Primary | ICD-10-CM

## 2022-10-06 NOTE — TELEPHONE ENCOUNTER
Patient called for a medication refill  Patient states she is staying with Jovana  Patient was given Jovana's phone number for further refills

## 2022-10-07 ENCOUNTER — APPOINTMENT (OUTPATIENT)
Dept: RADIOLOGY | Facility: CLINIC | Age: 77
End: 2022-10-07
Payer: MEDICARE

## 2022-10-07 ENCOUNTER — OFFICE VISIT (OUTPATIENT)
Dept: FAMILY MEDICINE CLINIC | Facility: CLINIC | Age: 77
End: 2022-10-07
Payer: MEDICARE

## 2022-10-07 VITALS
HEART RATE: 69 BPM | OXYGEN SATURATION: 96 % | BODY MASS INDEX: 35.24 KG/M2 | SYSTOLIC BLOOD PRESSURE: 150 MMHG | WEIGHT: 191.5 LBS | DIASTOLIC BLOOD PRESSURE: 80 MMHG | HEIGHT: 62 IN | TEMPERATURE: 97.6 F

## 2022-10-07 DIAGNOSIS — E11.40 CONTROLLED TYPE 2 DIABETES WITH NEUROPATHY (HCC): ICD-10-CM

## 2022-10-07 DIAGNOSIS — S93.401A SPRAIN OF RIGHT ANKLE, UNSPECIFIED LIGAMENT, INITIAL ENCOUNTER: Primary | ICD-10-CM

## 2022-10-07 DIAGNOSIS — S93.401A SPRAIN OF RIGHT ANKLE, UNSPECIFIED LIGAMENT, INITIAL ENCOUNTER: ICD-10-CM

## 2022-10-07 LAB — SL AMB POCT HEMOGLOBIN AIC: 5.9 (ref ?–6.5)

## 2022-10-07 PROCEDURE — 99213 OFFICE O/P EST LOW 20 MIN: CPT | Performed by: STUDENT IN AN ORGANIZED HEALTH CARE EDUCATION/TRAINING PROGRAM

## 2022-10-07 PROCEDURE — 73610 X-RAY EXAM OF ANKLE: CPT

## 2022-10-07 PROCEDURE — 83036 HEMOGLOBIN GLYCOSYLATED A1C: CPT | Performed by: STUDENT IN AN ORGANIZED HEALTH CARE EDUCATION/TRAINING PROGRAM

## 2022-10-07 RX ORDER — NAPROXEN 500 MG/1
500 TABLET ORAL 2 TIMES DAILY WITH MEALS
Qty: 30 TABLET | Refills: 0 | Status: SHIPPED | OUTPATIENT
Start: 2022-10-07

## 2022-10-07 RX ORDER — SITAGLIPTIN AND METFORMIN HYDROCHLORIDE 100; 1000 MG/1; MG/1
1 TABLET, FILM COATED, EXTENDED RELEASE ORAL
Qty: 90 TABLET | Refills: 3 | Status: SHIPPED | OUTPATIENT
Start: 2022-10-07

## 2022-10-07 NOTE — PROGRESS NOTES
Name: Clinton Modi      : 1945      MRN: [id number]  Encounter Provider: Brittney Alba DO  Encounter Date: 10/7/2022   Encounter department: [facility]     1  Sprain of right ankle, unspecified ligament, initial encounter  Assessment & Plan:  Topical Voltaren gel as needed  Naproxen 500 milligrams b i d  p r n  Will check right ankle x-ray as patient does have tenderness at proximal 5th metatarsal on the right    Ankle wrapped with Ace bandage in office  Instructions for RICE provided    Orders:  -     XR ankle 3+ vw right; Future; Expected date: 10/07/2022  -     naproxen (Naprosyn) 500 mg tablet; Take 1 tablet (500 mg total) by mouth 2 (two) times a day with meals    2  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)  -     SITagliptin-metFORMIN HCl ER (Janumet XR) 100-1000 MG TB24; Take 1 tablet by mouth daily with dinner  -     POCT hemoglobin A1c         Subjective      Ankle Pain: Patient complains of right ankle pain  Onset of the symptoms was yesterday  Inciting event:  Increased physical activity yesterday  Current symptoms include ability to bear weight, but with some pain, pain at the lateral aspect of the ankle and swelling  Aggravating symptoms: any weight bearing  Patient's overall course: stable  Patient has not had prior ankle problems  Previous visits for this problem: none  Evaluation to date: none  Treatment to date: elastic supporter which is somewhat effective  Went shopping yesterday and walked around more than she usually would  [facility] for about 3hours  Sat down to rest and when she was going to get back up almost fell over to to pain  Started using walker yesterday to assist with gait    Review of Systems   Constitutional: Negative for chills and fever  Respiratory: Negative for cough and shortness of breath  Cardiovascular: Negative for chest pain and palpitations     Musculoskeletal: Positive for arthralgias and joint swelling  Right ankle pain   Neurological: Negative for dizziness and headaches  Current Outpatient Medications on File Prior to Visit   Medication Sig    acetaminophen (TYLENOL) 325 mg tablet Take 2 tablets (650 mg total) by mouth every 4 (four) hours as needed for mild pain    Aspirin Low Dose 81 MG chewable tablet CHEW 1 TABLET BY MOUTH DAILY    benzonatate (TESSALON PERLES) 100 mg capsule Take 1 capsule (100 mg total) by mouth 3 (three) times a day as needed for cough    bisacodyl (DULCOLAX) 5 mg EC tablet Take 1 tablet (5 mg total) by mouth as needed in the morning for constipation   carbamide peroxide (DEBROX) 6 5 % otic solution Administer 5 drops into both ears 2 (two) times a day    carvedilol (COREG) 6 25 mg tablet Take 1 tablet (6 25 mg total) by mouth 2 (two) times a day with meals    fluconazole (DIFLUCAN) 150 mg tablet Take 150 mg by mouth once    glucose blood test strip E11 9 / testing daily    hydrOXYzine HCL (ATARAX) 10 mg tablet Take 10 mg by mouth every 6 (six) hours as needed    latanoprost (XALATAN) 0 005 % ophthalmic solution     Lyrica  MG TB24 Take 1 tablet by mouth 2 (two) times a day    montelukast (SINGULAIR) 10 mg tablet Take 1 tablet (10 mg total) by mouth daily at bedtime    pantoprazole (PROTONIX) 40 mg tablet TAKE 1 TABLET BY MOUTH EVERY DAY    predniSONE 20 mg tablet Take 1 tablet (20 mg total) by mouth daily    ProAir  (90 Base) MCG/ACT inhaler INHALE 2 PUFFS BY MOUTH EVERY 6 HOURS AS NEEDED FOR WHEEZE    QUEtiapine (SEROquel) 25 mg tablet Take 1 tablet (25 mg total) by mouth daily at bedtime    sertraline (ZOLOFT) 50 mg tablet TAKE 1 TABLET BY MOUTH EVERY DAY    traMADol (Ultram) 50 mg tablet Take 1 tablet (50 mg total) by mouth every 6 (six) hours as needed for moderate pain    Wixela Inhub 250-50 MCG/ACT inhaler INHALE 1 PUFF 2 TIMES A DAY RINSE MOUTH AFTER USE      zolpidem (AMBIEN) 5 mg tablet Take 1 tablet (5 mg total) by mouth daily at bedtime as needed for sleep    [DISCONTINUED] meloxicam (MOBIC) 15 mg tablet Take 1 tablet (15 mg total) by mouth daily    [DISCONTINUED] sitaGLIPtin-metFORMIN (JANUMET)  MG per tablet Take 1 tablet by mouth daily at bedtime    amLODIPine (NORVASC) 10 mg tablet TAKE 1 TABLET (10 MG TOTAL) BY MOUTH IN THE MORNING   LORazepam (ATIVAN) 1 mg tablet Take 0 5 tablets (0 5 mg total) by mouth every 8 (eight) hours as needed for anxiety for up to 7 days       Objective     /80 (BP Location: Left arm, Patient Position: Sitting, Cuff Size: Large)   Pulse 69   Temp 97 6 °F (36 4 °C) (Temporal)   Ht 5' 2" (1 575 m)   Wt 86 9 kg (191 lb 8 oz)   SpO2 96%   BMI 35 03 kg/m²     Physical Exam  Vitals reviewed  Constitutional:       Appearance: She is obese  HENT:      Head: Normocephalic and atraumatic  Cardiovascular:      Rate and Rhythm: Regular rhythm  Pulses: Normal pulses  Heart sounds: Normal heart sounds  Pulmonary:      Effort: Pulmonary effort is normal       Breath sounds: Normal breath sounds  Musculoskeletal:      Comments: Patient ambulating with walker, antalgic gait secondary to pain, right ankle initially wrapped in elastic bandage, tender to palpation at 5th proximal metacarpal and posterior lateral malleolar tenderness appreciated, no warmth with palpation of right ankle  Squeeze test negative  Neurological:      General: No focal deficit present  Mental Status: She is alert and oriented to person, place, and time     Psychiatric:         Mood and Affect: Mood normal          Behavior: Behavior normal        Ivory Hutchins DO

## 2022-10-07 NOTE — ASSESSMENT & PLAN NOTE
Topical Voltaren gel as needed  Naproxen 500 milligrams b i d  p r n    Will check right ankle x-ray as patient does have tenderness at proximal 5th metatarsal on the right    Ankle wrapped with Ace bandage in office  Instructions for RICE provided

## 2022-10-11 PROBLEM — H61.23 IMPACTED CERUMEN OF BOTH EARS: Status: RESOLVED | Noted: 2022-06-22 | Resolved: 2022-10-11

## 2022-10-11 PROBLEM — J06.9 ACUTE URI: Status: RESOLVED | Noted: 2022-06-22 | Resolved: 2022-10-11

## 2022-10-11 PROBLEM — R05.9 COUGH: Status: RESOLVED | Noted: 2022-04-17 | Resolved: 2022-10-11

## 2022-10-12 PROBLEM — Z00.00 MEDICARE ANNUAL WELLNESS VISIT, SUBSEQUENT: Status: RESOLVED | Noted: 2021-04-17 | Resolved: 2022-10-12

## 2022-10-12 PROBLEM — N30.01 ACUTE CYSTITIS WITH HEMATURIA: Status: RESOLVED | Noted: 2021-04-17 | Resolved: 2022-10-12

## 2022-10-13 DIAGNOSIS — I10 PRIMARY HYPERTENSION: ICD-10-CM

## 2022-10-13 RX ORDER — CARVEDILOL 6.25 MG/1
TABLET ORAL
Qty: 180 TABLET | Refills: 1 | Status: SHIPPED | OUTPATIENT
Start: 2022-10-13

## 2022-10-18 ENCOUNTER — TELEPHONE (OUTPATIENT)
Dept: FAMILY MEDICINE CLINIC | Facility: CLINIC | Age: 77
End: 2022-10-18

## 2022-10-18 DIAGNOSIS — F51.01 PRIMARY INSOMNIA: ICD-10-CM

## 2022-10-18 RX ORDER — QUETIAPINE FUMARATE 25 MG/1
TABLET, FILM COATED ORAL
Qty: 90 TABLET | Refills: 1 | Status: SHIPPED | OUTPATIENT
Start: 2022-10-18

## 2022-10-18 NOTE — TELEPHONE ENCOUNTER
Pt called to confirm appt for her Mammo scan  I gave her the date, time and location  Pt acknowledges this phone call  No further action needed  Any questions or concerns pt call

## 2022-10-19 NOTE — PLAN OF CARE
- Pplat 30 with Ppeak 36  - Vt~7ml/kg ideal body weight  - not paralyzed  - getting furosemide (24L positive since admission) Problem: MOBILITY - ADULT  Goal: Maintain or return to baseline ADL function  Description: INTERVENTIONS:  -  Assess patient's ability to carry out ADLs; assess patient's baseline for ADL function and identify physical deficits which impact ability to perform ADLs (bathing, care of mouth/teeth, toileting, grooming, dressing, etc )  - Assess/evaluate cause of self-care deficits   - Assess range of motion  - Assess patient's mobility; develop plan if impaired  - Assess patient's need for assistive devices and provide as appropriate  - Encourage maximum independence but intervene and supervise when necessary  - Involve family in performance of ADLs  - Assess for home care needs following discharge   - Consider OT consult to assist with ADL evaluation and planning for discharge  - Provide patient education as appropriate  Outcome: Progressing  Goal: Maintains/Returns to pre admission functional level  Description: INTERVENTIONS:  - Perform BMAT or MOVE assessment daily    - Set and communicate daily mobility goal to care team and patient/family/caregiver  - Collaborate with rehabilitation services on mobility goals if consulted  - Perform Range of Motion  times a day  - Reposition patient every  hours    - Dangle patient  times a day  - Stand patient  times a day  - Ambulate patient  times a day  - Out of bed to chair  times a day   - Out of bed for meals  times a day  - Out of bed for toileting  - Record patient progress and toleration of activity level   Outcome: Progressing     Problem: PAIN - ADULT  Goal: Verbalizes/displays adequate comfort level or baseline comfort level  Description: Interventions:  - Encourage patient to monitor pain and request assistance  - Assess pain using appropriate pain scale  - Administer analgesics based on type and severity of pain and evaluate response  - Implement non-pharmacological measures as appropriate and evaluate response  - Consider cultural and social influences on pain and pain management  - Notify physician/advanced practitioner if interventions unsuccessful or patient reports new pain  Outcome: Progressing     Problem: SAFETY ADULT  Goal: Maintain or return to baseline ADL function  Description: INTERVENTIONS:  -  Assess patient's ability to carry out ADLs; assess patient's baseline for ADL function and identify physical deficits which impact ability to perform ADLs (bathing, care of mouth/teeth, toileting, grooming, dressing, etc )  - Assess/evaluate cause of self-care deficits   - Assess range of motion  - Assess patient's mobility; develop plan if impaired  - Assess patient's need for assistive devices and provide as appropriate  - Encourage maximum independence but intervene and supervise when necessary  - Involve family in performance of ADLs  - Assess for home care needs following discharge   - Consider OT consult to assist with ADL evaluation and planning for discharge  - Provide patient education as appropriate  Outcome: Progressing  Goal: Maintains/Returns to pre admission functional level  Description: INTERVENTIONS:  - Perform BMAT or MOVE assessment daily    - Set and communicate daily mobility goal to care team and patient/family/caregiver  - Collaborate with rehabilitation services on mobility goals if consulted  - Perform Range of Motion  times a day  - Reposition patient every  hours    - Dangle patient  times a day  - Stand patient  times a day  - Ambulate patient  times a day  - Out of bed to chair  times a day   - Out of bed for meals  times a day  - Out of bed for toileting  - Record patient progress and toleration of activity level   Outcome: Progressing  Goal: Patient will remain free of falls  Description: INTERVENTIONS:  - Educate patient/family on patient safety including physical limitations  - Instruct patient to call for assistance with activity   - Consult OT/PT to assist with strengthening/mobility   - Keep Call bell within reach  - Keep bed low and locked with side rails adjusted as appropriate  - Keep care items and personal belongings within reach  - Initiate and maintain comfort rounds  - Make Fall Risk Sign visible to staff  - Offer Toileting every  Hours, in advance of need  - Initiate/Maintain alarm  - Obtain necessary fall risk management equipment:   - Apply yellow socks and bracelet for high fall risk patients  - Consider moving patient to room near nurses station  Outcome: Progressing     Problem: DISCHARGE PLANNING  Goal: Discharge to home or other facility with appropriate resources  Description: INTERVENTIONS:  - Identify barriers to discharge w/patient and caregiver  - Arrange for needed discharge resources and transportation as appropriate  - Identify discharge learning needs (meds, wound care, etc )  - Arrange for interpretive services to assist at discharge as needed  - Refer to Case Management Department for coordinating discharge planning if the patient needs post-hospital services based on physician/advanced practitioner order or complex needs related to functional status, cognitive ability, or social support system  Outcome: Progressing     Problem: Knowledge Deficit  Goal: Patient/family/caregiver demonstrates understanding of disease process, treatment plan, medications, and discharge instructions  Description: Complete learning assessment and assess knowledge base    Interventions:  - Provide teaching at level of understanding  - Provide teaching via preferred learning methods  Outcome: Progressing     Problem: GASTROINTESTINAL - ADULT  Goal: Minimal or absence of nausea and/or vomiting  Description: INTERVENTIONS:  - Administer IV fluids if ordered to ensure adequate hydration  - Maintain NPO status until nausea and vomiting are resolved  - Nasogastric tube if ordered  - Administer ordered antiemetic medications as needed  - Provide nonpharmacologic comfort measures as appropriate  - Advance diet as tolerated, if ordered  - Consider nutrition services referral to assist patient with adequate nutrition and appropriate food choices  Outcome: Progressing  Goal: Maintains or returns to baseline bowel function  Description: INTERVENTIONS:  - Assess bowel function  - Encourage oral fluids to ensure adequate hydration  - Administer IV fluids if ordered to ensure adequate hydration  - Administer ordered medications as needed  - Encourage mobilization and activity  - Consider nutritional services referral to assist patient with adequate nutrition and appropriate food choices  Outcome: Progressing  Goal: Maintains adequate nutritional intake  Description: INTERVENTIONS:  - Monitor percentage of each meal consumed  - Identify factors contributing to decreased intake, treat as appropriate  - Assist with meals as needed  - Monitor I&O, weight, and lab values if indicated  - Obtain nutrition services referral as needed  Outcome: Progressing  Goal: Establish and maintain optimal ostomy function  Description: INTERVENTIONS:  - Assess bowel function  - Encourage oral fluids to ensure adequate hydration  - Administer IV fluids if ordered to ensure adequate hydration   - Administer ordered medications as needed  - Encourage mobilization and activity  - Nutrition services referral to assist patient with appropriate food choices  - Assess stoma site  - Consider wound care consult   Outcome: Progressing  Goal: Oral mucous membranes remain intact  Description: INTERVENTIONS  - Assess oral mucosa and hygiene practices  - Implement preventative oral hygiene regimen  - Implement oral medicated treatments as ordered  - Initiate Nutrition services referral as needed  Outcome: Progressing     Problem: Potential for Falls  Goal: Patient will remain free of falls  Description: INTERVENTIONS:  - Educate patient/family on patient safety including physical limitations  - Instruct patient to call for assistance with activity   - Consult OT/PT to assist with strengthening/mobility   - Keep Call bell within reach  - Keep bed low and locked with side rails adjusted as appropriate  - Keep care items and personal belongings within reach  - Initiate and maintain comfort rounds  - Make Fall Risk Sign visible to staff  - Offer Toileting every  Hours, in advance of need  - Initiate/Maintain alarm  - Obtain necessary fall risk management equipment:   - Apply yellow socks and bracelet for high fall risk patients  - Consider moving patient to room near nurses station  Outcome: Progressing     Problem: Prexisting or High Potential for Compromised Skin Integrity  Goal: Skin integrity is maintained or improved  Description: INTERVENTIONS:  - Identify patients at risk for skin breakdown  - Assess and monitor skin integrity  - Assess and monitor nutrition and hydration status  - Monitor labs   - Assess for incontinence   - Turn and reposition patient  - Assist with mobility/ambulation  - Relieve pressure over bony prominences  - Avoid friction and shearing  - Provide appropriate hygiene as needed including keeping skin clean and dry  - Evaluate need for skin moisturizer/barrier cream  - Collaborate with interdisciplinary team   - Patient/family teaching  - Consider wound care consult   Outcome: Progressing

## 2022-10-24 DIAGNOSIS — E11.40 CONTROLLED TYPE 2 DIABETES WITH NEUROPATHY (HCC): ICD-10-CM

## 2022-10-25 ENCOUNTER — OFFICE VISIT (OUTPATIENT)
Dept: FAMILY MEDICINE CLINIC | Facility: CLINIC | Age: 77
End: 2022-10-25
Payer: MEDICARE

## 2022-10-25 VITALS
DIASTOLIC BLOOD PRESSURE: 80 MMHG | TEMPERATURE: 96.4 F | BODY MASS INDEX: 35.33 KG/M2 | SYSTOLIC BLOOD PRESSURE: 154 MMHG | WEIGHT: 192 LBS | HEART RATE: 69 BPM | OXYGEN SATURATION: 95 % | HEIGHT: 62 IN | RESPIRATION RATE: 20 BRPM

## 2022-10-25 DIAGNOSIS — E11.40 CONTROLLED TYPE 2 DIABETES WITH NEUROPATHY (HCC): Primary | ICD-10-CM

## 2022-10-25 DIAGNOSIS — I10 PRIMARY HYPERTENSION: ICD-10-CM

## 2022-10-25 DIAGNOSIS — F51.04 PSYCHOPHYSIOLOGICAL INSOMNIA: ICD-10-CM

## 2022-10-25 DIAGNOSIS — I16.0 HYPERTENSIVE URGENCY: ICD-10-CM

## 2022-10-25 DIAGNOSIS — F51.01 PRIMARY INSOMNIA: ICD-10-CM

## 2022-10-25 PROBLEM — R11.0 NAUSEA: Status: RESOLVED | Noted: 2022-02-27 | Resolved: 2022-10-25

## 2022-10-25 PROBLEM — R30.0 DYSURIA: Status: RESOLVED | Noted: 2022-05-11 | Resolved: 2022-10-25

## 2022-10-25 PROBLEM — F11.20 CONTINUOUS OPIOID DEPENDENCE (HCC): Status: RESOLVED | Noted: 2022-05-11 | Resolved: 2022-10-25

## 2022-10-25 PROBLEM — S93.401A SPRAIN OF RIGHT ANKLE: Status: RESOLVED | Noted: 2022-10-07 | Resolved: 2022-10-25

## 2022-10-25 PROBLEM — J42 CHRONIC BRONCHITIS (HCC): Status: RESOLVED | Noted: 2021-04-15 | Resolved: 2022-10-25

## 2022-10-25 PROBLEM — M79.604 RIGHT LEG PAIN: Status: RESOLVED | Noted: 2021-04-17 | Resolved: 2022-10-25

## 2022-10-25 PROCEDURE — 99214 OFFICE O/P EST MOD 30 MIN: CPT | Performed by: NURSE PRACTITIONER

## 2022-10-25 RX ORDER — AMLODIPINE BESYLATE 10 MG/1
10 TABLET ORAL
Qty: 90 TABLET | Refills: 1 | Status: SHIPPED | OUTPATIENT
Start: 2022-10-25 | End: 2022-11-24

## 2022-10-25 RX ORDER — QUETIAPINE FUMARATE 25 MG/1
25 TABLET, FILM COATED ORAL
Qty: 30 TABLET | Refills: 3 | Status: SHIPPED | OUTPATIENT
Start: 2022-10-25

## 2022-10-25 RX ORDER — PREGABALIN 165 MG/1
TABLET, FILM COATED, EXTENDED RELEASE ORAL
Qty: 60 TABLET | Refills: 0 | Status: SHIPPED | OUTPATIENT
Start: 2022-10-25

## 2022-10-25 NOTE — ASSESSMENT & PLAN NOTE
Blood pressure continues to be elevated  Patient reports she has been taking Coreg twice a day  Amlodipine was discontinued because of hypotension episodes  Will restart amlodipine, keep blood pressure log  Maintain safety  Managed stresses

## 2022-10-25 NOTE — PROGRESS NOTES
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)  -     CBC and differential; Future  -     Hemoglobin A1C; Future  -     Comprehensive metabolic panel; Future  -     Hemoglobin A1C; Future; Expected date: 2023  -     CBC and Platelet; Future; Expected date: 2023  -     Microalbumin / creatinine urine ratio; Future; Expected date: 2023  -     Lipid Panel with Direct LDL reflex; Future; Expected date: 2023  -     TSH, 3rd generation with Free T4 reflex; Future; Expected date: 2023    2  Hypertensive urgency  -     amLODIPine (NORVASC) 10 mg tablet; Take 1 tablet (10 mg total) by mouth daily at bedtime  -     Comprehensive metabolic panel; Future  -     Microalbumin / creatinine urine ratio; Future; Expected date: 2023    3  Primary insomnia  -     QUEtiapine (SEROquel) 25 mg tablet; Take 1 tablet (25 mg total) by mouth daily at bedtime    4  Psychophysiological insomnia  Assessment & Plan:  Patient has been taking Seroquel, reports getting at least 5 hours sleep  Patient has been on Lunesta, trazodone, Ambien little results  Will keep this regimen  Discussed good sleep hygiene      5  Primary hypertension  Assessment & Plan:  Blood pressure continues to be elevated  Patient reports she has been taking Coreg twice a day  Amlodipine was discontinued because of hypotension episodes  Will restart amlodipine, keep blood pressure log  Maintain safety  Managed stresses  Subjective      Patient is here with complaints of dizziness  Reports her blood pressure is elevated especially at night has been getting some readings 200/100, 170s over 100  Patient was hypotensive so her amlodipine was discontinued  Denies any new stresses    Continues to have problems with sleep  Has been taking Seroquel it gets her at least 5 hours of sleep  Review of Systems   Constitutional: Negative  HENT: Negative  Eyes: Negative  Respiratory: Negative  Cardiovascular: Negative  Gastrointestinal: Negative  Endocrine: Negative  Genitourinary: Negative  Musculoskeletal: Negative  Skin: Negative  Allergic/Immunologic: Negative  Neurological: Positive for dizziness, light-headedness and headaches  Negative for syncope  Psychiatric/Behavioral: Positive for sleep disturbance  Current Outpatient Medications on File Prior to Visit   Medication Sig   • acetaminophen (TYLENOL) 325 mg tablet Take 2 tablets (650 mg total) by mouth every 4 (four) hours as needed for mild pain   • Aspirin Low Dose 81 MG chewable tablet CHEW 1 TABLET BY MOUTH DAILY   • benzonatate (TESSALON PERLES) 100 mg capsule Take 1 capsule (100 mg total) by mouth 3 (three) times a day as needed for cough   • bisacodyl (DULCOLAX) 5 mg EC tablet Take 1 tablet (5 mg total) by mouth as needed in the morning for constipation     • carbamide peroxide (DEBROX) 6 5 % otic solution Administer 5 drops into both ears 2 (two) times a day   • carvedilol (COREG) 6 25 mg tablet TAKE 1 TABLET BY MOUTH TWICE A DAY WITH MEALS   • fluconazole (DIFLUCAN) 150 mg tablet Take 150 mg by mouth once   • glucose blood test strip E11 9 / testing daily   • hydrOXYzine HCL (ATARAX) 10 mg tablet Take 10 mg by mouth every 6 (six) hours as needed   • latanoprost (XALATAN) 0 005 % ophthalmic solution    • LORazepam (ATIVAN) 1 mg tablet Take 0 5 tablets (0 5 mg total) by mouth every 8 (eight) hours as needed for anxiety for up to 7 days   • Lyrica  MG TB24 TAKE 1 TABLET BY MOUTH TWICE A DAY   • montelukast (SINGULAIR) 10 mg tablet Take 1 tablet (10 mg total) by mouth daily at bedtime   • naproxen (Naprosyn) 500 mg tablet Take 1 tablet (500 mg total) by mouth 2 (two) times a day with meals   • pantoprazole (PROTONIX) 40 mg tablet TAKE 1 TABLET BY MOUTH EVERY DAY   • predniSONE 20 mg tablet Take 1 tablet (20 mg total) by mouth daily   • ProAir  (90 Base) MCG/ACT inhaler INHALE 2 PUFFS BY MOUTH EVERY 6 HOURS AS NEEDED FOR WHEEZE   • QUEtiapine (SEROquel) 25 mg tablet TAKE 1 TABLET BY MOUTH DAILY AT BEDTIME   • sertraline (ZOLOFT) 50 mg tablet TAKE 1 TABLET BY MOUTH EVERY DAY   • SITagliptin-metFORMIN HCl ER (Janumet XR) 100-1000 MG TB24 Take 1 tablet by mouth daily with dinner   • traMADol (Ultram) 50 mg tablet Take 1 tablet (50 mg total) by mouth every 6 (six) hours as needed for moderate pain   • Wixela Inhub 250-50 MCG/ACT inhaler INHALE 1 PUFF 2 TIMES A DAY RINSE MOUTH AFTER USE  • zolpidem (AMBIEN) 5 mg tablet Take 1 tablet (5 mg total) by mouth daily at bedtime as needed for sleep   • [DISCONTINUED] amLODIPine (NORVASC) 10 mg tablet TAKE 1 TABLET (10 MG TOTAL) BY MOUTH IN THE MORNING  • [DISCONTINUED] Lyrica  MG TB24 Take 1 tablet by mouth 2 (two) times a day       Objective     /80   Pulse 69   Temp (!) 96 4 °F (35 8 °C)   Resp 20   Ht 5' 2" (1 575 m)   Wt 87 1 kg (192 lb)   SpO2 95%   BMI 35 12 kg/m²     Physical Exam  Vitals and nursing note reviewed  Constitutional:       Appearance: She is well-developed  She is obese  She is ill-appearing  HENT:      Head: Normocephalic and atraumatic  Right Ear: External ear normal       Left Ear: External ear normal    Eyes:      Pupils: Pupils are equal, round, and reactive to light  Cardiovascular:      Rate and Rhythm: Normal rate and regular rhythm  Pulses: Normal pulses  Heart sounds: Normal heart sounds  Pulmonary:      Effort: Pulmonary effort is normal       Breath sounds: Normal breath sounds  Abdominal:      General: Bowel sounds are normal       Palpations: Abdomen is soft  Musculoskeletal:         General: Normal range of motion  Cervical back: Normal range of motion     Skin:     General: Skin is warm and dry  Neurological:      General: No focal deficit present  Mental Status: She is alert and oriented to person, place, and time  Mental status is at baseline  Cranial Nerves: No cranial nerve deficit  Sensory: No sensory deficit  Motor: No weakness  Coordination: Coordination normal       Gait: Gait normal       Deep Tendon Reflexes: Reflexes normal    Psychiatric:         Mood and Affect: Mood normal          Behavior: Behavior normal          Thought Content: Thought content normal          Judgment: Judgment normal        ALEKSANDER Manning  BMI Counseling: Body mass index is 35 12 kg/m²  The BMI is above normal  Nutrition recommendations include reducing portion sizes, decreasing overall calorie intake, 3-5 servings of fruits/vegetables daily, reducing fast food intake, consuming healthier snacks, decreasing soda and/or juice intake, moderation in carbohydrate intake, increasing intake of lean protein, reducing intake of saturated fat and trans fat and reducing intake of cholesterol  Exercise recommendations include exercising 3-5 times per week and strength training exercises

## 2022-10-25 NOTE — PATIENT INSTRUCTIONS
Continue Coreg twice a day  Take amlodipine at night check blood pressures after you take a medicine during the day  Continue with low-salt heart healthy diet continue with Tylenol for headache or ice packs  Obesity   AMBULATORY CARE:   Obesity  means your body mass index (BMI) is greater than 30  Your healthcare provider will use your height and weight to measure your BMI  The risks of obesity include  many health problems, including injuries or physical disability  · Diabetes (high blood sugar level)    · High blood pressure or high cholesterol    · Heart disease    · Stroke    · Gallbladder or liver disease    · Cancer of the colon, breast, prostate, liver, or kidney    · Sleep apnea    · Arthritis or gout    Screening  is done to check for health conditions before you have signs or symptoms  If you are 28to 79years old, your blood sugar level may be checked every 3 years for signs of prediabetes or diabetes  Your healthcare provider will check your blood pressure at each visit  High blood pressure can lead to a stroke or other problems  Your provider may check for signs of heart disease, cancer, or other health problems  Seek care immediately if:   · You have a severe headache, confusion, or difficulty speaking  · You have weakness on one side of your body  · You have chest pain, sweating, or shortness of breath  Call your doctor if:   · You have symptoms of gallbladder or liver disease, such as pain in your upper abdomen  · You have knee or hip pain and discomfort while walking  · You have symptoms of diabetes, such as intense hunger and thirst, and frequent urination  · You have symptoms of sleep apnea, such as snoring or daytime sleepiness  · You have questions or concerns about your condition or care  Treatment for obesity  focuses on helping you lose weight to improve your health  Even a small decrease in BMI can reduce the risk for many health problems   Your healthcare provider will help you set a weight-loss goal   · Lifestyle changes  are the first step in treating obesity  These include making healthy food choices and getting regular physical activity  Your healthcare provider may suggest a weight-loss program that involves coaching, education, and therapy  · Medicine  may help you lose weight when it is used with a healthy foods and physical activity  · Surgery  can help you lose weight if you are very obese and have other health problems  There are several types of weight-loss surgery  Ask your healthcare provider for more information  Tips for safe weight loss:   · Set small, realistic goals  An example of a small goal is to walk for 20 minutes 5 days a week  Anther goal is to lose 5% of your body weight  · Tell friends, family members, and coworkers about your goals  and ask for their support  Ask a friend to lose weight with you, or join a weight-loss support group  · Identify foods or triggers that may cause you to overeat , and find ways to avoid them  Remove tempting high-calorie foods from your home and workplace  Place a bowl of fresh fruit on your kitchen counter  If stress causes you to eat, then find other ways to cope with stress  A counselor or therapist may be able to help you  · Keep a diary to track what you eat and drink  Also write down how many minutes of physical activity you do each day  Weigh yourself once a week and record it in your diary  Eating changes: You will need to eat 500 to 1,000 fewer calories each day than you currently eat to lose 1 to 2 pounds a week  The following changes will help you cut calories:  · Eat smaller portions  Use small plates, no larger than 9 inches in diameter  Fill your plate half full of fruits and vegetables  Measure your food using measuring cups until you know what a serving size looks like  · Eat 3 meals and 1 or 2 snacks each day  Plan your meals in advance   Cook and eat at home most of the time  Eat slowly  Do not skip meals  Skipping meals can lead to overeating later in the day  This can make it harder for you to lose weight  Talk with a dietitian to help you make a meal plan and schedule that is right for you  · Eat fruits and vegetables at every meal   They are low in calories and high in fiber, which makes you feel full  Do not add butter, margarine, or cream sauce to vegetables  Use herbs to season steamed vegetables  · Eat less fat and fewer fried foods  Eat more baked or grilled chicken and fish  These protein sources are lower in calories and fat than red meat  Limit fast food  Dress your salads with olive oil and vinegar instead of bottled dressing  · Limit the amount of sugar you eat  Do not drink sugary beverages  Limit alcohol  Activity changes:  Physical activity is good for your body in many ways  It helps you burn calories and build strong muscles  It decreases stress and depression, and improves your mood  It can also help you sleep better  Talk to your healthcare provider before you begin an exercise program   · Exercise for at least 30 minutes 5 days a week  Start slowly  Set aside time each day for physical activity that you enjoy and that is convenient for you  It is best to do both weight training and an activity that increases your heart rate, such as walking, bicycling, or swimming  · Find ways to be more active  Do yard work and housecleaning  Walk up the stairs instead of using elevators  Spend your leisure time going to events that require walking, such as outdoor festivals or fairs  This extra physical activity can help you lose weight and keep it off  Follow up with your doctor as directed: You may need to meet with a dietitian  Write down your questions so you remember to ask them during your visits    © Copyright [employer] 2022 Information is for End User's use only and may not be sold, redistributed or otherwise used for commercial purposes  All illustrations and images included in CareNotes® are the copyrighted property of A D A M [employer]  or Noelle Abrams   The above information is an  only  It is not intended as medical advice for individual conditions or treatments  Talk to your doctor, nurse or pharmacist before following any medical regimen to see if it is safe and effective for you  Weight Management   AMBULATORY CARE:   Why it is important to manage your weight:  Being overweight increases your risk of health conditions such as heart disease, high blood pressure, type 2 diabetes, and certain types of cancer  It can also increase your risk for osteoarthritis, sleep apnea, and other respiratory problems  Aim for a slow, steady weight loss  Even a small amount of weight loss can lower your risk of health problems  Risks of being overweight:  Extra weight can cause many health problems, including the following:  · Diabetes (high blood sugar level)    · High blood pressure or high cholesterol    · Heart disease    · Stroke    · Gallbladder or liver disease    · Cancer of the colon, breast, prostate, liver, or kidney    · Sleep apnea    · Arthritis or gout    Screening  is done to check for health conditions before you have signs or symptoms  If you are 28to 79years old, your blood sugar level may be checked every 3 years for signs of prediabetes or diabetes  Your healthcare provider will check your blood pressure at each visit  High blood pressure can lead to a stroke or other problems  Your provider may check for signs of heart disease, cancer, or other health problems  How to lose weight safely:  A safe and healthy way to lose weight is to eat fewer calories and get regular exercise  · You can lose up about 1 pound a week by decreasing the number of calories you eat by 500 calories each day  You can decrease calories by eating smaller portion sizes or by cutting out high-calorie foods   Read labels to find out how many calories are in the foods you eat  · You can also burn calories with exercise such as walking, swimming, or biking  You will be more likely to keep weight off if you make these changes part of your lifestyle  Exercise at least 30 minutes per day on most days of the week  You can also fit in more physical activity by taking the stairs instead of the elevator or parking farther away from stores  Ask your healthcare provider about the best exercise plan for you  Healthy meal plan for weight management:  A healthy meal plan includes a variety of foods, contains fewer calories, and helps you stay healthy  A healthy meal plan includes the following:     · Eat whole-grain foods more often  A healthy meal plan should contain fiber  Fiber is the part of grains, fruits, and vegetables that is not broken down by your body  Whole-grain foods are healthy and provide extra fiber in your diet  Some examples of whole-grain foods are whole-wheat breads and pastas, oatmeal, brown rice, and bulgur  · Eat a variety of vegetables every day  Include dark, leafy greens such as spinach, kale, rowena greens, and mustard greens  Eat yellow and orange vegetables such as carrots, sweet potatoes, and winter squash  · Eat a variety of fruits every day  Choose fresh or canned fruit (canned in its own juice or light syrup) instead of juice  Fruit juice has very little or no fiber  · Eat low-fat dairy foods  Drink fat-free (skim) milk or 1% milk  Eat fat-free yogurt and low-fat cottage cheese  Try low-fat cheeses such as mozzarella and other reduced-fat cheeses  · Choose meat and other protein foods that are low in fat  Choose beans or other legumes such as split peas or lentils  Choose fish, skinless poultry (chicken or turkey), or lean cuts of red meat (beef or pork)  Before you cook meat or poultry, cut off any visible fat  · Use less fat and oil  Try baking foods instead of frying them  Add less fat, such as margarine, sour cream, regular salad dressing and mayonnaise to foods  Eat fewer high-fat foods  Some examples of high-fat foods include french fries, doughnuts, ice cream, and cakes  · Eat fewer sweets  Limit foods and drinks that are high in sugar  This includes candy, cookies, regular soda, and sweetened drinks  Ways to decrease calories:   · Eat smaller portions  ? Use a small plate with smaller servings  ? Do not eat second helpings  ? When you eat at a restaurant, ask for a box and place half of your meal in the box before you eat  ? Share an entrée with someone else  · Replace high-calorie snacks with healthy, low-calorie snacks  ? Choose fresh fruit, vegetables, fat-free rice cakes, or air-popped popcorn instead of potato chips, nuts, or chocolate  ? Choose water or calorie-free drinks instead of soda or sweetened drinks  · Do not shop for groceries when you are hungry  You may be more likely to make unhealthy food choices  Take a grocery list of healthy foods and shop after you have eaten  · Eat regular meals  Do not skip meals  Skipping meals can lead to overeating later in the day  This can make it harder for you to lose weight  Eat a healthy snack in place of a meal if you do not have time to eat a regular meal  Talk with a dietitian to help you create a meal plan and schedule that is right for you  Other things to consider as you try to lose weight:   · Be aware of situations that may give you the urge to overeat, such as eating while watching television  Find ways to avoid these situations  For example, read a book, go for a walk, or do crafts  · Meet with a weight loss support group or friends who are also trying to lose weight  This may help you stay motivated to continue working on your weight loss goals      © Copyright [employer] 2022 Information is for End User's use only and may not be sold, redistributed or otherwise used for commercial purposes  All illustrations and images included in CareNotes® are the copyrighted property of A D A M , Inc  or [facility] Steven Abrams   The above information is an  only  It is not intended as medical advice for individual conditions or treatments  Talk to your doctor, nurse or pharmacist before following any medical regimen to see if it is safe and effective for you  Low Fat Diet   AMBULATORY CARE:   A low-fat diet  is an eating plan that is low in total fat, unhealthy fat, and cholesterol  You may need to follow a low-fat diet if you have trouble digesting or absorbing fat  You may also need to follow this diet if you have high cholesterol  You can also lower your cholesterol by increasing the amount of fiber in your diet  Soluble fiber is a type of fiber that helps to decrease cholesterol levels  Different types of fat in food:   · Limit unhealthy fats  A diet that is high in cholesterol, saturated fat, and trans fat may cause unhealthy cholesterol levels  Unhealthy cholesterol levels increase your risk of heart disease  ? Cholesterol:  Limit intake of cholesterol to less than 200 mg per day  Cholesterol is found in meat, eggs, and dairy  ? Saturated fat:  Limit saturated fat to less than 7% of your total daily calories  Ask your dietitian how many calories you need each day  Saturated fat is found in butter, cheese, ice cream, whole milk, and palm oil  Saturated fat is also found in meat, such as beef, pork, chicken skin, and processed meats  Processed meats include sausage, hot dogs, and bologna  ? Trans fat:  Avoid trans fat as much as possible  Trans fat is used in fried and baked foods  Foods that say trans fat free on the label may still have up to 0 5 grams of trans fat per serving  · Include healthy fats  Replace foods that are high in saturated and trans fat with foods high in healthy fats  This may help to decrease high cholesterol levels       ? Monounsaturated fats:  These are found in avocados, nuts, and vegetable oils, such as olive, canola, and sunflower oil  ? Polyunsaturated fats: These can be found in vegetable oils, such as soybean or corn oil  Omega-3 fats can help to decrease the risk of heart disease  Omega-3 fats are found in fish, such as salmon, herring, trout, and tuna  Omega-3 fats can also be found in plant foods, such as walnuts, flaxseed, soybeans, and canola oil  Foods to limit or avoid:   · Grains:      ? Snacks that are made with partially hydrogenated oils, such as chips, regular crackers, and butter-flavored popcorn    ? High-fat baked goods, such as biscuits, croissants, doughnuts, pies, cookies, and pastries    · Dairy:      ? Whole milk, 2% milk, and yogurt and ice cream made with whole milk    ? Half and half creamer, heavy cream, and whipping cream    ? Cheese, cream cheese, and sour cream    · Meats and proteins:      ? High-fat cuts of meat (T-bone steak, regular hamburger, and ribs)    ? Fried meat, poultry (turkey and chicken), and fish    ? Poultry (chicken and turkey) with skin    ? Cold cuts (salami or bologna), hot dogs, clayton, and sausage    ? Whole eggs and egg yolks    · Vegetables and fruits with added fat:      ? Fried vegetables or vegetables in butter or high-fat sauces, such as cream or cheese sauces    ? Fried fruit or fruit served with butter or cream    · Fats:      ? Butter, stick margarine, and shortening    ? Coconut, palm oil, and palm kernel oil    Foods to include:   · Grains:      ? Whole-grain breads, cereals, pasta, and brown rice    ? Low-fat crackers and pretzels    · Vegetables and fruits:      ? Fresh, frozen, or canned vegetables (no salt or low-sodium)    ? Fresh, frozen, dried, or canned fruit (canned in light syrup or fruit juice)    ? Avocado    · Low-fat dairy products:      ? Nonfat (skim) or 1% milk    ? Nonfat or low-fat cheese, yogurt, and cottage cheese    · Meats and proteins:      ?  Chicken or turkey with no skin    ? Baked or broiled fish    ? Lean beef and pork (loin, round, extra lean hamburger)    ? Beans and peas, unsalted nuts, soy products    ? Egg whites and substitutes    ? Seeds and nuts    · Fats:      ? Unsaturated oil, such as canola, olive, peanut, soybean, or sunflower oil    ? Soft or liquid margarine and vegetable oil spread    ? Low-fat salad dressing    Other ways to decrease fat:   · Read food labels before you buy foods  Choose foods that have less than 30% of calories from fat  Choose low-fat or fat-free dairy products  Remember that fat free does not mean calorie free  These foods still contain calories, and too many calories can lead to weight gain  · Trim fat from meat and avoid fried food  Trim all visible fat from meat before you cook it  Remove the skin from poultry  Do not b[place] meat, fish, or poultry  Bake, roast, boil, or broil these foods instead  Avoid fried foods  Eat a baked potato instead of [place] Janie fries  Steam vegetables instead of sautéing them in butter  · Add less fat to foods  Use imitation clayton bits on salads and baked potatoes instead of regular clayton bits  Use fat-free or low-fat salad dressings instead of regular dressings  Use low-fat or nonfat butter-flavored topping instead of regular butter or margarine on popcorn and other foods  Ways to decrease fat in recipes:  Replace high-fat ingredients with low-fat or nonfat ones  This may cause baked goods to be drier than usual  You may need to use nonfat cooking spray on pans to prevent food from sticking  You also may need to change the amount of other ingredients, such as water, in the recipe  Try the following:  · Use low-fat or light margarine instead of regular margarine or shortening  · Use lean ground turkey breast or chicken, or lean ground beef (less than 5% fat) instead of hamburger  · Add 1 teaspoon of canola oil to 8 ounces of skim milk instead of using cream or half and half  · Use grated zucchini, carrots, or apples in breads instead of coconut  · Use blenderized, low-fat cottage cheese, plain tofu, or low-fat ricotta cheese instead of cream cheese  · Use 1 egg white and 1 teaspoon of canola oil, or use ¼ cup (2 ounces) of fat-free egg substitute instead of a whole egg  · Replace half of the oil that is called for in a recipe with applesauce when you bake  Use 3 tablespoons of cocoa powder and 1 tablespoon of canola oil instead of a square of baking chocolate  How to increase fiber:  Eat enough high-fiber foods to get 20 to 30 grams of fiber every day  Slowly increase your fiber intake to avoid stomach cramps, gas, and other problems  · Eat 3 ounces of whole-grain foods each day  An ounce is about 1 slice of bread  Eat whole-grain breads, such as whole-wheat bread  Whole wheat, whole-wheat flour, or other whole grains should be listed as the first ingredient on the food label  Replace white flour with whole-grain flour or use half of each in recipes  Whole-grain flour is heavier than white flour, so you may have to add more yeast or baking powder  · Eat a high-fiber cereal for breakfast   Oatmeal is a good source of soluble fiber  Look for cereals that have bran or fiber in the name  Choose whole-grain products, such as brown rice, barley, and whole-wheat pasta  · Eat more beans, peas, and lentils  For example, add beans to soups or salads  Eat at least 5 cups of fruits and vegetables each day  Eat fruits and vegetables with the peel because the peel is high in fiber  © Copyright [employer] 2022 Information is for End User's use only and may not be sold, redistributed or otherwise used for commercial purposes  All illustrations and images included in CareNotes® are the copyrighted property of A D A M [employer] Inc  or Noelle Tafoya  The above information is an  only   It is not intended as medical advice for individual conditions or treatments  Talk to your doctor, nurse or pharmacist before following any medical regimen to see if it is safe and effective for you  Heart Healthy Diet   AMBULATORY CARE:   A heart healthy diet  is an eating plan low in unhealthy fats and sodium (salt)  The plan is high in healthy fats and fiber  A heart healthy diet helps improve your cholesterol levels and lowers your risk for heart disease and stroke  A dietitian will teach you how to read and understand food labels  Heart healthy diet guidelines to follow:   · Choose foods that contain healthy fats  ? Unsaturated fats  include monounsaturated and polyunsaturated fats  Unsaturated fat is found in foods such as soybean, canola, olive, corn, and safflower oils  It is also found in soft tub margarine that is made with liquid vegetable oil  ? Omega-3 fat  is found in certain fish, such as salmon, tuna, and trout, and in walnuts and flaxseed  Eat fish high in omega-3 fats at least 2 times a week  · Get 20 to 30 grams of fiber each day  Fruits, vegetables, whole-grain foods, and legumes (cooked beans) are good sources of fiber  · Limit or do not have unhealthy fats  ? Cholesterol  is found in animal foods, such as eggs and lobster, and in dairy products made from whole milk  Limit cholesterol to less than 200 mg each day  ? Saturated fat  is found in meats, such as clayton and hamburger  It is also found in chicken or turkey skin, whole milk, and butter  Limit saturated fat to less than 7% of your total daily calories  ? Trans fat  is found in packaged foods, such as potato chips and cookies  It is also in hard margarine, some fried foods, and shortening  Do not eat foods that contain trans fats  · Limit sodium as directed  You may be told to limit sodium to 2,000 to 2,300 mg each day  Choose low-sodium or no-salt-added foods  Add little or no salt to food you prepare  Use herbs and spices in place of salt         Include the following in your heart healthy plan:  Ask your dietitian or healthcare provider how many servings to have from each of the following food groups:  · Grains:      ? Whole-wheat breads, cereals, and pastas, and brown rice    ? Low-fat, low-sodium crackers and chips    · Vegetables:      ? Broccoli, green beans, green peas, and spinach    ? Collards, kale, and lima beans    ? Carrots, sweet potatoes, tomatoes, and peppers    ? Canned vegetables with no salt added    · Fruits:      ? Bananas, peaches, pears, and pineapple    ? Grapes, raisins, and dates    ? Oranges, tangerines, grapefruit, orange juice, and grapefruit juice    ? Apricots, mangoes, melons, and papaya    ? Raspberries and strawberries    ? Canned fruit with no added sugar    · Low-fat dairy:      ? Nonfat (skim) milk, 1% milk, and low-fat almond, cashew, or soy milks fortified with calcium    ? Low-fat cheese, regular or frozen yogurt, and cottage cheese    · Meats and proteins:      ? Lean cuts of beef and pork (loin, leg, round), skinless chicken and turkey    ? Legumes, soy products, egg whites, or nuts    Limit or do not include the following in your heart healthy plan:   · Unhealthy fats and oils:      ? Whole or 2% milk, cream cheese, sour cream, or cheese    ? High-fat cuts of beef (T-bone steaks, ribs), chicken or turkey with skin, and organ meats such as liver    ? Butter, stick margarine, shortening, and cooking oils such as coconut or palm oil    · Foods and liquids high in sodium:      ? Packaged foods, such as frozen dinners, cookies, macaroni and cheese, and cereals with more than 300 mg of sodium per serving    ? Vegetables with added sodium, such as instant potatoes, vegetables with added sauces, or regular canned vegetables    ? Cured or smoked meats, such as hot dogs, clayton, and sausage    ?  High-sodium ketchup, barbecue sauce, salad dressing, pickles, olives, soy sauce, or miso    · Foods and liquids high in sugar:      ? Candy, cake, cookies, pies, or doughnuts    ? Soft drinks (soda), sports drinks, or sweetened tea    ? Canned or dry mixes for cakes, soups, sauces, or gravies    Other healthy heart guidelines:   · Do not smoke  Nicotine and other chemicals in cigarettes and cigars can cause lung and heart damage  Ask your healthcare provider for information if you currently smoke and need help to quit  E-cigarettes or smokeless tobacco still contain nicotine  Talk to your healthcare provider before you use these products  · Limit or do not drink alcohol as directed  Alcohol can damage your heart and raise your blood pressure  Your healthcare provider may give you specific daily and weekly limits  The general recommended limit is 1 drink a day for women 21 or older and for men 72 or older  Do not have more than 3 drinks in a day or 7 in a week  The recommended limit is 2 drinks a day for men 24to 59years of age  Do not have more than 4 drinks in a day or 14 in a week  A drink of alcohol is 12 ounces of beer, 5 ounces of wine, or 1½ ounces of liquor  · Exercise regularly  Exercise can help you maintain a healthy weight and improve your blood pressure and cholesterol levels  Regular exercise can also decrease your risk for heart problems  Ask your healthcare provider about the best exercise plan for you  Do not start an exercise program without asking your healthcare provider  Follow up with your doctor or cardiologist as directed:  Write down your questions so you remember to ask them during your visits  © [employer] 2022 Information is for End User's use only and may not be sold, redistributed or otherwise used for commercial purposes  All illustrations and images included in CareNotes® are the copyrighted property of [employer]  or [facility] Steven Abrams   The above information is an  only  It is not intended as medical advice for individual conditions or treatments   Talk to your doctor, nurse or pharmacist before following any medical regimen to see if it is safe and effective for you  Calorie Counting Diet   WHAT YOU NEED TO KNOW:   What is a calorie counting diet? It is a meal plan based on counting calories each day to reach a healthy body weight  You will need to eat fewer calories if you are trying to lose weight  Weight loss may decrease your risk for certain health problems or improve your health if you have health problems  Some of these health problems include heart disease, high blood pressure, and diabetes  What foods should I avoid? Your dietitian will tell you if you need to avoid certain foods based on your body weight and health condition  You may need to avoid high-fat foods if you are at risk for or have heart disease  You may need to eat fewer foods from the breads and starches food group if you have diabetes  How many calories are in foods? The following is a list of foods and drinks with the approximate number of calories in each  Check the food label to find the exact number of calories  A dietitian can tell you how many calories you should have from each food group each day  · Carbohydrate:      ? ½ of a 3-inch bagel, 1 slice of bread, or ½ of a hamburger bun or hot dog bun (80)    ? 1 (8-inch) flour tortilla or ½ cup of cooked rice (100)    ? 1 (6-inch) corn tortilla (80)    ? 1 (6-inch) pancake or 1 cup of bran flakes cereal (110)    ? ½ cup of cooked cereal (80)    ? ½ cup of cooked pasta (85)    ? 1 ounce of pretzels (100)    ? 3 cups of air-popped popcorn without butter or oil (80)    · Dairy:      ? 1 cup of skim or 1% milk (90)    ? 1 cup of 2% milk (120)    ? 1 cup of whole milk (160)    ? 1 cup of 2% chocolate milk (220)    ? 1 ounce of low-fat cheese with 3 grams of fat per ounce (70)    ? 1 ounce of cheddar cheese (114)    ? ½ cup of 1% fat cottage cheese (80)    ?  1 cup of plain or sugar-free, fat-free yogurt (90)    · Protein foods:      ? 3 ounces of fish (not breaded or fried) (95)    ? 3 ounces of breaded, fried fish (195)    ? ¾ cup of tuna canned in water (105)    ? 3 ounces of chicken breast without skin (105)    ? 1 fried chicken breast with skin (350)    ? ¼ cup of fat free egg substitute (40)    ? 1 large egg (75)    ? 3 ounces of lean beef or pork (165)    ? 3 ounces of fried pork chop or ham (185)    ? ½ cup of cooked dried beans, such as kidney, bartholomew, lentils, or navy (115)    ? 3 ounces of bologna or lunch meat (225)    ? 2 links of breakfast sausage (140)    · Vegetables:      ? ½ cup of sliced mushrooms (10)    ? 1 cup of salad greens, such as lettuce, spinach, or dolores (15)    ? ½ cup of steamed asparagus (20)    ? ½ cup of cooked summer squash, zucchini squash, or green or wax beans (25)    ? 1 cup of broccoli or cauliflower florets, or 1 medium tomato (25)    ? 1 large raw carrot or ½ cup of cooked carrots (40)    ? ? of a medium cucumber or 1 stalk of celery (5)    ? 1 small baked potato (160)    ? 1 cup of breaded, fried vegetables (230)    · Fruit:      ? 1 (6-inch) banana (55)     ? ½ of a 4-inch grapefruit (55)    ? 15 grapes (60)    ? 1 medium orange or apple (70)    ? 1 large peach (65)    ? 1 cup of fresh pineapple chunks (75)    ? 1 cup of melon cubes (50)    ? 1¼ cups of whole strawberries (45)    ? ½ cup of fruit canned in juice (55)    ? ½ cup of fruit canned in heavy syrup (110)    ? ? cup of raisins (130)    ? ½ cup of unsweetened fruit juice (60)    ? ½ cup of grape, cranberry, or prune juice (90)    · Fat:      ? 10 peanuts or 2 teaspoons of peanut butter (55)    ? 2 tablespoons of avocado or 1 tablespoon of regular salad dressing (45)    ? 2 slices of clayton (90)    ? 1 teaspoon of oil, such as safflower, canola, corn, or olive oil (45)    ? 2 teaspoons of low-fat margarine, or 1 tablespoon of low-fat mayonnaise (50)    ? 1 teaspoon of regular margarine (40)    ? 1 tablespoon of regular mayonnaise (135)    ?  1 tablespoon of cream cheese or 2 tablespoons of low-fat cream cheese (45)    ? 2 tablespoons of vegetable shortening (215)    · Dessert and sweets:      ? 8 animal crackers or 5 vanilla wafers (80)    ? 1 frozen fruit juice bar (80)    ? ½ cup of ice milk or low-fat frozen yogurt (90)    ? ½ cup of sherbet or sorbet (125)    ? ½ cup of sugar-free pudding or custard (60)    ? ½ cup of ice cream (140)    ? ½ cup of pudding or custard (175)    ? 1 (2-inch) square chocolate brownie (185)    · Combination foods:      ? Bean burrito made with an 8-inch tortilla, without cheese (275)    ? Chicken breast sandwich with lettuce and tomato (325)    ? 1 cup of chicken noodle soup (60)    ? 1 beef taco (175)    ? Regular hamburger with lettuce and tomato (310)    ? Regular cheeseburger with lettuce and tomato (410)     ? ¼ of a 12-inch cheese pizza (280)    ? Fried fish sandwich with lettuce and tomato (425)    ? Hot dog and bun (275)    ? 1½ cups of macaroni and cheese (310)    ? Taco salad with a fried tortilla shell (870)    · Low-calorie foods:      ? 1 tablespoon of ketchup or 1 tablespoon of fat free sour cream (15)    ? 1 teaspoon of mustard (5)    ? ¼ cup of salsa (20)    ? 1 large dill pickle (15)    ? 1 tablespoon of fat free salad dressing (10)    ? 2 teaspoons of low-sugar, light jam or jelly, or 1 tablespoon of sugar-free syrup (15)    ? 1 sugar-free popsicle (15)    ? 1 cup of club soda, seltzer water, or diet soda (0)    CARE AGREEMENT:   You have the right to help plan your care  Discuss treatment options with your healthcare provider to decide what care you want to receive  You always have the right to refuse treatment  The above information is an  only  It is not intended as medical advice for individual conditions or treatments  Talk to your doctor, nurse or pharmacist before following any medical regimen to see if it is safe and effective for you    © Copyright [employer] 2022 Information is for End User's use only and may not be sold, redistributed or otherwise used for commercial purposes   All illustrations and images included in CareNotes® are the copyrighted property of [employer] Inc  or [facility] Steven Tafoya

## 2022-10-25 NOTE — ASSESSMENT & PLAN NOTE
Patient has been taking Seroquel, reports getting at least 5 hours sleep  Patient has been on Lunesta, trazodone, Ambien little results  Will keep this regimen    Discussed good sleep hygiene

## 2022-10-28 ENCOUNTER — APPOINTMENT (OUTPATIENT)
Dept: LAB | Facility: CLINIC | Age: 77
End: 2022-10-28
Payer: MEDICARE

## 2022-10-28 DIAGNOSIS — I16.0 HYPERTENSIVE URGENCY: ICD-10-CM

## 2022-10-28 DIAGNOSIS — E11.40 CONTROLLED TYPE 2 DIABETES WITH NEUROPATHY (HCC): ICD-10-CM

## 2022-10-28 DIAGNOSIS — D64.9 NORMOCYTIC ANEMIA: ICD-10-CM

## 2022-10-28 LAB
ALBUMIN SERPL BCP-MCNC: 3.5 G/DL (ref 3.5–5)
ALP SERPL-CCNC: 62 U/L (ref 46–116)
ALT SERPL W P-5'-P-CCNC: 14 U/L (ref 12–78)
ANION GAP SERPL CALCULATED.3IONS-SCNC: 3 MMOL/L (ref 4–13)
AST SERPL W P-5'-P-CCNC: 11 U/L (ref 5–45)
BASOPHILS # BLD AUTO: 0.04 THOUSANDS/ÂΜL (ref 0–0.1)
BASOPHILS NFR BLD AUTO: 1 % (ref 0–1)
BILIRUB SERPL-MCNC: 0.27 MG/DL (ref 0.2–1)
BUN SERPL-MCNC: 21 MG/DL (ref 5–25)
CALCIUM SERPL-MCNC: 9.8 MG/DL (ref 8.3–10.1)
CHLORIDE SERPL-SCNC: 105 MMOL/L (ref 96–108)
CO2 SERPL-SCNC: 27 MMOL/L (ref 21–32)
CREAT SERPL-MCNC: 0.99 MG/DL (ref 0.6–1.3)
EOSINOPHIL # BLD AUTO: 0.45 THOUSAND/ÂΜL (ref 0–0.61)
EOSINOPHIL NFR BLD AUTO: 8 % (ref 0–6)
ERYTHROCYTE [DISTWIDTH] IN BLOOD BY AUTOMATED COUNT: 14.8 % (ref 11.6–15.1)
EST. AVERAGE GLUCOSE BLD GHB EST-MCNC: 137 MG/DL
GFR SERPL CREATININE-BSD FRML MDRD: 55 ML/MIN/1.73SQ M
GLUCOSE P FAST SERPL-MCNC: 117 MG/DL (ref 65–99)
HBA1C MFR BLD: 6.4 %
HCT VFR BLD AUTO: 39.3 % (ref 34.8–46.1)
HGB BLD-MCNC: 12 G/DL (ref 11.5–15.4)
IMM GRANULOCYTES # BLD AUTO: 0.03 THOUSAND/UL (ref 0–0.2)
IMM GRANULOCYTES NFR BLD AUTO: 1 % (ref 0–2)
LYMPHOCYTES # BLD AUTO: 2.25 THOUSANDS/ÂΜL (ref 0.6–4.47)
LYMPHOCYTES NFR BLD AUTO: 37 % (ref 14–44)
MCH RBC QN AUTO: 28.2 PG (ref 26.8–34.3)
MCHC RBC AUTO-ENTMCNC: 30.5 G/DL (ref 31.4–37.4)
MCV RBC AUTO: 92 FL (ref 82–98)
MONOCYTES # BLD AUTO: 0.36 THOUSAND/ÂΜL (ref 0.17–1.22)
MONOCYTES NFR BLD AUTO: 6 % (ref 4–12)
NEUTROPHILS # BLD AUTO: 2.88 THOUSANDS/ÂΜL (ref 1.85–7.62)
NEUTS SEG NFR BLD AUTO: 47 % (ref 43–75)
NRBC BLD AUTO-RTO: 0 /100 WBCS
PLATELET # BLD AUTO: 269 THOUSANDS/UL (ref 149–390)
PMV BLD AUTO: 10.9 FL (ref 8.9–12.7)
POTASSIUM SERPL-SCNC: 4.4 MMOL/L (ref 3.5–5.3)
PROT SERPL-MCNC: 8.5 G/DL (ref 6.4–8.4)
RBC # BLD AUTO: 4.26 MILLION/UL (ref 3.81–5.12)
SODIUM SERPL-SCNC: 135 MMOL/L (ref 135–147)
WBC # BLD AUTO: 6.01 THOUSAND/UL (ref 4.31–10.16)

## 2022-10-28 PROCEDURE — 36415 COLL VENOUS BLD VENIPUNCTURE: CPT

## 2022-10-28 PROCEDURE — 83036 HEMOGLOBIN GLYCOSYLATED A1C: CPT

## 2022-10-28 PROCEDURE — 80053 COMPREHEN METABOLIC PANEL: CPT

## 2022-10-28 PROCEDURE — 85025 COMPLETE CBC W/AUTO DIFF WBC: CPT

## 2022-11-02 ENCOUNTER — OFFICE VISIT (OUTPATIENT)
Dept: FAMILY MEDICINE CLINIC | Facility: CLINIC | Age: 77
End: 2022-11-02

## 2022-11-02 VITALS
HEART RATE: 67 BPM | WEIGHT: 193.5 LBS | TEMPERATURE: 97.6 F | OXYGEN SATURATION: 95 % | DIASTOLIC BLOOD PRESSURE: 64 MMHG | SYSTOLIC BLOOD PRESSURE: 148 MMHG | BODY MASS INDEX: 35.61 KG/M2 | HEIGHT: 62 IN

## 2022-11-02 DIAGNOSIS — I10 BENIGN HYPERTENSION: ICD-10-CM

## 2022-11-02 DIAGNOSIS — I10 HYPERTENSION, UNSPECIFIED TYPE: ICD-10-CM

## 2022-11-02 DIAGNOSIS — F51.01 PRIMARY INSOMNIA: Primary | ICD-10-CM

## 2022-11-02 PROBLEM — K52.82 EOSINOPHILIC COLITIS: Status: RESOLVED | Noted: 2022-04-04 | Resolved: 2022-11-02

## 2022-11-02 PROBLEM — N89.8 VAGINAL CYST: Status: RESOLVED | Noted: 2019-11-04 | Resolved: 2022-11-02

## 2022-11-02 PROBLEM — K59.01 SLOW TRANSIT CONSTIPATION: Status: RESOLVED | Noted: 2022-05-11 | Resolved: 2022-11-02

## 2022-11-02 PROBLEM — B37.31 VAGINAL YEAST INFECTION: Status: RESOLVED | Noted: 2019-11-04 | Resolved: 2022-11-02

## 2022-11-02 RX ORDER — QUETIAPINE FUMARATE 50 MG/1
50 TABLET, FILM COATED ORAL
Qty: 90 TABLET | Refills: 0 | Status: SHIPPED | OUTPATIENT
Start: 2022-11-02

## 2022-11-02 RX ORDER — CHLORTHALIDONE 25 MG/1
25 TABLET ORAL DAILY
Qty: 30 TABLET | Refills: 5 | Status: SHIPPED | OUTPATIENT
Start: 2022-11-02

## 2022-11-02 NOTE — PROGRESS NOTES
Assessment/Plan:     Primary hypertension  Reviewed blood pressures  Continues to be elevated 170s over 100  Discussed management of stress  Discussed proper weight check blood pressures  Will start water pill  Will check blood work in 2 weeks  Patient also advised to check blood pressure and call Friday with readings  Psychophysiological insomnia  Patient continues to have problems with sleeping  Has tried amnion, Restoril, trazodone, Lunesta with effects  Recently started on Seroquel will increase dose discussed maintaining safety  Discussed good sleep hygiene         Problem List Items Addressed This Visit        Cardiovascular and Mediastinum    Benign hypertension    Relevant Medications    chlorthalidone 25 mg tablet    Other Relevant Orders    Comprehensive metabolic panel       Other    Primary insomnia - Primary    Relevant Medications    QUEtiapine (SEROquel) 50 mg tablet      Other Visit Diagnoses     Hypertension, unspecified type        Relevant Medications    chlorthalidone 25 mg tablet            Subjective:      Patient ID: Rafael Rendon is a 78 y o  female  Patient is here for blood pressure management  Reports her blood pressure continues to be elevated 170/100  Patient reports that she has been taking her medications  Is on Coreg and amlodipine  Unable to take losartan or lisinopril because of kidney function  Does report a lot of stresses at home with her daughter at times  Also reports that she has lost a lot of friends and family in the past year due to various illnesses, feels like this ways heavy on  She has been taking cervical to help sleep has tried other medication and has not work  States that she does get about 3 hours of sleep with 25 mg of Zyprexa    Wakes up frequently to go to the bathroom      The following portions of the patient's history were reviewed and updated as appropriate: allergies, current medications, past family history, past medical history, past social history, past surgical history and problem list     Review of Systems   Constitutional: Negative  Negative for chills and fever  HENT: Negative  Negative for ear pain and sore throat  Eyes: Negative  Negative for pain and visual disturbance  Respiratory: Negative  Negative for cough and shortness of breath  Cardiovascular: Negative  Negative for chest pain and palpitations  Gastrointestinal: Negative  Negative for abdominal pain and vomiting  Endocrine: Negative  Genitourinary: Positive for frequency  Negative for dysuria and hematuria  Musculoskeletal: Negative  Negative for arthralgias and back pain  Skin: Negative for color change and rash  Allergic/Immunologic: Negative  Neurological: Negative  Negative for seizures and syncope  Psychiatric/Behavioral: Positive for dysphoric mood and sleep disturbance  All other systems reviewed and are negative          Objective:      /64 (BP Location: Left arm, Patient Position: Sitting, Cuff Size: Large)   Pulse 67   Temp 97 6 °F (36 4 °C) (Temporal)   Ht 5' 2" (1 575 m)   Wt 87 8 kg (193 lb 8 oz)   SpO2 95%   BMI 35 39 kg/m²          Physical Exam

## 2022-11-02 NOTE — ASSESSMENT & PLAN NOTE
Reviewed blood pressures  Continues to be elevated 170s over 100  Discussed management of stress  Discussed proper weight check blood pressures  Will start water pill  Will check blood work in 2 weeks  Patient also advised to check blood pressure and call Friday with readings

## 2022-11-02 NOTE — ASSESSMENT & PLAN NOTE
Patient continues to have problems with sleeping  Has tried amnion, Restoril, trazodone, Lunesta with effects  Recently started on Seroquel will increase dose discussed maintaining safety    Discussed good sleep hygiene

## 2022-11-02 NOTE — PATIENT INSTRUCTIONS
Increase seroquel to 50 mg  Take chlorathalidone daily, take around 9  Get blood work done 2 weeks  Call with on fri with blood pressure reading

## 2022-11-27 DIAGNOSIS — J45.990 EXERCISE INDUCED BRONCHOSPASM: ICD-10-CM

## 2022-11-27 DIAGNOSIS — R06.09 DOE (DYSPNEA ON EXERTION): ICD-10-CM

## 2022-11-27 RX ORDER — MONTELUKAST SODIUM 10 MG/1
TABLET ORAL
Qty: 90 TABLET | Refills: 1 | Status: SHIPPED | OUTPATIENT
Start: 2022-11-27

## 2022-11-28 DIAGNOSIS — M19.90 ARTHRITIS: ICD-10-CM

## 2022-11-28 RX ORDER — MELOXICAM 15 MG/1
15 TABLET ORAL DAILY
Qty: 90 TABLET | Refills: 0 | Status: SHIPPED | OUTPATIENT
Start: 2022-11-28

## 2022-11-29 ENCOUNTER — TELEPHONE (OUTPATIENT)
Dept: HEMATOLOGY ONCOLOGY | Facility: CLINIC | Age: 77
End: 2022-11-29

## 2022-11-29 NOTE — TELEPHONE ENCOUNTER
Called and left message for patient advising her the appointment with TOÑITO BROWN [facility] on 11/30 is being cancelled due to specialty labs not being done in time for appointment     I encouraged call back to [phone] to reschedule this appointment

## 2022-11-30 ENCOUNTER — TELEPHONE (OUTPATIENT)
Dept: HEMATOLOGY ONCOLOGY | Facility: CLINIC | Age: 77
End: 2022-11-30

## 2022-11-30 DIAGNOSIS — J45.20 MILD INTERMITTENT ASTHMA, UNSPECIFIED WHETHER COMPLICATED: ICD-10-CM

## 2022-11-30 DIAGNOSIS — I10 HYPERTENSION, UNSPECIFIED TYPE: ICD-10-CM

## 2022-11-30 RX ORDER — FLUTICASONE PROPIONATE AND SALMETEROL 50; 250 UG/1; UG/1
POWDER RESPIRATORY (INHALATION)
Qty: 60 BLISTER | Refills: 3 | Status: SHIPPED | OUTPATIENT
Start: 2022-11-30

## 2022-12-05 RX ORDER — CHLORTHALIDONE 25 MG/1
25 TABLET ORAL DAILY
Qty: 90 TABLET | Refills: 2 | Status: SHIPPED | OUTPATIENT
Start: 2022-12-05

## 2022-12-07 DIAGNOSIS — F51.01 PRIMARY INSOMNIA: ICD-10-CM

## 2022-12-08 RX ORDER — QUETIAPINE FUMARATE 50 MG/1
50 TABLET, FILM COATED ORAL
Qty: 90 TABLET | Refills: 0 | Status: SHIPPED | OUTPATIENT
Start: 2022-12-08

## 2022-12-21 DIAGNOSIS — E11.40 CONTROLLED TYPE 2 DIABETES WITH NEUROPATHY (HCC): ICD-10-CM

## 2022-12-22 ENCOUNTER — NURSE TRIAGE (OUTPATIENT)
Dept: OTHER | Facility: OTHER | Age: 77
End: 2022-12-22

## 2022-12-22 DIAGNOSIS — R05.1 ACUTE COUGH: Primary | ICD-10-CM

## 2022-12-22 RX ORDER — PREGABALIN 165 MG/1
TABLET, FILM COATED, EXTENDED RELEASE ORAL
Qty: 60 TABLET | Refills: 0 | Status: SHIPPED | OUTPATIENT
Start: 2022-12-22

## 2022-12-22 NOTE — TELEPHONE ENCOUNTER
Reason for Disposition  • Cough    Answer Assessment - Initial Assessment Questions  1  ONSET: "When did the cough begin?"       Yesterday   2  SEVERITY: "How bad is the cough today?"       Intermittent , took promethazine from 2018  3  SPUTUM: "Describe the color of your sputum" (none, dry cough; clear, white, yellow, green)      Clear   4  HEMOPTYSIS: "Are you coughing up any blood?" If so ask: "How much?" (flecks, streaks, tablespoons, etc )      *No Answer*  5  DIFFICULTY BREATHING: "Are you having difficulty breathing?" If Yes, ask: "How bad is it?" (e g , mild, moderate, severe)     - MILD: No SOB at rest, mild SOB with walking, speaks normally in sentences, can lay down, no retractions, pulse < 100      - MODERATE: SOB at rest, SOB with minimal exertion and prefers to sit, cannot lie down flat, speaks in phrases, mild retractions, audible wheezing, pulse 100-120      - SEVERE: Very SOB at rest, speaks in single words, struggling to breathe, sitting hunched forward, retractions, pulse > 120       Denies   6  FEVER: "Do you have a fever?" If Yes, ask: "What is your temperature, how was it measured, and when did it start?"      denies   7  CARDIAC HISTORY: "Do you have any history of heart disease?" (e g , heart attack, congestive heart failure)       Denies   8  LUNG HISTORY: "Do you have any history of lung disease?"  (e g , pulmonary embolus, asthma, emphysema)    Denies   9  PE RISK FACTORS: "Do you have a history of blood clots?" (or: recent major surgery, recent prolonged travel, bedridden)    Denies   10   OTHER SYMPTOMS: "Do you have any other symptoms?" (e g , runny nose, wheezing, chest pain)        Denies    Protocols used: COUGH - ACUTE PRODUCTIVE-ADULT-

## 2022-12-22 NOTE — TELEPHONE ENCOUNTER
Regarding: cough, phlegm  ----- Message from [employer] sent at 12/22/2022  2:23 PM EST -----  " I have had a cough and producing phlegm since yesterday, can you please refill my cough medicine "

## 2022-12-22 NOTE — TELEPHONE ENCOUNTER
Patient states she started with cough last night and producing clear sputum  Denies any other symptoms  States she is asthmatic and uses in haler for wheezing  States she started taking promethazine from 2018  Pt requesting inf a new script can be sent  States it improved cough  Denies any difficulty breathing or swallowing

## 2022-12-29 DIAGNOSIS — H61.23 IMPACTED CERUMEN OF BOTH EARS: ICD-10-CM

## 2022-12-29 DIAGNOSIS — F41.8 DEPRESSION WITH ANXIETY: ICD-10-CM

## 2022-12-29 DIAGNOSIS — J45.20 MILD INTERMITTENT ASTHMA, UNSPECIFIED WHETHER COMPLICATED: ICD-10-CM

## 2023-01-03 ENCOUNTER — TELEMEDICINE (OUTPATIENT)
Dept: FAMILY MEDICINE CLINIC | Facility: CLINIC | Age: 78
End: 2023-01-03

## 2023-01-03 DIAGNOSIS — J06.9 ACUTE URI: Primary | ICD-10-CM

## 2023-01-03 DIAGNOSIS — R05.1 ACUTE COUGH: ICD-10-CM

## 2023-01-03 RX ORDER — AZITHROMYCIN 250 MG/1
TABLET, FILM COATED ORAL
Qty: 6 TABLET | Refills: 0 | Status: SHIPPED | OUTPATIENT
Start: 2023-01-03 | End: 2023-01-08

## 2023-01-03 RX ORDER — DEXTROMETHORPHAN HYDROBROMIDE AND PROMETHAZINE HYDROCHLORIDE 15; 6.25 MG/5ML; MG/5ML
5 SOLUTION ORAL 4 TIMES DAILY PRN
Qty: 180 ML | Refills: 3 | Status: SHIPPED | OUTPATIENT
Start: 2023-01-03

## 2023-01-03 NOTE — ASSESSMENT & PLAN NOTE
Patient is being seen with complaints of upper respiratory symptoms ongoing for more than 2 weeks  Has productive cough  Discussed management of acute upper respiratory infection  Zithromax therapy  Use steam to help decongest   Increase fluid hydration, water, tea with honey

## 2023-01-03 NOTE — PROGRESS NOTES
Virtual Regular Visit    Verification of patient location:    Patient is located in the following state in which I hold an active license [place]      Assessment/Plan:    Problem List Items Addressed This Visit        Respiratory    Acute URI - Primary     Patient is being seen with complaints of upper respiratory symptoms ongoing for more than 2 weeks  Has productive cough  Discussed management of acute upper respiratory infection  Zithromax therapy  Use steam to help decongest   Increase fluid hydration, water, tea with honey  Relevant Medications    azithromycin (Zithromax) 250 mg tablet   Other Visit Diagnoses     Acute cough        Relevant Medications    Promethazine-DM (PHENERGAN-DM) 6 25-15 mg/5 mL oral syrup          BMI Counseling: There is no height or weight on file to calculate BMI  The BMI is above normal  Nutrition recommendations include decreasing portion sizes, encouraging healthy choices of fruits and vegetables, decreasing fast food intake, consuming healthier snacks, limiting drinks that contain sugar, moderation in carbohydrate intake, increasing intake of lean protein, reducing intake of saturated and trans fat and reducing intake of cholesterol  Exercise recommendations include strength training exercises  No pharmacotherapy was ordered  Rationale for BMI follow-up plan is due to patient being overweight or obese  Reason for visit is   Chief Complaint   Patient presents with   • Virtual Regular Visit        Encounter provider ALEKSANDER Velázquez    Provider located at [address]  [phone]      Recent Visits  No visits were found meeting these conditions    Showing recent visits within past 7 days and meeting all other requirements  Today's Visits  Date Type Provider Dept   01/03/23 Telemedicine Nati Alarcon [facility]   Showing today's visits and meeting all other requirements  Future Appointments  No visits were found meeting these conditions  Showing future appointments within next 150 days and meeting all other requirements       The patient was identified by name and date of birth  Jamshid Dalton was informed that this is a telemedicine visit and that the visit is being conducted through the [employer] platform  She agrees to proceed     My office door was closed  No one else was in the room  She acknowledged consent and understanding of privacy and security of the video platform  The patient has agreed to participate and understands they can discontinue the visit at any time  Patient is aware this is a billable service  Subjective  Jamshid Dalton is a 78 y o  female    Patient is being seen with ongoing complaints of having a productive cough  States this started before Tata  Does have some episodes of fever or chills  States that they did not have heat and she was in the house for more than 12 hours  States she feels that is when the cold started  Past Medical History:   Diagnosis Date   • Asthma    • Benign hypertension 2018   • Cardiac arrest ([facility] )    • Diabetes mellitus ([facility] )    • Glaucoma    • Hypertension    • Kidney stone    • Neuropathy    • Sleep apnea     no machine   • SOB (shortness of breath)    • Tubular adenoma of colon 10/2019   • Wheezing        Past Surgical History:   Procedure Laterality Date   •  SECTION     • COLONOSCOPY     • HYSTERECTOMY     • IR BIOPSY BONE MARROW  2022   • TONSILLECTOMY         Current Outpatient Medications   Medication Sig Dispense Refill   • azithromycin (Zithromax) 250 mg tablet Take 2 tablets (500 mg total) by mouth daily for 1 day, THEN 1 tablet (250 mg total) daily for 4 days   6 tablet 0   • Promethazine-DM (PHENERGAN-DM) 6 25-15 mg/5 mL oral syrup Take 5 mL by mouth 4 (four) times a day as needed for cough 180 mL 3   • acetaminophen (TYLENOL) 325 mg tablet Take 2 tablets (650 mg total) by mouth every 4 (four) hours as needed for mild pain  0   • Advair Diskus 250-50 MCG/ACT inhaler INHALE 1 PUFF 2 TIMES A DAY RINSE MOUTH AFTER USE  60 blister 3   • amLODIPine (NORVASC) 10 mg tablet Take 1 tablet (10 mg total) by mouth daily at bedtime 90 tablet 1   • Aspirin Low Dose 81 MG chewable tablet CHEW 1 TABLET BY MOUTH DAILY 90 tablet 1   • benzonatate (TESSALON PERLES) 100 mg capsule Take 1 capsule (100 mg total) by mouth 3 (three) times a day as needed for cough 20 capsule 0   • bisacodyl (DULCOLAX) 5 mg EC tablet Take 1 tablet (5 mg total) by mouth as needed in the morning for constipation  30 tablet 0   • carbamide peroxide (DEBROX) 6 5 % otic solution Administer 5 drops into both ears 2 (two) times a day 15 mL 0   • carvedilol (COREG) 6 25 mg tablet TAKE 1 TABLET BY MOUTH TWICE A DAY WITH MEALS 180 tablet 1   • chlorthalidone 25 mg tablet TAKE 1 TABLET (25 MG TOTAL) BY MOUTH DAILY  90 tablet 2   • fluconazole (DIFLUCAN) 150 mg tablet Take 150 mg by mouth once     • glucose blood test strip E11 9 / testing daily     • hydrOXYzine HCL (ATARAX) 10 mg tablet Take 10 mg by mouth every 6 (six) hours as needed     • latanoprost (XALATAN) 0 005 % ophthalmic solution      • LORazepam (ATIVAN) 1 mg tablet Take 0 5 tablets (0 5 mg total) by mouth every 8 (eight) hours as needed for anxiety for up to 7 days 15 tablet 0   • Lyrica  MG TB24 TAKE 1 TABLET BY MOUTH TWICE A DAY 60 tablet 0   • meloxicam (MOBIC) 15 mg tablet TAKE 1 TABLET (15 MG TOTAL) BY MOUTH DAILY   90 tablet 0   • montelukast (SINGULAIR) 10 mg tablet TAKE 1 TABLET BY MOUTH DAILY AT BEDTIME 90 tablet 1   • pantoprazole (PROTONIX) 40 mg tablet TAKE 1 TABLET BY MOUTH EVERY DAY 90 tablet 2   • predniSONE 20 mg tablet Take 1 tablet (20 mg total) by mouth daily 5 tablet 0   • ProAir  (90 Base) MCG/ACT inhaler Inhale 1 puff every 6 (six) hours as needed for wheezing 8 5 g 0   • QUEtiapine (SEROquel) 50 mg tablet Take 1 tablet (50 mg total) by mouth daily at bedtime 90 tablet 0   • sertraline (ZOLOFT) 50 mg tablet Take 1 tablet (50 mg total) by mouth daily 90 tablet 3   • SITagliptin-metFORMIN HCl ER (Janumet XR) 100-1000 MG TB24 Take 1 tablet by mouth daily with dinner 90 tablet 3   • traMADol (Ultram) 50 mg tablet Take 1 tablet (50 mg total) by mouth every 6 (six) hours as needed for moderate pain 20 tablet 0   • zolpidem (AMBIEN) 5 mg tablet Take 1 tablet (5 mg total) by mouth daily at bedtime as needed for sleep 30 tablet 0     No current facility-administered medications for this visit  Allergies   Allergen Reactions   • Ciprofloxacin Itching   • Levaquin [Levofloxacin] Swelling   • Penicillins GI Intolerance       Review of Systems   Constitutional: Positive for chills  Negative for fever  HENT: Positive for sore throat  Respiratory: Positive for cough  Video Exam    There were no vitals filed for this visit  Physical Exam  Vitals and nursing note reviewed  Constitutional:       Appearance: She is well-developed  She is ill-appearing  HENT:      Head: Normocephalic and atraumatic  Nose: Congestion present  Pulmonary:      Effort: Pulmonary effort is normal    Musculoskeletal:         General: Normal range of motion  Cervical back: Normal range of motion  Skin:     General: Skin is warm and dry  Neurological:      Mental Status: She is alert and oriented to person, place, and time     Psychiatric:         Mood and Affect: Mood normal           I spent 15 minutes directly with the patient during this visit

## 2023-01-18 PROBLEM — E11.40 CONTROLLED TYPE 2 DIABETES WITH NEUROPATHY (HCC): Status: RESOLVED | Noted: 2019-02-28 | Resolved: 2023-01-18

## 2023-01-25 DIAGNOSIS — M19.90 ARTHRITIS: ICD-10-CM

## 2023-01-25 RX ORDER — MELOXICAM 15 MG/1
15 TABLET ORAL DAILY
Qty: 90 TABLET | Refills: 0 | Status: SHIPPED | OUTPATIENT
Start: 2023-01-25

## 2023-02-02 ENCOUNTER — OFFICE VISIT (OUTPATIENT)
Dept: FAMILY MEDICINE CLINIC | Facility: CLINIC | Age: 78
End: 2023-02-02

## 2023-02-02 VITALS
SYSTOLIC BLOOD PRESSURE: 110 MMHG | HEART RATE: 88 BPM | TEMPERATURE: 98.2 F | BODY MASS INDEX: 36.62 KG/M2 | OXYGEN SATURATION: 94 % | RESPIRATION RATE: 18 BRPM | DIASTOLIC BLOOD PRESSURE: 60 MMHG | WEIGHT: 199 LBS | HEIGHT: 62 IN

## 2023-02-02 DIAGNOSIS — I20.8 ANGINAL EQUIVALENT (HCC): ICD-10-CM

## 2023-02-02 DIAGNOSIS — R68.89 FLU-LIKE SYMPTOMS: ICD-10-CM

## 2023-02-02 DIAGNOSIS — J42 CHRONIC BRONCHITIS, UNSPECIFIED CHRONIC BRONCHITIS TYPE (HCC): ICD-10-CM

## 2023-02-02 DIAGNOSIS — R68.83 CHILLS (WITHOUT FEVER): ICD-10-CM

## 2023-02-02 DIAGNOSIS — N18.31 STAGE 3A CHRONIC KIDNEY DISEASE (HCC): ICD-10-CM

## 2023-02-02 DIAGNOSIS — N30.00 ACUTE CYSTITIS WITHOUT HEMATURIA: ICD-10-CM

## 2023-02-02 DIAGNOSIS — R53.83 OTHER FATIGUE: ICD-10-CM

## 2023-02-02 DIAGNOSIS — E66.01 OBESITY, MORBID (HCC): ICD-10-CM

## 2023-02-02 DIAGNOSIS — J06.9 ACUTE UPPER RESPIRATORY INFECTION, UNSPECIFIED: ICD-10-CM

## 2023-02-02 DIAGNOSIS — R30.0 DYSURIA: Primary | ICD-10-CM

## 2023-02-02 DIAGNOSIS — E11.9 TYPE 2 DIABETES MELLITUS WITHOUT COMPLICATION, WITHOUT LONG-TERM CURRENT USE OF INSULIN (HCC): ICD-10-CM

## 2023-02-02 PROBLEM — I20.89 ANGINAL EQUIVALENT: Status: ACTIVE | Noted: 2023-02-02

## 2023-02-02 LAB
BACTERIA UR QL AUTO: ABNORMAL /HPF
BILIRUB UR QL STRIP: NEGATIVE
CLARITY UR: ABNORMAL
COLOR UR: YELLOW
GLUCOSE UR STRIP-MCNC: ABNORMAL MG/DL
HGB UR QL STRIP.AUTO: ABNORMAL
KETONES UR STRIP-MCNC: ABNORMAL MG/DL
LEUKOCYTE ESTERASE UR QL STRIP: ABNORMAL
MUCOUS THREADS UR QL AUTO: ABNORMAL
NITRITE UR QL STRIP: NEGATIVE
NON-SQ EPI CELLS URNS QL MICRO: ABNORMAL /HPF
PH UR STRIP.AUTO: 5.5 [PH]
PROT UR STRIP-MCNC: ABNORMAL MG/DL
RBC #/AREA URNS AUTO: ABNORMAL /HPF
SARS-COV-2 AG UPPER RESP QL IA: NEGATIVE
SL AMB  POCT GLUCOSE, UA: ABNORMAL
SL AMB LEUKOCYTE ESTERASE,UA: ABNORMAL
SL AMB POCT BILIRUBIN,UA: ABNORMAL
SL AMB POCT BLOOD,UA: ABNORMAL
SL AMB POCT CLARITY,UA: ABNORMAL
SL AMB POCT COLOR,UA: ABNORMAL
SL AMB POCT GLUCOSE BLD: 187
SL AMB POCT HEMOGLOBIN AIC: 6 (ref ?–6.5)
SL AMB POCT KETONES,UA: ABNORMAL
SL AMB POCT NITRITE,UA: ABNORMAL
SL AMB POCT PH,UA: 6
SL AMB POCT SPECIFIC GRAVITY,UA: 1.02
SL AMB POCT URINE PROTEIN: 100
SL AMB POCT UROBILINOGEN: 0.2
SP GR UR STRIP.AUTO: 1.02 (ref 1–1.03)
UROBILINOGEN UR STRIP-ACNC: <2 MG/DL
VALID CONTROL: NORMAL
WBC #/AREA URNS AUTO: ABNORMAL /HPF
WBC CLUMPS # UR AUTO: PRESENT /UL

## 2023-02-02 RX ORDER — NITROFURANTOIN 25; 75 MG/1; MG/1
100 CAPSULE ORAL 2 TIMES DAILY
Qty: 10 CAPSULE | Refills: 0 | Status: SHIPPED | OUTPATIENT
Start: 2023-02-02 | End: 2023-02-07

## 2023-02-02 RX ORDER — PREDNISONE 20 MG/1
20 TABLET ORAL DAILY
Qty: 5 TABLET | Refills: 0 | Status: CANCELLED | OUTPATIENT
Start: 2023-02-02

## 2023-02-02 NOTE — ASSESSMENT & PLAN NOTE
She reports fatigue body aches chills  COVID swab negative in office  Urine dip was positive for urinary tract infection  Self-care    Maintain good nutrition hydration rest   Antibiotics

## 2023-02-02 NOTE — ASSESSMENT & PLAN NOTE
Lab Results   Component Value Date    HGBA1C 6 0 02/02/2023   Patient's hemoglobin A1c is 6  Maintain low carbohydrate diet    Continue medication

## 2023-02-02 NOTE — ASSESSMENT & PLAN NOTE
Patient is being seen with complaints of fatigue, chills generally not feeling well  Reports frequent urination  Urine dip in office positive for UTI  Macrobid ordered    Courage fluid hydration, maintain proper hygiene

## 2023-02-02 NOTE — PROGRESS NOTES
Name: Juanito Glover      : 1945      MRN: [id number]  Encounter Provider: ALEKSANDER Balderas  Encounter Date: 2023   Encounter department: [address] [place] [address] PRIMARY CARE    Assessment & Plan     1  Dysuria  Assessment & Plan:  Patient is being seen with complaints of fatigue, chills generally not feeling well  Reports frequent urination  Urine dip in office positive for UTI  Macrobid ordered  Courage fluid hydration, maintain proper hygiene    Orders:  -     UA w Reflex to Microscopic w Reflex to Culture - Clinic Collect  -     POCT urine dip    2  Flu-like symptoms  Assessment & Plan:  She reports fatigue body aches chills  COVID swab negative in office  Urine dip was positive for urinary tract infection  Self-care  Maintain good nutrition hydration rest   Antibiotics    Orders:  -     POCT Rapid Covid Ag    3  Other fatigue  -     UA w Reflex to Microscopic w Reflex to Culture - Clinic Collect    4  Chills (without fever)  -     UA w Reflex to Microscopic w Reflex to Culture - Clinic Collect    5  Anginal equivalent ([facility] [address] )    6  Chronic bronchitis, unspecified chronic bronchitis type ([facility] [address] )    7  Obesity, morbid ([facility] [address] )    8  Stage 3a chronic kidney disease ([facility] [address] )    9  Type 2 diabetes mellitus without complication, without long-term current use of insulin [facility])  Assessment & Plan:    Lab Results   Component Value Date    HGBA1C 6 0 2023   Patient's hemoglobin A1c is 6  Maintain low carbohydrate diet  Continue medication    Orders:  -     POCT hemoglobin A1c  -     POCT blood glucose    10  Acute upper respiratory infection, unspecified    11  Acute cystitis without hematuria  -     nitrofurantoin (MACROBID) 100 mg capsule; Take 1 capsule (100 mg total) by mouth 2 (two) times a day for 5 days           Subjective      Patient is being seen with complaints of having fevers, chills, headaches  Symptoms started yesterday  Denies any productive cough  Patient does appear to be ill  Patient is weak  Review of Systems   Constitutional: Positive for chills and fatigue  Genitourinary: Positive for dysuria  Musculoskeletal: Negative  Skin: Negative  Neurological: Positive for dizziness and headaches  Hematological: Negative  Psychiatric/Behavioral: Negative  Current Outpatient Medications on File Prior to Visit   Medication Sig   • acetaminophen (TYLENOL) 325 mg tablet Take 2 tablets (650 mg total) by mouth every 4 (four) hours as needed for mild pain   • Advair Diskus 250-50 MCG/ACT inhaler INHALE 1 PUFF 2 TIMES A DAY RINSE MOUTH AFTER USE  • amLODIPine (NORVASC) 10 mg tablet Take 1 tablet (10 mg total) by mouth daily at bedtime   • Aspirin Low Dose 81 MG chewable tablet CHEW 1 TABLET BY MOUTH DAILY   • benzonatate (TESSALON PERLES) 100 mg capsule Take 1 capsule (100 mg total) by mouth 3 (three) times a day as needed for cough   • bisacodyl (DULCOLAX) 5 mg EC tablet Take 1 tablet (5 mg total) by mouth as needed in the morning for constipation  • carbamide peroxide (DEBROX) 6 5 % otic solution Administer 5 drops into both ears 2 (two) times a day   • carvedilol (COREG) 6 25 mg tablet TAKE 1 TABLET BY MOUTH TWICE A DAY WITH MEALS   • chlorthalidone 25 mg tablet TAKE 1 TABLET (25 MG TOTAL) BY MOUTH DAILY  • fluconazole (DIFLUCAN) 150 mg tablet Take 150 mg by mouth once   • glucose blood test strip E11 9 / testing daily   • hydrOXYzine HCL (ATARAX) 10 mg tablet Take 10 mg by mouth every 6 (six) hours as needed   • latanoprost (XALATAN) 0 005 % ophthalmic solution    • LORazepam (ATIVAN) 1 mg tablet Take 0 5 tablets (0 5 mg total) by mouth every 8 (eight) hours as needed for anxiety for up to 7 days   • Lyrica  MG TB24 TAKE 1 TABLET BY MOUTH TWICE A DAY   • meloxicam (MOBIC) 15 mg tablet TAKE 1 TABLET (15 MG TOTAL) BY MOUTH DAILY     • montelukast (SINGULAIR) 10 mg tablet TAKE 1 TABLET BY MOUTH DAILY AT BEDTIME   • pantoprazole (PROTONIX) 40 mg tablet TAKE 1 TABLET BY MOUTH EVERY DAY   • ProAir  (90 Base) MCG/ACT inhaler Inhale 1 puff every 6 (six) hours as needed for wheezing   • Promethazine-DM (PHENERGAN-DM) 6 25-15 mg/5 mL oral syrup Take 5 mL by mouth 4 (four) times a day as needed for cough   • QUEtiapine (SEROquel) 50 mg tablet Take 1 tablet (50 mg total) by mouth daily at bedtime   • sertraline (ZOLOFT) 50 mg tablet Take 1 tablet (50 mg total) by mouth daily   • SITagliptin-metFORMIN HCl ER (Janumet XR) 100-1000 MG TB24 Take 1 tablet by mouth daily with dinner   • traMADol (Ultram) 50 mg tablet Take 1 tablet (50 mg total) by mouth every 6 (six) hours as needed for moderate pain   • zolpidem (AMBIEN) 5 mg tablet Take 1 tablet (5 mg total) by mouth daily at bedtime as needed for sleep   • [DISCONTINUED] predniSONE 20 mg tablet Take 1 tablet (20 mg total) by mouth daily       Objective     /60   Pulse 88   Temp 98 2 °F (36 8 °C) (Temporal)   Resp 18   Ht 5' 2" (1 575 m)   Wt 90 3 kg (199 lb)   SpO2 94%   BMI 36 40 kg/m²     Physical Exam  Vitals and nursing note reviewed  Constitutional:       Appearance: She is well-developed  She is ill-appearing  HENT:      Head: Normocephalic and atraumatic  Eyes:      Pupils: Pupils are equal, round, and reactive to light  Cardiovascular:      Rate and Rhythm: Normal rate and regular rhythm  Pulses: Normal pulses  Heart sounds: Normal heart sounds  Pulmonary:      Effort: Pulmonary effort is normal    Abdominal:      General: Bowel sounds are normal       Palpations: Abdomen is soft  Musculoskeletal:         General: Normal range of motion  Cervical back: Normal range of motion and neck supple  Skin:     General: Skin is warm and dry  Neurological:      Mental Status: She is alert and oriented to person, place, and time  Sensory: No sensory deficit  Motor: Weakness present     Psychiatric:         Mood and Affect: Mood normal          Behavior: Behavior normal  Thought Content:  Thought content normal          Judgment: Judgment normal        ALEKSANDER Mace

## 2023-02-02 NOTE — PATIENT INSTRUCTIONS
Macrobid twice a day for 5 days  Increase water hydration  Tylenol for 5 days  Dysuria   WHAT YOU NEED TO KNOW:   Dysuria is difficulty urinating, or pain, burning, or discomfort with urination  Dysuria is usually a symptom of another problem  DISCHARGE INSTRUCTIONS:   Return to the emergency department if:   You have severe back, side, or abdominal pain  You have fever and shaking chills  You vomit several times in a row  Contact your healthcare provider if:   Your symptoms do not go away, even after treatment  You have questions or concerns about your condition or care  Medicines:   Medicines  may be given to help treat a bacterial infection or help decrease bladder spasms  Take your medicine as directed  Contact your healthcare provider if you think your medicine is not helping or if you have side effects  Tell him of her if you are allergic to any medicine  Keep a list of the medicines, vitamins, and herbs you take  Include the amounts, and when and why you take them  Bring the list or the pill bottles to follow-up visits  Carry your medicine list with you in case of an emergency  Follow up with your healthcare provider as directed: Your healthcare provider may also refer you to a urologist or nephrologist to have additional testing  Write down your questions so you remember to ask them during your visits  Manage your dysuria:   Drink more liquids  Liquids help flush out bacteria that may be causing an infection  Ask your healthcare provider how much liquid to drink each day and which liquids are best for you  Take sitz baths as directed  Fill a bathtub with 4 to 6 inches of warm water  You may also use a sitz bath pan that fits over a toilet  Sit in the sitz bath for 20 minutes  Do this 2 to 3 times a day, or as directed  The warm water can help decrease pain and swelling       © Copyright [employer] 2022 Information is for End User's use only and may not be sold, redistributed or otherwise used for commercial purposes  All illustrations and images included in CareNotes® are the copyrighted property of [employer]  or Noelle Tafoya  The above information is an  only  It is not intended as medical advice for individual conditions or treatments  Talk to your doctor, nurse or pharmacist before following any medical regimen to see if it is safe and effective for you

## 2023-02-03 ENCOUNTER — APPOINTMENT (EMERGENCY)
Dept: CT IMAGING | Facility: HOSPITAL | Age: 78
End: 2023-02-03

## 2023-02-03 ENCOUNTER — TELEPHONE (OUTPATIENT)
Dept: OTHER | Facility: HOSPITAL | Age: 78
End: 2023-02-03

## 2023-02-03 ENCOUNTER — HOSPITAL ENCOUNTER (EMERGENCY)
Facility: HOSPITAL | Age: 78
Discharge: HOME | End: 2023-02-03
Attending: EMERGENCY MEDICINE

## 2023-02-03 ENCOUNTER — TELEPHONE (OUTPATIENT)
Dept: FAMILY MEDICINE CLINIC | Facility: CLINIC | Age: 78
End: 2023-02-03

## 2023-02-03 VITALS
BODY MASS INDEX: 36.41 KG/M2 | SYSTOLIC BLOOD PRESSURE: 144 MMHG | RESPIRATION RATE: 14 BRPM | OXYGEN SATURATION: 97 % | DIASTOLIC BLOOD PRESSURE: 62 MMHG | HEART RATE: 50 BPM | WEIGHT: 199.08 LBS | TEMPERATURE: 97.6 F

## 2023-02-03 DIAGNOSIS — N12 PYELONEPHRITIS: Primary | ICD-10-CM

## 2023-02-03 LAB
ALBUMIN SERPL BCP-MCNC: 3.6 G/DL (ref 3.5–5)
ALP SERPL-CCNC: 70 U/L (ref 46–116)
ALT SERPL W P-5'-P-CCNC: 29 U/L (ref 12–78)
ANION GAP SERPL CALCULATED.3IONS-SCNC: 8 MMOL/L (ref 4–13)
AST SERPL W P-5'-P-CCNC: 27 U/L (ref 5–45)
BACTERIA UR QL AUTO: ABNORMAL /HPF
BASOPHILS # BLD AUTO: 0.02 THOUSANDS/ÂΜL (ref 0–0.1)
BASOPHILS NFR BLD AUTO: 0 % (ref 0–1)
BILIRUB SERPL-MCNC: 0.5 MG/DL (ref 0.2–1)
BILIRUB UR QL STRIP: NEGATIVE
BUN SERPL-MCNC: 18 MG/DL (ref 5–25)
CALCIUM SERPL-MCNC: 9.4 MG/DL (ref 8.3–10.1)
CHLORIDE SERPL-SCNC: 100 MMOL/L (ref 96–108)
CLARITY UR: CLEAR
CO2 SERPL-SCNC: 26 MMOL/L (ref 21–32)
COLOR UR: YELLOW
CREAT SERPL-MCNC: 1.09 MG/DL (ref 0.6–1.3)
EOSINOPHIL # BLD AUTO: 0.11 THOUSAND/ÂΜL (ref 0–0.61)
EOSINOPHIL NFR BLD AUTO: 1 % (ref 0–6)
ERYTHROCYTE [DISTWIDTH] IN BLOOD BY AUTOMATED COUNT: 14.6 % (ref 11.6–15.1)
FLUAV RNA RESP QL NAA+PROBE: NEGATIVE
FLUBV RNA RESP QL NAA+PROBE: NEGATIVE
GFR SERPL CREATININE-BSD FRML MDRD: 49 ML/MIN/1.73SQ M
GLUCOSE SERPL-MCNC: 140 MG/DL (ref 65–140)
GLUCOSE UR STRIP-MCNC: NEGATIVE MG/DL
HCT VFR BLD AUTO: 35.5 % (ref 34.8–46.1)
HGB BLD-MCNC: 11.5 G/DL (ref 11.5–15.4)
HGB UR QL STRIP.AUTO: ABNORMAL
IMM GRANULOCYTES # BLD AUTO: 0.04 THOUSAND/UL (ref 0–0.2)
IMM GRANULOCYTES NFR BLD AUTO: 0 % (ref 0–2)
KETONES UR STRIP-MCNC: NEGATIVE MG/DL
LEUKOCYTE ESTERASE UR QL STRIP: ABNORMAL
LIPASE SERPL-CCNC: 169 U/L (ref 73–393)
LYMPHOCYTES # BLD AUTO: 1.56 THOUSANDS/ÂΜL (ref 0.6–4.47)
LYMPHOCYTES NFR BLD AUTO: 13 % (ref 14–44)
MCH RBC QN AUTO: 29.3 PG (ref 26.8–34.3)
MCHC RBC AUTO-ENTMCNC: 32.4 G/DL (ref 31.4–37.4)
MCV RBC AUTO: 91 FL (ref 82–98)
MONOCYTES # BLD AUTO: 0.72 THOUSAND/ÂΜL (ref 0.17–1.22)
MONOCYTES NFR BLD AUTO: 6 % (ref 4–12)
NEUTROPHILS # BLD AUTO: 9.95 THOUSANDS/ÂΜL (ref 1.85–7.62)
NEUTS SEG NFR BLD AUTO: 80 % (ref 43–75)
NITRITE UR QL STRIP: NEGATIVE
NON-SQ EPI CELLS URNS QL MICRO: ABNORMAL /HPF
NRBC BLD AUTO-RTO: 0 /100 WBCS
PH UR STRIP.AUTO: 6 [PH]
PLATELET # BLD AUTO: 188 THOUSANDS/UL (ref 149–390)
PMV BLD AUTO: 9.6 FL (ref 8.9–12.7)
POTASSIUM SERPL-SCNC: 3.9 MMOL/L (ref 3.5–5.3)
PROT SERPL-MCNC: 8.7 G/DL (ref 6.4–8.4)
PROT UR STRIP-MCNC: ABNORMAL MG/DL
RBC # BLD AUTO: 3.92 MILLION/UL (ref 3.81–5.12)
RBC #/AREA URNS AUTO: ABNORMAL /HPF
RSV RNA RESP QL NAA+PROBE: NEGATIVE
SARS-COV-2 RNA RESP QL NAA+PROBE: NEGATIVE
SODIUM SERPL-SCNC: 134 MMOL/L (ref 135–147)
SP GR UR STRIP.AUTO: >=1.05 (ref 1–1.03)
UROBILINOGEN UR STRIP-ACNC: <2 MG/DL
WBC # BLD AUTO: 12.4 THOUSAND/UL (ref 4.31–10.16)
WBC #/AREA URNS AUTO: ABNORMAL /HPF

## 2023-02-03 RX ORDER — ACETAMINOPHEN 325 MG/1
650 TABLET ORAL ONCE
Status: COMPLETED | OUTPATIENT
Start: 2023-02-03 | End: 2023-02-03

## 2023-02-03 RX ORDER — SULFAMETHOXAZOLE AND TRIMETHOPRIM 800; 160 MG/1; MG/1
1 TABLET ORAL 2 TIMES DAILY
Qty: 20 TABLET | Refills: 0 | Status: SHIPPED | OUTPATIENT
Start: 2023-02-03 | End: 2023-02-13

## 2023-02-03 RX ORDER — SULFAMETHOXAZOLE AND TRIMETHOPRIM 800; 160 MG/1; MG/1
1 TABLET ORAL ONCE
Status: COMPLETED | OUTPATIENT
Start: 2023-02-03 | End: 2023-02-03

## 2023-02-03 RX ORDER — CEPHALEXIN 250 MG/1
500 CAPSULE ORAL ONCE
Status: DISCONTINUED | OUTPATIENT
Start: 2023-02-03 | End: 2023-02-03

## 2023-02-03 RX ADMIN — IOHEXOL 100 ML: 350 INJECTION, SOLUTION INTRAVENOUS at 16:30

## 2023-02-03 RX ADMIN — SULFAMETHOXAZOLE AND TRIMETHOPRIM 1 TABLET: 800; 160 TABLET ORAL at 18:42

## 2023-02-03 RX ADMIN — ACETAMINOPHEN 650 MG: 325 TABLET, FILM COATED ORAL at 15:33

## 2023-02-03 NOTE — DISCHARGE INSTRUCTIONS
Follow up with PCP  Follow up with urology  Increase water intake  Return to the ED with new or worsening symptoms including but not limited to worsening urinary symptoms, pain, fevers, decreased oral intake

## 2023-02-03 NOTE — TELEPHONE ENCOUNTER
Luzfritz Burdick is a 79-year-old female was evaluated by emergency room for UTI  Incidental CT finding of urethral diverticulum  Please contact patient with office follow-up in approximately 3--4 weeks  Thank you

## 2023-02-03 NOTE — ED PROVIDER NOTES
History  Chief Complaint   Patient presents with   • Abdominal Pain     Daughter states pt has been sick with abd pain and fevers for the past 2 days went to her pcp and was sent here     Patient is a 72-year-old female with a past medical history of type II diabetes, hypertension, kidney stone, sleep apnea presenting to the emergency department for evaluation of abdominal pain  Reports for the past 2 days she has had right-sided lower abdominal pain radiating to her right flank  Reports she went to the PCP and was told she has a UTI  Reports she was placed on Macrobid and has taken 2 doses of the medication so far  Reports she has had intermittent sweats and chills for the past day  Denies urinary symptoms  Denies fevers, chills, rash, headache, weakness, dizziness, visual changes, nausea, vomiting, diarrhea, constipation, chest pain, shortness of breath or difficulty breathing  Does not offer any other concerns or complaints  History provided by:  Patient   used: No    Abdominal Pain  Pain location:  RLQ and R flank  Pain quality: aching    Associated symptoms: no chest pain, no chills, no constipation, no cough, no diarrhea, no dysuria, no fever, no hematuria, no nausea, no shortness of breath, no sore throat and no vomiting        Prior to Admission Medications   Prescriptions Last Dose Informant Patient Reported? Taking? Advair Diskus 250-50 MCG/ACT inhaler   No No   Sig: INHALE 1 PUFF 2 TIMES A DAY RINSE MOUTH AFTER USE     Aspirin Low Dose 81 MG chewable tablet   No No   Sig: CHEW 1 TABLET BY MOUTH DAILY   LORazepam (ATIVAN) 1 mg tablet   No No   Sig: Take 0 5 tablets (0 5 mg total) by mouth every 8 (eight) hours as needed for anxiety for up to 7 days   Lyrica  MG TB24   No No   Sig: TAKE 1 TABLET BY MOUTH TWICE A DAY   ProAir  (90 Base) MCG/ACT inhaler   No No   Sig: Inhale 1 puff every 6 (six) hours as needed for wheezing   Promethazine-DM (PHENERGAN-DM) 6 25-15 mg/5 mL oral syrup   No No   Sig: Take 5 mL by mouth 4 (four) times a day as needed for cough   QUEtiapine (SEROquel) 50 mg tablet   No No   Sig: Take 1 tablet (50 mg total) by mouth daily at bedtime   SITagliptin-metFORMIN HCl ER (Janumet XR) 100-1000 MG TB24   No No   Sig: Take 1 tablet by mouth daily with dinner   acetaminophen (TYLENOL) 325 mg tablet   No No   Sig: Take 2 tablets (650 mg total) by mouth every 4 (four) hours as needed for mild pain   amLODIPine (NORVASC) 10 mg tablet   No No   Sig: Take 1 tablet (10 mg total) by mouth daily at bedtime   benzonatate (TESSALON PERLES) 100 mg capsule   No No   Sig: Take 1 capsule (100 mg total) by mouth 3 (three) times a day as needed for cough   bisacodyl (DULCOLAX) 5 mg EC tablet   No No   Sig: Take 1 tablet (5 mg total) by mouth as needed in the morning for constipation  carbamide peroxide (DEBROX) 6 5 % otic solution   No No   Sig: Administer 5 drops into both ears 2 (two) times a day   carvedilol (COREG) 6 25 mg tablet   No No   Sig: TAKE 1 TABLET BY MOUTH TWICE A DAY WITH MEALS   chlorthalidone 25 mg tablet   No No   Sig: TAKE 1 TABLET (25 MG TOTAL) BY MOUTH DAILY  fluconazole (DIFLUCAN) 150 mg tablet   Yes No   Sig: Take 150 mg by mouth once   glucose blood test strip   Yes No   Sig: E11 9 / testing daily   hydrOXYzine HCL (ATARAX) 10 mg tablet   Yes No   Sig: Take 10 mg by mouth every 6 (six) hours as needed   latanoprost (XALATAN) 0 005 % ophthalmic solution   Yes No   meloxicam (MOBIC) 15 mg tablet   No No   Sig: TAKE 1 TABLET (15 MG TOTAL) BY MOUTH DAILY     montelukast (SINGULAIR) 10 mg tablet   No No   Sig: TAKE 1 TABLET BY MOUTH DAILY AT BEDTIME   nitrofurantoin (MACROBID) 100 mg capsule   No No   Sig: Take 1 capsule (100 mg total) by mouth 2 (two) times a day for 5 days   pantoprazole (PROTONIX) 40 mg tablet   No No   Sig: TAKE 1 TABLET BY MOUTH EVERY DAY   sertraline (ZOLOFT) 50 mg tablet   No No   Sig: Take 1 tablet (50 mg total) by mouth daily traMADol (Ultram) 50 mg tablet   No No   Sig: Take 1 tablet (50 mg total) by mouth every 6 (six) hours as needed for moderate pain   zolpidem (AMBIEN) 5 mg tablet   No No   Sig: Take 1 tablet (5 mg total) by mouth daily at bedtime as needed for sleep      Facility-Administered Medications: None       Past Medical History:   Diagnosis Date   • Asthma    • Benign hypertension 2018   • Cardiac arrest ([facility] [address] )    • Diabetes mellitus ([facility] )    • Glaucoma    • Hypertension    • Kidney stone    • Neuropathy    • Sleep apnea     no machine   • SOB (shortness of breath)    • Tubular adenoma of colon 10/2019   • Wheezing        Past Surgical History:   Procedure Laterality Date   •  SECTION     • COLONOSCOPY     • HYSTERECTOMY     • IR BIOPSY BONE MARROW  2022   • TONSILLECTOMY         Family History   Problem Relation Age of Onset   • Diabetes Mother    • Hypertension Father    • Prostate cancer Father    • Diabetes Sister    • Hypertension Sister    • Breast cancer Sister 62   • Diabetes Brother    • Hypertension Brother    • Asthma Family    • No Known Problems Daughter    • No Known Problems Sister    • No Known Problems Daughter    • No Known Problems Daughter    • No Known Problems Maternal Aunt    • Breast cancer Maternal Aunt 62   • No Known Problems Maternal Aunt    • No Known Problems Maternal Aunt    • No Known Problems Paternal Aunt    • No Known Problems Paternal Aunt    • No Known Problems Paternal Aunt    • Colon cancer Neg Hx    • Ovarian cancer Neg Hx    • Uterine cancer Neg Hx    • Cervical cancer Neg Hx      I have reviewed and agree with the history as documented      E-Cigarette/Vaping   • E-Cigarette Use Never User      E-Cigarette/Vaping Substances   • Nicotine No    • THC No    • CBD No    • Flavoring No    • Other No    • Unknown No      Social History     Tobacco Use   • Smoking status: Never   • Smokeless tobacco: Never   Vaping Use   • Vaping Use: Never used   Substance Use Topics   • Alcohol use: Never   • Drug use: No       Review of Systems   Constitutional: Negative for chills and fever  HENT: Negative for ear pain and sore throat  Eyes: Negative for pain and visual disturbance  Respiratory: Negative for cough and shortness of breath  Cardiovascular: Negative for chest pain and palpitations  Gastrointestinal: Positive for abdominal pain  Negative for constipation, diarrhea, nausea and vomiting  Genitourinary: Positive for flank pain  Negative for difficulty urinating, dysuria, frequency, hematuria, pelvic pain and urgency  Musculoskeletal: Negative for arthralgias and back pain  Skin: Negative for color change and rash  Neurological: Negative for seizures, syncope and weakness  All other systems reviewed and are negative  Physical Exam  Physical Exam  Vitals and nursing note reviewed  Constitutional:       General: She is not in acute distress  Appearance: Normal appearance  She is well-developed  She is not ill-appearing, toxic-appearing or diaphoretic  HENT:      Head: Normocephalic and atraumatic  Right Ear: External ear normal       Left Ear: External ear normal       Nose: Nose normal       Mouth/Throat:      Mouth: Mucous membranes are moist    Eyes:      General: No scleral icterus  Right eye: No discharge  Left eye: No discharge  Conjunctiva/sclera: Conjunctivae normal    Cardiovascular:      Rate and Rhythm: Normal rate and regular rhythm  Heart sounds: No murmur heard  Pulmonary:      Effort: Pulmonary effort is normal  No respiratory distress  Breath sounds: Normal breath sounds  Abdominal:      Palpations: Abdomen is soft  Tenderness: There is abdominal tenderness in the right lower quadrant  There is right CVA tenderness  Musculoskeletal:         General: No swelling, deformity or signs of injury  Normal range of motion  Cervical back: Normal range of motion and neck supple   No rigidity  Skin:     General: Skin is warm and dry  Capillary Refill: Capillary refill takes less than 2 seconds  Coloration: Skin is not jaundiced  Findings: No erythema or rash  Neurological:      General: No focal deficit present  Mental Status: She is alert and oriented to person, place, and time  Mental status is at baseline  Cranial Nerves: No cranial nerve deficit  Gait: Gait normal    Psychiatric:         Mood and Affect: Mood normal          Behavior: Behavior normal          Thought Content: Thought content normal          Judgment: Judgment normal          Vital Signs  ED Triage Vitals   Temperature Pulse Respirations Blood Pressure SpO2   02/03/23 1443 02/03/23 1443 02/03/23 1443 02/03/23 1443 02/03/23 1443   97 6 °F (36 4 °C) 71 18 140/64 96 %      Temp src Heart Rate Source Patient Position - Orthostatic VS BP Location FiO2 (%)   -- 02/03/23 1730 02/03/23 1730 02/03/23 1730 --    Monitor Lying Right arm       Pain Score       02/03/23 1533       9           Vitals:    02/03/23 1443 02/03/23 1730 02/03/23 1800   BP: 140/64 154/67 144/62   Pulse: 71 64 (!) 50   Patient Position - Orthostatic VS:  Lying Lying         Visual Acuity      ED Medications  Medications   acetaminophen (TYLENOL) tablet 650 mg (650 mg Oral Given 2/3/23 1533)   iohexol (OMNIPAQUE) 350 MG/ML injection (SINGLE-DOSE) 100 mL (100 mL Intravenous Given 2/3/23 1630)   sulfamethoxazole-trimethoprim (BACTRIM DS) 800-160 mg per tablet 1 tablet (1 tablet Oral Given 2/3/23 1842)       Diagnostic Studies  Results Reviewed     Procedure Component Value Units Date/Time    Urine Microscopic [[id number]]  (Abnormal) Collected: 02/03/23 1730    Lab Status: Final result Specimen: Urine, Clean Catch Updated: 02/03/23 1745     RBC, UA 20-30 /hpf      WBC, UA Innumerable /hpf      Epithelial Cells Occasional /hpf      Bacteria, UA None Seen /hpf     Urine culture [[id number]] Collected: 02/03/23 1730    Lab Status:  In process Specimen: Urine, Clean Catch Updated: 02/03/23 1745    UA w Reflex to Microscopic w Reflex to Culture [[id number]]  (Abnormal) Collected: 02/03/23 1730    Lab Status: Final result Specimen: Urine, Clean Catch Updated: 02/03/23 1744     Color, UA Yellow     Clarity, UA Clear     Specific Gravity, UA >=1 050     pH, UA 6 0     Leukocytes, UA Large     Nitrite, UA Negative     Protein, UA Trace mg/dl      Glucose, UA Negative mg/dl      Ketones, UA Negative mg/dl      Urobilinogen, UA <2 0 mg/dl      Bilirubin, UA Negative     Occult Blood, UA Moderate    FLU/RSV/COVID - if FLU/RSV clinically relevant [[id number]]  (Normal) Collected: 02/03/23 1535    Lab Status: Final result Specimen: Nares from Nose Updated: 02/03/23 1640     SARS-CoV-2 Negative     INFLUENZA A PCR Negative     INFLUENZA B PCR Negative     RSV PCR Negative    Narrative:      FOR PEDIATRIC PATIENTS - copy/paste COVID Guidelines URL to browser: [email or web address]  [employer]    SARS-CoV-2 assay is a Nucleic Acid Amplification assay intended for the  qualitative detection of nucleic acid from SARS-CoV-2 in nasopharyngeal  swabs  Results are for the presumptive identification of SARS-CoV-2 RNA  Positive results are indicative of infection with SARS-CoV-2, the virus  causing COVID-19, but do not rule out bacterial infection or co-infection  with other viruses  Laboratories within the [place] and its  territories are required to report all positive results to the appropriate  public health authorities  Negative results do not preclude SARS-CoV-2  infection and should not be used as the sole basis for treatment or other  patient management decisions  Negative results must be combined with  clinical observations, patient history, and epidemiological information  This test has not been FDA cleared or approved  This test has been authorized by FDA under an Emergency Use Authorization  (EUA)   This test is only authorized for the duration of time the  declaration that circumstances exist justifying the authorization of the  emergency use of an in vitro diagnostic tests for detection of SARS-CoV-2  virus and/or diagnosis of COVID-19 infection under section 564(b)(1) of  the Act, 21 U  S C  717VEK-7(Z)(3), unless the authorization is terminated  or revoked sooner  The test has been validated but independent review by FDA  and CLIA is pending  Test performed using [employer] GeneXpert: This RT-PCR assay targets N2,  a region unique to SARS-CoV-2  A conserved region in the E-gene was chosen  for pan-Sarbecovirus detection which includes SARS-CoV-2  According to CMS-2020-01-R, this platform meets the definition of high-throughput technology      Comprehensive metabolic panel [[id number]]  (Abnormal) Collected: 02/03/23 0467    Lab Status: Final result Specimen: Blood from Arm, Left Updated: 02/03/23 1616     Sodium 134 mmol/L      Potassium 3 9 mmol/L      Chloride 100 mmol/L      CO2 26 mmol/L      ANION GAP 8 mmol/L      BUN 18 mg/dL      Creatinine 1 09 mg/dL      Glucose 140 mg/dL      Calcium 9 4 mg/dL      AST 27 U/L      ALT 29 U/L      Alkaline Phosphatase 70 U/L      Total Protein 8 7 g/dL      Albumin 3 6 g/dL      Total Bilirubin 0 50 mg/dL      eGFR 49 ml/min/1 73sq m     Narrative:      [place] guidelines for Chronic Kidney Disease (CKD):   •  Stage 1 with normal or high GFR (GFR > 90 mL/min/1 73 square meters)  •  Stage 2 Mild CKD (GFR = 60-89 mL/min/1 73 square meters)  •  Stage 3A Moderate CKD (GFR = 45-59 mL/min/1 73 square meters)  •  Stage 3B Moderate CKD (GFR = 30-44 mL/min/1 73 square meters)  •  Stage 4 Severe CKD (GFR = 15-29 mL/min/1 73 square meters)  •  Stage 5 End Stage CKD (GFR <15 mL/min/1 73 square meters)  Note: GFR calculation is accurate only with a steady state creatinine    Lipase [[id number]]  (Normal) Collected: 02/03/23 2008    Lab Status: Final result Specimen: Blood from Arm, Left Updated: 02/03/23 1616     Lipase 169 u/L     CBC and differential [[id number]]  (Abnormal) Collected: 02/03/23 1553    Lab Status: Final result Specimen: Blood from Arm, Left Updated: 02/03/23 1601     WBC 12 40 Thousand/uL      RBC 3 92 Million/uL      Hemoglobin 11 5 g/dL      Hematocrit 35 5 %      MCV 91 fL      MCH 29 3 pg      MCHC 32 4 g/dL      RDW 14 6 %      MPV 9 6 fL      Platelets 997 Thousands/uL      nRBC 0 /100 WBCs      Neutrophils Relative 80 %      Immat GRANS % 0 %      Lymphocytes Relative 13 %      Monocytes Relative 6 %      Eosinophils Relative 1 %      Basophils Relative 0 %      Neutrophils Absolute 9 95 Thousands/µL      Immature Grans Absolute 0 04 Thousand/uL      Lymphocytes Absolute 1 56 Thousands/µL      Monocytes Absolute 0 72 Thousand/µL      Eosinophils Absolute 0 11 Thousand/µL      Basophils Absolute 0 02 Thousands/µL                  CT abdomen pelvis with contrast   Final Result by Gauri Oneal MD (02/03 1707)      Bilateral nonobstructing renal calculi  No obstructing calculi in the collecting systems  No acute findings  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 0 Filed at: 02/03/2023 1444   In the last 24 hours, have you needed more help than usual? 0 Filed at: 02/03/2023 1444   Have you been hospitalized for one or more nights during the past 6 months? 0 Filed at: 02/03/2023 1444   In general, do you see well? 0 Filed at: 02/03/2023 1444   In general, do you have serious problems with your memory?  0 Filed at: 02/03/2023 1444   Do you take more than three different medications every day? 1 Filed at: 02/03/2023 1444   ISAR Score 1 Filed at: 02/03/2023 1444                      SBIRT 20yo+    Flowsheet Row Most Recent Value   SBIRT (23 yo +)    In order to provide better care to our patients, we are screening all of our patients for alcohol and drug use  Would it be okay to ask you these screening questions? Yes Filed at: 02/03/2023 1900   Initial Alcohol Screen: US AUDIT-C     1  How often do you have a drink containing alcohol? 0 Filed at: 02/03/2023 1900   2  How many drinks containing alcohol do you have on a typical day you are drinking? 0 Filed at: 02/03/2023 1900   3b  FEMALE Any Age, or MALE 65+: How often do you have 4 or more drinks on one occassion? 0 Filed at: 02/03/2023 1900   Audit-C Score 0 Filed at: 02/03/2023 1900   BING: How many times in the past year have you    Used an illegal drug or used a prescription medication for non-medical reasons? Never Filed at: 02/03/2023 1900                    Medical Decision Making    This is a 72-year-old female presenting to the emergency department for evaluation of abdominal pain  Reports that the past 2 days she has had right sided lower abdominal pain rating to her right flank  Reports she was recently put on Macrobid for UTI  Reports she has been having intermittent sweats and chills  Reports she is taken 2 doses of the medication so far  Differential diagnosis to include but is not limited to: UTI, pyelonephritis, hydronephrosis, nephrolithiasis, cystitis, diverticulitis, gastroenteritis, COVID/flu/RSV    Initial ED Plan: CBC, CMP, UA, CT abdomen pelvis, UA lipase, COVID/flu/RSV    ED results: Innumerable WBC on UA  Bilateral nonobstructing renal calculi, no obstructing calculi in the collecting systems  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  -Discussed with urology, Jos Chaudhary, will call to schedule the patient for outpatient follow-up  Final ED assessment: Patient is stable and well appearing   Discussed radiologic studies and laboratory results  Discussed follow-up with PCP and urology  Discussed changing antibiotic to Bactrim  Strict return precautions were discussed including but not limited to worsening abdominal pain, fevers, urinary symptoms  Patient verbalized understanding and is agreeable with the plan for discharge  Pyelonephritis: acute illness or injury  Amount and/or Complexity of Data Reviewed  Labs: ordered  Radiology: ordered  Risk  OTC drugs  Prescription drug management  Disposition  Final diagnoses:   Pyelonephritis     Time reflects when diagnosis was documented in both MDM as applicable and the Disposition within this note     Time User Action Codes Description Comment    2/3/2023  6:36 PM Kirt Ngo Add [N12] Pyelonephritis       ED Disposition     ED Disposition   Discharge    Condition   Stable    Date/Time   Fri Feb 3, 2023  6:32 PM    Comment   Sandor Pantoja discharge to home/self care                 Follow-up Information     Follow up With Specialties Details Why Contact Info Additional [address] [facility] [address] [place] For Urology [facility] Urology Follow up in 3 week(s) Service will contact patient/caregiver with a hospital follow-up appointment date and time once discharged [address] [facility] [phone]  [address] [place] [address] For Urology [facility], [phone], [address], [facility], [facility], [address]    ALEKSANDER Francisco Nurse Practitioner, Family Medicine Call in 3 days For follow up [address] [facility], [place], [facility] [address] [facility]       [address] [facility] Emergency Department Emergency Medicine Go to  If symptoms worsen [address] [facility] Emergency Department, [address] [facility], [address] For Urology [facility] Urology Call in 3 days For follow up [address] [place],  [phone] [facility],[address] [phone]  [address]  [facility] For Urology [address]  [address] [facility], [address], [place], [place], [address]          Discharge Medication List as of 2/3/2023  6:38 PM      START taking these medications    Details   sulfamethoxazole-trimethoprim (BACTRIM DS) 800-160 mg per tablet Take 1 tablet by mouth 2 (two) times a day for 10 days smx-tmp DS (BACTRIM) 800-160 mg tabs (1tab q12 D10), Starting Fri 2/3/2023, Until Mon 2/13/2023, Normal         CONTINUE these medications which have NOT CHANGED    Details   acetaminophen (TYLENOL) 325 mg tablet Take 2 tablets (650 mg total) by mouth every 4 (four) hours as needed for mild pain, Starting Tue 4/19/2022, No Print      Advair Diskus 250-50 MCG/ACT inhaler INHALE 1 PUFF 2 TIMES A DAY RINSE MOUTH AFTER USE , Normal      amLODIPine (NORVASC) 10 mg tablet Take 1 tablet (10 mg total) by mouth daily at bedtime, Starting Tue 10/25/2022, Until Thu 11/24/2022, Normal      Aspirin Low Dose 81 MG chewable tablet CHEW 1 TABLET BY MOUTH DAILY, Normal      benzonatate (TESSALON PERLES) 100 mg capsule Take 1 capsule (100 mg total) by mouth 3 (three) times a day as needed for cough, Starting Wed 6/22/2022, Normal      bisacodyl (DULCOLAX) 5 mg EC tablet Take 1 tablet (5 mg total) by mouth as needed in the morning for constipation  , Starting Wed 5/11/2022, Normal      carbamide peroxide (DEBROX) 6 5 % otic solution Administer 5 drops into both ears 2 (two) times a day, Starting Thu 12/29/2022, Normal      carvedilol (COREG) 6 25 mg tablet TAKE 1 TABLET BY MOUTH TWICE A DAY WITH MEALS, Normal      chlorthalidone 25 mg tablet TAKE 1 TABLET (25 MG TOTAL) BY MOUTH DAILY  , Starting Mon 12/5/2022, Normal      fluconazole (DIFLUCAN) 150 mg tablet Take 150 mg by mouth once, Starting Wed 5/18/2022, Historical Med      glucose blood test strip E11 9 / testing daily, Historical Med      hydrOXYzine HCL (ATARAX) 10 mg tablet Take 10 mg by mouth every 6 (six) hours as needed, Starting Fri 12/17/2021, Historical Med      latanoprost (XALATAN) 0 005 % ophthalmic solution Starting Fri 9/24/2021, Historical Med      LORazepam (ATIVAN) 1 mg tablet Take 0 5 tablets (0 5 mg total) by mouth every 8 (eight) hours as needed for anxiety for up to 7 days, Starting Tue 4/19/2022, Until Tue 4/26/2022 at 2359, Print      Lyrica  MG TB24 TAKE 1 TABLET BY MOUTH TWICE A DAY, Normal      meloxicam (MOBIC) 15 mg tablet TAKE 1 TABLET (15 MG TOTAL) BY MOUTH DAILY  , Starting Wed 1/25/2023, Normal      montelukast (SINGULAIR) 10 mg tablet TAKE 1 TABLET BY MOUTH DAILY AT BEDTIME, Normal      nitrofurantoin (MACROBID) 100 mg capsule Take 1 capsule (100 mg total) by mouth 2 (two) times a day for 5 days, Starting Thu 2/2/2023, Until Tue 2/7/2023, Normal      pantoprazole (PROTONIX) 40 mg tablet TAKE 1 TABLET BY MOUTH EVERY DAY, Normal      ProAir  (90 Base) MCG/ACT inhaler Inhale 1 puff every 6 (six) hours as needed for wheezing, Starting Thu 12/29/2022, Normal      Promethazine-DM (PHENERGAN-DM) 6 25-15 mg/5 mL oral syrup Take 5 mL by mouth 4 (four) times a day as needed for cough, Starting Tue 1/3/2023, Normal      QUEtiapine (SEROquel) 50 mg tablet Take 1 tablet (50 mg total) by mouth daily at bedtime, Starting Thu 12/8/2022, Normal      sertraline (ZOLOFT) 50 mg tablet Take 1 tablet (50 mg total) by mouth daily, Starting Thu 12/29/2022, Normal      SITagliptin-metFORMIN HCl ER (Janumet XR) 100-1000 MG TB24 Take 1 tablet by mouth daily with dinner, Starting Fri 10/7/2022, Normal      traMADol (Ultram) 50 mg tablet Take 1 tablet (50 mg total) by mouth every 6 (six) hours as needed for moderate pain, Starting Tue 5/10/2022, Normal      zolpidem (AMBIEN) 5 mg tablet Take 1 tablet (5 mg total) by mouth daily at bedtime as needed for sleep, Starting Fri 8/26/2022, Normal             No discharge procedures on file      PDMP Review       Value Time User    PDMP Reviewed  Yes 8/26/2022  8:41 AM Evan Rodriguez, [address]           ED Provider  Electronically Signed by           Nery Lara PA-C  02/03/23 9663

## 2023-02-04 LAB — BACTERIA UR CULT: ABNORMAL

## 2023-02-05 LAB — BACTERIA UR CULT: NORMAL

## 2023-02-06 LAB
BACTERIA UR CULT: ABNORMAL
BACTERIA UR CULT: ABNORMAL

## 2023-02-06 NOTE — TELEPHONE ENCOUNTER
Please Triage  New Patient    What is the reason for the patient’s appointment? UTI/bladder infection     What office location does the patient prefer? [place]     Imaging/Lab Results:    Do we accept the patient's insurance or is the patient Self-Pay? Yes     Insurance Provider: [employer]   Plan Type/Number:  Member ID#: Has the patient had any previous Urologist(s)?no    Have patient records been requested? If not are records showing in Epic:     Has the patient had any outside testing done? No     Does the patient have a personal history of cancer?  No

## 2023-02-23 NOTE — PROGRESS NOTES
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  - MRI pelvis ordered today  - Follow up for cystoscopy  - Call with any questions or concerns in the meantime  - All questions answered; patient understands and agrees with plan       History of Present Illness  Marcel Landa is a 70 y o  female new patient here for evaluation of recurrent UTI  Patient was recently hospitalized for UTI and pyelonephritis  She was treated adequately with antibiotics  States she does have urinary tract infections rarely  Denies prior  surgical manipulation  Did have hysterectomy in the past   While in the hospital, she had a CT scan showing bilateral nonobstructing intrarenal calculi as well as with 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  Denies pelvic pain, gross hematuria, dysuria, fever, chills, nausea, vomiting  Denies family history of  malignancies  Denies seeing urology in the past      Review of Systems   Constitutional: Negative for activity change, appetite change, chills and fever  HENT: Negative for congestion and trouble swallowing  Respiratory: Negative for cough and shortness of breath  Cardiovascular: Negative for chest pain, palpitations and leg swelling  Gastrointestinal: Negative for abdominal pain, constipation, diarrhea, nausea and vomiting  Genitourinary: Negative for difficulty urinating, dysuria, flank pain, frequency, hematuria and urgency  Musculoskeletal: Negative for back pain and gait problem  Skin: Negative for wound  Allergic/Immunologic: Negative for immunocompromised state  Neurological: Negative for dizziness and syncope  Hematological: Does not bruise/bleed easily     Psychiatric/Behavioral: Negative for confusion  All other systems reviewed and are negative  Vitals  Vitals:    02/27/23 1300   BP: 138/70   BP Location: Left arm   Patient Position: Sitting   Cuff Size: Large   Pulse: 65   SpO2: 97%   Weight: 91 6 kg (202 lb)   Height: 5' 2" (1 575 m)       Physical Exam  Constitutional:       General: She is not in acute distress  Appearance: Normal appearance  She is not ill-appearing, toxic-appearing or diaphoretic  HENT:      Head: Normocephalic  Nose: No congestion  Eyes:      General: No scleral icterus  Right eye: No discharge  Left eye: No discharge  Conjunctiva/sclera: Conjunctivae normal       Pupils: Pupils are equal, round, and reactive to light  Pulmonary:      Effort: Pulmonary effort is normal    Musculoskeletal:      Cervical back: Normal range of motion  Skin:     General: Skin is warm and dry  Coloration: Skin is not jaundiced or pale  Findings: No bruising, erythema, lesion or rash  Neurological:      General: No focal deficit present  Mental Status: She is alert and oriented to person, place, and time  Mental status is at baseline  Gait: Gait normal    Psychiatric:         Mood and Affect: Mood normal          Behavior: Behavior normal          Thought Content:  Thought content normal          Judgment: Judgment normal            Past History  Past Medical History:   Diagnosis Date   • Asthma    • Benign hypertension 11/30/2018   • Cardiac arrest ([facility] [address] )    • Diabetes mellitus ([facility] [address] )    • Glaucoma    • Hypertension    • Kidney stone    • Neuropathy    • Sleep apnea     no machine   • SOB (shortness of breath)    • Tubular adenoma of colon 10/2019   • Wheezing      Social History     Socioeconomic History   • Marital status: /Civil Union     Spouse name: None   • Number of children: None   • Years of education: None   • Highest education level: None   Occupational History   • Occupation: retired    Tobacco Use   • Smoking status: Never   • Smokeless tobacco: Never   Vaping Use   • Vaping Use: Never used   Substance and Sexual Activity   • Alcohol use: Never   • Drug use: No   • Sexual activity: Yes     Birth control/protection: Surgical   Other Topics Concern   • None   Social History Narrative   • None     Social Determinants of Health     Financial Resource Strain: Not on file   Food Insecurity: No Food Insecurity   • Worried About Running Out of Food in the Last Year: Never true   • Ran Out of Food in the Last Year: Never true   Transportation Needs: No Transportation Needs   • Lack of Transportation (Medical): No   • Lack of Transportation (Non-Medical):  No   Physical Activity: Not on file   Stress: Not on file   Social Connections: Not on file   Intimate Partner Violence: Not on file   Housing Stability: Low Risk    • Unable to Pay for Housing in the Last Year: No   • Number of Places Lived in the Last Year: 1   • Unstable Housing in the Last Year: No     Social History     Tobacco Use   Smoking Status Never   Smokeless Tobacco Never     Family History   Problem Relation Age of Onset   • Diabetes Mother    • Hypertension Father    • Prostate cancer Father    • Diabetes Sister    • Hypertension Sister    • Breast cancer Sister 62   • Diabetes Brother    • Hypertension Brother    • Asthma Family    • No Known Problems Daughter    • No Known Problems Sister    • No Known Problems Daughter    • No Known Problems Daughter    • No Known Problems Maternal Aunt    • Breast cancer Maternal Aunt 62   • No Known Problems Maternal Aunt    • No Known Problems Maternal Aunt    • No Known Problems Paternal Aunt    • No Known Problems Paternal Aunt    • No Known Problems Paternal Aunt    • Colon cancer Neg Hx    • Ovarian cancer Neg Hx    • Uterine cancer Neg Hx    • Cervical cancer Neg Hx        The following portions of the patient's history were reviewed and updated as appropriate: allergies, current medications, past medical history, past social history, past surgical history and problem list     Results  No results found for this or any previous visit (from the past 1 hour(s))  ]  No results found for: PSA  Lab Results   Component Value Date    CALCIUM 9 4 02/03/2023    K 3 9 02/03/2023    CO2 26 02/03/2023     02/03/2023    BUN 18 02/03/2023    CREATININE 1 09 02/03/2023     Lab Results   Component Value Date    WBC 12 40 (H) 02/03/2023    HGB 11 5 02/03/2023    HCT 35 5 02/03/2023    MCV 91 02/03/2023     02/03/2023       Brianna Hutchins PA-C

## 2023-02-27 ENCOUNTER — OFFICE VISIT (OUTPATIENT)
Dept: UROLOGY | Facility: CLINIC | Age: 78
End: 2023-02-27

## 2023-02-27 VITALS
WEIGHT: 202 LBS | BODY MASS INDEX: 37.17 KG/M2 | OXYGEN SATURATION: 97 % | HEIGHT: 62 IN | HEART RATE: 65 BPM | SYSTOLIC BLOOD PRESSURE: 138 MMHG | DIASTOLIC BLOOD PRESSURE: 70 MMHG

## 2023-02-27 DIAGNOSIS — N36.1 URETHRAL DIVERTICULUM: Primary | ICD-10-CM

## 2023-03-02 ENCOUNTER — TELEPHONE (OUTPATIENT)
Dept: FAMILY MEDICINE CLINIC | Facility: CLINIC | Age: 78
End: 2023-03-02

## 2023-03-02 NOTE — TELEPHONE ENCOUNTER
Patient called requesting a script for B/L knee X-rays  She saw the chiropractor today and they suggested that she gets x-rays done  She is scheduled to go back to the chiropractor next Thursday

## 2023-03-04 DIAGNOSIS — F51.01 PRIMARY INSOMNIA: ICD-10-CM

## 2023-03-04 PROBLEM — J06.9 ACUTE URI: Status: RESOLVED | Noted: 2022-06-22 | Resolved: 2023-03-04

## 2023-03-06 RX ORDER — QUETIAPINE FUMARATE 50 MG/1
50 TABLET, FILM COATED ORAL
Qty: 90 TABLET | Refills: 1 | Status: SHIPPED | OUTPATIENT
Start: 2023-03-06

## 2023-03-07 ENCOUNTER — OFFICE VISIT (OUTPATIENT)
Dept: FAMILY MEDICINE CLINIC | Facility: CLINIC | Age: 78
End: 2023-03-07

## 2023-03-07 VITALS
DIASTOLIC BLOOD PRESSURE: 68 MMHG | SYSTOLIC BLOOD PRESSURE: 130 MMHG | HEIGHT: 62 IN | WEIGHT: 199 LBS | HEART RATE: 75 BPM | BODY MASS INDEX: 36.62 KG/M2 | OXYGEN SATURATION: 95 % | TEMPERATURE: 99.9 F

## 2023-03-07 DIAGNOSIS — J06.9 ACUTE UPPER RESPIRATORY INFECTION, UNSPECIFIED: ICD-10-CM

## 2023-03-07 DIAGNOSIS — R68.89 FLU-LIKE SYMPTOMS: ICD-10-CM

## 2023-03-07 DIAGNOSIS — E11.40 CONTROLLED TYPE 2 DIABETES WITH NEUROPATHY (HCC): ICD-10-CM

## 2023-03-07 DIAGNOSIS — J06.9 ACUTE URI: Primary | ICD-10-CM

## 2023-03-07 LAB
SARS-COV-2 AG UPPER RESP QL IA: NEGATIVE
VALID CONTROL: NORMAL

## 2023-03-07 RX ORDER — METHYLPREDNISOLONE 4 MG/1
TABLET ORAL
Qty: 21 EACH | Refills: 0 | Status: SHIPPED | OUTPATIENT
Start: 2023-03-07

## 2023-03-07 RX ORDER — AZITHROMYCIN 250 MG/1
TABLET, FILM COATED ORAL
Qty: 6 TABLET | Refills: 0 | Status: SHIPPED | OUTPATIENT
Start: 2023-03-07 | End: 2023-03-12

## 2023-03-07 NOTE — PATIENT INSTRUCTIONS
Take 2 Zithromax pills today and then 1 for the next 4 days  Follow instructions for the Medrol Dosepak  Drink a lot of tea with honey use some Vicks and hot water to help decongest lots of liquids  Acute Bronchitis   WHAT YOU NEED TO KNOW:   Acute bronchitis is swelling and irritation in your lungs  It is usually caused by a virus and most often happens in the winter  Bronchitis may also be caused by bacteria or by a chemical irritant, such as smoke  DISCHARGE INSTRUCTIONS:   Return to the emergency department if:   You cough up blood  Your lips or fingernails turn blue  You feel like you are not getting enough air when you breathe  Call your doctor if:   Your symptoms do not go away or get worse, even after treatment  Your cough does not get better within 4 weeks  You have questions or concerns about your condition or care  Medicines: You may  need any of the following:  Cough suppressants  decrease your urge to cough  Decongestants  help loosen mucus in your lungs and make it easier to cough up  This can help you breathe easier  Inhalers  may be given  Your healthcare provider may give you one or more inhalers to help you breathe easier and cough less  An inhaler gives your medicine to open your airways  Ask your healthcare provider to show you how to use your inhaler correctly  Antibiotics  may be given for up to 5 days if your bronchitis is caused by bacteria  Acetaminophen  decreases pain and fever  It is available without a doctor's order  Ask how much to take and how often to take it  Follow directions  Read the labels of all other medicines you are using to see if they also contain acetaminophen, or ask your doctor or pharmacist  Acetaminophen can cause liver damage if not taken correctly  NSAIDs  help decrease swelling and pain or fever  This medicine is available with or without a doctor's order   NSAIDs can cause stomach bleeding or kidney problems in certain people  If you take blood thinner medicine, always ask your healthcare provider if NSAIDs are safe for you  Always read the medicine label and follow directions  Take your medicine as directed  Contact your healthcare provider if you think your medicine is not helping or if you have side effects  Tell your provider if you are allergic to any medicine  Keep a list of the medicines, vitamins, and herbs you take  Include the amounts, and when and why you take them  Bring the list or the pill bottles to follow-up visits  Carry your medicine list with you in case of an emergency  Self-care:   Drink liquids as directed  You may need to drink more liquids than usual to stay hydrated  Ask how much liquid to drink each day and which liquids are best for you  Use a cool mist humidifier  to increase air moisture in your home  This may make it easier for you to breathe and help decrease your cough  Get more rest   Rest helps your body to heal  Slowly start to do more each day  Rest when you feel it is needed  Avoid irritants in the air  Avoid chemicals, fumes, and dust  Wear a face mask if you must work around dust or fumes  Stay inside on days when air pollution levels are high  If you have allergies, stay inside when pollen counts are high  Do not use aerosol products, such as spray-on deodorant, bug spray, and hair spray  Do not smoke or be around others who are smoking  Nicotine and other chemicals in cigarettes and cigars can cause lung damage  Ask your healthcare provider for information if you currently smoke and need help to quit  E-cigarettes or smokeless tobacco still contain nicotine  Talk to your healthcare provider before you use these products  Prevent acute bronchitis:       Ask about vaccines you may need  Get a flu vaccine each year as soon as recommended, usually in September or October  Ask your healthcare provider if you should also get a pneumonia or COVID-19 vaccine   Your healthcare provider can tell you if you should also get other vaccines, and when to get them  Prevent the spread of germs  You can decrease your risk for acute bronchitis and other illnesses by doing the following:     Wash your hands often with soap and water  Carry germ-killing hand lotion or gel with you  You can use the lotion or gel to clean your hands when soap and water are not available  Do not touch your eyes, nose, or mouth unless you have washed your hands first     Always cover your mouth when you cough to prevent the spread of germs  It is best to cough into a tissue or your shirt sleeve instead of into your hand  Ask those around you to cover their mouths when they cough  Try to avoid people who have a cold or the flu  If you are sick, stay away from others as much as possible  Follow up with your doctor as directed:  Write down questions you have so you will remember to ask them during your follow-up visits  © Copyright [employer] 2022 Information is for End User's use only and may not be sold, redistributed or otherwise used for commercial purposes  The above information is an  only  It is not intended as medical advice for individual conditions or treatments  Talk to your doctor, nurse or pharmacist before following any medical regimen to see if it is safe and effective for you

## 2023-03-07 NOTE — PROGRESS NOTES
Name: Allen Barron      : 1945      MRN: [id number]  Encounter Provider: ALEKSANDER Cisneros  Encounter Date: 3/7/2023   Encounter department: [address] [place] [address]  Acute URI  Assessment & Plan:  Start medrol dose pack  Azithromcyin ordered low grade temp today, hx of copd  Continue tylenol as needed for body aches  Continue supportive measure tea with honey for throat pain, inhaled steam to clear sinuses  Ok to continue Vicks Vapor rub as needed  Please call the office if symptoms do not improve in 1 week  Orders:  -     methylPREDNISolone 4 MG tablet therapy pack; Use as directed on package  -     azithromycin (Zithromax) 250 mg tablet; Take 2 tablets (500 mg total) by mouth daily for 1 day, THEN 1 tablet (250 mg total) daily for 4 days  2  Flu-like symptoms  -     POCT Rapid Covid Ag  -     Covid/Flu- Office Collect    3  Acute upper respiratory infection, unspecified  -     Covid/Flu- Office Collect    4  Controlled type 2 diabetes with neuropathy ([facility] [address] )           Subjective      Has c/o of coughing, runnynose and feeling "really bad" since    She reports body aches denies fever  Poor appetite  Coughing up light colored phelgm  And nasal congestion  Has used Vicks vapor rub and tylenol for some relief  URI   This is a recurrent problem  The current episode started in the past 7 days  The problem has been gradually worsening  There has been no fever  Associated symptoms include headaches, rhinorrhea and a sore throat  She has tried acetaminophen and inhaler use for the symptoms  The treatment provided mild relief  Review of Systems   Constitutional: Positive for appetite change and fatigue  HENT: Positive for postnasal drip, rhinorrhea, sore throat and voice change  Eyes: Negative  Respiratory: Positive for shortness of breath  Cardiovascular: Negative  Gastrointestinal: Negative  Endocrine: Negative      Genitourinary: Negative  Musculoskeletal: Positive for myalgias  Skin: Negative  Allergic/Immunologic: Negative  Neurological: Positive for headaches  Psychiatric/Behavioral: Negative  Current Outpatient Medications on File Prior to Visit   Medication Sig   • acetaminophen (TYLENOL) 325 mg tablet Take 2 tablets (650 mg total) by mouth every 4 (four) hours as needed for mild pain   • Advair Diskus 250-50 MCG/ACT inhaler INHALE 1 PUFF 2 TIMES A DAY RINSE MOUTH AFTER USE  • Aspirin Low Dose 81 MG chewable tablet CHEW 1 TABLET BY MOUTH DAILY   • carvedilol (COREG) 6 25 mg tablet TAKE 1 TABLET BY MOUTH TWICE A DAY WITH MEALS   • glucose blood test strip E11 9 / testing daily   • hydrOXYzine HCL (ATARAX) 10 mg tablet Take 10 mg by mouth every 6 (six) hours as needed   • latanoprost (XALATAN) 0 005 % ophthalmic solution    • Lyrica  MG TB24 TAKE 1 TABLET BY MOUTH TWICE A DAY   • meloxicam (MOBIC) 15 mg tablet TAKE 1 TABLET (15 MG TOTAL) BY MOUTH DAILY     • montelukast (SINGULAIR) 10 mg tablet TAKE 1 TABLET BY MOUTH DAILY AT BEDTIME   • ProAir  (90 Base) MCG/ACT inhaler Inhale 1 puff every 6 (six) hours as needed for wheezing   • Promethazine-DM (PHENERGAN-DM) 6 25-15 mg/5 mL oral syrup Take 5 mL by mouth 4 (four) times a day as needed for cough   • QUEtiapine (SEROquel) 50 mg tablet Take 1 tablet (50 mg total) by mouth daily at bedtime as needed (insomnia)   • sertraline (ZOLOFT) 50 mg tablet Take 1 tablet (50 mg total) by mouth daily   • SITagliptin-metFORMIN HCl ER (Janumet XR) 100-1000 MG TB24 Take 1 tablet by mouth daily with dinner   • zolpidem (AMBIEN) 5 mg tablet Take 1 tablet (5 mg total) by mouth daily at bedtime as needed for sleep   • amLODIPine (NORVASC) 10 mg tablet Take 1 tablet (10 mg total) by mouth daily at bedtime   • benzonatate (TESSALON PERLES) 100 mg capsule Take 1 capsule (100 mg total) by mouth 3 (three) times a day as needed for cough (Patient not taking: Reported on 2/27/2023) • bisacodyl (DULCOLAX) 5 mg EC tablet Take 1 tablet (5 mg total) by mouth as needed in the morning for constipation  (Patient not taking: Reported on 2/27/2023)   • carbamide peroxide (DEBROX) 6 5 % otic solution Administer 5 drops into both ears 2 (two) times a day (Patient not taking: Reported on 3/7/2023)   • chlorthalidone 25 mg tablet TAKE 1 TABLET (25 MG TOTAL) BY MOUTH DAILY  (Patient not taking: Reported on 2/27/2023)   • fluconazole (DIFLUCAN) 150 mg tablet Take 150 mg by mouth once (Patient not taking: Reported on 2/27/2023)   • LORazepam (ATIVAN) 1 mg tablet Take 0 5 tablets (0 5 mg total) by mouth every 8 (eight) hours as needed for anxiety for up to 7 days   • pantoprazole (PROTONIX) 40 mg tablet TAKE 1 TABLET BY MOUTH EVERY DAY (Patient not taking: Reported on 2/27/2023)   • traMADol (Ultram) 50 mg tablet Take 1 tablet (50 mg total) by mouth every 6 (six) hours as needed for moderate pain (Patient not taking: Reported on 2/27/2023)       Objective     /68 (BP Location: Right arm, Patient Position: Sitting, Cuff Size: Large)   Pulse 75   Temp 99 9 °F (37 7 °C) (Temporal)   Ht 5' 2" (1 575 m)   Wt 90 3 kg (199 lb)   SpO2 95%   BMI 36 40 kg/m²     Physical Exam  Constitutional:       Appearance: Normal appearance  She is ill-appearing  HENT:      Nose: Congestion present  Mouth/Throat:      Mouth: Mucous membranes are moist    Cardiovascular:      Rate and Rhythm: Normal rate and regular rhythm  Pulses: Normal pulses  Heart sounds: Normal heart sounds  No murmur heard  No friction rub  Pulmonary:      Effort: Pulmonary effort is normal  No respiratory distress  Breath sounds: Normal breath sounds  Neurological:      General: No focal deficit present  Mental Status: She is alert and oriented to person, place, and time  Mental status is at baseline     Psychiatric:         Mood and Affect: Mood normal          Behavior: Behavior normal          Thought Content: Thought content normal          Judgment: Judgment normal        ALEKSANDER Mace

## 2023-03-07 NOTE — ASSESSMENT & PLAN NOTE
Start medrol dose pack  Azithromcyin ordered low grade temp today, hx of copd  Continue tylenol as needed for body aches  Continue supportive measure tea with honey for throat pain, inhaled steam to clear sinuses  Ok to continue Vicks Vapor rub as needed  Please call the office if symptoms do not improve in 1 week

## 2023-03-08 ENCOUNTER — TELEPHONE (OUTPATIENT)
Dept: FAMILY MEDICINE CLINIC | Facility: CLINIC | Age: 78
End: 2023-03-08

## 2023-03-08 LAB
FLUAV RNA RESP QL NAA+PROBE: NEGATIVE
FLUBV RNA RESP QL NAA+PROBE: NEGATIVE
SARS-COV-2 RNA RESP QL NAA+PROBE: NEGATIVE

## 2023-03-08 NOTE — TELEPHONE ENCOUNTER
Left detailed message on machine informing patient       ----- Message from [employer] sent at 3/8/2023 11:12 AM EST -----  Neg covid, flu   Continue medications for upper respiratory infection

## 2023-03-10 DIAGNOSIS — M25.562 CHRONIC PAIN OF BOTH KNEES: Primary | ICD-10-CM

## 2023-03-10 DIAGNOSIS — M25.561 CHRONIC PAIN OF BOTH KNEES: Primary | ICD-10-CM

## 2023-03-10 DIAGNOSIS — G89.29 CHRONIC PAIN OF BOTH KNEES: Primary | ICD-10-CM

## 2023-03-24 DIAGNOSIS — J45.990 EXERCISE INDUCED BRONCHOSPASM: ICD-10-CM

## 2023-03-24 DIAGNOSIS — R06.09 DOE (DYSPNEA ON EXERTION): ICD-10-CM

## 2023-03-25 RX ORDER — MONTELUKAST SODIUM 10 MG/1
TABLET ORAL
Qty: 90 TABLET | Refills: 1 | Status: SHIPPED | OUTPATIENT
Start: 2023-03-25

## 2023-03-27 ENCOUNTER — TELEPHONE (OUTPATIENT)
Dept: FAMILY MEDICINE CLINIC | Facility: CLINIC | Age: 78
End: 2023-03-27

## 2023-03-27 NOTE — TELEPHONE ENCOUNTER
Patient called stating that she thinks she may have another yeast infection from all the medications that she is taking    She is requesting Diflucan

## 2023-03-29 DIAGNOSIS — B37.31 VAGINAL YEAST INFECTION: Primary | ICD-10-CM

## 2023-03-29 RX ORDER — FLUCONAZOLE 150 MG/1
TABLET ORAL
Qty: 2 TABLET | Refills: 0 | Status: SHIPPED | OUTPATIENT
Start: 2023-03-29 | End: 2023-04-01

## 2023-04-20 ENCOUNTER — TELEPHONE (OUTPATIENT)
Dept: UROLOGY | Facility: AMBULATORY SURGERY CENTER | Age: 78
End: 2023-04-20

## 2023-04-20 NOTE — TELEPHONE ENCOUNTER
LMOM that her 5/9 appt was cancelled and rescheduled to Monday August 21st at 11:40  RTRN call if this appt is not good, however this is our first opening

## 2023-04-20 NOTE — TELEPHONE ENCOUNTER
Patient called in stating she can't make procedure scheduled in May due to [place] at this [place]  There is no availability for a cysto any time soon  Please triage patient to see availability and severity of apt         Patient Call Back- [phone]

## 2023-05-06 PROBLEM — J06.9 ACUTE URI: Status: RESOLVED | Noted: 2023-03-07 | Resolved: 2023-05-06

## 2023-05-06 PROBLEM — J06.9 VIRAL UPPER RESPIRATORY TRACT INFECTION: Status: RESOLVED | Noted: 2022-06-22 | Resolved: 2023-05-06

## 2023-05-13 DIAGNOSIS — I16.0 HYPERTENSIVE URGENCY: ICD-10-CM

## 2023-05-16 RX ORDER — AMLODIPINE BESYLATE 10 MG/1
TABLET ORAL
Qty: 90 TABLET | Refills: 1 | Status: SHIPPED | OUTPATIENT
Start: 2023-05-16

## 2023-05-28 DIAGNOSIS — M19.90 ARTHRITIS: ICD-10-CM

## 2023-05-28 RX ORDER — MELOXICAM 15 MG/1
15 TABLET ORAL DAILY
Qty: 90 TABLET | Refills: 0 | Status: SHIPPED | OUTPATIENT
Start: 2023-05-28

## 2023-06-02 DIAGNOSIS — J45.20 MILD INTERMITTENT ASTHMA, UNSPECIFIED WHETHER COMPLICATED: ICD-10-CM

## 2023-06-05 DIAGNOSIS — E11.40 CONTROLLED TYPE 2 DIABETES WITH NEUROPATHY (HCC): ICD-10-CM

## 2023-06-06 RX ORDER — PREGABALIN 165 MG/1
TABLET, FILM COATED, EXTENDED RELEASE ORAL
Qty: 60 TABLET | Refills: 0 | Status: SHIPPED | OUTPATIENT
Start: 2023-06-06

## 2023-06-07 DIAGNOSIS — J45.21 MILD INTERMITTENT ASTHMA WITH ACUTE EXACERBATION: Primary | ICD-10-CM

## 2023-06-07 RX ORDER — FLUTICASONE PROPIONATE AND SALMETEROL 113; 14 UG/1; UG/1
1 POWDER, METERED RESPIRATORY (INHALATION) 2 TIMES DAILY
Qty: 1 EACH | Refills: 3 | Status: SHIPPED | OUTPATIENT
Start: 2023-06-07

## 2023-06-28 NOTE — ASSESSMENT & PLAN NOTE
· Stable at baseline    Results from last 7 days   Lab Units 04/14/22  0616 04/13/22  0602 04/12/22  0605 04/11/22  0533 04/10/22  0648 04/09/22  0548 04/08/22  0450   BUN mg/dL 17 16 13 11 6 6 5   CREATININE mg/dL 1 00 0 82 0 76 0 76 0 77 0 74 0 77   EGFR ml/min/1 73sq m 55 70 76 76 75 79 75 Island Pedicle Flap Text: The defect edges were debeveled with a #15 scalpel blade.  Given the location of the defect, shape of the defect and the proximity to free margins an island pedicle advancement flap was deemed most appropriate.  Using a sterile surgical marker, an appropriate advancement flap was drawn incorporating the defect, outlining the appropriate donor tissue and placing the expected incisions within the relaxed skin tension lines where possible.    The area thus outlined was incised deep to adipose tissue with a #15 scalpel blade.  The skin margins were undermined to an appropriate distance in all directions around the primary defect and laterally outward around the island pedicle utilizing iris scissors.  There was minimal undermining beneath the pedicle flap.

## 2023-06-29 ENCOUNTER — TELEPHONE (OUTPATIENT)
Dept: FAMILY MEDICINE CLINIC | Facility: CLINIC | Age: 78
End: 2023-06-29

## 2023-07-08 NOTE — ASSESSMENT & PLAN NOTE
Lab Results   Component Value Date    EGFR 63 04/07/2022    EGFR 74 04/06/2022    EGFR 66 04/05/2022    CREATININE 0 89 04/07/2022    CREATININE 0 78 04/06/2022    CREATININE 0 86 04/05/2022     · Creatinine stable at baseline  · Avoid hypotension and nephrotoxic agents  · Monitor BMP daily alert/follows commands

## 2023-08-25 DIAGNOSIS — F51.01 PRIMARY INSOMNIA: ICD-10-CM

## 2023-08-25 RX ORDER — QUETIAPINE FUMARATE 50 MG/1
50 TABLET, FILM COATED ORAL
Qty: 90 TABLET | Refills: 1 | Status: SHIPPED | OUTPATIENT
Start: 2023-08-25

## 2023-08-26 DIAGNOSIS — M19.90 ARTHRITIS: ICD-10-CM

## 2023-08-26 RX ORDER — MELOXICAM 15 MG/1
15 TABLET ORAL DAILY
Qty: 90 TABLET | Refills: 0 | Status: SHIPPED | OUTPATIENT
Start: 2023-08-26

## 2023-08-28 ENCOUNTER — TELEPHONE (OUTPATIENT)
Dept: FAMILY MEDICINE CLINIC | Facility: CLINIC | Age: 78
End: 2023-08-28

## 2023-08-29 ENCOUNTER — OFFICE VISIT (OUTPATIENT)
Dept: FAMILY MEDICINE CLINIC | Facility: CLINIC | Age: 78
End: 2023-08-29
Payer: MEDICARE

## 2023-08-29 VITALS
DIASTOLIC BLOOD PRESSURE: 70 MMHG | OXYGEN SATURATION: 95 % | HEART RATE: 74 BPM | BODY MASS INDEX: 36.79 KG/M2 | SYSTOLIC BLOOD PRESSURE: 136 MMHG | TEMPERATURE: 97.6 F | WEIGHT: 201.13 LBS

## 2023-08-29 DIAGNOSIS — Z78.0 POST-MENOPAUSAL: ICD-10-CM

## 2023-08-29 DIAGNOSIS — E11.40 TYPE 2 DIABETES MELLITUS WITH DIABETIC NEUROPATHY, WITHOUT LONG-TERM CURRENT USE OF INSULIN (HCC): ICD-10-CM

## 2023-08-29 DIAGNOSIS — Z12.31 ENCOUNTER FOR SCREENING MAMMOGRAM FOR MALIGNANT NEOPLASM OF BREAST: ICD-10-CM

## 2023-08-29 DIAGNOSIS — Z13.820 OSTEOPOROSIS SCREENING: ICD-10-CM

## 2023-08-29 DIAGNOSIS — S06.0X0A CONCUSSION WITHOUT LOSS OF CONSCIOUSNESS, INITIAL ENCOUNTER: Primary | ICD-10-CM

## 2023-08-29 DIAGNOSIS — Z86.69 HISTORY OF CHIARI MALFORMATION: ICD-10-CM

## 2023-08-29 DIAGNOSIS — E11.40 CONTROLLED TYPE 2 DIABETES WITH NEUROPATHY (HCC): ICD-10-CM

## 2023-08-29 LAB — SL AMB POCT HEMOGLOBIN AIC: 6.3 (ref ?–6.5)

## 2023-08-29 PROCEDURE — 99214 OFFICE O/P EST MOD 30 MIN: CPT | Performed by: NURSE PRACTITIONER

## 2023-08-29 PROCEDURE — 83036 HEMOGLOBIN GLYCOSYLATED A1C: CPT | Performed by: NURSE PRACTITIONER

## 2023-08-29 RX ORDER — PREGABALIN 165 MG/1
1 TABLET, FILM COATED, EXTENDED RELEASE ORAL 2 TIMES DAILY
Qty: 60 TABLET | Refills: 0 | Status: SHIPPED | OUTPATIENT
Start: 2023-08-29

## 2023-08-29 RX ORDER — LIDOCAINE 50 MG/G
1 PATCH TOPICAL
COMMUNITY
Start: 2023-08-26

## 2023-08-29 RX ORDER — TIZANIDINE HYDROCHLORIDE 2 MG/1
2 CAPSULE, GELATIN COATED ORAL
COMMUNITY
Start: 2023-08-26 | End: 2023-08-29

## 2023-08-29 NOTE — PATIENT INSTRUCTIONS
Continue meds  Make appt for neurology  Concussion   WHAT YOU NEED TO KNOW:   A concussion is a mild brain injury. It is usually caused by a bump or blow to the head from a fall, a motor vehicle crash, or a sports injury. Being shaken forcefully may cause a concussion. DISCHARGE INSTRUCTIONS:   Call your local emergency number (911 in the [place]), or have someone call if:   You cannot be woken. You have a seizure, increasing confusion, or a change in personality. Your speech becomes slurred. Return to the emergency department if:   You have sudden or new vision problems. One of your pupils is bigger than the other. You have a severe headache that does not go away. You have arm or leg weakness, numbness, or new problems with coordination. You have blood or clear fluid coming out of the ears or nose. You cannot stop vomiting. Call your doctor if:   You have nausea or are vomiting. You feel more sleepy than usual.    Your symptoms get worse. Your symptoms last longer than 6 weeks. You have questions or concerns about your condition or care. Medicines: You may need any of the following:  Anti-nausea medicine  may be given to treat nausea and vomiting. Acetaminophen  decreases pain and fever. It is available without a doctor's order. Ask how much to take and how often to take it. Follow directions. Read the labels of all other medicines you are using to see if they also contain acetaminophen, or ask your doctor or pharmacist. Acetaminophen can cause liver damage if not taken correctly. Take your medicine as directed. Contact your healthcare provider if you think your medicine is not helping or if you have side effects. Tell your provider if you are allergic to any medicine. Keep a list of the medicines, vitamins, and herbs you take. Include the amounts, and when and why you take them. Bring the list or the pill bottles to follow-up visits.  Carry your medicine list with you in case of an emergency. Self-care:  Concussion symptoms usually go away within 10 days, but they may last longer. The following may be recommended to manage your symptoms:  Rest from physical and mental activities as directed. Mental activities are those that require thinking, concentration, and attention. You will need to rest until your symptoms are gone. Rest will allow you to recover from your concussion. Ask your healthcare provider when you can return to work and other daily activities. Have someone stay with you for the first 24 hours after your injury. Your healthcare provider should be contacted if your symptoms get worse, or you develop new symptoms. Do not participate in sports and physical activities until your healthcare provider says it is okay. These can make your symptoms worse or lead to another concussion. Your healthcare provider will tell you when it is okay for you to return to sports or physical activities. Ask for more information about sports concussions. Do not use heavy machinery or drive for 24 hours after your injury, or as directed. This can be dangerous and cause a serious accident. Your healthcare provider will tell you when it is safe for you to return to these activities. Prevent another concussion:   Wear protective sports equipment that fits properly. Helmets help lower your risk for a serious brain injury. Talk to your healthcare provider about ways you can decrease your risk for a concussion if you play sports. Wear your seatbelt every time you travel. This helps lower your risk for a head injury if you are in a car accident. Follow up with your doctor as directed:  Write down your questions so you remember to ask them during your visits. For more information:   [employer]  [address] , [address]  Phone: [address]  Phone: [phone]  Web Address: [employer]. [employer]    © Copyright [employer]ative 2022 Information is for End User's use only and may not be sold, redistributed or otherwise used for commercial purposes. The above information is an  only. It is not intended as medical advice for individual conditions or treatments. Talk to your doctor, nurse or pharmacist before following any medical regimen to see if it is safe and effective for you.

## 2023-08-30 PROBLEM — S06.0X0A CONCUSSION WITH NO LOSS OF CONSCIOUSNESS: Status: ACTIVE | Noted: 2023-08-30

## 2023-08-30 PROBLEM — I10 BENIGN HYPERTENSION: Status: RESOLVED | Noted: 2018-11-30 | Resolved: 2023-08-30

## 2023-08-30 PROBLEM — I16.0 HYPERTENSIVE URGENCY: Status: RESOLVED | Noted: 2022-02-27 | Resolved: 2023-08-30

## 2023-08-30 PROBLEM — E11.40 TYPE 2 DIABETES MELLITUS WITH DIABETIC NEUROPATHY, UNSPECIFIED (HCC): Status: ACTIVE | Noted: 2022-04-19

## 2023-08-30 PROBLEM — E11.9 TYPE 2 DIABETES MELLITUS WITHOUT COMPLICATION, WITHOUT LONG-TERM CURRENT USE OF INSULIN (HCC): Status: RESOLVED | Noted: 2019-02-28 | Resolved: 2023-08-30
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
## 2023-08-30 NOTE — PROGRESS NOTES
Name: Liz Baptiste      : 1945      MRN: [id number]  Encounter Provider: ALEKSANDER Elkins  Encounter Date: 2023   Encounter department: [address]     1. Concussion without loss of consciousness, initial encounter  Assessment & Plan:  Patient fell off a truck, hit her head. Discussed concussion. Has some weakness and dizziness. Denies any headaches. Negative Romberg. Negative pronator drift. Patient does have to schedule appointment with neurology. Advised to maintain safety. Orders:  -     Ambulatory Referral to Neurology; Future    2. 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 Encounter for screening mammogram for malignant neoplasm of breast  -     Mammo screening bilateral w 3d & cad; Future; Expected date: 2023    4. Post-menopausal  -     DXA bone density spine hip and pelvis; Future; Expected date: 2023    5. Osteoporosis screening  -     DXA bone density spine hip and pelvis; Future; Expected date: 2023    6. History of Chiari malformation  -     Ambulatory Referral to Neurology; Future    7. Type 2 diabetes mellitus with diabetic neuropathy, without long-term current use of insulin [facility])  Assessment & Plan:    Lab Results   Component Value Date    HGBA1C 6.3 2023   Patient hemoglobin A1c is well controlled. Continue with medication. Continue low carbohydrate diet. Blood work ordered          [facility]: balance, strength, and gait training instructions were provided. Urinary Incontinence Plan of Care: counseling topics discussed: practice Kegel (pelvic floor strengthening) exercises, limiting fluid intake 3-4 hours before bed, preventing constipation and improving blood sugar control.          Subjective      Patient is here for follow-up after being seen in the emergency room. Patient fell off a truck. Hit the side of her head, right side. Was seen in the emergency room. CAT scan was negative for bleeds. Denies any LOC. Currently continues to have some pain. Denies any blurry vision, acute headaches    Review of Systems   Constitutional: Negative. HENT: Negative. Eyes: Negative. Respiratory: Negative. Cardiovascular: Negative. Gastrointestinal: Negative. Endocrine: Negative. Genitourinary: Negative. Musculoskeletal: Negative. Skin: Negative. Allergic/Immunologic: Negative. Neurological: Positive for dizziness and light-headedness. Negative for weakness and headaches. Psychiatric/Behavioral: Negative. Current Outpatient Medications on File Prior to Visit   Medication Sig   • acetaminophen (TYLENOL) 325 mg tablet Take 2 tablets (650 mg total) by mouth every 4 (four) hours as needed for mild pain   • Advair Diskus 250-50 MCG/ACT inhaler INHALE 1 PUFF 2 TIMES A DAY RINSE MOUTH AFTER USE. • albuterol (PROVENTIL HFA,VENTOLIN HFA) 90 mcg/act inhaler INHALE 1 PUFF BY MOUTH EVERY 6 HOURS AS NEEDED FOR WHEEZE   • amLODIPine (NORVASC) 10 mg tablet TAKE 1 TABLET BY MOUTH DAILY AT BEDTIME   • Aspirin Low Dose 81 MG chewable tablet CHEW 1 TABLET BY MOUTH DAILY   • carvedilol (COREG) 6.25 mg tablet TAKE 1 TABLET BY MOUTH TWICE A DAY WITH MEALS   • fluticasone-salmeterol (AirDuo RespiClick 332/21) 612-77 mcg/act dry powder inhaler Inhale 1 puff 2 (two) times a day Rinse mouth after use. • glucose blood test strip E11.9 / testing daily   • hydrOXYzine HCL (ATARAX) 10 mg tablet Take 10 mg by mouth every 6 (six) hours as needed   • latanoprost (XALATAN) 0.005 % ophthalmic solution    • lidocaine (LIDODERM) 5 % Place 1 patch on the skin   • meloxicam (MOBIC) 15 mg tablet TAKE 1 TABLET (15 MG TOTAL) BY MOUTH DAILY.    • QUEtiapine (SEROquel) 50 mg tablet TAKE 1 TABLET (50 MG TOTAL) BY MOUTH DAILY AT BEDTIME AS NEEDED (INSOMNIA)   • sertraline (ZOLOFT) 50 mg tablet Take 1 tablet (50 mg total) by mouth daily   • SITagliptin-metFORMIN HCl ER (Janumet XR) 100-1000 MG TB24 Take 1 tablet by mouth daily with dinner   • TiZANidine (ZANAFLEX) 2 MG capsule Take 2 mg by mouth   • zolpidem (AMBIEN) 5 mg tablet Take 1 tablet (5 mg total) by mouth daily at bedtime as needed for sleep   • benzonatate (TESSALON PERLES) 100 mg capsule Take 1 capsule (100 mg total) by mouth 3 (three) times a day as needed for cough (Patient not taking: Reported on 2/27/2023)   • bisacodyl (DULCOLAX) 5 mg EC tablet Take 1 tablet (5 mg total) by mouth as needed in the morning for constipation. (Patient not taking: Reported on 2/27/2023)   • carbamide peroxide (DEBROX) 6.5 % otic solution Administer 5 drops into both ears 2 (two) times a day (Patient not taking: Reported on 3/7/2023)   • chlorthalidone 25 mg tablet TAKE 1 TABLET (25 MG TOTAL) BY MOUTH DAILY. (Patient not taking: Reported on 2/27/2023)   • LORazepam (ATIVAN) 1 mg tablet Take 0.5 tablets (0.5 mg total) by mouth every 8 (eight) hours as needed for anxiety for up to 7 days   • methylPREDNISolone 4 MG tablet therapy pack Use as directed on package (Patient not taking: Reported on 8/29/2023)   • montelukast (SINGULAIR) 10 mg tablet TAKE 1 TABLET BY MOUTH EVERYDAY AT BEDTIME (Patient not taking: Reported on 8/29/2023)   • pantoprazole (PROTONIX) 40 mg tablet TAKE 1 TABLET BY MOUTH EVERY DAY (Patient not taking: Reported on 2/27/2023)   • Promethazine-DM (PHENERGAN-DM) 6.25-15 mg/5 mL oral syrup Take 5 mL by mouth 4 (four) times a day as needed for cough (Patient not taking: Reported on 8/29/2023)   • traMADol (Ultram) 50 mg tablet Take 1 tablet (50 mg total) by mouth every 6 (six) hours as needed for moderate pain (Patient not taking: Reported on 2/27/2023)   Diabetic Foot Exam    Patient's shoes and socks removed.     Right Foot/Ankle   Right Foot Inspection  Skin Exam: skin normal and skin intact. No dry skin, no warmth, no callus, no erythema, no maceration, no abnormal color, no pre-ulcer, no ulcer and no callus. Toe Exam: ROM and strength within normal limits. Sensory   Vibration: diminished  Proprioception: diminished  Monofilament testing: diminished    Vascular  Capillary refills: < 3 seconds  The right DP pulse is 2+. The right PT pulse is 2+. Right Toe  - Comprehensive Exam  Ecchymosis: none  Arch: normal  Hammertoes: absent  Claw Toes: absent  Swelling: none   Tenderness: none         Left Foot/Ankle  Left Foot Inspection  Skin Exam: skin normal and skin intact. No dry skin, no warmth, no erythema, no maceration, normal color, no pre-ulcer, no ulcer and no callus. Toe Exam: ROM and strength within normal limits. Sensory   Vibration: diminished  Proprioception: diminished  Monofilament testing: diminished    Vascular  Capillary refills: < 3 seconds  The left DP pulse is 2+. The left PT pulse is 2+. Left Toe  - Comprehensive Exam  Ecchymosis: none  Arch: normal  Hammertoes: absent  Claw toes: absent  Swelling: none   Tenderness: none             Assign Risk Category  No deformity present  No loss of protective sensation  No weak pulses  Risk: 1      Objective     /70 (BP Location: Left arm, Patient Position: Sitting, Cuff Size: Large)   Pulse 74   Temp 97.6 °F (36.4 °C) (Temporal)   Wt 91.2 kg (201 lb 2 oz)   SpO2 95%   BMI 36.79 kg/m²     Physical Exam  Vitals and nursing note reviewed. Constitutional:       Appearance: She is well-developed. HENT:      Head: Normocephalic and atraumatic. Right Ear: External ear normal.      Left Ear: External ear normal.   Eyes:      Pupils: Pupils are equal, round, and reactive to light. Cardiovascular:      Rate and Rhythm: Normal rate and regular rhythm. Pulses: no weak pulses          Dorsalis pedis pulses are 2+ on the right side and 2+ on the left side.         Posterior tibial pulses are 2+ on the right side and 2+ on the left side. Pulmonary:      Effort: Pulmonary effort is normal.   Abdominal:      General: Bowel sounds are normal.      Palpations: Abdomen is soft. Musculoskeletal:         General: Normal range of motion. Cervical back: Normal range of motion. Feet:      Right foot:      Skin integrity: No ulcer, skin breakdown, erythema, warmth, callus or dry skin. Left foot:      Skin integrity: No ulcer, skin breakdown, erythema, warmth, callus or dry skin. Skin:     General: Skin is warm and dry. Neurological:      Mental Status: She is alert and oriented to person, place, and time. Cranial Nerves: Cranial nerves 2-12 are intact. Motor: Weakness present. No pronator drift. Coordination: Romberg sign negative. Coordination normal. Finger-Nose-Finger Test and [facility] to [facility] Test normal. Rapid alternating movements normal.   Psychiatric:         Mood and Affect: Mood normal.         Behavior: Behavior normal.         Thought Content:  Thought content normal.         Judgment: Judgment normal.       ALEKSANDER Paz

## 2023-08-30 NOTE — ASSESSMENT & PLAN NOTE
Lab Results   Component Value Date    HGBA1C 6.3 08/29/2023   Patient hemoglobin A1c is well controlled. Continue with medication. Continue low carbohydrate diet.   Blood work ordered

## 2023-08-31 NOTE — TELEPHONE ENCOUNTER
Patient called was discharged from the hospital but was not given any pain meds  She is having a sharp pain in her stomach and is scheduled for a itie 30 visit with you tomorrow  She would like to know if you can prescribe her pain medicine today  Nsaids Pregnancy And Lactation Text: These medications are considered safe up to 30 weeks gestation. It is excreted in breast milk.

## 2023-09-01 ENCOUNTER — TELEPHONE (OUTPATIENT)
Dept: FAMILY MEDICINE CLINIC | Facility: CLINIC | Age: 78
End: 2023-09-01

## 2023-09-01 ENCOUNTER — OFFICE VISIT (OUTPATIENT)
Dept: FAMILY MEDICINE CLINIC | Facility: CLINIC | Age: 78
End: 2023-09-01
Payer: MEDICARE

## 2023-09-01 VITALS
HEART RATE: 72 BPM | BODY MASS INDEX: 37.17 KG/M2 | SYSTOLIC BLOOD PRESSURE: 146 MMHG | WEIGHT: 202 LBS | OXYGEN SATURATION: 94 % | DIASTOLIC BLOOD PRESSURE: 80 MMHG | RESPIRATION RATE: 16 BRPM | HEIGHT: 62 IN | TEMPERATURE: 97.2 F

## 2023-09-01 DIAGNOSIS — H11.31 CONJUNCTIVAL HEMORRHAGE OF RIGHT EYE: Primary | ICD-10-CM

## 2023-09-01 PROCEDURE — 99213 OFFICE O/P EST LOW 20 MIN: CPT | Performed by: NURSE PRACTITIONER
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
## 2023-09-01 NOTE — PROGRESS NOTES
Name: Elana Cao      : 1945      MRN: [id number]  Encounter Provider: ALEKSANDER Macdonald  Encounter Date: 2023   Encounter department: [address] [facility]     1. Conjunctival hemorrhage of right eye  Assessment & Plan:  Patient has hemorrhage of right eye. Patient fell on the , hit her head. Seen in the emergency room in [facility]. Negative for brain bleed. Does have a headache. Does have hematoma on scalp. Neuro exam within normal limits. Pupil equal and reactive. Have some sensitivity to light. Discussed management. Patient has no facial abnormality, speech abnormality. Neuro is intact. Continue to monitor. Discussed signs and symptoms of having a brain bleed/stroke. Discussed with . indication to go to the emergency room at this time. Maintain safety             Subjective      Is brought in by  with concerns of blood in right eye. Patiently recently fell and had a concussion. Was seen in the emergency room in [facility], negative for any brain bleed. Patient continues to have headaches. Denies any dizziness. Review of Systems   Constitutional: Negative. HENT: Negative. Eyes: Positive for photophobia, pain and redness. Negative for discharge and visual disturbance. Respiratory: Negative. Cardiovascular: Negative. Gastrointestinal: Negative. Endocrine: Negative. Genitourinary: Negative. Musculoskeletal: Negative. Skin: Negative. Allergic/Immunologic: Negative. Neurological: Positive for headaches. Negative for dizziness, seizures, facial asymmetry and weakness. Psychiatric/Behavioral: Negative.         Current Outpatient Medications on File Prior to Visit   Medication Sig   • acetaminophen (TYLENOL) 325 mg tablet Take 2 tablets (650 mg total) by mouth every 4 (four) hours as needed for mild pain   • Advair Diskus 250-50 MCG/ACT inhaler INHALE 1 PUFF 2 TIMES A DAY RINSE MOUTH AFTER USE.   • albuterol (PROVENTIL HFA,VENTOLIN HFA) 90 mcg/act inhaler INHALE 1 PUFF BY MOUTH EVERY 6 HOURS AS NEEDED FOR WHEEZE   • amLODIPine (NORVASC) 10 mg tablet TAKE 1 TABLET BY MOUTH DAILY AT BEDTIME   • Aspirin Low Dose 81 MG chewable tablet CHEW 1 TABLET BY MOUTH DAILY   • carvedilol (COREG) 6.25 mg tablet TAKE 1 TABLET BY MOUTH TWICE A DAY WITH MEALS   • fluticasone-salmeterol (AirDuo RespiClick 482/98) 432-84 mcg/act dry powder inhaler Inhale 1 puff 2 (two) times a day Rinse mouth after use. • glucose blood test strip E11.9 / testing daily   • hydrOXYzine HCL (ATARAX) 10 mg tablet Take 10 mg by mouth every 6 (six) hours as needed   • latanoprost (XALATAN) 0.005 % ophthalmic solution    • lidocaine (LIDODERM) 5 % Place 1 patch on the skin   • Lyrica  MG TB24 Take 1 tablet by mouth 2 (two) times a day   • meloxicam (MOBIC) 15 mg tablet TAKE 1 TABLET (15 MG TOTAL) BY MOUTH DAILY. • QUEtiapine (SEROquel) 50 mg tablet TAKE 1 TABLET (50 MG TOTAL) BY MOUTH DAILY AT BEDTIME AS NEEDED (INSOMNIA)   • sertraline (ZOLOFT) 50 mg tablet Take 1 tablet (50 mg total) by mouth daily   • SITagliptin-metFORMIN HCl ER (Janumet XR) 100-1000 MG TB24 Take 1 tablet by mouth daily with dinner   • zolpidem (AMBIEN) 5 mg tablet Take 1 tablet (5 mg total) by mouth daily at bedtime as needed for sleep   • benzonatate (TESSALON PERLES) 100 mg capsule Take 1 capsule (100 mg total) by mouth 3 (three) times a day as needed for cough (Patient not taking: Reported on 2/27/2023)   • bisacodyl (DULCOLAX) 5 mg EC tablet Take 1 tablet (5 mg total) by mouth as needed in the morning for constipation. (Patient not taking: Reported on 2/27/2023)   • carbamide peroxide (DEBROX) 6.5 % otic solution Administer 5 drops into both ears 2 (two) times a day (Patient not taking: Reported on 3/7/2023)   • chlorthalidone 25 mg tablet TAKE 1 TABLET (25 MG TOTAL) BY MOUTH DAILY.  (Patient not taking: Reported on 2/27/2023)   • LORazepam (ATIVAN) 1 mg tablet Take 0.5 tablets (0.5 mg total) by mouth every 8 (eight) hours as needed for anxiety for up to 7 days   • methylPREDNISolone 4 MG tablet therapy pack Use as directed on package (Patient not taking: Reported on 8/29/2023)   • montelukast (SINGULAIR) 10 mg tablet TAKE 1 TABLET BY MOUTH EVERYDAY AT BEDTIME (Patient not taking: Reported on 8/29/2023)   • pantoprazole (PROTONIX) 40 mg tablet TAKE 1 TABLET BY MOUTH EVERY DAY (Patient not taking: Reported on 2/27/2023)   • Promethazine-DM (PHENERGAN-DM) 6.25-15 mg/5 mL oral syrup Take 5 mL by mouth 4 (four) times a day as needed for cough (Patient not taking: Reported on 8/29/2023)   • TiZANidine (ZANAFLEX) 2 MG capsule Take 2 mg by mouth   • traMADol (Ultram) 50 mg tablet Take 1 tablet (50 mg total) by mouth every 6 (six) hours as needed for moderate pain (Patient not taking: Reported on 2/27/2023)       Objective     /80   Pulse 72   Temp (!) 97.2 °F (36.2 °C)   Resp 16   Ht 5' 2" (1.575 m)   Wt 91.6 kg (202 lb)   SpO2 94%   BMI 36.95 kg/m²     Physical Exam  Vitals and nursing note reviewed. Constitutional:       Appearance: She is well-developed. HENT:      Head: Normocephalic and atraumatic. Right Ear: External ear normal.      Left Ear: External ear normal.   Eyes:      Extraocular Movements: Extraocular movements intact. Right eye: Normal extraocular motion and no nystagmus. Conjunctiva/sclera:      Right eye: Right conjunctiva is injected. Hemorrhage present. Pupils: Pupils are equal, round, and reactive to light. Cardiovascular:      Rate and Rhythm: Normal rate and regular rhythm. Pulmonary:      Effort: Pulmonary effort is normal.   Abdominal:      General: Bowel sounds are normal.      Palpations: Abdomen is soft. Musculoskeletal:         General: Normal range of motion. Cervical back: Normal range of motion. Skin:     General: Skin is warm and dry.    Neurological:      Mental Status: She is alert and oriented to person, place, and time. GCS: GCS eye subscore is 3. GCS verbal subscore is 5. GCS motor subscore is 6. Cranial Nerves: Cranial nerves 2-12 are intact. Motor: Motor function is intact. Coordination: Romberg sign negative.  Coordination normal. Finger-Nose-Finger Test and Heel to [facility] Test normal. Rapid alternating movements normal.       ALEKSANDER Brand
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
## 2023-09-11 ENCOUNTER — TELEPHONE (OUTPATIENT)
Dept: NEUROLOGY | Facility: CLINIC | Age: 78
End: 2023-09-11

## 2023-09-11 ENCOUNTER — TELEPHONE (OUTPATIENT)
Dept: ADMINISTRATIVE | Facility: OTHER | Age: 78
End: 2023-09-11

## 2023-09-11 NOTE — TELEPHONE ENCOUNTER
LMOM for patient to schedule an appt with Dr. Delfina Guy this week in [facility]

## 2023-09-11 NOTE — TELEPHONE ENCOUNTER
----- Message from Wilmer Melgar MA sent at 9/8/2023  1:35 PM EDT -----  Regarding: DM Eye Exam  09/08/23 1:35 PM    Hello, our patient attached above has had Diabetic Eye Exam completed/performed. Please assist in updating the patient chart by pulling the document from the Media Tab. The date of service is 6/29/2022.      Thank you,  Yael Hancock [facility]

## 2023-09-11 NOTE — TELEPHONE ENCOUNTER
Upon review of the In Basket request we were able to locate, review, and update the patient chart as requested for Diabetic Eye Exam.    Any additional questions or concerns should be emailed to the Practice Liaisons via the appropriate education email address, please do not reply via In Basket.     Thank you  Pal Grigsby

## 2023-09-12 ENCOUNTER — CONSULT (OUTPATIENT)
Dept: NEUROLOGY | Facility: CLINIC | Age: 78
End: 2023-09-12
Payer: MEDICARE

## 2023-09-12 VITALS
HEART RATE: 73 BPM | SYSTOLIC BLOOD PRESSURE: 142 MMHG | WEIGHT: 203.6 LBS | OXYGEN SATURATION: 96 % | TEMPERATURE: 97.9 F | BODY MASS INDEX: 37.47 KG/M2 | DIASTOLIC BLOOD PRESSURE: 74 MMHG | HEIGHT: 62 IN

## 2023-09-12 DIAGNOSIS — E66.9 OBESITY (BMI 30-39.9): ICD-10-CM

## 2023-09-12 DIAGNOSIS — E23.6 EMPTY SELLA (HCC): ICD-10-CM

## 2023-09-12 DIAGNOSIS — G43.009 MIGRAINE WITHOUT AURA AND WITHOUT STATUS MIGRAINOSUS, NOT INTRACTABLE: ICD-10-CM

## 2023-09-12 DIAGNOSIS — F41.9 ANXIETY AND DEPRESSION: ICD-10-CM

## 2023-09-12 DIAGNOSIS — G44.309 POST-TRAUMATIC HEADACHE: Primary | ICD-10-CM

## 2023-09-12 DIAGNOSIS — R20.0 NUMBNESS AND TINGLING OF RIGHT LEG: ICD-10-CM
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
## 2023-09-12 DIAGNOSIS — R20.2 NUMBNESS AND TINGLING OF RIGHT LEG: ICD-10-CM

## 2023-09-12 DIAGNOSIS — F32.A ANXIETY AND DEPRESSION: ICD-10-CM

## 2023-09-12 DIAGNOSIS — G47.33 OBSTRUCTIVE SLEEP APNEA (ADULT) (PEDIATRIC): ICD-10-CM

## 2023-09-12 DIAGNOSIS — M79.18 CERVICAL MYOFASCIAL PAIN SYNDROME: ICD-10-CM

## 2023-09-12 DIAGNOSIS — G93.5 CHIARI MALFORMATION TYPE I (HCC): ICD-10-CM

## 2023-09-12 PROBLEM — S06.0X0A CONCUSSION WITH NO LOSS OF CONSCIOUSNESS: Status: RESOLVED | Noted: 2023-08-30 | Resolved: 2023-09-12

## 2023-09-12 PROCEDURE — 99205 OFFICE O/P NEW HI 60 MIN: CPT | Performed by: STUDENT IN AN ORGANIZED HEALTH CARE EDUCATION/TRAINING PROGRAM

## 2023-09-12 RX ORDER — RIBOFLAVIN (VITAMIN B2) 400 MG
1 TABLET ORAL DAILY
Qty: 90 TABLET | Refills: 3 | Status: SHIPPED | OUTPATIENT
Start: 2023-09-12

## 2023-09-12 RX ORDER — LANOLIN ALCOHOL/MO/W.PET/CERES
400 CREAM (GRAM) TOPICAL DAILY
Qty: 90 TABLET | Refills: 3 | Status: SHIPPED | OUTPATIENT
Start: 2023-09-12

## 2023-09-12 NOTE — PATIENT INSTRUCTIONS
Additional Testing:   Neurodiagnostic workup:  MRI Brain and lumbar spine ordered  Home study    Referrals:  Physical therapy    Headache Calendar  Please maintain a headache calendar  Consider using phone applications such as Migraine [facility] or [employer] [facility]raine [employer]    Headache/migraine treatment:   Acute medications (for immediate treatment of a headache): It is ok to take ibuprofen, acetaminophen or naproxen (Advil, Tylenol,  Aleve, Excedrin) if they help your headaches you should limit these to No more than 2-3 times a week to avoid medication overuse/rebound headaches. Over the counter preventive supplements for headaches/migraines (if you try, try for 3 months straight)  (to take every day to help prevent headaches - not to take at the time of headache):  [x] Magnesium 400mg daily (If any diarrhea or upset stomach, decrease dose  as tolerated) - oxide or glycinate  [x] Riboflavin (Vitamin B2) 400mg daily - (FYI B2 may make your urine bright/neon yellow)    Lifestyle Recommendations:  [x] SLEEP - Maintain a regular sleep schedule: Adults need at least 7-8 hours of uninterrupted a night. Maintain good sleep hygiene:  Going to bed and waking up at consistent times, avoiding excessive daytime naps, avoiding caffeinated beverages in the evening, avoid excessive stimulation in the evening and generally using bed primarily for sleeping. One hour before bedtime would recommend turning lights down lower, decreasing your activity (may read quietly, listen to music at a low volume). When you get into bed, should eliminate all technology (no texting, emailing, playing with your phone, iPad or tablet in bed). [x] HYDRATION - Maintain good hydration. Drink  2L of fluid a day (4 typical small water bottles)  [x] DIET - Maintain good nutrition. In particular don't skip meals and try and eat healthy balanced meals regularly.   [x] TRIGGERS - Look for other triggers and avoid them: Limit caffeine to 1-2 cups a day or less. Avoid dietary triggers that you have noticed bring on your headaches (this could include aged cheese, peanuts, MSG, aspartame and nitrates). [x] EXERCISE - physical exercise as we all know is good for you in many ways, and not only is good for your heart, but also is beneficial for your mental health, cognitive health and  chronic pain/headaches. I would encourage at the least 5 days of physical exercise weekly for at least 30 minutes. Education and Follow-up  [x] Please call with any questions or concerns. Of course if any new concerning symptoms go to the emergency department.   [x] Follow up in 4 months

## 2023-09-12 NOTE — PROGRESS NOTES
Cecilia Camejo [facility] Consult  PATIENT:  Elana Cao  MRN:  [id number]  :  1945  DATE OF SERVICE:  2023  REFERRED BY: ALEKSANDER Macdonald  PMD: ALEKSANDER Macdonald    Assessment/Plan:     Elana Cao is a very pleasant 70 y.o. female with a past medical history that includes diabetes mellitus, asthma, COPD, obstructive sleep apnea, hypertension, chronic kidney disease, obesity, anxiety, depression, history of kidney stones, history of glaucoma, neuropathy referred here for evaluation of headache. Initial evaluation 2023     Ms. Magdalena Carbajal reports that she fell off of a moving truck on 2023 after losing her balance. She subsequently hit her head on the bumper and then on the ground. Given the fact that she did not exhibit any signs or symptoms of concussion outside of headache and there was no alteration in consciousness, I do not believe that she suffered a concussion. Since her fall she has been having frequent headaches, although she reports that they have improved since the injury. Of note, she has a pre-existing history of suspected migraines when she was about 38years old. It is possible that these are a flareup of those with a combination of posttraumatic headache as well. It is reassuring that they have been improving, but given the CT head findings of empty sella and possible Chiari malformation I think she would benefit from an MRI for clarification. She does not exhibit any signs or symptoms of IIH and is pending an ophthalmology evaluation in a few weeks where she will undergo a dilated eye exam.  She did not want any further prescription medications for her headaches so I have recommended that she try magnesium and B2 supplements instead. Of note, she has a history of obstructive sleep apnea but has not used her CPAP in the last 10 years.   It is possible that this is contributing to ongoing headaches and if she were to have IIH they could also be connected. I have recommended that she get a repeat sleep study for evaluation of this. Finally, she was interested in seeing physical therapy for ongoing head/neck pain as well as low back/right hip pain. She does have some numbness and tingling that radiates from her lower back to her knee on the right side so I have ordered an MRI of the lumbar spine for evaluation. Post-traumatic headache  Migraine without aura and without status migrainosus, not intractable  Empty sella (HCC)  -     Basic metabolic panel; Future  -     MRI brain with and without contrast; Future  -     Ambulatory referral to Physical Therapy; Future  -     magnesium Oxide (MAG-OX) 400 mg TABS; Take 1 tablet (400 mg total) by mouth daily  -     Riboflavin 400 MG TABS; Take 1 tablet (400 mg total) by mouth daily    Chiari malformation type I ([facility])  Obesity (BMI 30-39.9)  -     Basic metabolic panel; Future  -     MRI brain with and without contrast; Future    Obstructive sleep apnea (adult) (pediatric)  -     Home Study; Future    Anxiety and depression    Numbness and tingling of right leg  -     MRI lumbar spine without contrast; Future  -     Ambulatory referral to Physical Therapy; Future    Cervical myofascial pain syndrome  -     Ambulatory referral to Physical Therapy; Future    Fall, initial encounter  -     MRI brain with and without contrast; Future  -     MRI lumbar spine without contrast; Future  -     Ambulatory referral to Physical Therapy; Future      Workup:  - CT head without contrast 8/26/2023: No acute intracranial findings. Evidence of possible Chiari malformation. Soft tissue scalp hematomas. Empty sella. - CTA head/neck February 2022: No acute intracranial findings.   No evidence of significant stenosis, occlusion, or aneurysm.  - MRI Brain with and without contrast  - MRI Lumbar spine  - Sleep study  - PT eval  - Ophthalmology eval pending in October    Preventative:  - we discussed headache hygiene and lifestyle factors that may improve headaches  - Mg and B2  - Currently on through other providers: Amlodipine, carvedilol, Zoloft, Lyrica  - Past/ failed/contraindicated: Avoid Topamax due to history of kidney stones and glaucoma, avoid beta-blockers due to history of pulmonary issues, avoid TCAs due to age  - future options: Memantine, Diamox, CGRP med, botox    Acute:  - discussed not taking over-the-counter or prescription pain medications more than 2-3 days per week to prevent medication overuse/rebound headache  - Currently on through other providers: Tramadol, meloxicam, tizanidine  - Past/ failed/contraindicated: Avoid triptans due to history of bradycardia and history of uncontrolled hypertension, avoid NSAIDs due to history of CKD  - future options:  prochlorperazine, could consider trial of 5 days of Depakote 500 mg nightly or dexamethasone 2 mg daily for prolonged migraine, fredi Dent nurtec  Patient instructions   Additional Testing:   Neurodiagnostic workup:  MRI Brain and lumbar spine ordered  Home study    Referrals:  Physical therapy    Headache Calendar  Please maintain a headache calendar  Consider using phone applications such as Migraine [facility]valdo or [place] Migraine Tracker    Headache/migraine treatment:   Acute medications (for immediate treatment of a headache): It is ok to take ibuprofen, acetaminophen or naproxen (Advil, Tylenol,  Aleve, Excedrin) if they help your headaches you should limit these to No more than 2-3 times a week to avoid medication overuse/rebound headaches.      Over the counter preventive supplements for headaches/migraines (if you try, try for 3 months straight)  (to take every day to help prevent headaches - not to take at the time of headache):  [x] Magnesium 400mg daily (If any diarrhea or upset stomach, decrease dose  as tolerated) - oxide or glycinate  [x] Riboflavin (Vitamin B2) 400mg daily - (FYI B2 may make your urine bright/neon yellow)    Lifestyle Recommendations:  [x] SLEEP - Maintain a regular sleep schedule: Adults need at least 7-8 hours of uninterrupted a night. Maintain good sleep hygiene:  Going to bed and waking up at consistent times, avoiding excessive daytime naps, avoiding caffeinated beverages in the evening, avoid excessive stimulation in the evening and generally using bed primarily for sleeping. One hour before bedtime would recommend turning lights down lower, decreasing your activity (may read quietly, listen to music at a low volume). When you get into bed, should eliminate all technology (no texting, emailing, playing with your phone, iPad or tablet in bed). [x] HYDRATION - Maintain good hydration. Drink  2L of fluid a day (4 typical small water bottles)  [x] DIET - Maintain good nutrition. In particular don't skip meals and try and eat healthy balanced meals regularly. [x] TRIGGERS - Look for other triggers and avoid them: Limit caffeine to 1-2 cups a day or less. Avoid dietary triggers that you have noticed bring on your headaches (this could include aged cheese, peanuts, MSG, aspartame and nitrates). [x] EXERCISE - physical exercise as we all know is good for you in many ways, and not only is good for your heart, but also is beneficial for your mental health, cognitive health and  chronic pain/headaches. I would encourage at the least 5 days of physical exercise weekly for at least 30 minutes. Education and Follow-up  [x] Please call with any questions or concerns. Of course if any new concerning symptoms go to the emergency department. [x] Follow up in 4 months  CC: We had the pleasure of evaluating Brown Batista in neurological consultation today. Brown Batista is a   right handed female who presents today for evaluation of headaches. History obtained from patient as well as available medical record review.   History of Present Illness:   Current medical illnesses  or past medical history include diabetes mellitus, asthma, COPD, obstructive sleep apnea, hypertension, chronic kidney disease, obesity, anxiety, depression, history of kidney stones, history of glaucoma, neuropathy    Pertinent history:  -Patient fell off a moving truck on 8/25/2023 after losing her balance. Hit the front of her head on the bumper and then the ground. Initial symptoms included pain/headaches. Reportedly seen in the emergency room in [facility]. No evidence of brain bleeding  -Seen in family medicine clinic on 9/1/2023 for conjunctival hemorrhage. Headaches started at what age? 38years old - no formal evaluation or diagnosis  How often do the headaches occur?   - as of 9/12/2023: 12/30 (of those, 2-3 can be severe)  What time of the day do the headaches start? Evening  How long do the headaches last? 1 hour  Are you ever headache free? Yes    Aura? without aura     Where is your headache located and pain quality? Right parietal/occipital and frontal; squeezing  What is the intensity of pain? Worst 7/10, Average: 5-6/10  Associated symptoms:   [x] Photophobia     [x]Phonophobia  [x] Blurred vision   [x] Prefer quiet, dark room  [x] Hands or feet tingle or feel numb/paresthesias (right lower extremity - not necessarily associated with headache)    Things that make the headache worse? No specific movements. No clear positional component. Headache triggers: None    Have you seen someone else for headaches or pain? No  Have you had trigger point injection performed and how often? No  Have you had Botox injection performed and how often? No   Have you had epidural injections or transforaminal injections performed? No  Are you current pregnant or planning on getting pregnant? N/A  Have you ever had any Brain imaging? yes CT, CTA    Last eye exam: 1 year ago - history of glaucoma; normal dilated exam as per patient; pending follow-up in October    What medications do you take or have you taken for your headaches?    ABORTIVE:    OTC medications: Tylenol (about 2-3 days per week)  Prescription: Tramadol, meloxicam, tizanidine    Past/ failed/contraindicated:  OTC medications: Ibuprofen  Prescription: Avoid triptans due to history of bradycardia and history of uncontrolled hypertension, avoid NSAIDs due to history of CKD    PREVENTIVE:   Amlodipine, carvedilol, Zoloft, Lyrica    Past/ failed/contraindicated:  Avoid Topamax due to history of kidney stones and glaucoma, avoid beta-blockers due to history of pulmonary issues, avoid TCAs due to age    LIFESTYLE  Sleep   - averages: about 5-6 hours per night  Problems falling asleep?:   Yes  Problems staying asleep?:  Yes  - History of BOLA - does not use a CPAP (last used 10 years ago)  - Positive history of snoring    Physical activity: Walking, at home exercises about twice per week    Water: 4-6 glasses per day  Caffeine: 1.5 cups of coffee per day    Mood:   History of anxiety and depression  - Managed by PCP    The following portions of the patient's history were reviewed and updated as appropriate: allergies, current medications, past family history, past medical history, past social history, past surgical history and problem list.    Pertinent family history:  Family history of headaches:  Daughter has history of IIH  Any family history of aneurysms - No    Pertinent social history:  Work: Retired  Education: Managed by [employer] with  and daughter    Illicit Drugs: denies  Alcohol/tobacco: Denies alcohol use, Denies tobacco use    Past Medical History:     Past Medical History:   Diagnosis Date   • Asthma    • Benign hypertension 11/30/2018   • Cardiac arrest ([address])    • Diabetes mellitus ([address])    • Glaucoma    • Hypertension    • Kidney stone    • Neuropathy    • Sleep apnea     no machine   • SOB (shortness of breath)    • Tubular adenoma of colon 10/2019   • Wheezing        Patient Active Problem List   Diagnosis   • Depression with anxiety   • Mild intermittent asthma   • Encounter for gynecological examination without abnormal finding   • Atypical chest pain   • Dermatitis   • BMI 37.0-37.9, adult   • Obesity, morbid ([facility])   • Exertional dyspnea   • Psychophysiological insomnia   • Stage 3a chronic kidney disease ([facility])   • Abnormal EKG   • Primary hypertension   • Bradycardia   • Anxiety   • Transaminitis   • COPD (chronic obstructive pulmonary disease) ([facility])   • BOLA (obstructive sleep apnea)   • Eosinophilia   • Diverticulitis   • Dysuria   • Abdominal pain   • Primary insomnia   • Anginal equivalent ([facility])   • Flu-like symptoms   • Concussion with no loss of consciousness   • Type 2 diabetes mellitus with diabetic neuropathy, unspecified ([facility])   • Conjunctival hemorrhage of right eye       Medications:      Current Outpatient Medications   Medication Sig Dispense Refill   • acetaminophen (TYLENOL) 325 mg tablet Take 2 tablets (650 mg total) by mouth every 4 (four) hours as needed for mild pain  0   • Advair Diskus 250-50 MCG/ACT inhaler INHALE 1 PUFF 2 TIMES A DAY RINSE MOUTH AFTER USE. 60 blister 3   • albuterol (PROVENTIL HFA,VENTOLIN HFA) 90 mcg/act inhaler INHALE 1 PUFF BY MOUTH EVERY 6 HOURS AS NEEDED FOR WHEEZE 18 g 3   • amLODIPine (NORVASC) 10 mg tablet TAKE 1 TABLET BY MOUTH DAILY AT BEDTIME 90 tablet 1   • Aspirin Low Dose 81 MG chewable tablet CHEW 1 TABLET BY MOUTH DAILY 90 tablet 1   • carvedilol (COREG) 6.25 mg tablet TAKE 1 TABLET BY MOUTH TWICE A DAY WITH MEALS 180 tablet 1   • latanoprost (XALATAN) 0.005 % ophthalmic solution      • lidocaine (LIDODERM) 5 % Place 1 patch on the skin     • Lyrica  MG TB24 Take 1 tablet by mouth 2 (two) times a day 60 tablet 0   • meloxicam (MOBIC) 15 mg tablet TAKE 1 TABLET (15 MG TOTAL) BY MOUTH DAILY.  90 tablet 0   • montelukast (SINGULAIR) 10 mg tablet TAKE 1 TABLET BY MOUTH EVERYDAY AT BEDTIME 90 tablet 1   • QUEtiapine (SEROquel) 50 mg tablet TAKE 1 TABLET (50 MG TOTAL) BY MOUTH DAILY AT BEDTIME AS NEEDED (INSOMNIA) 90 tablet 1   • sertraline (ZOLOFT) 50 mg tablet Take 1 tablet (50 mg total) by mouth daily 90 tablet 3   • SITagliptin-metFORMIN HCl ER (Janumet XR) 100-1000 MG TB24 Take 1 tablet by mouth daily with dinner 90 tablet 3   • benzonatate (TESSALON PERLES) 100 mg capsule Take 1 capsule (100 mg total) by mouth 3 (three) times a day as needed for cough (Patient not taking: Reported on 2/27/2023) 20 capsule 0   • bisacodyl (DULCOLAX) 5 mg EC tablet Take 1 tablet (5 mg total) by mouth as needed in the morning for constipation. (Patient not taking: Reported on 2/27/2023) 30 tablet 0   • carbamide peroxide (DEBROX) 6.5 % otic solution Administer 5 drops into both ears 2 (two) times a day (Patient not taking: Reported on 3/7/2023) 15 mL 0   • chlorthalidone 25 mg tablet TAKE 1 TABLET (25 MG TOTAL) BY MOUTH DAILY. (Patient not taking: Reported on 2/27/2023) 90 tablet 2   • fluticasone-salmeterol (AirDuo RespiClick 083/89) 634-91 mcg/act dry powder inhaler Inhale 1 puff 2 (two) times a day Rinse mouth after use.  1 each 3   • glucose blood test strip E11.9 / testing daily (Patient not taking: Reported on 9/12/2023)     • hydrOXYzine HCL (ATARAX) 10 mg tablet Take 10 mg by mouth every 6 (six) hours as needed (Patient not taking: Reported on 9/12/2023)     • LORazepam (ATIVAN) 1 mg tablet Take 0.5 tablets (0.5 mg total) by mouth every 8 (eight) hours as needed for anxiety for up to 7 days (Patient not taking: Reported on 9/12/2023) 15 tablet 0   • methylPREDNISolone 4 MG tablet therapy pack Use as directed on package (Patient not taking: Reported on 8/29/2023) 21 each 0   • pantoprazole (PROTONIX) 40 mg tablet TAKE 1 TABLET BY MOUTH EVERY DAY (Patient not taking: Reported on 2/27/2023) 90 tablet 2   • Promethazine-DM (PHENERGAN-DM) 6.25-15 mg/5 mL oral syrup Take 5 mL by mouth 4 (four) times a day as needed for cough (Patient not taking: Reported on 8/29/2023) 180 mL 3   • TiZANidine (ZANAFLEX) 2 MG capsule Take 2 mg by mouth (Patient not taking: Reported on 9/12/2023)     • traMADol (Ultram) 50 mg tablet Take 1 tablet (50 mg total) by mouth every 6 (six) hours as needed for moderate pain (Patient not taking: Reported on 2/27/2023) 20 tablet 0   • zolpidem (AMBIEN) 5 mg tablet Take 1 tablet (5 mg total) by mouth daily at bedtime as needed for sleep (Patient not taking: Reported on 9/12/2023) 30 tablet 0     No current facility-administered medications for this visit. Allergies:       Allergies   Allergen Reactions   • Ciprofloxacin Itching   • Levaquin [Levofloxacin] Swelling   • Penicillins GI Intolerance       Family History:     Family History   Problem Relation Age of Onset   • Diabetes Mother    • Hypertension Father    • Prostate cancer Father    • Diabetes Sister    • Hypertension Sister    • Breast cancer Sister 62   • Diabetes Brother    • Hypertension Brother    • Asthma Family    • No Known Problems Daughter    • No Known Problems Sister    • No Known Problems Daughter    • No Known Problems Daughter    • No Known Problems Maternal Aunt    • Breast cancer Maternal Aunt 58   • No Known Problems Maternal Aunt    • No Known Problems Maternal Aunt    • No Known Problems Paternal Aunt    • No Known Problems Paternal Aunt    • No Known Problems Paternal Aunt    • Colon cancer Neg Hx    • Ovarian cancer Neg Hx    • Uterine cancer Neg Hx    • Cervical cancer Neg Hx        Social History:       Social History     Socioeconomic History   • Marital status: /Civil Union     Spouse name: Not on file   • Number of children: Not on file   • Years of education: Not on file   • Highest education level: Not on file   Occupational History   • Occupation: retired    Tobacco Use   • Smoking status: Never     Passive exposure: Never   • Smokeless tobacco: Never   Vaping Use   • Vaping Use: Never used   Substance and Sexual Activity   • Alcohol use: Never   • Drug use: No   • Sexual activity: Yes     Birth control/protection: Surgical   Other Topics Concern • Not on file   Social History Narrative   • Not on file     Social Determinants of Health     Financial Resource Strain: Not on file   Food Insecurity: No Food Insecurity (4/5/2022)    Hunger Vital Sign    • Worried About Running Out of Food in the Last Year: Never true    • Ran Out of Food in the Last Year: Never true   Transportation Needs: No Transportation Needs (4/5/2022)    PRAPARE - Transportation    • Lack of Transportation (Medical): No    • Lack of Transportation (Non-Medical): No   Physical Activity: Not on file   Stress: Not on file   Social Connections: Not on file   Intimate Partner Violence: Not on file   Housing Stability: Low Risk  (4/5/2022)    Housing Stability Vital Sign    • Unable to Pay for Housing in the Last Year: No    • Number of Places Lived in the Last Year: 1    • Unstable Housing in the Last Year: No         Objective:   Physical Exam:                                                               Vitals:            Constitutional:   /74 (BP Location: Left arm, Patient Position: Sitting, Cuff Size: Large)   Pulse 73   Temp 97.9 °F (36.6 °C) (Temporal)   Ht 5' 2" (1.575 m)   Wt 92.4 kg (203 lb 9.6 oz)   SpO2 96%   BMI 37.24 kg/m²   BP Readings from Last 3 Encounters:   09/12/23 142/74   09/01/23 146/80   08/29/23 136/70     Pulse Readings from Last 3 Encounters:   09/12/23 73   09/01/23 72   08/29/23 74         Well developed, well nourished, well groomed. No dysmorphic features. HEENT:  Normocephalic atraumatic. Oropharynx is clear and moist. No oral mucosal lesions. Chest:  Respirations regular and unlabored. Cardiovascular:  Distal extremities warm without palpable edema or tenderness, no observed significant swelling. Musculoskeletal:  (see below under neurologic exam for evaluation of motor function and gait)   Skin:  warm and dry, not diaphoretic. No apparent birthmarks or stigmata of neurocutaneous disease.    Psychiatric:  Normal behavior and appropriate affect       Neurological Examination:     Mental status/cognitive function:   Orientated to time, place and person. Recent and remote memory intact. Attention span and concentration as well as fund of knowledge are appropriate for age. Normal language and spontaneous speech. Cranial Nerves:  II-visual fields full. Fundi poorly visualized due to pupillary constriction  III, IV, VI-Pupils were equal, round, and reactive to light and accomodation. Extraocular movements were full and conjugate without nystagmus. Conjugate gaze, normal smooth pursuits, normal saccades   V-facial sensation symmetric. VII-facial expression symmetric, intact forehead wrinkle, strong eye closure, symmetric smile    VIII-hearing grossly intact bilaterally   IX, X-palate elevation symmetric, no dysarthria. XI-shoulder shrug strength intact    XII-tongue protrusion midline. Motor Exam: symmetric bulk and tone throughout, no pronator drift. Power/strength 5/5 bilateral upper and lower extremities, no atrophy, fasciculations or abnormal movements noted. Sensory: grossly intact light touch in all extremities. Reflexes: brachioradialis 2+, biceps 2+, knee 2+, ankle 2+ bilaterally. No ankle clonus  Coordination: Finger nose finger intact bilaterally, no apparent dysmetria, ataxia or tremor noted  Gait: steady casual and tandem gait. Pertinent lab results: None     Pertinent Imaging:   -CT head without contrast 8/26/2023: No acute intracranial findings. Evidence of possible Chiari malformation. Soft tissue scalp hematomas. Empty sella. (images not yet available)  -CTA head/neck February 2022: No acute intracranial findings. No evidence of significant stenosis, occlusion, or aneurysm. I have personally reviewed imaging and radiology read  Review of Systems:   Constitutional: Negative for appetite change, fatigue and fever. HENT: Negative.   Negative for hearing loss, tinnitus, trouble swallowing and voice change. Eyes: Negative. Negative for photophobia, pain and visual disturbance. Respiratory: Negative. Negative for shortness of breath. Cardiovascular: Negative. Negative for palpitations. Gastrointestinal: Negative. Negative for nausea and vomiting. Endocrine: Negative. Negative for cold intolerance. Genitourinary: Negative. Negative for dysuria, frequency and urgency. Musculoskeletal: Negative for back pain, gait problem, myalgias and neck pain. Skin: Negative. Negative for rash. Allergic/Immunologic: Negative. Neurological: Positive for headaches. Negative for dizziness, tremors, seizures, syncope, facial asymmetry, speech difficulty, weakness, light-headedness and numbness. Hematological: Negative. Does not bruise/bleed easily. Psychiatric/Behavioral: Negative. Negative for confusion, hallucinations and sleep disturbance. All other systems reviewed and are negative. I have spent 45 minutes with the patient today in which greater than 50% of this time was spent in counseling/coordination of care regarding Diagnostic results, Prognosis, Risks and benefits of tx options, Patient and family education, Impressions, Documenting in the medical record, Reviewing / ordering tests, medicine, procedures   and Obtaining or reviewing history  . I also spent 15 minutes non face to face for this patient the same day.      Activity Minutes   Precharting/reviewing 10   Patient care/counseling 45   Postcharting/care coordination 5       Author:  Jaswant Mayo DO 9/12/2023 9:21 AM

## 2023-09-12 NOTE — PROGRESS NOTES
Review of Systems   Constitutional: Negative for appetite change, fatigue and fever. HENT: Negative. Negative for hearing loss, tinnitus, trouble swallowing and voice change. Eyes: Negative. Negative for photophobia, pain and visual disturbance. Respiratory: Negative. Negative for shortness of breath. Cardiovascular: Negative. Negative for palpitations. Gastrointestinal: Negative. Negative for nausea and vomiting. Endocrine: Negative. Negative for cold intolerance. Genitourinary: Negative. Negative for dysuria, frequency and urgency. Musculoskeletal: Negative for back pain, gait problem, myalgias and neck pain. Skin: Negative. Negative for rash. Allergic/Immunologic: Negative. Neurological: Positive for headaches. Negative for dizziness, tremors, seizures, syncope, facial asymmetry, speech difficulty, weakness, light-headedness and numbness. Hematological: Negative. Does not bruise/bleed easily. Psychiatric/Behavioral: Negative. Negative for confusion, hallucinations and sleep disturbance. All other systems reviewed and are negative.

## 2023-09-22 ENCOUNTER — TELEPHONE (OUTPATIENT)
Dept: NEUROLOGY | Facility: CLINIC | Age: 78
End: 2023-09-22

## 2023-09-22 DIAGNOSIS — E11.40 CONTROLLED TYPE 2 DIABETES WITH NEUROPATHY (HCC): ICD-10-CM

## 2023-09-22 RX ORDER — SITAGLIPTIN AND METFORMIN HYDROCHLORIDE 1000; 100 MG/1; MG/1
1 TABLET, FILM COATED, EXTENDED RELEASE ORAL
Qty: 90 TABLET | Refills: 3 | Status: SHIPPED | OUTPATIENT
Start: 2023-09-22

## 2023-09-22 NOTE — TELEPHONE ENCOUNTER
LMOM informing patient we need a signed consent to request her imaging from [facility] for Dr. Mariam Mcgovern review. Informed patient I mailed out a consent form for her to sign and mail back to our office at her earliest convenience.

## 2023-10-02 ENCOUNTER — TELEPHONE (OUTPATIENT)
Dept: SLEEP CENTER | Facility: CLINIC | Age: 78
End: 2023-10-02

## 2023-10-02 NOTE — TELEPHONE ENCOUNTER
----- Message from Mariela Anders MD sent at 10/1/2023 10:06 PM EDT -----  approved  ----- Message -----  From: [facility]  Sent: 9/13/2023   8:42 AM EDT  To: [facility] Provider    This home sleep study needs approval.     If approved please sign and return to clerical pool. If denied please include reasons why. Also provide alternative testing if warranted. Please sign and return to clerical pool.

## 2023-10-03 ENCOUNTER — TELEPHONE (OUTPATIENT)
Dept: FAMILY MEDICINE CLINIC | Facility: CLINIC | Age: 78
End: 2023-10-03

## 2023-10-03 DIAGNOSIS — E11.40 TYPE 2 DIABETES MELLITUS WITH DIABETIC NEUROPATHY, WITHOUT LONG-TERM CURRENT USE OF INSULIN (HCC): Primary | ICD-10-CM

## 2023-10-03 DIAGNOSIS — I10 PRIMARY HYPERTENSION: ICD-10-CM

## 2023-10-04 ENCOUNTER — HOSPITAL ENCOUNTER (OUTPATIENT)
Age: 78
Discharge: HOME | End: 2023-10-04
Payer: MEDICARE

## 2023-10-04 ENCOUNTER — APPOINTMENT (OUTPATIENT)
Age: 78
End: 2023-10-04
Payer: MEDICARE

## 2023-10-04 ENCOUNTER — RA CDI HCC (OUTPATIENT)
Dept: OTHER | Facility: HOSPITAL | Age: 78
End: 2023-10-04

## 2023-10-04 VITALS — BODY MASS INDEX: 39.43 KG/M2 | HEIGHT: 60 IN

## 2023-10-04 DIAGNOSIS — E66.9 OBESITY (BMI 30-39.9): ICD-10-CM

## 2023-10-04 DIAGNOSIS — I10 PRIMARY HYPERTENSION: ICD-10-CM

## 2023-10-04 DIAGNOSIS — Z78.0 POST-MENOPAUSAL: ICD-10-CM

## 2023-10-04 DIAGNOSIS — R06.09 DOE (DYSPNEA ON EXERTION): ICD-10-CM

## 2023-10-04 DIAGNOSIS — Z12.31 ENCOUNTER FOR SCREENING MAMMOGRAM FOR MALIGNANT NEOPLASM OF BREAST: ICD-10-CM

## 2023-10-04 DIAGNOSIS — G93.5 CHIARI MALFORMATION TYPE I (HCC): ICD-10-CM

## 2023-10-04 DIAGNOSIS — Z13.820 OSTEOPOROSIS SCREENING: ICD-10-CM

## 2023-10-04 DIAGNOSIS — J45.990 EXERCISE INDUCED BRONCHOSPASM: ICD-10-CM

## 2023-10-04 DIAGNOSIS — G44.309 POST-TRAUMATIC HEADACHE: ICD-10-CM

## 2023-10-04 DIAGNOSIS — E11.40 TYPE 2 DIABETES MELLITUS WITH DIABETIC NEUROPATHY, WITHOUT LONG-TERM CURRENT USE OF INSULIN (HCC): ICD-10-CM

## 2023-10-04 LAB
ALBUMIN SERPL BCP-MCNC: 3.9 G/DL (ref 3.5–5)
ALP SERPL-CCNC: 58 U/L (ref 34–104)
ALT SERPL W P-5'-P-CCNC: 9 U/L (ref 7–52)
ANION GAP SERPL CALCULATED.3IONS-SCNC: 10 MMOL/L
AST SERPL W P-5'-P-CCNC: 13 U/L (ref 13–39)
BASOPHILS # BLD AUTO: 0.04 THOUSANDS/ÂΜL (ref 0–0.1)
BASOPHILS NFR BLD AUTO: 1 % (ref 0–1)
BILIRUB SERPL-MCNC: 0.26 MG/DL (ref 0.2–1)
BUN SERPL-MCNC: 21 MG/DL (ref 5–25)
CALCIUM SERPL-MCNC: 9 MG/DL (ref 8.4–10.2)
CHLORIDE SERPL-SCNC: 102 MMOL/L (ref 96–108)
CHOLEST SERPL-MCNC: 145 MG/DL
CO2 SERPL-SCNC: 28 MMOL/L (ref 21–32)
CREAT SERPL-MCNC: 0.94 MG/DL (ref 0.6–1.3)
CREAT UR-MCNC: 84.3 MG/DL
EOSINOPHIL # BLD AUTO: 0.38 THOUSAND/ÂΜL (ref 0–0.61)
EOSINOPHIL NFR BLD AUTO: 7 % (ref 0–6)
ERYTHROCYTE [DISTWIDTH] IN BLOOD BY AUTOMATED COUNT: 15 % (ref 11.6–15.1)
EST. AVERAGE GLUCOSE BLD GHB EST-MCNC: 143 MG/DL
GFR SERPL CREATININE-BSD FRML MDRD: 59 ML/MIN/1.73SQ M
GLUCOSE P FAST SERPL-MCNC: 95 MG/DL (ref 65–99)
HBA1C MFR BLD: 6.6 %
HCT VFR BLD AUTO: 36.1 % (ref 34.8–46.1)
HDLC SERPL-MCNC: 72 MG/DL
HGB BLD-MCNC: 11.4 G/DL (ref 11.5–15.4)
IMM GRANULOCYTES # BLD AUTO: 0.01 THOUSAND/UL (ref 0–0.2)
IMM GRANULOCYTES NFR BLD AUTO: 0 % (ref 0–2)
LDLC SERPL CALC-MCNC: 66 MG/DL (ref 0–100)
LYMPHOCYTES # BLD AUTO: 1.88 THOUSANDS/ÂΜL (ref 0.6–4.47)
LYMPHOCYTES NFR BLD AUTO: 35 % (ref 14–44)
MCH RBC QN AUTO: 29.5 PG (ref 26.8–34.3)
MCHC RBC AUTO-ENTMCNC: 31.6 G/DL (ref 31.4–37.4)
MCV RBC AUTO: 94 FL (ref 82–98)
MICROALBUMIN UR-MCNC: <7 MG/L
MICROALBUMIN/CREAT 24H UR: <8 MG/G CREATININE (ref 0–30)
MONOCYTES # BLD AUTO: 0.41 THOUSAND/ÂΜL (ref 0.17–1.22)
MONOCYTES NFR BLD AUTO: 8 % (ref 4–12)
NEUTROPHILS # BLD AUTO: 2.68 THOUSANDS/ÂΜL (ref 1.85–7.62)
NEUTS SEG NFR BLD AUTO: 49 % (ref 43–75)
NONHDLC SERPL-MCNC: 73 MG/DL
NRBC BLD AUTO-RTO: 0 /100 WBCS
PLATELET # BLD AUTO: 235 THOUSANDS/UL (ref 149–390)
PMV BLD AUTO: 11 FL (ref 8.9–12.7)
POTASSIUM SERPL-SCNC: 4.5 MMOL/L (ref 3.5–5.3)
PROT SERPL-MCNC: 7.2 G/DL (ref 6.4–8.4)
RBC # BLD AUTO: 3.86 MILLION/UL (ref 3.81–5.12)
SODIUM SERPL-SCNC: 140 MMOL/L (ref 135–147)
TRIGL SERPL-MCNC: 34 MG/DL
WBC # BLD AUTO: 5.4 THOUSAND/UL (ref 4.31–10.16)

## 2023-10-04 PROCEDURE — 80061 LIPID PANEL: CPT

## 2023-10-04 PROCEDURE — 82043 UR ALBUMIN QUANTITATIVE: CPT | Performed by: NURSE PRACTITIONER

## 2023-10-04 PROCEDURE — 36415 COLL VENOUS BLD VENIPUNCTURE: CPT

## 2023-10-04 PROCEDURE — 85025 COMPLETE CBC W/AUTO DIFF WBC: CPT

## 2023-10-04 PROCEDURE — 80053 COMPREHEN METABOLIC PANEL: CPT

## 2023-10-04 PROCEDURE — 77063 BREAST TOMOSYNTHESIS BI: CPT

## 2023-10-04 PROCEDURE — 83036 HEMOGLOBIN GLYCOSYLATED A1C: CPT

## 2023-10-04 PROCEDURE — 77080 DXA BONE DENSITY AXIAL: CPT

## 2023-10-04 PROCEDURE — 82570 ASSAY OF URINE CREATININE: CPT | Performed by: NURSE PRACTITIONER

## 2023-10-04 PROCEDURE — 77067 SCR MAMMO BI INCL CAD: CPT

## 2023-10-04 NOTE — PROGRESS NOTES
e11.22  [address] [facility] coding opportunities          Chart Reviewed number of suggestions sent to Provider: 1     Patients Insurance     Medicare Insurance: [employer]

## 2023-10-05 RX ORDER — MONTELUKAST SODIUM 10 MG/1
TABLET ORAL
Qty: 90 TABLET | Refills: 1 | Status: SHIPPED | OUTPATIENT
Start: 2023-10-05

## 2023-10-07 ENCOUNTER — HOSPITAL ENCOUNTER (OUTPATIENT)
Dept: MRI IMAGING | Facility: HOSPITAL | Age: 78
Discharge: HOME | End: 2023-10-07
Attending: STUDENT IN AN ORGANIZED HEALTH CARE EDUCATION/TRAINING PROGRAM
Payer: MEDICARE
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
## 2023-10-07 DIAGNOSIS — E66.9 OBESITY (BMI 30-39.9): ICD-10-CM

## 2023-10-07 DIAGNOSIS — R20.2 NUMBNESS AND TINGLING OF RIGHT LEG: ICD-10-CM

## 2023-10-07 DIAGNOSIS — G93.5 CHIARI MALFORMATION TYPE I (HCC): ICD-10-CM

## 2023-10-07 DIAGNOSIS — E23.6 EMPTY SELLA (HCC): ICD-10-CM

## 2023-10-07 DIAGNOSIS — R20.0 NUMBNESS AND TINGLING OF RIGHT LEG: ICD-10-CM

## 2023-10-07 DIAGNOSIS — G43.009 MIGRAINE WITHOUT AURA AND WITHOUT STATUS MIGRAINOSUS, NOT INTRACTABLE: ICD-10-CM

## 2023-10-07 DIAGNOSIS — G44.309 POST-TRAUMATIC HEADACHE: ICD-10-CM

## 2023-10-07 PROCEDURE — G1004 CDSM NDSC: HCPCS

## 2023-10-07 PROCEDURE — 70553 MRI BRAIN STEM W/O & W/DYE: CPT

## 2023-10-07 PROCEDURE — A9585 GADOBUTROL INJECTION: HCPCS | Performed by: STUDENT IN AN ORGANIZED HEALTH CARE EDUCATION/TRAINING PROGRAM

## 2023-10-07 PROCEDURE — 72148 MRI LUMBAR SPINE W/O DYE: CPT

## 2023-10-07 RX ORDER — GADOBUTROL 604.72 MG/ML
9 INJECTION INTRAVENOUS
Status: COMPLETED | OUTPATIENT
Start: 2023-10-07 | End: 2023-10-07

## 2023-10-07 RX ADMIN — GADOBUTROL 9 ML: 604.72 INJECTION INTRAVENOUS at 11:32

## 2023-10-09 ENCOUNTER — PROCEDURE VISIT (OUTPATIENT)
Dept: UROLOGY | Facility: CLINIC | Age: 78
End: 2023-10-09
Payer: MEDICARE

## 2023-10-09 ENCOUNTER — TELEPHONE (OUTPATIENT)
Dept: UROLOGY | Facility: CLINIC | Age: 78
End: 2023-10-09

## 2023-10-09 VITALS
WEIGHT: 204.2 LBS | OXYGEN SATURATION: 93 % | DIASTOLIC BLOOD PRESSURE: 80 MMHG | HEIGHT: 60 IN | BODY MASS INDEX: 40.09 KG/M2 | HEART RATE: 77 BPM | RESPIRATION RATE: 16 BRPM | SYSTOLIC BLOOD PRESSURE: 142 MMHG

## 2023-10-09 DIAGNOSIS — N39.0 RECURRENT UTI: Primary | ICD-10-CM

## 2023-10-09 DIAGNOSIS — N36.1 URETHRAL DIVERTICULUM: ICD-10-CM

## 2023-10-09 LAB
BACTERIA UR QL AUTO: NORMAL /HPF
BILIRUB UR QL STRIP: NEGATIVE
CLARITY UR: CLEAR
COLOR UR: COLORLESS
GLUCOSE UR STRIP-MCNC: NEGATIVE MG/DL
HGB UR QL STRIP.AUTO: NEGATIVE
KETONES UR STRIP-MCNC: NEGATIVE MG/DL
LEUKOCYTE ESTERASE UR QL STRIP: NEGATIVE
NITRITE UR QL STRIP: NEGATIVE
NON-SQ EPI CELLS URNS QL MICRO: NORMAL /HPF
PH UR STRIP.AUTO: 6.5 [PH]
PROT UR STRIP-MCNC: NEGATIVE MG/DL
RBC #/AREA URNS AUTO: NORMAL /HPF
SP GR UR STRIP.AUTO: 1.01 (ref 1–1.03)
UROBILINOGEN UR STRIP-ACNC: <2 MG/DL
WBC #/AREA URNS AUTO: NORMAL /HPF

## 2023-10-09 PROCEDURE — 52000 CYSTOURETHROSCOPY: CPT | Performed by: UROLOGY

## 2023-10-09 PROCEDURE — 87086 URINE CULTURE/COLONY COUNT: CPT | Performed by: UROLOGY

## 2023-10-09 PROCEDURE — 81001 URINALYSIS AUTO W/SCOPE: CPT | Performed by: UROLOGY

## 2023-10-09 PROCEDURE — 88112 CYTOPATH CELL ENHANCE TECH: CPT | Performed by: SPECIALIST

## 2023-10-09 RX ORDER — SULFAMETHOXAZOLE AND TRIMETHOPRIM 800; 160 MG/1; MG/1
1 TABLET ORAL EVERY 12 HOURS SCHEDULED
Qty: 6 TABLET | Refills: 0 | Status: SHIPPED | OUTPATIENT
Start: 2023-10-09 | End: 2023-10-12

## 2023-10-09 NOTE — PROGRESS NOTES
Cystoscopy     Date/Time 10/9/2023 11:00 AM     Performed by  Emeli Rose DO   Authorized by Emeli Rose DO     Universal Protocol:  Consent: Verbal consent obtained. Written consent obtained. Risks and benefits: risks, benefits and alternatives were discussed  Consent given by: patient  Time out: Immediately prior to procedure a "time out" was called to verify the correct patient, procedure, equipment, support staff and site/side marked as required. Timeout called at: 10/9/2023 11:34 AM.  Patient understanding: patient states understanding of the procedure being performed  Patient consent: the patient's understanding of the procedure matches consent given  Procedure consent: procedure consent matches procedure scheduled  Relevant documents: relevant documents present and verified  Patient identity confirmed: verbally with patient        Procedure Details:  Procedure type: cystoscopy    Patient tolerance: Patient tolerated the procedure well with no immediate complications    Additional Procedure Details: Office Cystoscopy Procedure Note    Indication:     recurrent urinary tract infections    Informed consent   The risks, benefits, complications, treatment options, and expected outcomes were discussed with the patient. The patient concurred with the proposed plan and provided informed consent. Anesthesia  Lidocaine jelly 2%    Antibiotic prophylaxis   None    Procedure  In the presence of a female nurse, the patient was placed in the supine frog-legged position, was prepped and draped in the usual manner using sterile technique, and 2% lidocaine jelly instilled into the urethra. Prior to this pelvic examination showed normal labia majora and minora, a normal vaginal introitus, good quality vaginal mucosa, and showed good pelvic floor descensus. Small right sided diverticulum palpated.   A 17 F flexible cystoscope was then inserted into the urethra and the urethra and bladder carefully examined. 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 If culture grows resistance, we will call her in new antibiotic.  -Follow-up urine cytology  -Return to clinic in 1 to 2 weeks to discuss test findings. If the urine cytology comes back negative, we will need to discuss surgical management of her urethral diverticulum.

## 2023-10-09 NOTE — TELEPHONE ENCOUNTER
Pt left without checking out, needed a F/U appt with     Made next available appt     11/3/23 @10:45 with Dr. Perico Lorenzana. Provided phone number and asked that the pt calls back and confirms the appt.

## 2023-10-09 NOTE — PATIENT INSTRUCTIONS
You had cystoscopy done in the office today. This means that we looked inside your urethra and bladder with a camera. You may see some blood in your urine for the next few days. This is normal. Please drink plenty of fluids. Call the office if you are passing large blood clots in your urine or if you are not able to urinate. It may burn when you urinate for the next few days. This is normal.    Please call the office if you have fevers or chills in the next few days. An antibiotic called Bactrim was sent to your pharmacy. Please pick this up and take medication as prescribed. You will return to clinic in 1-2 weeks.

## 2023-10-09 NOTE — LETTER
October 9, 2023     [facility], [address] [id number]    Patient: Brown Batista   YOB: 1945   Date of Visit: 10/9/2023       Dear Dr. Wandy Allen: Thank you for referring Brown Batista to me for evaluation. Below are my notes for this consultation. If you have questions, please do not hesitate to call me. I look forward to following your patient along with you. Sincerely,        Osmin Finn DO        CC: No Recipients    Osmin Finn DO  10/9/2023 11:37 AM  Sign when Signing Visit     Cystoscopy     Date/Time 10/9/2023 11:00 AM     Performed by  Osmin Finn DO   Authorized by Osmin Finn DO     Universal Protocol:  Consent: Verbal consent obtained. Written consent obtained. Risks and benefits: risks, benefits and alternatives were discussed  Consent given by: patient  Time out: Immediately prior to procedure a "time out" was called to verify the correct patient, procedure, equipment, support staff and site/side marked as required. Timeout called at: 10/9/2023 11:34 AM.  Patient understanding: patient states understanding of the procedure being performed  Patient consent: the patient's understanding of the procedure matches consent given  Procedure consent: procedure consent matches procedure scheduled  Relevant documents: relevant documents present and verified  Patient identity confirmed: verbally with patient        Procedure Details:  Procedure type: cystoscopy    Patient tolerance: Patient tolerated the procedure well with no immediate complications    Additional Procedure Details: Office Cystoscopy Procedure Note    Indication:     recurrent urinary tract infections    Informed consent   The risks, benefits, complications, treatment options, and expected outcomes were discussed with the patient. The patient concurred with the proposed plan and provided informed consent.     Anesthesia  Lidocaine jelly 2%    Antibiotic prophylaxis   None    Procedure  In the presence of a female nurse, the patient was placed in the supine frog-legged position, was prepped and draped in the usual manner using sterile technique, and 2% lidocaine jelly instilled into the urethra. Prior to this pelvic examination showed normal labia majora and minora, a normal vaginal introitus, good quality vaginal mucosa, and showed good pelvic floor descensus. Small right sided diverticulum palpated. A 17 F flexible cystoscope was then inserted into the urethra and the urethra and bladder carefully examined. 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 If culture grows resistance, we will call her in new antibiotic.  -Follow-up urine cytology  -Return to clinic in 1 to 2 weeks to discuss test findings. If the urine cytology comes back negative, we will need to discuss surgical management of her urethral diverticulum.

## 2023-10-10 PROCEDURE — 88112 CYTOPATH CELL ENHANCE TECH: CPT | Performed by: SPECIALIST

## 2023-10-11 DIAGNOSIS — I10 PRIMARY HYPERTENSION: ICD-10-CM

## 2023-10-11 LAB — BACTERIA UR CULT: ABNORMAL

## 2023-10-11 RX ORDER — CARVEDILOL 6.25 MG/1
TABLET ORAL
Qty: 180 TABLET | Refills: 1 | Status: SHIPPED | OUTPATIENT
Start: 2023-10-11

## 2023-10-12 ENCOUNTER — OFFICE VISIT (OUTPATIENT)
Dept: FAMILY MEDICINE CLINIC | Facility: CLINIC | Age: 78
End: 2023-10-12
Payer: MEDICARE

## 2023-10-12 VITALS
BODY MASS INDEX: 39.85 KG/M2 | SYSTOLIC BLOOD PRESSURE: 130 MMHG | TEMPERATURE: 97.6 F | HEIGHT: 60 IN | OXYGEN SATURATION: 95 % | WEIGHT: 203 LBS | RESPIRATION RATE: 16 BRPM | HEART RATE: 78 BPM | DIASTOLIC BLOOD PRESSURE: 72 MMHG

## 2023-10-12 DIAGNOSIS — R30.0 DYSURIA: ICD-10-CM

## 2023-10-12 DIAGNOSIS — Z00.00 MEDICARE ANNUAL WELLNESS VISIT, SUBSEQUENT: Primary | ICD-10-CM

## 2023-10-12 DIAGNOSIS — I10 PRIMARY HYPERTENSION: ICD-10-CM

## 2023-10-12 DIAGNOSIS — E11.40 TYPE 2 DIABETES MELLITUS WITH DIABETIC NEUROPATHY, WITHOUT LONG-TERM CURRENT USE OF INSULIN (HCC): ICD-10-CM

## 2023-10-12 PROCEDURE — 99214 OFFICE O/P EST MOD 30 MIN: CPT | Performed by: NURSE PRACTITIONER

## 2023-10-12 PROCEDURE — G0439 PPPS, SUBSEQ VISIT: HCPCS | Performed by: NURSE PRACTITIONER

## 2023-10-12 NOTE — PROGRESS NOTES
Assessment and Plan:     Problem List Items Addressed This Visit        Endocrine    Type 2 diabetes mellitus with diabetic neuropathy, unspecified ([address])       Lab Results   Component Value Date    HGBA1C 6.6 (H) 10/04/2023   Blood sugars are well controlled. Continue medication. Continue low carbohydrate diet. Relevant Orders    Hemoglobin A1C    Comprehensive metabolic panel    CBC and differential    Albumin / creatinine urine ratio    Lipid Panel with Direct LDL reflex    TSH, 3rd generation with Free T4 reflex       Cardiovascular and Mediastinum    Primary hypertension     Blood pressure is at goal.  Continue medication. Discussed maintaining heart healthy diet. Other    Medicare annual wellness visit, subsequent - Primary     Medicare wellness completed. HCR documentation completed and scanned in chart. Patient is up-to-date with screenings. Reviewed mammogram.  Discussed maintaining safety. Discussed doing memory exercises to help with increased problems with memory. Continue with good fluid hydration, exercise, nutrition. Dysuria     Patient recently seen by urology, diagnosed with a UTI. Did not start taking antibiotics. Start Bactrim as soon as she picks it up from pharmacy. Increase fluid hydration. Maintain good hygiene. Urinary Incontinence Plan of Care: counseling topics discussed: practice Kegel (pelvic floor strengthening) exercises, use restroom every 2 hours, limiting fluid intake 3-4 hours before bed, preventing constipation, taking fluid pills at a time when you can get to bathroom easily and bladder retraining. Preventive health issues were discussed with patient, and age appropriate screening tests were ordered as noted in patient's After Visit Summary. Personalized health advice and appropriate referrals for health education or preventive services given if needed, as noted in patient's After Visit Summary.      History of Present Illness:     Patient presents for a Medicare Wellness Visit    Patient is here for Medicare wellness visit. Also to follow-up on chronic health conditions. Has seen neurology. Also has seen urology. Reports diet neurology prescribed vitamin b and magnesium.  the medication but lost it because $45 would like to just buy over-the-counter. Does continue to have some mild headaches. Patient Care Team:  Vale Ortiz as PCP - General (Family Medicine)  Norrine Polio, MD [place] Lombard, MD Cary Dalton, DO (Gastroenterology)  Ivana Foreman PA-C as Physician Assistant (Physician Assistant)     Review of Systems:     Review of Systems   Constitutional: Negative. HENT: Negative. Eyes: Negative. Respiratory: Negative. Cardiovascular: Negative. Gastrointestinal: Negative. Endocrine: Negative. Genitourinary: Negative. Musculoskeletal: Negative. Allergic/Immunologic: Negative. Neurological:  Positive for weakness and headaches. Psychiatric/Behavioral: Negative.           Problem List:     Patient Active Problem List   Diagnosis   • Depression with anxiety   • Mild intermittent asthma   • Encounter for gynecological examination without abnormal finding   • Atypical chest pain   • Dermatitis   • BMI 37.0-37.9, adult   • Obesity, morbid ([address])   • Medicare annual wellness visit, subsequent   • Exertional dyspnea   • Psychophysiological insomnia   • Stage 3a chronic kidney disease ([address])   • Abnormal EKG   • Primary hypertension   • Bradycardia   • Anxiety   • Transaminitis   • COPD (chronic obstructive pulmonary disease) ([facility])   • BOLA (obstructive sleep apnea)   • Eosinophilia   • Diverticulitis   • Dysuria   • Primary insomnia   • Anginal equivalent   • Type 2 diabetes mellitus with diabetic neuropathy, unspecified ([facility])   • Conjunctival hemorrhage of right eye      Past Medical and Surgical History:     Past Medical History:   Diagnosis Date   • Asthma    • Benign hypertension 2018   • Cardiac arrest ([address])    • Diabetes mellitus ([address])    • Glaucoma    • Hypertension    • Kidney stone    • Neuropathy    • Sleep apnea     no machine   • SOB (shortness of breath)    • Tubular adenoma of colon 10/2019   • Wheezing      Past Surgical History:   Procedure Laterality Date   •  SECTION     • COLONOSCOPY     • HYSTERECTOMY     • IR BIOPSY BONE MARROW  2022   • TONSILLECTOMY        Family History:     Family History   Problem Relation Age of Onset   • Diabetes Mother    • Hypertension Father    • Prostate cancer Father    • Diabetes Sister    • Hypertension Sister    • Breast cancer Sister 62   • No Known Problems Sister    • No Known Problems Daughter    • No Known Problems Daughter    • No Known Problems Daughter    • No Known Problems Maternal Grandmother    • No Known Problems Paternal Grandmother    • Diabetes Brother    • Hypertension Brother    • No Known Problems Maternal Aunt    • Breast cancer Maternal Aunt 62   • No Known Problems Maternal Aunt    • No Known Problems Maternal Aunt    • Pancreatic cancer Paternal Aunt    • No Known Problems Paternal Aunt    • No Known Problems Paternal Aunt    • Asthma Family    • Colon cancer Neg Hx    • Ovarian cancer Neg Hx    • Uterine cancer Neg Hx    • Cervical cancer Neg Hx       Social History:     Social History     Socioeconomic History   • Marital status: /Civil Union     Spouse name: None   • Number of children: None   • Years of education: None   • Highest education level: None   Occupational History   • Occupation: retired    Tobacco Use   • Smoking status: Never     Passive exposure: Never   • Smokeless tobacco: Never   Vaping Use   • Vaping Use: Never used   Substance and Sexual Activity   • Alcohol use: Never   • Drug use: No   • Sexual activity: Yes     Birth control/protection: Surgical   Other Topics Concern   • None   Social History Narrative   • None     Social Determinants of Health Financial Resource Strain: Low Risk  (10/12/2023)    Overall Financial Resource Strain (CARDIA)    • Difficulty of Paying Living Expenses: Not hard at all   Food Insecurity: No Food Insecurity (4/5/2022)    Hunger Vital Sign    • Worried About Running Out of Food in the Last Year: Never true    • Ran Out of Food in the Last Year: Never true   Transportation Needs: No Transportation Needs (10/12/2023)    PRAPARE - Transportation    • Lack of Transportation (Medical): No    • Lack of Transportation (Non-Medical): No   Physical Activity: Not on file   Stress: Not on file   Social Connections: Not on file   Intimate Partner Violence: Not on file   Housing Stability: Low Risk  (4/5/2022)    Housing Stability Vital Sign    • Unable to Pay for Housing in the Last Year: No    • Number of Places Lived in the Last Year: 1    • Unstable Housing in the Last Year: No      Medications and Allergies:     Current Outpatient Medications   Medication Sig Dispense Refill   • acetaminophen (TYLENOL) 325 mg tablet Take 2 tablets (650 mg total) by mouth every 4 (four) hours as needed for mild pain  0   • Advair Diskus 250-50 MCG/ACT inhaler INHALE 1 PUFF 2 TIMES A DAY RINSE MOUTH AFTER USE. 60 blister 3   • albuterol (PROVENTIL HFA,VENTOLIN HFA) 90 mcg/act inhaler INHALE 1 PUFF BY MOUTH EVERY 6 HOURS AS NEEDED FOR WHEEZE 18 g 3   • amLODIPine (NORVASC) 10 mg tablet TAKE 1 TABLET BY MOUTH DAILY AT BEDTIME 90 tablet 1   • Aspirin Low Dose 81 MG chewable tablet CHEW 1 TABLET BY MOUTH DAILY 90 tablet 1   • benzonatate (TESSALON PERLES) 100 mg capsule Take 1 capsule (100 mg total) by mouth 3 (three) times a day as needed for cough 20 capsule 0   • carvedilol (COREG) 6.25 mg tablet TAKE 1 TABLET BY MOUTH TWICE A DAY WITH MEALS 180 tablet 1   • fluticasone-salmeterol (AirDuo RespiClick 903/32) 103-66 mcg/act dry powder inhaler Inhale 1 puff 2 (two) times a day Rinse mouth after use.  1 each 3   • latanoprost (XALATAN) 0.005 % ophthalmic solution      • lidocaine (LIDODERM) 5 % Place 1 patch on the skin     • Lyrica  MG TB24 Take 1 tablet by mouth 2 (two) times a day 60 tablet 0   • magnesium Oxide (MAG-OX) 400 mg TABS Take 1 tablet (400 mg total) by mouth daily 90 tablet 3   • meloxicam (MOBIC) 15 mg tablet TAKE 1 TABLET (15 MG TOTAL) BY MOUTH DAILY. 90 tablet 0   • montelukast (SINGULAIR) 10 mg tablet TAKE 1 TABLET BY MOUTH EVERYDAY AT BEDTIME 90 tablet 1   • QUEtiapine (SEROquel) 50 mg tablet TAKE 1 TABLET (50 MG TOTAL) BY MOUTH DAILY AT BEDTIME AS NEEDED (INSOMNIA) 90 tablet 1   • Riboflavin 400 MG TABS Take 1 tablet (400 mg total) by mouth daily 90 tablet 3   • sertraline (ZOLOFT) 50 mg tablet Take 1 tablet (50 mg total) by mouth daily 90 tablet 3   • bisacodyl (DULCOLAX) 5 mg EC tablet Take 1 tablet (5 mg total) by mouth as needed in the morning for constipation. 30 tablet 0   • carbamide peroxide (DEBROX) 6.5 % otic solution Administer 5 drops into both ears 2 (two) times a day 15 mL 0   • glucose blood test strip E11.9 / testing daily (Patient not taking: Reported on 9/12/2023)     • Janumet -1000 MG TB24 TAKE 1 TABLET BY MOUTH EVERY DAY WITH DINNER 90 tablet 3   • Promethazine-DM (PHENERGAN-DM) 6.25-15 mg/5 mL oral syrup Take 5 mL by mouth 4 (four) times a day as needed for cough (Patient not taking: Reported on 8/29/2023) 180 mL 3   • traMADol (Ultram) 50 mg tablet Take 1 tablet (50 mg total) by mouth every 6 (six) hours as needed for moderate pain (Patient not taking: Reported on 2/27/2023) 20 tablet 0   • zolpidem (AMBIEN) 5 mg tablet Take 1 tablet (5 mg total) by mouth daily at bedtime as needed for sleep (Patient not taking: Reported on 9/12/2023) 30 tablet 0     No current facility-administered medications for this visit.      Allergies   Allergen Reactions   • Ciprofloxacin Itching   • Levaquin [Levofloxacin] Swelling   • Milk (Cow) GI Intolerance   • Penicillins GI Intolerance   • Pork Allergy - Food Allergy GI Intolerance   • Shellfish Allergy - Food Allergy GI Intolerance   • Soy Allergy - Food Allergy GI Intolerance   • Wheat Bran - Food Allergy GI Intolerance      Immunizations:     Immunization History   Administered Date(s) Administered   • COVID-19 PFIZER VACCINE 0.3 ML IM 02/14/2021, 03/15/2021   • Tuberculin Skin Test 04/19/2022      Health Maintenance:         Topic Date Due   • Breast Cancer Screening: Mammogram  10/04/2024   • Colorectal Cancer Screening  04/08/2027   • Hepatitis C Screening  Completed         Topic Date Due   • Pneumococcal Vaccine: 65+ Years (1 - PCV) Never done   • COVID-19 Vaccine (3 - Pfizer series) 05/10/2021   • Influenza Vaccine (1) Never done      Medicare Screening Tests and Risk Assessments:     Taylor Upton is here for her Subsequent Wellness visit. Last Medicare Wellness visit information reviewed, patient interviewed and updates made to the record today. Health Risk Assessment:   Patient rates overall health as good. Patient feels that their physical health rating is much better. Patient is satisfied with their life. Eyesight was rated as same. Hearing was rated as same. Patient feels that their emotional and mental health rating is much better. Patients states they are never, rarely angry. Patient states they are sometimes unusually tired/fatigued. Pain experienced in the last 7 days has been some. Patient's pain rating has been 5/10. Patient states that she has experienced no weight loss or gain in last 6 months. Depression Screening:   PHQ-9 Score: 0      Fall Risk Screening: In the past year, patient has experienced: no history of falling in past year      Urinary Incontinence Screening:   Patient has leaked urine accidently in the last six months. Home Safety:  Patient has trouble with stairs inside or outside of their home. Patient has working smoke alarms and has working carbon monoxide detector. Home safety hazards include: none.      Nutrition:   Current diet is Regular. Medications:   Patient is currently taking over-the-counter supplements. OTC medications include: see medication list. Patient is able to manage medications. Activities of Daily Living (ADLs)/Instrumental Activities of Daily Living (IADLs):   Walk and transfer into and out of bed and chair?: Yes  Dress and groom yourself?: Yes    Bathe or shower yourself?: Yes    Feed yourself?  Yes  Do your laundry/housekeeping?: Yes  Manage your money, pay your bills and track your expenses?: Yes  Make your own meals?: Yes    Do your own shopping?: Yes    Previous Hospitalizations:   Any hospitalizations or ED visits within the last 12 months?: Yes    How many hospitalizations have you had in the last year?: 1-2    Advance Care Planning:     Advanced directive: Yes    Advanced directive counseling given: Yes    Provider agrees with end of life decisions: Yes      Comments:  and dtr  HCR documentation completed in office, original given to patient    Cognitive Screening:   Provider or family/friend/caregiver concerned regarding cognition?: Yes  Mini-Mental Status Exam (MMSE) Score: 20  Interpretation: MMSE Score 20-23: Mild cognitive impairment or early/mild dementia    PREVENTIVE SCREENINGS      Cardiovascular Screening:    General: Screening Current      Diabetes Screening:     General: Screening Not Indicated and History Diabetes      Colorectal Cancer Screening:     General: Screening Current      Breast Cancer Screening:     General: Screening Current      Cervical Cancer Screening:    General: Screening Not Indicated      Abdominal Aortic Aneurysm (AAA) Screening:        General: Screening Not Indicated      Lung Cancer Screening:     General: Screening Not Indicated      Hepatitis C Screening:    General: Screening Current    Screening, Brief Intervention, and Referral to Treatment (SBIRT)    Screening  Typical number of drinks in a day: 0  Typical number of drinks in a week: 0  Interpretation: Low risk drinking behavior. Single Item Drug Screening:  How often have you used an illegal drug (including marijuana) or a prescription medication for non-medical reasons in the past year? never    Single Item Drug Screen Score: 0  Interpretation: Negative screen for possible drug use disorder    Other Counseling Topics:   Car/seat belt/driving safety, skin self-exam, sunscreen and calcium and vitamin D intake and regular weightbearing exercise. No results found. Physical Exam:     /72   Pulse 78   Temp 97.6 °F (36.4 °C) (Temporal)   Resp 16   Ht 5' (1.524 m)   Wt 92.1 kg (203 lb)   SpO2 95%   BMI 39.65 kg/m²     Physical Exam  Constitutional:       General: She is not in acute distress. Appearance: Normal appearance. She is obese. She is not ill-appearing. HENT:      Head: Normocephalic and atraumatic. Nose: Nose normal.      Mouth/Throat:      Mouth: Mucous membranes are moist.   Eyes:      Pupils: Pupils are equal, round, and reactive to light. Cardiovascular:      Rate and Rhythm: Normal rate and regular rhythm. Pulses: Normal pulses. Heart sounds: Normal heart sounds. Pulmonary:      Effort: Pulmonary effort is normal. No respiratory distress. Breath sounds: Normal breath sounds. Chest:      Chest wall: No tenderness. Abdominal:      General: Abdomen is flat. Bowel sounds are normal. There is no distension. Palpations: There is no mass. Tenderness: There is no abdominal tenderness. Musculoskeletal:         General: Normal range of motion. Cervical back: Normal range of motion and neck supple. Skin:     General: Skin is warm and dry. Neurological:      General: No focal deficit present. Mental Status: She is alert and oriented to person, place, and time. Psychiatric:         Mood and Affect: Mood normal.         Behavior: Behavior normal.         Thought Content:  Thought content normal.         Judgment: Judgment normal. ALEKSANDER Gomez

## 2023-10-12 NOTE — PATIENT INSTRUCTIONS
Start bactrim for urinary tract infection  Continue meds  Do brain exercises    Medicare Preventive Visit Patient Instructions  Thank you for completing your Welcome to Medicare Visit or Medicare Annual Wellness Visit today. Your next wellness visit will be due in one year (10/12/2024). The screening/preventive services that you may require over the next 5-10 years are detailed below. Some tests may not apply to you based off risk factors and/or age. Screening tests ordered at today's visit but not completed yet may show as past due. Also, please note that scanned in results may not display below. Preventive Screenings:  Service Recommendations Previous Testing/Comments   Colorectal Cancer Screening  * Colonoscopy    * Fecal Occult Blood Test (FOBT)/Fecal Immunochemical Test (FIT)  * Fecal DNA/Cologuard Test  * Flexible Sigmoidoscopy Age: 43-73 years old   Colonoscopy: every 10 years (may be performed more frequently if at higher risk)  OR  FOBT/FIT: every 1 year  OR  Cologuard: every 3 years  OR  Sigmoidoscopy: every 5 years  Screening may be recommended earlier than age 39 if at higher risk for colorectal cancer. Also, an individualized decision between you and your healthcare provider will decide whether screening between the ages of 77-80 would be appropriate. Colonoscopy: 04/09/2022  FOBT/FIT: Not on file  Cologuard: Not on file  Sigmoidoscopy: Not on file    Screening Current     Breast Cancer Screening Age: 36 years old  Frequency: every 1-2 years  Not required if history of left and right mastectomy Mammogram: 10/04/2023    Screening Current   Cervical Cancer Screening Between the ages of 21-29, pap smear recommended once every 3 years. Between the ages of 32-69, can perform pap smear with HPV co-testing every 5 years.    Recommendations may differ for women with a history of total hysterectomy, cervical cancer, or abnormal pap smears in past. Pap Smear: 11/04/2019    Screening Not Indicated   Hepatitis C Screening Once for adults born between 1945 and 1965  More frequently in patients at high risk for Hepatitis C Hep C Antibody: 09/30/2019    Screening Current   Diabetes Screening 1-2 times per year if you're at risk for diabetes or have pre-diabetes Fasting glucose: 95 mg/dL (10/4/2023)  A1C: 6.6 % (10/4/2023)  Screening Not Indicated  History Diabetes   Cholesterol Screening Once every 5 years if you don't have a lipid disorder. May order more often based on risk factors. Lipid panel: 10/04/2023    Screening Current     Other Preventive Screenings Covered by Medicare:  Abdominal Aortic Aneurysm (AAA) Screening: covered once if your at risk. You're considered to be at risk if you have a family history of AAA. Lung Cancer Screening: covers low dose CT scan once per year if you meet all of the following conditions: (1) Age 48-67; (2) No signs or symptoms of lung cancer; (3) Current smoker or have quit smoking within the last 15 years; (4) You have a tobacco smoking history of at least 20 pack years (packs per day multiplied by number of years you smoked); (5) You get a written order from a healthcare provider. Glaucoma Screening: covered annually if you're considered high risk: (1) You have diabetes OR (2) Family history of glaucoma OR (3)  aged 48 and older OR (3)  American aged 72 and older  Osteoporosis Screening: covered every 2 years if you meet one of the following conditions: (1) You're estrogen deficient and at risk for osteoporosis based off medical history and other findings; (2) Have a vertebral abnormality; (3) On glucocorticoid therapy for more than 3 months; (4) Have primary hyperparathyroidism; (5) On osteoporosis medications and need to assess response to drug therapy. Last bone density test (DXA Scan): 10/04/2023. HIV Screening: covered annually if you're between the age of 14-79.  Also covered annually if you are younger than 13 and older than 72 with risk factors for HIV infection. For pregnant patients, it is covered up to 3 times per pregnancy. Immunizations:  Immunization Recommendations   Influenza Vaccine Annual influenza vaccination during flu season is recommended for all persons aged >= 6 months who do not have contraindications   Pneumococcal Vaccine   * Pneumococcal conjugate vaccine = PCV13 (Prevnar 13), PCV15 (Vaxneuvance), PCV20 (Prevnar 20)  * Pneumococcal polysaccharide vaccine = PPSV23 (Pneumovax) Adults 45-95 yo with certain risk factors or if 69+ yo  If never received any pneumonia vaccine: recommend Prevnar 20 (PCV20)  Give PCV20 if previously received 1 dose of PCV13 or PPSV23   Hepatitis B Vaccine 3 dose series if at intermediate or high risk (ex: diabetes, end stage renal disease, liver disease)   Respiratory syncytial virus (RSV) Vaccine - COVERED BY MEDICARE PART D  * RSVPreF3 (Arexvy) CDC recommends that adults 61years of age and older may receive a single dose of RSV vaccine using shared clinical decision-making (SCDM)   Tetanus (Td) Vaccine - COST NOT COVERED BY MEDICARE PART B Following completion of primary series, a booster dose should be given every 10 years to maintain immunity against tetanus. Td may also be given as tetanus wound prophylaxis. Tdap Vaccine - COST NOT COVERED BY MEDICARE PART B Recommended at least once for all adults. For pregnant patients, recommended with each pregnancy.    Shingles Vaccine (Shingrix) - COST NOT COVERED BY MEDICARE PART B  2 shot series recommended in those 19 years and older who have or will have weakened immune systems or those 50 years and older     Health Maintenance Due:      Topic Date Due    Breast Cancer Screening: Mammogram  10/04/2024    Colorectal Cancer Screening  04/08/2027    Hepatitis C Screening  Completed     Immunizations Due:      Topic Date Due    Pneumococcal Vaccine: 65+ Years (1 - PCV) Never done    COVID-19 Vaccine (3 - Pfizer series) 05/10/2021    Influenza Vaccine (1) Never done Advance Directives   What are advance directives? Advance directives are legal documents that state your wishes and plans for medical care. These plans are made ahead of time in case you lose your ability to make decisions for yourself. Advance directives can apply to any medical decision, such as the treatments you want, and if you want to donate organs. What are the types of advance directives? There are many types of advance directives, and each state has rules about how to use them. You may choose a combination of any of the following:  Living will: This is a written record of the treatment you want. You can also choose which treatments you do not want, which to limit, and which to stop at a certain time. This includes surgery, medicine, IV fluid, and tube feedings. Durable power of  for [facility]): This is a written record that states who you want to make healthcare choices for you when you are unable to make them for yourself. This person, called a proxy, is usually a family member or a friend. You may choose more than 1 proxy. Do not resuscitate (DNR) order:  A DNR order is used in case your heart stops beating or you stop breathing. It is a request not to have certain forms of treatment, such as CPR. A DNR order may be included in other types of advance directives. Medical directive: This covers the care that you want if you are in a coma, near death, or unable to make decisions for yourself. You can list the treatments you want for each condition. Treatment may include pain medicine, surgery, blood transfusions, dialysis, IV or tube feedings, and a ventilator (breathing machine). Values history: This document has questions about your views, beliefs, and how you feel and think about life. This information can help others choose the care that you would choose. Why are advance directives important? An advance directive helps you control your care.  Although spoken wishes may be used, it is better to have your wishes written down. Spoken wishes can be misunderstood, or not followed. Treatments may be given even if you do not want them. An advance directive may make it easier for your family to make difficult choices about your care. Urinary Incontinence   Urinary incontinence (UI)  is when you lose control of your bladder. UI develops because your bladder cannot store or empty urine properly. The 3 most common types of UI are stress incontinence, urge incontinence, or both. Medicines:   May be given to help strengthen your bladder control. Report any side effects of medication to your healthcare provider. Do pelvic muscle exercises often:  Your pelvic muscles help you stop urinating. Squeeze these muscles tight for 5 seconds, then relax for 5 seconds. Gradually work up to squeezing for 10 seconds. Do 3 sets of 15 repetitions a day, or as directed. This will help strengthen your pelvic muscles and improve bladder control. Train your bladder:  Go to the bathroom at set times, such as every 2 hours, even if you do not feel the urge to go. You can also try to hold your urine when you feel the urge to go. For example, hold your urine for 5 minutes when you feel the urge to go. As that becomes easier, hold your urine for 10 minutes. Self-care:   Keep a UI record. Write down how often you leak urine and how much you leak. Make a note of what you were doing when you leaked urine. Drink liquids as directed. You may need to limit the amount of liquid you drink to help control your urine leakage. Do not drink any liquid right before you go to bed. Limit or do not have drinks that contain caffeine or alcohol. Prevent constipation. Eat a variety of high-fiber foods. Good examples are high-fiber cereals, beans, vegetables, and whole-grain breads. Walking is the best way to trigger your intestines to have a bowel movement. Exercise regularly and maintain a healthy weight.   Weight loss and exercise will decrease pressure on your bladder and help you control your leakage. Use a catheter as directed  to help empty your bladder. A catheter is a tiny, plastic tube that is put into your bladder to drain your urine. Go to behavior therapy as directed. Behavior therapy may be used to help you learn to control your urge to urinate. Weight Management   Why it is important to manage your weight:  Being overweight increases your risk of health conditions such as heart disease, high blood pressure, type 2 diabetes, and certain types of cancer. It can also increase your risk for osteoarthritis, sleep apnea, and other respiratory problems. Aim for a slow, steady weight loss. Even a small amount of weight loss can lower your risk of health problems. How to lose weight safely:  A safe and healthy way to lose weight is to eat fewer calories and get regular exercise. You can lose up about 1 pound a week by decreasing the number of calories you eat by 500 calories each day. Healthy meal plan for weight management:  A healthy meal plan includes a variety of foods, contains fewer calories, and helps you stay healthy. A healthy meal plan includes the following:  Eat whole-grain foods more often. A healthy meal plan should contain fiber. Fiber is the part of grains, fruits, and vegetables that is not broken down by your body. Whole-grain foods are healthy and provide extra fiber in your diet. Some examples of whole-grain foods are whole-wheat breads and pastas, oatmeal, brown rice, and bulgur. Eat a variety of vegetables every day. Include dark, leafy greens such as spinach, kale, rowena greens, and mustard greens. Eat yellow and orange vegetables such as carrots, sweet potatoes, and winter squash. Eat a variety of fruits every day. Choose fresh or canned fruit (canned in its own juice or light syrup) instead of juice. Fruit juice has very little or no fiber. Eat low-fat dairy foods.   Drink fat-free (skim) milk or 1% milk. Eat fat-free yogurt and low-fat cottage cheese. Try low-fat cheeses such as mozzarella and other reduced-fat cheeses. Choose meat and other protein foods that are low in fat. Choose beans or other legumes such as split peas or lentils. Choose fish, skinless poultry (chicken or turkey), or lean cuts of red meat (beef or pork). Before you cook meat or poultry, cut off any visible fat. Use less fat and oil. Try baking foods instead of frying them. Add less fat, such as margarine, sour cream, regular salad dressing and mayonnaise to foods. Eat fewer high-fat foods. Some examples of high-fat foods include french fries, doughnuts, ice cream, and cakes. Eat fewer sweets. Limit foods and drinks that are high in sugar. This includes candy, cookies, regular soda, and sweetened drinks. Exercise:  Exercise at least 30 minutes per day on most days of the week. Some examples of exercise include walking, biking, dancing, and swimming. You can also fit in more physical activity by taking the stairs instead of the elevator or parking farther away from stores. Ask your healthcare provider about the best exercise plan for you. © Copyright [address] 2018 Information is for End User's use only and may not be sold, redistributed or otherwise used for commercial purposes.  All illustrations and images included in CareNotes® are the copyrighted property of [employer] or [address] [place] [address]

## 2023-10-13 PROBLEM — R68.89 FLU-LIKE SYMPTOMS: Status: RESOLVED | Noted: 2023-02-02 | Resolved: 2023-10-13

## 2023-10-13 PROBLEM — R10.9 ABDOMINAL PAIN: Status: RESOLVED | Noted: 2022-05-18 | Resolved: 2023-10-13

## 2023-10-13 NOTE — ASSESSMENT & PLAN NOTE
Lab Results   Component Value Date    HGBA1C 6.6 (H) 10/04/2023   Blood sugars are well controlled. Continue medication. Continue low carbohydrate diet.

## 2023-10-13 NOTE — ASSESSMENT & PLAN NOTE
Patient recently seen by urology, diagnosed with a UTI. Did not start taking antibiotics. Start Bactrim as soon as she picks it up from pharmacy. Increase fluid hydration. Maintain good hygiene.

## 2023-10-13 NOTE — ASSESSMENT & PLAN NOTE
Medicare wellness completed. HCR documentation completed and scanned in chart. Patient is up-to-date with screenings. Reviewed mammogram.  Discussed maintaining safety. Discussed doing memory exercises to help with increased problems with memory. Continue with good fluid hydration, exercise, nutrition.

## 2023-10-20 DIAGNOSIS — E11.40 CONTROLLED TYPE 2 DIABETES WITH NEUROPATHY (HCC): ICD-10-CM

## 2023-10-20 RX ORDER — PREGABALIN 165 MG/1
1 TABLET, FILM COATED, EXTENDED RELEASE ORAL 2 TIMES DAILY
Qty: 180 TABLET | Refills: 1 | Status: SHIPPED | OUTPATIENT
Start: 2023-10-20

## 2023-11-17 DIAGNOSIS — I16.0 HYPERTENSIVE URGENCY: ICD-10-CM

## 2023-11-17 RX ORDER — AMLODIPINE BESYLATE 10 MG/1
10 TABLET ORAL
Qty: 90 TABLET | Refills: 1 | Status: SHIPPED | OUTPATIENT
Start: 2023-11-17

## 2023-11-27 DIAGNOSIS — M19.90 ARTHRITIS: ICD-10-CM

## 2023-11-30 RX ORDER — MELOXICAM 15 MG/1
15 TABLET ORAL DAILY
Qty: 90 TABLET | Refills: 0 | Status: SHIPPED | OUTPATIENT
Start: 2023-11-30

## 2023-12-12 PROBLEM — Z00.00 MEDICARE ANNUAL WELLNESS VISIT, SUBSEQUENT: Status: RESOLVED | Noted: 2021-04-17 | Resolved: 2023-12-12

## 2023-12-27 ENCOUNTER — OFFICE VISIT (OUTPATIENT)
Dept: FAMILY MEDICINE CLINIC | Facility: CLINIC | Age: 78
End: 2023-12-27
Payer: MEDICARE

## 2023-12-27 VITALS
TEMPERATURE: 97.5 F | DIASTOLIC BLOOD PRESSURE: 70 MMHG | WEIGHT: 202 LBS | OXYGEN SATURATION: 95 % | SYSTOLIC BLOOD PRESSURE: 130 MMHG | RESPIRATION RATE: 16 BRPM | HEART RATE: 74 BPM | BODY MASS INDEX: 39.66 KG/M2 | HEIGHT: 60 IN

## 2023-12-27 DIAGNOSIS — R06.2 WHEEZES: ICD-10-CM

## 2023-12-27 DIAGNOSIS — R68.89 FLU-LIKE SYMPTOMS: Primary | ICD-10-CM

## 2023-12-27 DIAGNOSIS — R52 BODY ACHES: ICD-10-CM

## 2023-12-27 DIAGNOSIS — R05.9 COUGH, UNSPECIFIED TYPE: ICD-10-CM

## 2023-12-27 LAB
SARS-COV-2 AG UPPER RESP QL IA: NEGATIVE
VALID CONTROL: NORMAL

## 2023-12-27 PROCEDURE — 99213 OFFICE O/P EST LOW 20 MIN: CPT | Performed by: NURSE PRACTITIONER

## 2023-12-27 PROCEDURE — 87636 SARSCOV2 & INF A&B AMP PRB: CPT | Performed by: NURSE PRACTITIONER

## 2023-12-27 PROCEDURE — 87811 SARS-COV-2 COVID19 W/OPTIC: CPT | Performed by: NURSE PRACTITIONER

## 2023-12-27 RX ORDER — PREDNISONE 20 MG/1
20 TABLET ORAL DAILY
Qty: 5 TABLET | Refills: 0 | Status: ON HOLD | OUTPATIENT
Start: 2023-12-27

## 2023-12-27 RX ORDER — METHOCARBAMOL 500 MG/1
500 TABLET, FILM COATED ORAL 3 TIMES DAILY PRN
Qty: 15 TABLET | Refills: 0 | Status: ON HOLD | OUTPATIENT
Start: 2023-12-27

## 2023-12-27 RX ORDER — OSELTAMIVIR PHOSPHATE 75 MG/1
75 CAPSULE ORAL 2 TIMES DAILY
Qty: 10 CAPSULE | Refills: 0 | Status: SHIPPED | OUTPATIENT
Start: 2023-12-27 | End: 2024-01-01

## 2023-12-27 NOTE — ASSESSMENT & PLAN NOTE
Patient has had flulike symptoms started yesterday.  Fatigue, fever, body aches, cough.  Tested negative for COVID in office.  Discussed management of flu a B.  Will start Tamiflu.  Flu  swab sent out.  May take Robaxin every 8 hours as needed for body aches.  Increase fluid hydration.  Advised to use inhaler for shortness of breath.  Prednisone for chest tightness.  Discussed maintaining precautions.

## 2023-12-27 NOTE — PATIENT INSTRUCTIONS
Take Tamiflu twice a day for the next 5 days  Take Robaxin every 8 hours as needed for muscle aches May use Tylenol for fever drink lots of fluids tea with honey soup rest as you need   Prednisone daily for the next 5 days  Use inhaler as nned for shortness of breath     Acute Cough   WHAT YOU NEED TO KNOW:   An acute cough can last up to 3 weeks. Common causes of an acute cough include a cold, allergies, or a lung infection.  DISCHARGE INSTRUCTIONS:   Return to the emergency department if:   You have trouble breathing or feel short of breath.    You cough up blood, or you see blood in your mucus.    You faint or feel weak or dizzy.    You have chest pain when you cough or take a deep breath.    You have new wheezing.    Contact your healthcare provider if:   You have a fever.    Your cough lasts longer than 4 weeks.    Your symptoms do not improve with treatment.    You have questions or concerns about your condition or care.    Medicines:   Medicines  may be needed to stop the cough, decrease swelling in your airways, or help open your airways. Medicine may also be given to help you cough up mucus. Ask your healthcare provider what over-the-counter medicines you can take. If you have an infection caused by bacteria, you may need antibiotics.    Take your medicine as directed.  Contact your healthcare provider if you think your medicine is not helping or if you have side effects. Tell your provider if you are allergic to any medicine. Keep a list of the medicines, vitamins, and herbs you take. Include the amounts, and when and why you take them. Bring the list or the pill bottles to follow-up visits. Carry your medicine list with you in case of an emergency.    Manage your symptoms:   Do not smoke and stay away from others who smoke.  Nicotine and other chemicals in cigarettes and cigars can cause lung damage and make your cough worse. Ask your healthcare provider for information if you currently smoke and need  help to quit. E-cigarettes or smokeless tobacco still contain nicotine. Talk to your healthcare provider before you use these products.    Drink extra liquids as directed.  Liquids will help thin and loosen mucus so you can cough it up. Liquids will also help prevent dehydration. Examples of good liquids to drink include water, fruit juice, and broth. Do not drink liquids that contain caffeine. Caffeine can increase your risk for dehydration. Ask your healthcare provider how much liquid to drink each day.    Rest as directed.  Do not do activities that make your cough worse, such as exercise.    Use a humidifier or vaporizer.  Use a cool mist humidifier or a vaporizer to increase air moisture in your home. This may make it easier for you to breathe and help decrease your cough.    Eat 2 to 5 mL of honey 2 times each day.  Honey can help thin mucus and decrease your cough.    Use cough drops or lozenges.  These can help decrease throat irritation and your cough.    Follow up with your healthcare provider as directed:  Write down your questions so you remember to ask them during your visits.  © Copyright [employer] 2023 Information is for End User's use only and may not be sold, redistributed or otherwise used for commercial purposes.  The above information is an  only. It is not intended as medical advice for individual conditions or treatments. Talk to your doctor, nurse or pharmacist before following any medical regimen to see if it is safe and effective for you.

## 2023-12-27 NOTE — PROGRESS NOTES
Patient is being seen with complaints of bodyaches, fever, chills, fatigue name: Pebbles Landon      : 1945      MRN: [id number]  Encounter Provider: ALEKSANDER Manning  Encounter Date: 2023   Encounter department: [facility] PRIMARY [facility]    Assessment & Plan     1. Flu-like symptoms  Assessment & Plan:  Patient has had flulike symptoms started yesterday.  Fatigue, fever, body aches, cough.  Tested negative for COVID in office.  Discussed management of flu a B.  Will start Tamiflu.  Flu  swab sent out.  May take Robaxin every 8 hours as needed for body aches.  Increase fluid hydration.  Advised to use inhaler for shortness of breath.  Prednisone for chest tightness.  Discussed maintaining precautions.    Orders:  -     oseltamivir (TAMIFLU) 75 mg capsule; Take 1 capsule (75 mg total) by mouth 2 (two) times a day for 5 days    2. Cough, unspecified type  -     POCT Rapid Covid Ag  -     Covid/Flu- Office Collect    3. Body aches  -     methocarbamol (ROBAXIN) 500 mg tablet; Take 1 tablet (500 mg total) by mouth 3 (three) times a day as needed for muscle spasms    4. Wheezes  -     predniSONE 20 mg tablet; Take 1 tablet (20 mg total) by mouth daily           Subjective      Patient is being seen with complaints of fever, body aches, chills, nonproductive cough, shortness of breath.  Symptoms started yesterday.      Review of Systems   Constitutional:  Positive for chills, fatigue and fever.   HENT:  Positive for congestion.    Eyes: Negative.    Respiratory:  Positive for cough and shortness of breath.    Cardiovascular: Negative.    Gastrointestinal: Negative.    Endocrine: Negative.    Genitourinary: Negative.    Musculoskeletal: Negative.    Allergic/Immunologic: Negative.    Neurological: Negative.    Psychiatric/Behavioral: Negative.         Current Outpatient Medications on File Prior to Visit   Medication Sig   • acetaminophen (TYLENOL) 325 mg tablet Take 2 tablets (650 mg total)  by mouth every 4 (four) hours as needed for mild pain   • Advair Diskus 250-50 MCG/ACT inhaler INHALE 1 PUFF 2 TIMES A DAY RINSE MOUTH AFTER USE.   • albuterol (PROVENTIL HFA,VENTOLIN HFA) 90 mcg/act inhaler INHALE 1 PUFF BY MOUTH EVERY 6 HOURS AS NEEDED FOR WHEEZE   • amLODIPine (NORVASC) 10 mg tablet TAKE 1 TABLET BY MOUTH EVERYDAY AT BEDTIME   • Aspirin Low Dose 81 MG chewable tablet CHEW 1 TABLET BY MOUTH DAILY   • benzonatate (TESSALON PERLES) 100 mg capsule Take 1 capsule (100 mg total) by mouth 3 (three) times a day as needed for cough   • bisacodyl (DULCOLAX) 5 mg EC tablet Take 1 tablet (5 mg total) by mouth as needed in the morning for constipation.   • carbamide peroxide (DEBROX) 6.5 % otic solution Administer 5 drops into both ears 2 (two) times a day   • carvedilol (COREG) 6.25 mg tablet TAKE 1 TABLET BY MOUTH TWICE A DAY WITH MEALS   • fluticasone-salmeterol (AirDuo RespiClick 113/14) 113-14 mcg/act dry powder inhaler Inhale 1 puff 2 (two) times a day Rinse mouth after use.   • glucose blood test strip E11.9 / testing daily (Patient not taking: Reported on 9/12/2023)   • Janumet -1000 MG TB24 TAKE 1 TABLET BY MOUTH EVERY DAY WITH DINNER   • latanoprost (XALATAN) 0.005 % ophthalmic solution    • lidocaine (LIDODERM) 5 % Place 1 patch on the skin   • Lyrica  MG TB24 Take 1 tablet by mouth 2 (two) times a day   • magnesium Oxide (MAG-OX) 400 mg TABS Take 1 tablet (400 mg total) by mouth daily   • meloxicam (MOBIC) 15 mg tablet TAKE 1 TABLET (15 MG TOTAL) BY MOUTH DAILY.   • montelukast (SINGULAIR) 10 mg tablet TAKE 1 TABLET BY MOUTH EVERYDAY AT BEDTIME   • Promethazine-DM (PHENERGAN-DM) 6.25-15 mg/5 mL oral syrup Take 5 mL by mouth 4 (four) times a day as needed for cough (Patient not taking: Reported on 8/29/2023)   • QUEtiapine (SEROquel) 50 mg tablet TAKE 1 TABLET (50 MG TOTAL) BY MOUTH DAILY AT BEDTIME AS NEEDED (INSOMNIA)   • Riboflavin 400 MG TABS Take 1 tablet (400 mg total) by mouth  daily   • sertraline (ZOLOFT) 50 mg tablet Take 1 tablet (50 mg total) by mouth daily   • zolpidem (AMBIEN) 5 mg tablet Take 1 tablet (5 mg total) by mouth daily at bedtime as needed for sleep (Patient not taking: Reported on 9/12/2023)   • [DISCONTINUED] traMADol (Ultram) 50 mg tablet Take 1 tablet (50 mg total) by mouth every 6 (six) hours as needed for moderate pain (Patient not taking: Reported on 2/27/2023)       Objective     /70   Pulse 74   Temp 97.5 °F (36.4 °C)   Resp 16   Ht 5' (1.524 m)   Wt 91.6 kg (202 lb)   SpO2 95%   BMI 39.45 kg/m²     Physical Exam  Vitals and nursing note reviewed.   Constitutional:       Appearance: She is well-developed. She is obese. She is ill-appearing.   HENT:      Head: Normocephalic and atraumatic.   Cardiovascular:      Rate and Rhythm: Normal rate and regular rhythm.      Pulses: Normal pulses.      Heart sounds: Normal heart sounds.   Pulmonary:      Effort: Pulmonary effort is normal.      Breath sounds: Decreased air movement present. Wheezing present.   Chest:      Chest wall: Tenderness present.   Abdominal:      General: Bowel sounds are normal.      Palpations: Abdomen is soft.   Musculoskeletal:         General: Normal range of motion.      Cervical back: Normal range of motion.   Skin:     General: Skin is warm and dry.   Neurological:      General: No focal deficit present.      Mental Status: She is alert and oriented to person, place, and time.   Psychiatric:         Mood and Affect: Mood normal.         Behavior: Behavior normal.         Thought Content: Thought content normal.         Judgment: Judgment normal.       ALEKSANDER Manning

## 2024-01-04 ENCOUNTER — APPOINTMENT (EMERGENCY)
Dept: CT IMAGING | Facility: HOSPITAL | Age: 79
DRG: 194 | End: 2024-01-04
Payer: MEDICARE

## 2024-01-04 ENCOUNTER — APPOINTMENT (EMERGENCY)
Dept: RADIOLOGY | Facility: HOSPITAL | Age: 79
DRG: 194 | End: 2024-01-04
Payer: MEDICARE

## 2024-01-04 ENCOUNTER — HOSPITAL ENCOUNTER (INPATIENT)
Facility: HOSPITAL | Age: 79
LOS: 7 days | Discharge: HOME | DRG: 194 | End: 2024-01-12
Attending: EMERGENCY MEDICINE | Admitting: INTERNAL MEDICINE
Payer: MEDICARE

## 2024-01-04 DIAGNOSIS — J45.21 MILD INTERMITTENT ASTHMA WITH ACUTE EXACERBATION: ICD-10-CM

## 2024-01-04 DIAGNOSIS — J18.9 PNEUMONIA: Primary | ICD-10-CM

## 2024-01-04 LAB
2HR DELTA HS TROPONIN: 1 NG/L
ALBUMIN SERPL BCP-MCNC: 3.9 G/DL (ref 3.5–5)
ALP SERPL-CCNC: 58 U/L (ref 34–104)
ALT SERPL W P-5'-P-CCNC: 12 U/L (ref 7–52)
ANION GAP SERPL CALCULATED.3IONS-SCNC: 7 MMOL/L
APTT PPP: 35 SECONDS (ref 23–37)
AST SERPL W P-5'-P-CCNC: 13 U/L (ref 13–39)
ATRIAL RATE: 78 BPM
BACTERIA UR QL AUTO: ABNORMAL /HPF
BASOPHILS # BLD AUTO: 0.04 THOUSANDS/ÂΜL (ref 0–0.1)
BASOPHILS NFR BLD AUTO: 0 % (ref 0–1)
BILIRUB SERPL-MCNC: 0.35 MG/DL (ref 0.2–1)
BILIRUB UR QL STRIP: NEGATIVE
BUN SERPL-MCNC: 12 MG/DL (ref 5–25)
CALCIUM SERPL-MCNC: 9.1 MG/DL (ref 8.4–10.2)
CARDIAC TROPONIN I PNL SERPL HS: 3 NG/L
CARDIAC TROPONIN I PNL SERPL HS: 4 NG/L
CHLORIDE SERPL-SCNC: 97 MMOL/L (ref 96–108)
CLARITY UR: ABNORMAL
CO2 SERPL-SCNC: 32 MMOL/L (ref 21–32)
COLOR UR: YELLOW
CREAT SERPL-MCNC: 0.89 MG/DL (ref 0.6–1.3)
EOSINOPHIL # BLD AUTO: 0.27 THOUSAND/ÂΜL (ref 0–0.61)
EOSINOPHIL NFR BLD AUTO: 2 % (ref 0–6)
ERYTHROCYTE [DISTWIDTH] IN BLOOD BY AUTOMATED COUNT: 13.7 % (ref 11.6–15.1)
FLUAV RNA RESP QL NAA+PROBE: NEGATIVE
FLUBV RNA RESP QL NAA+PROBE: NEGATIVE
GFR SERPL CREATININE-BSD FRML MDRD: 63 ML/MIN/1.73SQ M
GLUCOSE SERPL-MCNC: 229 MG/DL (ref 65–140)
GLUCOSE SERPL-MCNC: 431 MG/DL (ref 65–140)
GLUCOSE UR STRIP-MCNC: ABNORMAL MG/DL
HCT VFR BLD AUTO: 37.4 % (ref 34.8–46.1)
HGB BLD-MCNC: 12.2 G/DL (ref 11.5–15.4)
HGB UR QL STRIP.AUTO: NEGATIVE
IMM GRANULOCYTES # BLD AUTO: 0.08 THOUSAND/UL (ref 0–0.2)
IMM GRANULOCYTES NFR BLD AUTO: 1 % (ref 0–2)
INR PPP: 0.95 (ref 0.84–1.19)
KETONES UR STRIP-MCNC: NEGATIVE MG/DL
LACTATE SERPL-SCNC: 1.3 MMOL/L (ref 0.5–2)
LEUKOCYTE ESTERASE UR QL STRIP: ABNORMAL
LYMPHOCYTES # BLD AUTO: 1.33 THOUSANDS/ÂΜL (ref 0.6–4.47)
LYMPHOCYTES NFR BLD AUTO: 11 % (ref 14–44)
MCH RBC QN AUTO: 29.6 PG (ref 26.8–34.3)
MCHC RBC AUTO-ENTMCNC: 32.6 G/DL (ref 31.4–37.4)
MCV RBC AUTO: 91 FL (ref 82–98)
MONOCYTES # BLD AUTO: 1 THOUSAND/ÂΜL (ref 0.17–1.22)
MONOCYTES NFR BLD AUTO: 8 % (ref 4–12)
NEUTROPHILS # BLD AUTO: 9.81 THOUSANDS/ÂΜL (ref 1.85–7.62)
NEUTS SEG NFR BLD AUTO: 78 % (ref 43–75)
NITRITE UR QL STRIP: NEGATIVE
NON-SQ EPI CELLS URNS QL MICRO: ABNORMAL /HPF
NRBC BLD AUTO-RTO: 0 /100 WBCS
P AXIS: 74 DEGREES
PH UR STRIP.AUTO: 7.5 [PH]
PLATELET # BLD AUTO: 281 THOUSANDS/UL (ref 149–390)
PMV BLD AUTO: 9.1 FL (ref 8.9–12.7)
POTASSIUM SERPL-SCNC: 4.2 MMOL/L (ref 3.5–5.3)
PR INTERVAL: 148 MS
PROCALCITONIN SERPL-MCNC: <0.05 NG/ML
PROT SERPL-MCNC: 7.9 G/DL (ref 6.4–8.4)
PROT UR STRIP-MCNC: ABNORMAL MG/DL
PROTHROMBIN TIME: 13.3 SECONDS (ref 11.6–14.5)
QRS AXIS: 248 DEGREES
QRSD INTERVAL: 68 MS
QT INTERVAL: 374 MS
QTC INTERVAL: 426 MS
RBC # BLD AUTO: 4.12 MILLION/UL (ref 3.81–5.12)
RBC #/AREA URNS AUTO: ABNORMAL /HPF
RSV RNA RESP QL NAA+PROBE: NEGATIVE
SARS-COV-2 RNA RESP QL NAA+PROBE: NEGATIVE
SODIUM SERPL-SCNC: 136 MMOL/L (ref 135–147)
SP GR UR STRIP.AUTO: 1.03 (ref 1–1.03)
T WAVE AXIS: 60 DEGREES
UROBILINOGEN UR STRIP-ACNC: <2 MG/DL
VENTRICULAR RATE: 78 BPM
WBC # BLD AUTO: 12.53 THOUSAND/UL (ref 4.31–10.16)
WBC #/AREA URNS AUTO: ABNORMAL /HPF

## 2024-01-04 PROCEDURE — 99285 EMERGENCY DEPT VISIT HI MDM: CPT | Performed by: EMERGENCY MEDICINE

## 2024-01-04 PROCEDURE — 94640 AIRWAY INHALATION TREATMENT: CPT

## 2024-01-04 PROCEDURE — 36415 COLL VENOUS BLD VENIPUNCTURE: CPT | Performed by: EMERGENCY MEDICINE

## 2024-01-04 PROCEDURE — 87186 SC STD MICRODIL/AGAR DIL: CPT | Performed by: EMERGENCY MEDICINE

## 2024-01-04 PROCEDURE — 84145 PROCALCITONIN (PCT): CPT | Performed by: EMERGENCY MEDICINE

## 2024-01-04 PROCEDURE — 87077 CULTURE AEROBIC IDENTIFY: CPT | Performed by: EMERGENCY MEDICINE

## 2024-01-04 PROCEDURE — 85025 COMPLETE CBC W/AUTO DIFF WBC: CPT | Performed by: EMERGENCY MEDICINE

## 2024-01-04 PROCEDURE — 71046 X-RAY EXAM CHEST 2 VIEWS: CPT

## 2024-01-04 PROCEDURE — 81001 URINALYSIS AUTO W/SCOPE: CPT | Performed by: EMERGENCY MEDICINE

## 2024-01-04 PROCEDURE — 83605 ASSAY OF LACTIC ACID: CPT | Performed by: EMERGENCY MEDICINE

## 2024-01-04 PROCEDURE — 93005 ELECTROCARDIOGRAM TRACING: CPT

## 2024-01-04 PROCEDURE — 74177 CT ABD & PELVIS W/CONTRAST: CPT

## 2024-01-04 PROCEDURE — 70450 CT HEAD/BRAIN W/O DYE: CPT

## 2024-01-04 PROCEDURE — 80053 COMPREHEN METABOLIC PANEL: CPT | Performed by: EMERGENCY MEDICINE

## 2024-01-04 PROCEDURE — 87040 BLOOD CULTURE FOR BACTERIA: CPT | Performed by: EMERGENCY MEDICINE

## 2024-01-04 PROCEDURE — 82948 REAGENT STRIP/BLOOD GLUCOSE: CPT

## 2024-01-04 PROCEDURE — 96374 THER/PROPH/DIAG INJ IV PUSH: CPT

## 2024-01-04 PROCEDURE — 99285 EMERGENCY DEPT VISIT HI MDM: CPT

## 2024-01-04 PROCEDURE — 85610 PROTHROMBIN TIME: CPT | Performed by: EMERGENCY MEDICINE

## 2024-01-04 PROCEDURE — 84484 ASSAY OF TROPONIN QUANT: CPT | Performed by: EMERGENCY MEDICINE

## 2024-01-04 PROCEDURE — 99223 1ST HOSP IP/OBS HIGH 75: CPT | Performed by: FAMILY MEDICINE

## 2024-01-04 PROCEDURE — 85730 THROMBOPLASTIN TIME PARTIAL: CPT | Performed by: EMERGENCY MEDICINE

## 2024-01-04 PROCEDURE — 96361 HYDRATE IV INFUSION ADD-ON: CPT

## 2024-01-04 PROCEDURE — 71260 CT THORAX DX C+: CPT

## 2024-01-04 PROCEDURE — 0241U HB NFCT DS VIR RESP RNA 4 TRGT: CPT | Performed by: EMERGENCY MEDICINE

## 2024-01-04 PROCEDURE — G1004 CDSM NDSC: HCPCS

## 2024-01-04 PROCEDURE — 87086 URINE CULTURE/COLONY COUNT: CPT | Performed by: EMERGENCY MEDICINE

## 2024-01-04 RX ORDER — GUAIFENESIN 600 MG/1
600 TABLET, EXTENDED RELEASE ORAL 2 TIMES DAILY
Status: DISCONTINUED | OUTPATIENT
Start: 2024-01-04 | End: 2024-01-12 | Stop reason: HOSPADM

## 2024-01-04 RX ORDER — FLUTICASONE FUROATE AND VILANTEROL 200; 25 UG/1; UG/1
1 POWDER RESPIRATORY (INHALATION) DAILY
Status: DISCONTINUED | OUTPATIENT
Start: 2024-01-05 | End: 2024-01-12 | Stop reason: HOSPADM

## 2024-01-04 RX ORDER — ALBUTEROL SULFATE 2.5 MG/3ML
5 SOLUTION RESPIRATORY (INHALATION) ONCE
Status: COMPLETED | OUTPATIENT
Start: 2024-01-04 | End: 2024-01-04

## 2024-01-04 RX ORDER — ASPIRIN 81 MG/1
81 TABLET, CHEWABLE ORAL DAILY
Status: DISCONTINUED | OUTPATIENT
Start: 2024-01-05 | End: 2024-01-12 | Stop reason: HOSPADM

## 2024-01-04 RX ORDER — AMLODIPINE BESYLATE 10 MG/1
10 TABLET ORAL
Status: DISCONTINUED | OUTPATIENT
Start: 2024-01-04 | End: 2024-01-12 | Stop reason: HOSPADM

## 2024-01-04 RX ORDER — CARVEDILOL 6.25 MG/1
6.25 TABLET ORAL 2 TIMES DAILY WITH MEALS
Status: DISCONTINUED | OUTPATIENT
Start: 2024-01-05 | End: 2024-01-12 | Stop reason: HOSPADM

## 2024-01-04 RX ORDER — SODIUM CHLORIDE 9 MG/ML
75 INJECTION, SOLUTION INTRAVENOUS CONTINUOUS
Status: DISCONTINUED | OUTPATIENT
Start: 2024-01-04 | End: 2024-01-07

## 2024-01-04 RX ORDER — METHYLPREDNISOLONE SODIUM SUCCINATE 125 MG/2ML
125 INJECTION, POWDER, LYOPHILIZED, FOR SOLUTION INTRAMUSCULAR; INTRAVENOUS ONCE
Status: COMPLETED | OUTPATIENT
Start: 2024-01-04 | End: 2024-01-04

## 2024-01-04 RX ORDER — INSULIN LISPRO 100 [IU]/ML
1-6 INJECTION, SOLUTION INTRAVENOUS; SUBCUTANEOUS
Status: DISCONTINUED | OUTPATIENT
Start: 2024-01-05 | End: 2024-01-05

## 2024-01-04 RX ORDER — PREGABALIN 50 MG/1
50 CAPSULE ORAL 3 TIMES DAILY
Status: DISCONTINUED | OUTPATIENT
Start: 2024-01-04 | End: 2024-01-12 | Stop reason: HOSPADM

## 2024-01-04 RX ORDER — ALBUTEROL SULFATE 90 UG/1
1 AEROSOL, METERED RESPIRATORY (INHALATION) EVERY 4 HOURS PRN
Status: DISCONTINUED | OUTPATIENT
Start: 2024-01-04 | End: 2024-01-09

## 2024-01-04 RX ORDER — MONTELUKAST SODIUM 10 MG/1
10 TABLET ORAL
Status: DISCONTINUED | OUTPATIENT
Start: 2024-01-04 | End: 2024-01-12 | Stop reason: HOSPADM

## 2024-01-04 RX ORDER — CEFTRIAXONE 1 G/50ML
1000 INJECTION, SOLUTION INTRAVENOUS EVERY 24 HOURS
Status: DISCONTINUED | OUTPATIENT
Start: 2024-01-04 | End: 2024-01-07

## 2024-01-04 RX ORDER — METHYLPREDNISOLONE SODIUM SUCCINATE 125 MG/2ML
60 INJECTION, POWDER, LYOPHILIZED, FOR SOLUTION INTRAMUSCULAR; INTRAVENOUS ONCE
Status: COMPLETED | OUTPATIENT
Start: 2024-01-04 | End: 2024-01-04

## 2024-01-04 RX ORDER — ACETAMINOPHEN 325 MG/1
650 TABLET ORAL EVERY 6 HOURS PRN
Status: DISCONTINUED | OUTPATIENT
Start: 2024-01-04 | End: 2024-01-12 | Stop reason: HOSPADM

## 2024-01-04 RX ORDER — INSULIN LISPRO 100 [IU]/ML
1-5 INJECTION, SOLUTION INTRAVENOUS; SUBCUTANEOUS
Status: DISCONTINUED | OUTPATIENT
Start: 2024-01-04 | End: 2024-01-05

## 2024-01-04 RX ORDER — METHOCARBAMOL 500 MG/1
500 TABLET, FILM COATED ORAL 3 TIMES DAILY PRN
Status: DISCONTINUED | OUTPATIENT
Start: 2024-01-04 | End: 2024-01-12 | Stop reason: HOSPADM

## 2024-01-04 RX ORDER — QUETIAPINE FUMARATE 25 MG/1
50 TABLET, FILM COATED ORAL
Status: DISCONTINUED | OUTPATIENT
Start: 2024-01-04 | End: 2024-01-12 | Stop reason: HOSPADM

## 2024-01-04 RX ORDER — LATANOPROST 50 UG/ML
1 SOLUTION/ DROPS OPHTHALMIC
Status: DISCONTINUED | OUTPATIENT
Start: 2024-01-04 | End: 2024-01-12 | Stop reason: HOSPADM

## 2024-01-04 RX ORDER — LANOLIN ALCOHOL/MO/W.PET/CERES
400 CREAM (GRAM) TOPICAL DAILY
Status: DISCONTINUED | OUTPATIENT
Start: 2024-01-05 | End: 2024-01-12 | Stop reason: HOSPADM

## 2024-01-04 RX ORDER — CEFEPIME HYDROCHLORIDE 2 G/50ML
2000 INJECTION, SOLUTION INTRAVENOUS ONCE
Status: COMPLETED | OUTPATIENT
Start: 2024-01-04 | End: 2024-01-04

## 2024-01-04 RX ORDER — BENZONATATE 100 MG/1
100 CAPSULE ORAL 3 TIMES DAILY PRN
Status: DISCONTINUED | OUTPATIENT
Start: 2024-01-04 | End: 2024-01-07

## 2024-01-04 RX ADMIN — ALBUTEROL SULFATE 5 MG: 2.5 SOLUTION RESPIRATORY (INHALATION) at 13:39

## 2024-01-04 RX ADMIN — IOHEXOL 100 ML: 350 INJECTION, SOLUTION INTRAVENOUS at 14:56

## 2024-01-04 RX ADMIN — CEFEPIME HYDROCHLORIDE 2000 MG: 2 INJECTION, SOLUTION INTRAVENOUS at 16:52

## 2024-01-04 RX ADMIN — SODIUM CHLORIDE 1000 ML: 0.9 INJECTION, SOLUTION INTRAVENOUS at 13:39

## 2024-01-04 RX ADMIN — METHYLPREDNISOLONE SODIUM SUCCINATE 125 MG: 125 INJECTION, POWDER, FOR SOLUTION INTRAMUSCULAR; INTRAVENOUS at 13:38

## 2024-01-04 RX ADMIN — INSULIN LISPRO 5 UNITS: 100 INJECTION, SOLUTION INTRAVENOUS; SUBCUTANEOUS at 21:51

## 2024-01-04 RX ADMIN — GUAIFENESIN 600 MG: 600 TABLET, EXTENDED RELEASE ORAL at 20:55

## 2024-01-04 RX ADMIN — QUETIAPINE FUMARATE 50 MG: 25 TABLET ORAL at 22:03

## 2024-01-04 RX ADMIN — SODIUM CHLORIDE 75 ML/HR: 0.9 INJECTION, SOLUTION INTRAVENOUS at 21:52

## 2024-01-04 RX ADMIN — CEFTRIAXONE 1000 MG: 1 INJECTION, SOLUTION INTRAVENOUS at 21:52

## 2024-01-04 RX ADMIN — IPRATROPIUM BROMIDE 0.5 MG: 0.5 SOLUTION RESPIRATORY (INHALATION) at 13:38

## 2024-01-04 RX ADMIN — VANCOMYCIN HYDROCHLORIDE 1500 MG: 5 INJECTION, POWDER, LYOPHILIZED, FOR SOLUTION INTRAVENOUS at 19:47

## 2024-01-04 RX ADMIN — PREGABALIN 50 MG: 50 CAPSULE ORAL at 20:55

## 2024-01-04 RX ADMIN — LATANOPROST 1 DROP: 50 SOLUTION OPHTHALMIC at 21:51

## 2024-01-04 RX ADMIN — AMLODIPINE BESYLATE 10 MG: 10 TABLET ORAL at 20:56

## 2024-01-04 RX ADMIN — MONTELUKAST 10 MG: 10 TABLET, FILM COATED ORAL at 20:56

## 2024-01-04 RX ADMIN — METHYLPREDNISOLONE SODIUM SUCCINATE 60 MG: 125 INJECTION, POWDER, FOR SOLUTION INTRAMUSCULAR; INTRAVENOUS at 20:55

## 2024-01-04 NOTE — ASSESSMENT & PLAN NOTE
"Lab Results   Component Value Date    HGBA1C 6.6 (H) 10/04/2023       No results for input(s): \"POCGLU\" in the last 72 hours.    Blood Sugar Average: Last 72 hrs:  Pretty well controlled.  Sliding scale insulin while here in the hospital    "

## 2024-01-04 NOTE — H&P
"[facility]  H&P  Name: Pebbles Landon 78 y.o. female I MRN: [id number]  Unit/Bed#: FT 04 I Date of Admission: 1/4/2024   Date of Service: 1/4/2024 I Hospital Day: 0      Assessment/Plan   * Pneumonia  Assessment & Plan  Will treat as community-acquired pneumonia.  The patient recently had a diagnosis of the flu during Christmas.  This is likely a superimposed pneumonia.  Will check procalcitonin level.  Continue with ceftriaxone.  Will put the patient on pneumonia pathway.    Type 2 diabetes mellitus with diabetic neuropathy, unspecified ([facility])  Assessment & Plan  Lab Results   Component Value Date    HGBA1C 6.6 (H) 10/04/2023       No results for input(s): \"POCGLU\" in the last 72 hours.    Blood Sugar Average: Last 72 hrs:  Will put the patient on sliding scale insulin      COPD (chronic obstructive pulmonary disease) ([facility])  Assessment & Plan  Per history.  Patient does have some wheezing right now.  Will continue with respiratory protocol and the IV antibiotics.  Will give a one-time dose of steroids right now but the patient does not improve then may need to consider more    BMI 37.0-37.9, adult  Assessment & Plan  Encourage weight loss    Type 2 diabetes mellitus without complication, without long-term current use of insulin ([facility])  Assessment & Plan  Lab Results   Component Value Date    HGBA1C 6.6 (H) 10/04/2023       No results for input(s): \"POCGLU\" in the last 72 hours.    Blood Sugar Average: Last 72 hrs:  Pretty well controlled.  Sliding scale insulin while here in the hospital           VTE Pharmacologic Prophylaxis:   High Risk (Score >/= 5) - Pharmacological DVT Prophylaxis Ordered: enoxaparin (Lovenox). Sequential Compression Devices Ordered.  Code Status: Prior full code  Discussion with family: Updated  (daughter) at bedside.    Anticipated Length of Stay: Patient will be admitted on an observation basis with an anticipated length of stay of less than 2 " midnights secondary to having a pneumonia.  However the patient is not improved tomorrow would have a low threshold of making her inpatient at that time since she did have some significant wheezing on examination and appears to be in mild distress.    Total Time Spent on Date of Encounter in care of patient: 50 mins. This time was spent on one or more of the following: performing physical exam; counseling and coordination of care; obtaining or reviewing history; documenting in the medical record; reviewing/ordering tests, medications or procedures; communicating with other healthcare professionals and discussing with patient's family/caregivers.    Chief Complaint: Cough and shortness of breath    History of Present Illness:  Pebbles Landon is a 78 y.o. female with a PMH of diabetes and COPD who presents with cough and shortness of breath.  This is a 78-year-old female patient who states that the day after [place] she started feeling flulike symptoms.  She had generalized body ache shortness of breath and cough and was diagnosed with the flu.  The patient took Tamiflu but did not get any improvement.  Patient states that the symptoms have now worsened.  She gets short of breath with minimal exertion.  Her body aches and fevers and chills have still continued.  Patient's been having a headache as well.  She has been wheezing quite a bit..    Review of Systems:  Review of Systems   Constitutional:  Positive for activity change, chills, diaphoresis, fatigue and fever.   Respiratory:  Positive for cough, shortness of breath and wheezing.    Musculoskeletal:  Positive for myalgias.   Neurological:  Positive for weakness.   All other systems reviewed and are negative.      Past Medical and Surgical History:   Past Medical History:   Diagnosis Date    Asthma     Benign hypertension 11/30/2018    Cardiac arrest (HCC)     Diabetes mellitus (HCC)     Glaucoma     Hypertension     Kidney stone     Neuropathy     Sleep  apnea     no machine    SOB (shortness of breath)     Tubular adenoma of colon 10/2019    Wheezing        Past Surgical History:   Procedure Laterality Date     SECTION      COLONOSCOPY      HYSTERECTOMY      IR BIOPSY BONE MARROW  2022    TONSILLECTOMY         Meds/Allergies:  Prior to Admission medications    Medication Sig Start Date End Date Taking? Authorizing Provider   acetaminophen (TYLENOL) 325 mg tablet Take 2 tablets (650 mg total) by mouth every 4 (four) hours as needed for mild pain 22   Cem Cristina MD   Advair Diskus 250-50 MCG/ACT inhaler INHALE 1 PUFF 2 TIMES A DAY RINSE MOUTH AFTER USE. 22   Jovana ALEKSANDER Bauman   albuterol (PROVENTIL HFA,VENTOLIN HFA) 90 mcg/act inhaler INHALE 1 PUFF BY MOUTH EVERY 6 HOURS AS NEEDED FOR WHEEZE 23   Jovana ALEKSANDER Bauman   amLODIPine (NORVASC) 10 mg tablet TAKE 1 TABLET BY MOUTH EVERYDAY AT BEDTIME 23   Jovana ALEKSANDER Bauman   Aspirin Low Dose 81 MG chewable tablet CHEW 1 TABLET BY MOUTH DAILY 3/23/21   ALEKSANDER Mckeon   benzonatate (TESSALON PERLES) 100 mg capsule Take 1 capsule (100 mg total) by mouth 3 (three) times a day as needed for cough 22   Jovana ALEKSANDER Bauman   bisacodyl (DULCOLAX) 5 mg EC tablet Take 1 tablet (5 mg total) by mouth as needed in the morning for constipation. 22   Jovana ALEKSANDER Bauman   carbamide peroxide (DEBROX) 6.5 % otic solution Administer 5 drops into both ears 2 (two) times a day 22   Jovana ALEKASNDER Bauman   carvedilol (COREG) 6.25 mg tablet TAKE 1 TABLET BY MOUTH TWICE A DAY WITH MEALS 10/11/23   Jovana ALEKSANDER Bauman   fluticasone-salmeterol (AirDuo RespiClick 113/14) 113-14 mcg/act dry powder inhaler Inhale 1 puff 2 (two) times a day Rinse mouth after use. 23   Jovana ALEKSANDER Bauman   glucose blood test strip E11.9 / testing daily  Patient not taking: Reported on 2023   Historical Provider, MD   Janumet -1000 MG TB24 TAKE 1 TABLET BY MOUTH EVERY DAY WITH DINNER 23   Brea  Suzanna King DO   latanoprost (XALATAN) 0.005 % ophthalmic solution  9/24/21   Historical Provider, MD   lidocaine (LIDODERM) 5 % Place 1 patch on the skin 8/26/23   Historical Provider, MD Shilpi GARBER 165 MG TB24 Take 1 tablet by mouth 2 (two) times a day 10/20/23   JovanaALEKSANDER White   magnesium Oxide (MAG-OX) 400 mg TABS Take 1 tablet (400 mg total) by mouth daily 9/12/23   Reg Cristina DO   meloxicam (MOBIC) 15 mg tablet TAKE 1 TABLET (15 MG TOTAL) BY MOUTH DAILY. 11/30/23   JovanaALEKSANDER White   methocarbamol (ROBAXIN) 500 mg tablet Take 1 tablet (500 mg total) by mouth 3 (three) times a day as needed for muscle spasms 12/27/23   JovanaALEKSANDER White   montelukast (SINGULAIR) 10 mg tablet TAKE 1 TABLET BY MOUTH EVERYDAY AT BEDTIME 10/5/23   Jovana ALEKSANDER Bauman   predniSONE 20 mg tablet Take 1 tablet (20 mg total) by mouth daily 12/27/23   JovanaALEKSANDER White   Promethazine-DM (PHENERGAN-DM) 6.25-15 mg/5 mL oral syrup Take 5 mL by mouth 4 (four) times a day as needed for cough  Patient not taking: Reported on 8/29/2023 1/3/23   ALEKSANDER Manning   QUEtiapine (SEROquel) 50 mg tablet TAKE 1 TABLET (50 MG TOTAL) BY MOUTH DAILY AT BEDTIME AS NEEDED (INSOMNIA) 8/25/23   ALEKSANDER Manning   Riboflavin 400 MG TABS Take 1 tablet (400 mg total) by mouth daily 9/12/23   Reg Cristina DO   sertraline (ZOLOFT) 50 mg tablet Take 1 tablet (50 mg total) by mouth daily 12/29/22   ALEKSANDER Manning   zolpidem (AMBIEN) 5 mg tablet Take 1 tablet (5 mg total) by mouth daily at bedtime as needed for sleep  Patient not taking: Reported on 9/12/2023 8/26/22   ALEKSANDER Manning     I have reviewed home medications with a medical source (PCP, Pharmacy, other).    Allergies:   Allergies   Allergen Reactions    Ciprofloxacin Itching    Levaquin [Levofloxacin] Swelling    Milk (Cow) GI Intolerance    Penicillins GI Intolerance    Pork Allergy - Food Allergy GI Intolerance    Shellfish Allergy - Food Allergy GI Intolerance    Soy Allergy - Food Allergy GI  Intolerance    Wheat Bran - Food Allergy GI Intolerance       Social History:  Marital Status: /Civil Union   Occupation: Retired  Patient Pre-hospital Living Situation: Home  Patient Pre-hospital Level of Mobility: walks  Patient Pre-hospital Diet Restrictions: None  Substance Use History:   Social History     Substance and Sexual Activity   Alcohol Use Never     Social History     Tobacco Use   Smoking Status Never    Passive exposure: Never   Smokeless Tobacco Never     Social History     Substance and Sexual Activity   Drug Use No       Family History:  Family History   Problem Relation Age of Onset    Diabetes Mother     Hypertension Father     Prostate cancer Father     Diabetes Sister     Hypertension Sister     Breast cancer Sister 58    No Known Problems Sister     No Known Problems Daughter     No Known Problems Daughter     No Known Problems Daughter     No Known Problems Maternal Grandmother     No Known Problems Paternal Grandmother     Diabetes Brother     Hypertension Brother     No Known Problems Maternal Aunt     Breast cancer Maternal Aunt 62    No Known Problems Maternal Aunt     No Known Problems Maternal Aunt     Pancreatic cancer Paternal Aunt     No Known Problems Paternal Aunt     No Known Problems Paternal Aunt     Asthma Family     Colon cancer Neg Hx     Ovarian cancer Neg Hx     Uterine cancer Neg Hx     Cervical cancer Neg Hx        Physical Exam:     Vitals:   Blood Pressure: 150/67 (01/04/24 1545)  Pulse: 81 (01/04/24 1545)  Temperature: 99.8 °F (37.7 °C) (01/04/24 1226)  Temp Source: Oral (01/04/24 1226)  Respirations: 20 (01/04/24 1545)  SpO2: 94 % (01/04/24 1545)    Physical Exam   General Appearance:    Alert, cooperative, no distress, appears stated age   Head:    Normocephalic, without obvious abnormality, atraumatic   Eyes:    PERRL, conjunctiva/corneas clear, EOM's intact,             Nose:   Nares normal, septum midline, mucosa normal   Throat:   Lips, mucosa, and  tongue normal; teeth and gums normal   Neck:   Supple, symmetrical, no adenopathy;        thyroid:  No enlargement/tenderness/nodules; no carotid    bruit or JVD   Back:     Symmetric, no curvature, ROM normal, no CVA tenderness   Lungs:   Diffuse expiratory wheezes.  Scattered rhonchi       Heart:  Tachycardic   Abdomen:     Soft, non-tender, bowel sounds active all four quadrants,     no masses, no organomegaly           Extremities:   Extremities normal, atraumatic, no cyanosis or edema   Pulses:   2+ and symmetric all extremities   Skin:   Skin color, texture, turgor normal, no rashes or lesions   Lymph nodes:   Cervical, supraclavicular, and axillary nodes normal   Neurologic:   CNII-XII intact. Normal strength, sensation and reflexes       throughout           Additional Data:     Lab Results:  Results from last 7 days   Lab Units 01/04/24  1330   WBC Thousand/uL 12.53*   HEMOGLOBIN g/dL 12.2   HEMATOCRIT % 37.4   PLATELETS Thousands/uL 281   NEUTROS PCT % 78*   LYMPHS PCT % 11*   MONOS PCT % 8   EOS PCT % 2     Results from last 7 days   Lab Units 01/04/24  1330   SODIUM mmol/L 136   POTASSIUM mmol/L 4.2   CHLORIDE mmol/L 97   CO2 mmol/L 32   BUN mg/dL 12   CREATININE mg/dL 0.89   ANION GAP mmol/L 7   CALCIUM mg/dL 9.1   ALBUMIN g/dL 3.9   TOTAL BILIRUBIN mg/dL 0.35   ALK PHOS U/L 58   ALT U/L 12   AST U/L 13   GLUCOSE RANDOM mg/dL 229*     Results from last 7 days   Lab Units 01/04/24  1330   INR  0.95             Results from last 7 days   Lab Units 01/04/24  1330   LACTIC ACID mmol/L 1.3   PROCALCITONIN ng/ml <0.05       Lines/Drains:  Invasive Devices       Peripheral Intravenous Line  Duration             Peripheral IV 01/04/24 Left Antecubital <1 day                        Imaging: Reviewed radiology reports from this admission including: chest CT scan  CT head without contrast   Final Result by Kwadwo High MD (01/04 1539)      1.  No acute intracranial abnormality.      2.  Paranasal sinus mucosal  disease with left maxillary sinus fluid level. Clinical correlation for acute on chronic sinusitis recommended.      3.  Chiari I malformation.      4.  Empty sella.                  Workstation performed: [id number]         CT chest abdomen pelvis w contrast   Final Result by Kwadwo High MD (01/04 5329)      1.  Left upper lobe consolidation, likely pneumonia given fever and cough. Radiographic follow-up to resolution recommended.      2.  No acute abnormality in the abdomen or pelvis.      3.  Bilateral nonobstructing renal calculi.      4.  Stable urethral diverticulum.      The study was marked in EPIC for immediate notification.      Workstation performed: [id number]         XR chest 2 views    (Results Pending)       EKG and Other Studies Reviewed on Admission:   EKG: No EKG obtained.    ** Please Note: This note has been constructed using a voice recognition system. **

## 2024-01-04 NOTE — ASSESSMENT & PLAN NOTE
"Lab Results   Component Value Date    HGBA1C 6.6 (H) 10/04/2023       No results for input(s): \"POCGLU\" in the last 72 hours.    Blood Sugar Average: Last 72 hrs:  Will put the patient on sliding scale insulin    "

## 2024-01-04 NOTE — ASSESSMENT & PLAN NOTE
Per history.  Patient does have some wheezing right now.  Will continue with respiratory protocol and the IV antibiotics.  Will give a one-time dose of steroids right now but the patient does not improve then may need to consider more

## 2024-01-04 NOTE — ASSESSMENT & PLAN NOTE
Will treat as community-acquired pneumonia.  The patient recently had a diagnosis of the flu during Christmas.  This is likely a superimposed pneumonia.  Will check procalcitonin level.  Continue with ceftriaxone.  Will put the patient on pneumonia pathway.

## 2024-01-04 NOTE — ED PROVIDER NOTES
History  Chief Complaint   Patient presents with    Flu Symptoms     Patient tested positive for flu approx 1 week ago. Patient reports symptoms worsening. Patient c/o shortness of breath, body aches, headache, chills, and fever.     77 y/o female, hx of DM and asthma, presents to the ED with her family for flu like symptoms x 1 week. Family reports that she has had cough, subjective fever, chills, generalized weakness, and body aches. She states that she was seen at her pcp's office last week and diagnosed with flu - given tamiflu without relief. She states that symptoms have worsened since. Denies any cp or sob. Has had lower abd pain. No other complaints.       History provided by:  Patient  Flu Symptoms  Presenting symptoms: cough, fever, myalgias and shortness of breath    Presenting symptoms: no diarrhea, no headaches, no nausea, no sore throat and no vomiting    Severity:  Moderate  Onset quality:  Sudden  Duration:  1 week  Progression:  Worsening  Chronicity:  New  Relieved by:  None tried  Worsened by:  Nothing  Ineffective treatments:  None tried  Associated symptoms: chills and nasal congestion    Associated symptoms: no ear pain and no neck stiffness    Risk factors: no sick contacts        Prior to Admission Medications   Prescriptions Last Dose Informant Patient Reported? Taking?   Advair Diskus 250-50 MCG/ACT inhaler  Self No No   Sig: INHALE 1 PUFF 2 TIMES A DAY RINSE MOUTH AFTER USE.   Aspirin Low Dose 81 MG chewable tablet  Self No No   Sig: CHEW 1 TABLET BY MOUTH DAILY   Janumet -1000 MG TB24  Self No No   Sig: TAKE 1 TABLET BY MOUTH EVERY DAY WITH DINNER   Lyrica  MG TB24   No No   Sig: Take 1 tablet by mouth 2 (two) times a day   Promethazine-DM (PHENERGAN-DM) 6.25-15 mg/5 mL oral syrup  Self No No   Sig: Take 5 mL by mouth 4 (four) times a day as needed for cough   Patient not taking: Reported on 8/29/2023   QUEtiapine (SEROquel) 50 mg tablet  Self No No   Sig: TAKE 1 TABLET (50 MG  TOTAL) BY MOUTH DAILY AT BEDTIME AS NEEDED (INSOMNIA)   Riboflavin 400 MG TABS  Self No No   Sig: Take 1 tablet (400 mg total) by mouth daily   acetaminophen (TYLENOL) 325 mg tablet  Self No No   Sig: Take 2 tablets (650 mg total) by mouth every 4 (four) hours as needed for mild pain   albuterol (PROVENTIL HFA,VENTOLIN HFA) 90 mcg/act inhaler  Self No No   Sig: INHALE 1 PUFF BY MOUTH EVERY 6 HOURS AS NEEDED FOR WHEEZE   amLODIPine (NORVASC) 10 mg tablet   No No   Sig: TAKE 1 TABLET BY MOUTH EVERYDAY AT BEDTIME   benzonatate (TESSALON PERLES) 100 mg capsule  Self No No   Sig: Take 1 capsule (100 mg total) by mouth 3 (three) times a day as needed for cough   bisacodyl (DULCOLAX) 5 mg EC tablet  Self No No   Sig: Take 1 tablet (5 mg total) by mouth as needed in the morning for constipation.   carbamide peroxide (DEBROX) 6.5 % otic solution  Self No No   Sig: Administer 5 drops into both ears 2 (two) times a day   carvedilol (COREG) 6.25 mg tablet   No No   Sig: TAKE 1 TABLET BY MOUTH TWICE A DAY WITH MEALS   fluticasone-salmeterol (AirDuo RespiClick 113/14) 113-14 mcg/act dry powder inhaler  Self No No   Sig: Inhale 1 puff 2 (two) times a day Rinse mouth after use.   glucose blood test strip  Self Yes No   Sig: E11.9 / testing daily   Patient not taking: Reported on 9/12/2023   latanoprost (XALATAN) 0.005 % ophthalmic solution  Self Yes No   lidocaine (LIDODERM) 5 %  Self Yes No   Sig: Place 1 patch on the skin   magnesium Oxide (MAG-OX) 400 mg TABS  Self No No   Sig: Take 1 tablet (400 mg total) by mouth daily   meloxicam (MOBIC) 15 mg tablet   No No   Sig: TAKE 1 TABLET (15 MG TOTAL) BY MOUTH DAILY.   methocarbamol (ROBAXIN) 500 mg tablet   No No   Sig: Take 1 tablet (500 mg total) by mouth 3 (three) times a day as needed for muscle spasms   montelukast (SINGULAIR) 10 mg tablet  Self No No   Sig: TAKE 1 TABLET BY MOUTH EVERYDAY AT BEDTIME   predniSONE 20 mg tablet   No No   Sig: Take 1 tablet (20 mg total) by mouth  daily   sertraline (ZOLOFT) 50 mg tablet  Self No No   Sig: Take 1 tablet (50 mg total) by mouth daily   zolpidem (AMBIEN) 5 mg tablet  Self No No   Sig: Take 1 tablet (5 mg total) by mouth daily at bedtime as needed for sleep   Patient not taking: Reported on 2023      Facility-Administered Medications: None       Past Medical History:   Diagnosis Date    Asthma     Benign hypertension 2018    Cardiac arrest (HCC)     Diabetes mellitus (HCC)     Glaucoma     Hypertension     Kidney stone     Neuropathy     Sleep apnea     no machine    SOB (shortness of breath)     Tubular adenoma of colon 10/2019    Wheezing        Past Surgical History:   Procedure Laterality Date     SECTION      COLONOSCOPY      HYSTERECTOMY      IR BIOPSY BONE MARROW  2022    TONSILLECTOMY         Family History   Problem Relation Age of Onset    Diabetes Mother     Hypertension Father     Prostate cancer Father     Diabetes Sister     Hypertension Sister     Breast cancer Sister 58    No Known Problems Sister     No Known Problems Daughter     No Known Problems Daughter     No Known Problems Daughter     No Known Problems Maternal Grandmother     No Known Problems Paternal Grandmother     Diabetes Brother     Hypertension Brother     No Known Problems Maternal Aunt     Breast cancer Maternal Aunt 62    No Known Problems Maternal Aunt     No Known Problems Maternal Aunt     Pancreatic cancer Paternal Aunt     No Known Problems Paternal Aunt     No Known Problems Paternal Aunt     Asthma Family     Colon cancer Neg Hx     Ovarian cancer Neg Hx     Uterine cancer Neg Hx     Cervical cancer Neg Hx      I have reviewed and agree with the history as documented.    E-Cigarette/Vaping    E-Cigarette Use Never User      E-Cigarette/Vaping Substances    Nicotine No     THC No     CBD No     Flavoring No     Other No     Unknown No      Social History     Tobacco Use    Smoking status: Never     Passive exposure: Never     Smokeless tobacco: Never   Vaping Use    Vaping status: Never Used   Substance Use Topics    Alcohol use: Never    Drug use: No       Review of Systems   Constitutional:  Positive for chills and fever.   HENT:  Positive for congestion. Negative for ear pain and sore throat.    Eyes:  Negative for pain and visual disturbance.   Respiratory:  Positive for cough and shortness of breath. Negative for wheezing.    Cardiovascular:  Negative for chest pain and leg swelling.   Gastrointestinal:  Negative for abdominal pain, diarrhea, nausea and vomiting.   Genitourinary:  Negative for dysuria, frequency, hematuria and urgency.   Musculoskeletal:  Positive for myalgias. Negative for neck pain and neck stiffness.   Skin:  Negative for rash and wound.   Neurological:  Negative for weakness, numbness and headaches.   Psychiatric/Behavioral:  Negative for agitation and confusion.    All other systems reviewed and are negative.      Physical Exam  Physical Exam  Vitals and nursing note reviewed.   Constitutional:       Appearance: She is well-developed.   HENT:      Head: Normocephalic and atraumatic.   Eyes:      Pupils: Pupils are equal, round, and reactive to light.   Cardiovascular:      Rate and Rhythm: Normal rate and regular rhythm.   Pulmonary:      Effort: Pulmonary effort is normal.      Breath sounds: Wheezing and rhonchi present.   Abdominal:      General: Bowel sounds are normal.      Palpations: Abdomen is soft.   Musculoskeletal:         General: Normal range of motion.      Cervical back: Normal range of motion and neck supple.   Skin:     General: Skin is warm and dry.   Neurological:      General: No focal deficit present.      Mental Status: She is alert and oriented to person, place, and time.      Comments: No focal deficits         Vital Signs  ED Triage Vitals [01/04/24 1226]   Temperature Pulse Respirations Blood Pressure SpO2   99.8 °F (37.7 °C) 82 20 170/74 100 %      Temp Source Heart Rate Source  Patient Position - Orthostatic VS BP Location FiO2 (%)   Oral Monitor Sitting Left arm --      Pain Score       --           Vitals:    01/04/24 1226 01/04/24 1430 01/04/24 1545   BP: 170/74 143/64 150/67   Pulse: 82 80 81   Patient Position - Orthostatic VS: Sitting Sitting          Visual Acuity      ED Medications  Medications   vancomycin (VANCOCIN) 1500 mg in sodium chloride 0.9% 250 mL IVPB (has no administration in time range)   cefepime (MAXIPIME) IVPB (premix in dextrose) 2,000 mg 50 mL (has no administration in time range)   sodium chloride 0.9 % bolus 1,000 mL (1,000 mL Intravenous New Bag 1/4/24 1339)   albuterol inhalation solution 5 mg (5 mg Nebulization Given 1/4/24 1339)   ipratropium (ATROVENT) 0.02 % inhalation solution 0.5 mg (0.5 mg Nebulization Given 1/4/24 1338)   methylPREDNISolone sodium succinate (Solu-MEDROL) injection 125 mg (125 mg Intravenous Given 1/4/24 1338)   iohexol (OMNIPAQUE) 350 MG/ML injection (MULTI-DOSE) 100 mL (100 mL Intravenous Given 1/4/24 1456)       Diagnostic Studies  Results Reviewed       Procedure Component Value Units Date/Time    HS Troponin I 2hr [[id number]]  (Normal) Collected: 01/04/24 1548    Lab Status: Final result Specimen: Blood from Arm, Left Updated: 01/04/24 1629     hs TnI 2hr 4 ng/L      Delta 2hr hsTnI 1 ng/L     Urine Microscopic [[id number]]  (Abnormal) Collected: 01/04/24 1607    Lab Status: Final result Specimen: Urine, Clean Catch Updated: 01/04/24 1615     RBC, UA 2-4 /hpf      WBC, UA 20-30 /hpf      Epithelial Cells Occasional /hpf      Bacteria, UA None Seen /hpf     Urine culture [[id number]] Collected: 01/04/24 1607    Lab Status: In process Specimen: Urine, Clean Catch Updated: 01/04/24 1615    UA w Reflex to Microscopic w Reflex to Culture [[id number]]  (Abnormal) Collected: 01/04/24 1607    Lab Status: Final result Specimen: Urine, Clean Catch Updated: 01/04/24 1614     Color, UA Yellow     Clarity, UA Turbid     Specific Gravity, UA 1.031      pH, UA 7.5     Leukocytes, UA Small     Nitrite, UA Negative     Protein, UA Trace mg/dl      Glucose,  (3/10%) mg/dl      Ketones, UA Negative mg/dl      Urobilinogen, UA <2.0 mg/dl      Bilirubin, UA Negative     Occult Blood, UA Negative    HS Troponin I 4hr [[id number]]     Lab Status: No result Specimen: Blood     Procalcitonin [[id number]]  (Normal) Collected: 01/04/24 1330    Lab Status: Final result Specimen: Blood from Arm, Left Updated: 01/04/24 1415     Procalcitonin <0.05 ng/ml     HS Troponin 0hr (reflex protocol) [[id number]]  (Normal) Collected: 01/04/24 1330    Lab Status: Final result Specimen: Blood from Arm, Left Updated: 01/04/24 1412     hs TnI 0hr 3 ng/L     Lactic acid [[id number]]  (Normal) Collected: 01/04/24 1330    Lab Status: Final result Specimen: Blood from Arm, Left Updated: 01/04/24 1404     LACTIC ACID 1.3 mmol/L     Narrative:      Result may be elevated if tourniquet was used during collection.    Comprehensive metabolic panel [[id number]]  (Abnormal) Collected: 01/04/24 1330    Lab Status: Final result Specimen: Blood from Arm, Left Updated: 01/04/24 1403     Sodium 136 mmol/L      Potassium 4.2 mmol/L      Chloride 97 mmol/L      CO2 32 mmol/L      ANION GAP 7 mmol/L      BUN 12 mg/dL      Creatinine 0.89 mg/dL      Glucose 229 mg/dL      Calcium 9.1 mg/dL      AST 13 U/L      ALT 12 U/L      Alkaline Phosphatase 58 U/L      Total Protein 7.9 g/dL      Albumin 3.9 g/dL      Total Bilirubin 0.35 mg/dL      eGFR 63 ml/min/1.73sq m     Narrative:      National Kidney Disease Foundation guidelines for Chronic Kidney Disease (CKD):     Stage 1 with normal or high GFR (GFR > 90 mL/min/1.73 square meters)    Stage 2 Mild CKD (GFR = 60-89 mL/min/1.73 square meters)    Stage 3A Moderate CKD (GFR = 45-59 mL/min/1.73 square meters)    Stage 3B Moderate CKD (GFR = 30-44 mL/min/1.73 square meters)    Stage 4 Severe CKD (GFR = 15-29 mL/min/1.73 square meters)    Stage 5 End Stage CKD (GFR  <15 mL/min/1.73 square meters)  Note: GFR calculation is accurate only with a steady state creatinine    Protime-INR [[id number]]  (Normal) Collected: 01/04/24 1330    Lab Status: Final result Specimen: Blood from Arm, Left Updated: 01/04/24 1359     Protime 13.3 seconds      INR 0.95    APTT [[id number]]  (Normal) Collected: 01/04/24 1330    Lab Status: Final result Specimen: Blood from Arm, Left Updated: 01/04/24 1359     PTT 35 seconds     CBC and differential [[id number]]  (Abnormal) Collected: 01/04/24 1330    Lab Status: Final result Specimen: Blood from Arm, Left Updated: 01/04/24 1345     WBC 12.53 Thousand/uL      RBC 4.12 Million/uL      Hemoglobin 12.2 g/dL      Hematocrit 37.4 %      MCV 91 fL      MCH 29.6 pg      MCHC 32.6 g/dL      RDW 13.7 %      MPV 9.1 fL      Platelets 281 Thousands/uL      nRBC 0 /100 WBCs      Neutrophils Relative 78 %      Immat GRANS % 1 %      Lymphocytes Relative 11 %      Monocytes Relative 8 %      Eosinophils Relative 2 %      Basophils Relative 0 %      Neutrophils Absolute 9.81 Thousands/µL      Immature Grans Absolute 0.08 Thousand/uL      Lymphocytes Absolute 1.33 Thousands/µL      Monocytes Absolute 1.00 Thousand/µL      Eosinophils Absolute 0.27 Thousand/µL      Basophils Absolute 0.04 Thousands/µL     Blood culture #2 [[id number]] Collected: 01/04/24 1330    Lab Status: In process Specimen: Blood from Arm, Left Updated: 01/04/24 1340    Blood culture #1 [[id number]] Collected: 01/04/24 1332    Lab Status: In process Specimen: Blood from Arm, Left Updated: 01/04/24 1340    COVID/FLU/RSV [[id number]]  (Normal) Collected: 01/04/24 1229    Lab Status: Final result Specimen: Nares from Nose Updated: 01/04/24 1317     SARS-CoV-2 Negative     INFLUENZA A PCR Negative     INFLUENZA B PCR Negative     RSV PCR Negative    Narrative:      FOR PEDIATRIC PATIENTS - copy/paste COVID Guidelines URL to browser:  [email or web address]    SARS-CoV-2 assay is a Nucleic Acid Amplification assay intended for the  qualitative detection of nucleic acid from SARS-CoV-2 in nasopharyngeal  swabs. Results are for the presumptive identification of SARS-CoV-2 RNA.    Positive results are indicative of infection with SARS-CoV-2, the virus  causing COVID-19, but do not rule out bacterial infection or co-infection  with other viruses. Laboratories within the [place] and its  territories are required to report all positive results to the appropriate  public health authorities. Negative results do not preclude SARS-CoV-2  infection and should not be used as the sole basis for treatment or other  patient management decisions. Negative results must be combined with  clinical observations, patient history, and epidemiological information.  This test has not been FDA cleared or approved.    This test has been authorized by FDA under an Emergency Use Authorization  (EUA). This test is only authorized for the duration of time the  declaration that circumstances exist justifying the authorization of the  emergency use of an in vitro diagnostic tests for detection of SARS-CoV-2  virus and/or diagnosis of COVID-19 infection under section 564(b)(1) of  the Act, 21 U.S.C. 360bbb-3(b)(1), unless the authorization is terminated  or revoked sooner. The test has been validated but independent review by FDA  and CLIA is pending.    Test performed using [employer] GeneXpert: This RT-PCR assay targets N2,  a region unique to SARS-CoV-2. A conserved region in the E-gene was chosen  for pan-Sarbecovirus detection which includes SARS-CoV-2.    According to CMS-2020-01-R, this platform meets the definition of high-throughput technology.                   CT head without contrast   Final Result by Kwadwo High MD (01/04 2009)      1.  No acute intracranial abnormality.      2.  Paranasal sinus mucosal  disease with left maxillary sinus fluid level. Clinical correlation for acute on chronic sinusitis recommended.      3.  Chiari I malformation.      4.  Empty sella.                  Workstation performed: [id number]         CT chest abdomen pelvis w contrast   Final Result by Kwadwo High MD (01/04 0341)      1.  Left upper lobe consolidation, likely pneumonia given fever and cough. Radiographic follow-up to resolution recommended.      2.  No acute abnormality in the abdomen or pelvis.      3.  Bilateral nonobstructing renal calculi.      4.  Stable urethral diverticulum.      The study was marked in EPIC for immediate notification.      Workstation performed: [id number]         XR chest 2 views    (Results Pending)              Procedures  ECG 12 Lead Documentation Only    Date/Time: 1/4/2024 4:33 PM    Performed by: Verónica Santana DO  Authorized by: Verónica Santana DO    Indications / Diagnosis:  Flu symptoms  Patient location:  ED  Rate:     ECG rate:  78    ECG rate assessment: normal    Rhythm:     Rhythm: sinus rhythm    Ectopy:     Ectopy: none    QRS:     QRS axis:  Right    QRS intervals:  Normal  ST segments:     ST segments:  Normal  T waves:     T waves: normal             ED Course                               SBIRT 22yo+      Flowsheet Row Most Recent Value   Initial Alcohol Screen: US AUDIT-C     1. How often do you have a drink containing alcohol? 0 Filed at: 01/04/2024 1228   2. How many drinks containing alcohol do you have on a typical day you are drinking?  0 Filed at: 01/04/2024 1228   3a. Male UNDER 65: How often do you have five or more drinks on one occasion? 0 Filed at: 01/04/2024 1228   3b. FEMALE Any Age, or MALE 65+: How often do you have 4 or more drinks on one occassion? 0 Filed at: 01/04/2024 1228   Audit-C Score 0 Filed at: 01/04/2024 1228   BING: How many times in the past year have you...    Used an illegal drug or used a prescription medication for non-medical reasons?  Never Filed at: 01/04/2024 1228                      Medical Decision Making  79 y/o female with flu symptoms and cough- will get labs, trop/ EKG, blood cultures, and ct scans. Will admit.     Amount and/or Complexity of Data Reviewed  Labs: ordered.  Radiology: ordered.    Risk  Prescription drug management.  Decision regarding hospitalization.             Disposition  Final diagnoses:   Pneumonia     Time reflects when diagnosis was documented in both MDM as applicable and the Disposition within this note       Time User Action Codes Description Comment    1/4/2024  4:23 PM Verónica Santana Add [J18.9] Pneumonia           ED Disposition       ED Disposition   Admit    Condition   Stable    Date/Time   Th Jan 4, 2024 5403    Comment   Case was discussed with ROBYN and the patient's admission status was agreed to be Admission Status: observation status to the service of Dr. Cristina .               Follow-up Information    None         Patient's Medications   Discharge Prescriptions    No medications on file       No discharge procedures on file.    PDMP Review         Value Time User    PDMP Reviewed  Yes 10/20/2023  4:46 PM ALEKSANDER Manning            ED Provider  Electronically Signed by             Verónica Santana DO  01/04/24 6491

## 2024-01-05 LAB
ANION GAP SERPL CALCULATED.3IONS-SCNC: 8 MMOL/L
BUN SERPL-MCNC: 17 MG/DL (ref 5–25)
CALCIUM SERPL-MCNC: 8.6 MG/DL (ref 8.4–10.2)
CHLORIDE SERPL-SCNC: 101 MMOL/L (ref 96–108)
CO2 SERPL-SCNC: 26 MMOL/L (ref 21–32)
CREAT SERPL-MCNC: 0.96 MG/DL (ref 0.6–1.3)
ERYTHROCYTE [DISTWIDTH] IN BLOOD BY AUTOMATED COUNT: 13.7 % (ref 11.6–15.1)
EST. AVERAGE GLUCOSE BLD GHB EST-MCNC: 154 MG/DL
GFR SERPL CREATININE-BSD FRML MDRD: 57 ML/MIN/1.73SQ M
GLUCOSE P FAST SERPL-MCNC: 299 MG/DL (ref 65–99)
GLUCOSE SERPL-MCNC: 224 MG/DL (ref 65–140)
GLUCOSE SERPL-MCNC: 236 MG/DL (ref 65–140)
GLUCOSE SERPL-MCNC: 271 MG/DL (ref 65–140)
GLUCOSE SERPL-MCNC: 299 MG/DL (ref 65–140)
GLUCOSE SERPL-MCNC: 332 MG/DL (ref 65–140)
HBA1C MFR BLD: 7 %
HCT VFR BLD AUTO: 34.2 % (ref 34.8–46.1)
HGB BLD-MCNC: 11.1 G/DL (ref 11.5–15.4)
MCH RBC QN AUTO: 29 PG (ref 26.8–34.3)
MCHC RBC AUTO-ENTMCNC: 32.5 G/DL (ref 31.4–37.4)
MCV RBC AUTO: 89 FL (ref 82–98)
PLATELET # BLD AUTO: 268 THOUSANDS/UL (ref 149–390)
PMV BLD AUTO: 9.3 FL (ref 8.9–12.7)
POTASSIUM SERPL-SCNC: 4.6 MMOL/L (ref 3.5–5.3)
PROCALCITONIN SERPL-MCNC: 0.16 NG/ML
RBC # BLD AUTO: 3.83 MILLION/UL (ref 3.81–5.12)
SODIUM SERPL-SCNC: 135 MMOL/L (ref 135–147)
WBC # BLD AUTO: 18.84 THOUSAND/UL (ref 4.31–10.16)

## 2024-01-05 PROCEDURE — 85027 COMPLETE CBC AUTOMATED: CPT | Performed by: FAMILY MEDICINE

## 2024-01-05 PROCEDURE — 82948 REAGENT STRIP/BLOOD GLUCOSE: CPT

## 2024-01-05 PROCEDURE — 99233 SBSQ HOSP IP/OBS HIGH 50: CPT | Performed by: INTERNAL MEDICINE

## 2024-01-05 PROCEDURE — 83036 HEMOGLOBIN GLYCOSYLATED A1C: CPT | Performed by: FAMILY MEDICINE

## 2024-01-05 PROCEDURE — 92610 EVALUATE SWALLOWING FUNCTION: CPT | Performed by: SPEECH-LANGUAGE PATHOLOGIST

## 2024-01-05 PROCEDURE — 80048 BASIC METABOLIC PNL TOTAL CA: CPT | Performed by: FAMILY MEDICINE

## 2024-01-05 PROCEDURE — 84145 PROCALCITONIN (PCT): CPT | Performed by: FAMILY MEDICINE

## 2024-01-05 PROCEDURE — 94664 DEMO&/EVAL PT USE INHALER: CPT

## 2024-01-05 PROCEDURE — 94760 N-INVAS EAR/PLS OXIMETRY 1: CPT

## 2024-01-05 RX ORDER — FONDAPARINUX SODIUM 2.5 MG/.5ML
2.5 INJECTION SUBCUTANEOUS EVERY 24 HOURS
Status: DISCONTINUED | OUTPATIENT
Start: 2024-01-05 | End: 2024-01-07

## 2024-01-05 RX ORDER — INSULIN GLARGINE 100 [IU]/ML
15 INJECTION, SOLUTION SUBCUTANEOUS
Status: DISCONTINUED | OUTPATIENT
Start: 2024-01-05 | End: 2024-01-07

## 2024-01-05 RX ADMIN — INSULIN LISPRO 4 UNITS: 100 INJECTION, SOLUTION INTRAVENOUS; SUBCUTANEOUS at 07:46

## 2024-01-05 RX ADMIN — AMLODIPINE BESYLATE 10 MG: 10 TABLET ORAL at 21:30

## 2024-01-05 RX ADMIN — SITAGLIPTIN 100 MG: 50 TABLET, FILM COATED ORAL at 21:30

## 2024-01-05 RX ADMIN — INSULIN LISPRO 5 UNITS: 100 INJECTION, SOLUTION INTRAVENOUS; SUBCUTANEOUS at 11:50

## 2024-01-05 RX ADMIN — CARVEDILOL 6.25 MG: 6.25 TABLET, FILM COATED ORAL at 16:05

## 2024-01-05 RX ADMIN — CARVEDILOL 6.25 MG: 6.25 TABLET, FILM COATED ORAL at 07:45

## 2024-01-05 RX ADMIN — PREGABALIN 50 MG: 50 CAPSULE ORAL at 16:05

## 2024-01-05 RX ADMIN — PREGABALIN 50 MG: 50 CAPSULE ORAL at 09:07

## 2024-01-05 RX ADMIN — ALBUTEROL SULFATE 1 PUFF: 90 AEROSOL, METERED RESPIRATORY (INHALATION) at 09:17

## 2024-01-05 RX ADMIN — SERTRALINE HYDROCHLORIDE 50 MG: 50 TABLET ORAL at 09:07

## 2024-01-05 RX ADMIN — CEFTRIAXONE 1000 MG: 1 INJECTION, SOLUTION INTRAVENOUS at 21:38

## 2024-01-05 RX ADMIN — QUETIAPINE FUMARATE 50 MG: 25 TABLET ORAL at 21:42

## 2024-01-05 RX ADMIN — GUAIFENESIN 600 MG: 600 TABLET, EXTENDED RELEASE ORAL at 09:07

## 2024-01-05 RX ADMIN — AZITHROMYCIN MONOHYDRATE 500 MG: 500 INJECTION, POWDER, LYOPHILIZED, FOR SOLUTION INTRAVENOUS at 09:01

## 2024-01-05 RX ADMIN — ASPIRIN 81 MG: 81 TABLET, CHEWABLE ORAL at 09:07

## 2024-01-05 RX ADMIN — ALBUTEROL SULFATE 1 PUFF: 90 AEROSOL, METERED RESPIRATORY (INHALATION) at 04:04

## 2024-01-05 RX ADMIN — GUAIFENESIN 600 MG: 600 TABLET, EXTENDED RELEASE ORAL at 17:58

## 2024-01-05 RX ADMIN — Medication 400 MG: at 09:07

## 2024-01-05 RX ADMIN — BENZONATATE 100 MG: 100 CAPSULE ORAL at 21:42

## 2024-01-05 RX ADMIN — SODIUM CHLORIDE 75 ML/HR: 0.9 INJECTION, SOLUTION INTRAVENOUS at 17:59

## 2024-01-05 RX ADMIN — MONTELUKAST 10 MG: 10 TABLET, FILM COATED ORAL at 21:31

## 2024-01-05 RX ADMIN — FLUTICASONE FUROATE AND VILANTEROL TRIFENATATE 1 PUFF: 200; 25 POWDER RESPIRATORY (INHALATION) at 09:19

## 2024-01-05 RX ADMIN — PREGABALIN 50 MG: 50 CAPSULE ORAL at 21:30

## 2024-01-05 RX ADMIN — LATANOPROST 1 DROP: 50 SOLUTION OPHTHALMIC at 22:46

## 2024-01-05 NOTE — PLAN OF CARE
Problem: PAIN - ADULT  Goal: Verbalizes/displays adequate comfort level or baseline comfort level  Description: Interventions:  - Encourage patient to monitor pain and request assistance  - Assess pain using appropriate pain scale  - Administer analgesics based on type and severity of pain and evaluate response  - Implement non-pharmacological measures as appropriate and evaluate response  - Consider cultural and social influences on pain and pain management  - Notify physician/advanced practitioner if interventions unsuccessful or patient reports new pain  Outcome: Progressing     Problem: INFECTION - ADULT  Goal: Absence or prevention of progression during hospitalization  Description: INTERVENTIONS:  - Assess and monitor for signs and symptoms of infection  - Monitor lab/diagnostic results  - Monitor all insertion sites, i.e. indwelling lines, tubes, and drains  - Monitor endotracheal if appropriate and nasal secretions for changes in amount and color  - [place] appropriate cooling/warming therapies per order  - Administer medications as ordered  - Instruct and encourage patient and family to use good hand hygiene technique  - Identify and instruct in appropriate isolation precautions for identified infection/condition  Outcome: Progressing  Goal: Absence of fever/infection during neutropenic period  Description: INTERVENTIONS:  - Monitor WBC    Outcome: Progressing     Problem: SAFETY ADULT  Goal: Patient will remain free of falls  Description: INTERVENTIONS:  - Educate patient/family on patient safety including physical limitations  - Instruct patient to call for assistance with activity   - Consult OT/PT to assist with strengthening/mobility   - Keep Call bell within reach  - Keep bed low and locked with side rails adjusted as appropriate  - Keep care items and personal belongings within reach  - Initiate and maintain comfort rounds  - Make Fall Risk Sign visible to staff  - Offer Toileting every 2 Hours,  in advance of need  - Initiate/Maintain bed alarm  - Obtain necessary fall risk management equipment: bed alarm  - Apply yellow socks and bracelet for high fall risk patients  - Consider moving patient to room near nurses station  Outcome: Progressing  Goal: Maintain or return to baseline ADL function  Description: INTERVENTIONS:  -  Assess patient's ability to carry out ADLs; assess patient's baseline for ADL function and identify physical deficits which impact ability to perform ADLs (bathing, care of mouth/teeth, toileting, grooming, dressing, etc.)  - Assess/evaluate cause of self-care deficits   - Assess range of motion  - Assess patient's mobility; develop plan if impaired  - Assess patient's need for assistive devices and provide as appropriate  - Encourage maximum independence but intervene and supervise when necessary  - Involve family in performance of ADLs  - Assess for home care needs following discharge   - Consider OT consult to assist with ADL evaluation and planning for discharge  - Provide patient education as appropriate  Outcome: Progressing  Goal: Maintains/Returns to pre admission functional level  Description: INTERVENTIONS:  - Perform AM-PAC 6 Click Basic Mobility/ Daily Activity assessment daily.  - Set and communicate daily mobility goal to care team and patient/family/caregiver.   - Collaborate with rehabilitation services on mobility goals if consulted  - Perform Range of Motion 4 times a day.  - Reposition patient every 2 hours.  - Dangle patient 3 times a day  - Stand patient 3 times a day  - Ambulate patient 3 times a day  - Out of bed to chair 3 times a day   - Out of bed for meals 3 times a day  - Out of bed for toileting  - Record patient progress and toleration of activity level   Outcome: Progressing     Problem: DISCHARGE PLANNING  Goal: Discharge to home or other facility with appropriate resources  Description: INTERVENTIONS:  - Identify barriers to discharge w/patient and  caregiver  - Arrange for needed discharge resources and transportation as appropriate  - Identify discharge learning needs (meds, wound care, etc.)  - Arrange for interpretive services to assist at discharge as needed  - Refer to Case Management Department for coordinating discharge planning if the patient needs post-hospital services based on physician/advanced practitioner order or complex needs related to functional status, cognitive ability, or social support system  Outcome: Progressing     Problem: Knowledge Deficit  Goal: Patient/family/caregiver demonstrates understanding of disease process, treatment plan, medications, and discharge instructions  Description: Complete learning assessment and assess knowledge base.  Interventions:  - Provide teaching at level of understanding  - Provide teaching via preferred learning methods  Outcome: Progressing

## 2024-01-05 NOTE — PROGRESS NOTES
[facility]  Progress Note  Name: Pebbles Landon I  MRN: [id number]  Unit/Bed#: -02 I Date of Admission: 1/4/2024   Date of Service: 1/5/2024  Hospital Day: 0    Assessment/Plan   * Pneumonia  Assessment & Plan    For now continue to biotic therapy with ceftriaxone, azithromycin  Monitor clinically, temperature curve, white count.    Type 2 diabetes mellitus with diabetic neuropathy, unspecified ([facility])  Assessment & Plan  Lab Results   Component Value Date    HGBA1C 7.0 (H) 01/05/2024       Recent Labs     01/04/24 2059 01/05/24  0715 01/05/24  1132   POCGLU 431* 271* 332*       Blood Sugar Average: Last 72 hrs:  (P) 344.2563356860102862    Continue sliding scale insulin  Hold oral antidiabetics  Add nightly coverage with Lantus 15 units nightly    COPD (chronic obstructive pulmonary disease) ([facility])  Assessment & Plan  Per history.  Wheezing improving  Continue bronchodilators  Monitor    BMI 37.0-37.9, adult  Assessment & Plan  Encourage weight loss           VTE Pharmacologic Prophylaxis:   Pharmacologic: Fondaparinux (Arixtra)  Mechanical VTE Prophylaxis in Place: Yes    Patient Centered Rounds: I have performed bedside rounds with nursing staff today.    Discussions with Specialists or Other Care Team Provider: Discussed with care management team    Education and Discussions with Family / Patient: Patient    Time Spent for Care: 45 minutes.  More than 50% of total time spent on counseling and coordination of care as described above.    Current Length of Stay: 0 day(s)    Current Patient Status: Inpatient   Certification Statement: The patient will continue to require additional inpatient hospital stay due to need for IV antibitoics    Discharge Plan: 24-48h    Code Status: Prior      Subjective:     Patient valuated this morning.  She denies any chest pain nausea vomiting.  Still has some cough and sputum production.  No other events reported.    Objective:     Vitals:   Temp  (24hrs), Av.8 °F (36.6 °C), Min:97.6 °F (36.4 °C), Max:97.9 °F (36.6 °C)    Temp:  [97.6 °F (36.4 °C)-97.9 °F (36.6 °C)] 97.6 °F (36.4 °C)  HR:  [69-81] 78  Resp:  [18-20] 18  BP: (137-158)/(64-72) 148/72  SpO2:  [92 %-96 %] 94 %  Body mass index is 39.14 kg/m².     Input and Output Summary (last 24 hours):       Intake/Output Summary (Last 24 hours) at 2024 1305  Last data filed at 2024 0801  Gross per 24 hour   Intake 1290 ml   Output 400 ml   Net 890 ml       Physical Exam:     Physical Exam  Vitals and nursing note reviewed.   Constitutional:       Appearance: Normal appearance. She is normal weight.   HENT:      Head: Normocephalic and atraumatic.      Right Ear: External ear normal.      Left Ear: External ear normal.      Nose: Nose normal. No congestion.      Mouth/Throat:      Mouth: Mucous membranes are moist.      Pharynx: Oropharynx is clear. No oropharyngeal exudate or posterior oropharyngeal erythema.   Eyes:      General: No scleral icterus.        Right eye: No discharge.         Left eye: No discharge.      Extraocular Movements: Extraocular movements intact.      Conjunctiva/sclera: Conjunctivae normal.      Pupils: Pupils are equal, round, and reactive to light.   Cardiovascular:      Rate and Rhythm: Normal rate and regular rhythm.      Pulses: Normal pulses.      Heart sounds: Normal heart sounds. No murmur heard.     No friction rub. No gallop.   Pulmonary:      Effort: Pulmonary effort is normal. No respiratory distress.      Breath sounds: No stridor. Rhonchi present. No wheezing or rales.   Chest:      Chest wall: No tenderness.   Abdominal:      General: Abdomen is flat. Bowel sounds are normal. There is no distension.      Palpations: Abdomen is soft. There is no mass.      Tenderness: There is no abdominal tenderness. There is no guarding or rebound.   Musculoskeletal:         General: No swelling, tenderness, deformity or signs of injury. Normal range of motion.      Cervical  back: Normal range of motion and neck supple. No rigidity. No muscular tenderness.   Skin:     General: Skin is warm and dry.      Capillary Refill: Capillary refill takes less than 2 seconds.      Coloration: Skin is not jaundiced or pale.      Findings: No bruising, erythema, lesion or rash.   Neurological:      General: No focal deficit present.      Mental Status: She is alert and oriented to person, place, and time. Mental status is at baseline.      Cranial Nerves: No cranial nerve deficit.      Sensory: No sensory deficit.      Motor: No weakness.      Coordination: Coordination normal.   Psychiatric:         Mood and Affect: Mood normal.         Behavior: Behavior normal.         Thought Content: Thought content normal.         Judgment: Judgment normal.           Additional Data:     Labs:    Results from last 7 days   Lab Units 01/05/24 0515 01/04/24  1330   WBC Thousand/uL 18.84* 12.53*   HEMOGLOBIN g/dL 11.1* 12.2   HEMATOCRIT % 34.2* 37.4   PLATELETS Thousands/uL 268 281   NEUTROS PCT %  --  78*   LYMPHS PCT %  --  11*   MONOS PCT %  --  8   EOS PCT %  --  2     Results from last 7 days   Lab Units 01/05/24  0515 01/04/24  1330   SODIUM mmol/L 135 136   POTASSIUM mmol/L 4.6 4.2   CHLORIDE mmol/L 101 97   CO2 mmol/L 26 32   BUN mg/dL 17 12   CREATININE mg/dL 0.96 0.89   ANION GAP mmol/L 8 7   CALCIUM mg/dL 8.6 9.1   ALBUMIN g/dL  --  3.9   TOTAL BILIRUBIN mg/dL  --  0.35   ALK PHOS U/L  --  58   ALT U/L  --  12   AST U/L  --  13   GLUCOSE RANDOM mg/dL 299* 229*     Results from last 7 days   Lab Units 01/04/24  1330   INR  0.95     Results from last 7 days   Lab Units 01/05/24  1132 01/05/24  0715 01/04/24  2059   POC GLUCOSE mg/dl 332* 271* 431*     Results from last 7 days   Lab Units 01/05/24  0515   HEMOGLOBIN A1C % 7.0*     Results from last 7 days   Lab Units 01/05/24  0515 01/04/24  1330   LACTIC ACID mmol/L  --  1.3   PROCALCITONIN ng/ml 0.16 <0.05           * I Have Reviewed All Lab Data Listed  Above.  * Additional Pertinent Lab Tests Reviewed: All Labs For Current Hospital Admission Reviewed      Recent Cultures (last 7 days):     Results from last 7 days   Lab Units 01/04/24  1332 01/04/24  1330   BLOOD CULTURE  Received in Microbiology Lab. Culture in Progress. Received in Microbiology Lab. Culture in Progress.       Last 24 Hours Medication List:   Current Facility-Administered Medications   Medication Dose Route Frequency Provider Last Rate    acetaminophen  650 mg Oral Q6H PRN Dominik Cristina MD      albuterol  1 puff Inhalation Q4H PRN Dominik Cristina MD      amLODIPine  10 mg Oral HS Dominik Cristina MD      aspirin  81 mg Oral Daily Dominik Cristina MD      azithromycin  500 mg Intravenous Q24H Adi Gupta  mg (01/05/24 0901)    benzonatate  100 mg Oral TID PRN Dominik Cristina MD      carvedilol  6.25 mg Oral BID With Meals Dominik Cristina MD      cefTRIAXone  1,000 mg Intravenous Q24H Dominik Cristina MD 1,000 mg (01/04/24 2152)    fluticasone-vilanterol  1 puff Inhalation Daily Dominik Cristina MD      fondaparinux  2.5 mg Subcutaneous Q24H Adi Gupta MD      guaiFENesin  600 mg Oral BID Dominik Cristina MD      insulin glargine  15 Units Subcutaneous HS Adi Gupta MD      insulin lispro  1-5 Units Subcutaneous HS Dominik Cristina MD      insulin lispro  1-6 Units Subcutaneous TID AC Dominik Cristina MD      latanoprost  1 drop Both Eyes HS Dominik Cristina MD      magnesium Oxide  400 mg Oral Daily Dominik Cristina MD      methocarbamol  500 mg Oral TID PRN Dominik Cristina MD      montelukast  10 mg Oral HS Dominik Cristina MD      pregabalin  50 mg Oral TID Dominik Cristina MD      QUEtiapine  50 mg Oral HS PRN Dominik Cristina MD      sertraline  50 mg Oral Daily Dominik Cristina MD      sodium chloride  75 mL/hr Intravenous Continuous Dominik Cristina MD 75 mL/hr (01/04/24 2152)        Today, Patient  Was Seen By: Adi Caicedo MD    ** Please Note: Dictation voice to text software may have been used in the creation of this document. **

## 2024-01-05 NOTE — ASSESSMENT & PLAN NOTE
Lab Results   Component Value Date    HGBA1C 7.0 (H) 01/05/2024       Recent Labs     01/04/24 2059 01/05/24 0715 01/05/24  1132   POCGLU 431* 271* 332*       Blood Sugar Average: Last 72 hrs:  (P) 344.8810759921258400    Continue sliding scale insulin  Hold oral antidiabetics  Add nightly coverage with Lantus 15 units nightly

## 2024-01-05 NOTE — ASSESSMENT & PLAN NOTE
For now continue to biotic therapy with ceftriaxone, azithromycin  Monitor clinically, temperature curve, white count.

## 2024-01-05 NOTE — PLAN OF CARE
Problem: INFECTION - ADULT  Goal: Absence or prevention of progression during hospitalization  Description: INTERVENTIONS:  - Assess and monitor for signs and symptoms of infection  - Monitor lab/diagnostic results  - Monitor all insertion sites, i.e. indwelling lines, tubes, and drains  - Monitor endotracheal if appropriate and nasal secretions for changes in amount and color  - [place] appropriate cooling/warming therapies per order  - Administer medications as ordered  - Instruct and encourage patient and family to use good hand hygiene technique  - Identify and instruct in appropriate isolation precautions for identified infection/condition  Outcome: Progressing

## 2024-01-05 NOTE — CASE MANAGEMENT
Case Management Assessment & Discharge Planning Note    Patient name Pebbles Gabonese  Location /-02 MRN [id number]  : 1945 Date 2024       Current Admission Date: 2024  Current Admission Diagnosis:Pneumonia   Patient Active Problem List    Diagnosis Date Noted    Pneumonia 2024    Conjunctival hemorrhage of right eye 2023    Anginal equivalent 2023    Flu-like symptoms 2023    Primary insomnia 2022    Diverticulitis 2022    Dysuria 2022    Type 2 diabetes mellitus with diabetic neuropathy, unspecified ([facility]) 2022    COPD (chronic obstructive pulmonary disease) ([facility]) 2022    Eosinophilia 2022    Transaminitis 2022    Anxiety 2022    Bradycardia 2022    Abnormal EKG 2022    Primary hypertension 2022    Stage 3a chronic kidney disease ([facility]) 2022    Psychophysiological insomnia 2021    Exertional dyspnea     Obesity, morbid ([facility]) 04/15/2021    BMI 37.0-37.9, adult 2020    Atypical chest pain 2020    Dermatitis 2020    Encounter for gynecological examination without abnormal finding 2019    Type 2 diabetes mellitus without complication, without long-term current use of insulin ([facility]) 2019    Depression with anxiety 2018    Mild intermittent asthma 2018    BOLA (obstructive sleep apnea) 10/17/2016      LOS (days): 0  Geometric Mean LOS (GMLOS) (days): 2.4  Days to GMLOS:2.3     OBJECTIVE:    Risk of Unplanned Readmission Score: 10.9         Current admission status: Inpatient       Preferred Pharmacy:   [id number] [place] - [address]  [address]  [facility] [address]  Phone: [phone] Fax: [phone]    [id number] [place], [place] - [address]  [address] [facility] [address]  Phone: [phone] Fax: [phone]    Primary Care Provider: ALEKSANDER Manning    Primary Insurance:  MEDICARE  Secondary Insurance: [employer]    ASSESSMENT:  Active Health Care Proxies       Evangelina Lnadon [facility] Care Representative - Daughter   Primary Phone: [phone] (Mobile)                           Readmission Root Cause  30 Day Readmission: No    Patient Information  Admitted from:: Home  Mental Status: Alert  During Assessment patient was accompanied by: Not accompanied during assessment  Assessment information provided by:: Patient  Primary Caregiver: Self  Support Systems: Spouse/significant other, Daughter  County of Residence: [place]  What city do you live in?: [place]  Home entry access options. Select all that apply.: Stairs  Number of steps to enter home.: 1  Do the steps have railings?: Yes  Type of Current Residence: [address] [place] home  Upon entering residence, is there a bedroom on the main floor (no further steps)?: Yes  Upon entering residence, is there a bathroom on the main floor (no further steps)?: Yes  Living Arrangements: Lives w/ Spouse/significant other, Lives w/ Daughter  Is patient a ?: Yes, [facility]    Activities of Daily Living Prior to Admission  Functional Status: Independent  Completes ADLs independently?: Yes  Ambulates independently?: Yes  Does patient use assisted devices?: Yes  Assisted Devices (DME) used: Straight Cane  Does patient currently own DME?: Yes  What DME does the patient currently own?: Straight Cane  Does patient have a history of Outpatient Therapy (PT/OT)?: No  Does the patient have a history of Short-Term Rehab?: Yes ([place])  Does patient have a history of HHC?: Yes  Does patient currently have HHC?: No         Patient Information Continued  Income Source: Pension/prison  Does patient have prescription coverage?: Yes  Does patient receive dialysis treatments?: No  Does patient have a history of substance abuse?: No  Does patient have a history of Mental Health Diagnosis?: Yes (depression and anxiety)  Is patient receiving  treatment for mental health?: Yes  Has patient received inpatient treatment related to mental health in the last 2 years?: No    PHQ 2/9 Screening   Reviewed PHQ 2/9 Depression Screening Score?: No    Means of Transportation  Means of Transport to Appts:: Family transport      Housing Stability: Low Risk  (1/5/2024)    Housing Stability Vital Sign     Unable to Pay for Housing in the Last Year: No     Number of Places Lived in the Last Year: 1     Unstable Housing in the Last Year: No   Food Insecurity: No Food Insecurity (1/5/2024)    Hunger Vital Sign     Worried About Running Out of Food in the Last Year: Never true     Ran Out of Food in the Last Year: Never true   Transportation Needs: No Transportation Needs (1/5/2024)    PRAPARE - Transportation     Lack of Transportation (Medical): No     Lack of Transportation (Non-Medical): No   Utilities: Not At Risk (1/5/2024)    [facility] Utilities     Threatened with loss of utilities: No       DISCHARGE DETAILS:    Discharge planning discussed with:: Patient at the bedside  Freedom of Choice: Yes  Comments - Freedom of Choice: FOC maintained in discussion regarding discharge planning. Patient is alert oriented and competent to make decisions. Introduced self and role, explained role of CM in arranging services such as DME, STR, HHC, and providing community resource information. Discussed current living situation and needs at discharge. Patient is IPTA. CM offered HHC, STR, DME, PCP, OP CM, community resources. Patient declined HHC and other CM services. Does not need additional DME. Pt is aware and encouraged to seek CM for any questions or concerns. CM continues to follow.  CM contacted family/caregiver?: No- see comments (patient will update family)  Were Treatment Team discharge recommendations reviewed with patient/caregiver?: Yes  Did patient/caregiver verbalize understanding of patient care needs?: Yes  Were patient/caregiver advised of the risks associated with not  following Treatment Team discharge recommendations?: Yes    Contacts  Patient Contacts: Vicente Landon (Spouse)  [phone] (Mobile)  Relationship to Patient:: Family  Reason/Outcome: Continuity of Care, Emergency Contact, Discharge Planning    Requested Home Health Care         Is the patient interested in HHC at discharge?: No    DME Referral Provided  Referral made for DME?: No    Other Referral/Resources/Interventions Provided:  Referral Comments: CM will continue to follow for needs.    Would you like to participate in our [facility] service program?  : No - Declined    Treatment Team Recommendation: Short Term Rehab, Home with Home Health Care

## 2024-01-05 NOTE — SPEECH THERAPY NOTE
Speech-Language Pathology Bedside Swallow Evaluation        Patient Name: Pebbles Landon    Today's Date: 2024     Problem List  Patient Active Problem List   Diagnosis    Depression with anxiety    Mild intermittent asthma    Type 2 diabetes mellitus without complication, without long-term current use of insulin ([facility])    Encounter for gynecological examination without abnormal finding    Atypical chest pain    Dermatitis    BMI 37.0-37.9, adult    Obesity, morbid ([facility])    Exertional dyspnea    Psychophysiological insomnia    Stage 3a chronic kidney disease ([facility])    Abnormal EKG    Primary hypertension    Bradycardia    Anxiety    Transaminitis    COPD (chronic obstructive pulmonary disease) ([facility])    BOLA (obstructive sleep apnea)    Eosinophilia    Diverticulitis    Dysuria    Primary insomnia    Anginal equivalent    Flu-like symptoms    Type 2 diabetes mellitus with diabetic neuropathy, unspecified ([facility])    Conjunctival hemorrhage of right eye    Pneumonia       Past Medical History  Past Medical History:   Diagnosis Date    Asthma     Benign hypertension 2018    Cardiac arrest ([facility])     Diabetes mellitus ([facility])     Glaucoma     Hypertension     Kidney stone     Neuropathy     Sleep apnea     no machine    SOB (shortness of breath)     Tubular adenoma of colon 10/2019    Wheezing        Past Surgical History  Past Surgical History:   Procedure Laterality Date     SECTION      COLONOSCOPY      HYSTERECTOMY      IR BIOPSY BONE MARROW  2022    TONSILLECTOMY           Current Medical Status  Pt is a 78 y.o. female who presented to [facility] with  cough and shortness of breath.  This is a 78-year-old female patient who states that the day after [place] she started feeling flulike symptoms.  She had generalized body ache shortness of breath and cough and was diagnosed with the flu.  The patient took Tamiflu but did not get any improvement.  Patient states that the  symptoms have now worsened.  She gets short of breath with minimal exertion.  Her body aches and fevers and chills have still continued.  Patient's been having a headache as well.  She has been wheezing quite a bit.    SLP consult requested to further assess the swallow. The patient denies any oropharyngeal dysphagia sxs but does admit to globus sensation and episodes of retrograde flow with po. She also admits to GERD sxs. Denies any recent weight loss or prior PNA.    Past medical history:   Please see H&P for details    Special Studies:  1/4 CT chest/abd/pelvis: 1.  Left upper lobe consolidation, likely pneumonia given fever and cough. Radiographic follow-up to resolution recommended.   2.  No acute abnormality in the abdomen or pelvis. 3.  Bilateral nonobstructing renal calculi. 4.  Stable urethral diverticulum.    1/4 CT head: 1.  No acute intracranial abnormality. 2.  Paranasal sinus mucosal disease with left maxillary sinus fluid level. Clinical correlation for acute on chronic sinusitis recommended. 3.  Chiari I malformation. 4.  Empty sella.    1/4 CXR: Trace bilateral pleural effusions with bibasilar atelectasis and airspace disease noted.     Swallow Information   Current Risks for Dysphagia & Aspiration: change in respiratory status     Current Symptoms/Concerns: change in respiratory status    Current Diet: regular diet and thin liquids      Baseline Diet: regular diet and thin liquids      Baseline Assessment   Behavior/Cognition: alert    Speech/Language Status: able to participate in conversation and able to follow commands    Patient Positioning: upright in bed    Swallow Mechanism Exam   Facial: symmetrical  Labial: WFL  Lingual: WFL  Velum: symmetrical  Mandible: adequate ROM  Dentition: adequate  Vocal quality:clear/adequate ( mildly congested c/w flu)  Volitional Cough: strong/productive   Resp: RA    Consistencies Assessed and Performance   Consistencies Administered: thin liquids, mechanical  soft solids, and hard solids  Specific materials administered included: chicken, corn, rice, thin liquids    Oral Stage:   Mastication was adequate with the materials administered today.  Bolus formation and transfer were functional with nosignificant oral residue noted.  No overt s/s reduced oral control.    Pharyngeal Stage:   Swallowing initiation appeared prompt.  Laryngeal rise was palpated and judged to be within functional limits.  No coughing, throat clearing, change in vocal quality or respiratory status noted today.     Esophageal Concerns: globus sensation and c/o slow motility benefiting from liquid wash      Summary   Pts oropharyngeal swallow function appears generally WFL at this time with the materials administered today. s/s suggestive of esophageal dysphagia. May consider PPI if sxs persist. Suspicion for aspiration is low.    Education was provided on reflux precautions.    Recommendations: regular diet and thin liquids     Recommended Form of Meds:  as tolerated      Aspiration precautions and compensatory swallowing strategies: upright posture, slow rate of feeding, small bites/sips, and alternating bites and sips    Results Reviewed with: patient     Dysphagia Goals: pt will tolerate regular textures with thin liquids without s/s of aspiration x2-3    Plan  Will f/u 1-2x    Cierra Negrete M.S., CCC-SLP  Speech Language Pathologist   Available via [employer]  [place] #[id number]  [place] #[id number]

## 2024-01-05 NOTE — RESPIRATORY THERAPY NOTE
RT Protocol Note  Pebbles Georgian 78 y.o. female MRN: [id number]  Unit/Bed#: -02 Encounter: [id number]    Assessment    Principal Problem:    Pneumonia  Active Problems:    Type 2 diabetes mellitus without complication, without long-term current use of insulin ([facility])    BMI 37.0-37.9, adult    COPD (chronic obstructive pulmonary disease) ([facility])    Type 2 diabetes mellitus with diabetic neuropathy, unspecified ([facility])      Home Pulmonary Medications:  inhalers       Past Medical History:   Diagnosis Date    Asthma     Benign hypertension 11/30/2018    Cardiac arrest ([facility])     Diabetes mellitus ([facility])     Glaucoma     Hypertension     Kidney stone     Neuropathy     Sleep apnea     no machine    SOB (shortness of breath)     Tubular adenoma of colon 10/2019    Wheezing      Social History     Socioeconomic History    Marital status: /Civil Union     Spouse name: None    Number of children: None    Years of education: None    Highest education level: None   Occupational History    Occupation: retired    Tobacco Use    Smoking status: Never     Passive exposure: Never    Smokeless tobacco: Never   Vaping Use    Vaping status: Never Used   Substance and Sexual Activity    Alcohol use: Never    Drug use: No    Sexual activity: Yes     Birth control/protection: Surgical   Other Topics Concern    None   Social History Narrative    None     Social Determinants of Health     Financial Resource Strain: Low Risk  (10/12/2023)    Overall Financial Resource Strain (CARDIA)     Difficulty of Paying Living Expenses: Not hard at all   Food Insecurity: No Food Insecurity (4/5/2022)    Hunger Vital Sign     Worried About Running Out of Food in the Last Year: Never true     Ran Out of Food in the Last Year: Never true   Transportation Needs: No Transportation Needs (10/12/2023)    PRAPARE - Transportation     Lack of Transportation (Medical): No     Lack of Transportation (Non-Medical): No   Physical Activity: Not on file    Stress: Not on file   Social Connections: Not on file   Intimate Partner Violence: Not on file   Housing Stability: Low Risk  (4/5/2022)    Housing Stability Vital Sign     Unable to Pay for Housing in the Last Year: No     Number of Places Lived in the Last Year: 1     Unstable Housing in the Last Year: No       Subjective         Objective    Physical Exam:   Assessment Type: (P) Assess only  General Appearance: (P) Awake, Alert  Respiratory Pattern: (P) Normal  Chest Assessment: (P) Chest expansion symmetrical  Bilateral Breath Sounds: (P) Diminished, Scattered, Coarse  Cough: (P) None  O2 Device: (P) RA    Vitals:  Blood pressure 148/72, pulse (P) 78, temperature 97.6 °F (36.4 °C), resp. rate (P) 18, weight 90.9 kg (200 lb 6.4 oz), SpO2 (P) 94%, not currently breastfeeding.          Imaging and other studies: I have personally reviewed pertinent reports.      O2 Device: (P) RA     Plan    Respiratory Plan: (P) Home Bronchodilator Patient pathway        Resp Comments: (P) Protocol completed.  Pt w/ PMH of asthma and uses inhalers at home.  Dx w/ pneumonia recently.  Pt sitting in bed in no apparent distress.  Able to talk in full sentences.  Will cont w./ home regimen.

## 2024-01-05 NOTE — PROGRESS NOTES
Spiritual Care Progress Note    2024  Patient: Pebbles Landon : 1945  Admission Date & Time: 2024 1242  MRN: [id number] CSN: [id number]     visited patient per referral from friends/[employer]ish community. Patient's friends at bedside.  introduced self & role, performed spiritual assessment, provided prayer, and provided emotional support. Patient is well-supported by her mavis community and expressed gratitude for spiritual care.  remains available.              Chaplaincy Interventions Utilized:   Empowerment: Encouraged self-care, Normalized experience of patient/family, and Provided chaplaincy education    Exploration: Explored emotional needs & resources, Explored relational needs & resources, and Explored spiritual needs & resources    Collaboration: Consulted with interdisciplinary team    Relationship Building: Cultivated a relationship of care and support and Listened empathically    Ritual: Provided prayer      Chaplaincy Outcomes Achieved:  Spiritual resources utilized and Verbally processed emotions      Spiritual Coping Strategies Utilized:   Spiritual practices, Spiritual comfort, and Spiritual community     24 1200   Clinical Encounter Type   Visited With Patient and family together   Routine Visit Introduction   Referral From Family;Community Clergy   Synagogue Encounters   Synagogue Needs Prayer

## 2024-01-06 PROBLEM — R82.79 POSITIVE URINE CULTURE: Status: ACTIVE | Noted: 2024-01-06

## 2024-01-06 LAB
ANION GAP SERPL CALCULATED.3IONS-SCNC: 5 MMOL/L
BACTERIA UR CULT: ABNORMAL
BASOPHILS # BLD AUTO: 0.04 THOUSANDS/ÂΜL (ref 0–0.1)
BASOPHILS NFR BLD AUTO: 0 % (ref 0–1)
BUN SERPL-MCNC: 20 MG/DL (ref 5–25)
CALCIUM SERPL-MCNC: 8.6 MG/DL (ref 8.4–10.2)
CHLORIDE SERPL-SCNC: 105 MMOL/L (ref 96–108)
CO2 SERPL-SCNC: 26 MMOL/L (ref 21–32)
CREAT SERPL-MCNC: 0.84 MG/DL (ref 0.6–1.3)
EOSINOPHIL # BLD AUTO: 0.04 THOUSAND/ÂΜL (ref 0–0.61)
EOSINOPHIL NFR BLD AUTO: 0 % (ref 0–6)
ERYTHROCYTE [DISTWIDTH] IN BLOOD BY AUTOMATED COUNT: 14 % (ref 11.6–15.1)
GFR SERPL CREATININE-BSD FRML MDRD: 67 ML/MIN/1.73SQ M
GLUCOSE SERPL-MCNC: 189 MG/DL (ref 65–140)
GLUCOSE SERPL-MCNC: 201 MG/DL (ref 65–140)
HCT VFR BLD AUTO: 30.7 % (ref 34.8–46.1)
HGB BLD-MCNC: 10.1 G/DL (ref 11.5–15.4)
IMM GRANULOCYTES # BLD AUTO: 0.2 THOUSAND/UL (ref 0–0.2)
IMM GRANULOCYTES NFR BLD AUTO: 1 % (ref 0–2)
LYMPHOCYTES # BLD AUTO: 2.35 THOUSANDS/ÂΜL (ref 0.6–4.47)
LYMPHOCYTES NFR BLD AUTO: 11 % (ref 14–44)
MCH RBC QN AUTO: 30 PG (ref 26.8–34.3)
MCHC RBC AUTO-ENTMCNC: 32.9 G/DL (ref 31.4–37.4)
MCV RBC AUTO: 91 FL (ref 82–98)
MONOCYTES # BLD AUTO: 1 THOUSAND/ÂΜL (ref 0.17–1.22)
MONOCYTES NFR BLD AUTO: 5 % (ref 4–12)
NEUTROPHILS # BLD AUTO: 17.46 THOUSANDS/ÂΜL (ref 1.85–7.62)
NEUTS SEG NFR BLD AUTO: 83 % (ref 43–75)
NRBC BLD AUTO-RTO: 0 /100 WBCS
PLATELET # BLD AUTO: 266 THOUSANDS/UL (ref 149–390)
PMV BLD AUTO: 9.6 FL (ref 8.9–12.7)
POTASSIUM SERPL-SCNC: 4.5 MMOL/L (ref 3.5–5.3)
RBC # BLD AUTO: 3.37 MILLION/UL (ref 3.81–5.12)
SODIUM SERPL-SCNC: 136 MMOL/L (ref 135–147)
WBC # BLD AUTO: 21.09 THOUSAND/UL (ref 4.31–10.16)

## 2024-01-06 PROCEDURE — 82948 REAGENT STRIP/BLOOD GLUCOSE: CPT

## 2024-01-06 PROCEDURE — 80048 BASIC METABOLIC PNL TOTAL CA: CPT | Performed by: INTERNAL MEDICINE

## 2024-01-06 PROCEDURE — 94664 DEMO&/EVAL PT USE INHALER: CPT

## 2024-01-06 PROCEDURE — 92526 ORAL FUNCTION THERAPY: CPT

## 2024-01-06 PROCEDURE — 99233 SBSQ HOSP IP/OBS HIGH 50: CPT | Performed by: INTERNAL MEDICINE

## 2024-01-06 PROCEDURE — 85025 COMPLETE CBC W/AUTO DIFF WBC: CPT | Performed by: INTERNAL MEDICINE

## 2024-01-06 RX ADMIN — ALBUTEROL SULFATE 1 PUFF: 90 AEROSOL, METERED RESPIRATORY (INHALATION) at 03:06

## 2024-01-06 RX ADMIN — AMLODIPINE BESYLATE 10 MG: 10 TABLET ORAL at 21:38

## 2024-01-06 RX ADMIN — CARVEDILOL 6.25 MG: 6.25 TABLET, FILM COATED ORAL at 07:43

## 2024-01-06 RX ADMIN — Medication 400 MG: at 09:10

## 2024-01-06 RX ADMIN — CEFTRIAXONE 1000 MG: 1 INJECTION, SOLUTION INTRAVENOUS at 21:38

## 2024-01-06 RX ADMIN — GUAIFENESIN 600 MG: 600 TABLET, EXTENDED RELEASE ORAL at 17:38

## 2024-01-06 RX ADMIN — CARVEDILOL 6.25 MG: 6.25 TABLET, FILM COATED ORAL at 15:31

## 2024-01-06 RX ADMIN — AZITHROMYCIN MONOHYDRATE 500 MG: 500 INJECTION, POWDER, LYOPHILIZED, FOR SOLUTION INTRAVENOUS at 07:43

## 2024-01-06 RX ADMIN — GUAIFENESIN 600 MG: 600 TABLET, EXTENDED RELEASE ORAL at 09:10

## 2024-01-06 RX ADMIN — PREGABALIN 50 MG: 50 CAPSULE ORAL at 15:31

## 2024-01-06 RX ADMIN — LATANOPROST 1 DROP: 50 SOLUTION OPHTHALMIC at 22:31

## 2024-01-06 RX ADMIN — PREGABALIN 50 MG: 50 CAPSULE ORAL at 21:38

## 2024-01-06 RX ADMIN — SERTRALINE HYDROCHLORIDE 50 MG: 50 TABLET ORAL at 09:10

## 2024-01-06 RX ADMIN — FLUTICASONE FUROATE AND VILANTEROL TRIFENATATE 1 PUFF: 200; 25 POWDER RESPIRATORY (INHALATION) at 09:15

## 2024-01-06 RX ADMIN — ALBUTEROL SULFATE 1 PUFF: 90 AEROSOL, METERED RESPIRATORY (INHALATION) at 09:13

## 2024-01-06 RX ADMIN — MONTELUKAST 10 MG: 10 TABLET, FILM COATED ORAL at 21:38

## 2024-01-06 RX ADMIN — SITAGLIPTIN 100 MG: 50 TABLET, FILM COATED ORAL at 22:28

## 2024-01-06 RX ADMIN — ASPIRIN 81 MG: 81 TABLET, CHEWABLE ORAL at 09:10

## 2024-01-06 RX ADMIN — QUETIAPINE FUMARATE 50 MG: 25 TABLET ORAL at 23:59

## 2024-01-06 RX ADMIN — PREGABALIN 50 MG: 50 CAPSULE ORAL at 09:10

## 2024-01-06 NOTE — PROGRESS NOTES
Formerly [facility], [facility]–1983  Progress Note  Name: Pebbles Landon I  MRN: [id number]  Unit/Bed#: -02 I Date of Admission: 1/4/2024   Date of Service: 1/6/2024  Hospital Day: 1    Assessment/Plan   * Pneumonia  Assessment & Plan    For now continue antibiotic therapy with ceftriaxone, azithromycin  Continue to monitor clinically, temperature curve, white count    Positive urine culture  Assessment & Plan  The patient is noted to have a positive urine culture with gram-negative rods enteric like, final speciation pending.  She does mention some urgency.  Given the same we will favor continuation of antibiotic therapy, she is currently ceftriaxone  Follow final results    Type 2 diabetes mellitus with diabetic neuropathy, unspecified ([facility])  Assessment & Plan  Lab Results   Component Value Date    HGBA1C 7.0 (H) 01/05/2024       Recent Labs     01/05/24  0715 01/05/24  1132 01/05/24  1556 01/05/24  2041   POCGLU 271* 332* 236* 224*         Blood Sugar Average: Last 72 hrs:  (P) 298.8    Patient wants to minimize glucose checks she does not like needles. We agreed to avoid sliding scale as per patient request and check glucose once a day with her bigger meal (lunch).  Patient also does not want to receive insulin.  Resume sitagliptin for now (at home she was on sitagliptin-metformin).    COPD (chronic obstructive pulmonary disease) ([facility])  Assessment & Plan  Per history.  Wheezing improving  Continue bronchodilators  Monitor    BMI 37.0-37.9, adult  Assessment & Plan  Encourage weight loss           VTE Pharmacologic Prophylaxis:   Pharmacologic: Fondaparinux (Arixtra)  Mechanical VTE Prophylaxis in Place: Yes    Patient Centered Rounds: I have performed bedside rounds with nursing staff today.    Discussions with Specialists or Other Care Team Provider: Discussed with care management team    Education and Discussions with Family / Patient: Patient  <24h ago I had extensive discussions with family as  well at the bedside    Time Spent for Care: 45 minutes.  More than 50% of total time spent on counseling and coordination of care as described above.    Current Length of Stay: 1 day(s)    Current Patient Status: Inpatient   Certification Statement: The patient will continue to require additional inpatient hospital stay due to need for     Discharge Plan: 24-48h depending on clinical outcome    Code Status: Prior      Subjective:     Patient valuated this morning.  The patient is upset with the morning phlebotomy  The patient also mentions a cough with some small amount of sputum.  Otherwise denies any nausea or vomiting.    Objective:     Vitals:   Temp (24hrs), Av.8 °F (36.6 °C), Min:97.8 °F (36.6 °C), Max:97.8 °F (36.6 °C)    Temp:  [97.8 °F (36.6 °C)] 97.8 °F (36.6 °C)  HR:  [59-67] 62  Resp:  [17-18] 17  BP: (134-151)/(59-72) 147/68  SpO2:  [93 %-98 %] 98 %  Body mass index is 39.14 kg/m².     Input and Output Summary (last 24 hours):       Intake/Output Summary (Last 24 hours) at 2024 1310  Last data filed at 2024 1300  Gross per 24 hour   Intake 480 ml   Output 1000 ml   Net -520 ml       Physical Exam:     Physical Exam  Vitals and nursing note reviewed.   Constitutional:       Appearance: Normal appearance. She is obese.      Comments: Female in bed, awake   HENT:      Head: Normocephalic and atraumatic.      Right Ear: External ear normal.      Left Ear: External ear normal.      Nose: Nose normal. No congestion.      Mouth/Throat:      Mouth: Mucous membranes are moist.      Pharynx: Oropharynx is clear. No oropharyngeal exudate or posterior oropharyngeal erythema.   Eyes:      General: No scleral icterus.        Right eye: No discharge.         Left eye: No discharge.      Extraocular Movements: Extraocular movements intact.      Conjunctiva/sclera: Conjunctivae normal.      Pupils: Pupils are equal, round, and reactive to light.   Cardiovascular:      Rate and Rhythm: Normal rate and regular  rhythm.      Pulses: Normal pulses.      Heart sounds: Normal heart sounds. No murmur heard.     No friction rub. No gallop.   Pulmonary:      Effort: Pulmonary effort is normal. No respiratory distress.      Breath sounds: No stridor. Rhonchi present. No wheezing or rales.   Chest:      Chest wall: No tenderness.   Abdominal:      General: Abdomen is flat. Bowel sounds are normal. There is no distension.      Palpations: Abdomen is soft. There is no mass.      Tenderness: There is no abdominal tenderness. There is no guarding or rebound.   Musculoskeletal:         General: No swelling, tenderness, deformity or signs of injury. Normal range of motion.      Cervical back: Normal range of motion and neck supple. No rigidity. No muscular tenderness.   Skin:     General: Skin is warm and dry.      Capillary Refill: Capillary refill takes less than 2 seconds.      Coloration: Skin is not jaundiced or pale.      Findings: No bruising, erythema, lesion or rash.   Neurological:      General: No focal deficit present.      Mental Status: She is alert and oriented to person, place, and time. Mental status is at baseline.      Cranial Nerves: No cranial nerve deficit.      Sensory: No sensory deficit.      Motor: No weakness.      Coordination: Coordination normal.   Psychiatric:         Mood and Affect: Mood normal.         Behavior: Behavior normal.         Thought Content: Thought content normal.         Judgment: Judgment normal.         Additional Data:     Labs:    Results from last 7 days   Lab Units 01/06/24  0502   WBC Thousand/uL 21.09*   HEMOGLOBIN g/dL 10.1*   HEMATOCRIT % 30.7*   PLATELETS Thousands/uL 266   NEUTROS PCT % 83*   LYMPHS PCT % 11*   MONOS PCT % 5   EOS PCT % 0     Results from last 7 days   Lab Units 01/06/24  0502 01/05/24  0515 01/04/24  1330   SODIUM mmol/L 136   < > 136   POTASSIUM mmol/L 4.5   < > 4.2   CHLORIDE mmol/L 105   < > 97   CO2 mmol/L 26   < > 32   BUN mg/dL 20   < > 12   CREATININE  mg/dL 0.84   < > 0.89   ANION GAP mmol/L 5   < > 7   CALCIUM mg/dL 8.6   < > 9.1   ALBUMIN g/dL  --   --  3.9   TOTAL BILIRUBIN mg/dL  --   --  0.35   ALK PHOS U/L  --   --  58   ALT U/L  --   --  12   AST U/L  --   --  13   GLUCOSE RANDOM mg/dL 201*   < > 229*    < > = values in this interval not displayed.     Results from last 7 days   Lab Units 01/04/24  1330   INR  0.95     Results from last 7 days   Lab Units 01/05/24  2041 01/05/24  1556 01/05/24  1132 01/05/24  0715 01/04/24  2059   POC GLUCOSE mg/dl 224* 236* 332* 271* 431*     Results from last 7 days   Lab Units 01/05/24  0515   HEMOGLOBIN A1C % 7.0*     Results from last 7 days   Lab Units 01/05/24  0515 01/04/24  1330   LACTIC ACID mmol/L  --  1.3   PROCALCITONIN ng/ml 0.16 <0.05           * I Have Reviewed All Lab Data Listed Above.  * Additional Pertinent Lab Tests Reviewed: All Labs For Current Hospital Admission Reviewed      Recent Cultures (last 7 days):     Results from last 7 days   Lab Units 01/04/24  1607 01/04/24  1332 01/04/24  1330   BLOOD CULTURE   --  No Growth at 24 hrs. No Growth at 24 hrs.   URINE CULTURE  >100,000 cfu/ml Gram Negative Ben Enteric Like*  --   --        Last 24 Hours Medication List:   Current Facility-Administered Medications   Medication Dose Route Frequency Provider Last Rate    acetaminophen  650 mg Oral Q6H PRN Dominik Cristina MD      albuterol  1 puff Inhalation Q4H PRN Dominik Cristina MD      amLODIPine  10 mg Oral HS Dominik Cristina MD      aspirin  81 mg Oral Daily Dominik Cristina MD      azithromycin  500 mg Intravenous Q24H Adi Gupta  mg (01/06/24 0743)    benzonatate  100 mg Oral TID PRN Dominik Cristina MD      carvedilol  6.25 mg Oral BID With Meals Dominik Cristina MD      cefTRIAXone  1,000 mg Intravenous Q24H Dominik Cristina MD 1,000 mg (01/05/24 6142)    fluticasone-vilanterol  1 puff Inhalation Daily Dominik Cristina MD      fondaparinux  2.5 mg Subcutaneous  Q24H Adi Caicedo MD      guaiFENesin  600 mg Oral BID Dominik Cristina MD      insulin glargine  15 Units Subcutaneous HS Adi Caicedo MD      latanoprost  1 drop Both Eyes HS Dominik Cristina MD      magnesium Oxide  400 mg Oral Daily Dominik Cristina MD      methocarbamol  500 mg Oral TID PRN Dominik Cristina MD      montelukast  10 mg Oral HS Dominik Cristina MD      pregabalin  50 mg Oral TID Dominik Cristina MD      QUEtiapine  50 mg Oral HS PRN Dominik Cristina MD      sertraline  50 mg Oral Daily Dominik Cristina MD      sitaGLIPtin  100 mg Oral HS Adi Caicedo MD      sodium chloride  75 mL/hr Intravenous Continuous Dominik Cristina MD 75 mL/hr (01/05/24 3846)        Today, Patient Was Seen By: Adi Caicedo MD    ** Please Note: Dictation voice to text software may have been used in the creation of this document. **

## 2024-01-06 NOTE — ASSESSMENT & PLAN NOTE
Lab Results   Component Value Date    HGBA1C 7.0 (H) 01/05/2024       Recent Labs     01/05/24  0715 01/05/24  1132 01/05/24  1556 01/05/24 2041   POCGLU 271* 332* 236* 224*         Blood Sugar Average: Last 72 hrs:  (P) 298.8    Patient wants to minimize glucose checks she does not like needles. We agreed to avoid sliding scale as per patient request and check glucose once a day with her bigger meal (lunch).  Patient also does not want to receive insulin.  Resume sitagliptin for now (at home she was on sitagliptin-metformin).

## 2024-01-06 NOTE — ASSESSMENT & PLAN NOTE
For now continue antibiotic therapy with ceftriaxone, azithromycin  Continue to monitor clinically, temperature curve, white count

## 2024-01-06 NOTE — RESPIRATORY THERAPY NOTE
RT Protocol Note  Pebbles Thai 78 y.o. female MRN: [id number]  Unit/Bed#: -02 Encounter: [id number]    Assessment    Principal Problem:    Pneumonia  Active Problems:    BMI 37.0-37.9, adult    COPD (chronic obstructive pulmonary disease) ([facility])    Type 2 diabetes mellitus with diabetic neuropathy, unspecified ([facility])  Physical Exam:   Assessment Type: Assess only  General Appearance: Awake, Alert  Respiratory Pattern: Normal  Chest Assessment: Chest expansion symmetrical  Bilateral Breath Sounds: Diminished  Cough: None  O2 Device: ra    Vitals:  Blood pressure 147/68, pulse 62, temperature 97.8 °F (36.6 °C), resp. rate 17, height 5' (1.524 m), weight 90.9 kg (200 lb 6.4 oz), SpO2 98%, not currently breastfeeding.      O2 Device: ra     Plan    Respiratory Plan: Home Bronchodilator Patient pathway        Resp Comments: will cont w PRN txs at this time

## 2024-01-06 NOTE — PLAN OF CARE
Problem: PAIN - ADULT  Goal: Verbalizes/displays adequate comfort level or baseline comfort level  Description: Interventions:  - Encourage patient to monitor pain and request assistance  - Assess pain using appropriate pain scale  - Administer analgesics based on type and severity of pain and evaluate response  - Implement non-pharmacological measures as appropriate and evaluate response  - Consider cultural and social influences on pain and pain management  - Notify physician/advanced practitioner if interventions unsuccessful or patient reports new pain  Outcome: Progressing     Problem: INFECTION - ADULT  Goal: Absence or prevention of progression during hospitalization  Description: INTERVENTIONS:  - Assess and monitor for signs and symptoms of infection  - Monitor lab/diagnostic results  - Monitor all insertion sites, i.e. indwelling lines, tubes, and drains  - Monitor endotracheal if appropriate and nasal secretions for changes in amount and color  - [place] appropriate cooling/warming therapies per order  - Administer medications as ordered  - Instruct and encourage patient and family to use good hand hygiene technique  - Identify and instruct in appropriate isolation precautions for identified infection/condition  Outcome: Progressing

## 2024-01-06 NOTE — RESPIRATORY THERAPY NOTE
RT Protocol Note  Pebbles Slovak 78 y.o. female MRN: [id number]  Unit/Bed#: -02 Encounter: [id number]    Assessment    Principal Problem:    Pneumonia  Active Problems:    BMI 37.0-37.9, adult    COPD (chronic obstructive pulmonary disease) ([facility])    Type 2 diabetes mellitus with diabetic neuropathy, unspecified ([facility])      Home Pulmonary Medications:     01/06/24 0336   Respiratory Protocol   Protocol Initiated? Yes   Protocol Selection Respiratory   Language Barrier? No   Medical & Social History Reviewed? Yes   Diagnostic Studies Reviewed? Yes   Physical Assessment Performed? Yes   Respiratory Plan Home Bronchodilator Patient pathway   Respiratory Assessment   Assessment Type Assess only   General Appearance Alert;Awake   Respiratory Pattern Normal   Chest Assessment Chest expansion symmetrical   Bilateral Breath Sounds Diminished   Cough None   Resp Comments Will continue with current tx plan   O2 Device RA   Cough Description   Sputum Amount None   Additional Assessments   Pulse 60   Respirations 18   SpO2 94 %            Past Medical History:   Diagnosis Date    Asthma     Benign hypertension 11/30/2018    Cardiac arrest (H[facility])     Diabetes mellitus ([facility])     Glaucoma     Hypertension     Kidney stone     Neuropathy     Sleep apnea     no machine    SOB (shortness of breath)     Tubular adenoma of colon 10/2019    Wheezing      Social History     Socioeconomic History    Marital status: /Civil Union     Spouse name: None    Number of children: None    Years of education: None    Highest education level: None   Occupational History    Occupation: retired    Tobacco Use    Smoking status: Never     Passive exposure: Never    Smokeless tobacco: Never   Vaping Use    Vaping status: Never Used   Substance and Sexual Activity    Alcohol use: Never    Drug use: No    Sexual activity: Yes     Birth control/protection: Surgical   Other Topics Concern    None   Social History Narrative    None     Social  Determinants of Health     Financial Resource Strain: Low Risk  (10/12/2023)    Overall Financial Resource Strain (CARDIA)     Difficulty of Paying Living Expenses: Not hard at all   Food Insecurity: No Food Insecurity (1/5/2024)    Hunger Vital Sign     Worried About Running Out of Food in the Last Year: Never true     Ran Out of Food in the Last Year: Never true   Transportation Needs: No Transportation Needs (1/5/2024)    PRAPARE - Transportation     Lack of Transportation (Medical): No     Lack of Transportation (Non-Medical): No   Physical Activity: Not on file   Stress: Not on file   Social Connections: Not on file   Intimate Partner Violence: Not on file   Housing Stability: Low Risk  (1/5/2024)    Housing Stability Vital Sign     Unable to Pay for Housing in the Last Year: No     Number of Places Lived in the Last Year: 1     Unstable Housing in the Last Year: No       Subjective         Objective    Physical Exam:   Assessment Type: Assess only  General Appearance: Alert, Awake  Respiratory Pattern: Normal  Chest Assessment: Chest expansion symmetrical  Bilateral Breath Sounds: Diminished  Cough: None  O2 Device: RA    Vitals:  Blood pressure 148/67, pulse 60, temperature 97.8 °F (36.6 °C), resp. rate 18, height 5' (1.524 m), weight 90.9 kg (200 lb 6.4 oz), SpO2 94%, not currently breastfeeding.          Imaging and other studies: I have personally reviewed pertinent reports.      O2 Device: RA     Plan    Respiratory Plan: Home Bronchodilator Patient pathway        Resp Comments: Will continue with current tx plan

## 2024-01-06 NOTE — PLAN OF CARE
Problem: INFECTION - ADULT  Goal: Absence of fever/infection during neutropenic period  Description: INTERVENTIONS:  - Monitor WBC    Outcome: Progressing     Problem: INFECTION - ADULT  Goal: Absence or prevention of progression during hospitalization  Description: INTERVENTIONS:  - Assess and monitor for signs and symptoms of infection  - Monitor lab/diagnostic results  - Monitor all insertion sites, i.e. indwelling lines, tubes, and drains  - Monitor endotracheal if appropriate and nasal secretions for changes in amount and color  - [place] appropriate cooling/warming therapies per order  - Administer medications as ordered  - Instruct and encourage patient and family to use good hand hygiene technique  - Identify and instruct in appropriate isolation precautions for identified infection/condition  Outcome: Progressing  Goal: Absence of fever/infection during neutropenic period  Description: INTERVENTIONS:  - Monitor WBC    Outcome: Progressing

## 2024-01-06 NOTE — ASSESSMENT & PLAN NOTE
The patient is noted to have a positive urine culture with gram-negative rods enteric like, final speciation pending.  She does mention some urgency.  Given the same we will favor continuation of antibiotic therapy, she is currently ceftriaxone  Follow final results

## 2024-01-06 NOTE — SPEECH THERAPY NOTE
Speech Language/Pathology     Speech/Language Pathology Progress Note     Patient Name: Pebbles Landon    Today's Date: 1/6/2024     Problem List  Principal Problem:    Pneumonia  Active Problems:    BMI 37.0-37.9, adult    COPD (chronic obstructive pulmonary disease) ([facility])    Type 2 diabetes mellitus with diabetic neuropathy, unspecified ([facility])        Subjective:  Patient received awake and alert, reports frequent coughing overnight (in the absence of PO intake).  Confirms no concerns w/ swallow function, confirms coughing is unrelated to oral intake, secretions, ect.  No hx of dysphagia.     Previous/current diet: reg/thin     Objective:  Patient self fed regular solids, thin liquids, consumed entire breakfast tray w/ no concerns.   Patient presents with WFL bolus containment, manipulation and control.  Mastication judged to be prolonged yet functional.  Coughing noted though appears unrelated to oral intake.    No overt s/s of aspiration or distress. Vocal quality noted to remain clear.      Assessment:  Oropharyngeal swallow function appears WFL/baseline.        Plan:  Reg/thin  Meds as preferred  No further ST follow-up; please reorder should pt's status change or concerns arise.         Junie Cao MS, CCC-SLP  Speech-Language Pathologist  [place] #[id number]  [place] #[id number]

## 2024-01-07 DIAGNOSIS — F41.8 DEPRESSION WITH ANXIETY: ICD-10-CM

## 2024-01-07 LAB
ALBUMIN SERPL BCP-MCNC: 3.6 G/DL (ref 3.5–5)
ALP SERPL-CCNC: 61 U/L (ref 34–104)
ALT SERPL W P-5'-P-CCNC: 15 U/L (ref 7–52)
ANION GAP SERPL CALCULATED.3IONS-SCNC: 7 MMOL/L
AST SERPL W P-5'-P-CCNC: 12 U/L (ref 13–39)
BASOPHILS # BLD AUTO: 0.06 THOUSANDS/ÂΜL (ref 0–0.1)
BASOPHILS NFR BLD AUTO: 0 % (ref 0–1)
BILIRUB SERPL-MCNC: 0.2 MG/DL (ref 0.2–1)
BUN SERPL-MCNC: 19 MG/DL (ref 5–25)
CALCIUM SERPL-MCNC: 9.2 MG/DL (ref 8.4–10.2)
CHLORIDE SERPL-SCNC: 103 MMOL/L (ref 96–108)
CO2 SERPL-SCNC: 27 MMOL/L (ref 21–32)
CREAT SERPL-MCNC: 0.9 MG/DL (ref 0.6–1.3)
EOSINOPHIL # BLD AUTO: 0.45 THOUSAND/ÂΜL (ref 0–0.61)
EOSINOPHIL NFR BLD AUTO: 3 % (ref 0–6)
ERYTHROCYTE [DISTWIDTH] IN BLOOD BY AUTOMATED COUNT: 14 % (ref 11.6–15.1)
GFR SERPL CREATININE-BSD FRML MDRD: 62 ML/MIN/1.73SQ M
GLUCOSE SERPL-MCNC: 158 MG/DL (ref 65–140)
GLUCOSE SERPL-MCNC: 167 MG/DL (ref 65–140)
GLUCOSE SERPL-MCNC: 180 MG/DL (ref 65–140)
GLUCOSE SERPL-MCNC: 240 MG/DL (ref 65–140)
HCT VFR BLD AUTO: 36.9 % (ref 34.8–46.1)
HGB BLD-MCNC: 11.8 G/DL (ref 11.5–15.4)
IMM GRANULOCYTES # BLD AUTO: 0.28 THOUSAND/UL (ref 0–0.2)
IMM GRANULOCYTES NFR BLD AUTO: 2 % (ref 0–2)
LYMPHOCYTES # BLD AUTO: 4.55 THOUSANDS/ÂΜL (ref 0.6–4.47)
LYMPHOCYTES NFR BLD AUTO: 29 % (ref 14–44)
MCH RBC QN AUTO: 29.1 PG (ref 26.8–34.3)
MCHC RBC AUTO-ENTMCNC: 32 G/DL (ref 31.4–37.4)
MCV RBC AUTO: 91 FL (ref 82–98)
MONOCYTES # BLD AUTO: 0.74 THOUSAND/ÂΜL (ref 0.17–1.22)
MONOCYTES NFR BLD AUTO: 5 % (ref 4–12)
NEUTROPHILS # BLD AUTO: 9.47 THOUSANDS/ÂΜL (ref 1.85–7.62)
NEUTS SEG NFR BLD AUTO: 61 % (ref 43–75)
NRBC BLD AUTO-RTO: 0 /100 WBCS
PLATELET # BLD AUTO: 296 THOUSANDS/UL (ref 149–390)
PMV BLD AUTO: 9 FL (ref 8.9–12.7)
POTASSIUM SERPL-SCNC: 4.5 MMOL/L (ref 3.5–5.3)
PROT SERPL-MCNC: 7.7 G/DL (ref 6.4–8.4)
RBC # BLD AUTO: 4.06 MILLION/UL (ref 3.81–5.12)
SODIUM SERPL-SCNC: 137 MMOL/L (ref 135–147)
WBC # BLD AUTO: 15.55 THOUSAND/UL (ref 4.31–10.16)

## 2024-01-07 PROCEDURE — 85025 COMPLETE CBC W/AUTO DIFF WBC: CPT | Performed by: INTERNAL MEDICINE

## 2024-01-07 PROCEDURE — 80053 COMPREHEN METABOLIC PANEL: CPT | Performed by: INTERNAL MEDICINE

## 2024-01-07 PROCEDURE — 99233 SBSQ HOSP IP/OBS HIGH 50: CPT | Performed by: INTERNAL MEDICINE

## 2024-01-07 PROCEDURE — 82948 REAGENT STRIP/BLOOD GLUCOSE: CPT

## 2024-01-07 RX ORDER — HYDROCODONE BITARTRATE AND HOMATROPINE METHYLBROMIDE ORAL SOLUTION 5; 1.5 MG/5ML; MG/5ML
5 LIQUID ORAL EVERY 4 HOURS PRN
Status: DISCONTINUED | OUTPATIENT
Start: 2024-01-07 | End: 2024-01-12 | Stop reason: HOSPADM

## 2024-01-07 RX ORDER — AZITHROMYCIN 500 MG/1
500 TABLET, FILM COATED ORAL EVERY 24 HOURS
Status: DISCONTINUED | OUTPATIENT
Start: 2024-01-07 | End: 2024-01-10

## 2024-01-07 RX ORDER — CEFPODOXIME PROXETIL 200 MG/1
200 TABLET, FILM COATED ORAL 2 TIMES DAILY WITH MEALS
Status: DISCONTINUED | OUTPATIENT
Start: 2024-01-07 | End: 2024-01-10

## 2024-01-07 RX ADMIN — PREGABALIN 50 MG: 50 CAPSULE ORAL at 20:25

## 2024-01-07 RX ADMIN — CARVEDILOL 6.25 MG: 6.25 TABLET, FILM COATED ORAL at 17:52

## 2024-01-07 RX ADMIN — SERTRALINE HYDROCHLORIDE 50 MG: 50 TABLET ORAL at 09:52

## 2024-01-07 RX ADMIN — Medication 5 ML: at 21:36

## 2024-01-07 RX ADMIN — CEFPODOXIME PROXETIL 200 MG: 200 TABLET, FILM COATED ORAL at 17:51

## 2024-01-07 RX ADMIN — GUAIFENESIN 600 MG: 600 TABLET, EXTENDED RELEASE ORAL at 17:52

## 2024-01-07 RX ADMIN — FLUTICASONE FUROATE AND VILANTEROL TRIFENATATE 1 PUFF: 200; 25 POWDER RESPIRATORY (INHALATION) at 12:57

## 2024-01-07 RX ADMIN — Medication 5 ML: at 16:05

## 2024-01-07 RX ADMIN — AMLODIPINE BESYLATE 10 MG: 10 TABLET ORAL at 21:36

## 2024-01-07 RX ADMIN — SITAGLIPTIN 100 MG: 50 TABLET, FILM COATED ORAL at 21:36

## 2024-01-07 RX ADMIN — CARVEDILOL 6.25 MG: 6.25 TABLET, FILM COATED ORAL at 09:52

## 2024-01-07 RX ADMIN — MONTELUKAST 10 MG: 10 TABLET, FILM COATED ORAL at 21:36

## 2024-01-07 RX ADMIN — GUAIFENESIN 600 MG: 600 TABLET, EXTENDED RELEASE ORAL at 09:52

## 2024-01-07 RX ADMIN — AZITHROMYCIN 500 MG: 500 TABLET, FILM COATED ORAL at 12:57

## 2024-01-07 RX ADMIN — LATANOPROST 1 DROP: 50 SOLUTION OPHTHALMIC at 21:36

## 2024-01-07 RX ADMIN — ASPIRIN 81 MG: 81 TABLET, CHEWABLE ORAL at 09:52

## 2024-01-07 RX ADMIN — BENZONATATE 100 MG: 100 CAPSULE ORAL at 06:27

## 2024-01-07 RX ADMIN — Medication 400 MG: at 09:52

## 2024-01-07 RX ADMIN — PREGABALIN 50 MG: 50 CAPSULE ORAL at 17:52

## 2024-01-07 RX ADMIN — Medication 5 ML: at 09:52

## 2024-01-07 RX ADMIN — QUETIAPINE FUMARATE 50 MG: 25 TABLET ORAL at 21:36

## 2024-01-07 RX ADMIN — PREGABALIN 50 MG: 50 CAPSULE ORAL at 09:52

## 2024-01-07 NOTE — PLAN OF CARE
Problem: INFECTION - ADULT  Goal: Absence or prevention of progression during hospitalization  Description: INTERVENTIONS:  - Assess and monitor for signs and symptoms of infection  - Monitor lab/diagnostic results  - Monitor all insertion sites, i.e. indwelling lines, tubes, and drains  - Monitor endotracheal if appropriate and nasal secretions for changes in amount and color  - [place] appropriate cooling/warming therapies per order  - Administer medications as ordered  - Instruct and encourage patient and family to use good hand hygiene technique  - Identify and instruct in appropriate isolation precautions for identified infection/condition  Outcome: Progressing     Problem: PAIN - ADULT  Goal: Verbalizes/displays adequate comfort level or baseline comfort level  Description: Interventions:  - Encourage patient to monitor pain and request assistance  - Assess pain using appropriate pain scale  - Administer analgesics based on type and severity of pain and evaluate response  - Implement non-pharmacological measures as appropriate and evaluate response  - Consider cultural and social influences on pain and pain management  - Notify physician/advanced practitioner if interventions unsuccessful or patient reports new pain  Outcome: Progressing     Problem: DISCHARGE PLANNING  Goal: Discharge to home or other facility with appropriate resources  Description: INTERVENTIONS:  - Identify barriers to discharge w/patient and caregiver  - Arrange for needed discharge resources and transportation as appropriate  - Identify discharge learning needs (meds, wound care, etc.)  - Arrange for interpretive services to assist at discharge as needed  - Refer to Case Management Department for coordinating discharge planning if the patient needs post-hospital services based on physician/advanced practitioner order or complex needs related to functional status, cognitive ability, or social support system  Outcome: Progressing

## 2024-01-07 NOTE — NURSING NOTE
Patient lost IV access last evening  Nurse and charge nurse attempted to get new IV access however unsuccessful   ICU contacted

## 2024-01-07 NOTE — PROGRESS NOTES
Replaced by [facility]  Progress Note  Name: Pebbles Landon I  MRN: [id number]  Unit/Bed#: -02 I Date of Admission: 1/4/2024   Date of Service: 1/7/2024 I Hospital Day: 2    Assessment/Plan   * Pneumonia  Assessment & Plan  CT scan with Left upper lobe consolidation, likely pneumonia given fever and cough  Also hx of airway hyperreactivity with potential asthma/COPD overlap which may be contributing to her symptoms and cough  Head CT also demonstrates chronic sinusitis which may contribute to postnasal dripping and cough      Continue antibiotic therapy with ceftriaxone, azithromycin  Continue to monitor clinically, temperature curve, white count   Continue Breo  Continue Flonase for sinusitis  As needed antitussive therapy    Positive urine culture  Assessment & Plan  The patient is noted to have a positive urine culture with gram-negative rods enteric like -final E. coli  She does mention some urgency.  Given the same we will favor continuation of antibiotic therapy, she is currently ceftriaxone continue-     Type 2 diabetes mellitus with diabetic neuropathy, unspecified ([facility])  Assessment & Plan  Lab Results   Component Value Date    HGBA1C 7.0 (H) 01/05/2024       Recent Labs     01/05/24  1556 01/05/24  2041 01/06/24  2216 01/07/24  0556   POCGLU 236* 224* 189* 158*         Blood Sugar Average: Last 72 hrs:  (P) 263    Patient wants to minimize glucose checks she does not like needles.  We agreed to check glucose only periodically if needed OR symptomatic that would be suggestive of hyper or  hypoglycemia  Continue sitagliptin for now (at home she was on sitagliptin-metformin).  Patient seems to be doing okay just on the Sitagliptin -Target blood glucose while in the inpatient setting 140-180 -most recent around 158    COPD (chronic obstructive pulmonary disease) ([facility])  Assessment & Plan  Per history.  Wheezing improving  Continue bronchodilators  Monitor ventilatory status           VTE  Pharmacologic Prophylaxis:   Pharmacologic: Fondaparinux (Arixtra)  Mechanical VTE Prophylaxis in Place: Yes    Patient Centered Rounds: I have performed bedside rounds with nursing staff today.    Discussions with Specialists or Other Care Team Provider:   Discussed with care management team    Education and Discussions with Family / Patient:   Patient, have attempted to call healthcare representative listed in chart Evangelina Landon did not , I left a VM    Time Spent for Care: 45 minutes.  More than 50% of total time spent on counseling and coordination of care as described above.    Current Length of Stay: 2 day(s)    Current Patient Status: Inpatient   Certification Statement: The patient will continue to require additional inpatient hospital stay due to need for IV antibiotics, improvement in the respiratory status    Discharge Plan: 24-48h    Code Status: Prior      Subjective:     Patient related this migraine  She mentions that she still has significant cough mostly dry.  Also mentions significant fatigue.  Denies any nausea or vomiting.  Tolerating p.o. intake.    Objective:     Vitals:   Temp (24hrs), Av.3 °F (36.8 °C), Min:98.1 °F (36.7 °C), Max:98.5 °F (36.9 °C)    Temp:  [98.1 °F (36.7 °C)-98.5 °F (36.9 °C)] 98.5 °F (36.9 °C)  HR:  [64-66] 64  Resp:  [19] 19  BP: (144-155)/(62-72) 144/62  SpO2:  [90 %-98 %] 90 %  Body mass index is 39.14 kg/m².     Input and Output Summary (last 24 hours):       Intake/Output Summary (Last 24 hours) at 2024 1038  Last data filed at 2024 0859  Gross per 24 hour   Intake 480 ml   Output --   Net 480 ml       Physical Exam:     Physical Exam  Vitals and nursing note reviewed.   Constitutional:       Appearance: Normal appearance. She is normal weight.      Comments: Female in bed, awake   HENT:      Head: Normocephalic and atraumatic.      Right Ear: External ear normal.      Left Ear: External ear normal.      Nose: Nose normal. No congestion.       Mouth/Throat:      Mouth: Mucous membranes are moist.      Pharynx: Oropharynx is clear. No oropharyngeal exudate or posterior oropharyngeal erythema.   Eyes:      General: No scleral icterus.        Right eye: No discharge.         Left eye: No discharge.      Extraocular Movements: Extraocular movements intact.      Conjunctiva/sclera: Conjunctivae normal.      Pupils: Pupils are equal, round, and reactive to light.   Cardiovascular:      Rate and Rhythm: Normal rate and regular rhythm.      Pulses: Normal pulses.      Heart sounds: Normal heart sounds. No murmur heard.     No friction rub. No gallop.   Pulmonary:      Effort: Pulmonary effort is normal. No respiratory distress.      Breath sounds: No stridor. Rhonchi present. No wheezing or rales.   Chest:      Chest wall: No tenderness.   Abdominal:      General: Abdomen is flat. Bowel sounds are normal. There is no distension.      Palpations: Abdomen is soft. There is no mass.      Tenderness: There is no abdominal tenderness. There is no guarding or rebound.   Musculoskeletal:         General: No swelling, tenderness, deformity or signs of injury. Normal range of motion.      Cervical back: Normal range of motion and neck supple. No rigidity. No muscular tenderness.   Skin:     General: Skin is warm and dry.      Capillary Refill: Capillary refill takes less than 2 seconds.      Coloration: Skin is not jaundiced or pale.      Findings: No bruising, erythema, lesion or rash.   Neurological:      General: No focal deficit present.      Mental Status: She is alert and oriented to person, place, and time. Mental status is at baseline.      Cranial Nerves: No cranial nerve deficit.      Sensory: No sensory deficit.      Motor: No weakness.      Coordination: Coordination normal.   Psychiatric:         Mood and Affect: Mood normal.         Behavior: Behavior normal.         Thought Content: Thought content normal.         Judgment: Judgment normal.            Additional Data:     Labs:    Results from last 7 days   Lab Units 01/07/24  0551   WBC Thousand/uL 15.55*   HEMOGLOBIN g/dL 11.8   HEMATOCRIT % 36.9   PLATELETS Thousands/uL 296   NEUTROS PCT % 61   LYMPHS PCT % 29   MONOS PCT % 5   EOS PCT % 3     Results from last 7 days   Lab Units 01/07/24  0551   SODIUM mmol/L 137   POTASSIUM mmol/L 4.5   CHLORIDE mmol/L 103   CO2 mmol/L 27   BUN mg/dL 19   CREATININE mg/dL 0.90   ANION GAP mmol/L 7   CALCIUM mg/dL 9.2   ALBUMIN g/dL 3.6   TOTAL BILIRUBIN mg/dL 0.20   ALK PHOS U/L 61   ALT U/L 15   AST U/L 12*   GLUCOSE RANDOM mg/dL 167*     Results from last 7 days   Lab Units 01/04/24  1330   INR  0.95     Results from last 7 days   Lab Units 01/07/24  0556 01/06/24  2216 01/05/24  2041 01/05/24  1556 01/05/24  1132 01/05/24  0715 01/04/24  2059   POC GLUCOSE mg/dl 158* 189* 224* 236* 332* 271* 431*     Results from last 7 days   Lab Units 01/05/24  0515   HEMOGLOBIN A1C % 7.0*     Results from last 7 days   Lab Units 01/05/24  0515 01/04/24  1330   LACTIC ACID mmol/L  --  1.3   PROCALCITONIN ng/ml 0.16 <0.05           * I Have Reviewed All Lab Data Listed Above.  * Additional Pertinent Lab Tests Reviewed: All Labs For Current Hospital Admission Reviewed      Recent Cultures (last 7 days):     Results from last 7 days   Lab Units 01/04/24  1607 01/04/24  1332 01/04/24  1330   BLOOD CULTURE   --  No Growth at 48 hrs. No Growth at 48 hrs.   URINE CULTURE  >100,000 cfu/ml Escherichia coli*  --   --        Last 24 Hours Medication List:   Current Facility-Administered Medications   Medication Dose Route Frequency Provider Last Rate    acetaminophen  650 mg Oral Q6H PRN Dominik Cristina MD      albuterol  1 puff Inhalation Q4H PRN Dominik Cristina MD      amLODIPine  10 mg Oral HS Dominik Cristina MD      aspirin  81 mg Oral Daily Dominik Cristina MD      azithromycin  500 mg Intravenous Q24H Adi Gupta MD Stopped (01/07/24 1014)    carvedilol   6.25 mg Oral BID With Meals Dominik Cristina MD      cefTRIAXone  1,000 mg Intravenous Q24H Dominik Cristina MD 1,000 mg (01/06/24 2018)    fluticasone-vilanterol  1 puff Inhalation Daily Dominik Cristina MD      fondaparinux  2.5 mg Subcutaneous Q24H Adi Caicedo MD      guaiFENesin  600 mg Oral BID Dominik Cristina MD      HYDROcodone Bit-Homatrop MBr  5 mL Oral Q4H PRN Adi Caicedo MD      latanoprost  1 drop Both Eyes HS Dominik Cristina MD      magnesium Oxide  400 mg Oral Daily Dominik Cristina MD      methocarbamol  500 mg Oral TID PRN Dominik Cristina MD      montelukast  10 mg Oral HS Dominik Cristina MD      pregabalin  50 mg Oral TID Dominik Cristina MD      QUEtiapine  50 mg Oral HS PRN Dominik Cristina MD      sertraline  50 mg Oral Daily Dominik Cristina MD      sitaGLIPtin  100 mg Oral HS Adi Caicedo MD      sodium chloride  75 mL/hr Intravenous Continuous Dominik Cristina MD 75 mL/hr (01/05/24 2319)        Today, Patient Was Seen By: Adi Caicedo MD    ** Please Note: Dictation voice to text software may have been used in the creation of this document. **

## 2024-01-07 NOTE — ASSESSMENT & PLAN NOTE
The patient is noted to have a positive urine culture with gram-negative rods enteric like -final E. coli  She does mention some urgency.  Given the same we will favor continuation of antibiotic therapy, she is currently ceftriaxone continue-

## 2024-01-07 NOTE — ASSESSMENT & PLAN NOTE
CT scan with Left upper lobe consolidation, likely pneumonia given fever and cough  Also hx of airway hyperreactivity with potential asthma/COPD overlap which may be contributing to her symptoms and cough  Head CT also demonstrates chronic sinusitis which may contribute to postnasal dripping and cough      Continue antibiotic therapy with ceftriaxone, azithromycin  Continue to monitor clinically, temperature curve, white count   Continue Breo  Continue Flonase for sinusitis  As needed antitussive therapy

## 2024-01-07 NOTE — PLAN OF CARE
Problem: PAIN - ADULT  Goal: Verbalizes/displays adequate comfort level or baseline comfort level  Description: Interventions:  - Encourage patient to monitor pain and request assistance  - Assess pain using appropriate pain scale  - Administer analgesics based on type and severity of pain and evaluate response  - Implement non-pharmacological measures as appropriate and evaluate response  - Consider cultural and social influences on pain and pain management  - Notify physician/advanced practitioner if interventions unsuccessful or patient reports new pain  Outcome: Progressing     Problem: INFECTION - ADULT  Goal: Absence or prevention of progression during hospitalization  Description: INTERVENTIONS:  - Assess and monitor for signs and symptoms of infection  - Monitor lab/diagnostic results  - Monitor all insertion sites, i.e. indwelling lines, tubes, and drains  - Monitor endotracheal if appropriate and nasal secretions for changes in amount and color  - [place] appropriate cooling/warming therapies per order  - Administer medications as ordered  - Instruct and encourage patient and family to use good hand hygiene technique  - Identify and instruct in appropriate isolation precautions for identified infection/condition  Outcome: Progressing  Goal: Absence of fever/infection during neutropenic period  Description: INTERVENTIONS:  - Monitor WBC    Outcome: Progressing     Problem: SAFETY ADULT  Goal: Patient will remain free of falls  Description: INTERVENTIONS:  - Educate patient/family on patient safety including physical limitations  - Instruct patient to call for assistance with activity   - Consult OT/PT to assist with strengthening/mobility   - Keep Call bell within reach  - Keep bed low and locked with side rails adjusted as appropriate  - Keep care items and personal belongings within reach  - Initiate and maintain comfort rounds  - Apply yellow socks and bracelet for high fall risk patients  - Consider  moving patient to room near nurses station  Outcome: Progressing  Goal: Maintain or return to baseline ADL function  Description: INTERVENTIONS:  -  Assess patient's ability to carry out ADLs; assess patient's baseline for ADL function and identify physical deficits which impact ability to perform ADLs (bathing, care of mouth/teeth, toileting, grooming, dressing, etc.)  - Assess/evaluate cause of self-care deficits   - Assess range of motion  - Assess patient's mobility; develop plan if impaired  - Assess patient's need for assistive devices and provide as appropriate  - Encourage maximum independence but intervene and supervise when necessary  - Involve family in performance of ADLs  - Assess for home care needs following discharge   - Consider OT consult to assist with ADL evaluation and planning for discharge  - Provide patient education as appropriate  Outcome: Progressing  Goal: Maintains/Returns to pre admission functional level  Description: INTERVENTIONS:  - Perform AM-PAC 6 Click Basic Mobility/ Daily Activity assessment daily.  - Set and communicate daily mobility goal to care team and patient/family/caregiver.   - Collaborate with rehabilitation services on mobility goals if consulted  - Out of bed for toileting  - Record patient progress and toleration of activity level   Outcome: Progressing     Problem: DISCHARGE PLANNING  Goal: Discharge to home or other facility with appropriate resources  Description: INTERVENTIONS:  - Identify barriers to discharge w/patient and caregiver  - Arrange for needed discharge resources and transportation as appropriate  - Identify discharge learning needs (meds, wound care, etc.)  - Arrange for interpretive services to assist at discharge as needed  - Refer to Case Management Department for coordinating discharge planning if the patient needs post-hospital services based on physician/advanced practitioner order or complex needs related to functional status, cognitive  ability, or social support system  Outcome: Progressing     Problem: Knowledge Deficit  Goal: Patient/family/caregiver demonstrates understanding of disease process, treatment plan, medications, and discharge instructions  Description: Complete learning assessment and assess knowledge base.  Interventions:  - Provide teaching at level of understanding  - Provide teaching via preferred learning methods  Outcome: Progressing

## 2024-01-07 NOTE — ASSESSMENT & PLAN NOTE
Lab Results   Component Value Date    HGBA1C 7.0 (H) 01/05/2024       Recent Labs     01/05/24  1556 01/05/24  2041 01/06/24  2216 01/07/24  0556   POCGLU 236* 224* 189* 158*         Blood Sugar Average: Last 72 hrs:  (P) 263    Patient wants to minimize glucose checks she does not like needles.  We agreed to check glucose only periodically if needed OR symptomatic that would be suggestive of hyper or  hypoglycemia  Continue sitagliptin for now (at home she was on sitagliptin-metformin).  Patient seems to be doing okay just on the Sitagliptin -Target blood glucose while in the inpatient setting 140-180 -most recent around 158

## 2024-01-08 LAB
ALBUMIN SERPL BCP-MCNC: 3.6 G/DL (ref 3.5–5)
ALP SERPL-CCNC: 56 U/L (ref 34–104)
ALT SERPL W P-5'-P-CCNC: 13 U/L (ref 7–52)
ANION GAP SERPL CALCULATED.3IONS-SCNC: 6 MMOL/L
AST SERPL W P-5'-P-CCNC: 9 U/L (ref 13–39)
BASOPHILS # BLD AUTO: 0.07 THOUSANDS/ÂΜL (ref 0–0.1)
BASOPHILS NFR BLD AUTO: 1 % (ref 0–1)
BILIRUB SERPL-MCNC: 0.23 MG/DL (ref 0.2–1)
BUN SERPL-MCNC: 19 MG/DL (ref 5–25)
CALCIUM SERPL-MCNC: 9.1 MG/DL (ref 8.4–10.2)
CHLORIDE SERPL-SCNC: 100 MMOL/L (ref 96–108)
CO2 SERPL-SCNC: 30 MMOL/L (ref 21–32)
CREAT SERPL-MCNC: 1.02 MG/DL (ref 0.6–1.3)
EOSINOPHIL # BLD AUTO: 0.55 THOUSAND/ÂΜL (ref 0–0.61)
EOSINOPHIL NFR BLD AUTO: 5 % (ref 0–6)
ERYTHROCYTE [DISTWIDTH] IN BLOOD BY AUTOMATED COUNT: 14 % (ref 11.6–15.1)
GFR SERPL CREATININE-BSD FRML MDRD: 53 ML/MIN/1.73SQ M
GLUCOSE SERPL-MCNC: 183 MG/DL (ref 65–140)
GLUCOSE SERPL-MCNC: 185 MG/DL (ref 65–140)
GLUCOSE SERPL-MCNC: 246 MG/DL (ref 65–140)
HCT VFR BLD AUTO: 35.7 % (ref 34.8–46.1)
HGB BLD-MCNC: 11.4 G/DL (ref 11.5–15.4)
IMM GRANULOCYTES # BLD AUTO: 0.27 THOUSAND/UL (ref 0–0.2)
IMM GRANULOCYTES NFR BLD AUTO: 2 % (ref 0–2)
LYMPHOCYTES # BLD AUTO: 4.08 THOUSANDS/ÂΜL (ref 0.6–4.47)
LYMPHOCYTES NFR BLD AUTO: 34 % (ref 14–44)
MCH RBC QN AUTO: 28.8 PG (ref 26.8–34.3)
MCHC RBC AUTO-ENTMCNC: 31.9 G/DL (ref 31.4–37.4)
MCV RBC AUTO: 90 FL (ref 82–98)
MONOCYTES # BLD AUTO: 0.61 THOUSAND/ÂΜL (ref 0.17–1.22)
MONOCYTES NFR BLD AUTO: 5 % (ref 4–12)
NEUTROPHILS # BLD AUTO: 6.49 THOUSANDS/ÂΜL (ref 1.85–7.62)
NEUTS SEG NFR BLD AUTO: 53 % (ref 43–75)
NRBC BLD AUTO-RTO: 0 /100 WBCS
PLATELET # BLD AUTO: 289 THOUSANDS/UL (ref 149–390)
PMV BLD AUTO: 8.9 FL (ref 8.9–12.7)
POTASSIUM SERPL-SCNC: 4.4 MMOL/L (ref 3.5–5.3)
PROT SERPL-MCNC: 7.8 G/DL (ref 6.4–8.4)
RBC # BLD AUTO: 3.96 MILLION/UL (ref 3.81–5.12)
SODIUM SERPL-SCNC: 136 MMOL/L (ref 135–147)
WBC # BLD AUTO: 12.07 THOUSAND/UL (ref 4.31–10.16)

## 2024-01-08 PROCEDURE — 82948 REAGENT STRIP/BLOOD GLUCOSE: CPT

## 2024-01-08 PROCEDURE — 99233 SBSQ HOSP IP/OBS HIGH 50: CPT | Performed by: INTERNAL MEDICINE

## 2024-01-08 PROCEDURE — 85025 COMPLETE CBC W/AUTO DIFF WBC: CPT | Performed by: INTERNAL MEDICINE

## 2024-01-08 PROCEDURE — 94664 DEMO&/EVAL PT USE INHALER: CPT

## 2024-01-08 PROCEDURE — 80053 COMPREHEN METABOLIC PANEL: CPT | Performed by: INTERNAL MEDICINE

## 2024-01-08 RX ADMIN — ASPIRIN 81 MG: 81 TABLET, CHEWABLE ORAL at 08:04

## 2024-01-08 RX ADMIN — CARVEDILOL 6.25 MG: 6.25 TABLET, FILM COATED ORAL at 08:04

## 2024-01-08 RX ADMIN — PREGABALIN 50 MG: 50 CAPSULE ORAL at 21:59

## 2024-01-08 RX ADMIN — SERTRALINE HYDROCHLORIDE 50 MG: 50 TABLET ORAL at 08:04

## 2024-01-08 RX ADMIN — GUAIFENESIN 600 MG: 600 TABLET, EXTENDED RELEASE ORAL at 17:33

## 2024-01-08 RX ADMIN — PREGABALIN 50 MG: 50 CAPSULE ORAL at 17:33

## 2024-01-08 RX ADMIN — Medication 5 ML: at 08:06

## 2024-01-08 RX ADMIN — Medication 5 ML: at 03:31

## 2024-01-08 RX ADMIN — Medication 5 ML: at 21:58

## 2024-01-08 RX ADMIN — AMLODIPINE BESYLATE 10 MG: 10 TABLET ORAL at 21:59

## 2024-01-08 RX ADMIN — SITAGLIPTIN 100 MG: 50 TABLET, FILM COATED ORAL at 21:59

## 2024-01-08 RX ADMIN — CARVEDILOL 6.25 MG: 6.25 TABLET, FILM COATED ORAL at 17:32

## 2024-01-08 RX ADMIN — AZITHROMYCIN 500 MG: 500 TABLET, FILM COATED ORAL at 13:15

## 2024-01-08 RX ADMIN — GUAIFENESIN 600 MG: 600 TABLET, EXTENDED RELEASE ORAL at 08:04

## 2024-01-08 RX ADMIN — Medication 5 ML: at 18:47

## 2024-01-08 RX ADMIN — CEFPODOXIME PROXETIL 200 MG: 200 TABLET, FILM COATED ORAL at 08:05

## 2024-01-08 RX ADMIN — FLUTICASONE FUROATE AND VILANTEROL TRIFENATATE 1 PUFF: 200; 25 POWDER RESPIRATORY (INHALATION) at 08:03

## 2024-01-08 RX ADMIN — Medication 400 MG: at 08:04

## 2024-01-08 RX ADMIN — PREGABALIN 50 MG: 50 CAPSULE ORAL at 08:04

## 2024-01-08 RX ADMIN — CEFPODOXIME PROXETIL 200 MG: 200 TABLET, FILM COATED ORAL at 17:33

## 2024-01-08 RX ADMIN — QUETIAPINE FUMARATE 50 MG: 25 TABLET ORAL at 21:59

## 2024-01-08 RX ADMIN — LATANOPROST 1 DROP: 50 SOLUTION OPHTHALMIC at 22:00

## 2024-01-08 RX ADMIN — MONTELUKAST 10 MG: 10 TABLET, FILM COATED ORAL at 21:59

## 2024-01-08 NOTE — ASSESSMENT & PLAN NOTE
CT scan with Left upper lobe consolidation, likely pneumonia given fever and cough  Also hx of airway hyperreactivity with potential asthma/COPD overlap which may be contributing to her symptoms and cough  Head CT also demonstrates chronic sinusitis which may contribute to postnasal dripping and cough      Continue antibiotic therapy with cefpodoxime and azithromycin  Continue to monitor clinically, temperature curve, white count   Continue Breo  Continue Flonase for sinusitis  Continue antitussive therapy

## 2024-01-08 NOTE — RESPIRATORY THERAPY NOTE
RT Protocol Note  Pebbles Yakut 78 y.o. female MRN: [id number]  Unit/Bed#: -02 Encounter: [id number]    Assessment    Principal Problem:    Pneumonia  Active Problems:    BMI 37.0-37.9, adult    COPD (chronic obstructive pulmonary disease) (HCC)    Type 2 diabetes mellitus with diabetic neuropathy, unspecified (HCC)    Positive urine culture      Home Pulmonary Medications:     01/08/24 0300   Respiratory Protocol   Protocol Initiated? No   Protocol Selection Respiratory   Language Barrier? No   Medical & Social History Reviewed? Yes   Diagnostic Studies Reviewed? Yes   Physical Assessment Performed? Yes   Respiratory Plan Home Bronchodilator Patient pathway   Respiratory Assessment   Assessment Type Assess only   General Appearance Drowsy   Respiratory Pattern Normal   Chest Assessment Chest expansion symmetrical   Bilateral Breath Sounds Diminished   Cough None   Resp Comments Continue with current tx plan   O2 Device RA   Additional Assessments   Pulse 70   Respirations 18   SpO2 93 %            Past Medical History:   Diagnosis Date    Asthma     Benign hypertension 11/30/2018    Cardiac arrest (HCC)     Diabetes mellitus (HCC)     Glaucoma     Hypertension     Kidney stone     Neuropathy     Sleep apnea     no machine    SOB (shortness of breath)     Tubular adenoma of colon 10/2019    Wheezing      Social History     Socioeconomic History    Marital status: /Civil Union     Spouse name: None    Number of children: None    Years of education: None    Highest education level: None   Occupational History    Occupation: retired    Tobacco Use    Smoking status: Never     Passive exposure: Never    Smokeless tobacco: Never   Vaping Use    Vaping status: Never Used   Substance and Sexual Activity    Alcohol use: Never    Drug use: No    Sexual activity: Yes     Birth control/protection: Surgical   Other Topics Concern    None   Social History Narrative    None     Social Determinants of Health      Financial Resource Strain: Low Risk  (10/12/2023)    Overall Financial Resource Strain (CARDIA)     Difficulty of Paying Living Expenses: Not hard at all   Food Insecurity: No Food Insecurity (1/5/2024)    Hunger Vital Sign     Worried About Running Out of Food in the Last Year: Never true     Ran Out of Food in the Last Year: Never true   Transportation Needs: No Transportation Needs (1/5/2024)    PRAPARE - Transportation     Lack of Transportation (Medical): No     Lack of Transportation (Non-Medical): No   Physical Activity: Not on file   Stress: Not on file   Social Connections: Not on file   Intimate Partner Violence: Not on file   Housing Stability: Low Risk  (1/5/2024)    Housing Stability Vital Sign     Unable to Pay for Housing in the Last Year: No     Number of Places Lived in the Last Year: 1     Unstable Housing in the Last Year: No       Subjective         Objective    Physical Exam:   Assessment Type: Assess only  General Appearance: Drowsy  Respiratory Pattern: Normal  Chest Assessment: Chest expansion symmetrical  Bilateral Breath Sounds: Diminished  Cough: None  O2 Device: RA    Vitals:  Blood pressure 135/59, pulse 70, temperature 98.1 °F (36.7 °C), resp. rate 18, height 5' (1.524 m), weight 90.9 kg (200 lb 6.4 oz), SpO2 93%, not currently breastfeeding.          Imaging and other studies: I have personally reviewed pertinent reports.      O2 Device: RA     Plan    Respiratory Plan: Home Bronchodilator Patient pathway        Resp Comments: Continue with current tx plan

## 2024-01-08 NOTE — PROGRESS NOTES
[facility]  Progress Note  Name: Pebbles Landon I  MRN: [id number]  Unit/Bed#: -02 I Date of Admission: 1/4/2024   Date of Service: 1/8/2024 I Hospital Day: 3    Assessment/Plan   * Pneumonia  Assessment & Plan  CT scan with Left upper lobe consolidation, likely pneumonia given fever and cough  Also hx of airway hyperreactivity with potential asthma/COPD overlap which may be contributing to her symptoms and cough  Head CT also demonstrates chronic sinusitis which may contribute to postnasal dripping and cough      Continue antibiotic therapy with cefpodoxime and azithromycin  Continue to monitor clinically, temperature curve, white count   Continue Breo  Continue Flonase for sinusitis  Continue antitussive therapy    Positive urine culture  Assessment & Plan  The patient is noted to have a positive urine culture with gram-negative rods enteric like -final E. coli  She does mention some urgency.  Given the same we will favor continuation of antibiotic therapy, she was on ceftriaxone but lost IV access, now on cefpodoxime    Type 2 diabetes mellitus with diabetic neuropathy, unspecified ([facility])  Assessment & Plan  Lab Results   Component Value Date    HGBA1C 7.0 (H) 01/05/2024       Recent Labs     01/07/24  0556 01/07/24  1050 01/07/24  2105 01/08/24  0526   POCGLU 158* 240* 180* 185*         Blood Sugar Average: Last 72 hrs:  (P) [id number]    Patient wants to minimize glucose checks she does not like needles.  We agreed to check glucose only periodically if needed OR symptomatic that would be suggestive of hyper or  hypoglycemia  Continue sitagliptin for now (at home she was on sitagliptin-metformin).  Patient seems to be doing okay just on the Sitagliptin -Target blood glucose while in the inpatient setting 140-180 -most recent around 183    COPD (chronic obstructive pulmonary disease) ([facility])  Assessment & Plan  Per history.  Wheezing improving  Continue  bronchodilators  Monitor ventilatory status    BMI 37.0-37.9, adult  Assessment & Plan  Encourage weight loss           VTE Pharmacologic Prophylaxis:   Pharmacologic:  Patient refusing DVT ppx  Mechanical VTE Prophylaxis in Place: Yes    Patient Centered Rounds: I have performed bedside rounds with nursing staff today.    Discussions with Specialists or Other Care Team Provider:   Discussed with care management team    Education and Discussions with Family / Patient:   Patient, attempted to call daughter did not     Time Spent for Care: 45 minutes.  More than 50% of total time spent on counseling and coordination of care as described above.    Current Length of Stay: 3 day(s)    Current Patient Status: Inpatient   Certification Statement: The patient will continue to require additional inpatient hospital stay due to need for clinical improvement    Discharge Plan: 24-48h    Code Status: Prior      Subjective:     Patient evaluated this morning.  The patient still has a significant cough mostly dry although she mentions that her actual shortness of breath is improving compared to before she is still very far from her baseline however.  Otherwise denies any nausea or vomiting.    Objective:     Vitals:   Temp (24hrs), Av.3 °F (36.8 °C), Min:98.1 °F (36.7 °C), Max:98.4 °F (36.9 °C)    Temp:  [98.1 °F (36.7 °C)-98.4 °F (36.9 °C)] 98.4 °F (36.9 °C)  HR:  [66-72] 66  Resp:  [18] 18  BP: (135-152)/(59-65) 152/65  SpO2:  [89 %-95 %] 89 %  Body mass index is 39.14 kg/m².     Input and Output Summary (last 24 hours):       Intake/Output Summary (Last 24 hours) at 2024 1432  Last data filed at 2024 1215  Gross per 24 hour   Intake 1000 ml   Output 300 ml   Net 700 ml       Physical Exam:     Physical Exam  Vitals and nursing note reviewed.   Constitutional:       Appearance: Normal appearance. She is normal weight.      Comments: Female in bed, awake   HENT:      Head: Normocephalic and atraumatic.       Right Ear: External ear normal.      Left Ear: External ear normal.      Nose: Nose normal. No congestion.      Mouth/Throat:      Mouth: Mucous membranes are moist.      Pharynx: Oropharynx is clear. No oropharyngeal exudate or posterior oropharyngeal erythema.   Eyes:      General: No scleral icterus.        Right eye: No discharge.         Left eye: No discharge.      Extraocular Movements: Extraocular movements intact.      Conjunctiva/sclera: Conjunctivae normal.      Pupils: Pupils are equal, round, and reactive to light.   Cardiovascular:      Rate and Rhythm: Normal rate and regular rhythm.      Pulses: Normal pulses.      Heart sounds: Normal heart sounds. No murmur heard.     No friction rub. No gallop.   Pulmonary:      Effort: Pulmonary effort is normal. No respiratory distress.      Breath sounds: No stridor. Rhonchi present. No wheezing or rales.      Comments: Patient does have bouts of dry cough during exam  Chest:      Chest wall: No tenderness.   Abdominal:      General: Abdomen is flat. Bowel sounds are normal. There is no distension.      Palpations: Abdomen is soft. There is no mass.      Tenderness: There is no abdominal tenderness. There is no guarding or rebound.   Musculoskeletal:         General: No swelling, tenderness, deformity or signs of injury. Normal range of motion.      Cervical back: Normal range of motion and neck supple. No rigidity. No muscular tenderness.   Skin:     General: Skin is warm and dry.      Capillary Refill: Capillary refill takes less than 2 seconds.      Coloration: Skin is not jaundiced or pale.      Findings: No bruising, erythema, lesion or rash.   Neurological:      General: No focal deficit present.      Mental Status: She is alert and oriented to person, place, and time. Mental status is at baseline.      Cranial Nerves: No cranial nerve deficit.      Sensory: No sensory deficit.      Motor: No weakness.      Coordination: Coordination normal.    Psychiatric:         Mood and Affect: Mood normal.         Behavior: Behavior normal.         Thought Content: Thought content normal.         Judgment: Judgment normal.         Additional Data:     Labs:    Results from last 7 days   Lab Units 01/08/24  0529   WBC Thousand/uL 12.07*   HEMOGLOBIN g/dL 11.4*   HEMATOCRIT % 35.7   PLATELETS Thousands/uL 289   NEUTROS PCT % 53   LYMPHS PCT % 34   MONOS PCT % 5   EOS PCT % 5     Results from last 7 days   Lab Units 01/08/24  0529   SODIUM mmol/L 136   POTASSIUM mmol/L 4.4   CHLORIDE mmol/L 100   CO2 mmol/L 30   BUN mg/dL 19   CREATININE mg/dL 1.02   ANION GAP mmol/L 6   CALCIUM mg/dL 9.1   ALBUMIN g/dL 3.6   TOTAL BILIRUBIN mg/dL 0.23   ALK PHOS U/L 56   ALT U/L 13   AST U/L 9*   GLUCOSE RANDOM mg/dL 183*     Results from last 7 days   Lab Units 01/04/24  1330   INR  0.95     Results from last 7 days   Lab Units 01/08/24  0526 01/07/24  2105 01/07/24  1050 01/07/24  0556 01/06/24  2216 01/05/24  2041 01/05/24  1556 01/05/24  1132 01/05/24  0715 01/04/24  2059   POC GLUCOSE mg/dl 185* 180* 240* 158* 189* 224* 236* 332* 271* 431*     Results from last 7 days   Lab Units 01/05/24  0515   HEMOGLOBIN A1C % 7.0*     Results from last 7 days   Lab Units 01/05/24  0515 01/04/24  1330   LACTIC ACID mmol/L  --  1.3   PROCALCITONIN ng/ml 0.16 <0.05           * I Have Reviewed All Lab Data Listed Above.  * Additional Pertinent Lab Tests Reviewed: All Labs For Current Hospital Admission Reviewed      Recent Cultures (last 7 days):     Results from last 7 days   Lab Units 01/04/24  1607 01/04/24  1332 01/04/24  1330   BLOOD CULTURE   --  No Growth at 72 hrs. No Growth at 72 hrs.   URINE CULTURE  >100,000 cfu/ml Escherichia coli*  --   --        Last 24 Hours Medication List:   Current Facility-Administered Medications   Medication Dose Route Frequency Provider Last Rate    acetaminophen  650 mg Oral Q6H PRN Dominik Cristina MD      albuterol  1 puff Inhalation Q4H PRN Dominik Coreas  MD Jonnie      amLODIPine  10 mg Oral HS Dominik Cristina MD      aspirin  81 mg Oral Daily Dominik Cristina MD      azithromycin  500 mg Oral Q24H Adi Caicedo MD      carvedilol  6.25 mg Oral BID With Meals Dominik Cristina MD      cefpodoxime  200 mg Oral BID With Meals Adi Caicedo MD      fluticasone-vilanterol  1 puff Inhalation Daily Dominik Cristina MD      guaiFENesin  600 mg Oral BID Dominik Cristina MD      HYDROcodone Bit-Homatrop MBr  5 mL Oral Q4H PRN Adi Caicedo MD      latanoprost  1 drop Both Eyes HS Dominik Cristina MD      magnesium Oxide  400 mg Oral Daily Dominik Cristina MD      methocarbamol  500 mg Oral TID PRN Dominik Cristina MD      montelukast  10 mg Oral HS Dominik Cristina MD      pregabalin  50 mg Oral TID Dominik Cristina MD      QUEtiapine  50 mg Oral HS PRN Dominik Cristina MD      sertraline  50 mg Oral Daily Dominik Cristina MD      sitaGLIPtin  100 mg Oral HS Adi Caicedo MD          Today, Patient Was Seen By: Adi Caicedo MD    ** Please Note: Dictation voice to text software may have been used in the creation of this document. **

## 2024-01-08 NOTE — ASSESSMENT & PLAN NOTE
The patient is noted to have a positive urine culture with gram-negative rods enteric like -final E. coli  She does mention some urgency.  Given the same we will favor continuation of antibiotic therapy, she was on ceftriaxone but lost IV access, now on cefpodoxime

## 2024-01-08 NOTE — PLAN OF CARE
Problem: PAIN - ADULT  Goal: Verbalizes/displays adequate comfort level or baseline comfort level  Description: Interventions:  - Encourage patient to monitor pain and request assistance  - Assess pain using appropriate pain scale  - Administer analgesics based on type and severity of pain and evaluate response  - Implement non-pharmacological measures as appropriate and evaluate response  - Consider cultural and social influences on pain and pain management  - Notify physician/advanced practitioner if interventions unsuccessful or patient reports new pain  Outcome: Progressing     Problem: INFECTION - ADULT  Goal: Absence or prevention of progression during hospitalization  Description: INTERVENTIONS:  - Assess and monitor for signs and symptoms of infection  - Monitor lab/diagnostic results  - Monitor all insertion sites, i.e. indwelling lines, tubes, and drains  - Monitor endotracheal if appropriate and nasal secretions for changes in amount and color  - [place] appropriate cooling/warming therapies per order  - Administer medications as ordered  - Instruct and encourage patient and family to use good hand hygiene technique  - Identify and instruct in appropriate isolation precautions for identified infection/condition  Outcome: Progressing  Goal: Absence of fever/infection during neutropenic period  Description: INTERVENTIONS:  - Monitor WBC    Outcome: Progressing     Problem: SAFETY ADULT  Goal: Patient will remain free of falls  Description: INTERVENTIONS:  - Educate patient/family on patient safety including physical limitations  - Instruct patient to call for assistance with activity   - Consult OT/PT to assist with strengthening/mobility   - Keep Call bell within reach  - Keep bed low and locked with side rails adjusted as appropriate  - Keep care items and personal belongings within reach  - Initiate and maintain comfort rounds  - Apply yellow socks and bracelet for high fall risk patients  - Consider  moving patient to room near nurses station  Outcome: Progressing  Goal: Maintain or return to baseline ADL function  Description: INTERVENTIONS:  -  Assess patient's ability to carry out ADLs; assess patient's baseline for ADL function and identify physical deficits which impact ability to perform ADLs (bathing, care of mouth/teeth, toileting, grooming, dressing, etc.)  - Assess/evaluate cause of self-care deficits   - Assess range of motion  - Assess patient's mobility; develop plan if impaired  - Assess patient's need for assistive devices and provide as appropriate  - Encourage maximum independence but intervene and supervise when necessary  - Involve family in performance of ADLs  - Assess for home care needs following discharge   - Consider OT consult to assist with ADL evaluation and planning for discharge  - Provide patient education as appropriate  Outcome: Progressing  Goal: Maintains/Returns to pre admission functional level  Description: INTERVENTIONS:  - Perform AM-PAC 6 Click Basic Mobility/ Daily Activity assessment daily.  - Set and communicate daily mobility goal to care team and patient/family/caregiver.   - Collaborate with rehabilitation services on mobility goals if consulted  - Out of bed for toileting  - Record patient progress and toleration of activity level   Outcome: Progressing     Problem: DISCHARGE PLANNING  Goal: Discharge to home or other facility with appropriate resources  Description: INTERVENTIONS:  - Identify barriers to discharge w/patient and caregiver  - Arrange for needed discharge resources and transportation as appropriate  - Identify discharge learning needs (meds, wound care, etc.)  - Arrange for interpretive services to assist at discharge as needed  - Refer to Case Management Department for coordinating discharge planning if the patient needs post-hospital services based on physician/advanced practitioner order or complex needs related to functional status, cognitive  ability, or social support system  Outcome: Progressing     Problem: Knowledge Deficit  Goal: Patient/family/caregiver demonstrates understanding of disease process, treatment plan, medications, and discharge instructions  Description: Complete learning assessment and assess knowledge base.  Interventions:  - Provide teaching at level of understanding  - Provide teaching via preferred learning methods  Outcome: Progressing

## 2024-01-08 NOTE — ASSESSMENT & PLAN NOTE
Lab Results   Component Value Date    HGBA1C 7.0 (H) 01/05/2024       Recent Labs     01/07/24  0556 01/07/24  1050 01/07/24  2105 01/08/24  0526   POCGLU 158* 240* 180* 185*         Blood Sugar Average: Last 72 hrs:  (P) [id number]    Patient wants to minimize glucose checks she does not like needles.  We agreed to check glucose only periodically if needed OR symptomatic that would be suggestive of hyper or  hypoglycemia  Continue sitagliptin for now (at home she was on sitagliptin-metformin).  Patient seems to be doing okay just on the Sitagliptin -Target blood glucose while in the inpatient setting 140-180 -most recent around 183

## 2024-01-08 NOTE — RESPIRATORY THERAPY NOTE
RT Protocol Note  Pebbles Korean 78 y.o. female MRN: [id number]  Unit/Bed#: -02 Encounter: [id number]    Assessment    Principal Problem:    Pneumonia  Active Problems:    BMI 37.0-37.9, adult    COPD (chronic obstructive pulmonary disease) ([facility])    Type 2 diabetes mellitus with diabetic neuropathy, unspecified ([facility])    Positive urine culture  Physical Exam:   Assessment Type: Assess only  General Appearance: Awake, Alert  Respiratory Pattern: Normal  Chest Assessment: Chest expansion symmetrical  Bilateral Breath Sounds: Diminished  O2 Device: ra    Vitals:  Blood pressure 152/65, pulse 69, temperature 98.4 °F (36.9 °C), resp. rate 16, height 5' (1.524 m), weight 90.9 kg (200 lb 6.4 oz), SpO2 92%, not currently breastfeeding.      O2 Device: ra     Plan    Respiratory Plan: Home Bronchodilator Patient pathway        Resp Comments: will cont w PRN txs

## 2024-01-08 NOTE — CASE MANAGEMENT
Case Management Discharge Planning Note    Patient name Pebbles Khmer  Location /-02 MRN [id number]  : 1945 Date 2024       Current Admission Date: 2024  Current Admission Diagnosis:Pneumonia   Patient Active Problem List    Diagnosis Date Noted    Positive urine culture 2024    Pneumonia 2024    Conjunctival hemorrhage of right eye 2023    Anginal equivalent 2023    Flu-like symptoms 2023    Primary insomnia 2022    Diverticulitis 2022    Dysuria 2022    Type 2 diabetes mellitus with diabetic neuropathy, unspecified ([facility]) 2022    COPD (chronic obstructive pulmonary disease) ([facility]) 2022    Eosinophilia 2022    Transaminitis 2022    Anxiety 2022    Bradycardia 2022    Abnormal EKG 2022    Primary hypertension 2022    Stage 3a chronic kidney disease ([facility]) 2022    Psychophysiological insomnia 2021    Exertional dyspnea     Obesity, morbid ([facility]) 04/15/2021    BMI 37.0-37.9, adult 2020    Atypical chest pain 2020    Dermatitis 2020    Encounter for gynecological examination without abnormal finding 2019    Type 2 diabetes mellitus without complication, without long-term current use of insulin ([facility]) 2019    Depression with anxiety 2018    Mild intermittent asthma 2018    BOLA (obstructive sleep apnea) 10/17/2016      LOS (days): 3  Geometric Mean LOS (GMLOS) (days): 2.4  Days to GMLOS:-0.8     OBJECTIVE:  Risk of Unplanned Readmission Score: 9.51         Current admission status: Inpatient   Preferred Pharmacy:   [id number] [place] - [address]  [address]  [place] [address]  Phone: [phone] Fax: [phone]    [id number] [place], [place] - [address]  [address] [address]  Phone: [phone] Fax: [phone]    Primary Care Provider: Jovana Bauman,  ALEKSANDER    Primary Insurance: MEDICARE  Secondary Insurance: [employer]    DISCHARGE DETAILS:     Per rounding with slim patient may be discharged in 24/48 hrs pending medical clearance.

## 2024-01-08 NOTE — PLAN OF CARE
Problem: PAIN - ADULT  Goal: Verbalizes/displays adequate comfort level or baseline comfort level  Description: Interventions:  - Encourage patient to monitor pain and request assistance  - Assess pain using appropriate pain scale  - Administer analgesics based on type and severity of pain and evaluate response  - Implement non-pharmacological measures as appropriate and evaluate response  - Consider cultural and social influences on pain and pain management  - Notify physician/advanced practitioner if interventions unsuccessful or patient reports new pain  Outcome: Progressing     Problem: INFECTION - ADULT  Goal: Absence or prevention of progression during hospitalization  Description: INTERVENTIONS:  - Assess and monitor for signs and symptoms of infection  - Monitor lab/diagnostic results  - Monitor all insertion sites, i.e. indwelling lines, tubes, and drains  - Monitor endotracheal if appropriate and nasal secretions for changes in amount and color  - [place] appropriate cooling/warming therapies per order  - Administer medications as ordered  - Instruct and encourage patient and family to use good hand hygiene technique  - Identify and instruct in appropriate isolation precautions for identified infection/condition  Outcome: Progressing     Problem: SAFETY ADULT  Goal: Patient will remain free of falls  Description: INTERVENTIONS:  - Educate patient/family on patient safety including physical limitations  - Instruct patient to call for assistance with activity   - Consult OT/PT to assist with strengthening/mobility   - Keep Call bell within reach  - Keep bed low and locked with side rails adjusted as appropriate  - Keep care items and personal belongings within reach  - Initiate and maintain comfort rounds  - Make Fall Risk Sign visible to staff  - Offer Toileting every 2 Hours, in advance of need  - Initiate/Maintain bed alarm  - Apply yellow socks and bracelet for high fall risk patients  - Consider  moving patient to room near nurses station  Outcome: Progressing

## 2024-01-09 LAB
ANION GAP SERPL CALCULATED.3IONS-SCNC: 6 MMOL/L
BACTERIA BLD CULT: NORMAL
BACTERIA BLD CULT: NORMAL
BASOPHILS # BLD AUTO: 0.05 THOUSANDS/ÂΜL (ref 0–0.1)
BASOPHILS NFR BLD AUTO: 0 % (ref 0–1)
BUN SERPL-MCNC: 15 MG/DL (ref 5–25)
CALCIUM SERPL-MCNC: 9.5 MG/DL (ref 8.4–10.2)
CHLORIDE SERPL-SCNC: 99 MMOL/L (ref 96–108)
CO2 SERPL-SCNC: 30 MMOL/L (ref 21–32)
CREAT SERPL-MCNC: 0.93 MG/DL (ref 0.6–1.3)
EOSINOPHIL # BLD AUTO: 0.48 THOUSAND/ÂΜL (ref 0–0.61)
EOSINOPHIL NFR BLD AUTO: 4 % (ref 0–6)
ERYTHROCYTE [DISTWIDTH] IN BLOOD BY AUTOMATED COUNT: 14 % (ref 11.6–15.1)
GFR SERPL CREATININE-BSD FRML MDRD: 59 ML/MIN/1.73SQ M
GLUCOSE SERPL-MCNC: 173 MG/DL (ref 65–140)
GLUCOSE SERPL-MCNC: 191 MG/DL (ref 65–140)
GLUCOSE SERPL-MCNC: 277 MG/DL (ref 65–140)
HCT VFR BLD AUTO: 34.5 % (ref 34.8–46.1)
HGB BLD-MCNC: 11 G/DL (ref 11.5–15.4)
IMM GRANULOCYTES # BLD AUTO: 0.23 THOUSAND/UL (ref 0–0.2)
IMM GRANULOCYTES NFR BLD AUTO: 2 % (ref 0–2)
LYMPHOCYTES # BLD AUTO: 2.95 THOUSANDS/ÂΜL (ref 0.6–4.47)
LYMPHOCYTES NFR BLD AUTO: 26 % (ref 14–44)
MCH RBC QN AUTO: 28.6 PG (ref 26.8–34.3)
MCHC RBC AUTO-ENTMCNC: 31.9 G/DL (ref 31.4–37.4)
MCV RBC AUTO: 90 FL (ref 82–98)
MONOCYTES # BLD AUTO: 0.71 THOUSAND/ÂΜL (ref 0.17–1.22)
MONOCYTES NFR BLD AUTO: 6 % (ref 4–12)
NEUTROPHILS # BLD AUTO: 6.84 THOUSANDS/ÂΜL (ref 1.85–7.62)
NEUTS SEG NFR BLD AUTO: 62 % (ref 43–75)
NRBC BLD AUTO-RTO: 0 /100 WBCS
PLATELET # BLD AUTO: 165 THOUSANDS/UL (ref 149–390)
PMV BLD AUTO: 10.4 FL (ref 8.9–12.7)
POTASSIUM SERPL-SCNC: 4.7 MMOL/L (ref 3.5–5.3)
RBC # BLD AUTO: 3.84 MILLION/UL (ref 3.81–5.12)
SODIUM SERPL-SCNC: 135 MMOL/L (ref 135–147)
WBC # BLD AUTO: 11.26 THOUSAND/UL (ref 4.31–10.16)

## 2024-01-09 PROCEDURE — 82948 REAGENT STRIP/BLOOD GLUCOSE: CPT

## 2024-01-09 PROCEDURE — 99233 SBSQ HOSP IP/OBS HIGH 50: CPT | Performed by: STUDENT IN AN ORGANIZED HEALTH CARE EDUCATION/TRAINING PROGRAM

## 2024-01-09 PROCEDURE — 85025 COMPLETE CBC W/AUTO DIFF WBC: CPT | Performed by: INTERNAL MEDICINE

## 2024-01-09 PROCEDURE — 80048 BASIC METABOLIC PNL TOTAL CA: CPT | Performed by: INTERNAL MEDICINE

## 2024-01-09 RX ORDER — IPRATROPIUM BROMIDE AND ALBUTEROL SULFATE 2.5; .5 MG/3ML; MG/3ML
3 SOLUTION RESPIRATORY (INHALATION)
Status: DISCONTINUED | OUTPATIENT
Start: 2024-01-09 | End: 2024-01-09

## 2024-01-09 RX ORDER — PREDNISONE 20 MG/1
40 TABLET ORAL DAILY
Status: DISCONTINUED | OUTPATIENT
Start: 2024-01-09 | End: 2024-01-10

## 2024-01-09 RX ORDER — ALBUTEROL SULFATE 90 UG/1
2 AEROSOL, METERED RESPIRATORY (INHALATION) EVERY 4 HOURS PRN
Status: DISCONTINUED | OUTPATIENT
Start: 2024-01-09 | End: 2024-01-12 | Stop reason: HOSPADM

## 2024-01-09 RX ADMIN — Medication 5 ML: at 02:48

## 2024-01-09 RX ADMIN — SERTRALINE HYDROCHLORIDE 50 MG: 50 TABLET ORAL at 09:11

## 2024-01-09 RX ADMIN — AMLODIPINE BESYLATE 10 MG: 10 TABLET ORAL at 21:27

## 2024-01-09 RX ADMIN — CARVEDILOL 6.25 MG: 6.25 TABLET, FILM COATED ORAL at 09:11

## 2024-01-09 RX ADMIN — PREGABALIN 50 MG: 50 CAPSULE ORAL at 09:11

## 2024-01-09 RX ADMIN — ASPIRIN 81 MG: 81 TABLET, CHEWABLE ORAL at 09:11

## 2024-01-09 RX ADMIN — PREGABALIN 50 MG: 50 CAPSULE ORAL at 21:27

## 2024-01-09 RX ADMIN — CEFPODOXIME PROXETIL 200 MG: 200 TABLET, FILM COATED ORAL at 09:10

## 2024-01-09 RX ADMIN — Medication 5 ML: at 21:27

## 2024-01-09 RX ADMIN — PREDNISONE 40 MG: 20 TABLET ORAL at 18:19

## 2024-01-09 RX ADMIN — Medication 5 ML: at 16:31

## 2024-01-09 RX ADMIN — QUETIAPINE FUMARATE 50 MG: 25 TABLET ORAL at 21:27

## 2024-01-09 RX ADMIN — MONTELUKAST 10 MG: 10 TABLET, FILM COATED ORAL at 21:27

## 2024-01-09 RX ADMIN — CARVEDILOL 6.25 MG: 6.25 TABLET, FILM COATED ORAL at 16:33

## 2024-01-09 RX ADMIN — AZITHROMYCIN 500 MG: 500 TABLET, FILM COATED ORAL at 12:13

## 2024-01-09 RX ADMIN — GUAIFENESIN 600 MG: 600 TABLET, EXTENDED RELEASE ORAL at 09:11

## 2024-01-09 RX ADMIN — FLUTICASONE FUROATE AND VILANTEROL TRIFENATATE 1 PUFF: 200; 25 POWDER RESPIRATORY (INHALATION) at 09:14

## 2024-01-09 RX ADMIN — LATANOPROST 1 DROP: 50 SOLUTION OPHTHALMIC at 21:27

## 2024-01-09 RX ADMIN — PREGABALIN 50 MG: 50 CAPSULE ORAL at 16:33

## 2024-01-09 RX ADMIN — CEFPODOXIME PROXETIL 200 MG: 200 TABLET, FILM COATED ORAL at 16:34

## 2024-01-09 RX ADMIN — Medication 400 MG: at 09:11

## 2024-01-09 RX ADMIN — GUAIFENESIN 600 MG: 600 TABLET, EXTENDED RELEASE ORAL at 18:19

## 2024-01-09 RX ADMIN — SITAGLIPTIN 100 MG: 50 TABLET, FILM COATED ORAL at 21:27

## 2024-01-09 NOTE — CASE MANAGEMENT
Case Management Discharge Planning Note    Patient name Pebbles Hebrew  Location /-02 MRN [id number]  : 1945 Date 2024       Current Admission Date: 2024  Current Admission Diagnosis:Pneumonia   Patient Active Problem List    Diagnosis Date Noted    Positive urine culture 2024    Pneumonia 2024    Conjunctival hemorrhage of right eye 2023    Anginal equivalent 2023    Flu-like symptoms 2023    Primary insomnia 2022    Diverticulitis 2022    Dysuria 2022    Type 2 diabetes mellitus with diabetic neuropathy, unspecified ([facility]) 2022    COPD (chronic obstructive pulmonary disease) ([facility]) 2022    Eosinophilia 2022    Transaminitis 2022    Anxiety 2022    Bradycardia 2022    Abnormal EKG 2022    Primary hypertension 2022    Stage 3a chronic kidney disease ([facility]) 2022    Psychophysiological insomnia 2021    Exertional dyspnea     Obesity, morbid ([facility]) 04/15/2021    BMI 37.0-37.9, adult 2020    Atypical chest pain 2020    Dermatitis 2020    Encounter for gynecological examination without abnormal finding 2019    Type 2 diabetes mellitus without complication, without long-term current use of insulin ([facility]) 2019    Depression with anxiety 2018    Mild intermittent asthma 2018    BOLA (obstructive sleep apnea) 10/17/2016      LOS (days): 4  Geometric Mean LOS (GMLOS) (days): 2.4  Days to GMLOS:-1.7     OBJECTIVE:  Risk of Unplanned Readmission Score: 9.67         Current admission status: Inpatient   Preferred Pharmacy:   [id number] [place] - [address]  [address]  [place] [address]  Phone: [phone] Fax: [phone]    [id number] [place], [place] - [address]  [address] [facility] [address]  Phone: [phone] Fax: [phone]    Primary Care Provider: Jovana Bauman,  ALEKSANDER    Primary Insurance: MEDICARE  Secondary Insurance: [employer]    DISCHARGE DETAILS:        IMM Given (Date):: 01/09/24  IMM Given to:: Patient (CM reviewed IMM with patient at bedside. Patient reported understanding their rights and denied any questions or concerns Patient was given a copy and signed copy was placed in medical records.)

## 2024-01-09 NOTE — ASSESSMENT & PLAN NOTE
Reported history of asthma/COPD.  Wheezing improving  O2 saturation noted 88 to 89% on room air which quickly improved to 93-95 percent with deep inspiration.  Started on p.o. prednisone 40 mg daily, [place].  Monitor ventilatory status

## 2024-01-09 NOTE — PROGRESS NOTES
[facility]  Name: Pebbles Landon I  MRN: [id number]  Unit/Bed#: -02 I Date of Admission: 1/4/2024   Date of Service: 1/9/2024 I Hospital Day: 4    Assessment/Plan   * Pneumonia  Assessment & Plan  CT scan with Left upper lobe consolidation, likely pneumonia given fever and cough  Also hx of airway hyperreactivity with potential asthma/COPD overlap which may be contributing to her symptoms and cough  Head CT also demonstrates chronic sinusitis which may contribute to postnasal dripping and cough      Continue antibiotic therapy with cefpodoxime and azithromycin Day #5  Continue to monitor clinically, temperature curve, white count   Continue Breo  Continue Flonase for sinusitis  Continue antitussive therapy    Positive urine culture  Assessment & Plan  The patient is noted to have a positive urine culture with gram-negative rods enteric like -final E. coli  She does mention some urgency.  Given the same we will favor continuation of antibiotic therapy, she was on ceftriaxone but lost IV access, now on cefpodoxime    Type 2 diabetes mellitus with diabetic neuropathy, unspecified ([facility])  Assessment & Plan  Lab Results   Component Value Date    HGBA1C 7.0 (H) 01/05/2024       Recent Labs     01/07/24  2105 01/08/24  0526 01/08/24  2046 01/09/24  0440   POCGLU 180* 185* 246* 191*         Blood Sugar Average: Last 72 hrs:  (P) [id number]    Patient wants to minimize glucose checks she does not like needles.  We agreed to check glucose only periodically if needed OR symptomatic that would be suggestive of hyper or  hypoglycemia  Continue sitagliptin for now (at home she was on sitagliptin-metformin).  Patient seems to be doing okay just on the Sitagliptin -Target blood glucose while in the inpatient setting 140-180 -most recent around 183    COPD (chronic obstructive pulmonary disease) ([facility])  Assessment & Plan  Reported history of asthma/COPD.  Wheezing  improving  O2 saturation noted 88 to 89% on room air which quickly improved to 93-95 percent with deep inspiration.  Started on p.o. prednisone 40 mg daily, DuoNebs.  Monitor ventilatory status    BMI 37.0-37.9, adult  Assessment & Plan  Encourage weight loss               VTE Pharmacologic Prophylaxis: VTE Score: 5  pt declines DVT ppx    Mobility:   Basic Mobility Inpatient Raw Score: 23  JH-HLM Goal: 7: Walk 25 feet or more  JH-HLM Achieved: 8: Walk 250 feet ot more      Patient Centered Rounds: I performed bedside rounds with nursing staff today.     Total Time Spent on Date of Encounter in care of patient: 25 mins. This time was spent on one or more of the following: performing physical exam; counseling and coordination of care; obtaining or reviewing history; documenting in the medical record; reviewing/ordering tests, medications or procedures; communicating with other healthcare professionals and discussing with patient's family/caregivers.    Current Length of Stay: 4 day(s)  Current Patient Status: Inpatient   Certification Statement: The patient will continue to require additional inpatient hospital stay due to hypoxia improving slowly  Discharge Plan: Anticipate discharge tomorrow to home.    Code Status: Prior    Subjective:     Patient is complaining of cough, shortness of breath with minimal exertion.  Patient reports that she does not feel comfortable going home today.  Currently she denies chest pain, nausea, vomiting, diarrhea, any other new complaints.    Objective:     Vitals:   Temp (24hrs), Av.3 °F (36.8 °C), Min:98 °F (36.7 °C), Max:98.5 °F (36.9 °C)    Temp:  [98 °F (36.7 °C)-98.5 °F (36.9 °C)] 98.5 °F (36.9 °C)  HR:  [72-92] 92  Resp:  [16] 16  BP: (132-174)/() 132/58  SpO2:  [91 %-95 %] 93 %  Body mass index is 39.14 kg/m².     Input and Output Summary (last 24 hours):     Intake/Output Summary (Last 24 hours) at 2024 1702  Last data filed at 2024 1601  Gross per 24 hour    Intake 1095 ml   Output --   Net 1095 ml       Physical Exam:   Physical Exam  Constitutional:       Appearance: Normal appearance. She is obese.      Comments: Morbidly obese with patient, acutely nontoxic appearing.   HENT:      Head: Normocephalic and atraumatic.   Cardiovascular:      Rate and Rhythm: Normal rate.      Pulses: Normal pulses.   Pulmonary:      Effort: Pulmonary effort is normal.      Breath sounds: Wheezing present.      Comments: Patient was noted with over saturation 88-89% on room air which quickly improved to 93 to 95% on room air with deep inspiration.  Currently maintaining 1-2L nasal cannula for comfort.  Abdominal:      General: Bowel sounds are normal. There is no distension.      Palpations: Abdomen is soft.      Tenderness: There is no abdominal tenderness.   Neurological:      Mental Status: She is alert and oriented to person, place, and time.   Psychiatric:         Mood and Affect: Mood normal.         Behavior: Behavior normal.          Additional Data:     Labs:  Results from last 7 days   Lab Units 01/09/24  0429   WBC Thousand/uL 11.26*   HEMOGLOBIN g/dL 11.0*   HEMATOCRIT % 34.5*   PLATELETS Thousands/uL 165   NEUTROS PCT % 62   LYMPHS PCT % 26   MONOS PCT % 6   EOS PCT % 4     Results from last 7 days   Lab Units 01/09/24  0429 01/08/24  0529   SODIUM mmol/L 135 136   POTASSIUM mmol/L 4.7 4.4   CHLORIDE mmol/L 99 100   CO2 mmol/L 30 30   BUN mg/dL 15 19   CREATININE mg/dL 0.93 1.02   ANION GAP mmol/L 6 6   CALCIUM mg/dL 9.5 9.1   ALBUMIN g/dL  --  3.6   TOTAL BILIRUBIN mg/dL  --  0.23   ALK PHOS U/L  --  56   ALT U/L  --  13   AST U/L  --  9*   GLUCOSE RANDOM mg/dL 173* 183*     Results from last 7 days   Lab Units 01/04/24  1330   INR  0.95     Results from last 7 days   Lab Units 01/09/24  0440 01/08/24  2046 01/08/24  0526 01/07/24  2105 01/07/24  1050 01/07/24  0556 01/06/24  2216 01/05/24  2041 01/05/24  1556 01/05/24  1132 01/05/24  0715 01/04/24  2059   POC GLUCOSE  mg/dl 191* 246* 185* 180* 240* 158* 189* 224* 236* 332* 271* 431*     Results from last 7 days   Lab Units 01/05/24  0515   HEMOGLOBIN A1C % 7.0*     Results from last 7 days   Lab Units 01/05/24  0515 01/04/24  1330   LACTIC ACID mmol/L  --  1.3   PROCALCITONIN ng/ml 0.16 <0.05       Lines/Drains:  Invasive Devices       None                         Recent Cultures (last 7 days):   Results from last 7 days   Lab Units 01/04/24  1607 01/04/24  1332 01/04/24  1330   BLOOD CULTURE   --  No Growth After 4 Days. No Growth After 4 Days.   URINE CULTURE  >100,000 cfu/ml Escherichia coli*  --   --        Last 24 Hours Medication List:   Current Facility-Administered Medications   Medication Dose Route Frequency Provider Last Rate    acetaminophen  650 mg Oral Q6H PRN Dominik Cristina MD      amLODIPine  10 mg Oral HS Dominik Cristina MD      aspirin  81 mg Oral Daily Dominik Cristina MD      azithromycin  500 mg Oral Q24H Adi Gupta MD      carvedilol  6.25 mg Oral BID With Meals Dominik Cristina MD      cefpodoxime  200 mg Oral BID With Meals Adi Gupta MD      fluticasone-vilanterol  1 puff Inhalation Daily Dominik Cristina MD      guaiFENesin  600 mg Oral BID Dominik Cristina MD      HYDROcodone Bit-Homatrop MBr  5 mL Oral Q4H PRN Adi Gupta MD      ipratropium-albuterol  3 mL Nebulization Q6H Van ARCEO MD      latanoprost  1 drop Both Eyes HS Dominik Cristina MD      magnesium Oxide  400 mg Oral Daily Dominik Cristina MD      methocarbamol  500 mg Oral TID PRN Dominik Cristina MD      montelukast  10 mg Oral HS Dominik Cristina MD      predniSONE  40 mg Oral Daily Van ARCEO MD      pregabalin  50 mg Oral TID Dominik Cristina MD      QUEtiapine  50 mg Oral HS PRN Dominik Cristina MD      sertraline  50 mg Oral Daily Dominik Cristina MD      sitaGLIPtin  100 mg Oral HS Adi Gupta MD          Today, Patient Was Seen By: Van ARCEO  MD Mp    **Please Note: This note may have been constructed using a voice recognition system.**

## 2024-01-09 NOTE — PLAN OF CARE
Problem: Knowledge Deficit  Goal: Patient/family/caregiver demonstrates understanding of disease process, treatment plan, medications, and discharge instructions  Description: Complete learning assessment and assess knowledge base.  Interventions:  - Provide teaching at level of understanding  - Provide teaching via preferred learning methods  Outcome: Progressing     Problem: DISCHARGE PLANNING  Goal: Discharge to home or other facility with appropriate resources  Description: INTERVENTIONS:  - Identify barriers to discharge w/patient and caregiver  - Arrange for needed discharge resources and transportation as appropriate  - Identify discharge learning needs (meds, wound care, etc.)  - Arrange for interpretive services to assist at discharge as needed  - Refer to Case Management Department for coordinating discharge planning if the patient needs post-hospital services based on physician/advanced practitioner order or complex needs related to functional status, cognitive ability, or social support system  Outcome: Progressing

## 2024-01-09 NOTE — PLAN OF CARE
Problem: INFECTION - ADULT  Goal: Absence or prevention of progression during hospitalization  Description: INTERVENTIONS:  - Assess and monitor for signs and symptoms of infection  - Monitor lab/diagnostic results  - Monitor all insertion sites, i.e. indwelling lines, tubes, and drains  - Monitor endotracheal if appropriate and nasal secretions for changes in amount and color  - [place] appropriate cooling/warming therapies per order  - Administer medications as ordered  - Instruct and encourage patient and family to use good hand hygiene technique  - Identify and instruct in appropriate isolation precautions for identified infection/condition  Outcome: Progressing     Problem: DISCHARGE PLANNING  Goal: Discharge to home or other facility with appropriate resources  Description: INTERVENTIONS:  - Identify barriers to discharge w/patient and caregiver  - Arrange for needed discharge resources and transportation as appropriate  - Identify discharge learning needs (meds, wound care, etc.)  - Arrange for interpretive services to assist at discharge as needed  - Refer to Case Management Department for coordinating discharge planning if the patient needs post-hospital services based on physician/advanced practitioner order or complex needs related to functional status, cognitive ability, or social support system  Outcome: Not Progressing

## 2024-01-09 NOTE — ASSESSMENT & PLAN NOTE
CT scan with Left upper lobe consolidation, likely pneumonia given fever and cough  Also hx of airway hyperreactivity with potential asthma/COPD overlap which may be contributing to her symptoms and cough  Head CT also demonstrates chronic sinusitis which may contribute to postnasal dripping and cough      Continue antibiotic therapy with cefpodoxime and azithromycin Day #5  Continue to monitor clinically, temperature curve, white count   Continue Breo  Continue Flonase for sinusitis  Continue antitussive therapy

## 2024-01-09 NOTE — ASSESSMENT & PLAN NOTE
Lab Results   Component Value Date    HGBA1C 7.0 (H) 01/05/2024       Recent Labs     01/07/24 2105 01/08/24  0526 01/08/24 2046 01/09/24  0440   POCGLU 180* 185* 246* 191*         Blood Sugar Average: Last 72 hrs:  (P) [id number]    Patient wants to minimize glucose checks she does not like needles.  We agreed to check glucose only periodically if needed OR symptomatic that would be suggestive of hyper or  hypoglycemia  Continue sitagliptin for now (at home she was on sitagliptin-metformin).  Patient seems to be doing okay just on the Sitagliptin -Target blood glucose while in the inpatient setting 140-180 -most recent around 183

## 2024-01-10 LAB
ANION GAP SERPL CALCULATED.3IONS-SCNC: 6 MMOL/L
BUN SERPL-MCNC: 16 MG/DL (ref 5–25)
CALCIUM SERPL-MCNC: 9.6 MG/DL (ref 8.4–10.2)
CHLORIDE SERPL-SCNC: 96 MMOL/L (ref 96–108)
CO2 SERPL-SCNC: 30 MMOL/L (ref 21–32)
CREAT SERPL-MCNC: 0.96 MG/DL (ref 0.6–1.3)
DME PARACHUTE DELIVERY DATE REQUESTED: NORMAL
DME PARACHUTE ITEM DESCRIPTION: NORMAL
DME PARACHUTE ORDER STATUS: NORMAL
DME PARACHUTE SUPPLIER NAME: NORMAL
DME PARACHUTE SUPPLIER PHONE: NORMAL
ERYTHROCYTE [DISTWIDTH] IN BLOOD BY AUTOMATED COUNT: 13.7 % (ref 11.6–15.1)
GFR SERPL CREATININE-BSD FRML MDRD: 57 ML/MIN/1.73SQ M
GLUCOSE SERPL-MCNC: 235 MG/DL (ref 65–140)
GLUCOSE SERPL-MCNC: 294 MG/DL (ref 65–140)
GLUCOSE SERPL-MCNC: 317 MG/DL (ref 65–140)
GLUCOSE SERPL-MCNC: 320 MG/DL (ref 65–140)
GLUCOSE SERPL-MCNC: 361 MG/DL (ref 65–140)
HCT VFR BLD AUTO: 36.6 % (ref 34.8–46.1)
HGB BLD-MCNC: 11.7 G/DL (ref 11.5–15.4)
MCH RBC QN AUTO: 28.8 PG (ref 26.8–34.3)
MCHC RBC AUTO-ENTMCNC: 32 G/DL (ref 31.4–37.4)
MCV RBC AUTO: 90 FL (ref 82–98)
PLATELET # BLD AUTO: 302 THOUSANDS/UL (ref 149–390)
PMV BLD AUTO: 8.7 FL (ref 8.9–12.7)
POTASSIUM SERPL-SCNC: 5.2 MMOL/L (ref 3.5–5.3)
RBC # BLD AUTO: 4.06 MILLION/UL (ref 3.81–5.12)
SODIUM SERPL-SCNC: 132 MMOL/L (ref 135–147)
WBC # BLD AUTO: 10.15 THOUSAND/UL (ref 4.31–10.16)

## 2024-01-10 PROCEDURE — 82948 REAGENT STRIP/BLOOD GLUCOSE: CPT

## 2024-01-10 PROCEDURE — 85027 COMPLETE CBC AUTOMATED: CPT | Performed by: STUDENT IN AN ORGANIZED HEALTH CARE EDUCATION/TRAINING PROGRAM

## 2024-01-10 PROCEDURE — 80048 BASIC METABOLIC PNL TOTAL CA: CPT | Performed by: STUDENT IN AN ORGANIZED HEALTH CARE EDUCATION/TRAINING PROGRAM

## 2024-01-10 PROCEDURE — 99232 SBSQ HOSP IP/OBS MODERATE 35: CPT | Performed by: STUDENT IN AN ORGANIZED HEALTH CARE EDUCATION/TRAINING PROGRAM

## 2024-01-10 RX ORDER — FLUTICASONE PROPIONATE 50 MCG
1 SPRAY, SUSPENSION (ML) NASAL DAILY
Status: DISCONTINUED | OUTPATIENT
Start: 2024-01-10 | End: 2024-01-12 | Stop reason: HOSPADM

## 2024-01-10 RX ORDER — INSULIN LISPRO 100 [IU]/ML
1-5 INJECTION, SOLUTION INTRAVENOUS; SUBCUTANEOUS
Status: DISCONTINUED | OUTPATIENT
Start: 2024-01-10 | End: 2024-01-12 | Stop reason: HOSPADM

## 2024-01-10 RX ADMIN — Medication 5 ML: at 17:35

## 2024-01-10 RX ADMIN — INSULIN LISPRO 3 UNITS: 100 INJECTION, SOLUTION INTRAVENOUS; SUBCUTANEOUS at 17:22

## 2024-01-10 RX ADMIN — LATANOPROST 1 DROP: 50 SOLUTION OPHTHALMIC at 22:03

## 2024-01-10 RX ADMIN — CEFPODOXIME PROXETIL 200 MG: 200 TABLET, FILM COATED ORAL at 07:57

## 2024-01-10 RX ADMIN — ASPIRIN 81 MG: 81 TABLET, CHEWABLE ORAL at 07:52

## 2024-01-10 RX ADMIN — AMLODIPINE BESYLATE 10 MG: 10 TABLET ORAL at 21:09

## 2024-01-10 RX ADMIN — GUAIFENESIN 600 MG: 600 TABLET, EXTENDED RELEASE ORAL at 07:52

## 2024-01-10 RX ADMIN — Medication 5 ML: at 13:24

## 2024-01-10 RX ADMIN — FLUTICASONE FUROATE AND VILANTEROL TRIFENATATE 1 PUFF: 200; 25 POWDER RESPIRATORY (INHALATION) at 07:57

## 2024-01-10 RX ADMIN — FLUTICASONE PROPIONATE 1 SPRAY: 50 SPRAY, METERED NASAL at 17:23

## 2024-01-10 RX ADMIN — ACETAMINOPHEN 650 MG: 325 TABLET, FILM COATED ORAL at 07:52

## 2024-01-10 RX ADMIN — SITAGLIPTIN 100 MG: 50 TABLET, FILM COATED ORAL at 21:09

## 2024-01-10 RX ADMIN — PREGABALIN 50 MG: 50 CAPSULE ORAL at 17:25

## 2024-01-10 RX ADMIN — CARVEDILOL 6.25 MG: 6.25 TABLET, FILM COATED ORAL at 07:52

## 2024-01-10 RX ADMIN — PHENOL 1 SPRAY: 1.5 LIQUID ORAL at 17:22

## 2024-01-10 RX ADMIN — Medication 400 MG: at 07:52

## 2024-01-10 RX ADMIN — PREDNISONE 40 MG: 20 TABLET ORAL at 07:52

## 2024-01-10 RX ADMIN — SERTRALINE HYDROCHLORIDE 50 MG: 50 TABLET ORAL at 07:52

## 2024-01-10 RX ADMIN — GUAIFENESIN 600 MG: 600 TABLET, EXTENDED RELEASE ORAL at 17:25

## 2024-01-10 RX ADMIN — PREGABALIN 50 MG: 50 CAPSULE ORAL at 07:52

## 2024-01-10 RX ADMIN — QUETIAPINE FUMARATE 50 MG: 25 TABLET ORAL at 21:13

## 2024-01-10 RX ADMIN — MONTELUKAST 10 MG: 10 TABLET, FILM COATED ORAL at 21:09

## 2024-01-10 RX ADMIN — Medication 5 ML: at 07:52

## 2024-01-10 RX ADMIN — CARVEDILOL 6.25 MG: 6.25 TABLET, FILM COATED ORAL at 17:29

## 2024-01-10 RX ADMIN — PREGABALIN 50 MG: 50 CAPSULE ORAL at 21:09

## 2024-01-10 NOTE — ASSESSMENT & PLAN NOTE
Lab Results   Component Value Date    HGBA1C 7.0 (H) 01/05/2024       Recent Labs     01/09/24  0440 01/09/24 2048 01/10/24  0527 01/10/24  1119   POCGLU 191* 277* 317* 294*         Blood Sugar Average: Last 72 hrs:  (P) 232    Patient wants to minimize glucose checks she does not like needles.  We agreed to check glucose only periodically if needed OR symptomatic that would be suggestive of hyper or  hypoglycemia  Continue sitagliptin for now (at home she was on sitagliptin-metformin).  Patient seems to be doing okay just on the Sitagliptin -Target blood glucose while in the inpatient setting 140-180 -most recent around 183

## 2024-01-10 NOTE — PLAN OF CARE
Problem: PAIN - ADULT  Goal: Verbalizes/displays adequate comfort level or baseline comfort level  Description: Interventions:  - Encourage patient to monitor pain and request assistance  - Assess pain using appropriate pain scale  - Administer analgesics based on type and severity of pain and evaluate response  - Implement non-pharmacological measures as appropriate and evaluate response  - Consider cultural and social influences on pain and pain management  - Notify physician/advanced practitioner if interventions unsuccessful or patient reports new pain  Outcome: Progressing     Problem: INFECTION - ADULT  Goal: Absence or prevention of progression during hospitalization  Description: INTERVENTIONS:  - Assess and monitor for signs and symptoms of infection  - Monitor lab/diagnostic results  - Monitor all insertion sites, i.e. indwelling lines, tubes, and drains  - Monitor endotracheal if appropriate and nasal secretions for changes in amount and color  - [place] appropriate cooling/warming therapies per order  - Administer medications as ordered  - Instruct and encourage patient and family to use good hand hygiene technique  - Identify and instruct in appropriate isolation precautions for identified infection/condition  Outcome: Progressing  Goal: Absence of fever/infection during neutropenic period  Description: INTERVENTIONS:  - Monitor WBC    Outcome: Progressing     Problem: SAFETY ADULT  Goal: Maintain or return to baseline ADL function  Description: INTERVENTIONS:  -  Assess patient's ability to carry out ADLs; assess patient's baseline for ADL function and identify physical deficits which impact ability to perform ADLs (bathing, care of mouth/teeth, toileting, grooming, dressing, etc.)  - Assess/evaluate cause of self-care deficits   - Assess range of motion  - Assess patient's mobility; develop plan if impaired  - Assess patient's need for assistive devices and provide as appropriate  - Encourage  maximum independence but intervene and supervise when necessary  - Involve family in performance of ADLs  - Assess for home care needs following discharge   - Consider OT consult to assist with ADL evaluation and planning for discharge  - Provide patient education as appropriate  Outcome: Progressing     Problem: DISCHARGE PLANNING  Goal: Discharge to home or other facility with appropriate resources  Description: INTERVENTIONS:  - Identify barriers to discharge w/patient and caregiver  - Arrange for needed discharge resources and transportation as appropriate  - Identify discharge learning needs (meds, wound care, etc.)  - Arrange for interpretive services to assist at discharge as needed  - Refer to Case Management Department for coordinating discharge planning if the patient needs post-hospital services based on physician/advanced practitioner order or complex needs related to functional status, cognitive ability, or social support system  Outcome: Progressing     Problem: Knowledge Deficit  Goal: Patient/family/caregiver demonstrates understanding of disease process, treatment plan, medications, and discharge instructions  Description: Complete learning assessment and assess knowledge base.  Interventions:  - Provide teaching at level of understanding  - Provide teaching via preferred learning methods  Outcome: Progressing

## 2024-01-10 NOTE — PROGRESS NOTES
[facility]  Progress Note  Name: Pebbles Landon I  MRN: [id number]  Unit/Bed#: -02 I Date of Admission: 1/4/2024   Date of Service: 1/10/2024 I Hospital Day: 5    Assessment/Plan   * Pneumonia  Assessment & Plan  CT scan with Left upper lobe consolidation, likely pneumonia given fever and cough  Also hx of airway hyperreactivity with potential asthma/COPD overlap which may be contributing to her symptoms and cough  Head CT also demonstrates chronic sinusitis which may contribute to postnasal dripping and cough      Completed 5 days of cefpodoxime and azithromycin   Continue to monitor clinically, temperature curve, white count   Continue Breo  Continue Flonase for sinusitis  Continue antitussive therapy    Positive urine culture  Assessment & Plan  The patient is noted to have a positive urine culture with gram-negative rods enteric like -final E. coli  Completed 5 days of antibiotics    Type 2 diabetes mellitus with diabetic neuropathy, unspecified ([facility])  Assessment & Plan  Lab Results   Component Value Date    HGBA1C 7.0 (H) 01/05/2024       Recent Labs     01/09/24  0440 01/09/24  2048 01/10/24  0527 01/10/24  1119   POCGLU 191* 277* 317* 294*         Blood Sugar Average: Last 72 hrs:  (P) 232    Patient wants to minimize glucose checks she does not like needles.  We agreed to check glucose only periodically if needed OR symptomatic that would be suggestive of hyper or  hypoglycemia  Continue sitagliptin for now (at home she was on sitagliptin-metformin).  Patient seems to be doing okay just on the Sitagliptin -Target blood glucose while in the inpatient setting 140-180 -most recent around 183    COPD (chronic obstructive pulmonary disease) ([facility])  Assessment & Plan  Reported history of asthma/COPD.  Wheezing improving  O2 saturation 93-95 percent with deep inspiration.  Started on p.o. prednisone 40 mg daily, DuoNebs.  Added Flonase for nasal congestion and sinusitis.  Monitor  ventilatory status    BMI 37.0-37.9, adult  Assessment & Plan  Encourage weight loss               VTE Pharmacologic Prophylaxis: VTE Score: 5  pt declines DVT ppx    Mobility:   Basic Mobility Inpatient Raw Score: 23  JH-HLM Goal: 7: Walk 25 feet or more  JH-HLM Achieved: 7: Walk 25 feet or more      Patient Centered Rounds: I performed bedside rounds with nursing staff today.     Education and Discussions with Family / Patient: Updated  (daughter) via phone.    Total Time Spent on Date of Encounter in care of patient: 25 mins. This time was spent on one or more of the following: performing physical exam; counseling and coordination of care; obtaining or reviewing history; documenting in the medical record; reviewing/ordering tests, medications or procedures; communicating with other healthcare professionals and discussing with patient's family/caregivers.    Current Length of Stay: 5 day(s)  Current Patient Status: Inpatient   Certification Statement: The patient will continue to require additional inpatient hospital stay due to dyspnea, wheezing  Discharge Plan: Anticipate discharge tomorrow to home.    Code Status: Prior    Subjective:   Seen during a.m. rounds.  Patient appears comfortable nondistressed however complaining of cough, wheezing, shortness of breath and does not feel comfortable going home today.  Reports feeling better compared to yesterday.  Denies chest pain, nausea, vomiting, abdominal pain, diarrhea, any other complaints.  No other events reported.     Objective:     Vitals:   Temp (24hrs), Av °F (36.7 °C), Min:98 °F (36.7 °C), Max:98 °F (36.7 °C)    Temp:  [98 °F (36.7 °C)] 98 °F (36.7 °C)  HR:  [69-92] 69  Resp:  [18] 18  BP: (132-166)/() 144/70  SpO2:  [88 %-93 %] 88 %  Body mass index is 39.14 kg/m².     Input and Output Summary (last 24 hours):     Intake/Output Summary (Last 24 hours) at 1/10/2024 1552  Last data filed at 1/10/2024 1300  Gross per 24 hour   Intake  900 ml   Output --   Net 900 ml       Physical Exam:   Physical Exam  Constitutional:       Appearance: Normal appearance. She is obese.      Comments: Morbidly obese with patient, acutely nontoxic appearing.   HENT:      Head: Normocephalic and atraumatic.   Cardiovascular:      Rate and Rhythm: Normal rate.      Pulses: Normal pulses.   Pulmonary:      Effort: Pulmonary effort is normal.      Breath sounds: Wheezing present.      Comments: Patient was noted with over saturation 88-89% on room air which quickly improved to 93 to 95% on room air with deep inspiration.  Currently maintaining 1-2L nasal cannula for comfort.  Abdominal:      General: Bowel sounds are normal. There is no distension.      Palpations: Abdomen is soft.      Tenderness: There is no abdominal tenderness.   Neurological:      Mental Status: She is alert and oriented to person, place, and time.   Psychiatric:         Mood and Affect: Mood normal.         Behavior: Behavior normal.          Additional Data:     Labs:  Results from last 7 days   Lab Units 01/10/24  0522 01/09/24  0429   WBC Thousand/uL 10.15 11.26*   HEMOGLOBIN g/dL 11.7 11.0*   HEMATOCRIT % 36.6 34.5*   PLATELETS Thousands/uL 302 165   NEUTROS PCT %  --  62   LYMPHS PCT %  --  26   MONOS PCT %  --  6   EOS PCT %  --  4     Results from last 7 days   Lab Units 01/10/24  0522 01/09/24  0429 01/08/24  0529   SODIUM mmol/L 132*   < > 136   POTASSIUM mmol/L 5.2   < > 4.4   CHLORIDE mmol/L 96   < > 100   CO2 mmol/L 30   < > 30   BUN mg/dL 16   < > 19   CREATININE mg/dL 0.96   < > 1.02   ANION GAP mmol/L 6   < > 6   CALCIUM mg/dL 9.6   < > 9.1   ALBUMIN g/dL  --   --  3.6   TOTAL BILIRUBIN mg/dL  --   --  0.23   ALK PHOS U/L  --   --  56   ALT U/L  --   --  13   AST U/L  --   --  9*   GLUCOSE RANDOM mg/dL 320*   < > 183*    < > = values in this interval not displayed.     Results from last 7 days   Lab Units 01/04/24  1330   INR  0.95     Results from last 7 days   Lab Units  01/10/24  1119 01/10/24  0527 01/09/24  2048 01/09/24  0440 01/08/24  2046 01/08/24  0526 01/07/24  2105 01/07/24  1050 01/07/24  0556 01/06/24  2216 01/05/24  2041 01/05/24  1556   POC GLUCOSE mg/dl 294* 317* 277* 191* 246* 185* 180* 240* 158* 189* 224* 236*     Results from last 7 days   Lab Units 01/05/24  0515   HEMOGLOBIN A1C % 7.0*     Results from last 7 days   Lab Units 01/05/24  0515 01/04/24  1330   LACTIC ACID mmol/L  --  1.3   PROCALCITONIN ng/ml 0.16 <0.05       Lines/Drains:  Invasive Devices       None                     Recent Cultures (last 7 days):   Results from last 7 days   Lab Units 01/04/24  1607 01/04/24  1332 01/04/24  1330   BLOOD CULTURE   --  No Growth After 5 Days. No Growth After 5 Days.   URINE CULTURE  >100,000 cfu/ml Escherichia coli*  --   --        Last 24 Hours Medication List:   Current Facility-Administered Medications   Medication Dose Route Frequency Provider Last Rate    acetaminophen  650 mg Oral Q6H PRN Dominik Cristina MD      albuterol  2 puff Inhalation Q4H PRN Van ARCEO MD      amLODIPine  10 mg Oral HS Dominik Cristina MD      aspirin  81 mg Oral Daily Dominik Cristina MD      carvedilol  6.25 mg Oral BID With Meals Dominik Cristina MD      fluticasone  1 spray Each Nare Daily Van ARCEO MD      fluticasone-vilanterol  1 puff Inhalation Daily Dominik Cristina MD      guaiFENesin  600 mg Oral BID Dominik Cristina MD      HYDROcodone Bit-Homatrop MBr  5 mL Oral Q4H PRN Adi Gupta MD      latanoprost  1 drop Both Eyes HS Dominik Cristina MD      magnesium Oxide  400 mg Oral Daily Dominik Cristina MD      methocarbamol  500 mg Oral TID PRN Dominik Cristina MD      montelukast  10 mg Oral HS Dominik Cristina MD      phenol  1 spray Mouth/Throat Q2H PRN Van ARCEO MD      predniSONE  40 mg Oral Daily Van ARCEO MD      pregabalin  50 mg Oral TID Dominik Cristina MD      QUEtiapine  50 mg Oral HS PRN Dominik Coreas  MD Jonnie      sertraline  50 mg Oral Daily Dominik Cristina MD      sitaGLIPtin  100 mg Oral HS Adi Gupta MD          Today, Patient Was Seen By: Van [place] MD    **Please Note: This note may have been constructed using a voice recognition system.**

## 2024-01-10 NOTE — CASE MANAGEMENT
Case Management Discharge Planning Note    Patient name Pebbles English  Location /-02 MRN [id number]  : 1945 Date 1/10/2024       Current Admission Date: 2024  Current Admission Diagnosis:Pneumonia   Patient Active Problem List    Diagnosis Date Noted    Positive urine culture 2024    Pneumonia 2024    Conjunctival hemorrhage of right eye 2023    Anginal equivalent 2023    Flu-like symptoms 2023    Primary insomnia 2022    Diverticulitis 2022    Dysuria 2022    Type 2 diabetes mellitus with diabetic neuropathy, unspecified ([facility]) 2022    COPD (chronic obstructive pulmonary disease) ([facility]) 2022    Eosinophilia 2022    Transaminitis 2022    Anxiety 2022    Bradycardia 2022    Abnormal EKG 2022    Primary hypertension 2022    Stage 3a chronic kidney disease ([facility]) 2022    Psychophysiological insomnia 2021    Exertional dyspnea     Obesity, morbid ([facility]) 04/15/2021    BMI 37.0-37.9, adult 2020    Atypical chest pain 2020    Dermatitis 2020    Encounter for gynecological examination without abnormal finding 2019    Type 2 diabetes mellitus without complication, without long-term current use of insulin ([facility]) 2019    Depression with anxiety 2018    Mild intermittent asthma 2018    BOLA (obstructive sleep apnea) 10/17/2016      LOS (days): 5  Geometric Mean LOS (GMLOS) (days): 2.4  Days to GMLOS:-2.8     OBJECTIVE:  Risk of Unplanned Readmission Score: 9.45         Current admission status: Inpatient   Preferred Pharmacy:   [id number] [place] [facility] [address]  [address]  [place] [address]  Phone: [phone] Fax: [phone]    [id number] [place], [place] - [address]  [address] [address]  Phone: [phone] Fax: [phone]    Primary Care Provider: Jovana Bauman,  ALEKSANDER    Primary Insurance: MEDICARE  Secondary Insurance: [employer]    DISCHARGE DETAILS:      DME Referral Provided  Referral made for DME?: Yes  DME referral completed for the following items:: Nebulizer  DME Supplier Name:: [facility]

## 2024-01-10 NOTE — ASSESSMENT & PLAN NOTE
Reported history of asthma/COPD.  Wheezing improving  O2 saturation 93-95 percent with deep inspiration.  Started on p.o. prednisone 40 mg daily, DuoNebs.  Added Flonase for nasal congestion and sinusitis.  Monitor ventilatory status

## 2024-01-10 NOTE — ASSESSMENT & PLAN NOTE
The patient is noted to have a positive urine culture with gram-negative rods enteric like -final E. coli  Completed 5 days of antibiotics

## 2024-01-10 NOTE — ASSESSMENT & PLAN NOTE
CT scan with Left upper lobe consolidation, likely pneumonia given fever and cough  Also hx of airway hyperreactivity with potential asthma/COPD overlap which may be contributing to her symptoms and cough  Head CT also demonstrates chronic sinusitis which may contribute to postnasal dripping and cough      Completed 5 days of cefpodoxime and azithromycin   Continue to monitor clinically, temperature curve, white count   Continue Breo  Continue Flonase for sinusitis  Continue antitussive therapy

## 2024-01-10 NOTE — PLAN OF CARE
Problem: PAIN - ADULT  Goal: Verbalizes/displays adequate comfort level or baseline comfort level  Description: Interventions:  - Encourage patient to monitor pain and request assistance  - Assess pain using appropriate pain scale  - Administer analgesics based on type and severity of pain and evaluate response  - Implement non-pharmacological measures as appropriate and evaluate response  - Consider cultural and social influences on pain and pain management  - Notify physician/advanced practitioner if interventions unsuccessful or patient reports new pain  Outcome: Progressing     Problem: INFECTION - ADULT  Goal: Absence or prevention of progression during hospitalization  Description: INTERVENTIONS:  - Assess and monitor for signs and symptoms of infection  - Monitor lab/diagnostic results  - Monitor all insertion sites, i.e. indwelling lines, tubes, and drains  - Monitor endotracheal if appropriate and nasal secretions for changes in amount and color  - [place] appropriate cooling/warming therapies per order  - Administer medications as ordered  - Instruct and encourage patient and family to use good hand hygiene technique  - Identify and instruct in appropriate isolation precautions for identified infection/condition  Outcome: Progressing  Goal: Absence of fever/infection during neutropenic period  Description: INTERVENTIONS:  - Monitor WBC    Outcome: Progressing     Problem: SAFETY ADULT  Goal: Patient will remain free of falls  Description: INTERVENTIONS:  - Educate patient/family on patient safety including physical limitations  - Instruct patient to call for assistance with activity   - Consult OT/PT to assist with strengthening/mobility   - Keep Call bell within reach  - Keep bed low and locked with side rails adjusted as appropriate  - Keep care items and personal belongings within reach  - Initiate and maintain comfort rounds  - Make Fall Risk Sign visible to staff  - Apply yellow socks and bracelet  for high fall risk patients  - Consider moving patient to room near nurses station  Outcome: Progressing  Goal: Maintain or return to baseline ADL function  Description: INTERVENTIONS:  -  Assess patient's ability to carry out ADLs; assess patient's baseline for ADL function and identify physical deficits which impact ability to perform ADLs (bathing, care of mouth/teeth, toileting, grooming, dressing, etc.)  - Assess/evaluate cause of self-care deficits   - Assess range of motion  - Assess patient's mobility; develop plan if impaired  - Assess patient's need for assistive devices and provide as appropriate  - Encourage maximum independence but intervene and supervise when necessary  - Involve family in performance of ADLs  - Assess for home care needs following discharge   - Consider OT consult to assist with ADL evaluation and planning for discharge  - Provide patient education as appropriate  Outcome: Progressing  Goal: Maintains/Returns to pre admission functional level  Description: INTERVENTIONS:  - Perform AM-PAC 6 Click Basic Mobility/ Daily Activity assessment daily.  - Set and communicate daily mobility goal to care team and patient/family/caregiver.   - Collaborate with rehabilitation services on mobility goals if consulted  - Perform Range of Motion 4 times a day.  - Reposition patient every 2 hours.  - Dangle patient 3 times a day  - Stand patient 3 times a day  - Ambulate patient 3 times a day  - Out of bed to chair 3 times a day   - Out of bed for meals 3 times a day  - Out of bed for toileting  - Record patient progress and toleration of activity level   Outcome: Progressing     Problem: DISCHARGE PLANNING  Goal: Discharge to home or other facility with appropriate resources  Description: INTERVENTIONS:  - Identify barriers to discharge w/patient and caregiver  - Arrange for needed discharge resources and transportation as appropriate  - Identify discharge learning needs (meds, wound care, etc.)  -  Arrange for interpretive services to assist at discharge as needed  - Refer to Case Management Department for coordinating discharge planning if the patient needs post-hospital services based on physician/advanced practitioner order or complex needs related to functional status, cognitive ability, or social support system  Outcome: Progressing     Problem: Knowledge Deficit  Goal: Patient/family/caregiver demonstrates understanding of disease process, treatment plan, medications, and discharge instructions  Description: Complete learning assessment and assess knowledge base.  Interventions:  - Provide teaching at level of understanding  - Provide teaching via preferred learning methods  Outcome: Progressing

## 2024-01-11 ENCOUNTER — APPOINTMENT (INPATIENT)
Dept: RADIOLOGY | Facility: HOSPITAL | Age: 79
DRG: 194 | End: 2024-01-11
Payer: MEDICARE

## 2024-01-11 LAB
ANION GAP SERPL CALCULATED.3IONS-SCNC: 6 MMOL/L
BUN SERPL-MCNC: 23 MG/DL (ref 5–25)
CALCIUM SERPL-MCNC: 9.4 MG/DL (ref 8.4–10.2)
CHLORIDE SERPL-SCNC: 100 MMOL/L (ref 96–108)
CO2 SERPL-SCNC: 29 MMOL/L (ref 21–32)
CREAT SERPL-MCNC: 0.95 MG/DL (ref 0.6–1.3)
GFR SERPL CREATININE-BSD FRML MDRD: 58 ML/MIN/1.73SQ M
GLUCOSE SERPL-MCNC: 194 MG/DL (ref 65–140)
GLUCOSE SERPL-MCNC: 200 MG/DL (ref 65–140)
GLUCOSE SERPL-MCNC: 212 MG/DL (ref 65–140)
GLUCOSE SERPL-MCNC: 278 MG/DL (ref 65–140)
GLUCOSE SERPL-MCNC: 390 MG/DL (ref 65–140)
POTASSIUM SERPL-SCNC: 4.9 MMOL/L (ref 3.5–5.3)
SODIUM SERPL-SCNC: 135 MMOL/L (ref 135–147)

## 2024-01-11 PROCEDURE — 94761 N-INVAS EAR/PLS OXIMETRY MLT: CPT

## 2024-01-11 PROCEDURE — 71046 X-RAY EXAM CHEST 2 VIEWS: CPT

## 2024-01-11 PROCEDURE — 99232 SBSQ HOSP IP/OBS MODERATE 35: CPT | Performed by: STUDENT IN AN ORGANIZED HEALTH CARE EDUCATION/TRAINING PROGRAM

## 2024-01-11 PROCEDURE — 82948 REAGENT STRIP/BLOOD GLUCOSE: CPT

## 2024-01-11 PROCEDURE — 80048 BASIC METABOLIC PNL TOTAL CA: CPT | Performed by: STUDENT IN AN ORGANIZED HEALTH CARE EDUCATION/TRAINING PROGRAM

## 2024-01-11 RX ADMIN — INSULIN LISPRO 1 UNITS: 100 INJECTION, SOLUTION INTRAVENOUS; SUBCUTANEOUS at 08:00

## 2024-01-11 RX ADMIN — PREGABALIN 50 MG: 50 CAPSULE ORAL at 21:49

## 2024-01-11 RX ADMIN — Medication 5 ML: at 08:36

## 2024-01-11 RX ADMIN — PREDNISONE 30 MG: 20 TABLET ORAL at 08:36

## 2024-01-11 RX ADMIN — AMLODIPINE BESYLATE 10 MG: 10 TABLET ORAL at 21:49

## 2024-01-11 RX ADMIN — SITAGLIPTIN 100 MG: 50 TABLET, FILM COATED ORAL at 21:49

## 2024-01-11 RX ADMIN — ALBUTEROL SULFATE 2 PUFF: 90 AEROSOL, METERED RESPIRATORY (INHALATION) at 21:49

## 2024-01-11 RX ADMIN — CARVEDILOL 6.25 MG: 6.25 TABLET, FILM COATED ORAL at 18:35

## 2024-01-11 RX ADMIN — PREGABALIN 50 MG: 50 CAPSULE ORAL at 18:35

## 2024-01-11 RX ADMIN — FLUTICASONE FUROATE AND VILANTEROL TRIFENATATE 1 PUFF: 200; 25 POWDER RESPIRATORY (INHALATION) at 08:51

## 2024-01-11 RX ADMIN — SERTRALINE HYDROCHLORIDE 50 MG: 50 TABLET ORAL at 08:36

## 2024-01-11 RX ADMIN — GUAIFENESIN 600 MG: 600 TABLET, EXTENDED RELEASE ORAL at 08:36

## 2024-01-11 RX ADMIN — Medication 5 ML: at 03:09

## 2024-01-11 RX ADMIN — INSULIN LISPRO 4 UNITS: 100 INJECTION, SOLUTION INTRAVENOUS; SUBCUTANEOUS at 18:36

## 2024-01-11 RX ADMIN — LATANOPROST 1 DROP: 50 SOLUTION OPHTHALMIC at 21:50

## 2024-01-11 RX ADMIN — GUAIFENESIN 600 MG: 600 TABLET, EXTENDED RELEASE ORAL at 18:34

## 2024-01-11 RX ADMIN — INSULIN LISPRO 1 UNITS: 100 INJECTION, SOLUTION INTRAVENOUS; SUBCUTANEOUS at 12:30

## 2024-01-11 RX ADMIN — CARVEDILOL 6.25 MG: 6.25 TABLET, FILM COATED ORAL at 08:35

## 2024-01-11 RX ADMIN — PREGABALIN 50 MG: 50 CAPSULE ORAL at 08:36

## 2024-01-11 RX ADMIN — QUETIAPINE FUMARATE 50 MG: 25 TABLET ORAL at 21:48

## 2024-01-11 RX ADMIN — ALBUTEROL SULFATE 2 PUFF: 90 AEROSOL, METERED RESPIRATORY (INHALATION) at 08:39

## 2024-01-11 RX ADMIN — FLUTICASONE PROPIONATE 1 SPRAY: 50 SPRAY, METERED NASAL at 08:38

## 2024-01-11 RX ADMIN — MONTELUKAST 10 MG: 10 TABLET, FILM COATED ORAL at 21:49

## 2024-01-11 RX ADMIN — ASPIRIN 81 MG: 81 TABLET, CHEWABLE ORAL at 08:36

## 2024-01-11 RX ADMIN — METHOCARBAMOL TABLETS 500 MG: 500 TABLET, COATED ORAL at 18:35

## 2024-01-11 RX ADMIN — Medication 400 MG: at 08:36

## 2024-01-11 RX ADMIN — Medication 5 ML: at 20:19

## 2024-01-11 NOTE — PLAN OF CARE
Problem: INFECTION - ADULT  Goal: Absence or prevention of progression during hospitalization  Description: INTERVENTIONS:  - Assess and monitor for signs and symptoms of infection  - Monitor lab/diagnostic results  - Monitor all insertion sites, i.e. indwelling lines, tubes, and drains  - [place] appropriate cooling/warming therapies per order  - Administer medications as ordered  - Instruct and encourage patient and family to use good hand hygiene technique  - Identify and instruct in appropriate isolation precautions for identified infection/condition  Outcome: Progressing     Problem: INFECTION - ADULT  Goal: Absence of fever/infection during neutropenic period  Description: INTERVENTIONS:  - Monitor WBC    Outcome: Progressing     Problem: PAIN - ADULT  Goal: Verbalizes/displays adequate comfort level or baseline comfort level  Description: Interventions:  - Encourage patient to monitor pain and request assistance  - Assess pain using appropriate pain scale  - Administer analgesics based on type and severity of pain and evaluate response  - Implement non-pharmacological measures as appropriate and evaluate response  - Consider cultural and social influences on pain and pain management  - Notify physician/advanced practitioner if interventions unsuccessful or patient reports new pain  Outcome: Progressing     Problem: Knowledge Deficit  Goal: Patient/family/caregiver demonstrates understanding of disease process, treatment plan, medications, and discharge instructions  Description: Complete learning assessment and assess knowledge base.  Interventions:  - Provide teaching at level of understanding  - Provide teaching via preferred learning methods  Outcome: Progressing

## 2024-01-11 NOTE — ASSESSMENT & PLAN NOTE
CT scan with Left upper lobe consolidation, likely pneumonia given fever and cough  Also hx of airway hyperreactivity with potential asthma/COPD overlap which may be contributing to her symptoms and cough  Head CT also demonstrates chronic sinusitis which may contribute to postnasal dripping and cough      Completed 5 days of cefpodoxime and azithromycin   Complaining of excessive cough, noted with hypoxia with minimal exertion.  Continue to monitor clinically, temperature curve, white count   Continue Breo  Continue Flonase for sinusitis  Continue antitussive therapy  Follow-up with two-view chest x-ray.

## 2024-01-11 NOTE — RESPIRATORY THERAPY NOTE
Home Oxygen Qualifying Test     Patient name: Pebbles Landon        : 1945   Date of Test:  2024  Diagnosis:    Home Oxygen Test:    **Medicare Guidelines require item(s) 1-5 on all ambulatory patients or 1 and 2 on non-ambulatory patients.    1. Baseline SPO2 on Room Air at rest 98 %     SPO2 during exertion on Room Air 86 %  During exertion monitor SPO2. If SPO2 increases >=89%, do not add supplemental oxygen    SPO2 on Oxygen at Rest n/a % at n/a LPM    SPO2 during exertion on Oxygen 92 % at 2 LPM    Test performed during exertion activity.      [x]  Supplemental Home Oxygen is indicated.    []  Client does not qualify for home oxygen.    Respiratory Additional Notes- pt will req 2L during exertion.    Jada Cuevas, RT

## 2024-01-11 NOTE — CASE MANAGEMENT
Case Management Discharge Planning Note    Patient name Pebbles Hungarian  Location /-02 MRN [id number]  : 1945 Date 2024       Current Admission Date: 2024  Current Admission Diagnosis:Pneumonia   Patient Active Problem List    Diagnosis Date Noted    Positive urine culture 2024    Pneumonia 2024    Conjunctival hemorrhage of right eye 2023    Anginal equivalent 2023    Flu-like symptoms 2023    Primary insomnia 2022    Diverticulitis 2022    Dysuria 2022    Type 2 diabetes mellitus with diabetic neuropathy, unspecified ([facility]) 2022    COPD (chronic obstructive pulmonary disease) ([facility]) 2022    Eosinophilia 2022    Transaminitis 2022    Anxiety 2022    Bradycardia 2022    Abnormal EKG 2022    Primary hypertension 2022    Stage 3a chronic kidney disease ([facility]) 2022    Psychophysiological insomnia 2021    Exertional dyspnea     Obesity, morbid ([facility]) 04/15/2021    BMI 37.0-37.9, adult 2020    Atypical chest pain 2020    Dermatitis 2020    Encounter for gynecological examination without abnormal finding 2019    Type 2 diabetes mellitus without complication, without long-term current use of insulin ([facility]) 2019    Depression with anxiety 2018    Mild intermittent asthma 2018    BOLA (obstructive sleep apnea) 10/17/2016      LOS (days): 6  Geometric Mean LOS (GMLOS) (days): 2.4  Days to GMLOS:-3.9     OBJECTIVE:  Risk of Unplanned Readmission Score: 9.96         Current admission status: Inpatient   Preferred Pharmacy:   [id number] [place] - [address]  [address]  [place] [address]  Phone: [phone] Fax: [phone]    [id number] [place], [place] - [address]  [address] [address] [facility] [address]  Phone: [phone] Fax: [phone]    Primary Care Provider: Jovana Bauman,  ALEKSANDER    Primary Insurance: MEDICARE  Secondary Insurance: [employer]    DISCHARGE DETAILS:     CM attempted to find out with patient how much payment plan will be and cm was informed that patient will be able to set up a payment plan once invoice is received.

## 2024-01-11 NOTE — ASSESSMENT & PLAN NOTE
Lab Results   Component Value Date    HGBA1C 7.0 (H) 01/05/2024       Recent Labs     01/10/24  1649 01/10/24  2059 01/11/24  0544 01/11/24  1140   POCGLU 361* 235* 200* 194*         Blood Sugar Average: Last 72 hrs:  (P) 250    Patient wants to minimize glucose checks she does not like needles.  We agreed to check glucose only periodically if needed OR symptomatic that would be suggestive of hyper or  hypoglycemia  Continue sitagliptin for now (at home she was on sitagliptin-metformin).  Patient seems to be doing okay just on the Sitagliptin -Target blood glucose while in the inpatient setting 140-180 -most recent around 183

## 2024-01-11 NOTE — NURSING NOTE
Patient's SPO2 on RA and at rest fluctuated between % prior to amb with patient. Patient's SPO2 dropped down to 87% at the end of a 20ft walk in the hallway. Supplemental o2 provided at 2L via nc. Patient's oxygen saturations quickly recovered and increased to 92% at rest.

## 2024-01-11 NOTE — PROGRESS NOTES
[facility]  Progress Note  Name: Pebbles Landon I  MRN: [id number]  Unit/Bed#: -02 I Date of Admission: 1/4/2024   Date of Service: 1/11/2024 I Hospital Day: 6    Assessment/Plan   * Pneumonia  Assessment & Plan  CT scan with Left upper lobe consolidation, likely pneumonia given fever and cough  Also hx of airway hyperreactivity with potential asthma/COPD overlap which may be contributing to her symptoms and cough  Head CT also demonstrates chronic sinusitis which may contribute to postnasal dripping and cough      Completed 5 days of cefpodoxime and azithromycin   Continue to monitor clinically, temperature curve, white count   Continue Breo  Continue Flonase for sinusitis  Continue antitussive therapy    Positive urine culture  Assessment & Plan  The patient is noted to have a positive urine culture with gram-negative rods enteric like -final E. coli  Completed 5 days of antibiotics    Type 2 diabetes mellitus with diabetic neuropathy, unspecified ([facility])  Assessment & Plan  Lab Results   Component Value Date    HGBA1C 7.0 (H) 01/05/2024       Recent Labs     01/10/24  1649 01/10/24  2059 01/11/24  0544 01/11/24  1140   POCGLU 361* 235* 200* 194*         Blood Sugar Average: Last 72 hrs:  (P) 250    Patient wants to minimize glucose checks she does not like needles.  We agreed to check glucose only periodically if needed OR symptomatic that would be suggestive of hyper or  hypoglycemia  Continue sitagliptin for now (at home she was on sitagliptin-metformin).  Patient seems to be doing okay just on the Sitagliptin -Target blood glucose while in the inpatient setting 140-180 -most recent around 183    COPD (chronic obstructive pulmonary disease) ([facility]CC)  Assessment & Plan  Reported history of asthma/COPD.  Wheezing improving  O2 saturation 93-95 percent with deep inspiration.  Started on p.o. prednisone 40 mg daily, DuoNebs.  Added Flonase for nasal congestion and sinusitis.  Monitor  "ventilatory status    BMI 37.0-37.9, adult  Assessment & Plan  Encourage weight loss               VTE Pharmacologic Prophylaxis: VTE Score: 5  pt. Declined DVT ppx    Mobility:   Basic Mobility Inpatient Raw Score: 23  JH-HLM Goal: 7: Walk 25 feet or more  JH-HLM Achieved: 7: Walk 25 feet or more      Patient Centered Rounds: I performed bedside rounds with nursing staff today.       Total Time Spent on Date of Encounter in care of patient: 25 mins. This time was spent on one or more of the following: performing physical exam; counseling and coordination of care; obtaining or reviewing history; documenting in the medical record; reviewing/ordering tests, medications or procedures; communicating with other healthcare professionals and discussing with patient's family/caregivers.    Current Length of Stay: 6 day(s)  Current Patient Status: Inpatient   Certification Statement: The patient will continue to require additional inpatient hospital stay due to hypoxia  Discharge Plan: Anticipate discharge in 24-48 hrs to home.    Code Status: Prior    Subjective:   C/o \" excessive cough and sob\". Noted with episode of hypoxia with minimal exertion.  Denies chest pain, nausea, vomiting, abdominal pain, any other new complaints.  Follow-up with home O2 eval.  Will follow-up at two-view chest x-ray.    Objective:     Vitals:   Temp (24hrs), Av.9 °F (36.6 °C), Min:97.9 °F (36.6 °C), Max:98 °F (36.7 °C)    Temp:  [97.9 °F (36.6 °C)-98 °F (36.7 °C)] 97.9 °F (36.6 °C)  HR:  [59-71] 59  Resp:  [18] 18  BP: (132-158)/(56-78) 132/56  SpO2:  [88 %-100 %] 98 %  Body mass index is 39.14 kg/m².     Input and Output Summary (last 24 hours):     Intake/Output Summary (Last 24 hours) at 2024 1236  Last data filed at 1/10/2024 1812  Gross per 24 hour   Intake 800 ml   Output --   Net 800 ml       Physical Exam:   Physical Exam  Constitutional:       Appearance: Normal appearance. She is obese.      Comments: Morbidly obese with " patient, acutely nontoxic appearing.   HENT:      Head: Normocephalic and atraumatic.   Cardiovascular:      Rate and Rhythm: Normal rate.      Pulses: Normal pulses.   Pulmonary:      Effort: Pulmonary effort is normal.      Breath sounds: Wheezing present.      Comments: Patient was noted with over saturation 88-89% on room air which quickly improved to 93 to 95% on room air with deep inspiration.  Currently maintaining 1-2L nasal cannula for comfort.  Abdominal:      General: Bowel sounds are normal. There is no distension.      Palpations: Abdomen is soft.      Tenderness: There is no abdominal tenderness.   Neurological:      Mental Status: She is alert and oriented to person, place, and time.   Psychiatric:         Mood and Affect: Mood normal.         Behavior: Behavior normal.          Additional Data:     Labs:  Results from last 7 days   Lab Units 01/10/24  0522 01/09/24  0429   WBC Thousand/uL 10.15 11.26*   HEMOGLOBIN g/dL 11.7 11.0*   HEMATOCRIT % 36.6 34.5*   PLATELETS Thousands/uL 302 165   NEUTROS PCT %  --  62   LYMPHS PCT %  --  26   MONOS PCT %  --  6   EOS PCT %  --  4     Results from last 7 days   Lab Units 01/11/24  0536 01/09/24  0429 01/08/24  0529   SODIUM mmol/L 135   < > 136   POTASSIUM mmol/L 4.9   < > 4.4   CHLORIDE mmol/L 100   < > 100   CO2 mmol/L 29   < > 30   BUN mg/dL 23   < > 19   CREATININE mg/dL 0.95   < > 1.02   ANION GAP mmol/L 6   < > 6   CALCIUM mg/dL 9.4   < > 9.1   ALBUMIN g/dL  --   --  3.6   TOTAL BILIRUBIN mg/dL  --   --  0.23   ALK PHOS U/L  --   --  56   ALT U/L  --   --  13   AST U/L  --   --  9*   GLUCOSE RANDOM mg/dL 212*   < > 183*    < > = values in this interval not displayed.     Results from last 7 days   Lab Units 01/04/24  1330   INR  0.95     Results from last 7 days   Lab Units 01/11/24  1140 01/11/24  0544 01/10/24  2059 01/10/24  1649 01/10/24  1119 01/10/24  0527 01/09/24  2048 01/09/24  0440 01/08/24  2046 01/08/24  0526 01/07/24  2105 01/07/24  1050    POC GLUCOSE mg/dl 194* 200* 235* 361* 294* 317* 277* 191* 246* 185* 180* 240*     Results from last 7 days   Lab Units 01/05/24  0515   HEMOGLOBIN A1C % 7.0*     Results from last 7 days   Lab Units 01/05/24  0515 01/04/24  1330   LACTIC ACID mmol/L  --  1.3   PROCALCITONIN ng/ml 0.16 <0.05       Lines/Drains:  Invasive Devices       None                         Recent Cultures (last 7 days):   Results from last 7 days   Lab Units 01/04/24  1607 01/04/24  1332 01/04/24  1330   BLOOD CULTURE   --  No Growth After 5 Days. No Growth After 5 Days.   URINE CULTURE  >100,000 cfu/ml Escherichia coli*  --   --        Last 24 Hours Medication List:   Current Facility-Administered Medications   Medication Dose Route Frequency Provider Last Rate    acetaminophen  650 mg Oral Q6H PRN Dominik Cristina MD      albuterol  2 puff Inhalation Q4H PRN Van ARCEO MD      amLODIPine  10 mg Oral HS Dominik Cristina MD      aspirin  81 mg Oral Daily Dominik Cristina MD      carvedilol  6.25 mg Oral BID With Meals Dominik Cristina MD      fluticasone  1 spray Each Nare Daily Van ARCEO MD      fluticasone-vilanterol  1 puff Inhalation Daily Dominik Cristina MD      guaiFENesin  600 mg Oral BID Dominik Cristina MD      HYDROcodone Bit-Homatrop MBr  5 mL Oral Q4H PRN Adi Gupta MD      insulin lispro  1-5 Units Subcutaneous TID AC Van ARCEO MD      latanoprost  1 drop Both Eyes HS Dominik Cristina MD      magnesium Oxide  400 mg Oral Daily Dominik Cristina MD      methocarbamol  500 mg Oral TID PRN Dominik Cristina MD      montelukast  10 mg Oral HS Dominik Cristina MD      phenol  1 spray Mouth/Throat Q2H PRN Van ARCEO MD      predniSONE  30 mg Oral Daily Van ARCEO MD      pregabalin  50 mg Oral TID Dominik Cristina MD      QUEtiapine  50 mg Oral HS PRN Dominik Cristina MD      sertraline  50 mg Oral Daily Dominik Cristina MD      sitaGLIPtin  100 mg Oral  Adi  John Gupta MD          Today, Patient Was Seen By: Van Gresham MD    **Please Note: This note may have been constructed using a voice recognition system.**

## 2024-01-12 ENCOUNTER — TRANSITIONAL CARE MANAGEMENT (OUTPATIENT)
Dept: FAMILY MEDICINE CLINIC | Facility: CLINIC | Age: 79
End: 2024-01-12

## 2024-01-12 VITALS
SYSTOLIC BLOOD PRESSURE: 146 MMHG | HEIGHT: 60 IN | HEART RATE: 60 BPM | BODY MASS INDEX: 39.34 KG/M2 | WEIGHT: 200.4 LBS | DIASTOLIC BLOOD PRESSURE: 64 MMHG | RESPIRATION RATE: 16 BRPM | TEMPERATURE: 98.3 F | OXYGEN SATURATION: 99 %

## 2024-01-12 LAB
GLUCOSE SERPL-MCNC: 162 MG/DL (ref 65–140)
GLUCOSE SERPL-MCNC: 217 MG/DL (ref 65–140)

## 2024-01-12 PROCEDURE — 99239 HOSP IP/OBS DSCHRG MGMT >30: CPT | Performed by: STUDENT IN AN ORGANIZED HEALTH CARE EDUCATION/TRAINING PROGRAM

## 2024-01-12 PROCEDURE — 94761 N-INVAS EAR/PLS OXIMETRY MLT: CPT

## 2024-01-12 PROCEDURE — 82948 REAGENT STRIP/BLOOD GLUCOSE: CPT

## 2024-01-12 RX ORDER — PREDNISONE 10 MG/1
TABLET ORAL DAILY
Qty: 12 TABLET | Refills: 0 | Status: SHIPPED | OUTPATIENT
Start: 2024-01-13 | End: 2024-01-19

## 2024-01-12 RX ORDER — ALBUTEROL SULFATE 2.5 MG/3ML
2.5 SOLUTION RESPIRATORY (INHALATION) EVERY 6 HOURS PRN
Qty: 100 ML | Refills: 0 | Status: SHIPPED | OUTPATIENT
Start: 2024-01-12

## 2024-01-12 RX ORDER — HYDROCODONE BITARTRATE AND HOMATROPINE METHYLBROMIDE ORAL SOLUTION 5; 1.5 MG/5ML; MG/5ML
5 LIQUID ORAL EVERY 4 HOURS PRN
Qty: 40 ML | Refills: 0 | Status: SHIPPED | OUTPATIENT
Start: 2024-01-12

## 2024-01-12 RX ADMIN — INSULIN LISPRO 1 UNITS: 100 INJECTION, SOLUTION INTRAVENOUS; SUBCUTANEOUS at 08:00

## 2024-01-12 RX ADMIN — METHOCARBAMOL TABLETS 500 MG: 500 TABLET, COATED ORAL at 08:50

## 2024-01-12 RX ADMIN — GUAIFENESIN 600 MG: 600 TABLET, EXTENDED RELEASE ORAL at 08:48

## 2024-01-12 RX ADMIN — Medication 5 ML: at 15:27

## 2024-01-12 RX ADMIN — Medication 5 ML: at 05:10

## 2024-01-12 RX ADMIN — FLUTICASONE PROPIONATE 1 SPRAY: 50 SPRAY, METERED NASAL at 08:51

## 2024-01-12 RX ADMIN — Medication 400 MG: at 08:49

## 2024-01-12 RX ADMIN — PREGABALIN 50 MG: 50 CAPSULE ORAL at 15:27

## 2024-01-12 RX ADMIN — PREDNISONE 30 MG: 20 TABLET ORAL at 08:48

## 2024-01-12 RX ADMIN — ASPIRIN 81 MG: 81 TABLET, CHEWABLE ORAL at 08:48

## 2024-01-12 RX ADMIN — PREGABALIN 50 MG: 50 CAPSULE ORAL at 08:49

## 2024-01-12 RX ADMIN — Medication 5 ML: at 11:05

## 2024-01-12 RX ADMIN — CARVEDILOL 6.25 MG: 6.25 TABLET, FILM COATED ORAL at 15:30

## 2024-01-12 RX ADMIN — SERTRALINE HYDROCHLORIDE 50 MG: 50 TABLET ORAL at 08:49

## 2024-01-12 RX ADMIN — CARVEDILOL 6.25 MG: 6.25 TABLET, FILM COATED ORAL at 08:50

## 2024-01-12 RX ADMIN — INSULIN LISPRO 1 UNITS: 100 INJECTION, SOLUTION INTRAVENOUS; SUBCUTANEOUS at 12:39

## 2024-01-12 RX ADMIN — FLUTICASONE FUROATE AND VILANTEROL TRIFENATATE 1 PUFF: 200; 25 POWDER RESPIRATORY (INHALATION) at 08:51

## 2024-01-12 NOTE — PLAN OF CARE
Problem: PAIN - ADULT  Goal: Verbalizes/displays adequate comfort level or baseline comfort level  Description: Interventions:  - Encourage patient to monitor pain and request assistance  - Assess pain using appropriate pain scale  - Administer analgesics based on type and severity of pain and evaluate response  - Implement non-pharmacological measures as appropriate and evaluate response  - Consider cultural and social influences on pain and pain management  - Notify physician/advanced practitioner if interventions unsuccessful or patient reports new pain  Outcome: Progressing     Problem: INFECTION - ADULT  Goal: Absence or prevention of progression during hospitalization  Description: INTERVENTIONS:  - Assess and monitor for signs and symptoms of infection  - Monitor lab/diagnostic results  - Monitor all insertion sites, i.e. indwelling lines, tubes, and drains  - [place] appropriate cooling/warming therapies per order  - Administer medications as ordered  - Instruct and encourage patient and family to use good hand hygiene technique  - Identify and instruct in appropriate isolation precautions for identified infection/condition  Outcome: Progressing

## 2024-01-12 NOTE — CASE MANAGEMENT
Case Management Discharge Planning Note    Patient name Pebbles English  Location /-02 MRN [id number]  : 1945 Date 2024       Current Admission Date: 2024  Current Admission Diagnosis:Pneumonia   Patient Active Problem List    Diagnosis Date Noted    Positive urine culture 2024    Pneumonia 2024    Conjunctival hemorrhage of right eye 2023    Anginal equivalent 2023    Flu-like symptoms 2023    Primary insomnia 2022    Diverticulitis 2022    Dysuria 2022    Type 2 diabetes mellitus with diabetic neuropathy, unspecified ([facility]) 2022    COPD (chronic obstructive pulmonary disease) ([facility]) 2022    Eosinophilia 2022    Transaminitis 2022    Anxiety 2022    Bradycardia 2022    Abnormal EKG 2022    Primary hypertension 2022    Stage 3a chronic kidney disease ([facility]) 2022    Psychophysiological insomnia 2021    Exertional dyspnea     Obesity, morbid ([facility]) 04/15/2021    BMI 37.0-37.9, adult 2020    Atypical chest pain 2020    Dermatitis 2020    Encounter for gynecological examination without abnormal finding 2019    Type 2 diabetes mellitus without complication, without long-term current use of insulin ([facility]) 2019    Depression with anxiety 2018    Mild intermittent asthma 2018    BOLA (obstructive sleep apnea) 10/17/2016      LOS (days): 7  Geometric Mean LOS (GMLOS) (days): 2.9  Days to GMLOS:-4.2     OBJECTIVE:  Risk of Unplanned Readmission Score: 10.09         Current admission status: Inpatient   Preferred Pharmacy:   [id number] [place] - [address]  [address]  [place] [address]  Phone: [phone] Fax: [phone]    [id number] [place], [place] - [address]  [address] [address]  Phone: [phone] Fax: [phone]    Primary Care Provider: Jovana Bauman,  CRNP    Primary Insurance: MEDICARE  Secondary Insurance: [employer]    DISCHARGE DETAILS:    Discharge planning discussed with:: patient at the bedside     Comments - Freedom of Choice: Patient no longer in need of home O2, will provide nebulizer from DME closet  CM contacted family/caregiver?: No- see comments  Were Treatment Team discharge recommendations reviewed with patient/caregiver?: Yes  Did patient/caregiver verbalize understanding of patient care needs?: Yes  Were patient/caregiver advised of the risks associated with not following Treatment Team discharge recommendations?: Yes    Contacts  Patient Contacts: Vicente Landon (Spouse)  [phone] (Mobile)  Relationship to Patient:: Family  Contact Method: In Person  Reason/Outcome: Continuity of Care, Emergency Contact, Discharge Planning              Other Referral/Resources/Interventions Provided:  Interventions: DME  Referral Comments: delivered nebulizer and taught patient to use, verbalized understanding.  and other family member at bedside, answered questions.         Treatment Team Recommendation: Home  Discharge Destination Plan:: Home                                IMM Given (Date):: 01/12/24  IMM Given to:: Patient  Family notified:: CM reviewed IMM with patient at bedside. Patient reported understanding their rights and denied any questions or concerns Patient was given a copy and signed copy was placed in medical records.

## 2024-01-12 NOTE — CASE MANAGEMENT
Case Management Discharge Planning Note    Patient name Pebbles Persian  Location /-02 MRN [id number]  : 1945 Date 2024       Current Admission Date: 2024  Current Admission Diagnosis:Pneumonia   Patient Active Problem List    Diagnosis Date Noted    Positive urine culture 2024    Pneumonia 2024    Conjunctival hemorrhage of right eye 2023    Anginal equivalent 2023    Flu-like symptoms 2023    Primary insomnia 2022    Diverticulitis 2022    Dysuria 2022    Type 2 diabetes mellitus with diabetic neuropathy, unspecified ([facility]) 2022    COPD (chronic obstructive pulmonary disease) ([facility]) 2022    Eosinophilia 2022    Transaminitis 2022    Anxiety 2022    Bradycardia 2022    Abnormal EKG 2022    Primary hypertension 2022    Stage 3a chronic kidney disease ([facility]) 2022    Psychophysiological insomnia 2021    Exertional dyspnea     Obesity, morbid ([facility]) 04/15/2021    BMI 37.0-37.9, adult 2020    Atypical chest pain 2020    Dermatitis 2020    Encounter for gynecological examination without abnormal finding 2019    Type 2 diabetes mellitus without complication, without long-term current use of insulin ([facility]) 2019    Depression with anxiety 2018    Mild intermittent asthma 2018    BOLA (obstructive sleep apnea) 10/17/2016      LOS (days): 7  Geometric Mean LOS (GMLOS) (days): 2.9  Days to GMLOS:-4.1     OBJECTIVE:  Risk of Unplanned Readmission Score: 10.09         Current admission status: Inpatient   Preferred Pharmacy:   [id number] [place] - [address]  [address]  [place] [address]  Phone: [phone] Fax: [phone]    [id number] [place], [place] - [address]  [address] [address]  Phone: [phone] Fax: [phone]    Primary Care Provider: Jovana Bauman,  CRNP    Primary Insurance: MEDICARE  Secondary Insurance: [employer]    DISCHARGE DETAILS:    Discharge planning discussed with:: patient at the bedside     Comments - Freedom of Choice: Patient no longer in need of home O2, will provide nebulizer from DME closet  CM contacted family/caregiver?: No- see comments  Were Treatment Team discharge recommendations reviewed with patient/caregiver?: Yes  Did patient/caregiver verbalize understanding of patient care needs?: Yes  Were patient/caregiver advised of the risks associated with not following Treatment Team discharge recommendations?: Yes    Contacts  Patient Contacts: Vicente Landon (Spouse)  [phone] (Mobile)  Relationship to Patient:: Family  Contact Method: In Person  Reason/Outcome: Continuity of Care, Emergency Contact, Discharge Planning              Other Referral/Resources/Interventions Provided:  Interventions: DME  Referral Comments: home O2 re-eval shows home O2 no longer needed.         Treatment Team Recommendation: Home  Discharge Destination Plan:: Home                                IMM Given (Date):: 01/12/24  IMM Given to:: Patient  Family notified:: CM reviewed IMM with patient at bedside. Patient reported understanding their rights and denied any questions or concerns Patient was given a copy and signed copy was placed in medical records.

## 2024-01-12 NOTE — DISCHARGE SUMMARY
[facility]  Discharge- Pebbles Croatian 1945, 78 y.o. female MRN: [id number]  Unit/Bed#: -02 Encounter: [id number]  Primary Care Provider: ALEKSANDER Manning   Date and time admitted to hospital: 1/4/2024 12:42 PM    * Pneumonia  Assessment & Plan  CT scan with Left upper lobe consolidation, likely pneumonia given fever and cough  Also hx of airway hyperreactivity with potential asthma/COPD overlap which may be contributing to her symptoms and cough  Head CT also demonstrates chronic sinusitis which may contribute to postnasal dripping and cough      Completed 5 days of cefpodoxime and azithromycin   Did well with Flonase.  Currently reports cough has improved significantly as well as congestion improved.  Currently O2 saturation 96 to 97% on room air.  Patient had required oxygen upon ambulation yesterday however on the evaluation today saturation noted around 92% on ambulation does not qualify for home oxygen.  Currently patient is stable for discharge with close outpatient follow-up with PCP   Patient is agreeable with the plan.    Positive urine culture  Assessment & Plan  Urinary tract infection, (now resolved) identified with urine culture positive for gram-negative rods/E. coli treated with IV antibiotics x5 fays.        Type 2 diabetes mellitus with diabetic neuropathy, unspecified ([facility])  Assessment & Plan  Lab Results   Component Value Date    HGBA1C 7.0 (H) 01/05/2024       Recent Labs     01/11/24  1611 01/11/24  2117 01/12/24  0712 01/12/24  1110   POCGLU 390* 278* 162* 217*         Blood Sugar Average: Last 72 hrs:  (P) 259.4220115565621434    Patient wants to minimize glucose checks she does not like needles.  Accu-Cheks likely elevated due to steroids.  Recommended to continue home regimen with close outpatient follow-up with PCP  Patient is in agreement with above plan.    COPD (chronic obstructive pulmonary disease) ([facility])  Assessment & Plan  Reported history of  asthma/COPD.  Currently much better.  O2 saturation 95 to 97% on room air.  Saturation remained around 92% ambulation on room air.  Discharging on tapering dose of prednisone with recommendation to follow-up outpatient with PCP.    BMI 37.0-37.9, adult  Assessment & Plan  Encourage weight loss      Discharging Physician / Practitioner: Van Gresham MD  PCP: ALEKSANDER Manning  Admission Date:   Admission Orders (From admission, onward)       Ordered        01/05/24 1119  Inpatient Admission  Once            01/04/24 1623  Place in Observation  Once                          Discharge Date: 01/12/24    Medical Problems       Resolved Problems  Date Reviewed: 1/12/2024   None           Reason for Admission: Mild asthma/COPD exacerbation, pneumonia.    Hospital Course:     Pebbles Landon is a 78 y.o. female patient with past medical history of morbid obesity, COPD/asthma, diabetes, who originally presented to the hospital on 1/4/2024 due to complaining of cough, shortness of breath.  Patient hospitalized due to mild COPD/asthma exacerbation secondary to pneumonia.  On further evaluation noted with left upper lobe consolidation on CT report.  Now completed 5 days of antibiotics as above.  Did well with IV steroids, nebs, Flonase.  Initially patient had required oxygen upon ambulation however on reevaluation it appears her position remained stable around 92% on room air during ambulation and does not require home oxygen.  Currently she is hemodynamically stable for discharge home.  Recommend to have repeat CT chest in 6 to 8 weeks with primary care provider to monitor evaluation.  Recommend outpatient follow-up with PCP.  No other events reported.  Refer to earlier notes for further clarification.      Please see above list of diagnoses and related plan for additional information.     Condition at Discharge: good     Discharge Day Visit / Exam:     Subjective: Seen during a.m. rounds.  Patient appears comfortable  nondistressed.  Patient reports that today she feels significantly better overall and eager to go home.  Reports her congestion and shortness of breath has significantly improved her cough has improved as well.  O2 saturation 95 to 97% on room air and remains around 92% on ambulation.  No other events reported.    Vitals: Blood Pressure: 146/64 (01/12/24 0724)  Pulse: 60 (01/12/24 0724)  Temperature: 98.3 °F (36.8 °C) (01/12/24 0724)  Temp Source: Oral (01/08/24 2214)  Respirations: 16 (01/11/24 2148)  Height: 5' (152.4 cm) (01/05/24 1625)  Weight - Scale: 90.9 kg (200 lb 6.4 oz) (01/04/24 2040)  SpO2: 99 % (01/12/24 0724)  Exam:   Physical Exam  Constitutional:       Appearance: Normal appearance. She is obese.      Comments: Morbidly obese with patient, acutely nontoxic appearing.   HENT:      Head: Normocephalic and atraumatic.   Cardiovascular:      Rate and Rhythm: Normal rate.      Pulses: Normal pulses.   Pulmonary:      Effort: Pulmonary effort is normal.      Breath sounds: No wheezing.      Comments: O2 saturation 95 to 97% on room air.  Abdominal:      General: Bowel sounds are normal. There is no distension.      Palpations: Abdomen is soft.      Tenderness: There is no abdominal tenderness.   Neurological:      Mental Status: She is alert and oriented to person, place, and time.   Psychiatric:         Mood and Affect: Mood normal.         Behavior: Behavior normal.         Discharge instructions/Information to patient and family:   See after visit summary for information provided to patient and family.      Provisions for Follow-Up Care:  See after visit summary for information related to follow-up care and any pertinent home health orders.      Disposition:     Home      Planned Readmission:      Discharge Statement:  I spent 35 minutes discharging the patient. This time was spent on the day of discharge. I had direct contact with the patient on the day of discharge. Greater than 50% of the total time  was spent examining patient, answering all patient questions, arranging and discussing plan of care with patient as well as directly providing post-discharge instructions.  Additional time then spent on discharge activities.    Discharge Medications:  See after visit summary for reconciled discharge medications provided to patient and family.      ** Please Note: This note has been constructed using a voice recognition system **

## 2024-01-12 NOTE — DISCHARGE INSTR - AVS FIRST PAGE
Recommended close follow up with primary care provider in 1 week of discharge.  Recommended to follow up with repeat CT chest in 6 to 8 weeks with primary care provider to monitor for resolution of pneumonia.

## 2024-01-12 NOTE — RESPIRATORY THERAPY NOTE
Home Oxygen Qualifying Test     Patient name: Pebbles Landon        : 1945   Date of Test:  2024  Diagnosis: pneumonia   Home Oxygen Test:    **Medicare Guidelines require item(s) 1-5 on all ambulatory patients or 1 and 2 on non-ambulatory patients.    1. Baseline SPO2 on Room Air at rest 97 %       SPO2 during exertion on Room Air 92  %  During exertion monitor SPO2. If SPO2 increases >=89%, do not add supplemental oxygen    SPO2 on Oxygen at Rest     NA    SPO2 during exertion on Oxygen     NA    Test performed during exertion activity.      []  Supplemental Home Oxygen is indicated.    [x]  Client does not qualify for home oxygen.    Respiratory Additional Notes-    Rao Collazo, RT

## 2024-01-12 NOTE — PROGRESS NOTES
Patient ambulated with nursing in the presence of respiratory on room air to re-assess need for home O2. Patient's oxygen saturation decreased to 92 percent on room air. No acute respiratory distress noted during ambulation.

## 2024-01-12 NOTE — NURSING NOTE
Patient transported off the unit with all of her belongings. 4 pm medication which included coreg lyrica and cough medication administered prior to leaving. Patient made aware. No acute respiratory distress noted prior to leaving the unit.

## 2024-01-13 NOTE — ASSESSMENT & PLAN NOTE
Lab Results   Component Value Date    HGBA1C 7.0 (H) 01/05/2024       Recent Labs     01/11/24  1611 01/11/24  2117 01/12/24  0712 01/12/24  1110   POCGLU 390* 278* 162* 217*         Blood Sugar Average: Last 72 hrs:  (P) 259.5397560942008654    Patient wants to minimize glucose checks she does not like needles.  We agreed to check glucose only periodically if needed OR symptomatic that would be suggestive of hyper or  hypoglycemia  Continue sitagliptin for now (at home she was on sitagliptin-metformin).  Patient seems to be doing okay just on the Sitagliptin -Target blood glucose while in the inpatient setting 140-180 -most recent around 183

## 2024-01-16 DIAGNOSIS — J45.20 MILD INTERMITTENT ASTHMA, UNSPECIFIED WHETHER COMPLICATED: ICD-10-CM

## 2024-01-16 RX ORDER — ALBUTEROL SULFATE 90 UG/1
AEROSOL, METERED RESPIRATORY (INHALATION)
Qty: 18 G | Refills: 3 | Status: SHIPPED | OUTPATIENT
Start: 2024-01-16

## 2024-01-17 ENCOUNTER — TELEMEDICINE (OUTPATIENT)
Dept: NEUROLOGY | Facility: CLINIC | Age: 79
End: 2024-01-17
Payer: MEDICARE

## 2024-01-17 ENCOUNTER — TELEPHONE (OUTPATIENT)
Dept: NEUROLOGY | Facility: CLINIC | Age: 79
End: 2024-01-17

## 2024-01-17 DIAGNOSIS — G43.009 MIGRAINE WITHOUT AURA AND WITHOUT STATUS MIGRAINOSUS, NOT INTRACTABLE: ICD-10-CM

## 2024-01-17 DIAGNOSIS — G47.33 OBSTRUCTIVE SLEEP APNEA (ADULT) (PEDIATRIC): ICD-10-CM

## 2024-01-17 DIAGNOSIS — E66.9 OBESITY (BMI 30-39.9): ICD-10-CM

## 2024-01-17 DIAGNOSIS — G93.5 CHIARI MALFORMATION TYPE I (HCC): ICD-10-CM

## 2024-01-17 DIAGNOSIS — G44.309 POST-TRAUMATIC HEADACHE: Primary | ICD-10-CM

## 2024-01-17 DIAGNOSIS — M79.18 CERVICAL MYOFASCIAL PAIN SYNDROME: ICD-10-CM

## 2024-01-17 DIAGNOSIS — F32.A ANXIETY AND DEPRESSION: ICD-10-CM

## 2024-01-17 DIAGNOSIS — F41.9 ANXIETY AND DEPRESSION: ICD-10-CM

## 2024-01-17 LAB
DME PARACHUTE DELIVERY DATE ACTUAL: NORMAL
DME PARACHUTE DELIVERY DATE REQUESTED: NORMAL
DME PARACHUTE ITEM DESCRIPTION: NORMAL
DME PARACHUTE ORDER STATUS: NORMAL
DME PARACHUTE SUPPLIER NAME: NORMAL
DME PARACHUTE SUPPLIER PHONE: NORMAL

## 2024-01-17 PROCEDURE — 99214 OFFICE O/P EST MOD 30 MIN: CPT | Performed by: STUDENT IN AN ORGANIZED HEALTH CARE EDUCATION/TRAINING PROGRAM

## 2024-01-17 NOTE — PROGRESS NOTES
Review of Systems   Constitutional:  Negative for appetite change, fatigue and fever.   HENT: Negative.  Negative for hearing loss, tinnitus, trouble swallowing and voice change.    Eyes: Negative.  Negative for photophobia, pain and visual disturbance.   Respiratory: Negative.  Negative for shortness of breath.    Cardiovascular: Negative.  Negative for palpitations.   Gastrointestinal: Negative.  Negative for nausea and vomiting.   Endocrine: Negative.  Negative for cold intolerance.   Genitourinary: Negative.  Negative for dysuria, frequency and urgency.   Musculoskeletal:  Negative for back pain, gait problem, myalgias, neck pain and neck stiffness.   Skin: Negative.  Negative for rash.   Allergic/Immunologic: Negative.    Neurological:  Positive for headaches. Negative for dizziness, tremors, seizures, syncope, facial asymmetry, speech difficulty, weakness, light-headedness and numbness.   Hematological: Negative.  Does not bruise/bleed easily.   Psychiatric/Behavioral: Negative.  Negative for confusion, hallucinations and sleep disturbance.    All other systems reviewed and are negative.    Since your last visit are your headaches Improved    Any change to the headache type? no    What is your current headache frequency: less than weekly    Are you taking your current medications as prescribed? yes    Do you have any side effects? no    How may days per week do you take an abortive medicine? 0/7

## 2024-01-17 NOTE — PATIENT INSTRUCTIONS
Headache Calendar  Please maintain a headache calendar  Consider using phone applications such as Migraine [employer]valdo or [place] Migraine Tracker     Headache/migraine treatment:   Acute medications (for immediate treatment of a headache):   It is ok to take ibuprofen, acetaminophen or naproxen (Advil, Tylenol,  Aleve, Excedrin) if they help your headaches you should limit these to No more than 2-3 times a week to avoid medication overuse/rebound headaches.      Over the counter preventive supplements for headaches/migraines  [x] Magnesium 400mg daily (If any diarrhea or upset stomach, decrease dose  as tolerated) - oxide or glycinate  [x] Riboflavin (Vitamin B2) 400mg daily - (FYI B2 may make your urine bright/neon yellow)     Lifestyle Recommendations:  [x] SLEEP - Maintain a regular sleep schedule: Adults need at least 7-8 hours of uninterrupted a night. Maintain good sleep hygiene:  Going to bed and waking up at consistent times, avoiding excessive daytime naps, avoiding caffeinated beverages in the evening, avoid excessive stimulation in the evening and generally using bed primarily for sleeping.  One hour before bedtime would recommend turning lights down lower, decreasing your activity (may read quietly, listen to music at a low volume). When you get into bed, should eliminate all technology (no texting, emailing, playing with your phone, iPad or tablet in bed).  [x] HYDRATION - Maintain good hydration.  Drink  2L of fluid a day (4 typical small water bottles)  [x] DIET - Maintain good nutrition. In particular don't skip meals and try and eat healthy balanced meals regularly.  [x] TRIGGERS - Look for other triggers and avoid them: Limit caffeine to 1-2 cups a day or less. Avoid dietary triggers that you have noticed bring on your headaches (this could include aged cheese, peanuts, MSG, aspartame and nitrates).  [x] EXERCISE - physical exercise as we all know is good for you in many ways, and not only is good for  your heart, but also is beneficial for your mental health, cognitive health and  chronic pain/headaches. I would encourage at the least 5 days of physical exercise weekly for at least 30 minutes.      Education and Follow-up  [x] Please call with any questions or concerns. Of course if any new concerning symptoms go to the emergency department.  [x] Follow up in 6 months

## 2024-01-17 NOTE — PROGRESS NOTES
[facility] Consult - Follow up   PATIENT:  Pebbles Landon  MRN:  [id number]  :  1945  DATE OF SERVICE:  2024  REFERRED BY: No ref. provider found  PMD: ALEKSANDER Manning    Telemedicine consent    Patient: Pebbles Landon  Provider: Reg Cristina DO  Provider located at [address] [place] [address] [facility] [address] [id number]    The patient was identified by name and date of birth. Pebbles Landon was informed that this is a telemedicine visit and that the visit is being conducted through the Epic Embedded platform. She agrees to proceed..  My office door was closed. No one else was in the room.  She acknowledged consent and understanding of privacy and security of the video platform. The patient has agreed to participate and understands they can discontinue the visit at any time.    Patient is aware this is a billable service.  Assessment/Plan:   Pebbles Landon is a very pleasant 78 y.o. female with a past medical history that includes diabetes mellitus, asthma, COPD, obstructive sleep apnea, hypertension, chronic kidney disease, obesity, anxiety, depression, history of kidney stones, history of glaucoma, neuropathy here for f/u evaluation of headache.     At today's visit, she reports significant improvement in terms of her headaches.  She currently experiences them less than weekly and has been tolerating magnesium and B2 supplements daily.  She was recently admitted to the hospital with pneumonia and had a bit of a flareup at that time, but since discharge she feels that this has improved.  Her imaging in October did not reveal any significant or concerning findings.  It is also reassuring that she was seen by an eye doctor at that time and there was no evidence of papilledema as per the patient.  She does have glaucoma that may need to be addressed in the future, but otherwise she did not report any concerning  findings at that visit.  Given the fact that she is doing well, I will have her continue with magnesium and B2 supplements daily.  She was interested in thinking about stopping them to see how her headaches would do and that is certainly an option as well.    Workup:  - CT head without contrast 8/26/2023: No acute intracranial findings.  Evidence of possible Chiari malformation.  Soft tissue scalp hematomas.  Empty sella.  - CTA head/neck February 2022: No acute intracranial findings.  No evidence of significant stenosis, occlusion, or aneurysm.  - MRI Brain with and without contrast 10/7/2023: No acute findings.  Chronic microvascular ischemic changes.  Chiari type I malformation.  Partially empty sella. (I have personally reviewed imaging and radiology read)  - MRI Lumbar spine 10/7/2023: Mild degenerative changes     Preventative:  - we discussed headache hygiene and lifestyle factors that may improve headaches  - Mg and B2  - Currently on through other providers: Amlodipine, carvedilol, Zoloft  - Past/ failed/contraindicated: Avoid Topamax due to history of kidney stones and glaucoma, avoid beta-blockers due to history of pulmonary issues, avoid TCAs due to age  - future options: Memantine, Diamox, CGRP med, botox     Acute:  - discussed not taking over-the-counter or prescription pain medications more than 2-3 days per week to prevent medication overuse/rebound headache  - Currently on through other providers: None  - Past/ failed/contraindicated: Avoid triptans due to history of bradycardia and history of uncontrolled hypertension, avoid NSAIDs due to history of CKD  - future options:  prochlorperazine, could consider trial of 5 days of Depakote 500 mg nightly or dexamethasone 2 mg daily for prolonged migraine, ubrelvy, reyvow, nurtec  Patient instructions   Headache Calendar  Please maintain a headache calendar  Consider using phone applications such as Migraine Osvaldo or [place] Migraine Tracker      Headache/migraine treatment:   Acute medications (for immediate treatment of a headache):   It is ok to take ibuprofen, acetaminophen or naproxen (Advil, Tylenol,  Aleve, Excedrin) if they help your headaches you should limit these to No more than 2-3 times a week to avoid medication overuse/rebound headaches.      Over the counter preventive supplements for headaches/migraines  [x] Magnesium 400mg daily (If any diarrhea or upset stomach, decrease dose  as tolerated) - oxide or glycinate  [x] Riboflavin (Vitamin B2) 400mg daily - (FYI B2 may make your urine bright/neon yellow)     Lifestyle Recommendations:  [x] SLEEP - Maintain a regular sleep schedule: Adults need at least 7-8 hours of uninterrupted a night. Maintain good sleep hygiene:  Going to bed and waking up at consistent times, avoiding excessive daytime naps, avoiding caffeinated beverages in the evening, avoid excessive stimulation in the evening and generally using bed primarily for sleeping.  One hour before bedtime would recommend turning lights down lower, decreasing your activity (may read quietly, listen to music at a low volume). When you get into bed, should eliminate all technology (no texting, emailing, playing with your phone, iPad or tablet in bed).  [x] HYDRATION - Maintain good hydration.  Drink  2L of fluid a day (4 typical small water bottles)  [x] DIET - Maintain good nutrition. In particular don't skip meals and try and eat healthy balanced meals regularly.  [x] TRIGGERS - Look for other triggers and avoid them: Limit caffeine to 1-2 cups a day or less. Avoid dietary triggers that you have noticed bring on your headaches (this could include aged cheese, peanuts, MSG, aspartame and nitrates).  [x] EXERCISE - physical exercise as we all know is good for you in many ways, and not only is good for your heart, but also is beneficial for your mental health, cognitive health and  chronic pain/headaches. I would encourage at the least 5 days  of physical exercise weekly for at least 30 minutes.      Education and Follow-up  [x] Please call with any questions or concerns. Of course if any new concerning symptoms go to the emergency department.  [x] Follow up in 6 months  Subjective:   1/17/24: Since our last visit, she was recently admitted to the hospital last week with suspected pneumonia with asthma/COPD overlap.  At today's visit, she reports improvement in terms of her headaches and reports that they occur less than weekly. She is taking Mg and B2 supplements daily. She was seen by ophthalmology in October with reports of glaucoma, but no concerning findings. She denies any significant changes to her vision since our last visit.     Previous History:  9/12/23: Ms. Landon reports that she fell off of a moving truck on 8/25/2023 after losing her balance.  She subsequently hit her head on the bumper and then on the ground. Given the fact that she did not exhibit any signs or symptoms of concussion outside of headache and there was no alteration in consciousness, I do not believe that she suffered a concussion.  Since her fall she has been having frequent headaches, although she reports that they have improved since the injury.   Of note, she has a pre-existing history of suspected migraines when she was about 42 years old.  It is possible that these are a flareup of those with a combination of posttraumatic headache as well.  It is reassuring that they have been improving, but given the CT head findings of empty sella and possible Chiari malformation I think she would benefit from an MRI for clarification.  She does not exhibit any signs or symptoms of IIH and is pending an ophthalmology evaluation in a few weeks where she will undergo a dilated eye exam.  She did not want any further prescription medications for her headaches so I have recommended that she try magnesium and B2 supplements instead.  Of note, she has a history of obstructive sleep apnea  but has not used her CPAP in the last 10 years.  It is possible that this is contributing to ongoing headaches and if she were to have IIH they could also be connected.  I have recommended that she get a repeat sleep study for evaluation of this.  Finally, she was interested in seeing physical therapy for ongoing head/neck pain as well as low back/right hip pain.  She does have some numbness and tingling that radiates from her lower back to her knee on the right side so I have ordered an MRI of the lumbar spine for evaluation.    Past Medical History:     Past Medical History:   Diagnosis Date    Asthma     Benign hypertension 11/30/2018    Cardiac arrest ([facility])     Diabetes mellitus ([facility])     Glaucoma     Hypertension     Kidney stone     Neuropathy     Sleep apnea     no machine    SOB (shortness of breath)     Tubular adenoma of colon 10/2019    Wheezing        Patient Active Problem List   Diagnosis    Depression with anxiety    Mild intermittent asthma    Type 2 diabetes mellitus without complication, without long-term current use of insulin ([facility])    Encounter for gynecological examination without abnormal finding    Atypical chest pain    Dermatitis    BMI 37.0-37.9, adult    Obesity, morbid ([facility])    Exertional dyspnea    Psychophysiological insomnia    Stage 3a chronic kidney disease ([facility])    Abnormal EKG    Primary hypertension    Bradycardia    Anxiety    Transaminitis    COPD (chronic obstructive pulmonary disease) ([facility])    BOLA (obstructive sleep apnea)    Eosinophilia    Diverticulitis    Dysuria    Primary insomnia    Anginal equivalent    Flu-like symptoms    Type 2 diabetes mellitus with diabetic neuropathy, unspecified ([facility])    Conjunctival hemorrhage of right eye    Pneumonia    Positive urine culture       Medications:      Current Outpatient Medications   Medication Sig Dispense Refill    acetaminophen (TYLENOL) 325 mg tablet Take 2 tablets (650 mg total) by mouth every 4 (four) hours as needed for  mild pain  0    albuterol (2.5 mg/3 mL) 0.083 % nebulizer solution Take 3 mL (2.5 mg total) by nebulization every 6 (six) hours as needed for wheezing or shortness of breath 100 mL 0    albuterol (PROVENTIL HFA,VENTOLIN HFA) 90 mcg/act inhaler INHALE 1 PUFF BY MOUTH EVERY 6 HOURS AS NEEDED FOR WHEEZE 18 g 3    amLODIPine (NORVASC) 10 mg tablet TAKE 1 TABLET BY MOUTH EVERYDAY AT BEDTIME 90 tablet 1    Aspirin Low Dose 81 MG chewable tablet CHEW 1 TABLET BY MOUTH DAILY 90 tablet 1    benzonatate (TESSALON PERLES) 100 mg capsule Take 1 capsule (100 mg total) by mouth 3 (three) times a day as needed for cough 20 capsule 0    bisacodyl (DULCOLAX) 5 mg EC tablet Take 1 tablet (5 mg total) by mouth as needed in the morning for constipation. (Patient not taking: Reported on 1/4/2024) 30 tablet 0    carbamide peroxide (DEBROX) 6.5 % otic solution Administer 5 drops into both ears 2 (two) times a day (Patient not taking: Reported on 1/4/2024) 15 mL 0    carvedilol (COREG) 6.25 mg tablet TAKE 1 TABLET BY MOUTH TWICE A DAY WITH MEALS 180 tablet 1    fluticasone-salmeterol (AirDuo RespiClick 113/14) 113-14 mcg/act dry powder inhaler Inhale 1 puff 2 (two) times a day Rinse mouth after use. (Patient taking differently: Inhale 1 puff daily as needed Rinse mouth after use.) 1 each 3    glucose blood test strip E11.9 / testing daily (Patient not taking: Reported on 9/12/2023)      HYDROcodone Bit-Homatrop MBr (HYCODAN) 5-1.5 mg/5 mL syrup Take 5 mL by mouth every 4 (four) hours as needed for cough Max Daily Amount: 30 mL 40 mL 0    Janumet -1000 MG TB24 TAKE 1 TABLET BY MOUTH EVERY DAY WITH DINNER 90 tablet 3    latanoprost (XALATAN) 0.005 % ophthalmic solution       lidocaine (LIDODERM) 5 % Place 1 patch on the skin (Patient not taking: Reported on 1/4/2024)      Lyrica  MG TB24 Take 1 tablet by mouth 2 (two) times a day 180 tablet 1    magnesium Oxide (MAG-OX) 400 mg TABS Take 1 tablet (400 mg total) by mouth daily  (Patient not taking: Reported on 1/4/2024) 90 tablet 3    meloxicam (MOBIC) 15 mg tablet TAKE 1 TABLET (15 MG TOTAL) BY MOUTH DAILY. (Patient not taking: Reported on 1/4/2024) 90 tablet 0    methocarbamol (ROBAXIN) 500 mg tablet Take 1 tablet (500 mg total) by mouth 3 (three) times a day as needed for muscle spasms (Patient not taking: Reported on 1/4/2024) 15 tablet 0    montelukast (SINGULAIR) 10 mg tablet TAKE 1 TABLET BY MOUTH EVERYDAY AT BEDTIME 90 tablet 1    predniSONE 10 mg tablet Take 3 tablets (30 mg total) by mouth daily for 2 days, THEN 2 tablets (20 mg total) daily for 2 days, THEN 1 tablet (10 mg total) daily for 2 days. 12 tablet 0    Promethazine-DM (PHENERGAN-DM) 6.25-15 mg/5 mL oral syrup Take 5 mL by mouth 4 (four) times a day as needed for cough (Patient not taking: Reported on 8/29/2023) 180 mL 3    QUEtiapine (SEROquel) 50 mg tablet TAKE 1 TABLET (50 MG TOTAL) BY MOUTH DAILY AT BEDTIME AS NEEDED (INSOMNIA) 90 tablet 1    Riboflavin 400 MG TABS Take 1 tablet (400 mg total) by mouth daily (Patient not taking: Reported on 1/4/2024) 90 tablet 3    sertraline (ZOLOFT) 50 mg tablet TAKE 1 TABLET BY MOUTH EVERY DAY 90 tablet 3    zolpidem (AMBIEN) 5 mg tablet Take 1 tablet (5 mg total) by mouth daily at bedtime as needed for sleep (Patient not taking: Reported on 9/12/2023) 30 tablet 0     No current facility-administered medications for this visit.        Allergies:      Allergies   Allergen Reactions    Ciprofloxacin Itching    Levaquin [Levofloxacin] Swelling    Milk (Cow) GI Intolerance    Penicillins GI Intolerance    Pork Allergy - Food Allergy GI Intolerance    Shellfish Allergy - Food Allergy GI Intolerance    Soy Allergy - Food Allergy GI Intolerance    Wheat Bran - Food Allergy GI Intolerance       Family History:     Family History   Problem Relation Age of Onset    Diabetes Mother     Hypertension Father     Prostate cancer Father     Diabetes Sister     Hypertension Sister     Breast  cancer Sister 58    No Known Problems Sister     No Known Problems Daughter     No Known Problems Daughter     No Known Problems Daughter     No Known Problems Maternal Grandmother     No Known Problems Paternal Grandmother     Diabetes Brother     Hypertension Brother     No Known Problems Maternal Aunt     Breast cancer Maternal Aunt 62    No Known Problems Maternal Aunt     No Known Problems Maternal Aunt     Pancreatic cancer Paternal Aunt     No Known Problems Paternal Aunt     No Known Problems Paternal Aunt     Asthma Family     Colon cancer Neg Hx     Ovarian cancer Neg Hx     Uterine cancer Neg Hx     Cervical cancer Neg Hx        Social History:     Social History     Socioeconomic History    Marital status: /Civil Union     Spouse name: Not on file    Number of children: Not on file    Years of education: Not on file    Highest education level: Not on file   Occupational History    Occupation: retired    Tobacco Use    Smoking status: Never     Passive exposure: Never    Smokeless tobacco: Never   Vaping Use    Vaping status: Never Used   Substance and Sexual Activity    Alcohol use: Never    Drug use: No    Sexual activity: Yes     Birth control/protection: Surgical   Other Topics Concern    Not on file   Social History Narrative    Not on file     Social Determinants of Health     Financial Resource Strain: Low Risk  (10/12/2023)    Overall Financial Resource Strain (CARDIA)     Difficulty of Paying Living Expenses: Not hard at all   Food Insecurity: No Food Insecurity (1/5/2024)    Hunger Vital Sign     Worried About Running Out of Food in the Last Year: Never true     Ran Out of Food in the Last Year: Never true   Transportation Needs: No Transportation Needs (1/5/2024)    PRAPARE - Transportation     Lack of Transportation (Medical): No     Lack of Transportation (Non-Medical): No   Physical Activity: Not on file   Stress: Not on file   Social Connections: Not on file   Intimate Partner  Violence: Not on file   Housing Stability: Low Risk  (1/5/2024)    Housing Stability Vital Sign     Unable to Pay for Housing in the Last Year: No     Number of Places Lived in the Last Year: 1     Unstable Housing in the Last Year: No         Objective:   Physical Exam:                                                               Vitals:            Constitutional:  There were no vitals taken for this visit.  BP Readings from Last 3 Encounters:   01/12/24 146/64   12/27/23 130/70   10/12/23 130/72     Pulse Readings from Last 3 Encounters:   01/12/24 60   12/27/23 74   10/12/23 78         Well developed, well nourished, No dysmorphic features.       HEENT:  Normocephalic atraumatic. See neuro exam   Psychiatric:  Normal behavior and appropriate affect       Neurological Examination:     Mental status/cognitive function:   Recent and remote memory appear intact. Attention span and concentration as well as fund of knowledge appear appropriate for age. Normal language and spontaneous speech.  Cranial Nerves:  III, IV, VI-Pupils were equal, round. Extraocular movements appear full and conjugate   VII-facial expression symmetric  Motor Exam: symmetric bulk throughout. no atrophy, fasciculations or abnormal movements noted.   Coordination:  no apparent dysmetria, ataxia or tremor noted  Review of Systems:   Constitutional:  Negative for appetite change, fatigue and fever.   HENT: Negative.  Negative for hearing loss, tinnitus, trouble swallowing and voice change.    Eyes: Negative.  Negative for photophobia, pain and visual disturbance.   Respiratory: Negative.  Negative for shortness of breath.    Cardiovascular: Negative.  Negative for palpitations.   Gastrointestinal: Negative.  Negative for nausea and vomiting.   Endocrine: Negative.  Negative for cold intolerance.   Genitourinary: Negative.  Negative for dysuria, frequency and urgency.   Musculoskeletal:  Negative for back pain, gait problem, myalgias, neck pain and  neck stiffness.   Skin: Negative.  Negative for rash.   Allergic/Immunologic: Negative.    Neurological:  Positive for headaches. Negative for dizziness, tremors, seizures, syncope, facial asymmetry, speech difficulty, weakness, light-headedness and numbness.   Hematological: Negative.  Does not bruise/bleed easily.   Psychiatric/Behavioral: Negative.  Negative for confusion, hallucinations and sleep disturbance.    All other systems reviewed and are negative.    I have spent 15 minutes with Patient  today in which greater than 50% of this time was spent in counseling/coordination of care regarding Diagnostic results, Prognosis, Risks and benefits of tx options, Patient and family education, Impressions, Documenting in the medical record, Reviewing / ordering tests, medicine, procedures  , and Obtaining or reviewing history  . I also spent 15 minutes non face to face for this patient the same day.     Activity Minutes   Precharting/reviewing 10   Patient care/counseling 15   Postcharting/care coordination 5       Author:  eRg Cristina DO 1/17/2024 6:48 AM

## 2024-01-17 NOTE — TELEPHONE ENCOUNTER
Received VM transcription from 1/15/24, 2:32 PM:    Hi this is Pebbles Micronesian. Please call me back regarding my appointment for Wednesday, January 17th. I just come out of the hospital. So I want to find out exactly why I need to be seen on Wednesday. Call me back at [phone]. Thank you.  -----------------------    Pt spoke with Nohemi BRADLEY 1/16/24 at 12:09. Pt changed in office appt to [facility].  Per appt desk:  Change reason: Patient (just came from the hospital)     Pt checking into [facility] now.

## 2024-01-18 ENCOUNTER — TELEMEDICINE (OUTPATIENT)
Dept: FAMILY MEDICINE CLINIC | Facility: CLINIC | Age: 79
End: 2024-01-18
Payer: MEDICARE

## 2024-01-18 DIAGNOSIS — R05.1 ACUTE COUGH: Primary | ICD-10-CM

## 2024-01-18 DIAGNOSIS — J18.9 PNEUMONIA OF LEFT UPPER LOBE DUE TO INFECTIOUS ORGANISM: ICD-10-CM

## 2024-01-18 PROCEDURE — 99496 TRANSJ CARE MGMT HIGH F2F 7D: CPT | Performed by: NURSE PRACTITIONER

## 2024-01-18 RX ORDER — DEXTROMETHORPHAN HYDROBROMIDE AND PROMETHAZINE HYDROCHLORIDE 15; 6.25 MG/5ML; MG/5ML
5 SYRUP ORAL 4 TIMES DAILY PRN
Qty: 118 ML | Refills: 1 | Status: SHIPPED | OUTPATIENT
Start: 2024-01-18

## 2024-01-18 RX ORDER — BENZONATATE 100 MG/1
100 CAPSULE ORAL 3 TIMES DAILY PRN
Qty: 20 CAPSULE | Refills: 0 | Status: SHIPPED | OUTPATIENT
Start: 2024-01-18

## 2024-01-18 NOTE — PROGRESS NOTES
Virtual TCM Visit:    Verification of patient location:    Patient is located at Home in the following state in which I hold an active license [place]    Assessment/Plan:        Problem List Items Addressed This Visit        Respiratory    Pneumonia of left upper lobe due to infectious organism     Patient recently admitted to the hospital for left upper lobe pneumonia.  Was sent home with nebulizer treatment, prednisone.  Has 1 more day left of prednisone therapy.  Advised to continue to use nebulizer.  Continue with warm liquids at nutrition.  Tessalon Perles given for cough.  May alternate with Phenergan.  Use steam to help decongest.  Discussed the importance of doing lung exercises.  Patient does have an incentive spirometer advised to use at least 10 times an hour.  Advised to call for worsening symptoms.  Did discuss with patient that it would benefit her to have her visit in the office so I can do a physical assessment.  Patient stated that the weather was too cold, did not feel comfortable leaving the house.  Did discuss with patient that it will take a few weeks for the pneumonia to resolve.         Relevant Medications    benzonatate (TESSALON PERLES) 100 mg capsule    promethazine-dextromethorphan (PHENERGAN-DM) 6.25-15 mg/5 mL oral syrup       Other    Acute cough - Primary    Relevant Medications    benzonatate (TESSALON PERLES) 100 mg capsule    promethazine-dextromethorphan (PHENERGAN-DM) 6.25-15 mg/5 mL oral syrup          Reason for visit is follow-up after being admitted to the hospital for pneumonia    Encounter provider ALEKSANDER Manning     Provider located at [facility]  [address]  [place] [address] [phone]    Recent Visits  No visits were found meeting these conditions.  Showing recent visits within past 7 days and meeting all other requirements  Today's Visits  Date Type Provider Dept   01/18/24 Telemedicine ALEKSANDER Manning  [facility]   Showing today's visits and meeting all other requirements  Future Appointments  No visits were found meeting these conditions.  Showing future appointments within next 150 days and meeting all other requirements       After connecting through [employer], the patient was identified by name and date of birth. Pebbles Landon was informed that this is a telemedicine visit and that the visit is being conducted through the Epic Embedded platform. She agrees to proceed..  My office door was closed. No one else was in the room.  She acknowledged consent and understanding of privacy and security of the video platform. The patient has agreed to participate and understands they can discontinue the visit at any time.    Patient is aware this is a billable service.    Transitional Care Management Review:  Pebbles Landon is a 78 y.o. female here for TCM follow up.     During the TCM phone call patient stated:    TCM Call     Date and time call was made  1/12/2024  5:20 PM    Hospital care reviewed  Records reviewed    Patient was hospitialized at  [facility]    Date of Admission  01/04/24    Date of discharge  01/12/24    Diagnosis  Pneumonia    Disposition  Home    Current Symptoms  None      TCM Call     Should patient be enrolled in anticoag monitoring?  No    Scheduled for follow up?  Yes    Did you obtain your prescribed medications  Yes    Do you need help managing your prescriptions or medications  No    Is transportation to your appointment needed  No    I have advised the patient to call PCP with any new or worsening symptoms  Ana Rosa Stevens MA    Living Arrangements  Spouse or Significiant other        Subjective:     Patient ID: Pebbles aLndon is a 78 y.o. female.    Patient is being seen for follow-up after being admitted to the hospital for pneumonia.  Patient was discharged with prednisone reports that she does have 1 more day of prednisone left.  Did not receive  any other Tessalon Perles.  Continues to have fatigue, productive cough.  Has an inhaler and a nebulizer.  Has been able to eat and drink.      Review of Systems   Constitutional:  Positive for fatigue.   HENT:  Positive for congestion.    Eyes: Negative.    Respiratory:  Positive for cough and shortness of breath.    Cardiovascular: Negative.    Gastrointestinal: Negative.    Endocrine: Negative.    Genitourinary: Negative.    Musculoskeletal: Negative.    Allergic/Immunologic: Negative.    Neurological: Negative.    Psychiatric/Behavioral: Negative.           Objective:    There were no vitals filed for this visit.    Physical Exam  Constitutional:       Appearance: She is well-developed. She is ill-appearing.   HENT:      Head: Normocephalic and atraumatic.      Nose: Congestion present.   Pulmonary:      Effort: Pulmonary effort is normal.   Chest:      Chest wall: Tenderness present.   Musculoskeletal:         General: Normal range of motion.      Cervical back: Normal range of motion and neck supple.   Skin:     General: Skin is dry.   Neurological:      General: No focal deficit present.      Mental Status: She is alert and oriented to person, place, and time.   Psychiatric:         Mood and Affect: Mood normal.         Behavior: Behavior normal.         Thought Content: Thought content normal.         Judgment: Judgment normal.         Medications have been reviewed by provider in current encounter    I spent 20 minutes with the patient during this visit.    ALEKSANDER Manning      VIRTUAL VISIT DISCLAIMER    Pebbles Landon verbally agrees to participate in Virtual Care Services. Pt is aware that Virtual Care Services could be limited without vital signs or the ability to perform a full hands-on physical exam. Pebbles Landon understands she or the provider may request at any time to terminate the video visit and request the patient to seek care or treatment in person.

## 2024-01-18 NOTE — ASSESSMENT & PLAN NOTE
Patient recently admitted to the hospital for left upper lobe pneumonia.  Was sent home with nebulizer treatment, prednisone.  Has 1 more day left of prednisone therapy.  Advised to continue to use nebulizer.  Continue with warm liquids at nutrition.  Tessalon Perles given for cough.  May alternate with Phenergan.  Use steam to help decongest.  Discussed the importance of doing lung exercises.  Patient does have an incentive spirometer advised to use at least 10 times an hour.  Advised to call for worsening symptoms.  Did discuss with patient that it would benefit her to have her visit in the office so I can do a physical assessment.  Patient stated that the weather was too cold, did not feel comfortable leaving the house.  Did discuss with patient that it will take a few weeks for the pneumonia to resolve.

## 2024-01-24 ENCOUNTER — OFFICE VISIT (OUTPATIENT)
Dept: FAMILY MEDICINE CLINIC | Facility: CLINIC | Age: 79
End: 2024-01-24
Payer: MEDICARE

## 2024-01-24 VITALS
HEIGHT: 60 IN | RESPIRATION RATE: 14 BRPM | TEMPERATURE: 97.7 F | HEART RATE: 79 BPM | WEIGHT: 200 LBS | DIASTOLIC BLOOD PRESSURE: 64 MMHG | SYSTOLIC BLOOD PRESSURE: 120 MMHG | BODY MASS INDEX: 39.27 KG/M2 | OXYGEN SATURATION: 97 %

## 2024-01-24 DIAGNOSIS — I10 PRIMARY HYPERTENSION: ICD-10-CM

## 2024-01-24 DIAGNOSIS — E11.9 TYPE 2 DIABETES MELLITUS WITHOUT COMPLICATION, WITHOUT LONG-TERM CURRENT USE OF INSULIN (HCC): ICD-10-CM

## 2024-01-24 DIAGNOSIS — E11.40 TYPE 2 DIABETES MELLITUS WITH DIABETIC NEUROPATHY, WITHOUT LONG-TERM CURRENT USE OF INSULIN (HCC): ICD-10-CM

## 2024-01-24 DIAGNOSIS — J18.9 PNEUMONIA OF LEFT UPPER LOBE DUE TO INFECTIOUS ORGANISM: Primary | ICD-10-CM

## 2024-01-24 DIAGNOSIS — R30.0 DYSURIA: ICD-10-CM

## 2024-01-24 PROBLEM — R00.1 BRADYCARDIA: Status: RESOLVED | Noted: 2022-02-27 | Resolved: 2024-01-24

## 2024-01-24 PROBLEM — F51.01 PRIMARY INSOMNIA: Status: RESOLVED | Noted: 2022-11-02 | Resolved: 2024-01-24

## 2024-01-24 PROBLEM — D72.10 EOSINOPHILIA: Status: RESOLVED | Noted: 2022-04-07 | Resolved: 2024-01-24

## 2024-01-24 PROBLEM — R82.79 POSITIVE URINE CULTURE: Status: RESOLVED | Noted: 2024-01-06 | Resolved: 2024-01-24

## 2024-01-24 PROBLEM — L30.9 DERMATITIS: Status: RESOLVED | Noted: 2020-03-12 | Resolved: 2024-01-24

## 2024-01-24 PROBLEM — R05.1 ACUTE COUGH: Status: RESOLVED | Noted: 2024-01-18 | Resolved: 2024-01-24

## 2024-01-24 PROBLEM — R68.89 FLU-LIKE SYMPTOMS: Status: RESOLVED | Noted: 2023-02-02 | Resolved: 2024-01-24

## 2024-01-24 PROBLEM — R07.89 ATYPICAL CHEST PAIN: Status: RESOLVED | Noted: 2020-03-12 | Resolved: 2024-01-24

## 2024-01-24 PROBLEM — F41.9 ANXIETY: Status: RESOLVED | Noted: 2022-03-08 | Resolved: 2024-01-24

## 2024-01-24 LAB
SL AMB  POCT GLUCOSE, UA: NORMAL
SL AMB LEUKOCYTE ESTERASE,UA: NORMAL
SL AMB POCT BILIRUBIN,UA: NORMAL
SL AMB POCT BLOOD,UA: NORMAL
SL AMB POCT CLARITY,UA: CLEAR
SL AMB POCT COLOR,UA: YELLOW
SL AMB POCT KETONES,UA: NORMAL
SL AMB POCT NITRITE,UA: NORMAL
SL AMB POCT PH,UA: 6.5
SL AMB POCT SPECIFIC GRAVITY,UA: 1.02
SL AMB POCT URINE PROTEIN: NORMAL
SL AMB POCT UROBILINOGEN: 0.2

## 2024-01-24 PROCEDURE — 99214 OFFICE O/P EST MOD 30 MIN: CPT | Performed by: NURSE PRACTITIONER

## 2024-01-24 NOTE — ASSESSMENT & PLAN NOTE
Patient reports improvement with breathing, reports cough is also getting better.  Does continue to have some fatigue, muscle pain, back pain.  Education provided.  Increase fluid hydration maintain good nutrition.  Start stretches, increase exercise as tolerated..  May use heat, muscle rub for back pain.  Continue using incentive spirometer for lung exercises.  May use albuterol inhaler for acute shortness of breath

## 2024-01-24 NOTE — Clinical Note
Please advise patient to get lab work done prior to 3-month appointment.  Lab orders placed. Obtain fasting labs, nothing to eat after 8pm , may drink water.

## 2024-01-24 NOTE — PATIENT INSTRUCTIONS
Continue deep breathing exercises  Continue fluid hydration, steam to help decongest  May take tylenol for pain  Pneumonia   WHAT YOU NEED TO KNOW:   Pneumonia is an infection in your lungs caused by bacteria, viruses, fungi, or parasites. You can become infected if you come in contact with someone who is sick. You can get pneumonia if you recently had surgery or needed a ventilator to help you breathe. Pneumonia can also be caused by accidentally inhaling saliva or small pieces of food. Pneumonia may cause mild symptoms, or it can be severe and life-threatening.        DISCHARGE INSTRUCTIONS:   Return to the emergency department if:   You cough up blood.    Your heart beats more than 100 beats in 1 minute.    You are very tired, confused, and cannot think clearly.    You have chest pain or trouble breathing.    Your lips or fingernails turn gray or blue.    Call your doctor if:   Your symptoms are the same or get worse 48 hours after you start antibiotics.    Your fever is not below 99°F (37.2°C) 48 hours after you start antibiotics.    You have a fever higher than 101°F (38.3°C).    You cannot eat, or you have loss of appetite, nausea, or are vomiting.    You have questions or concerns about your condition or care.    Medicines:  You may need any of the following:  Antibiotics  treat pneumonia caused by bacteria.    Acetaminophen  decreases pain and fever. It is available without a doctor's order. Ask how much to take and how often to take it. Follow directions. Read the labels of all other medicines you are using to see if they also contain acetaminophen, or ask your doctor or pharmacist. Acetaminophen can cause liver damage if not taken correctly.    NSAIDs , such as ibuprofen, help decrease swelling, pain, and fever. This medicine is available with or without a doctor's order. NSAIDs can cause stomach bleeding or kidney problems in certain people. If you take blood thinner medicine, always ask your healthcare  provider if NSAIDs are safe for you. Always read the medicine label and follow directions.    Take your medicine as directed.  Contact your healthcare provider if you think your medicine is not helping or if you have side effects. Tell your provider if you are allergic to any medicine. Keep a list of the medicines, vitamins, and herbs you take. Include the amounts, and when and why you take them. Bring the list or the pill bottles to follow-up visits. Carry your medicine list with you in case of an emergency.    Manage your symptoms:   Rest as needed.  Rest often throughout the day. Alternate times of activity with times of rest.    Drink liquids as directed.  Ask how much liquid to drink each day and which liquids are best for you. Liquids help thin your mucus, which may make it easier for you to cough it up.    Do not smoke.  Smoking increases your risk for pneumonia. Smoking also makes it harder for you to get better after you have had pneumonia. Ask your healthcare provider for information if you need help to quit smoking. Avoid secondhand smoke.    Limit alcohol.  Women should limit alcohol to 1 drink a day. Men should limit alcohol to 2 drinks a day. A drink of alcohol is 12 ounces of beer, 5 ounces of wine, or 1½ ounces of liquor.    Use a cool mist humidifier.  A humidifier will help increase air moisture in your home. This may make it easier for you to breathe and help decrease your cough.    Keep your head elevated.  You may be able to breathe better if you keep your head and upper body elevated.       Prevent pneumonia:   Wash your hands often.  Use soap and water. Wash for at least 20 seconds. Rinse with warm, running water for several seconds. Then dry your hands with a clean towel or paper towel. Use hand  that contains alcohol if soap and water are not available. Do not touch your eyes, nose, or mouth without washing your hands first.         Cover a sneeze or cough.  Use a tissue that covers  your mouth and nose. Throw the tissue away in a trash can right away. Use the bend of your arm if a tissue is not available. Wash your hands well with soap and water or use a hand . Do not stand close to anyone who is sneezing or coughing.    Stay away from others until you are well.  Do not go to work or other activities. Wait until your symptoms are gone or your healthcare provider says it is okay to return.    Ask about vaccines you may need.  A pneumonia vaccine can help lower your risk for pneumonia. The vaccine may be recommended every 5 years, starting at age 65. Other vaccines help lower the risk for infections that can become serious for a person who has pneumonia. Get a flu vaccine each year as soon as recommended, usually in September or October. Get a COVID-19 vaccine and booster as directed. Your healthcare provider can tell you if you should also get other vaccines, and when to get them.       Follow up with your doctor as directed:  You will need to return for more tests. Write down your questions so you remember to ask them during your visits.   © Copyright [employer] 2023 Information is for End User's use only and may not be sold, redistributed or otherwise used for commercial purposes.  The above information is an  only. It is not intended as medical advice for individual conditions or treatments. Talk to your doctor, nurse or pharmacist before following any medical regimen to see if it is safe and effective for you.

## 2024-01-24 NOTE — ASSESSMENT & PLAN NOTE
Blood pressure is at goal.  Continue with self-care.  Continue medications.  Increase exercise activity as tolerated.  Continue heart healthy diet.

## 2024-01-24 NOTE — ASSESSMENT & PLAN NOTE
Patient reports left flank pain, urine frequency.  Urine dip negative in office for urinary tract infection.  Advised to continue with water hydration.

## 2024-01-24 NOTE — PROGRESS NOTES
Name: Pebbles Landon      : 1945      MRN: [id number]  Encounter Provider: ALEKSANDER Manning  Encounter Date: 2024   Encounter department: [facility]    Assessment & Plan     1. Pneumonia of left upper lobe due to infectious organism  Assessment & Plan:  Patient reports improvement with breathing, reports cough is also getting better.  Does continue to have some fatigue, muscle pain, back pain.  Education provided.  Increase fluid hydration maintain good nutrition.  Start stretches, increase exercise as tolerated..  May use heat, muscle rub for back pain.  Continue using incentive spirometer for lung exercises.  May use albuterol inhaler for acute shortness of breath      2. Primary hypertension  Assessment & Plan:  Blood pressure is at goal.  Continue with self-care.  Continue medications.  Increase exercise activity as tolerated.  Continue heart healthy diet.      3. Dysuria  Assessment & Plan:  Patient reports left flank pain, urine frequency.  Urine dip negative in office for urinary tract infection.  Advised to continue with water hydration.      4. Type 2 diabetes mellitus with diabetic neuropathy, without long-term current use of insulin (HCC)  -     Comprehensive metabolic panel; Future; Expected date: 2024  -     CBC and differential; Future; Expected date: 2024  -     Albumin / creatinine urine ratio; Future; Expected date: 2024  -     Lipid Panel with Direct LDL reflex; Future; Expected date: 2024  -     TSH, 3rd generation with Free T4 reflex; Future; Expected date: 2024  -     Hemoglobin A1c (w/out EAG) ([facility] ONLY); Future; Expected date: 2024  -     Microalbumin, Random Urine (W/Creatinine) ([facility] ONLY); Future; Expected date: 2024  -     Hemoglobin A1c (w/out EAG) ([facility] ONLY)  -     Microalbumin, Random Urine (W/Creatinine) ([facility] ONLY)    5. Type 2 diabetes mellitus without complication, without long-term current  use of insulin (HCC)        Falls Plan of Care: balance, strength, and gait training instructions were provided. Medications that increase falls were reviewed.         Subjective      Patient is here for follow-up.  Recently admitted to the hospital with pneumonia.  Reports that she does have some improvements with her breathing.  Has been using incentive spirometer.  Reports left flank pain that becomes acute at times.  Has been using [employer] foam to help with the pain.  Does report some frequency with urination.      Review of Systems   Constitutional:  Positive for fatigue.   HENT: Negative.     Eyes: Negative.    Respiratory:  Positive for chest tightness. Negative for cough, shortness of breath and wheezing.    Cardiovascular: Negative.    Gastrointestinal: Negative.    Endocrine: Negative.    Genitourinary:  Positive for flank pain.   Musculoskeletal:  Positive for back pain.   Allergic/Immunologic: Negative.    Neurological: Negative.    Psychiatric/Behavioral: Negative.         Current Outpatient Medications on File Prior to Visit   Medication Sig   • acetaminophen (TYLENOL) 325 mg tablet Take 2 tablets (650 mg total) by mouth every 4 (four) hours as needed for mild pain   • albuterol (2.5 mg/3 mL) 0.083 % nebulizer solution Take 3 mL (2.5 mg total) by nebulization every 6 (six) hours as needed for wheezing or shortness of breath   • albuterol (PROVENTIL HFA,VENTOLIN HFA) 90 mcg/act inhaler INHALE 1 PUFF BY MOUTH EVERY 6 HOURS AS NEEDED FOR WHEEZE   • amLODIPine (NORVASC) 10 mg tablet TAKE 1 TABLET BY MOUTH EVERYDAY AT BEDTIME   • Aspirin Low Dose 81 MG chewable tablet CHEW 1 TABLET BY MOUTH DAILY   • benzonatate (TESSALON PERLES) 100 mg capsule Take 1 capsule (100 mg total) by mouth 3 (three) times a day as needed for cough   • carvedilol (COREG) 6.25 mg tablet TAKE 1 TABLET BY MOUTH TWICE A DAY WITH MEALS   • HYDROcodone Bit-Homatrop MBr (HYCODAN) 5-1.5 mg/5 mL syrup Take 5 mL by mouth every 4 (four)  hours as needed for cough Max Daily Amount: 30 mL   • latanoprost (XALATAN) 0.005 % ophthalmic solution    • montelukast (SINGULAIR) 10 mg tablet TAKE 1 TABLET BY MOUTH EVERYDAY AT BEDTIME   • promethazine-dextromethorphan (PHENERGAN-DM) 6.25-15 mg/5 mL oral syrup Take 5 mL by mouth 4 (four) times a day as needed for cough   • QUEtiapine (SEROquel) 50 mg tablet TAKE 1 TABLET (50 MG TOTAL) BY MOUTH DAILY AT BEDTIME AS NEEDED (INSOMNIA)   • sertraline (ZOLOFT) 50 mg tablet TAKE 1 TABLET BY MOUTH EVERY DAY   • fluticasone-salmeterol (AirDuo RespiClick 113/14) 113-14 mcg/act dry powder inhaler Inhale 1 puff 2 (two) times a day Rinse mouth after use. (Patient taking differently: Inhale 1 puff daily as needed Rinse mouth after use.)   • glucose blood test strip E11.9 / testing daily (Patient not taking: Reported on 9/12/2023)   • Janumet -1000 MG TB24 TAKE 1 TABLET BY MOUTH EVERY DAY WITH DINNER   • lidocaine (LIDODERM) 5 % Place 1 patch on the skin (Patient not taking: Reported on 1/4/2024)   • Lyrica  MG TB24 Take 1 tablet by mouth 2 (two) times a day   • magnesium Oxide (MAG-OX) 400 mg TABS Take 1 tablet (400 mg total) by mouth daily (Patient not taking: Reported on 1/4/2024)   • meloxicam (MOBIC) 15 mg tablet TAKE 1 TABLET (15 MG TOTAL) BY MOUTH DAILY. (Patient not taking: Reported on 1/4/2024)   • Riboflavin 400 MG TABS Take 1 tablet (400 mg total) by mouth daily (Patient not taking: Reported on 1/4/2024)   • zolpidem (AMBIEN) 5 mg tablet Take 1 tablet (5 mg total) by mouth daily at bedtime as needed for sleep (Patient not taking: Reported on 9/12/2023)   • [DISCONTINUED] bisacodyl (DULCOLAX) 5 mg EC tablet Take 1 tablet (5 mg total) by mouth as needed in the morning for constipation. (Patient not taking: Reported on 1/4/2024)   • [DISCONTINUED] carbamide peroxide (DEBROX) 6.5 % otic solution Administer 5 drops into both ears 2 (two) times a day (Patient not taking: Reported on 1/4/2024)   •  [DISCONTINUED] methocarbamol (ROBAXIN) 500 mg tablet Take 1 tablet (500 mg total) by mouth 3 (three) times a day as needed for muscle spasms (Patient not taking: Reported on 1/4/2024)       Objective     /64   Pulse 79   Temp 97.7 °F (36.5 °C) (Temporal)   Resp 14   Ht 5' (1.524 m)   Wt 90.7 kg (200 lb)   SpO2 97%   BMI 39.06 kg/m²     Physical Exam  Vitals and nursing note reviewed.   Constitutional:       Appearance: She is well-developed. She is obese. She is ill-appearing.   HENT:      Head: Normocephalic and atraumatic.      Right Ear: External ear normal.      Left Ear: External ear normal.   Eyes:      Pupils: Pupils are equal, round, and reactive to light.   Cardiovascular:      Rate and Rhythm: Normal rate and regular rhythm.      Pulses: Normal pulses.      Heart sounds: Normal heart sounds.   Pulmonary:      Effort: Pulmonary effort is normal.      Breath sounds: Normal breath sounds. No wheezing, rhonchi or rales.   Chest:      Chest wall: Tenderness present.   Abdominal:      General: Bowel sounds are normal.      Palpations: Abdomen is soft.      Tenderness: There is left CVA tenderness.   Musculoskeletal:         General: Normal range of motion.      Cervical back: Normal range of motion.   Skin:     General: Skin is warm and dry.   Neurological:      General: No focal deficit present.      Mental Status: She is alert and oriented to person, place, and time.   Psychiatric:         Mood and Affect: Mood normal.         Behavior: Behavior normal.         Thought Content: Thought content normal.         Judgment: Judgment normal.       ALEKSANDER Manning

## 2024-02-01 DIAGNOSIS — I10 PRIMARY HYPERTENSION: ICD-10-CM

## 2024-02-01 RX ORDER — CARVEDILOL 6.25 MG/1
6.25 TABLET ORAL 2 TIMES DAILY WITH MEALS
Qty: 180 TABLET | Refills: 1 | Status: SHIPPED | OUTPATIENT
Start: 2024-02-01

## 2024-02-06 NOTE — PROGRESS NOTES
Daily Note     Today's date: 5/10/2021  Patient name: Tammi Storm  : 1945  MRN: [id number]  Referring provider: ALEKSANDER Rolle  Dx:   Encounter Diagnosis     ICD-10-CM    1  Right knee pain, unspecified chronicity  [id number]        Start Time: 1400  Stop Time: 1500  Total time in clinic (min): 60 minutes    Subjective: pt notes overall she is feeling better  Objective: See treatment diary below  Pt seen1:1  for 30 minutes     Assessment: Tolerated treatment fair  Patient would benefit from continued PT      Plan: Continue per plan of care         Precautions: HTN, DM, Neuropathy    Daily Treatment Diary     Manual                                                                                   Exercise Diary  5/3 5 5/10          Water walking 5 12 12          Postural training             Gait training             Home exercise pgm/patient education             Wall: t/h raises 1 1 1          Hip abd/add 2 2 2          Marching 1 2 2          squats 1 1 1          Knee flex/ext 1 2 2          SLR FF  2 2          SLS (eyes open/closed)             SLS w UE mvmt  AROM/ball toss             Weight shifting             UE Noodle work x 4              Step ups   4          Resistive UE work (paddles, bells, TB)             Core work on noodle (sitting/stdg)             Sit on noodle with movement             Seated on pool bench w proper posture 1            Ankle df/pf 1 2 1          marching 1 1 1          Hip Ab/add 1 1 1          Knee flex/ext 1 2 2          Deep water mvmt 5 5 5          Deep water tx/stretching 5 10 10          Specific self - stretches wall/steps  2 2              Modalities  5/3 5/5 5/10          whirlpool 5 10 10
Marychuy Long(Attending)

## 2024-02-21 PROBLEM — Z01.419 ENCOUNTER FOR GYNECOLOGICAL EXAMINATION WITHOUT ABNORMAL FINDING: Status: RESOLVED | Noted: 2019-11-04 | Resolved: 2024-02-21

## 2024-02-21 PROBLEM — J18.9 PNEUMONIA OF LEFT UPPER LOBE DUE TO INFECTIOUS ORGANISM: Status: RESOLVED | Noted: 2024-01-04 | Resolved: 2024-02-21

## 2024-02-25 DIAGNOSIS — F51.01 PRIMARY INSOMNIA: ICD-10-CM

## 2024-02-25 RX ORDER — QUETIAPINE FUMARATE 50 MG/1
50 TABLET, FILM COATED ORAL
Qty: 90 TABLET | Refills: 1 | Status: SHIPPED | OUTPATIENT
Start: 2024-02-25

## 2024-03-07 ENCOUNTER — TELEPHONE (OUTPATIENT)
Dept: FAMILY MEDICINE CLINIC | Facility: CLINIC | Age: 79
End: 2024-03-07

## 2024-03-07 NOTE — TELEPHONE ENCOUNTER
Patient was made aware that per the provider a controlled substance could not be sent to another pharmacy outside the state.

## 2024-03-14 ENCOUNTER — TELEPHONE (OUTPATIENT)
Dept: FAMILY MEDICINE CLINIC | Facility: CLINIC | Age: 79
End: 2024-03-14

## 2024-03-14 NOTE — TELEPHONE ENCOUNTER
Yes, good morning. This is Lola Landon. My YOB: 1945. My phone number is [phone]. I would like Jesus, Doctor Jesus to call me about and something to neutralize. I'm itching all over and I'm pooped all over. I think I came in contact with either some bed bugs while I was out [place] or something, but I'm all overworked and it's even to my eyes it's so. I want her to either call me or prescribe something to neutralize whatever what bit me. I can stop eating like this. It's really really bad. So after to call me as soon as possible. Thank you very much.

## 2024-03-15 ENCOUNTER — OFFICE VISIT (OUTPATIENT)
Dept: FAMILY MEDICINE CLINIC | Facility: CLINIC | Age: 79
End: 2024-03-15
Payer: MEDICARE

## 2024-03-15 VITALS
TEMPERATURE: 98 F | WEIGHT: 209 LBS | HEIGHT: 60 IN | HEART RATE: 84 BPM | RESPIRATION RATE: 14 BRPM | DIASTOLIC BLOOD PRESSURE: 70 MMHG | SYSTOLIC BLOOD PRESSURE: 148 MMHG | BODY MASS INDEX: 41.03 KG/M2 | OXYGEN SATURATION: 98 %
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
## 2024-03-15 DIAGNOSIS — R21 RASH: ICD-10-CM

## 2024-03-15 DIAGNOSIS — E11.40 CONTROLLED TYPE 2 DIABETES WITH NEUROPATHY (HCC): ICD-10-CM

## 2024-03-15 PROBLEM — E23.6 EMPTY SELLA (HCC): Status: ACTIVE | Noted: 2024-03-15

## 2024-03-15 PROCEDURE — 99213 OFFICE O/P EST LOW 20 MIN: CPT | Performed by: NURSE PRACTITIONER

## 2024-03-15 PROCEDURE — G2211 COMPLEX E/M VISIT ADD ON: HCPCS | Performed by: NURSE PRACTITIONER

## 2024-03-15 RX ORDER — HYDROXYZINE HYDROCHLORIDE 25 MG/1
25 TABLET, FILM COATED ORAL 3 TIMES DAILY
Qty: 30 TABLET | Refills: 1 | Status: SHIPPED | OUTPATIENT
Start: 2024-03-15

## 2024-03-15 NOTE — PATIENT INSTRUCTIONS
Bed Bugs   WHAT YOU NEED TO KNOW:   Bed bugs are small, flat insects that bite your exposed skin and feed on your blood while you sleep. They can spread from person to person. They hide in the folds and seams of bed linens, furniture cracks, and electrical outlets. They are common in areas of frequent travel or buildings with shared walls, such as hotels or apartments. Do not scratch bite marks. Scratching can cause a skin infection.  DISCHARGE INSTRUCTIONS:   Call 911 for any of the following:   You have trouble breathing.    You have swelling in your lips, tongue, or throat.    Return to the emergency department if:   You feel lightheaded, dizzy, or faint.    Your heart is beating faster than usual.    You are vomiting and cannot stop.    Contact your healthcare provider if:   You have very swollen, painful bite marks.    You have a fever.    You have open sores that are draining pus.    You have questions or concerns about your condition or care.    Medicines:   Medicines  may be given to help decrease itching and inflammation. These may be given as a pill, cream, or ointment.    Take your medicine as directed.  Contact your healthcare provider if you think your medicine is not helping or if you have side effects. Tell your provider if you are allergic to any medicine. Keep a list of the medicines, vitamins, and herbs you take. Include the amounts, and when and why you take them. Bring the list or the pill bottles to follow-up visits. Carry your medicine list with you in case of an emergency.    Prevent bed bugs:   Protect your clothes and luggage when you travel.  Inspect them often for bed bugs. Keep your luggage closed tightly when you are not using it. Keep your luggage in the bathroom, or place contents in sealed plastic bags.    Inspect any used items you bring into your home.  You may need to fumigate used furniture. Examine cardboard boxes or other items with small cracks where bed bugs could  hide.    Remove clutter from the area where you sleep.  Place your mattress or box spring in a sealed bag. Seal any cracks or molding in the walls or furniture.    Wash bed linens and clothes in hot, soapy water.  Wash the items in water that is at least 130°F (50°C) for 2 hours, or 20°F (-5°C) or colder for 5 days. Dry them in a dryer on the hot setting for at least 20 minutes. Dry cleaning is also effective to get rid of bed bugs.    Tell someone about the bed bugs.  This may include a landlord, , or pest control company. Insecticide sprays are used to get rid of bed bugs.    Follow up with your doctor as directed:  Write down your questions so you remember to ask them during your visits.  © Copyright [employer] 2023 Information is for End User's use only and may not be sold, redistributed or otherwise used for commercial purposes.  The above information is an  only. It is not intended as medical advice for individual conditions or treatments. Talk to your doctor, nurse or pharmacist before following any medical regimen to see if it is safe and effective for you.

## 2024-03-16 ENCOUNTER — NURSE TRIAGE (OUTPATIENT)
Dept: OTHER | Facility: OTHER | Age: 79
End: 2024-03-16
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
## 2024-03-16 RX ORDER — PREGABALIN 165 MG/1
1 TABLET, FILM COATED, EXTENDED RELEASE ORAL 2 TIMES DAILY
Qty: 180 TABLET | Refills: 1 | Status: SHIPPED | OUTPATIENT
Start: 2024-03-16

## 2024-03-16 NOTE — TELEPHONE ENCOUNTER
"Reason for Disposition  • [1] SEVERE local itching (i.e., interferes with work, school, sleep) AND [2] not improved after 24 hours of hydrocortisone cream    Answer Assessment - Initial Assessment Questions  1. LOCATION: \"Where are the bites located?\"       Bites are getting worse in which they are painful and itchy.  Bites on eyelids, face, fingers, toes, neck, shoulders, feet.    2. ONSET: \"When did you get bitten?\"       Was bitten in [place].    3. CAUSE: Why do you suspect bed bugs?\" (e.g., recent travel, recent purchase of used clothing)      Patient was seen in office and was dx'd with bedbug bites.    4. REDNESS: \"Is the area red or pink?\" If Yes, ask: \"What size is area of redness?\" (inches or cm). \"When did the redness start?\"      Bites are red and swollen.    5. ITCHING: \"Does it itch?\" If Yes, ask: \"How bad is the itch?\"     - MILD: doesn't interfere with normal activities    - MODERATE-SEVERE: interferes with work, school, sleep, or other activities       Severe itching.    6. SWELLING: \"How big is the swelling?\" (inches, cm, or compare to coins)      All bumps swollen.    7. OTHER SYMPTOMS: \"Do you have any other symptoms?\"  (e.g., difficulty breathing, hives)      Denies fever.    Protocols used: Bed Bug Bite-ADULT-AH    "

## 2024-03-16 NOTE — ASSESSMENT & PLAN NOTE
Patient has bug bites all over her body.  States that a family's house in [place] incident started then.  Bites are diffused over her body.  Reports itching.  Atarax 3 times a day as needed.  Topical hydrocortisone.  Education provided.  Advised to wash all linens clothing.

## 2024-03-16 NOTE — TELEPHONE ENCOUNTER
"Regarding: bed bugs/ pain/ itching  ----- Message from Mireille Downing sent at 3/16/2024 10:36 AM EDT -----  \"I have bites all over from bed bugs and the medicine isn't helping at all. I'm itching and in pain all over, my eyes and face it's everywhere\"    "

## 2024-03-16 NOTE — PROGRESS NOTES
Name: Pebbles Landon      : 1945      MRN: [id number]  Encounter Provider: ALEKSANDER Manning  Encounter Date: 3/15/2024   Encounter department: [facility] PRIMARY CARE    Assessment & Plan     1. Bedbug bite, initial encounter  Assessment & Plan:  Patient has bug bites all over her body.  States that a family's house in [place] incident started then.  Bites are diffused over her body.  Reports itching.  Atarax 3 times a day as needed.  Topical hydrocortisone.  Education provided.  Advised to wash all linens clothing.    Orders:  -     hydrOXYzine HCL (ATARAX) 25 mg tablet; Take 1 tablet (25 mg total) by mouth 3 (three) times a day  -     hydrocortisone 2.5 % cream; Apply topically 2 (two) times a day    2. Rash  -     hydrOXYzine HCL (ATARAX) 25 mg tablet; Take 1 tablet (25 mg total) by mouth 3 (three) times a day  -     hydrocortisone 2.5 % cream; Apply topically 2 (two) times a day    3. 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 They screened negative with a PHQ-9 score of 0.        Subjective      Patient is here with complaints of itching, having bug bites all over her body.  Stayed at a family's home in [place], problems started then.    Rash      Review of Systems   Constitutional: Negative.    HENT: Negative.     Eyes: Negative.    Respiratory: Negative.     Cardiovascular: Negative.    Gastrointestinal: Negative.    Endocrine: Negative.    Genitourinary: Negative.    Musculoskeletal: Negative.    Skin:  Positive for rash.   Allergic/Immunologic: Negative.    Neurological: Negative.    Psychiatric/Behavioral: Negative.         Current Outpatient Medications on File Prior to Visit   Medication Sig   • acetaminophen (TYLENOL) 325 mg tablet Take 2 tablets (650 mg total) by mouth every 4  (four) hours as needed for mild pain   • albuterol (2.5 mg/3 mL) 0.083 % nebulizer solution Take 3 mL (2.5 mg total) by nebulization every 6 (six) hours as needed for wheezing or shortness of breath   • albuterol (PROVENTIL HFA,VENTOLIN HFA) 90 mcg/act inhaler INHALE 1 PUFF BY MOUTH EVERY 6 HOURS AS NEEDED FOR WHEEZE   • amLODIPine (NORVASC) 10 mg tablet TAKE 1 TABLET BY MOUTH EVERYDAY AT BEDTIME   • Aspirin Low Dose 81 MG chewable tablet CHEW 1 TABLET BY MOUTH DAILY   • benzonatate (TESSALON PERLES) 100 mg capsule Take 1 capsule (100 mg total) by mouth 3 (three) times a day as needed for cough   • carvedilol (COREG) 6.25 mg tablet TAKE 1 TABLET BY MOUTH TWICE A DAY WITH FOOD   • HYDROcodone Bit-Homatrop MBr (HYCODAN) 5-1.5 mg/5 mL syrup Take 5 mL by mouth every 4 (four) hours as needed for cough Max Daily Amount: 30 mL   • Janumet -1000 MG TB24 TAKE 1 TABLET BY MOUTH EVERY DAY WITH DINNER   • latanoprost (XALATAN) 0.005 % ophthalmic solution    • montelukast (SINGULAIR) 10 mg tablet TAKE 1 TABLET BY MOUTH EVERYDAY AT BEDTIME   • promethazine-dextromethorphan (PHENERGAN-DM) 6.25-15 mg/5 mL oral syrup Take 5 mL by mouth 4 (four) times a day as needed for cough   • QUEtiapine (SEROquel) 50 mg tablet TAKE 1 TABLET (50 MG TOTAL) BY MOUTH DAILY AT BEDTIME AS NEEDED (INSOMNIA)   • sertraline (ZOLOFT) 50 mg tablet TAKE 1 TABLET BY MOUTH EVERY DAY   • [DISCONTINUED] Lyrica  MG TB24 Take 1 tablet by mouth 2 (two) times a day   • fluticasone-salmeterol (AirDuo RespiClick 113/14) 113-14 mcg/act dry powder inhaler Inhale 1 puff 2 (two) times a day Rinse mouth after use. (Patient taking differently: Inhale 1 puff daily as needed Rinse mouth after use.)   • glucose blood test strip E11.9 / testing daily (Patient not taking: Reported on 9/12/2023)   • lidocaine (LIDODERM) 5 % Place 1 patch on the skin (Patient not taking: Reported on 1/4/2024)   • magnesium Oxide (MAG-OX) 400 mg TABS Take 1 tablet (400 mg total) by  mouth daily (Patient not taking: Reported on 1/4/2024)   • meloxicam (MOBIC) 15 mg tablet TAKE 1 TABLET (15 MG TOTAL) BY MOUTH DAILY. (Patient not taking: Reported on 1/4/2024)   • Riboflavin 400 MG TABS Take 1 tablet (400 mg total) by mouth daily (Patient not taking: Reported on 1/4/2024)   • zolpidem (AMBIEN) 5 mg tablet Take 1 tablet (5 mg total) by mouth daily at bedtime as needed for sleep (Patient not taking: Reported on 9/12/2023)       Objective     /70   Pulse 84   Temp 98 °F (36.7 °C)   Resp 14   Ht 5' (1.524 m)   Wt 94.8 kg (209 lb)   SpO2 98%   BMI 40.82 kg/m²     Physical Exam  Vitals and nursing note reviewed.   Constitutional:       Appearance: She is well-developed. She is obese.   HENT:      Head: Normocephalic and atraumatic.   Cardiovascular:      Rate and Rhythm: Normal rate and regular rhythm.      Pulses: Normal pulses.      Heart sounds: Normal heart sounds.   Pulmonary:      Effort: Pulmonary effort is normal.      Breath sounds: Normal breath sounds.   Musculoskeletal:         General: Normal range of motion.      Cervical back: Normal range of motion.   Skin:     General: Skin is warm and dry.      Findings: Rash (bug bites , diffused area over entire body, especially arms and legs) present.   Neurological:      Mental Status: She is alert and oriented to person, place, and time.   Psychiatric:         Attention and Perception: Attention and perception normal.         Mood and Affect: Mood is anxious.         Speech: Speech normal.         Behavior: Behavior normal.         Thought Content: Thought content normal.         Cognition and Memory: Cognition and memory normal.         Judgment: Judgment normal.       ALEKSANDER Manning

## 2024-03-18 ENCOUNTER — TELEPHONE (OUTPATIENT)
Dept: FAMILY MEDICINE CLINIC | Facility: CLINIC | Age: 79
End: 2024-03-18

## 2024-03-18 NOTE — TELEPHONE ENCOUNTER
Patient is still experiencing extreme itching. She wants to know if there is shot she can be given.

## 2024-03-19 ENCOUNTER — TELEPHONE (OUTPATIENT)
Dept: FAMILY MEDICINE CLINIC | Facility: CLINIC | Age: 79
End: 2024-03-19

## 2024-03-19 NOTE — TELEPHONE ENCOUNTER
Pt is continuing to have problems with itching. Advised to use benadryl and hydrocortisone cream. Reports all linens were washed. Advised to monitor for skin infection, do not scratch skin to break skin integrity
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
## 2024-03-22 DIAGNOSIS — R21 RASH: ICD-10-CM

## 2024-03-27 ENCOUNTER — TELEPHONE (OUTPATIENT)
Dept: FAMILY MEDICINE CLINIC | Facility: CLINIC | Age: 79
End: 2024-03-27

## 2024-03-27 NOTE — TELEPHONE ENCOUNTER
Patient wants provider to know that she will need to call [facility] Provider Auth Line to request a Prior Auth form for her Lyrica  MG TB24 refill. The number is [phone].     Patient is on her last pill.

## 2024-04-05 DIAGNOSIS — R06.09 DOE (DYSPNEA ON EXERTION): ICD-10-CM

## 2024-04-05 DIAGNOSIS — J45.990 EXERCISE INDUCED BRONCHOSPASM: ICD-10-CM

## 2024-04-09 DIAGNOSIS — M19.90 ARTHRITIS: ICD-10-CM

## 2024-04-09 RX ORDER — MELOXICAM 15 MG/1
15 TABLET ORAL DAILY
Qty: 90 TABLET | Refills: 0 | Status: SHIPPED | OUTPATIENT
Start: 2024-04-09

## 2024-04-09 RX ORDER — MONTELUKAST SODIUM 10 MG/1
TABLET ORAL
Qty: 90 TABLET | Refills: 1 | Status: SHIPPED | OUTPATIENT
Start: 2024-04-09

## 2024-04-09 NOTE — TELEPHONE ENCOUNTER
Patient called would like to know the status status of her Lyrica  MG TB24 prior authorization.    Also, patient is requesting a refill of meloxicam (MOBIC) 15 mg tablet.

## 2024-04-12 ENCOUNTER — TELEPHONE (OUTPATIENT)
Dept: OTHER | Facility: OTHER | Age: 79
End: 2024-04-12

## 2024-04-12 PROBLEM — N18.31 TYPE 2 DIABETES MELLITUS WITH STAGE 3A CHRONIC KIDNEY DISEASE (HCC): Status: ACTIVE | Noted: 2019-02-28

## 2024-04-12 PROBLEM — E11.22 TYPE 2 DIABETES MELLITUS WITH STAGE 3A CHRONIC KIDNEY DISEASE (HCC): Status: ACTIVE | Noted: 2019-02-28

## 2024-04-15 ENCOUNTER — OFFICE VISIT (OUTPATIENT)
Dept: FAMILY MEDICINE CLINIC | Facility: CLINIC | Age: 79
End: 2024-04-15
Payer: MEDICARE

## 2024-04-15 VITALS
TEMPERATURE: 99.5 F | OXYGEN SATURATION: 98 % | DIASTOLIC BLOOD PRESSURE: 64 MMHG | HEART RATE: 79 BPM | BODY MASS INDEX: 40.1 KG/M2 | WEIGHT: 204.25 LBS | SYSTOLIC BLOOD PRESSURE: 144 MMHG | HEIGHT: 60 IN

## 2024-04-15 DIAGNOSIS — J06.0 ACUTE LARYNGOPHARYNGITIS: ICD-10-CM
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
## 2024-04-15 DIAGNOSIS — R21 RASH: ICD-10-CM

## 2024-04-15 DIAGNOSIS — J02.9 SORE THROAT: Primary | ICD-10-CM

## 2024-04-15 DIAGNOSIS — F51.01 PRIMARY INSOMNIA: ICD-10-CM

## 2024-04-15 PROCEDURE — G2211 COMPLEX E/M VISIT ADD ON: HCPCS | Performed by: NURSE PRACTITIONER

## 2024-04-15 PROCEDURE — 99214 OFFICE O/P EST MOD 30 MIN: CPT | Performed by: NURSE PRACTITIONER

## 2024-04-15 PROCEDURE — 87651 STREP A DNA AMP PROBE: CPT | Performed by: NURSE PRACTITIONER

## 2024-04-15 RX ORDER — AZITHROMYCIN 250 MG/1
TABLET, FILM COATED ORAL DAILY
Qty: 6 TABLET | Refills: 0 | Status: SHIPPED | OUTPATIENT
Start: 2024-04-15 | End: 2024-04-20

## 2024-04-15 RX ORDER — ZOLPIDEM TARTRATE 10 MG/1
10 TABLET ORAL
Qty: 30 TABLET | Refills: 0 | Status: SHIPPED | OUTPATIENT
Start: 2024-04-15

## 2024-04-15 RX ORDER — PREDNISONE 20 MG/1
20 TABLET ORAL DAILY
Qty: 5 TABLET | Refills: 0 | Status: SHIPPED | OUTPATIENT
Start: 2024-04-15

## 2024-04-15 RX ORDER — LIDOCAINE HYDROCHLORIDE 20 MG/ML
15 SOLUTION OROPHARYNGEAL 4 TIMES DAILY PRN
Qty: 100 ML | Refills: 0 | Status: SHIPPED | OUTPATIENT
Start: 2024-04-15

## 2024-04-15 NOTE — ASSESSMENT & PLAN NOTE
Reports sore throat upper respiratory symptoms ongoing for more than a week.  Strep was negative in office.  Discussed acute upper respiratory symptoms.  Zithromax therapy.  Continue with fluid hydration, tea with honey.  May use lidocaine swish and swallow to help with painful symptoms.

## 2024-04-15 NOTE — PATIENT INSTRUCTIONS
Take 2 Zithromax pills today then 1 for the next 4 days  Use lidocaine swish and swallow every 6 hours for sore throat  Continue honey, tea  May take 10 mg of Ambien for sleep  Pharyngitis   WHAT YOU NEED TO KNOW:   Pharyngitis, or sore throat, is inflammation of the tissues and structures in your pharynx (throat). Pharyngitis is most often caused by bacteria or a virus. Other causes include smoking, allergies, or acid reflux.  DISCHARGE INSTRUCTIONS:   Call your local emergency number (911 in the [place]) if:   You have trouble breathing or swallowing because your throat is swollen.      Return to the emergency department if:   You are drooling because it hurts too much to swallow.    Your fever is higher than 102?F (39?C) or lasts longer than 3 days.    You are confused.    You taste blood.    Call your doctor if:   Your throat pain gets worse.    You have a painful lump in your throat that does not go away after 5 days.    Your symptoms do not improve after 5 days.    You have questions or concerns about your condition or care.    Medicines:  Viral pharyngitis will go away on its own without treatment. Your sore throat should start to feel better in 3 to 5 days. You may need any of the following:  Antibiotics  treat a bacterial infection.    NSAIDs  help decrease swelling and pain or fever. This medicine is available with or without a doctor's order. NSAIDs can cause stomach bleeding or kidney problems in certain people. If you take blood thinner medicine, always ask your healthcare provider if NSAIDs are safe for you. Always read the medicine label and follow directions.    Acetaminophen  decreases pain and fever. It is available without a doctor's order. Ask how much to take and how often to take it. Follow directions. Read the labels of all other medicines you are using to see if they also contain acetaminophen, or ask your doctor or pharmacist. Acetaminophen can cause liver damage if not taken correctly.    Take  your medicine as directed.  Contact your healthcare provider if you think your medicine is not helping or if you have side effects. Tell your provider if you are allergic to any medicine. Keep a list of the medicines, vitamins, and herbs you take. Include the amounts, and when and why you take them. Bring the list or the pill bottles to follow-up visits. Carry your medicine list with you in case of an emergency.    Manage your symptoms:   Gargle salt water.  Mix ¼ teaspoon salt in an 8 ounce glass of warm water and gargle. Do not swallow. Salt water may help decrease swelling in your throat.    Drink liquids as directed.  You may need to drink more liquids than usual. Liquids may help soothe your throat and prevent dehydration. Ask how much liquid to drink each day and which liquids are best for you.    Use a cool mist humidifier.  This will add moisture to the air and help decrease your cough.    Soothe your throat.  Cough drops, ice, soft foods, or popsicles may help decrease throat pain.    Prevent the spread of pharyngitis:  Cover your mouth and nose when you cough or sneeze. Do not share food or drinks. Wash your hands often. Use soap and water. If soap and water are unavailable, use an alcohol-based hand .       Follow up with your doctor as directed:  Write down your questions so you remember to ask them during your visits.  © Copyright [employer] 2023 Information is for End User's use only and may not be sold, redistributed or otherwise used for commercial purposes.  The above information is an  only. It is not intended as medical advice for individual conditions or treatments. Talk to your doctor, nurse or pharmacist before following any medical regimen to see if it is safe and effective for you.

## 2024-04-15 NOTE — PROGRESS NOTES
Name: Pebbles Landon      : 1945      MRN: [id number]  Encounter Provider: ALEKSANDER Manning  Encounter Date: 4/15/2024   Encounter department: [facility] PRIMARY CARE    Assessment & Plan     1. Sore throat  Assessment & Plan:  Reports sore throat upper respiratory symptoms ongoing for more than a week.  Strep was negative in office.  Discussed acute upper respiratory symptoms.  Zithromax therapy.  Continue with fluid hydration, tea with honey.  May use lidocaine swish and swallow to help with painful symptoms.    Orders:  -     POCT rapid PCR strepA  -     Lidocaine Viscous HCl (XYLOCAINE) 2 % mucosal solution; Swish and spit 15 mL 4 (four) times a day as needed for mouth pain or discomfort    2. Acute laryngopharyngitis  -     azithromycin (Zithromax) 250 mg tablet; Take 2 tablets (500 mg total) by mouth daily for 1 day, THEN 1 tablet (250 mg total) daily for 4 days.    3. Primary insomnia  Assessment & Plan:  She has been taking Seroquel reports it has not been helping.  Would like to go back to Ambien.  Ambien ordered education provided.  Dose increased.  Discussed good sleep hygiene.  Discussed monitoring for side effects of medication    Orders:  -     zolpidem (AMBIEN) 10 mg tablet; Take 1 tablet (10 mg total) by mouth daily at bedtime as needed for sleep    4. Rash  -     predniSONE 20 mg tablet; Take 1 tablet (20 mg total) by mouth daily    5. Bedbug bite, subsequent encounter  Assessment & Plan:  Continues to have rash from bedbug bites, reports it continues to be itchy.  Prednisone therapy.  Discussed monitoring for signs of infection.             Subjective      Patient is here with complaints of sore throat, congestion, sinus pain and pressure.  Denies any fevers or body aches.  Continues to have rash, itchiness throughout her body.      Review of Systems   Constitutional: Negative.  Negative for fatigue and fever.   HENT:  Positive for congestion, sinus pressure, sinus pain,  sore throat and trouble swallowing.    Eyes: Negative.    Respiratory: Negative.  Negative for cough and shortness of breath.    Cardiovascular: Negative.  Negative for chest pain.   Gastrointestinal: Negative.    Endocrine: Negative.    Genitourinary: Negative.    Musculoskeletal: Negative.    Skin:  Positive for rash.   Allergic/Immunologic: Negative.    Neurological: Negative.    Psychiatric/Behavioral: Negative.         Current Outpatient Medications on File Prior to Visit   Medication Sig   • acetaminophen (TYLENOL) 325 mg tablet Take 2 tablets (650 mg total) by mouth every 4 (four) hours as needed for mild pain   • albuterol (2.5 mg/3 mL) 0.083 % nebulizer solution Take 3 mL (2.5 mg total) by nebulization every 6 (six) hours as needed for wheezing or shortness of breath   • albuterol (PROVENTIL HFA,VENTOLIN HFA) 90 mcg/act inhaler INHALE 1 PUFF BY MOUTH EVERY 6 HOURS AS NEEDED FOR WHEEZE   • amLODIPine (NORVASC) 10 mg tablet TAKE 1 TABLET BY MOUTH EVERYDAY AT BEDTIME   • Aspirin Low Dose 81 MG chewable tablet CHEW 1 TABLET BY MOUTH DAILY   • carvedilol (COREG) 6.25 mg tablet TAKE 1 TABLET BY MOUTH TWICE A DAY WITH FOOD   • fluticasone-salmeterol (AirDuo RespiClick 113/14) 113-14 mcg/act dry powder inhaler Inhale 1 puff 2 (two) times a day Rinse mouth after use. (Patient taking differently: Inhale 1 puff daily as needed Rinse mouth after use.)   • glucose blood test strip    • hydrocortisone 2.5 % cream APPLY TO AFFECTED AREA TWICE A DAY   • hydrOXYzine HCL (ATARAX) 25 mg tablet Take 1 tablet (25 mg total) by mouth 3 (three) times a day   • Janumet -1000 MG TB24 TAKE 1 TABLET BY MOUTH EVERY DAY WITH DINNER   • latanoprost (XALATAN) 0.005 % ophthalmic solution    • Lyrica  MG TB24 Take 1 tablet by mouth 2 (two) times a day   • meloxicam (MOBIC) 15 mg tablet TAKE 1 TABLET (15 MG TOTAL) BY MOUTH DAILY.   • montelukast (SINGULAIR) 10 mg tablet TAKE 1 TABLET BY MOUTH EVERYDAY AT BEDTIME   • sertraline  (ZOLOFT) 50 mg tablet TAKE 1 TABLET BY MOUTH EVERY DAY   • [DISCONTINUED] QUEtiapine (SEROquel) 50 mg tablet TAKE 1 TABLET (50 MG TOTAL) BY MOUTH DAILY AT BEDTIME AS NEEDED (INSOMNIA)   • benzonatate (TESSALON PERLES) 100 mg capsule Take 1 capsule (100 mg total) by mouth 3 (three) times a day as needed for cough (Patient not taking: Reported on 4/15/2024)   • lidocaine (LIDODERM) 5 % Place 1 patch on the skin (Patient not taking: Reported on 1/4/2024)   • magnesium Oxide (MAG-OX) 400 mg TABS Take 1 tablet (400 mg total) by mouth daily (Patient not taking: Reported on 1/4/2024)   • Riboflavin 400 MG TABS Take 1 tablet (400 mg total) by mouth daily (Patient not taking: Reported on 1/4/2024)   • [DISCONTINUED] HYDROcodone Bit-Homatrop MBr (HYCODAN) 5-1.5 mg/5 mL syrup Take 5 mL by mouth every 4 (four) hours as needed for cough Max Daily Amount: 30 mL (Patient not taking: Reported on 4/15/2024)   • [DISCONTINUED] promethazine-dextromethorphan (PHENERGAN-DM) 6.25-15 mg/5 mL oral syrup Take 5 mL by mouth 4 (four) times a day as needed for cough (Patient not taking: Reported on 4/15/2024)   • [DISCONTINUED] zolpidem (AMBIEN) 5 mg tablet Take 1 tablet (5 mg total) by mouth daily at bedtime as needed for sleep (Patient not taking: Reported on 9/12/2023)       Objective     /64 (BP Location: Left arm, Patient Position: Sitting, Cuff Size: Large)   Pulse 79   Temp 99.5 °F (37.5 °C) (Temporal)   Ht 5' (1.524 m)   Wt 92.6 kg (204 lb 4 oz)   SpO2 98%   BMI 39.89 kg/m²     Physical Exam  Vitals and nursing note reviewed.   Constitutional:       Appearance: She is well-developed. She is obese.   HENT:      Head: Normocephalic and atraumatic.      Right Ear: External ear normal.      Left Ear: External ear normal.      Nose: Congestion present.      Mouth/Throat:      Mouth: Mucous membranes are moist. Mucous membranes are pale. No oral lesions.      Pharynx: No oropharyngeal exudate, posterior oropharyngeal erythema or  uvula swelling.      Tonsils: No tonsillar exudate.   Eyes:      Pupils: Pupils are equal, round, and reactive to light.   Cardiovascular:      Rate and Rhythm: Normal rate and regular rhythm.      Pulses: Normal pulses.      Heart sounds: Normal heart sounds.   Pulmonary:      Effort: Pulmonary effort is normal.   Chest:      Chest wall: Tenderness present.   Abdominal:      General: Bowel sounds are normal.      Palpations: Abdomen is soft.   Musculoskeletal:         General: Normal range of motion.      Cervical back: Normal range of motion.   Skin:     General: Skin is warm and dry.      Findings: Rash present.   Neurological:      General: No focal deficit present.      Mental Status: She is alert and oriented to person, place, and time.   Psychiatric:         Mood and Affect: Mood normal.         Behavior: Behavior normal.         Thought Content: Thought content normal.         Judgment: Judgment normal.       ALEKSANDER Manning

## 2024-04-15 NOTE — ASSESSMENT & PLAN NOTE
She has been taking Seroquel reports it has not been helping.  Would like to go back to Ambien.  Ambien ordered education provided.  Dose increased.  Discussed good sleep hygiene.  Discussed monitoring for side effects of medication

## 2024-04-15 NOTE — ASSESSMENT & PLAN NOTE
Continues to have rash from bedbug bites, reports it continues to be itchy.  Prednisone therapy.  Discussed monitoring for signs of infection.

## 2024-04-29 LAB
LEFT EYE DIABETIC RETINOPATHY: NORMAL
RIGHT EYE DIABETIC RETINOPATHY: NORMAL

## 2024-05-03 ENCOUNTER — TELEPHONE (OUTPATIENT)
Dept: FAMILY MEDICINE CLINIC | Facility: CLINIC | Age: 79
End: 2024-05-03

## 2024-05-07 ENCOUNTER — HOSPITAL ENCOUNTER (EMERGENCY)
Facility: HOSPITAL | Age: 79
Discharge: HOME | End: 2024-05-07
Attending: EMERGENCY MEDICINE
Payer: COMMERCIAL

## 2024-05-07 ENCOUNTER — APPOINTMENT (EMERGENCY)
Dept: CT IMAGING | Facility: HOSPITAL | Age: 79
End: 2024-05-07
Payer: COMMERCIAL

## 2024-05-07 ENCOUNTER — APPOINTMENT (EMERGENCY)
Dept: RADIOLOGY | Facility: HOSPITAL | Age: 79
End: 2024-05-07
Payer: COMMERCIAL

## 2024-05-07 VITALS
RESPIRATION RATE: 19 BRPM | TEMPERATURE: 98.6 F | HEART RATE: 60 BPM | DIASTOLIC BLOOD PRESSURE: 55 MMHG | OXYGEN SATURATION: 95 % | SYSTOLIC BLOOD PRESSURE: 114 MMHG

## 2024-05-07 DIAGNOSIS — K29.70 GASTRITIS: Primary | ICD-10-CM

## 2024-05-07 LAB
ALBUMIN SERPL BCP-MCNC: 3.6 G/DL (ref 3.5–5)
ALP SERPL-CCNC: 49 U/L (ref 34–104)
ALT SERPL W P-5'-P-CCNC: 7 U/L (ref 7–52)
ANION GAP SERPL CALCULATED.3IONS-SCNC: 6 MMOL/L (ref 4–13)
APTT PPP: 29 SECONDS (ref 23–37)
AST SERPL W P-5'-P-CCNC: 9 U/L (ref 13–39)
BASOPHILS # BLD AUTO: 0.05 THOUSANDS/ÂΜL (ref 0–0.1)
BASOPHILS NFR BLD AUTO: 1 % (ref 0–1)
BILIRUB SERPL-MCNC: 0.35 MG/DL (ref 0.2–1)
BUN SERPL-MCNC: 12 MG/DL (ref 5–25)
CALCIUM SERPL-MCNC: 8.6 MG/DL (ref 8.4–10.2)
CARDIAC TROPONIN I PNL SERPL HS: <2 NG/L
CARDIAC TROPONIN I PNL SERPL HS: <2 NG/L
CHLORIDE SERPL-SCNC: 106 MMOL/L (ref 96–108)
CO2 SERPL-SCNC: 27 MMOL/L (ref 21–32)
CREAT SERPL-MCNC: 0.83 MG/DL (ref 0.6–1.3)
EOSINOPHIL # BLD AUTO: 0.29 THOUSAND/ÂΜL (ref 0–0.61)
EOSINOPHIL NFR BLD AUTO: 6 % (ref 0–6)
ERYTHROCYTE [DISTWIDTH] IN BLOOD BY AUTOMATED COUNT: 13.7 % (ref 11.6–15.1)
GFR SERPL CREATININE-BSD FRML MDRD: 68 ML/MIN/1.73SQ M
GLUCOSE SERPL-MCNC: 107 MG/DL (ref 65–140)
HCT VFR BLD AUTO: 38.8 % (ref 34.8–46.1)
HGB BLD-MCNC: 12.4 G/DL (ref 11.5–15.4)
IMM GRANULOCYTES # BLD AUTO: 0.01 THOUSAND/UL (ref 0–0.2)
IMM GRANULOCYTES NFR BLD AUTO: 0 % (ref 0–2)
INR PPP: 0.98 (ref 0.84–1.19)
LIPASE SERPL-CCNC: 27 U/L (ref 11–82)
LYMPHOCYTES # BLD AUTO: 1.63 THOUSANDS/ÂΜL (ref 0.6–4.47)
LYMPHOCYTES NFR BLD AUTO: 31 % (ref 14–44)
MCH RBC QN AUTO: 28.6 PG (ref 26.8–34.3)
MCHC RBC AUTO-ENTMCNC: 32 G/DL (ref 31.4–37.4)
MCV RBC AUTO: 90 FL (ref 82–98)
MONOCYTES # BLD AUTO: 0.4 THOUSAND/ÂΜL (ref 0.17–1.22)
MONOCYTES NFR BLD AUTO: 8 % (ref 4–12)
NEUTROPHILS # BLD AUTO: 2.82 THOUSANDS/ÂΜL (ref 1.85–7.62)
NEUTS SEG NFR BLD AUTO: 54 % (ref 43–75)
NRBC BLD AUTO-RTO: 0 /100 WBCS
PLATELET # BLD AUTO: 221 THOUSANDS/UL (ref 149–390)
PMV BLD AUTO: 10.8 FL (ref 8.9–12.7)
POTASSIUM SERPL-SCNC: 4.2 MMOL/L (ref 3.5–5.3)
PROT SERPL-MCNC: 6.8 G/DL (ref 6.4–8.4)
PROTHROMBIN TIME: 13.6 SECONDS (ref 11.6–14.5)
RBC # BLD AUTO: 4.33 MILLION/UL (ref 3.81–5.12)
SODIUM SERPL-SCNC: 139 MMOL/L (ref 135–147)
WBC # BLD AUTO: 5.2 THOUSAND/UL (ref 4.31–10.16)

## 2024-05-07 PROCEDURE — 36415 COLL VENOUS BLD VENIPUNCTURE: CPT | Performed by: EMERGENCY MEDICINE

## 2024-05-07 PROCEDURE — 93005 ELECTROCARDIOGRAM TRACING: CPT

## 2024-05-07 PROCEDURE — C9113 INJ PANTOPRAZOLE SODIUM, VIA: HCPCS | Performed by: EMERGENCY MEDICINE

## 2024-05-07 PROCEDURE — 96374 THER/PROPH/DIAG INJ IV PUSH: CPT

## 2024-05-07 PROCEDURE — 99284 EMERGENCY DEPT VISIT MOD MDM: CPT

## 2024-05-07 PROCEDURE — 85025 COMPLETE CBC W/AUTO DIFF WBC: CPT | Performed by: EMERGENCY MEDICINE

## 2024-05-07 PROCEDURE — 83690 ASSAY OF LIPASE: CPT | Performed by: EMERGENCY MEDICINE

## 2024-05-07 PROCEDURE — 80053 COMPREHEN METABOLIC PANEL: CPT | Performed by: EMERGENCY MEDICINE

## 2024-05-07 PROCEDURE — 85730 THROMBOPLASTIN TIME PARTIAL: CPT | Performed by: EMERGENCY MEDICINE

## 2024-05-07 PROCEDURE — 74177 CT ABD & PELVIS W/CONTRAST: CPT

## 2024-05-07 PROCEDURE — 96375 TX/PRO/DX INJ NEW DRUG ADDON: CPT

## 2024-05-07 PROCEDURE — 96361 HYDRATE IV INFUSION ADD-ON: CPT

## 2024-05-07 PROCEDURE — 84484 ASSAY OF TROPONIN QUANT: CPT | Performed by: EMERGENCY MEDICINE

## 2024-05-07 PROCEDURE — 85610 PROTHROMBIN TIME: CPT | Performed by: EMERGENCY MEDICINE

## 2024-05-07 PROCEDURE — 71046 X-RAY EXAM CHEST 2 VIEWS: CPT

## 2024-05-07 RX ORDER — SUCRALFATE 1 G/1
1 TABLET ORAL 4 TIMES DAILY
Qty: 56 TABLET | Refills: 0 | Status: SHIPPED | OUTPATIENT
Start: 2024-05-07 | End: 2024-05-21

## 2024-05-07 RX ORDER — HYDROMORPHONE HCL/PF 1 MG/ML
0.5 SYRINGE (ML) INJECTION ONCE
Status: COMPLETED | OUTPATIENT
Start: 2024-05-07 | End: 2024-05-07

## 2024-05-07 RX ORDER — DICYCLOMINE HCL 20 MG
20 TABLET ORAL ONCE
Status: COMPLETED | OUTPATIENT
Start: 2024-05-07 | End: 2024-05-07

## 2024-05-07 RX ORDER — MAGNESIUM HYDROXIDE/ALUMINUM HYDROXICE/SIMETHICONE 120; 1200; 1200 MG/30ML; MG/30ML; MG/30ML
30 SUSPENSION ORAL ONCE
Status: COMPLETED | OUTPATIENT
Start: 2024-05-07 | End: 2024-05-07

## 2024-05-07 RX ORDER — MORPHINE SULFATE 4 MG/ML
4 INJECTION, SOLUTION INTRAMUSCULAR; INTRAVENOUS ONCE
Status: DISCONTINUED | OUTPATIENT
Start: 2024-05-07 | End: 2024-05-07 | Stop reason: HOSPADM

## 2024-05-07 RX ORDER — MORPHINE SULFATE 4 MG/ML
4 INJECTION, SOLUTION INTRAMUSCULAR; INTRAVENOUS ONCE
Status: COMPLETED | OUTPATIENT
Start: 2024-05-07 | End: 2024-05-07

## 2024-05-07 RX ORDER — ONDANSETRON 2 MG/ML
4 INJECTION INTRAMUSCULAR; INTRAVENOUS ONCE
Status: COMPLETED | OUTPATIENT
Start: 2024-05-07 | End: 2024-05-07

## 2024-05-07 RX ORDER — FAMOTIDINE 10 MG/ML
20 INJECTION, SOLUTION INTRAVENOUS ONCE
Status: COMPLETED | OUTPATIENT
Start: 2024-05-07 | End: 2024-05-07

## 2024-05-07 RX ORDER — SUCRALFATE 1 G/1
1 TABLET ORAL ONCE
Status: COMPLETED | OUTPATIENT
Start: 2024-05-07 | End: 2024-05-07

## 2024-05-07 RX ORDER — ONDANSETRON 4 MG/1
4 TABLET, ORALLY DISINTEGRATING ORAL EVERY 6 HOURS PRN
Qty: 25 TABLET | Refills: 0 | Status: SHIPPED | OUTPATIENT
Start: 2024-05-07

## 2024-05-07 RX ORDER — PANTOPRAZOLE SODIUM 20 MG/1
20 TABLET, DELAYED RELEASE ORAL DAILY
Qty: 20 TABLET | Refills: 0 | Status: SHIPPED | OUTPATIENT
Start: 2024-05-07

## 2024-05-07 RX ADMIN — SUCRALFATE 1 G: 1 TABLET ORAL at 19:09

## 2024-05-07 RX ADMIN — DICYCLOMINE HYDROCHLORIDE 20 MG: 20 TABLET ORAL at 16:22

## 2024-05-07 RX ADMIN — HYDROMORPHONE HYDROCHLORIDE 0.5 MG: 1 INJECTION, SOLUTION INTRAMUSCULAR; INTRAVENOUS; SUBCUTANEOUS at 19:53

## 2024-05-07 RX ADMIN — ONDANSETRON 4 MG: 2 INJECTION INTRAMUSCULAR; INTRAVENOUS at 15:33

## 2024-05-07 RX ADMIN — FAMOTIDINE 20 MG: 10 INJECTION, SOLUTION INTRAVENOUS at 19:21

## 2024-05-07 RX ADMIN — IOHEXOL 100 ML: 350 INJECTION, SOLUTION INTRAVENOUS at 17:06

## 2024-05-07 RX ADMIN — MORPHINE SULFATE 4 MG: 4 INJECTION INTRAVENOUS at 15:44

## 2024-05-07 RX ADMIN — SODIUM CHLORIDE 1000 ML: 0.9 INJECTION, SOLUTION INTRAVENOUS at 15:34

## 2024-05-07 RX ADMIN — SODIUM CHLORIDE 80 MG: 9 INJECTION, SOLUTION INTRAVENOUS at 15:33

## 2024-05-07 RX ADMIN — ALUMINUM HYDROXIDE, MAGNESIUM HYDROXIDE, AND DIMETHICONE 30 ML: 200; 20; 200 SUSPENSION ORAL at 19:09

## 2024-05-07 NOTE — ED NOTES
"Pt demanding to use of a \"vein machine\", palpable peripheral vein in right AC however daughter would like the use of a vein machine.      Rylee M Devlin, RN  05/07/24 [id number]    " Called to discuss results 3/17/2023 pul testing.  No answer. Results sent via my [facility] results.     Mild COPD seen on PFT, orders placed to begin controller inhaler, Stiolto respimat 2 puffs daily. Stiolto appears preferred on your formulary, let me know if not affordable for alternate. Okay to continue albuterol inhaler as needed.   Order per addendum to 3/16/2023 office note.

## 2024-05-07 NOTE — ED NOTES
Provider at bedside to attempt IV with ultrasound guidance per patient request      Rylee M Devlin, RN  05/07/24 [id number]

## 2024-05-07 NOTE — ED PROVIDER NOTES
History  Chief Complaint   Patient presents with   • Abdominal Pain     Abd pain with diarrhea x 2 days     Patient 79-year-old female past medical Struve asthma, diabetes, CKD stage III, hypertension, COPD, obstructive sleep apnea, cardiac arrest presenting with abdominal pain.  Patient notes periumbilical abdominal pain which radiates laterally and has been constant, cramping, waxing and waning in intensity for the past 2 days.  Notes multiple episodes today bloody and black diarrhea and states that the blood started today.  Notes nausea but denies vomiting and notes increased sweating.  States had central nonradiating chest pain starting 2 days ago which resolved today.  Notes generalized weakness but denies any dizziness, shortness of breath, vomiting or fevers, rashes, vision changes, dysuria.  Took Pepto-Bismol with no relief.  Does note sick contacts with similar symptoms but states that her symptoms have resolved.        Prior to Admission Medications   Prescriptions Last Dose Informant Patient Reported? Taking?   Aspirin Low Dose 81 MG chewable tablet  Self No No   Sig: CHEW 1 TABLET BY MOUTH DAILY   Janumet -1000 MG TB24  Self No No   Sig: TAKE 1 TABLET BY MOUTH EVERY DAY WITH DINNER   Lidocaine Viscous HCl (XYLOCAINE) 2 % mucosal solution   No No   Sig: Swish and spit 15 mL 4 (four) times a day as needed for mouth pain or discomfort   Lyrica  MG TB24  Self No No   Sig: Take 1 tablet by mouth 2 (two) times a day   Riboflavin 400 MG TABS  Self No No   Sig: Take 1 tablet (400 mg total) by mouth daily   Patient not taking: Reported on 1/4/2024   acetaminophen (TYLENOL) 325 mg tablet  Self No No   Sig: Take 2 tablets (650 mg total) by mouth every 4 (four) hours as needed for mild pain   albuterol (2.5 mg/3 mL) 0.083 % nebulizer solution  Self No No   Sig: Take 3 mL (2.5 mg total) by nebulization every 6 (six) hours as needed for wheezing or shortness of breath   albuterol (PROVENTIL HFA,VENTOLIN  HFA) 90 mcg/act inhaler  Self No No   Sig: INHALE 1 PUFF BY MOUTH EVERY 6 HOURS AS NEEDED FOR WHEEZE   amLODIPine (NORVASC) 10 mg tablet  Self No No   Sig: TAKE 1 TABLET BY MOUTH EVERYDAY AT BEDTIME   benzonatate (TESSALON PERLES) 100 mg capsule  Self No No   Sig: Take 1 capsule (100 mg total) by mouth 3 (three) times a day as needed for cough   Patient not taking: Reported on 4/15/2024   carvedilol (COREG) 6.25 mg tablet  Self No No   Sig: TAKE 1 TABLET BY MOUTH TWICE A DAY WITH FOOD   fluticasone-salmeterol (AirDuo RespiClick 113/14) 113-14 mcg/act dry powder inhaler  Self No No   Sig: Inhale 1 puff 2 (two) times a day Rinse mouth after use.   Patient taking differently: Inhale 1 puff daily as needed Rinse mouth after use.   glucose blood test strip  Self Yes No   hydrOXYzine HCL (ATARAX) 25 mg tablet  Self No No   Sig: Take 1 tablet (25 mg total) by mouth 3 (three) times a day   hydrocortisone 2.5 % cream  Self No No   Sig: APPLY TO AFFECTED AREA TWICE A DAY   latanoprost (XALATAN) 0.005 % ophthalmic solution  Self Yes No   lidocaine (LIDODERM) 5 %  Self Yes No   Sig: Place 1 patch on the skin   Patient not taking: Reported on 1/4/2024   magnesium Oxide (MAG-OX) 400 mg TABS  Self No No   Sig: Take 1 tablet (400 mg total) by mouth daily   Patient not taking: Reported on 1/4/2024   meloxicam (MOBIC) 15 mg tablet  Self No No   Sig: TAKE 1 TABLET (15 MG TOTAL) BY MOUTH DAILY.   montelukast (SINGULAIR) 10 mg tablet  Self No No   Sig: TAKE 1 TABLET BY MOUTH EVERYDAY AT BEDTIME   predniSONE 20 mg tablet   No No   Sig: Take 1 tablet (20 mg total) by mouth daily   sertraline (ZOLOFT) 50 mg tablet  Self No No   Sig: TAKE 1 TABLET BY MOUTH EVERY DAY   zolpidem (AMBIEN) 10 mg tablet   No No   Sig: Take 1 tablet (10 mg total) by mouth daily at bedtime as needed for sleep      Facility-Administered Medications: None       Past Medical History:   Diagnosis Date   • Asthma    • Benign hypertension 11/30/2018   • Cardiac arrest  (HCC)    • Diabetes mellitus (HCC)    • Glaucoma    • Hypertension    • Kidney stone    • Neuropathy    • Sleep apnea     no machine   • SOB (shortness of breath)    • Tubular adenoma of colon 10/2019   • Wheezing        Past Surgical History:   Procedure Laterality Date   •  SECTION     • COLONOSCOPY     • HYSTERECTOMY     • IR BIOPSY BONE MARROW  2022   • TONSILLECTOMY         Family History   Problem Relation Age of Onset   • Diabetes Mother    • Hypertension Father    • Prostate cancer Father    • Diabetes Sister    • Hypertension Sister    • Breast cancer Sister 58   • No Known Problems Sister    • No Known Problems Daughter    • No Known Problems Daughter    • No Known Problems Daughter    • No Known Problems Maternal Grandmother    • No Known Problems Paternal Grandmother    • Diabetes Brother    • Hypertension Brother    • No Known Problems Maternal Aunt    • Breast cancer Maternal Aunt 62   • No Known Problems Maternal Aunt    • No Known Problems Maternal Aunt    • Pancreatic cancer Paternal Aunt    • No Known Problems Paternal Aunt    • No Known Problems Paternal Aunt    • Asthma Family    • Colon cancer Neg Hx    • Ovarian cancer Neg Hx    • Uterine cancer Neg Hx    • Cervical cancer Neg Hx      I have reviewed and agree with the history as documented.    E-Cigarette/Vaping   • E-Cigarette Use Never User      E-Cigarette/Vaping Substances   • Nicotine No    • THC No    • CBD No    • Flavoring No    • Other No    • Unknown No      Social History     Tobacco Use   • Smoking status: Never     Passive exposure: Never   • Smokeless tobacco: Never   Vaping Use   • Vaping status: Never Used   Substance Use Topics   • Alcohol use: Never   • Drug use: No       Review of Systems   All other systems reviewed and are negative.      Physical Exam  Physical Exam  Vitals reviewed.   Constitutional:       General: She is not in acute distress.     Appearance: Normal appearance. She is not ill-appearing.    HENT:      Mouth/Throat:      Mouth: Mucous membranes are moist.   Eyes:      Conjunctiva/sclera: Conjunctivae normal.      Comments: Normal conjunctiva   Cardiovascular:      Rate and Rhythm: Normal rate and regular rhythm.      Heart sounds: Normal heart sounds.      Comments: No lower extremity edema, equal radial pulses  Pulmonary:      Effort: Pulmonary effort is normal.      Breath sounds: Normal breath sounds.   Abdominal:      General: Abdomen is flat.      Palpations: Abdomen is soft.      Tenderness: There is generalized abdominal tenderness and tenderness in the right upper quadrant, epigastric area, left upper quadrant and left lower quadrant. There is no right CVA tenderness or left CVA tenderness.   Musculoskeletal:         General: No swelling. Normal range of motion.      Cervical back: Neck supple.   Skin:     General: Skin is warm and dry.   Neurological:      General: No focal deficit present.      Mental Status: She is alert.   Psychiatric:         Mood and Affect: Mood normal.         Vital Signs  ED Triage Vitals [05/07/24 1334]   Temperature Pulse Respirations Blood Pressure SpO2   98 °F (36.7 °C) 59 20 160/71 98 %      Temp Source Heart Rate Source Patient Position - Orthostatic VS BP Location FiO2 (%)   Tympanic Monitor -- -- --      Pain Score       8           Vitals:    05/07/24 1334   BP: 160/71   Pulse: 59         Visual Acuity      ED Medications  Medications   sodium chloride 0.9 % bolus 1,000 mL (has no administration in time range)   ondansetron (ZOFRAN) injection 4 mg (has no administration in time range)   pantoprazole (PROTONIX) 80 mg in sodium chloride 0.9 % 100 mL IVPB (has no administration in time range)   dicyclomine (BENTYL) tablet 20 mg (has no administration in time range)   morphine injection 4 mg (has no administration in time range)       Diagnostic Studies  Results Reviewed       Procedure Component Value Units Date/Time    CBC and differential [[id number]]     Lab  Status: No result Specimen: Blood     Lipase [[id number]]     Lab Status: No result Specimen: Blood     Comprehensive metabolic panel [[id number]]     Lab Status: No result Specimen: Blood                    No orders to display              Procedures  ECG 12 Lead Documentation Only    Date/Time: 5/7/2024 2:15 PM    Performed by: Mercedes Langley DO  Authorized by: Mercedes Langley DO    Patient location:  ED  Previous ECG:     Previous ECG:  Compared to current    Comparison ECG info:  QRS axis is shifted right otherwise unchanged  Interpretation:     Interpretation: non-specific    Rate:     ECG rate assessment: bradycardic    Rhythm:     Rhythm: sinus rhythm    Ectopy:     Ectopy: none    QRS:     QRS axis:  Right    QRS intervals:  Normal  Conduction:     Conduction: normal    ST segments:     ST segments:  Normal  T waves:     T waves: normal    ECG 12 Lead Documentation Only    Date/Time: 5/7/2024 7:26 PM    Performed by: Mercedes Langley DO  Authorized by: Mercedes Langley DO    Patient location:  ED  Previous ECG:     Previous ECG:  Compared to current    Similarity:  No change           ED Course  ED Course as of 05/07/24 2024   Tue May 07, 2024   1837 Patient with mild improvement of symptoms, workup unremarkable, suspect gastritis and will treat accordingly and obtain delta troponin to rule out ACS.   2017 Workup unremarkable, will discharge with outpatient gastroenterology follow-up.  Have discussed return precautions and patient states understands.                                             Medical Decision Making  Patient 79-year-old female past medical history of asthma, diabetes, CKD stage III, hypertension, COPD, cardiac arrest, BOLA presenting for abdominal pain and diarrhea.  Patient is well-appearing at bedside with stable vitals and in no acute distress.  She has equal radial pulses, no lower extremity edema, lungs clear to auscultation, diffuse upper quadrant tenderness and mild  left lower quadrant tenderness without guarding, no other significant physical exam findings.  Will treat symptomatically and obtain labs to assess for electrolyte MIs, anemia, pancreatitis, EKG and troponin to assess for ACS, CT to assess for diverticulitis, appendicitis, perforation, small bowel obstruction, other intra-abdominal pathology and continue to monitor.    Amount and/or Complexity of Data Reviewed  Labs: ordered.  Radiology: ordered and independent interpretation performed.    Risk  OTC drugs.  Prescription drug management.             Disposition  Final diagnoses:   None     ED Disposition       None          Follow-up Information    None         Patient's Medications   Discharge Prescriptions    No medications on file       No discharge procedures on file.    PDMP Review         Value Time User    PDMP Reviewed  Yes 10/20/2023  4:46 PM ALEKSANDER Manning            ED Provider  Electronically Signed by             Mercedes Langley DO  05/07/24 2024

## 2024-05-08 LAB
ATRIAL RATE: 56 BPM
ATRIAL RATE: 57 BPM
P AXIS: 66 DEGREES
P AXIS: 67 DEGREES
PR INTERVAL: 168 MS
PR INTERVAL: 174 MS
QRS AXIS: -48 DEGREES
QRS AXIS: 29 DEGREES
QRSD INTERVAL: 66 MS
QRSD INTERVAL: 72 MS
QT INTERVAL: 426 MS
QT INTERVAL: 432 MS
QTC INTERVAL: 414 MS
QTC INTERVAL: 416 MS
T WAVE AXIS: 42 DEGREES
T WAVE AXIS: 58 DEGREES
VENTRICULAR RATE: 56 BPM
VENTRICULAR RATE: 57 BPM

## 2024-05-08 PROCEDURE — 93010 ELECTROCARDIOGRAM REPORT: CPT | Performed by: INTERNAL MEDICINE

## 2024-05-09 ENCOUNTER — VBI (OUTPATIENT)
Dept: FAMILY MEDICINE CLINIC | Facility: CLINIC | Age: 79
End: 2024-05-09

## 2024-05-09 NOTE — TELEPHONE ENCOUNTER
05/09/24 1:02 PM    Patient contacted post ED visit, first outreach attempt made. Message was left for patient to return a call to the VBI Department at [facility]: Phone [phone].    Thank you.  Frida Villa  [facility]

## 2024-05-09 NOTE — LETTER
[facility]  [address] [place] [address]  [phone]    Date: 05/14/24    Pebbles Landon  [address]  [place] [address]    Dear Pebbles:                                                                                                                                Thank you for choosing [facility] emergency department for care.  Your primary care provider wants to make sure that your ongoing medical care is being addressed. If you require follow up care as a result of your emergency department visit, there are a few things the practice would like you to know.                As part of the network's continuing commitment to caring for our patients, we have added more same day appointments and have extended office hours to meet your medical needs. After hours, on-call physicians are available via your primary care provider's main office line.               We encourage you to contact our office prior to seeking treatment to discuss your symptoms with the medical staff.  Together, we can determine the correct course of action.  A majority of non-emergent conditions such as: common cold, flu-like symptoms, fevers, strains/sprains, dislocations, minor burns, cuts and animal bites can be treated at [facility] facilities. Diagnostic testing is available at some sites.               Of course, if you are experiencing a life threatening medical emergency call 911 or proceed directly to the nearest emergency room.    Your nearest [facility] facility is conveniently located at:    [facility]  [address] [place] [address]  [phone]  SKIP THE WAIT  Conveniently offered at most [facility] locations  [place] your spot online at [email or web address] or on the [facility] Mike    Sincerely,    [facility]  Dept: [phone]

## 2024-05-10 DIAGNOSIS — F51.01 PRIMARY INSOMNIA: ICD-10-CM

## 2024-05-10 RX ORDER — ZOLPIDEM TARTRATE 10 MG/1
10 TABLET ORAL
Qty: 30 TABLET | Refills: 0 | OUTPATIENT
Start: 2024-05-10

## 2024-05-10 RX ORDER — ZOLPIDEM TARTRATE 10 MG/1
10 TABLET ORAL
Qty: 30 TABLET | Refills: 0 | Status: SHIPPED | OUTPATIENT
Start: 2024-05-10

## 2024-05-13 NOTE — TELEPHONE ENCOUNTER
05/13/24 1:13 PM    Patient contacted post ED visit, second outreach attempt made. Message was left for patient to return a call to the VBI Department at [facility]: Phone [phone].    Thank you.  Frida Villa  [facility]

## 2024-05-14 NOTE — TELEPHONE ENCOUNTER
05/14/24 2:42 PM    Patient contacted post ED visit, third outreach attempt made. Message was left for patient to return a call to either the VBI Department at [facility]: Phone [phone] or the PCP office.     Thank you.  Frida Villa  [facility]    05/14/24 2:44 PM    Patient contacted post ED visit, phone outreaches were unsuccessful and a MyChart letter has been sent to the patient as follow-up.    Thank you.  Frida Villa  [facility]

## 2024-05-15 PROBLEM — J06.0 ACUTE LARYNGOPHARYNGITIS: Status: RESOLVED | Noted: 2024-04-15 | Resolved: 2024-05-15

## 2024-05-16 ENCOUNTER — TELEPHONE (OUTPATIENT)
Dept: GASTROENTEROLOGY | Facility: CLINIC | Age: 79
End: 2024-05-16

## 2024-06-03 ENCOUNTER — OFFICE VISIT (OUTPATIENT)
Dept: URGENT CARE | Facility: CLINIC | Age: 79
End: 2024-06-03
Payer: COMMERCIAL

## 2024-06-03 ENCOUNTER — APPOINTMENT (OUTPATIENT)
Dept: RADIOLOGY | Facility: CLINIC | Age: 79
End: 2024-06-03
Payer: COMMERCIAL

## 2024-06-03 VITALS
SYSTOLIC BLOOD PRESSURE: 150 MMHG | OXYGEN SATURATION: 98 % | HEART RATE: 56 BPM | TEMPERATURE: 96.2 F | DIASTOLIC BLOOD PRESSURE: 74 MMHG | RESPIRATION RATE: 18 BRPM

## 2024-06-03 DIAGNOSIS — M25.512 ACUTE PAIN OF LEFT SHOULDER: ICD-10-CM

## 2024-06-03 DIAGNOSIS — M19.012 PRIMARY OSTEOARTHRITIS OF LEFT SHOULDER: Primary | ICD-10-CM

## 2024-06-03 PROCEDURE — 73030 X-RAY EXAM OF SHOULDER: CPT

## 2024-06-03 PROCEDURE — 99213 OFFICE O/P EST LOW 20 MIN: CPT | Performed by: STUDENT IN AN ORGANIZED HEALTH CARE EDUCATION/TRAINING PROGRAM

## 2024-06-03 NOTE — PROGRESS NOTES
[facility]        NAME: Pebbles Landon is a 79 y.o. female  : 1945    MRN: [id number]  DATE: Iveth 3, 2024  TIME: 5:32 PM    Assessment and Orders   Primary osteoarthritis of left shoulder [M19.012]  1. Primary osteoarthritis of left shoulder  Diclofenac Sodium (VOLTAREN) 1 %    Ambulatory Referral to Orthopedic Surgery      2. Acute pain of left shoulder  XR shoulder 2+ vw left    Diclofenac Sodium (VOLTAREN) 1 %    Ambulatory Referral to Orthopedic Surgery            Plan and Discussion      Symptoms and exam consistent with osteoarthritis of the left shoulder. Arthritic changes noted on xray. Positive Hawkin's test. Patient already taking Meloxicam for bilateral knee arthritis. Suggested adding Tylenol arthritis daily. Prescribed topical Voltaren.  Referred to ortho for further evaluation and treatment. Risks and benefits discussed. Patient understands and agrees with the plan.     PATIENT INSTRUCTIONS    If tests have been performed at [facility], our office will contact you with results if changes need to be made to the care plan discussed with you at the visit.  You can review your full results on [facility] MyChart.    Follow up with PCP.     If any of the following occur, please report to your nearest ED for evaluation or call 911.   Difficultly breathing or shortness of breath  Chest pain  Acutely worsening symptoms.         Chief Complaint     Chief Complaint   Patient presents with    Shoulder Pain     LEFT SHOULDER PAIN, PAST 1-1 1/2 WEEKS, MUSCLES SPASMS.CAN BARELY LIFT IT. JUST STARTED ON ITS OWN.         History of Present Illness       Shoulder Pain         Review of Systems   Review of Systems      Current Medications       Current Outpatient Medications:     acetaminophen (TYLENOL) 325 mg tablet, Take 2 tablets (650 mg total) by mouth every 4 (four) hours as needed for mild pain, Disp: , Rfl: 0    albuterol (2.5 mg/3 mL) 0.083 % nebulizer solution, Take 3 mL (2.5 mg total) by  nebulization every 6 (six) hours as needed for wheezing or shortness of breath, Disp: 100 mL, Rfl: 0    albuterol (PROVENTIL HFA,VENTOLIN HFA) 90 mcg/act inhaler, INHALE 1 PUFF BY MOUTH EVERY 6 HOURS AS NEEDED FOR WHEEZE, Disp: 18 g, Rfl: 3    amLODIPine (NORVASC) 10 mg tablet, TAKE 1 TABLET BY MOUTH EVERYDAY AT BEDTIME, Disp: 90 tablet, Rfl: 1    Aspirin Low Dose 81 MG chewable tablet, CHEW 1 TABLET BY MOUTH DAILY, Disp: 90 tablet, Rfl: 1    carvedilol (COREG) 6.25 mg tablet, TAKE 1 TABLET BY MOUTH TWICE A DAY WITH FOOD, Disp: 180 tablet, Rfl: 1    Diclofenac Sodium (VOLTAREN) 1 %, Apply 2 g topically 4 (four) times a day, Disp: 50 g, Rfl: 0    glucose blood test strip, , Disp: , Rfl:     hydrocortisone 2.5 % cream, APPLY TO AFFECTED AREA TWICE A DAY, Disp: 28 g, Rfl: 3    Janumet -1000 MG TB24, TAKE 1 TABLET BY MOUTH EVERY DAY WITH DINNER, Disp: 90 tablet, Rfl: 3    latanoprost (XALATAN) 0.005 % ophthalmic solution, , Disp: , Rfl:     Lyrica  MG TB24, Take 1 tablet by mouth 2 (two) times a day, Disp: 180 tablet, Rfl: 1    meloxicam (MOBIC) 15 mg tablet, TAKE 1 TABLET (15 MG TOTAL) BY MOUTH DAILY., Disp: 90 tablet, Rfl: 0    montelukast (SINGULAIR) 10 mg tablet, TAKE 1 TABLET BY MOUTH EVERYDAY AT BEDTIME, Disp: 90 tablet, Rfl: 1    ondansetron (Zofran ODT) 4 mg disintegrating tablet, Take 1 tablet (4 mg total) by mouth every 6 (six) hours as needed for nausea or vomiting, Disp: 25 tablet, Rfl: 0    sertraline (ZOLOFT) 50 mg tablet, TAKE 1 TABLET BY MOUTH EVERY DAY, Disp: 90 tablet, Rfl: 3    zolpidem (AMBIEN) 10 mg tablet, TAKE 1 TABLET BY MOUTH DAILY AT BEDTIME AS NEEDED FOR SLEEP, Disp: 30 tablet, Rfl: 0    benzonatate (TESSALON PERLES) 100 mg capsule, Take 1 capsule (100 mg total) by mouth 3 (three) times a day as needed for cough (Patient not taking: Reported on 4/15/2024), Disp: 20 capsule, Rfl: 0    fluticasone-salmeterol (AirDuo RespiClick 113/14) 113-14 mcg/act dry powder inhaler, Inhale 1 puff  2 (two) times a day Rinse mouth after use. (Patient taking differently: Inhale 1 puff daily as needed Rinse mouth after use.), Disp: 1 each, Rfl: 3    hydrOXYzine HCL (ATARAX) 25 mg tablet, Take 1 tablet (25 mg total) by mouth 3 (three) times a day (Patient not taking: Reported on 6/3/2024), Disp: 30 tablet, Rfl: 1    lidocaine (LIDODERM) 5 %, Place 1 patch on the skin (Patient not taking: Reported on 1/4/2024), Disp: , Rfl:     Lidocaine Viscous HCl (XYLOCAINE) 2 % mucosal solution, Swish and spit 15 mL 4 (four) times a day as needed for mouth pain or discomfort (Patient not taking: Reported on 6/3/2024), Disp: 100 mL, Rfl: 0    magnesium Oxide (MAG-OX) 400 mg TABS, Take 1 tablet (400 mg total) by mouth daily (Patient not taking: Reported on 1/4/2024), Disp: 90 tablet, Rfl: 3    pantoprazole (PROTONIX) 20 mg tablet, Take 1 tablet (20 mg total) by mouth daily (Patient not taking: Reported on 6/3/2024), Disp: 20 tablet, Rfl: 0    predniSONE 20 mg tablet, Take 1 tablet (20 mg total) by mouth daily (Patient not taking: Reported on 6/3/2024), Disp: 5 tablet, Rfl: 0    Riboflavin 400 MG TABS, Take 1 tablet (400 mg total) by mouth daily (Patient not taking: Reported on 1/4/2024), Disp: 90 tablet, Rfl: 3    sucralfate (CARAFATE) 1 g tablet, Take 1 tablet (1 g total) by mouth 4 (four) times a day for 14 days, Disp: 56 tablet, Rfl: 0    Current Allergies     Allergies as of 06/03/2024 - Reviewed 06/03/2024   Allergen Reaction Noted    Ciprofloxacin Itching 10/07/2005    Levaquin [levofloxacin] Swelling 08/13/2017    Milk (cow) GI Intolerance 04/21/2022    Penicillins GI Intolerance 08/13/2017    Pork allergy - food allergy GI Intolerance 04/23/2022    Shellfish allergy - food allergy GI Intolerance 04/21/2022    Soy allergy - food allergy GI Intolerance 04/21/2022    Wheat bran - food allergy GI Intolerance 04/21/2022            The following portions of the patient's history were reviewed and updated as appropriate:  allergies, current medications, past family history, past medical history, past social history, past surgical history and problem list.     Past Medical History:   Diagnosis Date    Asthma     Benign hypertension 2018    Cardiac arrest (HCC)     Diabetes mellitus (HCC)     Glaucoma     Hypertension     Kidney stone     Neuropathy     Sleep apnea     no machine    SOB (shortness of breath)     Tubular adenoma of colon 10/2019    Wheezing        Past Surgical History:   Procedure Laterality Date     SECTION      COLONOSCOPY      HYSTERECTOMY  1985    IR BIOPSY BONE MARROW  2022    TONSILLECTOMY         Family History   Problem Relation Age of Onset    Diabetes Mother     Hypertension Father     Prostate cancer Father     Diabetes Sister     Hypertension Sister     Breast cancer Sister 58    No Known Problems Sister     No Known Problems Daughter     No Known Problems Daughter     No Known Problems Daughter     No Known Problems Maternal Grandmother     No Known Problems Paternal Grandmother     Diabetes Brother     Hypertension Brother     No Known Problems Maternal Aunt     Breast cancer Maternal Aunt 62    No Known Problems Maternal Aunt     No Known Problems Maternal Aunt     Pancreatic cancer Paternal Aunt     No Known Problems Paternal Aunt     No Known Problems Paternal Aunt     Asthma Family     Colon cancer Neg Hx     Ovarian cancer Neg Hx     Uterine cancer Neg Hx     Cervical cancer Neg Hx          Medications have been verified.        Objective   /74   Pulse 56   Temp (!) 96.2 °F (35.7 °C)   Resp 18   SpO2 98%   No LMP recorded. Patient is postmenopausal.       Physical Exam     Physical Exam  Constitutional:       General: She is not in acute distress.  Pulmonary:      Effort: Pulmonary effort is normal. No respiratory distress.   Musculoskeletal:      Left shoulder: Tenderness and bony tenderness (AC joint, acromion) present. No swelling, deformity, effusion, laceration or  crepitus. Decreased range of motion (pain with internal and external rotation, limited to 90 degress abduction). Decreased strength. Normal pulse.      Comments: +Hawken's positive   Neurological:      General: No focal deficit present.      Mental Status: She is alert and oriented to person, place, and time.   Psychiatric:         Mood and Affect: Mood normal.         Behavior: Behavior normal.               Etelvina Steele DO

## 2024-06-04 ENCOUNTER — TELEPHONE (OUTPATIENT)
Age: 79
End: 2024-06-04

## 2024-06-04 NOTE — TELEPHONE ENCOUNTER
Patient called and said she went to urgent care yesterday for pain in her shoulder.  She has an appt scheduled for today with Jovana at 1:20 and she is asking if she needs to come in for that.  She said they did an xray of the shoulder and would like Jovana to take a look at it and call her.  Patient would like a call back letter her know if Jovana still needs her to come in.

## 2024-06-07 DIAGNOSIS — F51.01 PRIMARY INSOMNIA: ICD-10-CM

## 2024-06-07 RX ORDER — ZOLPIDEM TARTRATE 10 MG/1
10 TABLET ORAL
Qty: 30 TABLET | Refills: 0 | Status: SHIPPED | OUTPATIENT
Start: 2024-06-07

## 2024-06-17 ENCOUNTER — TELEPHONE (OUTPATIENT)
Age: 79
End: 2024-06-17

## 2024-06-17 NOTE — TELEPHONE ENCOUNTER
Patient called to say her Left shoulder pain continues.    She would like the provider to review her recent xray to make sure nothing was missed.    Offered an appointment.  Patient refused.    Patient expecting a return call with next steps/instructions.

## 2024-06-19 ENCOUNTER — TELEPHONE (OUTPATIENT)
Age: 79
End: 2024-06-19

## 2024-06-19 DIAGNOSIS — M25.512 ACUTE PAIN OF LEFT SHOULDER: Primary | ICD-10-CM

## 2024-06-19 RX ORDER — TRAMADOL HYDROCHLORIDE 50 MG/1
50 TABLET ORAL EVERY 6 HOURS PRN
Qty: 30 TABLET | Refills: 0 | Status: SHIPPED | OUTPATIENT
Start: 2024-06-19

## 2024-06-19 NOTE — TELEPHONE ENCOUNTER
Patient has appointment with orthopedics.  Tramadol sent in for acute pain.  May also use muscle rub.

## 2024-06-19 NOTE — TELEPHONE ENCOUNTER
Pt given x-ray results. States she is in so much pain with left shoulder.  She is doing everything she was advised to do and nothing is helping.    Asking for any recommendations.

## 2024-06-22 ENCOUNTER — NURSE TRIAGE (OUTPATIENT)
Dept: OTHER | Facility: OTHER | Age: 79
End: 2024-06-22

## 2024-06-22 DIAGNOSIS — M19.012 PRIMARY OSTEOARTHRITIS OF LEFT SHOULDER: ICD-10-CM

## 2024-06-22 DIAGNOSIS — M25.512 ACUTE PAIN OF LEFT SHOULDER: ICD-10-CM

## 2024-06-22 NOTE — TELEPHONE ENCOUNTER
"Regarding: med refill  ----- Message from Arlette MOROCHO sent at 6/22/2024 11:06 AM EDT -----  Pt stated \"I need a refill on my Diclofenac gel. I am in a lot of pain and have been trying to get this filled for 2 days\"    "

## 2024-07-02 ENCOUNTER — OFFICE VISIT (OUTPATIENT)
Dept: OBGYN CLINIC | Facility: CLINIC | Age: 79
End: 2024-07-02
Payer: COMMERCIAL

## 2024-07-02 VITALS
HEART RATE: 59 BPM | DIASTOLIC BLOOD PRESSURE: 77 MMHG | HEIGHT: 60 IN | BODY MASS INDEX: 39.46 KG/M2 | SYSTOLIC BLOOD PRESSURE: 131 MMHG | WEIGHT: 201 LBS

## 2024-07-02 DIAGNOSIS — M75.42 SUBACROMIAL IMPINGEMENT OF LEFT SHOULDER: ICD-10-CM

## 2024-07-02 DIAGNOSIS — M19.012 ARTHRITIS OF LEFT ACROMIOCLAVICULAR JOINT: ICD-10-CM

## 2024-07-02 DIAGNOSIS — M62.838 TRAPEZIUS MUSCLE SPASM: ICD-10-CM

## 2024-07-02 DIAGNOSIS — M47.812 FACET ARTHROPATHY, CERVICAL: ICD-10-CM

## 2024-07-02 DIAGNOSIS — M75.82 TENDINITIS OF LEFT ROTATOR CUFF: Primary | ICD-10-CM

## 2024-07-02 DIAGNOSIS — S16.1XXA CERVICAL STRAIN, INITIAL ENCOUNTER: ICD-10-CM

## 2024-07-02 DIAGNOSIS — M50.30 DEGENERATIVE DISC DISEASE, CERVICAL: ICD-10-CM

## 2024-07-02 PROCEDURE — 20610 DRAIN/INJ JOINT/BURSA W/O US: CPT | Performed by: FAMILY MEDICINE

## 2024-07-02 PROCEDURE — 99203 OFFICE O/P NEW LOW 30 MIN: CPT | Performed by: FAMILY MEDICINE

## 2024-07-02 RX ORDER — BUPIVACAINE HYDROCHLORIDE 2.5 MG/ML
1 INJECTION, SOLUTION INFILTRATION; PERINEURAL
Status: COMPLETED | OUTPATIENT
Start: 2024-07-02 | End: 2024-07-02

## 2024-07-02 RX ORDER — BUPIVACAINE HYDROCHLORIDE 2.5 MG/ML
4 INJECTION, SOLUTION INFILTRATION; PERINEURAL
Status: COMPLETED | OUTPATIENT
Start: 2024-07-02 | End: 2024-07-02

## 2024-07-02 RX ORDER — TRIAMCINOLONE ACETONIDE 40 MG/ML
40 INJECTION, SUSPENSION INTRA-ARTICULAR; INTRAMUSCULAR
Status: COMPLETED | OUTPATIENT
Start: 2024-07-02 | End: 2024-07-02

## 2024-07-02 RX ADMIN — BUPIVACAINE HYDROCHLORIDE 1 ML: 2.5 INJECTION, SOLUTION INFILTRATION; PERINEURAL at 16:00

## 2024-07-02 RX ADMIN — BUPIVACAINE HYDROCHLORIDE 4 ML: 2.5 INJECTION, SOLUTION INFILTRATION; PERINEURAL at 16:00

## 2024-07-02 RX ADMIN — TRIAMCINOLONE ACETONIDE 40 MG: 40 INJECTION, SUSPENSION INTRA-ARTICULAR; INTRAMUSCULAR at 16:00

## 2024-07-02 NOTE — PATIENT INSTRUCTIONS
Over the counter vitamins:    - Turmeric vitamin at least 1000 mg daily    - Tart cherry vitamin at least 1000 mg daily      Physical Therapy and home exercises

## 2024-07-02 NOTE — PROGRESS NOTES
Assessment/Plan:  Assessment & Plan   Diagnoses and all orders for this visit:    Tendinitis of left rotator cuff  -     Large joint arthrocentesis: L subacromial bursa    Subacromial impingement of left shoulder  -     Ambulatory Referral to Orthopedic Surgery  -     Ambulatory Referral to Physical Therapy; Future  -     Large joint arthrocentesis: L subacromial bursa    Arthritis of left acromioclavicular joint  -     Ambulatory Referral to Physical Therapy; Future    Trapezius muscle spasm  -     Ambulatory Referral to Physical Therapy; Future    Cervical strain, initial encounter  -     Ambulatory Referral to Physical Therapy; Future    Degenerative disc disease, cervical  -     Ambulatory Referral to Physical Therapy; Future    Facet arthropathy, cervical  -     Ambulatory Referral to Physical Therapy; Future    79-year-old right-hand-dominant female with left shoulder pain and limited range of motion 6 weeks duration.  Discussed with patient physical exam, radiographs, impression, and plan.  X-rays of the left shoulder noted for mild AC and degenerative joint degenerative changes.  Physical exam cervical spine unremarkable for midline or paraspinal tenderness.  She is limited range of motion with extension and sidebending to both sides.  Axial load and Spurling's are unremarkable.  Left shoulder noted for tenderness at the lateral aspect.  She has range of motion limited forward flexion actively to 30 degrees and passively to 90 degrees, abduction to 110 degrees, internal rotation to the lumbar spine.  She has normal strength in rotator cuff.  There is pain with empty can test and positive Gonzalez.  She has normal radial pulse both upper extremities.  Clinical impression is that she has symptoms from combination of aggravated AC arthritis and rotator tendinitis contributing to impingement.  I discussed treatment regimen steroid injection, supplements, and formal therapy.  Surgery is not warranted at this time.   I advised steroid injection may cause elevation blood sugar that may last for 1 week.  I administered mixture 3 cc 0.25% bupivacaine and 1 cc Kenalog to the left shoulder subacromial space without complication.  She may continue with topical diclofenac gel.  She is to take turmeric and tart cherry supplements.  She is to start formal therapy as soon as possible and do home exercises as directed.  She will follow-up as needed.          Subjective:   Patient ID: Pebbles Landon is a 79 y.o. female.  Chief Complaint   Patient presents with    Left Shoulder - Pain        79-year-old right-hand-dominant female presents evaluation left shoulder pain and limited range of motion 6 weeks duration.  She woke up 1 morning with pain described as sudden in onset, generalized to the shoulder, achy and sore, worse with moving the arm, associated with limited range of motion, and improved with resting.  Symptoms fluctuate in intensity with symptoms being severe.  She has difficulty elevating the arm above shoulder muscle tasks such as getting dressed and doing her hair are bothersome.  She has managed symptoms with being on meloxicam and also applying topical solutions to the area.  Symptoms worsened and she presented to urgent care.  X-ray evaluation was unremarkable for acute osseous abnormality.  She was advised on top of diclofenac and she was advised to follow-up with orthopedic care.    Shoulder Pain  This is a new problem. The current episode started more than 1 month ago. The problem occurs daily. The problem has been gradually worsening. Associated symptoms include arthralgias. Pertinent negatives include no joint swelling, numbness or weakness. The symptoms are aggravated by twisting (Arm elevation). She has tried rest, position changes, acetaminophen and NSAIDs (Topical diclofenac) for the symptoms. The treatment provided mild relief.           The following portions of the patient's history were reviewed and updated  as appropriate: She  has a past medical history of Asthma, Benign hypertension (11/30/2018), Cardiac arrest (HCC), Diabetes mellitus (HCC), Glaucoma, Hypertension, Kidney stone, Neuropathy, Sleep apnea, SOB (shortness of breath), Tubular adenoma of colon (10/2019), and Wheezing.  She is allergic to ciprofloxacin, levaquin [levofloxacin], milk (cow), penicillins, pork allergy - food allergy, shellfish allergy - food allergy, soy allergy - food allergy, and wheat bran - food allergy..    Review of Systems   Musculoskeletal:  Positive for arthralgias. Negative for joint swelling.   Neurological:  Negative for weakness and numbness.       Objective:  Vitals:    07/02/24 1551   BP: 131/77   Pulse: 59   Weight: 91.2 kg (201 lb)   Height: 5' (1.524 m)      Back Exam     Comments:    Cervical spine  - No tenderness  - Limited range of motion with extension and sidebending to both sides  - Negative axial load  - Negative Spurling's      Right Hand Exam     Muscle Strength   Wrist extension: 5/5   Wrist flexion: 5/5   : 5/5     Other   Sensation: normal  Pulse: present      Left Hand Exam     Muscle Strength   Wrist extension: 5/5   Wrist flexion: 5/5   :  5/5     Other   Sensation: normal  Pulse: present      Right Elbow Exam     Other   Sensation: normal      Left Elbow Exam     Tenderness   The patient is experiencing no tenderness.     Range of Motion   The patient has normal left elbow ROM.    Muscle Strength   The patient has normal left elbow strength (5/5 flexion and extension).    Other   Sensation: normal      Right Shoulder Exam     Other   Sensation: normal      Left Shoulder Exam     Tenderness   Left shoulder tenderness location: Lateral.    Range of Motion   Active abduction:  110   Forward flexion:  30 (Passive 90)   Internal rotation 0 degrees:  Lumbar     Muscle Strength   Abduction: 5/5   Internal rotation: 5/5   External rotation: 5/5   Supraspinatus: 5/5     Tests   Gonzalez test: positive    Other  "  Sensation: normal     Comments:  Pain with empty can test          Strength/Myotome Testing     Left Wrist/Hand   Wrist extension: 5  Wrist flexion: 5    Right Wrist/Hand   Wrist extension: 5  Wrist flexion: 5      Physical Exam  Vitals and nursing note reviewed.   Constitutional:       Appearance: Normal appearance. She is well-developed. She is not ill-appearing or diaphoretic.   HENT:      Head: Normocephalic and atraumatic.      Right Ear: External ear normal.      Left Ear: External ear normal.   Eyes:      Conjunctiva/sclera: Conjunctivae normal.   Neck:      Trachea: No tracheal deviation.   Cardiovascular:      Rate and Rhythm: Normal rate.   Pulmonary:      Effort: Pulmonary effort is normal. No respiratory distress.   Abdominal:      General: There is no distension.   Musculoskeletal:         General: Tenderness present.   Skin:     General: Skin is warm and dry.      Coloration: Skin is not jaundiced or pale.   Neurological:      Mental Status: She is alert and oriented to person, place, and time.   Psychiatric:         Mood and Affect: Mood normal.         Behavior: Behavior normal.         Thought Content: Thought content normal.         Judgment: Judgment normal.         I have personally reviewed pertinent films in PACS and my interpretation is  .  X-rays of the left shoulder noted for mild AC and degenerative joint degenerative changes.    Large joint arthrocentesis: L subacromial bursa  Universal Protocol:  Consent: Verbal consent obtained.  Risks and benefits: risks, benefits and alternatives were discussed  Consent given by: patient  Time out: Immediately prior to procedure a \"time out\" was called to verify the correct patient, procedure, equipment, support staff and site/side marked as required.  Patient understanding: patient states understanding of the procedure being performed  Patient consent: the patient's understanding of the procedure matches consent given  Procedure consent: procedure " "consent matches procedure scheduled  Relevant documents: relevant documents present and verified  Test results: test results available and properly labeled  Site marked: the operative site was marked  Radiology Images displayed and confirmed. If images not available, report reviewed: imaging studies available  Required items: required blood products, implants, devices, and special equipment available  Patient identity confirmed: verbally with patient  Supporting Documentation  Indications: pain   Procedure Details  Location: shoulder - L subacromial bursa  Preparation: Patient was prepped and draped in the usual sterile fashion  Needle gauge: 21G 2\"  Ultrasound guidance: no  Approach: lateral  Medications administered: 4 mL bupivacaine 0.25 %; 1 mL bupivacaine 0.25 %; 40 mg triamcinolone acetonide 40 mg/mL    Patient tolerance: patient tolerated the procedure well with no immediate complications  Dressing:  Sterile dressing applied           "

## 2024-07-09 ENCOUNTER — TELEPHONE (OUTPATIENT)
Age: 79
End: 2024-07-09

## 2024-07-09 DIAGNOSIS — J18.9 PNEUMONIA: ICD-10-CM

## 2024-07-09 DIAGNOSIS — J45.21 MILD INTERMITTENT ASTHMA WITH ACUTE EXACERBATION: ICD-10-CM

## 2024-07-09 NOTE — TELEPHONE ENCOUNTER
Caller: Patient     Doctor: Babak     Reason for call: Patient states she is in a lot of pain. Patient states she is using the heating pad but no relief. If you are sending medication to patient please send to [facility]     Call back#: [phone]

## 2024-07-10 RX ORDER — ALBUTEROL SULFATE 2.5 MG/3ML
2.5 SOLUTION RESPIRATORY (INHALATION) EVERY 6 HOURS PRN
Qty: 100 ML | Refills: 5 | Status: SHIPPED | OUTPATIENT
Start: 2024-07-10

## 2024-07-11 DIAGNOSIS — M19.90 ARTHRITIS: ICD-10-CM

## 2024-07-11 DIAGNOSIS — M75.82 TENDINITIS OF LEFT ROTATOR CUFF: Primary | ICD-10-CM

## 2024-07-11 DIAGNOSIS — F51.01 PRIMARY INSOMNIA: ICD-10-CM

## 2024-07-11 RX ORDER — MELOXICAM 15 MG/1
15 TABLET ORAL DAILY
Qty: 90 TABLET | Refills: 1 | Status: SHIPPED | OUTPATIENT
Start: 2024-07-11

## 2024-07-11 RX ORDER — LIDOCAINE 50 MG/G
1 PATCH TOPICAL DAILY
Qty: 30 PATCH | Refills: 1 | Status: SHIPPED | OUTPATIENT
Start: 2024-07-11

## 2024-07-12 RX ORDER — ZOLPIDEM TARTRATE 10 MG/1
10 TABLET ORAL
Qty: 30 TABLET | Refills: 0 | Status: SHIPPED | OUTPATIENT
Start: 2024-07-12

## 2024-07-18 ENCOUNTER — EVALUATION (OUTPATIENT)
Dept: PHYSICAL THERAPY | Facility: CLINIC | Age: 79
End: 2024-07-18
Payer: COMMERCIAL

## 2024-07-18 DIAGNOSIS — S16.1XXD CERVICAL STRAIN, SUBSEQUENT ENCOUNTER: ICD-10-CM

## 2024-07-18 DIAGNOSIS — M50.30 DEGENERATIVE DISC DISEASE, CERVICAL: ICD-10-CM

## 2024-07-18 DIAGNOSIS — M47.812 FACET ARTHROPATHY, CERVICAL: ICD-10-CM

## 2024-07-18 DIAGNOSIS — M19.012 ARTHRITIS OF LEFT ACROMIOCLAVICULAR JOINT: ICD-10-CM

## 2024-07-18 DIAGNOSIS — M62.838 TRAPEZIUS MUSCLE SPASM: ICD-10-CM

## 2024-07-18 DIAGNOSIS — M75.42 SUBACROMIAL IMPINGEMENT OF LEFT SHOULDER: Primary | ICD-10-CM

## 2024-07-18 PROCEDURE — 97110 THERAPEUTIC EXERCISES: CPT | Performed by: PHYSICAL THERAPIST

## 2024-07-18 PROCEDURE — 97112 NEUROMUSCULAR REEDUCATION: CPT | Performed by: PHYSICAL THERAPIST

## 2024-07-18 PROCEDURE — 97161 PT EVAL LOW COMPLEX 20 MIN: CPT | Performed by: PHYSICAL THERAPIST

## 2024-07-18 NOTE — PROGRESS NOTES
PT Evaluation     Today's date: 2024  Patient name: Pebbles Landon  : 1945  MRN: [id number]  Referring provider: Jon Hilliard*  Dx:   Encounter Diagnosis     ICD-10-CM    1. Subacromial impingement of left shoulder  M75.42 Ambulatory Referral to Physical Therapy      2. Arthritis of left acromioclavicular joint  M19.012 Ambulatory Referral to Physical Therapy      3. Trapezius muscle spasm  M62.838 Ambulatory Referral to Physical Therapy      4. Cervical strain, subsequent encounter  S16.1XXD Ambulatory Referral to Physical Therapy      5. Degenerative disc disease, cervical  M50.30 Ambulatory Referral to Physical Therapy      6. Facet arthropathy, cervical  M47.812 Ambulatory Referral to Physical Therapy          Start Time: 1100  Stop Time: 1145  Total time in clinic (min): 45 minutes    Assessment  Impairments: abnormal or restricted ROM, activity intolerance, impaired physical strength, lacks appropriate home exercise program and pain with function  Functional limitations: self-care/ADLs, household activities, reaching overhead, and liting objects    Assessment details: Pt is a 78 y/o female presenting to physical therapy with L shoulder pain and restrictions as well as pain in her cervical spine. Upon examination, pt presents with impairments of decreased strength, decreased ROM, and increased pain of pt's L shoulder and cervical spine resulting in limitations of self-care/ADLs, household activities, reaching overhead, and liting objects. Pt plan of care will focus on improving strength and ROM of pt's L shoulder and cervical spine as well as decreasing pain and improving tolerance to functional activities. Pt would benefit from skilled physical therapy to facilitate a return to her prior level of function.         Understanding of Dx/Px/POC: good     Prognosis: good    Goals  STG - 4 weeks  1. Pt will have a subjective decrease in pain of pt's L shoulder by 2 levels or more during  her household activities  2. Pt will demonstrate WNL AROM of pt's L shoulder in all planes to facilitate a return to her prior level of function  3. Pt will demonstrate WNL AROM of pt's cervical spine in all planes to facilitate a return to her prior level of function    LTG - 8 weeks  1. Pt will demonstrate 4/5 gross strength or better of pt's L shoulder in all planes to facilitate a return to his prior level of function  2. Pt's FOTO score will improve by 10 points or better to facilitate a return to pt's prior level of function.      Plan  Patient would benefit from: PT eval and skilled physical therapy  Planned modality interventions: cryotherapy, thermotherapy: hydrocollator packs and unattended electrical stimulation    Planned therapy interventions: activity modification, ADL training, behavior modification, flexibility, functional ROM exercises, graded exercise, home exercise program, IASTM, joint mobilization, kinesiology taping, manual therapy, massage, Chavira taping, nerve gliding, neuromuscular re-education, postural training, self care, strengthening, stretching, therapeutic activities and therapeutic exercise    Frequency: 1x week  Duration in weeks: 8  Plan of Care beginning date: 7/18/2024  Plan of Care expiration date: 9/12/2024        Subjective Evaluation    History of Present Illness  Mechanism of injury: Pt is a 80 y/o female presenting to physical therapy with L shoulder pain and restrictions as well as pain in her cervical spine. In mid May, pt reports waking up one morning with severe pain in her L shoulder as well as restrictions in moving her LUE. Pt reports going to the Urgent Care where she received medication and referral for orthopedics. Pt reports she was using a sling due to the intensity of pain in her L shoulder. Pt reports going to see Dr. Hilliard in July where she received an injection and medication. Pt reports she will have increased pain with laying on her L side, and  lifting or reaching with the LUE. Pt reports she will have decreased pain with heat, medication, and resting her LUE. Pt reports she currently has disturbed sleep now because of the pain.   Patient Goals  Patient goals for therapy: decreased pain, increased motion and increased strength    Pain  Current pain ratin  At best pain ratin  At worst pain ratin  Location: L shoulder  Quality: sharp, tight and discomfort  Relieving factors: medications, heat and rest  Aggravating factors: lifting and overhead activity  Progression: improved    Treatments  Previous treatment: medication  Current treatment: injection treatment, medication and physical therapy      Objective     Palpation   Left   Tenderness of the levator scapulae and upper trapezius.     Tenderness     Left Shoulder   Tenderness in the AC joint, acromion and supraspinatus tendon.     Active Range of Motion   Cervical/Thoracic Spine       Cervical    Flexion: 30 degrees  Restriction level: moderate  Extension: 25 degrees      Left lateral flexion: 15 degrees     Restriction level: moderate  Right lateral flexion: 15 degrees     Restriction level moderate  Left rotation: 60 degrees with pain Restriction level: moderate  Right rotation: 50 degrees    Restriction level: moderate  Left Shoulder   Flexion: 80 degrees with pain  Abduction: 70 degrees with pain  External rotation 45°: 20 degrees with pain  External rotation BTH: Active external rotation behind the head: unable.   Internal rotation 45°: 20 degrees with pain  Internal rotation BTB: sacrum     Passive Range of Motion   Left Shoulder   Flexion: 90 degrees with pain  Abduction: 80 degrees with pain    Joint Play   Left Shoulder  Hypomobile in the anterior capsule and posterior capsule.    Strength/Myotome Testing   Cervical Spine   Neck extension: 4-  Neck flexion: 4-    Left   Neck lateral flexion (C3): 4-    Right   Neck lateral flexion (C3): 4-    Left Shoulder     Planes of Motion    Flexion: 2-   Abduction: 2-   External rotation at 0°: 3+   Internal rotation at 0°: 3+     Isolated Muscles   Biceps: 4   Triceps: 4              Precautions: HTN, Diabetes    POC expires Unit limit Auth Expiration date PT/OT + Visit Limit?   9/12 N/A Pending BOMN                 Visit/Unit Tracking  AUTH Status:  Date 7/18              Pending Used 1               Remaining                  FOTO      Manuals 7/18            PROM L shoulder BEATRIS            Grade I/II post/inf mobilizations BEATRIS            GH distraction BEATRIS                         Neuro Re-Ed             Education on POC, diagnosis, and HEP 8'                                                                                          Ther Ex             pendulums 2x10 CW/CCW            Table slides 1x10                                                                                          Ther Activity                                       Gait Training                                       Modalities

## 2024-07-25 ENCOUNTER — APPOINTMENT (OUTPATIENT)
Dept: PHYSICAL THERAPY | Facility: CLINIC | Age: 79
End: 2024-07-25
Payer: COMMERCIAL

## 2024-07-29 ENCOUNTER — TELEPHONE (OUTPATIENT)
Age: 79
End: 2024-07-29

## 2024-07-30 ENCOUNTER — OFFICE VISIT (OUTPATIENT)
Dept: FAMILY MEDICINE CLINIC | Facility: CLINIC | Age: 79
End: 2024-07-30
Payer: COMMERCIAL

## 2024-07-30 VITALS
HEIGHT: 60 IN | TEMPERATURE: 97.8 F | BODY MASS INDEX: 37.89 KG/M2 | OXYGEN SATURATION: 92 % | HEART RATE: 86 BPM | WEIGHT: 193 LBS | SYSTOLIC BLOOD PRESSURE: 126 MMHG | DIASTOLIC BLOOD PRESSURE: 78 MMHG

## 2024-07-30 DIAGNOSIS — M25.512 CHRONIC LEFT SHOULDER PAIN: ICD-10-CM

## 2024-07-30 DIAGNOSIS — L03.012 PARONYCHIA OF THUMB, LEFT: Primary | ICD-10-CM

## 2024-07-30 DIAGNOSIS — G89.29 CHRONIC LEFT SHOULDER PAIN: ICD-10-CM

## 2024-07-30 PROCEDURE — 99213 OFFICE O/P EST LOW 20 MIN: CPT | Performed by: NURSE PRACTITIONER

## 2024-07-30 PROCEDURE — G2211 COMPLEX E/M VISIT ADD ON: HCPCS | Performed by: NURSE PRACTITIONER

## 2024-07-30 RX ORDER — SULFAMETHOXAZOLE/TRIMETHOPRIM 800-160 MG
1 TABLET ORAL 2 TIMES DAILY
Qty: 14 TABLET | Refills: 0 | Status: SHIPPED | OUTPATIENT
Start: 2024-07-30 | End: 2024-08-06

## 2024-07-30 RX ORDER — MUPIROCIN 20 MG/G
OINTMENT TOPICAL 3 TIMES DAILY
Qty: 30 G | Refills: 1 | Status: SHIPPED | OUTPATIENT
Start: 2024-07-30 | End: 2024-08-09

## 2024-07-30 NOTE — PROGRESS NOTES
Ambulatory Visit  Name: Pebbles Landon      : 1945      MRN: [id number]  Encounter Provider: ALEKSANDER Loera  Encounter Date: 2024   Encounter department: [facility]    Assessment & Plan   1. Paronychia of thumb, left  -     sulfamethoxazole-trimethoprim (BACTRIM DS) 800-160 mg per tablet; Take 1 tablet by mouth 2 (two) times a day for 7 days  -     mupirocin (BACTROBAN) 2 % ointment; Apply topically 3 (three) times a day for 10 days  2. Chronic left shoulder pain      Falls Plan of Care: balance, strength, and gait training instructions were provided. Recommended assistive device to help with gait and balance. Medications that increase falls were reviewed. Vitamin D supplementation was recommended. Home safety education provided.       History of Present Illness     Pt is a 79 yr old female   Presents in office for left thumb slight swelling and redness   She is Diabetic   Here to get evaluated         Review of Systems   Constitutional:  Negative for fatigue and unexpected weight change.   HENT:  Negative for congestion, nosebleeds and sore throat.    Respiratory:  Negative for cough and shortness of breath.    Cardiovascular:  Negative for chest pain and palpitations.   Gastrointestinal:  Negative for abdominal distention and nausea.   Endocrine:        Diabetes    Skin:         Left thumb paronychia        Objective     /78 (BP Location: Right arm, Patient Position: Sitting, Cuff Size: Large)   Pulse 86   Temp 97.8 °F (36.6 °C)   Ht 5' (1.524 m)   Wt 87.5 kg (193 lb)   SpO2 92%   BMI 37.69 kg/m²     Physical Exam  Vitals and nursing note reviewed.   Constitutional:       Comments: BMI 37.69   HENT:      Head: Normocephalic and atraumatic.   Eyes:      Extraocular Movements: Extraocular movements intact.   Cardiovascular:      Rate and Rhythm: Normal rate and regular rhythm.      Pulses: Normal pulses.      Heart sounds: Normal heart sounds.    Pulmonary:      Effort: Pulmonary effort is normal.      Breath sounds: Normal breath sounds.   Abdominal:      Palpations: Abdomen is soft.   Musculoskeletal:      Cervical back: Normal range of motion.      Right lower leg: No edema.      Left lower leg: No edema.   Skin:     General: Skin is warm.      Findings: Erythema (swelling and errythema to left thumb) present.   Neurological:      Mental Status: She is alert and oriented to person, place, and time.   Psychiatric:         Mood and Affect: Mood normal.         Behavior: Behavior normal.       Administrative Statements   I have spent a total time of 25  minutes in caring for this patient on the day of the visit/encounter including Prognosis, Risks and benefits of tx options, Instructions for management, Patient and family education, Importance of tx compliance, Risk factor reductions, Impressions, Counseling / Coordination of care, Documenting in the medical record, Reviewing / ordering tests, medicine, procedures  , Obtaining or reviewing history  , Communicating with other healthcare professionals , and SHE IS AWARE IF INCREASED SWELLING AND PAIN ER TO BE DRAINED  .

## 2024-08-08 DIAGNOSIS — J45.990 EXERCISE INDUCED BRONCHOSPASM: ICD-10-CM

## 2024-08-08 DIAGNOSIS — I10 PRIMARY HYPERTENSION: ICD-10-CM

## 2024-08-08 DIAGNOSIS — I16.0 HYPERTENSIVE URGENCY: ICD-10-CM

## 2024-08-08 DIAGNOSIS — F51.01 PRIMARY INSOMNIA: ICD-10-CM

## 2024-08-08 DIAGNOSIS — R06.09 DOE (DYSPNEA ON EXERTION): ICD-10-CM

## 2024-08-08 RX ORDER — MONTELUKAST SODIUM 10 MG/1
TABLET ORAL
Qty: 90 TABLET | Refills: 1 | Status: SHIPPED | OUTPATIENT
Start: 2024-08-08

## 2024-08-08 RX ORDER — AMLODIPINE BESYLATE 10 MG/1
10 TABLET ORAL
Qty: 90 TABLET | Refills: 1 | Status: SHIPPED | OUTPATIENT
Start: 2024-08-08

## 2024-08-08 RX ORDER — CARVEDILOL 6.25 MG/1
6.25 TABLET ORAL 2 TIMES DAILY WITH MEALS
Qty: 180 TABLET | Refills: 1 | Status: SHIPPED | OUTPATIENT
Start: 2024-08-08

## 2024-08-09 ENCOUNTER — TELEPHONE (OUTPATIENT)
Dept: FAMILY MEDICINE CLINIC | Facility: CLINIC | Age: 79
End: 2024-08-09

## 2024-08-09 RX ORDER — ZOLPIDEM TARTRATE 10 MG/1
10 TABLET ORAL
Qty: 30 TABLET | Refills: 0 | Status: SHIPPED | OUTPATIENT
Start: 2024-08-09

## 2024-08-09 NOTE — TELEPHONE ENCOUNTER
QWAReESTINE    Patient would like to know if she can get another refill of the below medication for her (L thumb) it is very sore and tender still to the touch.    sulfamethoxazole-trimethoprim (BACTRIM DS) 800-160 mg per tablet     Pharmacy:  [facility][id number] - [place] - [address]  [address] [facility] [address]  Phone: [phone]  Fax: [phone]  MILAGRO #: [id number]     CB: [phone]

## 2024-08-12 NOTE — TELEPHONE ENCOUNTER
LMOM for patient to call back and check status of this issue    If she is still experiencing swelling and pain, I did advise she needs to go to ER or urgent care in the voicemail but reiterated for her to call us back for an update

## 2024-08-13 DIAGNOSIS — E11.40 CONTROLLED TYPE 2 DIABETES WITH NEUROPATHY (HCC): ICD-10-CM

## 2024-08-13 DIAGNOSIS — M25.512 CHRONIC LEFT SHOULDER PAIN: ICD-10-CM

## 2024-08-13 DIAGNOSIS — G89.29 CHRONIC LEFT SHOULDER PAIN: ICD-10-CM

## 2024-08-13 DIAGNOSIS — J45.21 MILD INTERMITTENT ASTHMA WITH ACUTE EXACERBATION: ICD-10-CM

## 2024-08-14 RX ORDER — FLUTICASONE PROPIONATE AND SALMETEROL 113; 14 UG/1; UG/1
POWDER, METERED RESPIRATORY (INHALATION)
Qty: 1 EACH | Refills: 3 | Status: SHIPPED | OUTPATIENT
Start: 2024-08-14

## 2024-08-14 RX ORDER — SITAGLIPTIN AND METFORMIN HYDROCHLORIDE 1000; 100 MG/1; MG/1
1 TABLET, FILM COATED, EXTENDED RELEASE ORAL
Qty: 30 TABLET | Refills: 0 | Status: SHIPPED | OUTPATIENT
Start: 2024-08-14

## 2024-08-14 NOTE — TELEPHONE ENCOUNTER
Patient needs updated blood work and has previously placed orders. Please contact patient to go for labs. Courtesy refill provided.     Patient needs A1C

## 2024-08-21 ENCOUNTER — TELEPHONE (OUTPATIENT)
Age: 79
End: 2024-08-21

## 2024-08-21 ENCOUNTER — TELEMEDICINE (OUTPATIENT)
Dept: FAMILY MEDICINE CLINIC | Facility: CLINIC | Age: 79
End: 2024-08-21
Payer: COMMERCIAL

## 2024-08-21 ENCOUNTER — NURSE TRIAGE (OUTPATIENT)
Age: 79
End: 2024-08-21

## 2024-08-21 DIAGNOSIS — F41.8 DEPRESSION WITH ANXIETY: ICD-10-CM

## 2024-08-21 DIAGNOSIS — G47.09 OTHER INSOMNIA: Primary | ICD-10-CM

## 2024-08-21 PROBLEM — F41.9 ANXIETY DISORDER, UNSPECIFIED: Status: ACTIVE | Noted: 2022-04-19

## 2024-08-21 PROBLEM — K63.9 BOWEL TROUBLE: Status: ACTIVE | Noted: 2017-09-13

## 2024-08-21 PROBLEM — G62.9 NEUROPATHY: Status: ACTIVE | Noted: 2024-08-21

## 2024-08-21 PROBLEM — N39.46 MIXED INCONTINENCE URGE AND STRESS: Status: ACTIVE | Noted: 2017-12-15

## 2024-08-21 PROBLEM — M19.90 ARTHRITIS: Status: ACTIVE | Noted: 2017-09-13

## 2024-08-21 PROBLEM — E55.9 VITAMIN D DEFICIENCY: Status: ACTIVE | Noted: 2018-10-19

## 2024-08-21 PROBLEM — K21.9 GASTRO-ESOPHAGEAL REFLUX DISEASE WITHOUT ESOPHAGITIS: Status: ACTIVE | Noted: 2022-04-19

## 2024-08-21 PROBLEM — R74.01 ELEVATION OF LEVELS OF LIVER TRANSAMINASE LEVELS: Status: ACTIVE | Noted: 2022-04-19

## 2024-08-21 PROBLEM — K57.92 DIVERTICULITIS OF INTESTINE, PART UNSPECIFIED, WITHOUT PERFORATION OR ABSCESS WITHOUT BLEEDING: Status: ACTIVE | Noted: 2022-04-19

## 2024-08-21 PROBLEM — N32.81 OVERACTIVE BLADDER: Status: ACTIVE | Noted: 2017-12-15

## 2024-08-21 PROCEDURE — 99213 OFFICE O/P EST LOW 20 MIN: CPT | Performed by: NURSE PRACTITIONER

## 2024-08-21 PROCEDURE — G2211 COMPLEX E/M VISIT ADD ON: HCPCS | Performed by: NURSE PRACTITIONER

## 2024-08-21 RX ORDER — TRAZODONE HYDROCHLORIDE 50 MG/1
50 TABLET, FILM COATED ORAL
Qty: 30 TABLET | Refills: 0 | Status: SHIPPED | OUTPATIENT
Start: 2024-08-21 | End: 2024-08-29 | Stop reason: SINTOL

## 2024-08-21 NOTE — TELEPHONE ENCOUNTER
"Pt warm transferred by [facility].  Pt has complained about feeling depressed for the past week.  Pt had wanted to be seen in office today with PCP but no availability.  Pt had requested a call back from PCP until this RN had offered appt with ALEKSANDER Khan.  Pt requested virtual visit.      Pt admits she has some stressors in her life that are making her feel depressed but did not explain what those were.  Pt states the last time she felt like this was 15-20 years ago. Pt states she is still taking her sertraline.  Pt lives with her  but feels he doesn't take her feelings seriously.  Pt states her  took her out for a drive yesterday and she just felt like crying the entire time.  Pt denies SI/HI.    Pt scheduled to see ALEKSANDER Khan today virtually 8/21/24 at 1400.    Reason for Disposition   Depression is worsening (e.g.,sleeping poorly, less able to do activities of daily living)    Answer Assessment - Initial Assessment Questions  1. CONCERN: \"What happened that made you call today?\"      Depression for past few days.    2. DEPRESSION SYMPTOM SCREENING: \"How are you feeling overall?\" (e.g., decreased energy, increased sleeping or difficulty sleeping, difficulty concentrating, feelings of sadness, guilt, hopelessness, or worthlessness)      Difficulty sleeping, low energy  3. RISK OF HARM - SUICIDAL IDEATION:  \"Do you ever have thoughts of hurting or killing yourself?\"  (e.g., yes, no, no but preoccupation with thoughts about death)    - INTENT:  \"Do you have thoughts of hurting or killing yourself right NOW?\" (e.g., yes, no, N/A) No    - PLAN: \"Do you have a specific plan for how you would do this?\" (e.g., gun, knife, overdose, no plan, N/A)      Denies    4. RISK OF HARM - HOMICIDAL IDEATION:  \"Do you ever have thoughts of hurting or killing someone else?\"  (e.g., yes, no, no but preoccupation with thoughts about death)    - INTENT:  \"Do you have thoughts of hurting or killing someone right NOW?\" (e.g., " "yes, no, N/A)No    - PLAN: \"Do you have a specific plan for how you would do this?\" (e.g., gun, knife, no plan, N/A)       Denies    5. FUNCTIONAL IMPAIRMENT: \"How have things been going for you overall? Have you had more difficulty than usual doing your normal daily activities?\"  (e.g., better, same, worse; self-care, school, work, interactions)      Just has no energy.  No appetite but is making herself eat d/t medication.    6. SUPPORT: \"Who is with you now?\" \"Who do you live with?\" \"Do you have family or friends who you can talk to?\"       Pt has her  with her.      7. THERAPIST: \"Do you have a counselor or therapist? Name?\"      Denies.  Pt doesn't have one.    8. STRESSORS: \"Has there been any new stress or recent changes in your life?\"      Pt states that there are but hasn't expounded on what those are.    9. ALCOHOL USE OR SUBSTANCE USE (DRUG USE): \"Do you drink alcohol or use any illegal drugs?\"      Denies    10. OTHER: \"Do you have any other physical symptoms right now?\" (e.g., fever)        No appetite, Diarrhea, nausea, HA,    Protocols used: Depression-ADULT-[place]    "

## 2024-08-21 NOTE — TELEPHONE ENCOUNTER
Pt called c/o feeling very down and depressed, crying a lot and just cannot snap herself out of it. Pt states she does not have any plans to hurt herself or anyone else.  Pt requesting an appointment today but nothing available. Pt requested a call back from [facility] but agreed to speak with nurse triage.    Warm transfer to Nurse triage

## 2024-08-23 ENCOUNTER — TELEPHONE (OUTPATIENT)
Dept: FAMILY MEDICINE CLINIC | Facility: CLINIC | Age: 79
End: 2024-08-23

## 2024-08-23 NOTE — TELEPHONE ENCOUNTER
Spoke with patient.  Started taking the Zoloft.  Did not start taking the trazodone had Ambien .  Education provided.  Stop Ambien start trazodone.  Discussed self-care.  Discussed nonpharmacological intervention for management of anxiety.  Patient has follow-up appointment

## 2024-08-26 ENCOUNTER — TELEPHONE (OUTPATIENT)
Age: 79
End: 2024-08-26

## 2024-08-26 ENCOUNTER — NURSE TRIAGE (OUTPATIENT)
Age: 79
End: 2024-08-26

## 2024-08-26 NOTE — TELEPHONE ENCOUNTER
"Pt called back stating she was prescribed Trazodone about a week ago but didn't start taking it till Friday night.  Pt states she took it Friday till last night.  She states last night she went to bed around 10:30pm and was still awake at 4:30am.  Pt then took Melatonin 10mg at 5am but only slept for about an hour.  Pt states she was already awake at 6:30am.  Pt states when she was on Ambien 10mg she only got about 3 hours of sleep.  Pt denies use of Alcohol, Caffiene and states she doesn't take any naps during the day.  Pt states she feels \"lousy\" and doesn't want to get up despite being dressed and ready for the day.    Pt does admit to having some new stressors with some home issues like her refrigerator and boiler going out.  Preferred pharmacy pended.  Please review and advise how pt should proceed and if new medication could be sent in.      Reason for Disposition   Insomnia is an ongoing problem (> 2 weeks)    Answer Assessment - Initial Assessment Questions  1. DESCRIPTION: \"Tell me about your sleeping problem.\"       Not new.    2. ONSET: \"How long have you been having trouble sleeping?\" (e.g., days, weeks, months)      About 4-5 days    3. RECURRENT: \"Have you had sleeping problems before?\"  If Yes, ask: \"What happened that time?\" \"What helped your sleeping problem go away in the past?\"       Yes.  Pt states Ambien 10mg will only give her about 3 hours sleep.    4. STRESS: \"Is there anything in your life that is making you feel stressed or tense?\"      Pt had has a lot of things \"going on in the house.\" Refrigerator went, Boiler went.     5. PAIN: \"Do you have any pain that is keeping you awake?\" (e.g., back pain, headache, abdominal pain)      Shoulder pain that's hurting.    6. CAFFEINE ABUSE: \"Do you drink caffeinated beverages, and how much each day?\" (e.g., coffee, tea, sandra)      Denies.    7. ALCOHOL USE OR SUBSTANCE USE (DRUG USE): \"Do you drink alcohol or use any illegal drugs?\"      Denies    8. " "OTHER SYMPTOMS: \"Do you have any other symptoms?\"  (e.g., difficulty breathing)      Pt states she was going to the bathroom a lot but she also drinks a lot of water as well.    Protocols used: Insomnia-ADULT-[place]    "

## 2024-08-26 NOTE — PROGRESS NOTES
Virtual Regular Visit- pt of [place]   Name: Pebbles Landon      : 1945      MRN: [id number]  Encounter Provider: ALEKSANDER Loera  Encounter Date: 2024   Encounter department: [facility] [place]    Verification of patient location:    Patient is located at Home in the following state in which I hold an active license [place]    Pt is a 79 yr old female   Presents via virtual visit - she is pt of Jovana from sister office   Here for worsening depression anxiety - increased stressors - finances etc denies any suicidal or homicidal ideations     Assessment & Plan   1. Other insomnia  Comments:  ambien is not working   will dioscontinue and start Trazadone   discussed with PCP  2. Depression with anxiety  Comments:  Increase zoloft 75 mg po daily  Orders:  -     sertraline (ZOLOFT) 50 mg tablet; Take 1.5 tablets (75 mg total) by mouth daily  -     traZODone (DESYREL) 50 mg tablet; Take 1 tablet (50 mg total) by mouth daily at bedtime      Falls Plan of Care: balance, strength, and gait training instructions were provided. Recommended assistive device to help with gait and balance. Medications that increase falls were reviewed. Assessed feet and footwear. Vitamin D supplementation was recommended. Home safety education provided.         Encounter provider ALEKSANDER Loera    The patient was identified by name and date of birth. Pebbles Landon was informed that this is a telemedicine visit and that the visit is being conducted through the Epic Embedded platform. She agrees to proceed..  My office door was closed. No one else was in the room.  She acknowledged consent and understanding of privacy and security of the video platform. The patient has agreed to participate and understands they can discontinue the visit at any time.    Patient is aware this is a billable service.     History of Present Illness     Pt is a 79 yr old female   Presents via virtual visit - she is pt of  Jovana from sister office   Here for worsening depression anxiety - increased stressors - finances etc denies any suicidal or homicidal ideations         Review of Systems   Constitutional:  Positive for fatigue. Negative for fever and unexpected weight change.   HENT:  Negative for congestion and postnasal drip.    Eyes: Negative.    Respiratory:  Negative for cough and shortness of breath.    Cardiovascular:  Negative for chest pain and palpitations.   Gastrointestinal:  Negative for nausea.   Neurological:  Negative for headaches.   Psychiatric/Behavioral:  Positive for sleep disturbance. Negative for suicidal ideas. The patient is nervous/anxious.      Pertinent Medical History   Reviewed       Medical History Reviewed by provider this encounter:  Tobacco  Allergies  Meds  Problems  Med Hx  Surg Hx  Fam Hx       Past Medical History   Past Medical History:   Diagnosis Date    Asthma     Benign hypertension 2018    Cardiac arrest (HCC)     Diabetes mellitus (HCC)     Glaucoma     Hypertension     Kidney stone     Neuropathy     Sleep apnea     no machine    SOB (shortness of breath)     Tubular adenoma of colon 10/2019    Wheezing      Past Surgical History:   Procedure Laterality Date     SECTION      COLONOSCOPY      HYSTERECTOMY  1985    IR BIOPSY BONE MARROW  2022    TONSILLECTOMY       Family History   Problem Relation Age of Onset    Diabetes Mother     Hypertension Father     Prostate cancer Father     Diabetes Sister     Hypertension Sister     Breast cancer Sister 58    No Known Problems Sister     No Known Problems Daughter     No Known Problems Daughter     No Known Problems Daughter     No Known Problems Maternal Grandmother     No Known Problems Paternal Grandmother     Diabetes Brother     Hypertension Brother     No Known Problems Maternal Aunt     Breast cancer Maternal Aunt 62    No Known Problems Maternal Aunt     No Known Problems Maternal Aunt     Pancreatic cancer  Paternal Aunt     No Known Problems Paternal Aunt     No Known Problems Paternal Aunt     Asthma Family     Colon cancer Neg Hx     Ovarian cancer Neg Hx     Uterine cancer Neg Hx     Cervical cancer Neg Hx      Current Outpatient Medications on File Prior to Visit   Medication Sig Dispense Refill    acetaminophen (TYLENOL) 325 mg tablet Take 2 tablets (650 mg total) by mouth every 4 (four) hours as needed for mild pain (Patient taking differently: Take 650 mg by mouth every 6 (six) hours as needed for mild pain)  0    albuterol (2.5 mg/3 mL) 0.083 % nebulizer solution Take 3 mL (2.5 mg total) by nebulization every 6 (six) hours as needed for wheezing or shortness of breath 100 mL 5    albuterol (PROVENTIL HFA,VENTOLIN HFA) 90 mcg/act inhaler INHALE 1 PUFF BY MOUTH EVERY 6 HOURS AS NEEDED FOR WHEEZE 18 g 3    amLODIPine (NORVASC) 10 mg tablet TAKE 1 TABLET BY MOUTH EVERYDAY AT BEDTIME 90 tablet 1    carvedilol (COREG) 6.25 mg tablet TAKE 1 TABLET BY MOUTH TWICE A DAY WITH FOOD 180 tablet 1    Diclofenac Sodium (VOLTAREN) 1 % APPLY 2 GRAMS TOPICALLY 4 (FOUR) TIMES A DAY FOR 10 DAYS 350 g 3    fluticasone-salmeterol (AIRDUO RESPICLICK) 113-14 mcg/act dry powder inhaler INHALE 1 PUFF 2 TIMES A DAY RINSE MOUTH AFTER USE. 1 each 3    glucose blood test strip       latanoprost (XALATAN) 0.005 % ophthalmic solution       Lyrica  MG TB24 Take 1 tablet by mouth 2 (two) times a day 180 tablet 1    montelukast (SINGULAIR) 10 mg tablet TAKE 1 TABLET BY MOUTH EVERYDAY AT BEDTIME 90 tablet 1    mupirocin (BACTROBAN) 2 % ointment Apply topically 3 (three) times a day for 10 days 30 g 1    ondansetron (Zofran ODT) 4 mg disintegrating tablet Take 1 tablet (4 mg total) by mouth every 6 (six) hours as needed for nausea or vomiting 25 tablet 0    SITagliptin-metFORMIN HCl ER (Janumet XR) 100-1000 MG TB24 TAKE 1 TABLET BY MOUTH EVERY DAY WITH DINNER 30 tablet 0    sucralfate (CARAFATE) 1 g tablet Take 1 tablet (1 g total) by mouth  4 (four) times a day for 14 days 56 tablet 0    traMADol (Ultram) 50 mg tablet Take 1 tablet (50 mg total) by mouth every 6 (six) hours as needed for severe pain 30 tablet 0     No current facility-administered medications on file prior to visit.     Allergies   Allergen Reactions    Ciprofloxacin Itching    Levaquin [Levofloxacin] Swelling    Milk (Cow) GI Intolerance    Penicillins GI Intolerance    Pork Allergy - Food Allergy GI Intolerance    Shellfish Allergy - Food Allergy GI Intolerance    Soy Allergy - Food Allergy GI Intolerance    Wheat Bran - Food Allergy GI Intolerance      Current Outpatient Medications on File Prior to Visit   Medication Sig Dispense Refill    acetaminophen (TYLENOL) 325 mg tablet Take 2 tablets (650 mg total) by mouth every 4 (four) hours as needed for mild pain (Patient taking differently: Take 650 mg by mouth every 6 (six) hours as needed for mild pain)  0    albuterol (2.5 mg/3 mL) 0.083 % nebulizer solution Take 3 mL (2.5 mg total) by nebulization every 6 (six) hours as needed for wheezing or shortness of breath 100 mL 5    albuterol (PROVENTIL HFA,VENTOLIN HFA) 90 mcg/act inhaler INHALE 1 PUFF BY MOUTH EVERY 6 HOURS AS NEEDED FOR WHEEZE 18 g 3    amLODIPine (NORVASC) 10 mg tablet TAKE 1 TABLET BY MOUTH EVERYDAY AT BEDTIME 90 tablet 1    carvedilol (COREG) 6.25 mg tablet TAKE 1 TABLET BY MOUTH TWICE A DAY WITH FOOD 180 tablet 1    Diclofenac Sodium (VOLTAREN) 1 % APPLY 2 GRAMS TOPICALLY 4 (FOUR) TIMES A DAY FOR 10 DAYS 350 g 3    fluticasone-salmeterol (AIRDUO RESPICLICK) 113-14 mcg/act dry powder inhaler INHALE 1 PUFF 2 TIMES A DAY RINSE MOUTH AFTER USE. 1 each 3    glucose blood test strip       latanoprost (XALATAN) 0.005 % ophthalmic solution       Lyrica  MG TB24 Take 1 tablet by mouth 2 (two) times a day 180 tablet 1    montelukast (SINGULAIR) 10 mg tablet TAKE 1 TABLET BY MOUTH EVERYDAY AT BEDTIME 90 tablet 1    mupirocin (BACTROBAN) 2 % ointment Apply topically 3  (three) times a day for 10 days 30 g 1    ondansetron (Zofran ODT) 4 mg disintegrating tablet Take 1 tablet (4 mg total) by mouth every 6 (six) hours as needed for nausea or vomiting 25 tablet 0    SITagliptin-metFORMIN HCl ER (Janumet XR) 100-1000 MG TB24 TAKE 1 TABLET BY MOUTH EVERY DAY WITH DINNER 30 tablet 0    sucralfate (CARAFATE) 1 g tablet Take 1 tablet (1 g total) by mouth 4 (four) times a day for 14 days 56 tablet 0    traMADol (Ultram) 50 mg tablet Take 1 tablet (50 mg total) by mouth every 6 (six) hours as needed for severe pain 30 tablet 0     No current facility-administered medications on file prior to visit.      Social History     Tobacco Use    Smoking status: Never     Passive exposure: Never    Smokeless tobacco: Never   Vaping Use    Vaping status: Never Used   Substance and Sexual Activity    Alcohol use: Never    Drug use: No    Sexual activity: Yes     Birth control/protection: Surgical     Objective     There were no vitals taken for this visit.  Physical Exam  Constitutional:       Comments: Depressed tearful    Pulmonary:      Effort: Pulmonary effort is normal.   Neurological:      Mental Status: She is alert and oriented to person, place, and time.   Psychiatric:         Mood and Affect: Mood normal.         Behavior: Behavior normal.         Visit Time  Total Visit Duration: 35 min

## 2024-08-26 NOTE — TELEPHONE ENCOUNTER
Patient called in stated she has not been sleeping. Provider is not in office today, called [facility] they will not see pt for med change unless it's cold etc they will see. Tried calling nurse line they are not available sent a message in call hub since the patient stated she need to speak to someone today.

## 2024-08-26 NOTE — TELEPHONE ENCOUNTER
----- Message from Chato CEDENO sent at 8/26/2024 10:33 AM EDT -----  Patient stated she is not feeling well no sleep.  PCP not in office today no other provider in office call [facility]   They will not see pt since this is regarding med change. Patient stated she needs to speak with someone today.

## 2024-08-29 ENCOUNTER — OFFICE VISIT (OUTPATIENT)
Dept: FAMILY MEDICINE CLINIC | Facility: CLINIC | Age: 79
End: 2024-08-29
Payer: COMMERCIAL

## 2024-08-29 VITALS
HEART RATE: 71 BPM | HEIGHT: 60 IN | SYSTOLIC BLOOD PRESSURE: 118 MMHG | WEIGHT: 194 LBS | OXYGEN SATURATION: 96 % | TEMPERATURE: 97.9 F | RESPIRATION RATE: 14 BRPM | BODY MASS INDEX: 38.09 KG/M2 | DIASTOLIC BLOOD PRESSURE: 70 MMHG

## 2024-08-29 DIAGNOSIS — R21 RASH IN ADULT: ICD-10-CM

## 2024-08-29 DIAGNOSIS — E55.9 HYPOVITAMINOSIS D: ICD-10-CM

## 2024-08-29 DIAGNOSIS — G47.00 INSOMNIA, UNSPECIFIED TYPE: ICD-10-CM

## 2024-08-29 DIAGNOSIS — N18.31 TYPE 2 DIABETES MELLITUS WITH STAGE 3A CHRONIC KIDNEY DISEASE, WITHOUT LONG-TERM CURRENT USE OF INSULIN (HCC): Primary | ICD-10-CM

## 2024-08-29 DIAGNOSIS — L20.89 OTHER ATOPIC DERMATITIS: ICD-10-CM

## 2024-08-29 DIAGNOSIS — E23.6 EMPTY SELLA (HCC): ICD-10-CM

## 2024-08-29 DIAGNOSIS — E66.01 OBESITY, MORBID (HCC): ICD-10-CM

## 2024-08-29 DIAGNOSIS — N18.31 STAGE 3A CHRONIC KIDNEY DISEASE (HCC): ICD-10-CM

## 2024-08-29 DIAGNOSIS — E11.40 CONTROLLED TYPE 2 DIABETES WITH NEUROPATHY (HCC): ICD-10-CM

## 2024-08-29 DIAGNOSIS — F51.01 PRIMARY INSOMNIA: ICD-10-CM

## 2024-08-29 DIAGNOSIS — E11.22 TYPE 2 DIABETES MELLITUS WITH STAGE 3A CHRONIC KIDNEY DISEASE, WITHOUT LONG-TERM CURRENT USE OF INSULIN (HCC): Primary | ICD-10-CM

## 2024-08-29 DIAGNOSIS — I10 PRIMARY HYPERTENSION: ICD-10-CM

## 2024-08-29 PROBLEM — I20.89 ANGINAL EQUIVALENT: Status: RESOLVED | Noted: 2023-02-02 | Resolved: 2024-08-29

## 2024-08-29 LAB — SL AMB POCT HEMOGLOBIN AIC: 6.6 (ref ?–6.5)

## 2024-08-29 PROCEDURE — 83036 HEMOGLOBIN GLYCOSYLATED A1C: CPT | Performed by: NURSE PRACTITIONER

## 2024-08-29 PROCEDURE — 99214 OFFICE O/P EST MOD 30 MIN: CPT | Performed by: NURSE PRACTITIONER

## 2024-08-29 RX ORDER — QUETIAPINE FUMARATE 100 MG/1
100 TABLET, FILM COATED ORAL
Qty: 30 TABLET | Refills: 0 | Status: SHIPPED | OUTPATIENT
Start: 2024-08-29 | End: 2024-09-28

## 2024-08-29 RX ORDER — HYDROCORTISONE 2.5 %
CREAM (GRAM) TOPICAL 2 TIMES DAILY
Qty: 30 G | Refills: 0 | Status: SHIPPED | OUTPATIENT
Start: 2024-08-29

## 2024-08-29 NOTE — ASSESSMENT & PLAN NOTE
Patient has had lots of difficulty with insomnia in the past few weeks.  Reports situational anxiety.  Has done nonpharmacological interventions to manage insomnia started taking trazodone has side effects of nightmares.  Has also started taking Zoloft.  Will stop trazodone will start Seroquel May use chamomile tea, sleepy time tea.

## 2024-08-29 NOTE — PATIENT INSTRUCTIONS
Take seroquel at [facility]  Stop trazodone  Continue zolft  Maintain good nutrition  Get labs done  Obtain fasting labs, nothing to eat after 8pm , may drink water.

## 2024-08-29 NOTE — ASSESSMENT & PLAN NOTE
Lab Results   Component Value Date    HGBA1C 6.6 (A) 08/29/2024   Hemoglobin A1c well-controlled.  Continue medication.  Continue low carbohydrate diet

## 2024-08-29 NOTE — PROGRESS NOTES
Ambulatory Visit  Name: Pebbles Landon      : 1945      MRN: [id number]  Encounter Provider: ALEKSANDER Manning  Encounter Date: 2024   Encounter department: [facility]    Assessment & Plan   1. Type 2 diabetes mellitus with stage 3a chronic kidney disease, without long-term current use of insulin (HCC)  Assessment & Plan:    Lab Results   Component Value Date    HGBA1C 6.6 (A) 2024   Hemoglobin A1c well-controlled.  Continue medication.  Continue low carbohydrate diet  2. 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)  -     POCT hemoglobin A1c  -     Hemoglobin A1C; Future; Expected date: 2024  -     Comprehensive metabolic panel; Future; Expected date: 2024  -     CBC and differential; Future; Expected date: 2024  -     Albumin / creatinine urine ratio; Future; Expected date: 2024  -     Lipid Panel with Direct LDL reflex; Future; Expected date: 2024  -     TSH, 3rd generation with Free T4 reflex; Future; Expected date: 2024  3. Rash in adult  -     Food Allergy Profile; Future  -     [facility] Allergy Panel, Adult; Future  4. Empty sella ([facility])  5. Obesity, morbid ([facility])  6. Stage 3a chronic kidney disease (HCC)  7. Other atopic dermatitis  -     Food Allergy Profile; Future  -     [facility] Allergy Panel, Adult; Future  -     hydrocortisone 2.5 % cream; Apply topically 2 (two) times a day  8. Insomnia, unspecified type  -     QUEtiapine (SEROquel) 100 mg tablet; Take 1 tablet (100 mg total) by mouth daily at bedtime  9. Hypovitaminosis D  -     Vitamin D 25 hydroxy; Future  10. Primary hypertension  Assessment & Plan:  Blood pressure is at goal.  Continue medication continue heart healthy diet  11. Primary insomnia  Assessment & Plan:  Patient has had lots of difficulty with insomnia in the past few weeks.  Reports situational anxiety.  Has done nonpharmacological interventions to manage insomnia started taking trazodone has  side effects of nightmares.  Has also started taking Zoloft.  Will stop trazodone will start Seroquel May use chamomile tea, sleepy time tea.       History of Present Illness     Patient is here with continued complaints of insomnia.  Has dermatitis that has not been resolving.  Resolved.  Anxiety due to several situations.        Review of Systems   Constitutional: Negative.    HENT: Negative.     Eyes: Negative.    Respiratory: Negative.     Cardiovascular: Negative.    Gastrointestinal: Negative.    Endocrine: Negative.    Genitourinary: Negative.    Musculoskeletal: Negative.    Skin: Negative.    Allergic/Immunologic: Negative.    Neurological: Negative.    Psychiatric/Behavioral: Negative.         Objective     /70   Pulse 71   Temp 97.9 °F (36.6 °C) (Temporal)   Resp 14   Ht 5' (1.524 m)   Wt 88 kg (194 lb)   SpO2 96%   BMI 37.89 kg/m²     Physical Exam  Cardiovascular:      Pulses: no weak pulses.           Dorsalis pedis pulses are 2+ on the right side and 2+ on the left side.        Posterior tibial pulses are 2+ on the right side and 2+ on the left side.   Feet:      Right foot:      Skin integrity: Dry skin present. No ulcer, skin breakdown, erythema, warmth or callus.      Left foot:      Skin integrity: Dry skin present. No ulcer, skin breakdown, erythema, warmth or callus.       Administrative Statements Diabetic Foot Exam    Patient's shoes and socks removed.    Right Foot/Ankle   Right Foot Inspection  Skin Exam: skin normal, skin intact and dry skin. No warmth, no callus, no erythema, no maceration, no abnormal color, no pre-ulcer, no ulcer and no callus.     Toe Exam: ROM and strength within normal limits.     Sensory   Vibration: intact  Proprioception: intact  Monofilament testing: intact    Vascular  Capillary refills: < 3 seconds  The right DP pulse is 2+. The right PT pulse is 2+.     Right Toe  - Comprehensive Exam  Ecchymosis: none  Arch: normal  Hammertoes: absent  Claw Toes:  absent  Swelling: none   Tenderness: none       Left Foot/Ankle  Left Foot Inspection  Skin Exam: skin normal, skin intact and dry skin. No warmth, no erythema, no maceration, normal color, no pre-ulcer, no ulcer and no callus.     Toe Exam: ROM and strength within normal limits.     Sensory   Vibration: intact  Proprioception: intact  Monofilament testing: intact    Vascular  Capillary refills: < 3 seconds  The left DP pulse is 2+. The left PT pulse is 2+.     Left Toe  - Comprehensive Exam  Ecchymosis: none  Arch: normal  Hammertoes: absent  Claw toes: absent  Swelling: none   Tenderness: none       Assign Risk Category  No deformity present  No loss of protective sensation  No weak pulses  Risk: 0

## 2024-08-30 ENCOUNTER — NURSE TRIAGE (OUTPATIENT)
Age: 79
End: 2024-08-30

## 2024-08-30 NOTE — TELEPHONE ENCOUNTER
Pt requesting a call back from provider. States she was told to call office if she was not feeling well and provider would call her back. Symptoms related to the medication that she took last night.

## 2024-08-30 NOTE — TELEPHONE ENCOUNTER
"Pt requesting alternate medication as the quetiapine is causing her to feel nauseas and very jittery. Says that she remembers taking this in the past and had the same reaction. She would like to know if she can have an alternate medication sent over before the weekend.       Reason for Disposition   Caller has URGENT medicine question about med that PCP or specialist prescribed and triager unable to answer question    Answer Assessment - Initial Assessment Questions  1. NAME of MEDICATION: \"What medicine are you calling about?\"      Quetiapine   2. QUESTION: \"What is your question?\" (e.g., medication refill, side effect)      Side effect  3. PRESCRIBING HCP: \"Who prescribed it?\" Reason: if prescribed by specialist, call should be referred to that group.      PCP  4. SYMPTOMS: \"Do you have any symptoms?\"      Nausea, jittery  5. SEVERITY: If symptoms are present, ask \"Are they mild, moderate or severe?\"      moderate  6. PREGNANCY:  \"Is there any chance that you are pregnant?\" \"When was your last menstrual period?\"      NA    Protocols used: Medication Question Call-ADULT-[place]    "

## 2024-09-03 DIAGNOSIS — F51.01 PRIMARY INSOMNIA: Primary | ICD-10-CM

## 2024-09-03 RX ORDER — ESZOPICLONE 2 MG/1
2 TABLET, FILM COATED ORAL
Qty: 30 TABLET | Refills: 1 | Status: SHIPPED | OUTPATIENT
Start: 2024-09-03

## 2024-09-04 ENCOUNTER — APPOINTMENT (OUTPATIENT)
Dept: LAB | Facility: CLINIC | Age: 79
End: 2024-09-04
Payer: COMMERCIAL

## 2024-09-04 DIAGNOSIS — F51.01 PRIMARY INSOMNIA: ICD-10-CM

## 2024-09-04 DIAGNOSIS — E11.40 CONTROLLED TYPE 2 DIABETES WITH NEUROPATHY (HCC): ICD-10-CM

## 2024-09-04 DIAGNOSIS — F51.04 PSYCHOPHYSIOLOGICAL INSOMNIA: Primary | ICD-10-CM

## 2024-09-04 DIAGNOSIS — E55.9 HYPOVITAMINOSIS D: ICD-10-CM

## 2024-09-04 DIAGNOSIS — R21 RASH IN ADULT: ICD-10-CM

## 2024-09-04 DIAGNOSIS — L20.89 OTHER ATOPIC DERMATITIS: ICD-10-CM

## 2024-09-04 LAB
25(OH)D3 SERPL-MCNC: 20.4 NG/ML (ref 30–100)
ALBUMIN SERPL BCG-MCNC: 3.9 G/DL (ref 3.5–5)
ALP SERPL-CCNC: 60 U/L (ref 34–104)
ALT SERPL W P-5'-P-CCNC: 13 U/L (ref 7–52)
ANION GAP SERPL CALCULATED.3IONS-SCNC: 10 MMOL/L (ref 4–13)
AST SERPL W P-5'-P-CCNC: 13 U/L (ref 13–39)
BASOPHILS # BLD AUTO: 0.05 THOUSANDS/ÂΜL (ref 0–0.1)
BASOPHILS NFR BLD AUTO: 1 % (ref 0–1)
BILIRUB SERPL-MCNC: 0.26 MG/DL (ref 0.2–1)
BUN SERPL-MCNC: 20 MG/DL (ref 5–25)
CALCIUM SERPL-MCNC: 9.3 MG/DL (ref 8.4–10.2)
CHLORIDE SERPL-SCNC: 100 MMOL/L (ref 96–108)
CHOLEST SERPL-MCNC: 150 MG/DL
CO2 SERPL-SCNC: 28 MMOL/L (ref 21–32)
CREAT SERPL-MCNC: 0.96 MG/DL (ref 0.6–1.3)
CREAT UR-MCNC: 54 MG/DL
EOSINOPHIL # BLD AUTO: 0.32 THOUSAND/ÂΜL (ref 0–0.61)
EOSINOPHIL NFR BLD AUTO: 5 % (ref 0–6)
ERYTHROCYTE [DISTWIDTH] IN BLOOD BY AUTOMATED COUNT: 14.9 % (ref 11.6–15.1)
EST. AVERAGE GLUCOSE BLD GHB EST-MCNC: 154 MG/DL
GFR SERPL CREATININE-BSD FRML MDRD: 57 ML/MIN/1.73SQ M
GLUCOSE P FAST SERPL-MCNC: 92 MG/DL (ref 65–99)
HBA1C MFR BLD: 7 %
HCT VFR BLD AUTO: 38.3 % (ref 34.8–46.1)
HDLC SERPL-MCNC: 63 MG/DL
HGB BLD-MCNC: 11.8 G/DL (ref 11.5–15.4)
IMM GRANULOCYTES # BLD AUTO: 0.01 THOUSAND/UL (ref 0–0.2)
IMM GRANULOCYTES NFR BLD AUTO: 0 % (ref 0–2)
LDLC SERPL CALC-MCNC: 80 MG/DL (ref 0–100)
LYMPHOCYTES # BLD AUTO: 2.04 THOUSANDS/ÂΜL (ref 0.6–4.47)
LYMPHOCYTES NFR BLD AUTO: 30 % (ref 14–44)
MCH RBC QN AUTO: 29.3 PG (ref 26.8–34.3)
MCHC RBC AUTO-ENTMCNC: 30.8 G/DL (ref 31.4–37.4)
MCV RBC AUTO: 95 FL (ref 82–98)
MICROALBUMIN UR-MCNC: 21.3 MG/L
MICROALBUMIN/CREAT 24H UR: 39 MG/G CREATININE (ref 0–30)
MONOCYTES # BLD AUTO: 0.47 THOUSAND/ÂΜL (ref 0.17–1.22)
MONOCYTES NFR BLD AUTO: 7 % (ref 4–12)
NEUTROPHILS # BLD AUTO: 3.85 THOUSANDS/ÂΜL (ref 1.85–7.62)
NEUTS SEG NFR BLD AUTO: 57 % (ref 43–75)
NRBC BLD AUTO-RTO: 0 /100 WBCS
PLATELET # BLD AUTO: 278 THOUSANDS/UL (ref 149–390)
PMV BLD AUTO: 9.9 FL (ref 8.9–12.7)
POTASSIUM SERPL-SCNC: 4.6 MMOL/L (ref 3.5–5.3)
PROT SERPL-MCNC: 7.5 G/DL (ref 6.4–8.4)
RBC # BLD AUTO: 4.03 MILLION/UL (ref 3.81–5.12)
SODIUM SERPL-SCNC: 138 MMOL/L (ref 135–147)
TRIGL SERPL-MCNC: 36 MG/DL
TSH SERPL DL<=0.05 MIU/L-ACNC: 2.31 UIU/ML (ref 0.45–4.5)
WBC # BLD AUTO: 6.74 THOUSAND/UL (ref 4.31–10.16)

## 2024-09-04 PROCEDURE — 82570 ASSAY OF URINE CREATININE: CPT

## 2024-09-04 PROCEDURE — 86003 ALLG SPEC IGE CRUDE XTRC EA: CPT

## 2024-09-04 PROCEDURE — 85025 COMPLETE CBC W/AUTO DIFF WBC: CPT

## 2024-09-04 PROCEDURE — 83036 HEMOGLOBIN GLYCOSYLATED A1C: CPT

## 2024-09-04 PROCEDURE — 84443 ASSAY THYROID STIM HORMONE: CPT

## 2024-09-04 PROCEDURE — 80061 LIPID PANEL: CPT

## 2024-09-04 PROCEDURE — 36415 COLL VENOUS BLD VENIPUNCTURE: CPT

## 2024-09-04 PROCEDURE — 82043 UR ALBUMIN QUANTITATIVE: CPT

## 2024-09-04 PROCEDURE — 82306 VITAMIN D 25 HYDROXY: CPT

## 2024-09-04 PROCEDURE — 80053 COMPREHEN METABOLIC PANEL: CPT

## 2024-09-04 PROCEDURE — 82785 ASSAY OF IGE: CPT

## 2024-09-04 RX ORDER — LORAZEPAM 1 MG/1
1 TABLET ORAL
Qty: 10 TABLET | Refills: 0 | Status: SHIPPED | OUTPATIENT
Start: 2024-09-04

## 2024-09-04 RX ORDER — ESZOPICLONE 2 MG/1
2 TABLET, FILM COATED ORAL
Qty: 30 TABLET | Refills: 1 | OUTPATIENT
Start: 2024-09-04

## 2024-09-05 LAB
A ALTERNATA IGE QN: <0.1 KUA/I
A FUMIGATUS IGE QN: <0.1 KUA/I
ALMOND IGE QN: <0.1 KUA/I
BERMUDA GRASS IGE QN: <0.1 KUA/I
BOXELDER IGE QN: <0.1 KUA/I
C HERBARUM IGE QN: <0.1 KUA/I
CASHEW NUT IGE QN: <0.1 KUA/I
CAT DANDER IGE QN: <0.1 KUA/I
CMN PIGWEED IGE QN: <0.1 KUA/I
CODFISH IGE QN: <0.1 KUA/I
COMMON RAGWEED IGE QN: <0.1 KUA/I
COTTONWOOD IGE QN: <0.1 KUA/I
D FARINAE IGE QN: <0.1 KUA/I
D PTERONYSS IGE QN: <0.1 KUA/I
DOG DANDER IGE QN: <0.1 KUA/I
EGG WHITE IGE QN: <0.1 KUA/I
GLUTEN IGE QN: <0.1 KUA/I
HAZELNUT IGE QN: <0.1 KUA/L
LONDON PLANE IGE QN: <0.1 KUA/I
MILK IGE QN: <0.1 KUA/I
MOUSE URINE PROT IGE QN: <0.1 KUA/I
MT JUNIPER IGE QN: <0.1 KUA/I
MUGWORT IGE QN: <0.1 KUA/I
P NOTATUM IGE QN: <0.1 KUA/I
PEANUT IGE QN: <0.1 KUA/I
ROACH IGE QN: <0.1 KUA/I
SALMON IGE QN: <0.1 KUA/I
SCALLOP IGE QN: <0.1 KUA/L
SESAME SEED IGE QN: <0.1 KUA/I
SHEEP SORREL IGE QN: <0.1 KUA/I
SHRIMP IGE QN: <0.1 KUA/L
SILVER BIRCH IGE QN: <0.1 KUA/I
SOYBEAN IGE QN: <0.1 KUA/I
TIMOTHY IGE QN: <0.1 KUA/I
TOTAL IGE SMQN RAST: 18.5 KU/L (ref 0–113)
TOTAL IGE SMQN RAST: 19.7 KU/L (ref 0–113)
TUNA IGE QN: <0.1 KUA/I
WALNUT IGE QN: <0.1 KUA/I
WALNUT IGE QN: <0.1 KUA/I
WHEAT IGE QN: <0.1 KUA/I
WHITE ASH IGE QN: <0.1 KUA/I
WHITE ELM IGE QN: <0.1 KUA/I
WHITE MULBERRY IGE QN: <0.1 KUA/I
WHITE OAK IGE QN: <0.1 KUA/I

## 2024-09-25 DIAGNOSIS — E11.40 CONTROLLED TYPE 2 DIABETES WITH NEUROPATHY (HCC): ICD-10-CM

## 2024-09-25 DIAGNOSIS — L20.89 OTHER ATOPIC DERMATITIS: ICD-10-CM

## 2024-09-25 RX ORDER — HYDROCORTISONE 2.5 %
1 CREAM (GRAM) TOPICAL 2 TIMES DAILY
Qty: 28.35 G | Refills: 5 | Status: SHIPPED | OUTPATIENT
Start: 2024-09-25

## 2024-09-26 ENCOUNTER — OFFICE VISIT (OUTPATIENT)
Dept: FAMILY MEDICINE CLINIC | Facility: CLINIC | Age: 79
End: 2024-09-26
Payer: COMMERCIAL

## 2024-09-26 VITALS
HEART RATE: 80 BPM | WEIGHT: 195 LBS | OXYGEN SATURATION: 95 % | RESPIRATION RATE: 14 BRPM | SYSTOLIC BLOOD PRESSURE: 144 MMHG | DIASTOLIC BLOOD PRESSURE: 80 MMHG | BODY MASS INDEX: 38.28 KG/M2 | HEIGHT: 60 IN | TEMPERATURE: 97.5 F

## 2024-09-26 DIAGNOSIS — R21 RASH IN ADULT: ICD-10-CM

## 2024-09-26 DIAGNOSIS — E11.40 TYPE 2 DIABETES MELLITUS WITH DIABETIC NEUROPATHY, WITHOUT LONG-TERM CURRENT USE OF INSULIN (HCC): Primary | ICD-10-CM

## 2024-09-26 DIAGNOSIS — E11.40 CONTROLLED TYPE 2 DIABETES WITH NEUROPATHY (HCC): ICD-10-CM

## 2024-09-26 DIAGNOSIS — F41.8 DEPRESSION WITH ANXIETY: ICD-10-CM

## 2024-09-26 DIAGNOSIS — I10 PRIMARY HYPERTENSION: ICD-10-CM

## 2024-09-26 DIAGNOSIS — F51.01 PRIMARY INSOMNIA: ICD-10-CM

## 2024-09-26 DIAGNOSIS — E55.9 VITAMIN D DEFICIENCY: ICD-10-CM

## 2024-09-26 DIAGNOSIS — I16.0 HYPERTENSIVE URGENCY: ICD-10-CM

## 2024-09-26 DIAGNOSIS — F41.8 OTHER SPECIFIED ANXIETY DISORDERS: ICD-10-CM

## 2024-09-26 PROCEDURE — 99215 OFFICE O/P EST HI 40 MIN: CPT | Performed by: NURSE PRACTITIONER

## 2024-09-26 PROCEDURE — G2211 COMPLEX E/M VISIT ADD ON: HCPCS | Performed by: NURSE PRACTITIONER

## 2024-09-26 RX ORDER — CARVEDILOL 6.25 MG/1
6.25 TABLET ORAL 2 TIMES DAILY WITH MEALS
Qty: 180 TABLET | Refills: 1 | Status: SHIPPED | OUTPATIENT
Start: 2024-09-26

## 2024-09-26 RX ORDER — SITAGLIPTIN AND METFORMIN HYDROCHLORIDE 1000; 100 MG/1; MG/1
1 TABLET, FILM COATED, EXTENDED RELEASE ORAL
Qty: 30 TABLET | Refills: 0 | Status: SHIPPED | OUTPATIENT
Start: 2024-09-26

## 2024-09-26 RX ORDER — ESZOPICLONE 2 MG/1
2 TABLET, FILM COATED ORAL
Qty: 30 TABLET | Refills: 1 | Status: SHIPPED | OUTPATIENT
Start: 2024-09-26

## 2024-09-26 RX ORDER — PREGABALIN 165 MG/1
1 TABLET, FILM COATED, EXTENDED RELEASE ORAL 2 TIMES DAILY
Qty: 180 TABLET | Refills: 1 | Status: SHIPPED | OUTPATIENT
Start: 2024-09-26

## 2024-09-26 RX ORDER — AMLODIPINE BESYLATE 10 MG/1
10 TABLET ORAL
Qty: 90 TABLET | Refills: 1 | Status: SHIPPED | OUTPATIENT
Start: 2024-09-26

## 2024-09-26 RX ORDER — ERGOCALCIFEROL 1.25 MG/1
50000 CAPSULE, LIQUID FILLED ORAL 2 TIMES WEEKLY
Qty: 16 CAPSULE | Refills: 0 | Status: SHIPPED | OUTPATIENT
Start: 2024-09-26 | End: 2024-11-19

## 2024-09-26 NOTE — ASSESSMENT & PLAN NOTE
Orders:    ergocalciferol (VITAMIN D2) 50,000 units; Take 1 capsule (50,000 Units total) by mouth 2 (two) times a week for 16 doses

## 2024-09-26 NOTE — ASSESSMENT & PLAN NOTE
Patient's blood pressure slightly elevated.  Patient reports increased stressors.  Discussed management.  Continue medication continue low-salt heart healthy diet.  Orders:    carvedilol (COREG) 6.25 mg tablet; Take 1 tablet (6.25 mg total) by mouth 2 (two) times a day with meals

## 2024-09-26 NOTE — PROGRESS NOTES
Ambulatory Visit  Name: Pebbles Landon      : 1945      MRN: [id number]  Encounter Provider: ALEKSANDER Manning  Encounter Date: 2024   Encounter department: [facility]    Assessment & Plan  Type 2 diabetes mellitus with diabetic neuropathy, without long-term current use of insulin (HCC)    Lab Results   Component Value Date    HGBA1C 7.0 (H) 2024   Hemoglobin A1c is stable.  Continue low carbohydrate diet continue medication needs to get eye exam done.         Vitamin D deficiency    Orders:    ergocalciferol (VITAMIN D2) 50,000 units; Take 1 capsule (50,000 Units total) by mouth 2 (two) times a week for 16 doses    Primary insomnia  Patient continues to have problems with insomnia due to anxiety, stressors.  States Lunesta has been helping but it has been expensive.  Has used a coupon before refill ordered.  May take as needed  Orders:    eszopiclone (LUNESTA) 2 mg tablet; Take 1 tablet (2 mg total) by mouth daily at bedtime as needed for sleep Take immediately before bedtime    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)    Lab Results   Component Value Date    HGBA1C 7.0 (H) 2024       Orders:    SITagliptin-metFORMIN HCl ER (Janumet XR) 100-1000 MG TB24; Take 1 tablet by mouth daily with dinner    Depression with anxiety      Orders:    sertraline (ZOLOFT) 50 mg tablet; Take 1.5 tablets (75 mg total) by mouth daily    Primary hypertension  Patient's blood pressure slightly elevated.  Patient reports increased stressors.  Discussed management.  Continue medication continue low-salt heart healthy diet.  Orders:    carvedilol (COREG) 6.25 mg tablet; Take 1 tablet (6.25 mg total) by mouth 2 (two) times a day with meals    Hypertensive urgency    Orders:    amLODIPine (NORVASC) 10 mg tablet; Take 1 tablet (10 mg total) by mouth daily at bedtime    Rash in adult  Patient continues to have the rash from the bedbug bites.  No infection noted.  May use cocoa  butter cream, vitamin E oil.  Continue to monitor for any infection avoid scratching         Other specified anxiety disorders  Patient reports that she continues to have anxiety has been taking 75 mg of Zoloft.  Reports a lot of stresses in her life.  Continue med continue nonpharmacological intervention.            History of Present Illness     Patient is here to follow-up on chronic health conditions.  Had blood work done.  Reports some improvement with sleep has multiple stressors in her life          Review of Systems   Constitutional: Negative.    HENT: Negative.     Eyes: Negative.    Respiratory: Negative.     Cardiovascular: Negative.    Gastrointestinal: Negative.    Endocrine: Negative.    Genitourinary: Negative.    Musculoskeletal: Negative.    Skin:  Positive for rash.   Allergic/Immunologic: Negative.    Neurological: Negative.    Psychiatric/Behavioral:  Positive for dysphoric mood and sleep disturbance.            Objective     /80   Pulse 80   Temp 97.5 °F (36.4 °C) (Temporal)   Resp 14   Ht 5' (1.524 m)   Wt 88.5 kg (195 lb)   SpO2 95%   BMI 38.08 kg/m²     Physical Exam  Constitutional:       General: She is not in acute distress.     Appearance: Normal appearance. She is obese. She is not ill-appearing.   HENT:      Head: Normocephalic and atraumatic.      Nose: Nose normal.      Mouth/Throat:      Mouth: Mucous membranes are moist.   Eyes:      Pupils: Pupils are equal, round, and reactive to light.   Cardiovascular:      Rate and Rhythm: Normal rate and regular rhythm.      Pulses: Normal pulses.      Heart sounds: Normal heart sounds.   Pulmonary:      Effort: Pulmonary effort is normal. No respiratory distress.      Breath sounds: Normal breath sounds.   Chest:      Chest wall: No tenderness.   Abdominal:      General: Abdomen is flat. Bowel sounds are normal. There is no distension.      Palpations: There is no mass.      Tenderness: There is no abdominal tenderness.    Musculoskeletal:         General: Normal range of motion.      Cervical back: Normal range of motion and neck supple.   Skin:     General: Skin is warm and dry.      Findings: Rash present.   Neurological:      General: No focal deficit present.      Mental Status: She is alert and oriented to person, place, and time.   Psychiatric:         Mood and Affect: Mood normal.         Behavior: Behavior normal.         Thought Content: Thought content normal.         Judgment: Judgment normal.

## 2024-09-26 NOTE — ASSESSMENT & PLAN NOTE
Patient continues to have problems with insomnia due to anxiety, stressors.  States Lunesta has been helping but it has been expensive.  Has used a coupon before refill ordered.  May take as needed  Orders:    eszopiclone (LUNESTA) 2 mg tablet; Take 1 tablet (2 mg total) by mouth daily at bedtime as needed for sleep Take immediately before bedtime

## 2024-09-27 NOTE — ASSESSMENT & PLAN NOTE
Patient continues to have the rash from the bedbug bites.  No infection noted.  May use cocoa butter cream, vitamin E oil.  Continue to monitor for any infection avoid scratching

## 2024-09-27 NOTE — ASSESSMENT & PLAN NOTE
Patient reports that she continues to have anxiety has been taking 75 mg of Zoloft.  Reports a lot of stresses in her life.  Continue med continue nonpharmacological intervention.

## 2024-09-27 NOTE — ASSESSMENT & PLAN NOTE
Lab Results   Component Value Date    HGBA1C 7.0 (H) 09/04/2024   Hemoglobin A1c is stable.  Continue low carbohydrate diet continue medication needs to get eye exam done.

## 2024-10-04 ENCOUNTER — NURSE TRIAGE (OUTPATIENT)
Dept: OTHER | Facility: OTHER | Age: 79
End: 2024-10-04

## 2024-10-04 NOTE — TELEPHONE ENCOUNTER
"Regarding: Urgent Refill  ----- Message from Beata MCKEON sent at 10/4/2024  7:29 PM EDT -----  \" The Pharmacy won't fill my Prescription for Lunesta, the [facility] Number is .\"    "

## 2024-10-05 DIAGNOSIS — F51.01 PRIMARY INSOMNIA: ICD-10-CM

## 2024-10-05 RX ORDER — ESZOPICLONE 2 MG/1
2 TABLET, FILM COATED ORAL
Qty: 30 TABLET | Refills: 0 | Status: SHIPPED | OUTPATIENT
Start: 2024-10-05

## 2024-10-08 ENCOUNTER — TELEPHONE (OUTPATIENT)
Age: 79
End: 2024-10-08

## 2024-10-08 DIAGNOSIS — F41.8 DEPRESSION WITH ANXIETY: ICD-10-CM

## 2024-10-08 NOTE — TELEPHONE ENCOUNTER
Pt requesting a refill of meloxicam (MOBIC) 15 mg tablet please advise as it is not on the current med list

## 2024-10-13 DIAGNOSIS — F41.8 DEPRESSION WITH ANXIETY: ICD-10-CM

## 2024-10-16 DIAGNOSIS — J45.20 MILD INTERMITTENT ASTHMA, UNSPECIFIED WHETHER COMPLICATED: ICD-10-CM

## 2024-10-17 ENCOUNTER — TELEPHONE (OUTPATIENT)
Age: 79
End: 2024-10-17

## 2024-10-17 RX ORDER — ALBUTEROL SULFATE 90 UG/1
INHALANT RESPIRATORY (INHALATION)
Qty: 18 G | Refills: 3 | Status: SHIPPED | OUTPATIENT
Start: 2024-10-17

## 2024-10-17 NOTE — TELEPHONE ENCOUNTER
Patient calling states PCP has suggested her to see a Nephrologist. Pt inquiring if referral able to be placed for her. Pt states has been experiencing stiffness in shoulder, hands and hips. Pt has take OTC Tynelol every 6 hours and has not helped. Requesting further recommendations.      Please review and advise.  Thank you

## 2024-10-18 ENCOUNTER — TELEPHONE (OUTPATIENT)
Age: 79
End: 2024-10-18

## 2024-10-18 DIAGNOSIS — M19.90 ARTHRITIS: Primary | ICD-10-CM

## 2024-10-18 RX ORDER — MELOXICAM 15 MG/1
15 TABLET ORAL DAILY
Qty: 90 TABLET | Refills: 1 | Status: SHIPPED | OUTPATIENT
Start: 2024-10-18 | End: 2024-10-24 | Stop reason: SDUPTHER

## 2024-10-18 NOTE — TELEPHONE ENCOUNTER
Pt called in today to check if she could come in to see Jovana today.      Attempted to schedule, but there were no more Same Day available or any openings.     Pt reports that her whole body and neck have been very stiff, and that it is hard to get out of bed. Began since she was taken off Meloxicam.     Pt is inquiring if there is any way that Jovana can squeeze her in for today?    Please advise & call pt back with update on her inquiry/request. Thank you!    Pebbles Landon   [phone]

## 2024-10-18 NOTE — TELEPHONE ENCOUNTER
Patient is willing to try Mobic and stop voltaran.  She is wondering if she can use other OTC medications like bio freeze?    Please advise and notify.    Thank you.

## 2024-10-24 DIAGNOSIS — M19.90 ARTHRITIS: ICD-10-CM

## 2024-10-24 RX ORDER — MELOXICAM 15 MG/1
15 TABLET ORAL DAILY
Qty: 90 TABLET | Refills: 1 | Status: SHIPPED | OUTPATIENT
Start: 2024-10-24

## 2024-11-03 DIAGNOSIS — F41.8 DEPRESSION WITH ANXIETY: ICD-10-CM

## 2024-11-03 DIAGNOSIS — E11.40 CONTROLLED TYPE 2 DIABETES WITH NEUROPATHY (HCC): ICD-10-CM

## 2024-11-03 DIAGNOSIS — F51.01 PRIMARY INSOMNIA: ICD-10-CM

## 2024-11-04 RX ORDER — SITAGLIPTIN AND METFORMIN HYDROCHLORIDE 1000; 100 MG/1; MG/1
1 TABLET, FILM COATED, EXTENDED RELEASE ORAL
Qty: 30 TABLET | Refills: 5 | Status: SHIPPED | OUTPATIENT
Start: 2024-11-04

## 2024-11-05 RX ORDER — ESZOPICLONE 2 MG/1
TABLET, FILM COATED ORAL
Qty: 30 TABLET | Refills: 0 | Status: SHIPPED | OUTPATIENT
Start: 2024-11-05

## 2024-11-19 ENCOUNTER — TELEPHONE (OUTPATIENT)
Age: 79
End: 2024-11-19

## 2024-11-19 NOTE — TELEPHONE ENCOUNTER
Pt reports she would like to see if PCP can please call in an order for a (portable nebulizer/run by battery. Pt is asking if PCP can make sure its within her network of [employer] insurance.  PCP please call pt back with an update if this order can be made for the pt.

## 2024-11-29 ENCOUNTER — HOSPITAL ENCOUNTER (EMERGENCY)
Facility: HOSPITAL | Age: 79
Discharge: HOME | End: 2024-11-29
Attending: EMERGENCY MEDICINE
Payer: COMMERCIAL

## 2024-11-29 ENCOUNTER — APPOINTMENT (EMERGENCY)
Dept: RADIOLOGY | Facility: HOSPITAL | Age: 79
End: 2024-11-29
Payer: COMMERCIAL

## 2024-11-29 ENCOUNTER — APPOINTMENT (EMERGENCY)
Dept: CT IMAGING | Facility: HOSPITAL | Age: 79
End: 2024-11-29
Payer: COMMERCIAL

## 2024-11-29 VITALS
RESPIRATION RATE: 22 BRPM | OXYGEN SATURATION: 98 % | SYSTOLIC BLOOD PRESSURE: 139 MMHG | TEMPERATURE: 98.3 F | DIASTOLIC BLOOD PRESSURE: 67 MMHG | HEART RATE: 61 BPM

## 2024-11-29 DIAGNOSIS — S00.531A CONTUSION OF LIP, INITIAL ENCOUNTER: ICD-10-CM

## 2024-11-29 DIAGNOSIS — M25.561 BILATERAL KNEE PAIN: ICD-10-CM

## 2024-11-29 DIAGNOSIS — M25.562 BILATERAL KNEE PAIN: ICD-10-CM

## 2024-11-29 DIAGNOSIS — S00.83XA FACIAL CONTUSION, INITIAL ENCOUNTER: ICD-10-CM

## 2024-11-29 DIAGNOSIS — M79.622 LEFT UPPER ARM PAIN: ICD-10-CM
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
## 2024-11-29 PROCEDURE — 73564 X-RAY EXAM KNEE 4 OR MORE: CPT

## 2024-11-29 PROCEDURE — 71046 X-RAY EXAM CHEST 2 VIEWS: CPT

## 2024-11-29 PROCEDURE — 72125 CT NECK SPINE W/O DYE: CPT

## 2024-11-29 PROCEDURE — 70486 CT MAXILLOFACIAL W/O DYE: CPT

## 2024-11-29 PROCEDURE — 99285 EMERGENCY DEPT VISIT HI MDM: CPT

## 2024-11-29 PROCEDURE — 70450 CT HEAD/BRAIN W/O DYE: CPT

## 2024-11-29 PROCEDURE — 73030 X-RAY EXAM OF SHOULDER: CPT

## 2024-11-29 PROCEDURE — 73080 X-RAY EXAM OF ELBOW: CPT

## 2024-11-29 PROCEDURE — 99284 EMERGENCY DEPT VISIT MOD MDM: CPT | Performed by: EMERGENCY MEDICINE

## 2024-11-29 RX ORDER — OXYCODONE HYDROCHLORIDE 5 MG/1
5 TABLET ORAL ONCE
Refills: 0 | Status: COMPLETED | OUTPATIENT
Start: 2024-11-29 | End: 2024-11-29

## 2024-11-29 RX ORDER — ACETAMINOPHEN 325 MG/1
975 TABLET ORAL ONCE
Status: COMPLETED | OUTPATIENT
Start: 2024-11-29 | End: 2024-11-29

## 2024-11-29 RX ADMIN — ACETAMINOPHEN 975 MG: 325 TABLET, FILM COATED ORAL at 15:56

## 2024-11-29 RX ADMIN — OXYCODONE HYDROCHLORIDE 5 MG: 5 TABLET ORAL at 15:56

## 2024-11-29 NOTE — ED PROVIDER NOTES
Time reflects when diagnosis was documented in both MDM as applicable and the Disposition within this note       Time User Action Codes Description Comment    11/29/2024  4:17 PM JoséhortensiaShea [W01.0XXA] Fall on same level from tripping     11/29/2024  4:17 PM JoséhortensiaShea [S00.83XA] Facial contusion, initial encounter     11/29/2024  4:17 PM Shea Vicente [S00.531A] Contusion of lip, initial encounter     11/29/2024  4:18 PM JoséhortensiaShea Add [M25.561,  M25.562] Bilateral knee pain     11/29/2024  4:18 PM Shea Vicente [M79.622] Left upper arm pain           ED Disposition       ED Disposition   Discharge    Condition   Stable    Date/Time   Fri Nov 29, 2024  4:18 PM    Comment   Pebbles Frisian discharge to home/self care.                   Assessment & Plan       Medical Decision Making  79-year-old female presents status post trip and fall that occurred last night resulting in head and facial injury without loss of consciousness.  Patient does complain of facial pain as well as bilateral knee pain and left upper arm pain.  Based on complaints as well as physical exam, will obtain noncontrast CT scan of the head, cervical spine and facial bones.  Will obtain x-rays of chest, left shoulder and elbow which will also be able to visualize the humerus.  Will obtain x-rays of bilateral knee joints.  Will provide Tylenol and oxycodone for pain relief.    Amount and/or Complexity of Data Reviewed  Radiology: ordered and independent interpretation performed. Decision-making details documented in ED Course.    Risk  OTC drugs.  Prescription drug management.        ED Course as of 11/29/24 1621   Fri Nov 29, 2024   1609 CT scans and x-rays are unremarkable.  Patient did confirm she had a candy in her mouth at the time of CT scan.  No acute fracture or ICH.  Will provide patient with a sling for comfort but advised her not to use the sling all of the time to avoid adhesive capsulitis/frozen  shoulder.  Will provide sports medicine/orthopedic follow-up and also encouraged PCP follow-up.  Will recommend patient continue taking Tylenol 1000 mg every 8 hours as needed for pain.  Discussed ED return parameters.       Medications   acetaminophen (TYLENOL) tablet 975 mg (975 mg Oral Given 11/29/24 1556)   oxyCODONE (ROXICODONE) IR tablet 5 mg (5 mg Oral Given 11/29/24 1556)       ED Risk Strat Scores                   Identification of Seniors at Risk      Flowsheet Row Most Recent Value   (ISAR) Identification of Seniors at Risk    Before the illness or injury that brought you to the Emergency, did you need someone to help you on a regular basis? 0 Filed at: 11/29/2024 1440   In the last 24 hours, have you needed more help than usual? 0 Filed at: 11/29/2024 1440   Have you been hospitalized for one or more nights during the past 6 months? 0 Filed at: 11/29/2024 1440   In general, do you see well? 0 Filed at: 11/29/2024 1440   In general, do you have serious problems with your memory? 0 Filed at: 11/29/2024 1440   Do you take more than three different medications every day? 0 Filed at: 11/29/2024 1440   ISAR Score 0 Filed at: 11/29/2024 1440                SBIRT 22yo+      Flowsheet Row Most Recent Value   Initial Alcohol Screen: US AUDIT-C     1. How often do you have a drink containing alcohol? 0 Filed at: 11/29/2024 1440   2. How many drinks containing alcohol do you have on a typical day you are drinking?  0 Filed at: 11/29/2024 1440   3b. FEMALE Any Age, or MALE 65+: How often do you have 4 or more drinks on one occassion? 0 Filed at: 11/29/2024 1440   Audit-C Score 0 Filed at: 11/29/2024 1440   BING: How many times in the past year have you...    Used an illegal drug or used a prescription medication for non-medical reasons? Never Filed at: 11/29/2024 1440                            History of Present Illness       Chief Complaint   Patient presents with    Fall     Pt tripped outside and fell around 11  last night. Pt hit her face and left shoulder. No thinners.        Past Medical History:   Diagnosis Date    Asthma     Benign hypertension 2018    Cardiac arrest (HCC)     Diabetes mellitus (HCC)     Glaucoma     Hypertension     Kidney stone     Neuropathy     Sleep apnea     no machine    SOB (shortness of breath)     Tubular adenoma of colon 10/2019    Wheezing       Past Surgical History:   Procedure Laterality Date     SECTION      COLONOSCOPY      HYSTERECTOMY      IR BIOPSY BONE MARROW  2022    TONSILLECTOMY        Family History   Problem Relation Age of Onset    Diabetes Mother     Hypertension Father     Prostate cancer Father     Diabetes Sister     Hypertension Sister     Breast cancer Sister 58    No Known Problems Sister     No Known Problems Daughter     No Known Problems Daughter     No Known Problems Daughter     No Known Problems Maternal Grandmother     No Known Problems Paternal Grandmother     Diabetes Brother     Hypertension Brother     No Known Problems Maternal Aunt     Breast cancer Maternal Aunt 62    No Known Problems Maternal Aunt     No Known Problems Maternal Aunt     Pancreatic cancer Paternal Aunt     No Known Problems Paternal Aunt     No Known Problems Paternal Aunt     Asthma Family     Colon cancer Neg Hx     Ovarian cancer Neg Hx     Uterine cancer Neg Hx     Cervical cancer Neg Hx       Social History     Tobacco Use    Smoking status: Never     Passive exposure: Never    Smokeless tobacco: Never   Vaping Use    Vaping status: Never Used   Substance Use Topics    Alcohol use: Never    Drug use: No      E-Cigarette/Vaping    E-Cigarette Use Never User       E-Cigarette/Vaping Substances    Nicotine No     THC No     CBD No     Flavoring No     Other No     Unknown No       I have reviewed and agree with the history as documented.     Patient is a 79-year-old female with past medical history of hypertension, type II non-insulin-dependent diabetes, diabetic  neuropathy, asthma, obstructive sleep apnea, glaucoma, hysterectomy, presents to the emergency department with her daughter and  for a fall that occurred last night.  Patient states she tripped over a TV that was sitting outside, causing her to fall on the pavement.  She did strike her head, specifically the face.  She denies any loss of consciousness.  Patient did require assistance getting up.  She reports last night she did have a headache but denies any headache currently.  She complains of left-sided facial pain as well as pain within her mouth and her upper lip.  She also complains of left shoulder and upper arm pain, left-sided neck pain as well as bilateral knee pain.  She states she does have baseline arthritis in the knees but she believes she hit her knees when she fell.  She denies being on any antiplatelet or anticoagulant agents.  She took Tylenol 1000 mg at 5 AM today.  She denies any back pain, chest pain, difficulty breathing or pain with breathing, abdominal pain or distention, nausea, vomiting, change in bowel habits, gross hematuria, flank pain, difficulty urinating or dysuria, change in vision or hearing, tinnitus, dizziness or near syncope, extremity weakness or paresthesia or other focal neurologic deficits.  Rest of review of systems is negative.      History provided by:  Patient, relative and spouse (Patient's daughter and )   used: No    Fall  Associated symptoms: headaches and neck pain    Associated symptoms: no abdominal pain, no back pain, no chest pain, no hearing loss, no nausea, no seizures and no vomiting        Review of Systems   Constitutional:  Negative for chills and fever.   HENT:  Positive for facial swelling. Negative for congestion, dental problem, ear pain, hearing loss, rhinorrhea, sore throat, tinnitus and voice change.         +Facial pain and swelling, wound, s/p trip and fall.    Eyes:  Negative for photophobia, pain and visual  disturbance.   Respiratory:  Negative for cough, chest tightness, shortness of breath and wheezing.    Cardiovascular:  Negative for chest pain and palpitations.   Gastrointestinal:  Negative for abdominal distention, abdominal pain, constipation, diarrhea, nausea and vomiting.   Genitourinary:  Negative for dysuria, flank pain, frequency and hematuria.   Musculoskeletal:  Positive for arthralgias, joint swelling and neck pain. Negative for back pain and neck stiffness.        +Bilateral knee pain and swelling s/p fall. +Left should and upper arm pain s/p fall. +Left neck pain s/p fall.    Skin:  Negative for color change, pallor, rash and wound.   Allergic/Immunologic: Negative for immunocompromised state.   Neurological:  Positive for headaches. Negative for dizziness, seizures, syncope, facial asymmetry, speech difficulty, weakness, light-headedness and numbness.   Hematological:  Negative for adenopathy. Does not bruise/bleed easily.   Psychiatric/Behavioral:  Negative for confusion and decreased concentration.    All other systems reviewed and are negative.          Objective       ED Triage Vitals   Temperature Pulse Blood Pressure Respirations SpO2 Patient Position - Orthostatic VS   11/29/24 1438 11/29/24 1438 11/29/24 1438 11/29/24 1438 11/29/24 1438 11/29/24 1438   98.9 °F (37.2 °C) 62 (!) 144/103 22 96 % Sitting      Temp Source Heart Rate Source BP Location FiO2 (%) Pain Score    11/29/24 1609 11/29/24 1438 11/29/24 1438 -- 11/29/24 1556    Oral Monitor Left arm  10 - Worst Possible Pain      Vitals      Date and Time Temp Pulse SpO2 Resp BP Pain Score FACES Pain Rating User   11/29/24 1609 98.3 °F (36.8 °C) 61 98 % 22 139/67 -- -- TO   11/29/24 1556 -- -- -- -- -- 10 - Worst Possible Pain -- KW   11/29/24 1438 98.9 °F (37.2 °C) 62 96 % 22 144/103 -- -- AS          Vitals:    11/29/24 1438 11/29/24 1609   BP: (!) 144/103 139/67   BP Location: Left arm Right arm   Pulse: 62 61   Resp: 22 22   Temp: 98.9  °F (37.2 °C) 98.3 °F (36.8 °C)   TempSrc:  Oral   SpO2: 96% 98%        Physical Exam  Vitals and nursing note reviewed.   Constitutional:       General: She is not in acute distress.     Appearance: Normal appearance. She is well-developed. She is not ill-appearing, toxic-appearing or diaphoretic.      Comments: TRAUMA PRIMARY SURVEY:  AIRWAY: Patent, intact. Patient speaking without difficulty or evidence of airway compromise.  BREATHING: Clear, equal breath sounds bilaterally.   CIRCULATION: 2+ radial and pedal pulses bilaterally  DISABILITY: GCS 15. PERRL  EXPOSURE:  Completed    HENT:      Head: Normocephalic.      Comments: Superficial erythematous abrasion as well as tenderness over the left maxilla.  There is superficial abrasion and erythema just above the upper lip.  Upper lip is swollen and ecchymotic and there is small hematoma within the mucosal aspect of the upper lip.  No open wound external or internal to the lip or within the oropharynx.     Right Ear: External ear normal.      Left Ear: External ear normal.      Nose: Nose normal.      Mouth/Throat:      Mouth: Mucous membranes are moist.      Pharynx: Oropharynx is clear. No oropharyngeal exudate.   Eyes:      Extraocular Movements: Extraocular movements intact.      Conjunctiva/sclera: Conjunctivae normal.      Pupils: Pupils are equal, round, and reactive to light.   Neck:      Vascular: No JVD.      Comments: No midline cervical spine tenderness.  There is tenderness to the left lateral neck just above the clavicle.  Cardiovascular:      Rate and Rhythm: Normal rate and regular rhythm.      Pulses: Normal pulses.      Heart sounds: Normal heart sounds. No murmur heard.     No friction rub. No gallop.   Pulmonary:      Effort: Pulmonary effort is normal. No respiratory distress.      Breath sounds: Normal breath sounds. No wheezing, rhonchi or rales.      Comments: No chest wall or rib tenderness.  Chest:      Chest wall: No tenderness.    Abdominal:      General: There is no distension.      Palpations: Abdomen is soft.      Tenderness: There is no abdominal tenderness. There is no guarding or rebound.   Musculoskeletal:         General: Swelling and tenderness present. No deformity.      Cervical back: Normal range of motion and neck supple. Tenderness present. No rigidity.      Comments: No midline C/T/L-spine tenderness.  No step-offs.  LEFT UPPER EXTREMITY: There is diffuse tenderness over the left shoulder, upper arm as well as posterior elbow joint.  No tenderness below the elbow joint in the forearm, wrist or hand.  Intact hand  strength.  Gross sensation and motor intact.  No obvious deformity.  2+ radial pulse.  RIGHT UPPER EXTREMITY nontender, has full range of motion of all joints.  LOWER EXTREMITIES:   There is mild right knee joint effusion and tenderness over the medial joint line with mild ecchymosis over the proximal tibia.  No significant swelling or effusion of the left knee but there is tenderness over the proximal tibia/tibial plateau region.  Patient able to flex and extend both knees but this does cause pain.  Bilateral lower extremities are neurovascularly intact.   Skin:     General: Skin is warm and dry.      Coloration: Skin is not pale.      Findings: No erythema or rash.   Neurological:      General: No focal deficit present.      Mental Status: She is alert and oriented to person, place, and time.      Cranial Nerves: No cranial nerve deficit.      Sensory: No sensory deficit.      Motor: No weakness.   Psychiatric:         Mood and Affect: Mood normal.         Behavior: Behavior normal.         Results Reviewed       None            XR chest 2 views   Final Interpretation by David Zarate MD (11/29 1556)      No acute cardiopulmonary disease.            Workstation performed: [id number]         XR shoulder 2+ views LEFT   Final Interpretation by David Zarate MD (11/29 1558)      No acute osseous  abnormality.         Computerized Assisted Algorithm (CAA) may have been used to analyze all applicable images.         Workstation performed: [id number]         XR elbow 3+ vw LEFT   Final Interpretation by David Zarate MD (11/29 1602)      No acute osseous abnormality.         Computerized Assisted Algorithm (CAA) may have been used to analyze all applicable images.         Workstation performed: [id number]         XR knee 4+ vw right injury   Final Interpretation by David Zarate MD (11/29 1605)      No acute osseous abnormality.         Computerized Assisted Algorithm (CAA) may have been used to analyze all applicable images.         Workstation performed: [id number]         XR knee 4+ vw left injury   Final Interpretation by David Zarate MD (11/29 1606)      No acute osseous abnormality.         Computerized Assisted Algorithm (CAA) may have been used to analyze all applicable images.         Workstation performed: [id number]         CT cervical spine without contrast   Final Interpretation by Héctor Luo MD (11/29 6182)      No acute fracture or evidence for traumatic malalignment.                  Workstation performed: [id number]         CT head without contrast   Final Interpretation by Héctor Luo MD (11/29 1542)      No acute intracranial hemorrhage or depressed calvarial fracture identified.                  Workstation performed: [id number]         CT facial bones without contrast   Final Interpretation by Héctor Luo MD (11/29 8877)      No acute fractures or evidence for traumatic malalignment.      Probable piece of candy adjacent to the left mandibular body which should be correlated with direct visual inspection.               Workstation performed: [id number]             Procedures    ED Medication and Procedure Management   Prior to Admission Medications   Prescriptions Last Dose Informant Patient Reported? Taking?   LORazepam (ATIVAN) 1 mg tablet   No No   Sig: Take 1 tablet (1  mg total) by mouth daily at bedtime   Lyrica  MG TB24   No No   Sig: TAKE 1 TABLET BY MOUTH TWICE A DAY   SITagliptin-metFORMIN HCl ER (Janumet XR) 100-1000 MG TB24   No No   Sig: TAKE 1 TABLET BY MOUTH EVERY DAY WITH DINNER   acetaminophen (TYLENOL) 325 mg tablet  Self No No   Sig: Take 2 tablets (650 mg total) by mouth every 4 (four) hours as needed for mild pain   Patient taking differently: Take 650 mg by mouth every 6 (six) hours as needed for mild pain   albuterol (2.5 mg/3 mL) 0.083 % nebulizer solution   No No   Sig: Take 3 mL (2.5 mg total) by nebulization every 6 (six) hours as needed for wheezing or shortness of breath   albuterol (PROVENTIL HFA,VENTOLIN HFA) 90 mcg/act inhaler   No No   Sig: INHALE 1 PUFF BY MOUTH EVERY 6 HOURS AS NEEDED FOR WHEEZE   amLODIPine (NORVASC) 10 mg tablet   No No   Sig: Take 1 tablet (10 mg total) by mouth daily at bedtime   carvedilol (COREG) 6.25 mg tablet   No No   Sig: Take 1 tablet (6.25 mg total) by mouth 2 (two) times a day with meals   ergocalciferol (VITAMIN D2) 50,000 units   No No   Sig: Take 1 capsule (50,000 Units total) by mouth 2 (two) times a week for 16 doses   eszopiclone (LUNESTA) 2 mg tablet   No No   Sig: TAKE 1 TABLET BY MOUTH ONCE AT BEDTIME AS NEEDED FOR SLEEP IMMEDIATELY BEFORE BEDTIME   fluticasone-salmeterol (AIRDUO RESPICLICK) 113-14 mcg/act dry powder inhaler   No No   Sig: INHALE 1 PUFF 2 TIMES A DAY RINSE MOUTH AFTER USE.   glucose blood test strip  Self Yes No   hydrocortisone 2.5 % cream   No No   Sig: APPLY TO AFFECTED AREA TWICE A DAY   latanoprost (XALATAN) 0.005 % ophthalmic solution  Self Yes No   meloxicam (Mobic) 15 mg tablet   No No   Sig: Take 1 tablet (15 mg total) by mouth daily   montelukast (SINGULAIR) 10 mg tablet   No No   Sig: TAKE 1 TABLET BY MOUTH EVERYDAY AT BEDTIME   mupirocin (BACTROBAN) 2 % ointment   No No   Sig: Apply topically 3 (three) times a day for 10 days   ondansetron (Zofran ODT) 4 mg disintegrating tablet    No No   Sig: Take 1 tablet (4 mg total) by mouth every 6 (six) hours as needed for nausea or vomiting   sertraline (ZOLOFT) 50 mg tablet   No No   Sig: TAKE 1 AND 1/2 TABLETS BY MOUTH DAILY   sucralfate (CARAFATE) 1 g tablet   No No   Sig: Take 1 tablet (1 g total) by mouth 4 (four) times a day for 14 days   traMADol (Ultram) 50 mg tablet   No No   Sig: Take 1 tablet (50 mg total) by mouth every 6 (six) hours as needed for severe pain      Facility-Administered Medications: None     Patient's Medications   Discharge Prescriptions    No medications on file       ED SEPSIS DOCUMENTATION   Time reflects when diagnosis was documented in both MDM as applicable and the Disposition within this note       Time User Action Codes Description Comment    11/29/2024  4:17 PM Shea Vicente [W01.0XXA] Fall on same level from tripping     11/29/2024  4:17 PM Shea Vicente [S00.83XA] Facial contusion, initial encounter     11/29/2024  4:17 PM Shea Vicente [S00.531A] Contusion of lip, initial encounter     11/29/2024  4:18 PM Shea Vicente [M25.561,  M25.562] Bilateral knee pain     11/29/2024  4:18 PM Shea Vicente [M79.622] Left upper arm pain                  Shea Vicente,   11/29/24 1628

## 2024-11-29 NOTE — DISCHARGE INSTRUCTIONS
Take Tylenol up to 1000 mg every 8 hours as needed for mild to moderate pain.  I also recommend that you apply ice on and off for 20 minutes at a time to the face and lip as well as to the bilateral knee joints.  Use the sling as needed if the left arm is very painful or you are having difficulty moving the arm but I advised that you do not use the sling all of the time and take breaks throughout the day to avoid frozen shoulder.  Try to slowly increase range of motion of the left shoulder and elbow joints as tolerated.  Follow-up with your family doctor as well as with orthopedic/sports medicine specialist.  Return to the ER if any of your symptoms worsen.

## 2024-12-02 ENCOUNTER — VBI (OUTPATIENT)
Dept: FAMILY MEDICINE CLINIC | Facility: CLINIC | Age: 79
End: 2024-12-02

## 2024-12-02 NOTE — TELEPHONE ENCOUNTER
12/02/24 10:11 AM    Patient contacted post ED visit, first outreach attempt made. Message was left for patient to return a call to the [facility] Department at [facility]: Phone [phone].    Thank you.  Romi Martin MA  [facility]

## 2024-12-02 NOTE — LETTER
[facility]  [address]  [place] [address]  [phone]    Date: 12/04/24    Pebbles Landon  [address] [place] [address]    Dear Pebbles:                                                                                                                                Thank you for choosing [facility] emergency department for care.  Your primary care provider wants to make sure that your ongoing medical care is being addressed. If you require follow up care as a result of your emergency department visit, there are a few things the practice would like you to know.                As part of the network's continuing commitment to caring for our patients, we have added more same day appointments and have extended office hours to meet your medical needs. After hours, on-call physicians are available via your primary care provider's main office line.               We encourage you to contact our office prior to seeking treatment to discuss your symptoms with the medical staff.  Together, we can determine the correct course of action.  A majority of non-emergent conditions such as: common cold, flu-like symptoms, fevers, strains/sprains, dislocations, minor burns, cuts and animal bites can be treated at [facility] facilities. Diagnostic testing is available at some sites.               Of course, if you are experiencing a life threatening medical emergency call 911 or proceed directly to the nearest emergency room.    Your nearest [facility] facility is conveniently located at:    [facility])  [address] [place] [id number]  [phone]  SKIP THE WAIT  Conveniently offered at most [facility] locations  [place] your spot online at [email or web address] or on the [facility] Mike    Sincerely,    [facility]  Dept: [phone]

## 2024-12-03 NOTE — TELEPHONE ENCOUNTER
12/03/24 9:20 AM    Patient contacted post ED visit, second outreach attempt made. Message was left for patient to return a call to the VBI Department at [facility] Phone [phone].    Thank you.  Romi Martin MA  [facility]

## 2024-12-04 DIAGNOSIS — F51.01 PRIMARY INSOMNIA: ICD-10-CM

## 2024-12-04 NOTE — TELEPHONE ENCOUNTER
12/04/24 10:49 AM    Patient contacted post ED visit, phone outreaches were unsuccessful and a MyChart letter has been sent to the patient as follow-up.    Thank you.  Romi Martin MA  [facility]

## 2024-12-04 NOTE — TELEPHONE ENCOUNTER
Reason for call:   [x] Refill   [] Prior Auth  [] Other:     Office:   [x] PCP/Provider - Merna  [] Specialty/Provider -     Medication: Eszopiclone 2mg    Dose/Frequency: 1 tab hs prn    Quantity: 30    Pharmacy: [facility]/pharmacy #[id number] - [place] - [address] [phone]     Does the patient have enough for 3 days?   [] Yes   [x] No - 2 days

## 2024-12-05 RX ORDER — ESZOPICLONE 2 MG/1
2 TABLET, FILM COATED ORAL
Qty: 30 TABLET | Refills: 0 | Status: SHIPPED | OUTPATIENT
Start: 2024-12-05

## 2024-12-06 ENCOUNTER — OFFICE VISIT (OUTPATIENT)
Dept: OBGYN CLINIC | Facility: CLINIC | Age: 79
End: 2024-12-06
Payer: COMMERCIAL

## 2024-12-06 VITALS
WEIGHT: 195 LBS | OXYGEN SATURATION: 95 % | HEART RATE: 59 BPM | SYSTOLIC BLOOD PRESSURE: 114 MMHG | BODY MASS INDEX: 38.28 KG/M2 | HEIGHT: 60 IN | DIASTOLIC BLOOD PRESSURE: 68 MMHG

## 2024-12-06 DIAGNOSIS — M24.812 INTERNAL DERANGEMENT OF LEFT SHOULDER: Primary | ICD-10-CM

## 2024-12-06 PROCEDURE — 99213 OFFICE O/P EST LOW 20 MIN: CPT | Performed by: FAMILY MEDICINE

## 2024-12-06 RX ORDER — BIMATOPROST 0.1 MG/ML
SOLUTION/ DROPS OPHTHALMIC
COMMUNITY
Start: 2024-11-21

## 2024-12-06 RX ORDER — LIDOCAINE 50 MG/G
1 PATCH TOPICAL DAILY
Qty: 30 PATCH | Refills: 1 | Status: SHIPPED | OUTPATIENT
Start: 2024-12-06

## 2024-12-06 NOTE — PROGRESS NOTES
Assessment/Plan:  Assessment & Plan   Diagnoses and all orders for this visit:    Internal derangement of left shoulder  -     Ambulatory Referral to Orthopedic Surgery  -     MRI shoulder left wo contrast; Future  -     lidocaine (Lidoderm) 5 %; Apply 1 patch topically over 12 hours daily Remove & Discard patch within 12 hours or as directed by MD    Other orders  -     Lumigan 0.01 % ophthalmic drops; APPLY ONE DROP TO EACH EYE AT BEDTIME      79-year-old right-hand-dominant female with left shoulder pain, limited range of motion, and weakness following fall injury 11/28/2024.  Discussed with patient clinical exam, imaging studies, impression, and plan.  X-rays of the left shoulder are unremarkable for acute osseous abnormality.  Imaging studies, clinical exam, and history suggest that she has symptoms from pathology of the shoulder.  Positive Gonzalez, positive drop arm, and positive empty can are concerning for rotator cuff tear.  At this time I will refer her for MRI of the left shoulder to evaluate for rotator cuff tear and occult fracture as surgical intervention may be warranted.  She will return after having MRI done.        Subjective:   Patient ID: Pebbles Landon is a 79 y.o. female.  Chief Complaint   Patient presents with    Left Shoulder - Pain    Left Arm - Pain        79-year-old right-hand-dominant female presents for evaluation of left shoulder pain following injury on 11/28/2024.   She tripped and fell forward bracing her fall with her arm outstretched.  She had pain described as sudden in onset, generalized to the shoulder, radiating distally to the upper arm and elbow, worse with moving the arm, associated limited range of motion, and improved with resting.  She was evaluated at the emergency room where imaging studies were unremarkable for acute osseous abnormality.  She was given medication for pain and advised to follow-up with orthopedic care.  She reports difficulty with elevating the  arm.  She has been alternating with ice and heat and Tylenol and ibuprofen to help with symptoms.    Shoulder Pain  This is a new problem. The current episode started 1 to 4 weeks ago. The problem occurs constantly. The problem has been unchanged. Associated symptoms include arthralgias and weakness. Pertinent negatives include no joint swelling or numbness. Exacerbated by: Arm movement. She has tried rest, position changes, oral narcotics, NSAIDs, heat, ice and acetaminophen for the symptoms. The treatment provided mild relief.               Review of Systems   Musculoskeletal:  Positive for arthralgias. Negative for joint swelling.   Neurological:  Positive for weakness. Negative for numbness.       Objective:  Vitals:    12/06/24 1014   BP: 114/68   Pulse: 59   SpO2: 95%   Weight: 88.5 kg (195 lb)   Height: 5' (1.524 m)      Right Hand Exam     Muscle Strength   Wrist extension: 5/5   Wrist flexion: 5/5   : 5/5     Other   Sensation: normal  Pulse: present      Left Hand Exam     Muscle Strength   Wrist extension: 5/5   Wrist flexion: 5/5   :  5/5     Other   Sensation: normal  Pulse: present      Left Elbow Exam     Range of Motion   The patient has normal left elbow ROM.    Muscle Strength   The patient has normal left elbow strength (5/5 flexion and extension).      Left Shoulder Exam     Tenderness   Left shoulder tenderness location: Anterior, lateral.    Range of Motion   Active abduction:  90   Forward flexion:  100   Left shoulder internal rotation 0 degrees: Lateral hip.     Muscle Strength   Abduction: 4/5   Internal rotation: 5/5   External rotation: 4/5   Supraspinatus: 4/5     Tests   Gonzalez test: positive  Drop arm: positive     Comments:  Positive empty can          Strength/Myotome Testing     Left Wrist/Hand   Wrist extension: 5  Wrist flexion: 5    Right Wrist/Hand   Wrist extension: 5  Wrist flexion: 5      Physical Exam  Vitals and nursing note reviewed.   Constitutional:        Appearance: Normal appearance. She is well-developed. She is not ill-appearing or diaphoretic.   HENT:      Head: Normocephalic and atraumatic.      Right Ear: External ear normal.      Left Ear: External ear normal.   Eyes:      Conjunctiva/sclera: Conjunctivae normal.   Neck:      Trachea: No tracheal deviation.   Cardiovascular:      Rate and Rhythm: Normal rate.   Pulmonary:      Effort: Pulmonary effort is normal. No respiratory distress.   Abdominal:      General: There is no distension.   Musculoskeletal:         General: Tenderness present.   Skin:     General: Skin is warm and dry.      Coloration: Skin is not jaundiced or pale.   Neurological:      Mental Status: She is alert and oriented to person, place, and time.   Psychiatric:         Mood and Affect: Mood normal.         Behavior: Behavior normal.         Thought Content: Thought content normal.         Judgment: Judgment normal.         I have personally reviewed pertinent films in PACS and my interpretation is  .  No acute osseous abnormality left shoulder.

## 2024-12-09 ENCOUNTER — HOSPITAL ENCOUNTER (OUTPATIENT)
Dept: MRI IMAGING | Facility: HOSPITAL | Age: 79
Discharge: HOME | End: 2024-12-09
Attending: FAMILY MEDICINE
Payer: COMMERCIAL

## 2024-12-09 DIAGNOSIS — M24.812 INTERNAL DERANGEMENT OF LEFT SHOULDER: ICD-10-CM

## 2024-12-09 PROCEDURE — 73221 MRI JOINT UPR EXTREM W/O DYE: CPT

## 2024-12-16 ENCOUNTER — TELEPHONE (OUTPATIENT)
Dept: OBGYN CLINIC | Facility: CLINIC | Age: 79
End: 2024-12-16

## 2024-12-16 NOTE — TELEPHONE ENCOUNTER
PA for LIDO 5 % PATCHESSUBMITTED to [employer]    via    [x]CMM-KEY: [id number]    [x]PA sent as URGENT    All office notes, labs and other pertaining documents and studies sent. Clinical questions answered. Awaiting determination from insurance company.     Turnaround time for your insurance to make a decision on your Prior Authorization can take 7-21 business days.

## 2024-12-16 NOTE — TELEPHONE ENCOUNTER
PA for LIDO 5% PATCHES APPROVED     Date(s) approved UNTIL 12/15/2025        Patient advised by          [x]MyChart Message  []Phone call   []LMOM  []L/M to call office as no active Communication consent on file  []Unable to leave detailed message as VM not approved on Communication consent       Pharmacy advised by    [x]Fax  []Phone call    Approval letter scanned into Media Yes

## 2024-12-17 ENCOUNTER — TELEPHONE (OUTPATIENT)
Dept: OBGYN CLINIC | Facility: HOSPITAL | Age: 79
End: 2024-12-17

## 2024-12-17 NOTE — TELEPHONE ENCOUNTER
Caller: Patient    Doctor: Babak    Reason for call: Patient said she received a call about her MRI. Advised MRI was completed and results are available. She has a follow up appt to go over results 12/19/24 @ 11:00 with Dr. Hilliard. There are no messages that anyone had called.    Call back: n/a

## 2024-12-18 ENCOUNTER — OFFICE VISIT (OUTPATIENT)
Dept: OBGYN CLINIC | Facility: CLINIC | Age: 79
End: 2024-12-18
Payer: COMMERCIAL

## 2024-12-18 VITALS
HEIGHT: 60 IN | SYSTOLIC BLOOD PRESSURE: 128 MMHG | WEIGHT: 195 LBS | OXYGEN SATURATION: 97 % | HEART RATE: 64 BPM | BODY MASS INDEX: 38.28 KG/M2 | DIASTOLIC BLOOD PRESSURE: 71 MMHG

## 2024-12-18 DIAGNOSIS — S46.012D ROTATOR CUFF STRAIN, LEFT, SUBSEQUENT ENCOUNTER: Primary | ICD-10-CM

## 2024-12-18 DIAGNOSIS — M19.012 ARTHRITIS OF LEFT ACROMIOCLAVICULAR JOINT: ICD-10-CM

## 2024-12-18 DIAGNOSIS — M67.912 TENDINOPATHY OF LEFT ROTATOR CUFF: ICD-10-CM

## 2024-12-18 DIAGNOSIS — M75.42 SUBACROMIAL IMPINGEMENT OF LEFT SHOULDER: ICD-10-CM

## 2024-12-18 PROCEDURE — 99214 OFFICE O/P EST MOD 30 MIN: CPT | Performed by: FAMILY MEDICINE

## 2024-12-18 PROCEDURE — 20610 DRAIN/INJ JOINT/BURSA W/O US: CPT | Performed by: FAMILY MEDICINE

## 2024-12-18 RX ORDER — BUPIVACAINE HYDROCHLORIDE 2.5 MG/ML
1 INJECTION, SOLUTION INFILTRATION; PERINEURAL
Status: COMPLETED | OUTPATIENT
Start: 2024-12-18 | End: 2024-12-18

## 2024-12-18 RX ORDER — METHYLPREDNISOLONE ACETATE 40 MG/ML
2 INJECTION, SUSPENSION INTRA-ARTICULAR; INTRALESIONAL; INTRAMUSCULAR; SOFT TISSUE
Status: COMPLETED | OUTPATIENT
Start: 2024-12-18 | End: 2024-12-18

## 2024-12-18 RX ORDER — BUPIVACAINE HYDROCHLORIDE 2.5 MG/ML
4 INJECTION, SOLUTION INFILTRATION; PERINEURAL
Status: COMPLETED | OUTPATIENT
Start: 2024-12-18 | End: 2024-12-18

## 2024-12-18 RX ADMIN — METHYLPREDNISOLONE ACETATE 2 ML: 40 INJECTION, SUSPENSION INTRA-ARTICULAR; INTRALESIONAL; INTRAMUSCULAR; SOFT TISSUE at 11:30

## 2024-12-18 RX ADMIN — BUPIVACAINE HYDROCHLORIDE 1 ML: 2.5 INJECTION, SOLUTION INFILTRATION; PERINEURAL at 11:30

## 2024-12-18 RX ADMIN — BUPIVACAINE HYDROCHLORIDE 4 ML: 2.5 INJECTION, SOLUTION INFILTRATION; PERINEURAL at 11:30

## 2024-12-18 NOTE — PROGRESS NOTES
Assessment/Plan:  Assessment & Plan   Diagnoses and all orders for this visit:    Rotator cuff strain, left, subsequent encounter  -     Ambulatory Referral to Physical Therapy; Future  -     Large joint arthrocentesis: L subacromial bursa    Tendinopathy of left rotator cuff  -     Ambulatory Referral to Physical Therapy; Future    Arthritis of left acromioclavicular joint  -     Ambulatory Referral to Physical Therapy; Future    Subacromial impingement of left shoulder  -     Ambulatory Referral to Physical Therapy; Future        79-year-old right-hand-dominant female with left shoulder pain, limited range of motion, and weakness following injury 11/28/2024.  Discussed with patient MRI results, impression, and plan.  MRI left shoulder from moderate supraspinatus and infraspinatus tendinosis with tiny 3 mm full-thickness tear distal supraspinatus tendon, trace fluid subacromial subdeltoid bursa, mild AC joint osteoarthritis.  Imaging studies and history suggest that she has symptoms from combination of rotator cuff tendinopathy and aggravation of arthritis.  I advised that surgery is not warranted.  I discussed treatment regimen steroid injection and formal therapy.  Also discussed with patient option of PRP injection, however this is not covered by insurance.  She agreed to proceed with steroid injection.  I administered mixture of 3 cc 0.25% bupivacaine and 2 cc Depo-Medrol to the left shoulder subacromial space without complication.  She is to start physical therapy as soon as possible and do home exercises as directed.  She will follow-up as needed.        Subjective:   Patient ID: Pebbles Landon is a 79 y.o. female.  Chief Complaint   Patient presents with    Left Shoulder - Pain        79-year-old right-hand-dominant female following up for left shoulder pain, limited range of motion, and weakness following injury on 11/28/2024.  She was last seen by me 12 days ago at which point she was referred for MRI  "of the shoulder.  She states that symptoms are improved since last visit, but pain is still bothersome.  She has pain described generalized to the shoulder, achy and sore, rating to see the upper arm, worse with moving the arm, associated with limited range of motion, and improved with resting.    Shoulder Pain  This is a new problem. The current episode started 1 to 4 weeks ago. The problem occurs daily. The problem has been gradually improving. Associated symptoms include arthralgias and weakness. Pertinent negatives include no joint swelling or numbness. Exacerbated by: Arm use. She has tried rest, position changes and NSAIDs for the symptoms. The treatment provided mild relief.               Review of Systems   Musculoskeletal:  Positive for arthralgias. Negative for joint swelling.   Neurological:  Positive for weakness. Negative for numbness.       Objective:  Vitals:    12/18/24 1130   BP: 128/71   Pulse: 64   SpO2: 97%   Weight: 88.5 kg (195 lb)   Height: 5' (1.524 m)      Ortho Exam    Physical Exam    I have personally reviewed pertinent films in PACS and my interpretation is  .  No appreciable rotator cuff retraction.    Large joint arthrocentesis: L subacromial bursa  [place] Protocol:  procedure performed by consultantConsent: Verbal consent obtained.  Risks and benefits: risks, benefits and alternatives were discussed  Consent given by: patient  Time out: Immediately prior to procedure a \"time out\" was called to verify the correct patient, procedure, equipment, support staff and site/side marked as required.  Patient understanding: patient states understanding of the procedure being performed  Patient consent: the patient's understanding of the procedure matches consent given  Procedure consent: procedure consent matches procedure scheduled  Relevant documents: relevant documents present and verified  Test results: test results available and properly labeled  Site marked: the operative site was " marked  Radiology Images displayed and confirmed. If images not available, report reviewed: imaging studies available  Required items: required blood products, implants, devices, and special equipment available  Patient identity confirmed: verbally with patient  Supporting Documentation  Indications: pain   Procedure Details  Location: shoulder - L subacromial bursa  Preparation: Patient was prepped and draped in the usual sterile fashion  Needle size: 22 G  Ultrasound guidance: no  Approach: posterolateral  Medications administered: 4 mL bupivacaine 0.25 %; 1 mL bupivacaine 0.25 %; 2 mL methylPREDNISolone acetate 40 mg/mL    Patient tolerance: patient tolerated the procedure well with no immediate complications  Dressing:  Sterile dressing applied        More than 31 minutes spent reviewing patient chart, reviewing and interpreting imaging studies, obtaining history from patient, discussing and implementing treatment plan.

## 2024-12-24 ENCOUNTER — DOCUMENTATION (OUTPATIENT)
Dept: OBGYN CLINIC | Facility: CLINIC | Age: 79
End: 2024-12-24

## 2024-12-30 ENCOUNTER — HOSPITAL ENCOUNTER (INPATIENT)
Facility: HOSPITAL | Age: 79
LOS: 1 days | Discharge: HOME | DRG: 871 | End: 2025-01-02
Admitting: STUDENT IN AN ORGANIZED HEALTH CARE EDUCATION/TRAINING PROGRAM
Payer: COMMERCIAL

## 2024-12-30 ENCOUNTER — OFFICE VISIT (OUTPATIENT)
Dept: FAMILY MEDICINE CLINIC | Facility: CLINIC | Age: 79
End: 2024-12-30
Payer: COMMERCIAL

## 2024-12-30 ENCOUNTER — NURSE TRIAGE (OUTPATIENT)
Age: 79
End: 2024-12-30

## 2024-12-30 ENCOUNTER — APPOINTMENT (EMERGENCY)
Dept: CT IMAGING | Facility: HOSPITAL | Age: 79
DRG: 871 | End: 2024-12-30
Payer: COMMERCIAL

## 2024-12-30 VITALS
DIASTOLIC BLOOD PRESSURE: 60 MMHG | HEART RATE: 80 BPM | SYSTOLIC BLOOD PRESSURE: 140 MMHG | BODY MASS INDEX: 37.81 KG/M2 | OXYGEN SATURATION: 91 % | HEIGHT: 60 IN | WEIGHT: 192.6 LBS

## 2024-12-30 DIAGNOSIS — R51.9 ACUTE INTRACTABLE HEADACHE, UNSPECIFIED HEADACHE TYPE: Primary | ICD-10-CM

## 2024-12-30 DIAGNOSIS — A41.9 SEPSIS WITHOUT ACUTE ORGAN DYSFUNCTION (HCC): ICD-10-CM

## 2024-12-30 DIAGNOSIS — N39.0 UTI (URINARY TRACT INFECTION): Primary | ICD-10-CM

## 2024-12-30 DIAGNOSIS — R11.0 NAUSEA: ICD-10-CM

## 2024-12-30 DIAGNOSIS — R50.9 FEVER: ICD-10-CM

## 2024-12-30 DIAGNOSIS — R51.9 HEADACHE: ICD-10-CM

## 2024-12-30 DIAGNOSIS — G89.29 CHRONIC NONINTRACTABLE HEADACHE, UNSPECIFIED HEADACHE TYPE: ICD-10-CM

## 2024-12-30 DIAGNOSIS — R51.9 CHRONIC NONINTRACTABLE HEADACHE, UNSPECIFIED HEADACHE TYPE: ICD-10-CM

## 2024-12-30 LAB
ALBUMIN SERPL BCG-MCNC: 4 G/DL (ref 3.5–5)
ALP SERPL-CCNC: 57 U/L (ref 34–104)
ALT SERPL W P-5'-P-CCNC: 9 U/L (ref 7–52)
ANION GAP SERPL CALCULATED.3IONS-SCNC: 7 MMOL/L (ref 4–13)
AST SERPL W P-5'-P-CCNC: 11 U/L (ref 13–39)
BACTERIA UR QL AUTO: ABNORMAL /HPF
BASOPHILS # BLD AUTO: 0.04 THOUSANDS/ΜL (ref 0–0.1)
BASOPHILS NFR BLD AUTO: 0 % (ref 0–1)
BILIRUB SERPL-MCNC: 0.39 MG/DL (ref 0.2–1)
BILIRUB UR QL STRIP: NEGATIVE
BUN SERPL-MCNC: 13 MG/DL (ref 5–25)
CALCIUM SERPL-MCNC: 9.2 MG/DL (ref 8.4–10.2)
CHLORIDE SERPL-SCNC: 101 MMOL/L (ref 96–108)
CLARITY UR: ABNORMAL
CO2 SERPL-SCNC: 26 MMOL/L (ref 21–32)
COLOR UR: ABNORMAL
CREAT SERPL-MCNC: 0.74 MG/DL (ref 0.6–1.3)
EOSINOPHIL # BLD AUTO: 0.02 THOUSAND/ΜL (ref 0–0.61)
EOSINOPHIL NFR BLD AUTO: 0 % (ref 0–6)
ERYTHROCYTE [DISTWIDTH] IN BLOOD BY AUTOMATED COUNT: 14.2 % (ref 11.6–15.1)
FLUAV AG UPPER RESP QL IA.RAPID: NEGATIVE
FLUBV AG UPPER RESP QL IA.RAPID: NEGATIVE
GFR SERPL CREATININE-BSD FRML MDRD: 78 ML/MIN/1.73SQ M
GLUCOSE SERPL-MCNC: 149 MG/DL (ref 65–140)
GLUCOSE UR STRIP-MCNC: NEGATIVE MG/DL
HCT VFR BLD AUTO: 36.9 % (ref 34.8–46.1)
HGB BLD-MCNC: 11.7 G/DL (ref 11.5–15.4)
HGB UR QL STRIP.AUTO: ABNORMAL
IMM GRANULOCYTES # BLD AUTO: 0.16 THOUSAND/UL (ref 0–0.2)
IMM GRANULOCYTES NFR BLD AUTO: 1 % (ref 0–2)
KETONES UR STRIP-MCNC: NEGATIVE MG/DL
LACTATE SERPL-SCNC: 0.7 MMOL/L (ref 0.5–2)
LEUKOCYTE ESTERASE UR QL STRIP: ABNORMAL
LYMPHOCYTES # BLD AUTO: 1.2 THOUSANDS/ΜL (ref 0.6–4.47)
LYMPHOCYTES NFR BLD AUTO: 10 % (ref 14–44)
MCH RBC QN AUTO: 28.3 PG (ref 26.8–34.3)
MCHC RBC AUTO-ENTMCNC: 31.7 G/DL (ref 31.4–37.4)
MCV RBC AUTO: 89 FL (ref 82–98)
MONOCYTES # BLD AUTO: 0.94 THOUSAND/ΜL (ref 0.17–1.22)
MONOCYTES NFR BLD AUTO: 7 % (ref 4–12)
MUCOUS THREADS UR QL AUTO: ABNORMAL
NEUTROPHILS # BLD AUTO: 10.27 THOUSANDS/ΜL (ref 1.85–7.62)
NEUTS SEG NFR BLD AUTO: 82 % (ref 43–75)
NITRITE UR QL STRIP: POSITIVE
NON-SQ EPI CELLS URNS QL MICRO: ABNORMAL /HPF
NRBC BLD AUTO-RTO: 0 /100 WBCS
PH UR STRIP.AUTO: 7.5 [PH]
PLATELET # BLD AUTO: 110 THOUSANDS/UL (ref 149–390)
PMV BLD AUTO: 11.8 FL (ref 8.9–12.7)
POTASSIUM SERPL-SCNC: 4.1 MMOL/L (ref 3.5–5.3)
PROCALCITONIN SERPL-MCNC: 0.18 NG/ML
PROT SERPL-MCNC: 7.3 G/DL (ref 6.4–8.4)
PROT UR STRIP-MCNC: ABNORMAL MG/DL
RBC # BLD AUTO: 4.13 MILLION/UL (ref 3.81–5.12)
RBC #/AREA URNS AUTO: ABNORMAL /HPF
SARS-COV+SARS-COV-2 AG RESP QL IA.RAPID: NEGATIVE
SARS-COV-2 AG UPPER RESP QL IA: NEGATIVE
SL AMB POCT RAPID FLU A: NORMAL
SL AMB POCT RAPID FLU B: NORMAL
SODIUM SERPL-SCNC: 134 MMOL/L (ref 135–147)
SP GR UR STRIP.AUTO: 1.02 (ref 1–1.03)
UROBILINOGEN UR STRIP-ACNC: <2 MG/DL
VALID CONTROL: NORMAL
WBC # BLD AUTO: 12.63 THOUSAND/UL (ref 4.31–10.16)
WBC #/AREA URNS AUTO: ABNORMAL /HPF

## 2024-12-30 PROCEDURE — 84145 PROCALCITONIN (PCT): CPT

## 2024-12-30 PROCEDURE — G2211 COMPLEX E/M VISIT ADD ON: HCPCS | Performed by: STUDENT IN AN ORGANIZED HEALTH CARE EDUCATION/TRAINING PROGRAM

## 2024-12-30 PROCEDURE — 85025 COMPLETE CBC W/AUTO DIFF WBC: CPT

## 2024-12-30 PROCEDURE — 87811 SARS-COV-2 COVID19 W/OPTIC: CPT

## 2024-12-30 PROCEDURE — 87811 SARS-COV-2 COVID19 W/OPTIC: CPT | Performed by: STUDENT IN AN ORGANIZED HEALTH CARE EDUCATION/TRAINING PROGRAM

## 2024-12-30 PROCEDURE — 99285 EMERGENCY DEPT VISIT HI MDM: CPT

## 2024-12-30 PROCEDURE — 36415 COLL VENOUS BLD VENIPUNCTURE: CPT

## 2024-12-30 PROCEDURE — 83605 ASSAY OF LACTIC ACID: CPT

## 2024-12-30 PROCEDURE — 96365 THER/PROPH/DIAG IV INF INIT: CPT

## 2024-12-30 PROCEDURE — 99214 OFFICE O/P EST MOD 30 MIN: CPT | Performed by: STUDENT IN AN ORGANIZED HEALTH CARE EDUCATION/TRAINING PROGRAM

## 2024-12-30 PROCEDURE — 80053 COMPREHEN METABOLIC PANEL: CPT

## 2024-12-30 PROCEDURE — 87804 INFLUENZA ASSAY W/OPTIC: CPT | Performed by: STUDENT IN AN ORGANIZED HEALTH CARE EDUCATION/TRAINING PROGRAM

## 2024-12-30 PROCEDURE — 87186 SC STD MICRODIL/AGAR DIL: CPT

## 2024-12-30 PROCEDURE — 96361 HYDRATE IV INFUSION ADD-ON: CPT

## 2024-12-30 PROCEDURE — 87077 CULTURE AEROBIC IDENTIFY: CPT

## 2024-12-30 PROCEDURE — 87086 URINE CULTURE/COLONY COUNT: CPT

## 2024-12-30 PROCEDURE — 81001 URINALYSIS AUTO W/SCOPE: CPT

## 2024-12-30 PROCEDURE — 87040 BLOOD CULTURE FOR BACTERIA: CPT

## 2024-12-30 PROCEDURE — 70450 CT HEAD/BRAIN W/O DYE: CPT

## 2024-12-30 PROCEDURE — 96375 TX/PRO/DX INJ NEW DRUG ADDON: CPT

## 2024-12-30 PROCEDURE — 87804 INFLUENZA ASSAY W/OPTIC: CPT

## 2024-12-30 RX ORDER — ACETAMINOPHEN 325 MG/1
650 TABLET ORAL ONCE
Status: COMPLETED | OUTPATIENT
Start: 2024-12-30 | End: 2024-12-30

## 2024-12-30 RX ORDER — MAGNESIUM SULFATE HEPTAHYDRATE 40 MG/ML
2 INJECTION, SOLUTION INTRAVENOUS ONCE
Status: COMPLETED | OUTPATIENT
Start: 2024-12-30 | End: 2024-12-30

## 2024-12-30 RX ORDER — KETOROLAC TROMETHAMINE 30 MG/ML
15 INJECTION, SOLUTION INTRAMUSCULAR; INTRAVENOUS ONCE
Status: COMPLETED | OUTPATIENT
Start: 2024-12-30 | End: 2024-12-30

## 2024-12-30 RX ORDER — DEXAMETHASONE SODIUM PHOSPHATE 10 MG/ML
10 INJECTION, SOLUTION INTRAMUSCULAR; INTRAVENOUS ONCE
Status: COMPLETED | OUTPATIENT
Start: 2024-12-30 | End: 2024-12-30

## 2024-12-30 RX ORDER — METOCLOPRAMIDE HYDROCHLORIDE 5 MG/ML
10 INJECTION INTRAMUSCULAR; INTRAVENOUS ONCE
Status: COMPLETED | OUTPATIENT
Start: 2024-12-30 | End: 2024-12-30

## 2024-12-30 RX ORDER — SULFAMETHOXAZOLE AND TRIMETHOPRIM 800; 160 MG/1; MG/1
1 TABLET ORAL ONCE
Status: COMPLETED | OUTPATIENT
Start: 2024-12-30 | End: 2024-12-30

## 2024-12-30 RX ADMIN — KETOROLAC TROMETHAMINE 15 MG: 30 INJECTION, SOLUTION INTRAMUSCULAR at 19:52

## 2024-12-30 RX ADMIN — SODIUM CHLORIDE 1000 ML: 0.9 INJECTION, SOLUTION INTRAVENOUS at 19:01

## 2024-12-30 RX ADMIN — METOCLOPRAMIDE 10 MG: 5 INJECTION, SOLUTION INTRAMUSCULAR; INTRAVENOUS at 19:15

## 2024-12-30 RX ADMIN — ACETAMINOPHEN 650 MG: 325 TABLET, FILM COATED ORAL at 17:55

## 2024-12-30 RX ADMIN — MAGNESIUM SULFATE HEPTAHYDRATE 2 G: 40 INJECTION, SOLUTION INTRAVENOUS at 19:52

## 2024-12-30 RX ADMIN — SODIUM CHLORIDE 1000 ML: 0.9 INJECTION, SOLUTION INTRAVENOUS at 21:46

## 2024-12-30 RX ADMIN — SULFAMETHOXAZOLE AND TRIMETHOPRIM 1 TABLET: 800; 160 TABLET ORAL at 21:14

## 2024-12-30 RX ADMIN — DEXAMETHASONE SODIUM PHOSPHATE 10 MG: 10 INJECTION, SOLUTION INTRAMUSCULAR; INTRAVENOUS at 21:48

## 2024-12-30 NOTE — ASSESSMENT & PLAN NOTE
Presents today with 2 weeks of progressively worsening headache.  Patient reports that headache started about 2 weeks ago and has not resolved despite use of Tylenol or strength.  Daughter present for visit and reports that today patient did exhibit some shivering/shaking, fatigue, vomiting.    Daughter reports that patient did sustain a fall on Thanksgiving day which she was evaluated in the emergency room for.  Patient reports feeling overall very weak and is today sitting in a wheelchair despite being very physically independent previously.    Rapid COVID and flu today is negative    Recommend patient to be evaluated in the emergency room as I do believe she will need a repeat CT head and some basic labs to evaluate symptoms further.  Patient reports that she is supposed to be traveling tomorrow with her daughter which I have recommended against at this time.    At completion of visit patient was escorted by her  and daughter to emergency room for further evaluation    Orders:  •  POCT Rapid Covid Ag  •  POCT rapid flu A and B

## 2024-12-30 NOTE — PROGRESS NOTES
Name: Pebbles Landon      : 1945      MRN: [id number]  Encounter Provider: Brea King DO  Encounter Date: 2024   Encounter department: [facility]  :  Assessment & Plan  Acute intractable headache, unspecified headache type  Presents today with 2 weeks of progressively worsening headache.  Patient reports that headache started about 2 weeks ago and has not resolved despite use of Tylenol or strength.  Daughter present for visit and reports that today patient did exhibit some shivering/shaking, fatigue, vomiting.    Daughter reports that patient did sustain a fall on Thanksgiving day which she was evaluated in the emergency room for.  Patient reports feeling overall very weak and is today sitting in a wheelchair despite being very physically independent previously.    Rapid COVID and flu today is negative    Recommend patient to be evaluated in the emergency room as I do believe she will need a repeat CT head and some basic labs to evaluate symptoms further.  Patient reports that she is supposed to be traveling tomorrow with her daughter which I have recommended against at this time.    At completion of visit patient was escorted by her  and daughter to emergency room for further evaluation    Orders:  •  POCT Rapid Covid Ag  •  POCT rapid flu A and B           History of Present Illness     Headache  Headache pattern:  Some headache always there, and the pain doesn't change much  Duration:  2 to 4 weeks  Frequency:  No headaches then constant pain started  Initial event:  None  Providers seen:  None  Quality:  Pounding  Laterality:  Both sides at the same time  Location:  Front/forehead  Headaches last more than three days?: Yes    Changes in thinking and mood:  Fatigue and not feeling right  Changes in vision:  None  Bilateral symptoms:  None  Unilateral symptoms:  None  Stomach/GI changes:  Vomiting  Changes in sensation:  None  Abortive medication  frequency of use:  Every day  Abortive medications tried:  Acetaminophen  Preventative medications tried:  None    Review of Systems   Constitutional:  Positive for chills. Negative for fever.   HENT:  Negative for congestion and rhinorrhea.    Respiratory:  Negative for cough and shortness of breath.    Cardiovascular:  Negative for chest pain and palpitations.   Gastrointestinal:  Positive for vomiting. Negative for abdominal pain, diarrhea and nausea.   Neurological:  Positive for headaches.       Objective   /60 (BP Location: Right arm, Patient Position: Sitting, Cuff Size: Large)   Pulse 80   Ht 5' (1.524 m)   Wt 87.4 kg (192 lb 9.6 oz)   SpO2 91%   BMI 37.61 kg/m²      Physical Exam  Vitals reviewed.   Constitutional:       Appearance: She is obese. She is ill-appearing.   HENT:      Head: Normocephalic and atraumatic.   Eyes:      Extraocular Movements: Extraocular movements intact.   Cardiovascular:      Rate and Rhythm: Normal rate and regular rhythm.      Heart sounds: Normal heart sounds.   Pulmonary:      Effort: Pulmonary effort is normal.      Breath sounds: Normal breath sounds.   Neurological:      Mental Status: She is oriented to person, place, and time.      Motor: Weakness present.   Psychiatric:         Mood and Affect: Mood normal.         Behavior: Behavior normal.

## 2024-12-30 NOTE — ED PROVIDER NOTES
ED Disposition       None          Assessment & Plan   {Hyperlinks  Risk Stratification - NIHSS - HEART SCORE - Fill out sepsis note and make sure you call [phone] if severe or septic shock:[id number]}    Medical Decision Making  Risk  OTC drugs.             Medications   acetaminophen (TYLENOL) tablet 650 mg (650 mg Oral Given 24)       ED Risk Strat Scores                          SBIRT 22yo+      Flowsheet Row Most Recent Value   Initial Alcohol Screen: US AUDIT-C     1. How often do you have a drink containing alcohol? 0 Filed at: 2024   2. How many drinks containing alcohol do you have on a typical day you are drinking?  0 Filed at: 2024   3a. Male UNDER 65: How often do you have five or more drinks on one occasion? 0 Filed at: 2024   3b. FEMALE Any Age, or MALE 65+: How often do you have 4 or more drinks on one occassion? 0 Filed at: 2024   Audit-C Score 0 Filed at: 2024   BING: How many times in the past year have you...    Used an illegal drug or used a prescription medication for non-medical reasons? Never Filed at: 2024                            History of Present Illness   {Hyperlinks  History (Med, Surg, Fam, Social) - Current Medications - Allergies  :[id number]}    Chief Complaint   Patient presents with    Headache     Came in for complaints of headaches x 2 weeks, also complaint of dizziness and nausea. Unable to tolerate PO intake.       Past Medical History:   Diagnosis Date    Asthma     Benign hypertension 2018    Cardiac arrest (HCC)     Diabetes mellitus (HCC)     Glaucoma     Hypertension     Kidney stone     Neuropathy     Sleep apnea     no machine    SOB (shortness of breath)     Tubular adenoma of colon 10/2019    Wheezing       Past Surgical History:   Procedure Laterality Date     SECTION      COLONOSCOPY      HYSTERECTOMY  1985    IR BIOPSY BONE MARROW  2022    TONSILLECTOMY        Family  History   Problem Relation Age of Onset    Diabetes Mother     Hypertension Father     Prostate cancer Father     Diabetes Sister     Hypertension Sister     Breast cancer Sister 58    No Known Problems Sister     No Known Problems Daughter     No Known Problems Daughter     No Known Problems Daughter     No Known Problems Maternal Grandmother     No Known Problems Paternal Grandmother     Diabetes Brother     Hypertension Brother     No Known Problems Maternal Aunt     Breast cancer Maternal Aunt 62    No Known Problems Maternal Aunt     No Known Problems Maternal Aunt     Pancreatic cancer Paternal Aunt     No Known Problems Paternal Aunt     No Known Problems Paternal Aunt     Asthma Family     Colon cancer Neg Hx     Ovarian cancer Neg Hx     Uterine cancer Neg Hx     Cervical cancer Neg Hx       Social History     Tobacco Use    Smoking status: Never     Passive exposure: Never    Smokeless tobacco: Never   Vaping Use    Vaping status: Never Used   Substance Use Topics    Alcohol use: Never    Drug use: No      E-Cigarette/Vaping    E-Cigarette Use Never User       E-Cigarette/Vaping Substances    Nicotine No     THC No     CBD No     Flavoring No     Other No     Unknown No       I have reviewed and agree with the history as documented.     HPI    Review of Systems        Objective   {Hyperlinks  Historical Vitals - Historical Labs - Chart Review/Microbiology - Last Echo - Code Status  :[id number]}    ED Triage Vitals   Temperature Pulse Blood Pressure Respirations SpO2 Patient Position - Orthostatic VS   12/30/24 1749 12/30/24 1749 12/30/24 1749 12/30/24 1749 12/30/24 1749 12/30/24 1749   (!) 102.4 °F (39.1 °C) 85 (!) 178/78 18 95 % Sitting      Temp Source Heart Rate Source BP Location FiO2 (%) Pain Score    12/30/24 1749 12/30/24 1749 12/30/24 1749 -- 12/30/24 1755    Oral Monitor Right arm  Med Not Given for Pain - for MAR use only      Vitals      Date and Time Temp Pulse SpO2 Resp BP Pain Score FACES  Pain Rating User   12/30/24 5795 -- -- -- -- -- Med Not Given for Pain - for MAR use only -- LM   12/30/24 1749 102.4 °F (39.1 °C) 85 95 % 18 178/78 -- -- KW            Physical Exam    Results Reviewed       None            No orders to display       Procedures    ED Medication and Procedure Management   Prior to Admission Medications   Prescriptions Last Dose Informant Patient Reported? Taking?   LORazepam (ATIVAN) 1 mg tablet  Self No No   Sig: Take 1 tablet (1 mg total) by mouth daily at bedtime   Lumigan 0.01 % ophthalmic drops  Self Yes No   Sig: APPLY ONE DROP TO EACH EYE AT BEDTIME   Lyrica  MG TB24  Self No No   Sig: TAKE 1 TABLET BY MOUTH TWICE A DAY   SITagliptin-metFORMIN HCl ER (Janumet XR) 100-1000 MG TB24  Self No No   Sig: TAKE 1 TABLET BY MOUTH EVERY DAY WITH DINNER   acetaminophen (TYLENOL) 325 mg tablet  Self No No   Sig: Take 2 tablets (650 mg total) by mouth every 4 (four) hours as needed for mild pain   albuterol (2.5 mg/3 mL) 0.083 % nebulizer solution  Self No No   Sig: Take 3 mL (2.5 mg total) by nebulization every 6 (six) hours as needed for wheezing or shortness of breath   albuterol (PROVENTIL HFA,VENTOLIN HFA) 90 mcg/act inhaler  Self No No   Sig: INHALE 1 PUFF BY MOUTH EVERY 6 HOURS AS NEEDED FOR WHEEZE   amLODIPine (NORVASC) 10 mg tablet  Self No No   Sig: Take 1 tablet (10 mg total) by mouth daily at bedtime   carvedilol (COREG) 6.25 mg tablet  Self No No   Sig: Take 1 tablet (6.25 mg total) by mouth 2 (two) times a day with meals   ergocalciferol (VITAMIN D2) 50,000 units   No No   Sig: Take 1 capsule (50,000 Units total) by mouth 2 (two) times a week for 16 doses   eszopiclone (LUNESTA) 2 mg tablet  Self No No   Sig: Take 1 tablet (2 mg total) by mouth daily at bedtime as needed for sleep Take immediately before bedtime   fluticasone-salmeterol (AIRDUO RESPICLICK) 113-14 mcg/act dry powder inhaler  Self No No   Sig: INHALE 1 PUFF 2 TIMES A DAY RINSE MOUTH AFTER USE.   glucose  blood test strip  Self Yes No   hydrocortisone 2.5 % cream  Self No No   Sig: APPLY TO AFFECTED AREA TWICE A DAY   latanoprost (XALATAN) 0.005 % ophthalmic solution  Self Yes No   lidocaine (Lidoderm) 5 %   No No   Sig: Apply 1 patch topically over 12 hours daily Remove & Discard patch within 12 hours or as directed by MD   meloxicam (Mobic) 15 mg tablet  Self No No   Sig: Take 1 tablet (15 mg total) by mouth daily   montelukast (SINGULAIR) 10 mg tablet  Self No No   Sig: TAKE 1 TABLET BY MOUTH EVERYDAY AT BEDTIME   mupirocin (BACTROBAN) 2 % ointment   No No   Sig: Apply topically 3 (three) times a day for 10 days   ondansetron (Zofran ODT) 4 mg disintegrating tablet  Self No No   Sig: Take 1 tablet (4 mg total) by mouth every 6 (six) hours as needed for nausea or vomiting   sertraline (ZOLOFT) 50 mg tablet  Self No No   Sig: TAKE 1 AND 1/2 TABLETS BY MOUTH DAILY   sucralfate (CARAFATE) 1 g tablet   No No   Sig: Take 1 tablet (1 g total) by mouth 4 (four) times a day for 14 days   traMADol (Ultram) 50 mg tablet  Self No No   Sig: Take 1 tablet (50 mg total) by mouth every 6 (six) hours as needed for severe pain      Facility-Administered Medications: None     Patient's Medications   Discharge Prescriptions    No medications on file     No discharge procedures on file.  ED SEPSIS DOCUMENTATION            12/31/24 0730 98.1 °F (36.7 °C) 59 96 % 18 142/64 -- -- DII   12/31/24 0535 97.1 °F (36.2 °C) 62 94 % -- -- -- -- DII   12/31/24 0027 -- -- -- -- -- 9 -- EB   12/30/24 2359 97.5 °F (36.4 °C) 64 93 % 20 145/70 -- -- DII   12/30/24 2230 -- 68 96 % 16 139/63 -- -- CZ   12/30/24 1952 -- -- -- -- -- 10 - Worst Possible Pain -- CZ   12/30/24 1936 99.6 °F (37.6 °C) -- -- -- -- -- -- CZ   12/30/24 1840 -- -- -- -- -- 9 -- FB   12/30/24 1755 -- -- -- -- -- Med Not Given for Pain - for MAR use only -- LM   12/30/24 1749 102.4 °F (39.1 °C) 85 95 % 18 178/78 -- -- KW            Physical Exam  Vitals and nursing note reviewed.   Constitutional:       General: She is not in acute distress.     Appearance: She is well-developed.   HENT:      Head: Normocephalic and atraumatic.   Eyes:      Conjunctiva/sclera: Conjunctivae normal.   Cardiovascular:      Rate and Rhythm: Normal rate and regular rhythm.      Heart sounds: No murmur heard.  Pulmonary:      Effort: Pulmonary effort is normal. No respiratory distress.      Breath sounds: Normal breath sounds.   Abdominal:      Palpations: Abdomen is soft.      Tenderness: There is no abdominal tenderness.   Musculoskeletal:         General: No swelling.      Cervical back: Neck supple.   Skin:     General: Skin is warm and dry.      Capillary Refill: Capillary refill takes less than 2 seconds.   Neurological:      Mental Status: She is alert.   Psychiatric:         Mood and Affect: Mood normal.         Results Reviewed       Procedure Component Value Units Date/Time    Blood culture #1 [[id number]] Collected: 12/30/24 1856    Lab Status: Final result Specimen: Blood from Hand, Right Updated: 01/04/25 2201     Blood Culture No Growth After 5 Days.    Blood culture #2 [[id number]] Collected: 12/30/24 1845    Lab Status: Final result Specimen: Blood from Arm, Right Updated: 01/04/25 2201     Blood Culture No Growth After 5 Days.    Urine culture [[id number]]  (Abnormal)  (Susceptibility)  Collected: 12/30/24 2003    Lab Status: Final result Specimen: Urine, Clean Catch Updated: 01/01/25 1244     Urine Culture >100,000 cfu/ml Klebsiella aerogenes    Susceptibility       Klebsiella aerogenes (1)       Antibiotic Interpretation Microscan   Method Status    ZID Performed  Yes  ALEXANDRO Final    Amoxicillin + Clavulanate Resistant >16/8 ug/ml ALEXANDRO Final    Ampicillin ($$) Resistant >16.00 ug/ml ALEXANDRO Final    Ampicillin + Sulbactam ($) Resistant 8/4 ug/ml ALEXANDRO Final    Aztreonam ($$$)  Susceptible <=4 ug/ml ALEXANDRO Final    Cefazolin ($) Resistant >16.00 ug/ml ALEXANDRO Final    Cefuroxime ($$) Susceptible <=4 ug/ml ALEXANDRO Final    Ciprofloxacin ($)  Susceptible <=0.25 ug/ml ALEXANDRO Final    Ertapenem ($$$) Susceptible <=0.5 ug/ml ALEXANDRO Final    Gentamicin ($$) Susceptible <=2 ug/ml ALEXANDRO Final    Levofloxacin ($) Susceptible <=0.50 ug/ml ALEXANDRO Final    Nitrofurantoin Intermediate 64 ug/ml ALEXANDRO Final    Piperacillin + Tazobactam ($$$) Susceptible <=8 ug/ml ALEXANDRO Final    Tetracycline Susceptible <=4 ug/ml ALEXANDRO Final    Trimethoprim + Sulfamethoxazole ($$$) Susceptible <=0.5/9.5 ug/ml ALEXANDRO Final                       Urine Microscopic [[id number]]  (Abnormal) Collected: 12/30/24 2003    Lab Status: Final result Specimen: Urine, Clean Catch Updated: 12/30/24 2016     RBC, UA 4-10 /hpf      WBC, UA 30-50 /hpf      Epithelial Cells Occasional /hpf      Bacteria, UA Occasional /hpf      MUCUS THREADS Occasional    UA w Reflex to Microscopic w Reflex to Culture [[id number]]  (Abnormal) Collected: 12/30/24 2003    Lab Status: Final result Specimen: Urine, Clean Catch Updated: 12/30/24 2014     Color, UA Light Yellow     Clarity, UA Turbid     Specific Gravity, UA 1.016     pH, UA 7.5     Leukocytes, UA Small     Nitrite, UA Positive     Protein, UA Trace mg/dl      Glucose, UA Negative mg/dl      Ketones, UA Negative mg/dl      Urobilinogen, UA <2.0 mg/dl      Bilirubin, UA Negative     Occult Blood, UA Trace    CBC and differential [[id number]]   (Abnormal) Collected: 12/30/24 1845    Lab Status: Final result Specimen: Blood from Arm, Right Updated: 12/30/24 1937     WBC 12.63 Thousand/uL      RBC 4.13 Million/uL      Hemoglobin 11.7 g/dL      Hematocrit 36.9 %      MCV 89 fL      MCH 28.3 pg      MCHC 31.7 g/dL      RDW 14.2 %      MPV 11.8 fL      Platelets 110 Thousands/uL      nRBC 0 /100 WBCs      Segmented % 82 %      Immature Grans % 1 %      Lymphocytes % 10 %      Monocytes % 7 %      Eosinophils Relative 0 %      Basophils Relative 0 %      Absolute Neutrophils 10.27 Thousands/µL      Absolute Immature Grans 0.16 Thousand/uL      Absolute Lymphocytes 1.20 Thousands/µL      Absolute Monocytes 0.94 Thousand/µL      Eosinophils Absolute 0.02 Thousand/µL      Basophils Absolute 0.04 Thousands/µL     Procalcitonin [[id number]]  (Normal) Collected: 12/30/24 1845    Lab Status: Final result Specimen: Blood from Arm, Right Updated: 12/30/24 1932     Procalcitonin 0.18 ng/ml     Lactic acid, plasma (w/reflex if result > 2.0) [[id number]]  (Normal) Collected: 12/30/24 1845    Lab Status: Final result Specimen: Blood from Arm, Right Updated: 12/30/24 1924     LACTIC ACID 0.7 mmol/L     Narrative:      Result may be elevated if tourniquet was used during collection.    FLU/COVID Rapid Antigen (30 min. TAT) - Preferred screening test in ED [[id number]]  (Normal) Collected: 12/30/24 1845    Lab Status: Final result Specimen: Nares from Nose Updated: 12/30/24 1924     SARS COV Rapid Antigen Negative     Influenza A Rapid Antigen Negative     Influenza B Rapid Antigen Negative    Narrative:      This test has been performed using the [employer] Ele 2 FLU+SARS Antigen test under the Emergency Use Authorization (EUA). This test has been validated by the  and verified by the performing laboratory. The Ele uses lateral flow immunofluorescent sandwich assay to detect SARS-COV, Influenza A and Influenza B Antigen.     The [employer] Ele 2 SARS Antigen test does  not differentiate between SARS-CoV and SARS-CoV-2.     Negative results are presumptive and may be confirmed with a molecular assay, if necessary, for patient management. Negative results do not rule out SARS-CoV-2 or influenza infection and should not be used as the sole basis for treatment or patient management decisions. A negative test result may occur if the level of antigen in a sample is below the limit of detection of this test.     Positive results are indicative of the presence of viral antigens, but do not rule out bacterial infection or co-infection with other viruses.     All test results should be used as an adjunct to clinical observations and other information available to the provider.    FOR PEDIATRIC PATIENTS - copy/paste COVID Guidelines URL to browser: [email or web address]    Comprehensive metabolic panel [[id number]]  (Abnormal) Collected: 12/30/24 1845    Lab Status: Final result Specimen: Blood from Arm, Right Updated: 12/30/24 1922     Sodium 134 mmol/L      Potassium 4.1 mmol/L      Chloride 101 mmol/L      CO2 26 mmol/L      ANION GAP 7 mmol/L      BUN 13 mg/dL      Creatinine 0.74 mg/dL      Glucose 149 mg/dL      Calcium 9.2 mg/dL      AST 11 U/L      ALT 9 U/L      Alkaline Phosphatase 57 U/L      Total Protein 7.3 g/dL      Albumin 4.0 g/dL      Total Bilirubin 0.39 mg/dL      eGFR 78 ml/min/1.73sq m     Narrative:      National Kid[employer] Disease Foundation guidelines for Chronic Kidney Disease (CKD):     Stage 1 with normal or high GFR (GFR > 90 mL/min/1.73 square meters)    Stage 2 Mild CKD (GFR = 60-89 mL/min/1.73 square meters)    Stage 3A Moderate CKD (GFR = 45-59 mL/min/1.73 square meters)    Stage 3B Moderate CKD (GFR = 30-44 mL/min/1.73 square meters)    Stage 4 Severe CKD (GFR = 15-29 mL/min/1.73 square meters)    Stage 5 End Stage CKD (GFR <15 mL/min/1.73 square meters)  Note: GFR calculation is accurate only with a steady  state creatinine            CT abdomen pelvis wo contrast   Final Interpretation by Sadi Rivera MD (12/31 7717)      1. No acute intra-abdominal disease. Colonic diverticulosis without diverticulitis. Normal appendix.      2. Bilateral nonobstructing renal stones. No hydronephrosis.      3. Small fat-containing umbilical and inguinal hernias               Electronically signed: 12/31/2024 05:57 PM Sadi Rivera MD      CT head without contrast   Final Interpretation by Rob Pressley MD (12/30 2145)      No acute intracranial abnormality.      Reidentified Chiari I malformation.            Workstation performed: [id number]             Procedures    ED Medication and Procedure Management   Prior to Admission Medications   Prescriptions Last Dose Informant Patient Reported? Taking?   LORazepam (ATIVAN) 1 mg tablet  Self No No   Sig: Take 1 tablet (1 mg total) by mouth daily at bedtime   Lumigan 0.01 % ophthalmic drops  Self Yes No   Sig: APPLY ONE DROP TO EACH EYE AT BEDTIME   Lyrica  MG TB24  Self No No   Sig: TAKE 1 TABLET BY MOUTH TWICE A DAY   SITagliptin-metFORMIN HCl ER (Janumet XR) 100-1000 MG TB24  Self No No   Sig: TAKE 1 TABLET BY MOUTH EVERY DAY WITH DINNER   acetaminophen (TYLENOL) 325 mg tablet  Self No No   Sig: Take 2 tablets (650 mg total) by mouth every 4 (four) hours as needed for mild pain   albuterol (2.5 mg/3 mL) 0.083 % nebulizer solution  Self No No   Sig: Take 3 mL (2.5 mg total) by nebulization every 6 (six) hours as needed for wheezing or shortness of breath   albuterol (PROVENTIL HFA,VENTOLIN HFA) 90 mcg/act inhaler  Self No No   Sig: INHALE 1 PUFF BY MOUTH EVERY 6 HOURS AS NEEDED FOR WHEEZE   amLODIPine (NORVASC) 10 mg tablet  Self No No   Sig: Take 1 tablet (10 mg total) by mouth daily at bedtime   carvedilol (COREG) 6.25 mg tablet  Self No No   Sig: Take 1 tablet (6.25 mg total) by mouth 2 (two) times a day with meals   ergocalciferol (VITAMIN D2) 50,000 units   No No   Sig:  Take 1 capsule (50,000 Units total) by mouth 2 (two) times a week for 16 doses   fluticasone-salmeterol (AIRDUO RESPICLICK) 113-14 mcg/act dry powder inhaler  Self No No   Sig: INHALE 1 PUFF 2 TIMES A DAY RINSE MOUTH AFTER USE.   glucose blood test strip  Self Yes No   hydrocortisone 2.5 % cream  Self No No   Sig: APPLY TO AFFECTED AREA TWICE A DAY   latanoprost (XALATAN) 0.005 % ophthalmic solution  Self Yes No   lidocaine (Lidoderm) 5 %   No No   Sig: Apply 1 patch topically over 12 hours daily Remove & Discard patch within 12 hours or as directed by MD   meloxicam (Mobic) 15 mg tablet  Self No No   Sig: Take 1 tablet (15 mg total) by mouth daily   montelukast (SINGULAIR) 10 mg tablet  Self No No   Sig: TAKE 1 TABLET BY MOUTH EVERYDAY AT BEDTIME   mupirocin (BACTROBAN) 2 % ointment   No No   Sig: Apply topically 3 (three) times a day for 10 days   ondansetron (Zofran ODT) 4 mg disintegrating tablet  Self No No   Sig: Take 1 tablet (4 mg total) by mouth every 6 (six) hours as needed for nausea or vomiting   sertraline (ZOLOFT) 50 mg tablet  Self No No   Sig: TAKE 1 AND 1/2 TABLETS BY MOUTH DAILY   sucralfate (CARAFATE) 1 g tablet   No No   Sig: Take 1 tablet (1 g total) by mouth 4 (four) times a day for 14 days   traMADol (Ultram) 50 mg tablet  Self No No   Sig: Take 1 tablet (50 mg total) by mouth every 6 (six) hours as needed for severe pain      Facility-Administered Medications: None     Discharge Medication List as of 1/2/2025 11:53 AM        START taking these medications    Details   methylprednisolone (MEDROL) 4 mg tablet Multiple Dosages:Starting Thu 1/2/2025, Until Thu 1/2/2025 at 2359, THEN Starting Fri 1/3/2025, Until Fri 1/3/2025 at 2359, THEN Starting Sat 1/4/2025, Until Sat 1/4/2025 at 2359, THEN Starting Sun 1/5/2025, Until Sun 1/5/2025 at 2359, THEN Starting  Mon 1/6/2025, Until Mon 1/6/2025 at 2359, THEN Starting Tue 1/7/2025, Until Tue 1/7/2025 at 2359Take 6 tablets (24 mg total) by mouth daily  for 1 day, THEN 5 tablets (20 mg total) daily for 1 day, THEN 4 tablets (16 mg total) daily for 1 day, THEN 3 t ablets (12 mg total) daily for 1 day, THEN 2 tablets (8 mg total) daily for 1 day, THEN 1 tablet (4 mg total) daily for 1 day., Normal      sulfamethoxazole-trimethoprim (BACTRIM DS) 800-160 mg per tablet Take 1 tablet by mouth every 12 (twelve) hours for 6 days, Starting Thu 1/2/2025, Until Wed 1/8/2025, Normal           CONTINUE these medications which have NOT CHANGED    Details   acetaminophen (TYLENOL) 325 mg tablet Take 2 tablets (650 mg total) by mouth every 4 (four) hours as needed for mild pain, Starting Tue 4/19/2022, No Print      albuterol (2.5 mg/3 mL) 0.083 % nebulizer solution Take 3 mL (2.5 mg total) by nebulization every 6 (six) hours as needed for wheezing or shortness of breath, Starting Wed 7/10/2024, Normal      albuterol (PROVENTIL HFA,VENTOLIN HFA) 90 mcg/act inhaler INHALE 1 PUFF BY MOUTH EVERY 6 HOURS AS NEEDED FOR WHEEZE, Normal      amLODIPine (NORVASC) 10 mg tablet Take 1 tablet (10 mg total) by mouth daily at bedtime, Starting Thu 9/26/2024, Normal      carvedilol (COREG) 6.25 mg tablet Take 1 tablet (6.25 mg total) by mouth 2 (two) times a day with meals, Starting Thu 9/26/2024, Normal      ergocalciferol (VITAMIN D2) 50,000 units Take 1 capsule (50,000 Units total) by mouth 2 (two) times a week for 16 doses, Starting Thu 9/26/2024, Until Tue 11/19/2024, Normal      fluticasone-salmeterol (AIRDUO RESPICLICK) 113-14 mcg/act dry powder inhaler INHALE 1 PUFF 2 TIMES A DAY RINSE MOUTH AFTER USE., Normal      glucose blood test strip Historical Med      hydrocortisone 2.5 % cream APPLY TO AFFECTED AREA TWICE A DAY, Starting Wed 9/25/2024, Normal      latanoprost (XALATAN) 0.005 % ophthalmic solution Starting Fri 9/24/2021, Historical Med      lidocaine (Lidoderm) 5 % Apply 1 patch topically over 12 hours daily Remove & Discard patch within 12 hours or as directed by MD, Starting  Fri 12/6/2024, Normal      LORazepam (ATIVAN) 1 mg tablet Take 1 tablet (1 mg total) by mouth daily at bedtime, Starting Wed 9/4/2024, Normal      Lumigan 0.01 % ophthalmic drops APPLY ONE DROP TO EACH EYE AT BEDTIME, Historical Med      Lyrica  MG TB24 TAKE 1 TABLET BY MOUTH TWICE A DAY, Starting Thu 9/26/2024, Normal      meloxicam (Mobic) 15 mg tablet Take 1 tablet (15 mg total) by mouth daily, Starting Thu 10/24/2024, Normal      montelukast (SINGULAIR) 10 mg tablet TAKE 1 TABLET BY MOUTH EVERYDAY AT BEDTIME, Normal      mupirocin (BACTROBAN) 2 % ointment Apply topically 3 (three) times a day for 10 days, Starting Tue 7/30/2024, Until Fri 8/9/2024, Normal      ondansetron (Zofran ODT) 4 mg disintegrating tablet Take 1 tablet (4 mg total) by mouth every 6 (six) hours as needed for nausea or vomiting, Starting Tue 5/7/2024, Normal      sertraline (ZOLOFT) 50 mg tablet TAKE 1 AND 1/2 TABLETS BY MOUTH DAILY, Starting Mon 11/4/2024, Normal      SITagliptin-metFORMIN HCl ER (Janumet XR) 100-1000 MG TB24 TAKE 1 TABLET BY MOUTH EVERY DAY WITH DINNER, Starting Mon 11/4/2024, Normal      sucralfate (CARAFATE) 1 g tablet Take 1 tablet (1 g total) by mouth 4 (four) times a day for 14 days, Starting Tue 5/7/2024, Until Tue 5/21/2024, Normal      traMADol (Ultram) 50 mg tablet Take 1 tablet (50 mg total) by mouth every 6 (six) hours as needed for severe pain, Starting Wed 6/19/2024, Normal      eszopiclone (LUNESTA) 2 mg tablet Take 1 tablet (2 mg total) by mouth daily at bedtime as needed for sleep Take immediately before bedtime, Starting Thu 12/5/2024, Normal           No discharge procedures on file.  ED SEPSIS DOCUMENTATION   Time reflects when diagnosis was documented in both MDM as applicable and the Disposition within this note       Time User Action Codes Description Comment    12/30/2024 11:14 PM Augusto Calvo Add [N39.0] UTI (urinary tract infection)     12/30/2024 11:14 PM Augusto Calvo Add [R50.9]  Fever     12/30/2024 11:14 PM Augusto Calvo Add [R51.9] Headache     12/30/2024 11:14 PM Augusto Calvo Add [R11.0] Nausea     1/2/2025  9:57 AM Giovanni Rucker Add [A41.9] Sepsis without acute organ dysfunction (HCC)     1/2/2025  9:57 AM Giovanni Rucker Add [R51.9,  G89.29] Chronic nonintractable headache, unspecified headache type                  Augusto Calvo MD  01/07/25 0739

## 2024-12-30 NOTE — TELEPHONE ENCOUNTER
"Pebbles requests PCP recommendation.     She reports headache for about two weeks, throbbing at temples and top of head, improves to a mild ache after taking 650 mg of Tylenol. She has been taking Tylenol every 8 hours for the past two weeks.    She denies any other symptoms. She states her blood pressure has been running in the 140s.     She uses [facility] in [place] if PCP would like to send prescription. She will be leaving for [place] tomorrow.      Reason for Disposition   MODERATE headache (e.g., interferes with normal activities) present > 24 hours and unexplained    Answer Assessment - Initial Assessment Questions  1. LOCATION: \"Where does it hurt?\"       Temples and top of head    2. ONSET: \"When did the headache start?\" (e.g., minutes, hours, days)       About 2 weeks ago    3. PATTERN: \"Does the pain come and go, or has it been constant since it started?\"      Constant, throbbing headache subsides to achiness with Tylenol but comes back as Tylenol wears off    4. SEVERITY: \"How bad is the pain?\" and \"What does it keep you from doing?\"  (e.g., Scale 1-10; mild, moderate, or severe)      Moderate    5. OTHER SYMPTOMS: \"Do you have any other symptoms?\" (e.g., fever, stiff neck, eye pain, sore throat, cold symptoms)      Denies    Protocols used: Headache-Adult-OH    "

## 2024-12-31 ENCOUNTER — APPOINTMENT (OUTPATIENT)
Dept: CT IMAGING | Facility: HOSPITAL | Age: 79
DRG: 871 | End: 2024-12-31
Payer: COMMERCIAL

## 2024-12-31 ENCOUNTER — TELEPHONE (OUTPATIENT)
Dept: ADMINISTRATIVE | Facility: OTHER | Age: 79
End: 2024-12-31

## 2024-12-31 PROBLEM — G89.29 CHRONIC NONINTRACTABLE HEADACHE: Status: ACTIVE | Noted: 2024-12-30

## 2024-12-31 PROBLEM — R51.9 HEADACHE: Status: ACTIVE | Noted: 2024-12-31

## 2024-12-31 PROBLEM — R11.10 VOMITING: Status: ACTIVE | Noted: 2024-12-31

## 2024-12-31 PROBLEM — A41.9 SEPSIS WITHOUT ACUTE ORGAN DYSFUNCTION (HCC): Status: ACTIVE | Noted: 2024-12-31

## 2024-12-31 LAB
ANION GAP SERPL CALCULATED.3IONS-SCNC: 8 MMOL/L (ref 4–13)
BUN SERPL-MCNC: 14 MG/DL (ref 5–25)
CALCIUM SERPL-MCNC: 8.7 MG/DL (ref 8.4–10.2)
CHLORIDE SERPL-SCNC: 105 MMOL/L (ref 96–108)
CO2 SERPL-SCNC: 21 MMOL/L (ref 21–32)
CREAT SERPL-MCNC: 0.78 MG/DL (ref 0.6–1.3)
ERYTHROCYTE [DISTWIDTH] IN BLOOD BY AUTOMATED COUNT: 14.3 % (ref 11.6–15.1)
GFR SERPL CREATININE-BSD FRML MDRD: 73 ML/MIN/1.73SQ M
GLUCOSE P FAST SERPL-MCNC: 200 MG/DL (ref 65–99)
GLUCOSE SERPL-MCNC: 110 MG/DL (ref 65–140)
GLUCOSE SERPL-MCNC: 143 MG/DL (ref 65–140)
GLUCOSE SERPL-MCNC: 180 MG/DL (ref 65–140)
GLUCOSE SERPL-MCNC: 200 MG/DL (ref 65–140)
GLUCOSE SERPL-MCNC: 202 MG/DL (ref 65–140)
GLUCOSE SERPL-MCNC: 203 MG/DL (ref 65–140)
HCT VFR BLD AUTO: 35 % (ref 34.8–46.1)
HGB BLD-MCNC: 11.2 G/DL (ref 11.5–15.4)
MCH RBC QN AUTO: 28.9 PG (ref 26.8–34.3)
MCHC RBC AUTO-ENTMCNC: 32 G/DL (ref 31.4–37.4)
MCV RBC AUTO: 90 FL (ref 82–98)
PLATELET # BLD AUTO: 181 THOUSANDS/UL (ref 149–390)
PMV BLD AUTO: 9.7 FL (ref 8.9–12.7)
POTASSIUM SERPL-SCNC: 4.9 MMOL/L (ref 3.5–5.3)
RBC # BLD AUTO: 3.87 MILLION/UL (ref 3.81–5.12)
SODIUM SERPL-SCNC: 134 MMOL/L (ref 135–147)
WBC # BLD AUTO: 12.79 THOUSAND/UL (ref 4.31–10.16)

## 2024-12-31 PROCEDURE — 82948 REAGENT STRIP/BLOOD GLUCOSE: CPT

## 2024-12-31 PROCEDURE — 99205 OFFICE O/P NEW HI 60 MIN: CPT | Performed by: STUDENT IN AN ORGANIZED HEALTH CARE EDUCATION/TRAINING PROGRAM

## 2024-12-31 PROCEDURE — 85027 COMPLETE CBC AUTOMATED: CPT

## 2024-12-31 PROCEDURE — 80048 BASIC METABOLIC PNL TOTAL CA: CPT

## 2024-12-31 PROCEDURE — 94664 DEMO&/EVAL PT USE INHALER: CPT

## 2024-12-31 PROCEDURE — 99223 1ST HOSP IP/OBS HIGH 75: CPT

## 2024-12-31 PROCEDURE — 74176 CT ABD & PELVIS W/O CONTRAST: CPT

## 2024-12-31 PROCEDURE — 93005 ELECTROCARDIOGRAM TRACING: CPT

## 2024-12-31 RX ORDER — INSULIN LISPRO 100 [IU]/ML
1-6 INJECTION, SOLUTION INTRAVENOUS; SUBCUTANEOUS
Status: DISCONTINUED | OUTPATIENT
Start: 2024-12-31 | End: 2025-01-02 | Stop reason: HOSPADM

## 2024-12-31 RX ORDER — SODIUM CHLORIDE 9 MG/ML
100 INJECTION, SOLUTION INTRAVENOUS ONCE
Status: COMPLETED | OUTPATIENT
Start: 2024-12-31 | End: 2025-01-01

## 2024-12-31 RX ORDER — AMLODIPINE BESYLATE 10 MG/1
10 TABLET ORAL
Status: DISCONTINUED | OUTPATIENT
Start: 2024-12-31 | End: 2025-01-02 | Stop reason: HOSPADM

## 2024-12-31 RX ORDER — SODIUM CHLORIDE, SODIUM GLUCONATE, SODIUM ACETATE, POTASSIUM CHLORIDE, MAGNESIUM CHLORIDE, SODIUM PHOSPHATE, DIBASIC, AND POTASSIUM PHOSPHATE .53; .5; .37; .037; .03; .012; .00082 G/100ML; G/100ML; G/100ML; G/100ML; G/100ML; G/100ML; G/100ML
100 INJECTION, SOLUTION INTRAVENOUS CONTINUOUS
Status: DISPENSED | OUTPATIENT
Start: 2024-12-31 | End: 2024-12-31

## 2024-12-31 RX ORDER — MONTELUKAST SODIUM 10 MG/1
10 TABLET ORAL
Status: DISCONTINUED | OUTPATIENT
Start: 2024-12-31 | End: 2025-01-02 | Stop reason: HOSPADM

## 2024-12-31 RX ORDER — ACETAMINOPHEN 325 MG/1
650 TABLET ORAL EVERY 6 HOURS PRN
Status: DISCONTINUED | OUTPATIENT
Start: 2024-12-31 | End: 2025-01-02 | Stop reason: HOSPADM

## 2024-12-31 RX ORDER — PREGABALIN 25 MG/1
50 CAPSULE ORAL 3 TIMES DAILY
Status: DISCONTINUED | OUTPATIENT
Start: 2024-12-31 | End: 2025-01-02 | Stop reason: HOSPADM

## 2024-12-31 RX ORDER — FONDAPARINUX SODIUM 2.5 MG/.5ML
2.5 INJECTION SUBCUTANEOUS EVERY 24 HOURS
Status: DISCONTINUED | OUTPATIENT
Start: 2024-12-31 | End: 2025-01-02 | Stop reason: HOSPADM

## 2024-12-31 RX ORDER — MAGNESIUM SULFATE HEPTAHYDRATE 40 MG/ML
2 INJECTION, SOLUTION INTRAVENOUS EVERY 24 HOURS
Status: DISCONTINUED | OUTPATIENT
Start: 2024-12-31 | End: 2025-01-02 | Stop reason: HOSPADM

## 2024-12-31 RX ORDER — KETOROLAC TROMETHAMINE 30 MG/ML
15 INJECTION, SOLUTION INTRAMUSCULAR; INTRAVENOUS ONCE
Status: DISCONTINUED | OUTPATIENT
Start: 2024-12-31 | End: 2024-12-31

## 2024-12-31 RX ORDER — SODIUM CHLORIDE 9 MG/ML
50 INJECTION, SOLUTION INTRAVENOUS ONCE
Status: COMPLETED | OUTPATIENT
Start: 2024-12-31 | End: 2024-12-31

## 2024-12-31 RX ORDER — SODIUM CHLORIDE 9 MG/ML
100 INJECTION, SOLUTION INTRAVENOUS ONCE
Status: DISCONTINUED | OUTPATIENT
Start: 2024-12-31 | End: 2024-12-31

## 2024-12-31 RX ORDER — METOCLOPRAMIDE HYDROCHLORIDE 5 MG/ML
10 INJECTION INTRAMUSCULAR; INTRAVENOUS EVERY 8 HOURS SCHEDULED
Status: DISCONTINUED | OUTPATIENT
Start: 2024-12-31 | End: 2025-01-02 | Stop reason: HOSPADM

## 2024-12-31 RX ORDER — PREGABALIN 25 MG/1
50 CAPSULE ORAL 3 TIMES DAILY
Status: DISCONTINUED | OUTPATIENT
Start: 2024-12-31 | End: 2024-12-31

## 2024-12-31 RX ORDER — ALBUTEROL SULFATE 0.83 MG/ML
2.5 SOLUTION RESPIRATORY (INHALATION) EVERY 6 HOURS PRN
Status: DISCONTINUED | OUTPATIENT
Start: 2024-12-31 | End: 2025-01-02 | Stop reason: HOSPADM

## 2024-12-31 RX ORDER — PREGABALIN 165 MG/1
1 TABLET, FILM COATED, EXTENDED RELEASE ORAL 2 TIMES DAILY
Status: DISCONTINUED | OUTPATIENT
Start: 2024-12-31 | End: 2024-12-31

## 2024-12-31 RX ORDER — ALBUTEROL SULFATE 90 UG/1
1 INHALANT RESPIRATORY (INHALATION) EVERY 6 HOURS PRN
Status: DISCONTINUED | OUTPATIENT
Start: 2024-12-31 | End: 2025-01-02 | Stop reason: HOSPADM

## 2024-12-31 RX ORDER — MAGNESIUM SULFATE HEPTAHYDRATE 40 MG/ML
2 INJECTION, SOLUTION INTRAVENOUS EVERY 24 HOURS
Status: DISCONTINUED | OUTPATIENT
Start: 2024-12-31 | End: 2024-12-31

## 2024-12-31 RX ORDER — METOCLOPRAMIDE HYDROCHLORIDE 5 MG/ML
10 INJECTION INTRAMUSCULAR; INTRAVENOUS EVERY 8 HOURS SCHEDULED
Status: DISCONTINUED | OUTPATIENT
Start: 2024-12-31 | End: 2024-12-31

## 2024-12-31 RX ORDER — LATANOPROST 50 UG/ML
1 SOLUTION/ DROPS OPHTHALMIC
Status: DISCONTINUED | OUTPATIENT
Start: 2024-12-31 | End: 2025-01-02 | Stop reason: HOSPADM

## 2024-12-31 RX ORDER — ZOLPIDEM TARTRATE 5 MG/1
5 TABLET ORAL
Status: DISCONTINUED | OUTPATIENT
Start: 2024-12-31 | End: 2025-01-02 | Stop reason: HOSPADM

## 2024-12-31 RX ORDER — DIPHENHYDRAMINE HYDROCHLORIDE 50 MG/ML
25 INJECTION INTRAMUSCULAR; INTRAVENOUS EVERY 8 HOURS SCHEDULED
Status: DISCONTINUED | OUTPATIENT
Start: 2024-12-31 | End: 2024-12-31

## 2024-12-31 RX ORDER — CARVEDILOL 6.25 MG/1
6.25 TABLET ORAL 2 TIMES DAILY WITH MEALS
Status: DISCONTINUED | OUTPATIENT
Start: 2024-12-31 | End: 2025-01-02 | Stop reason: HOSPADM

## 2024-12-31 RX ORDER — ENOXAPARIN SODIUM 100 MG/ML
40 INJECTION SUBCUTANEOUS DAILY
Status: DISCONTINUED | OUTPATIENT
Start: 2024-12-31 | End: 2024-12-31

## 2024-12-31 RX ORDER — DIPHENHYDRAMINE HYDROCHLORIDE 50 MG/ML
25 INJECTION INTRAMUSCULAR; INTRAVENOUS EVERY 8 HOURS SCHEDULED
Status: DISCONTINUED | OUTPATIENT
Start: 2024-12-31 | End: 2025-01-02 | Stop reason: HOSPADM

## 2024-12-31 RX ADMIN — INSULIN LISPRO 2 UNITS: 100 INJECTION, SOLUTION INTRAVENOUS; SUBCUTANEOUS at 13:46

## 2024-12-31 RX ADMIN — MAGNESIUM SULFATE HEPTAHYDRATE 2 G: 40 INJECTION, SOLUTION INTRAVENOUS at 19:27

## 2024-12-31 RX ADMIN — SODIUM CHLORIDE 50 ML/HR: 0.9 INJECTION, SOLUTION INTRAVENOUS at 00:25

## 2024-12-31 RX ADMIN — VALPROATE SODIUM 1000 MG: 100 INJECTION, SOLUTION INTRAVENOUS at 13:34

## 2024-12-31 RX ADMIN — DIPHENHYDRAMINE HYDROCHLORIDE 25 MG: 50 INJECTION, SOLUTION INTRAMUSCULAR; INTRAVENOUS at 13:35

## 2024-12-31 RX ADMIN — CARVEDILOL 6.25 MG: 6.25 TABLET, FILM COATED ORAL at 16:50

## 2024-12-31 RX ADMIN — PREGABALIN 50 MG: 25 CAPSULE ORAL at 20:18

## 2024-12-31 RX ADMIN — SODIUM CHLORIDE, SODIUM GLUCONATE, SODIUM ACETATE, POTASSIUM CHLORIDE, MAGNESIUM CHLORIDE, SODIUM PHOSPHATE, DIBASIC, AND POTASSIUM PHOSPHATE 100 ML/HR: .53; .5; .37; .037; .03; .012; .00082 INJECTION, SOLUTION INTRAVENOUS at 19:26

## 2024-12-31 RX ADMIN — MONTELUKAST 10 MG: 10 TABLET, FILM COATED ORAL at 21:42

## 2024-12-31 RX ADMIN — METOCLOPRAMIDE HYDROCHLORIDE 10 MG: 5 INJECTION INTRAMUSCULAR; INTRAVENOUS at 21:42

## 2024-12-31 RX ADMIN — DIPHENHYDRAMINE HYDROCHLORIDE 25 MG: 50 INJECTION, SOLUTION INTRAMUSCULAR; INTRAVENOUS at 21:42

## 2024-12-31 RX ADMIN — AMLODIPINE BESYLATE 10 MG: 10 TABLET ORAL at 21:42

## 2024-12-31 RX ADMIN — CEFTRIAXONE SODIUM 1000 MG: 10 INJECTION, POWDER, FOR SOLUTION INTRAVENOUS at 09:34

## 2024-12-31 RX ADMIN — LATANOPROST 1 DROP: 50 SOLUTION OPHTHALMIC at 01:40

## 2024-12-31 RX ADMIN — INSULIN LISPRO 2 UNITS: 100 INJECTION, SOLUTION INTRAVENOUS; SUBCUTANEOUS at 09:25

## 2024-12-31 RX ADMIN — ACETAMINOPHEN 650 MG: 325 TABLET, FILM COATED ORAL at 09:22

## 2024-12-31 RX ADMIN — LATANOPROST 1 DROP: 50 SOLUTION OPHTHALMIC at 21:46

## 2024-12-31 RX ADMIN — ZOLPIDEM TARTRATE 5 MG: 5 TABLET, COATED ORAL at 00:28

## 2024-12-31 RX ADMIN — RIMEGEPANT SULFATE 75 MG: 75 TABLET, ORALLY DISINTEGRATING ORAL at 20:17

## 2024-12-31 RX ADMIN — ACETAMINOPHEN 650 MG: 325 TABLET, FILM COATED ORAL at 00:28

## 2024-12-31 RX ADMIN — MONTELUKAST 10 MG: 10 TABLET, FILM COATED ORAL at 00:27

## 2024-12-31 RX ADMIN — METOCLOPRAMIDE HYDROCHLORIDE 10 MG: 5 INJECTION INTRAMUSCULAR; INTRAVENOUS at 13:35

## 2024-12-31 RX ADMIN — PREGABALIN 50 MG: 25 CAPSULE ORAL at 16:50

## 2024-12-31 RX ADMIN — AMLODIPINE BESYLATE 10 MG: 10 TABLET ORAL at 00:27

## 2024-12-31 RX ADMIN — SODIUM CHLORIDE 100 ML/HR: 0.9 INJECTION, SOLUTION INTRAVENOUS at 13:44

## 2024-12-31 RX ADMIN — CARVEDILOL 6.25 MG: 6.25 TABLET, FILM COATED ORAL at 09:22

## 2024-12-31 RX ADMIN — INSULIN LISPRO 1 UNITS: 100 INJECTION, SOLUTION INTRAVENOUS; SUBCUTANEOUS at 01:50

## 2024-12-31 RX ADMIN — PREGABALIN 50 MG: 25 CAPSULE ORAL at 09:22

## 2024-12-31 RX ADMIN — SERTRALINE HYDROCHLORIDE 75 MG: 50 TABLET ORAL at 09:22

## 2024-12-31 NOTE — TELEPHONE ENCOUNTER
Upon review of the In Basket request and the patient's chart, initial outreach has been made via fax to facility. Please see Contacts section for details.     Thank you  Watson Hadley MA

## 2024-12-31 NOTE — ASSESSMENT & PLAN NOTE
Reports n/v/d x2 days  Abdomen TTP  w/ guarding LUQ  Will order CT a/p, plan to follow up  Gentle IVF hydration initiated  CLD advance as tolerated

## 2024-12-31 NOTE — RESPIRATORY THERAPY NOTE
12/31/24 0226   Respiratory Protocol   Protocol Initiated? Yes   Protocol Selection Respiratory   Language Barrier? No   Medical & Social History Reviewed? Yes   Diagnostic Studies Reviewed? Yes   Physical Assessment Performed? Yes   Respiratory Plan Home Bronchodilator Patient pathway   Respiratory Assessment   General Appearance Sleeping   Respiratory Pattern Normal   Chest Assessment Chest expansion symmetrical   Bilateral Breath Sounds Clear;Diminished   Cough None   Resp Comments pt with hx of COPD/asthma/BOLA, PRN Albuterol use at home, pt asleep with even non labored respirations, no distress noted at this time   O2 Device room air   Subjective Data will discuss/assess cpap use when pt is awake

## 2024-12-31 NOTE — PROGRESS NOTES
Progress Note - Hospitalist   Name: Pebbles Landon 79 y.o. female I MRN: [id number]  Unit/Bed#: 2 E 264-01 I Date of Admission: 12/30/2024   Date of Service: 12/31/2024 I Hospital Day: 0    Assessment & Plan  Urinary tract infection  Presents to ED complaining of headache and weakness for past several weeks, reports onset of n/v/d and urinary urgency yesterday.  UA + trace blood, nitrates, small leukocytes, microscopic 4-10 RBC, 30-50 WBC, occasional mucus threads  CTH negative acute intracranial abnormality  Labs revealing leukocytosis 12.63 otherwise unremarkable  Febrile on arrival 102.4F, currently afebrile s/p Tylenol in ED  Received migraine cocktail in ED  Continue with IV ceftriaxone  Pending blood cultures, urine culture sensitivity    Vomiting  Reports n/v/d x2 days  Patient had 1 episodes of vomiting in the morning today  IV antibiotic as needed  BMP daily  Depression with anxiety  Continue PTA Zoloft  Mild intermittent asthma  Continue PTA inhalers/nebs  Type 2 diabetes mellitus with stage 3a chronic kidney disease (HCC)  SSI AC/at bedtime ordered  Monitor Accu-Cheks closely, avoid hypoglycemia  Lab Results   Component Value Date    HGBA1C 7.0 (H) 09/04/2024     BMI 37.0-37.9, adult  Healthy diet and exercise encouraged  Primary hypertension  BP stable since admission, continue to monitor  Continue PTA carvedilol, amlodipine  COPD (chronic obstructive pulmonary disease) (HCC)  Denies chest pain or shortness of breath  Continue PTA inhalers/nebs  BOLA (obstructive sleep apnea)  CPAP ordered  Insomnia  Continue PTA Lunesta  PDMP reviewed  Sepsis without acute organ dysfunction (HCC)  Fever, leukocytes count with UA positive for WBCs count  Sepsis secondary to UTI  Continue IV fluids for 12 hours  Ceftriaxone, blood cultures, urine culture sensitivity  Chronic nonintractable headache  Patient does have chronic migraine headache  Patient still complaining of headache worsening for last 2 to 3 days  CT  head negative for stroke/hemorrhage  Patient was given headache cocktail yesterday but did not get better  Consulted neurology for further evaluation  As per the last neurology note, avoid triptans due to history of bradycardia and history of uncontrolled hypertension, avoid NSAIDs due to history of CKD  - future options:  prochlorperazine, could consider trial of 5 days of Depakote 500 mg nightly or dexamethasone 2 mg daily for prolonged migraine, ubrelvy, reyvow, nurtec    VTE Pharmacologic Prophylaxis: VTE Score: 5 Moderate Risk (Score 3-4) - Pharmacological DVT Prophylaxis Ordered: Fondaparinux.    Mobility:   Basic Mobility Inpatient Raw Score: 22  JH-HLM Goal: 7: Walk 25 feet or more  JH-HLM Achieved: 7: Walk 25 feet or more  JH-HLM Goal achieved. Continue to encourage appropriate mobility.    Patient Centered Rounds: I performed bedside rounds with nursing staff today.   Discussions with Specialists or Other Care Team Provider:      Education and Discussions with Family / Patient:  Updated patient.     Current Length of Stay: 0 day(s)  Current Patient Status: Observation   Certification Statement: The patient will continue to require additional inpatient hospital stay due to IV antibiotics, headache evaluation  Discharge Plan: Anticipate discharge in 24-48 hrs to home with home services.    Code Status: Level 1 - Full Code    Subjective   Patient seen and examined at bedside  Patient still complaining of intermittent headache did not get better with headache cocktail  Had 1 episodes of vomiting  No chest pain, shortness of breath    Objective :  Temp:  [97.1 °F (36.2 °C)-102.4 °F (39.1 °C)] 98.1 °F (36.7 °C)  HR:  [59-85] 59  BP: (139-178)/(60-78) 142/64  Resp:  [16-20] 18  SpO2:  [91 %-96 %] 96 %  O2 Device: None (Room air)    There is no height or weight on file to calculate BMI.     Input and Output Summary (last 24 hours):     Intake/Output Summary (Last 24 hours) at 12/31/2024 1045  Last data  filed at 12/31/2024 0300  Gross per 24 hour   Intake 1290 ml   Output --   Net 1290 ml       Physical Exam  Constitutional:no Acute distress  HEENT: no Pallor or icterus  CVS: S1 plus S2  Respiratory: Normal vascular breathe without crackles and wheeze  Gastroenterology: Soft nontender without any palpable mass  Skin: No bruises or ecchymosis  Neurology: No focal logical deficit     Lines/Drains:              Lab Results: I have reviewed the following results:   Results from last 7 days   Lab Units 12/31/24  0503 12/30/24  1845   WBC Thousand/uL 12.79* 12.63*   HEMOGLOBIN g/dL 11.2* 11.7   HEMATOCRIT % 35.0 36.9   PLATELETS Thousands/uL 181 110*   SEGS PCT %  --  82*   LYMPHO PCT %  --  10*   MONO PCT %  --  7   EOS PCT %  --  0     Results from last 7 days   Lab Units 12/31/24  0601 12/30/24  1845   SODIUM mmol/L 134* 134*   POTASSIUM mmol/L 4.9 4.1   CHLORIDE mmol/L 105 101   CO2 mmol/L 21 26   BUN mg/dL 14 13   CREATININE mg/dL 0.78 0.74   ANION GAP mmol/L 8 7   CALCIUM mg/dL 8.7 9.2   ALBUMIN g/dL  --  4.0   TOTAL BILIRUBIN mg/dL  --  0.39   ALK PHOS U/L  --  57   ALT U/L  --  9   AST U/L  --  11*   GLUCOSE RANDOM mg/dL 200* 149*         Results from last 7 days   Lab Units 12/31/24  0752 12/31/24  0149   POC GLUCOSE mg/dl 203* 180*         Results from last 7 days   Lab Units 12/30/24  1845   LACTIC ACID mmol/L 0.7   PROCALCITONIN ng/ml 0.18       Recent Cultures (last 7 days):   Results from last 7 days   Lab Units 12/30/24  1856 12/30/24  1845   BLOOD CULTURE  Received in Microbiology Lab. Culture in Progress. Received in Microbiology Lab. Culture in Progress.       Imaging Results Review: I reviewed radiology reports from this admission including: CT head.  Other Study Results Review: Other studies reviewed include: CBC and BMP    Last 24 Hours Medication List:     Current Facility-Administered Medications:     acetaminophen (TYLENOL) tablet 650 mg, Q6H PRN    albuterol (PROVENTIL HFA,VENTOLIN HFA) inhaler 1  puff, Q6H PRN    albuterol inhalation solution 2.5 mg, Q6H PRN    amLODIPine (NORVASC) tablet 10 mg, HS    carvedilol (COREG) tablet 6.25 mg, BID With Meals    cefTRIAXone (ROCEPHIN) 1,000 mg in dextrose 5 % 50 mL IVPB, Q24H, Last Rate: 1,000 mg (12/31/24 0934)    fondaparinux (ARIXTRA) subcutaneous injection 2.5 mg, Q24H    insulin lispro (HumALOG/ADMELOG) 100 units/mL subcutaneous injection 1-6 Units, 4x Daily (AC & HS) **AND** Fingerstick Glucose (POCT), 4x Daily AC and at bedtime    latanoprost (XALATAN) 0.005 % ophthalmic solution 1 drop, HS    montelukast (SINGULAIR) tablet 10 mg, HS    multi-electrolyte (PLASMALYTE-A/ISOLYTE-S PH 7.4) IV solution, Continuous, Last Rate: 100 mL/hr (12/31/24 1029)    pregabalin (LYRICA) capsule 50 mg, TID    sertraline (ZOLOFT) tablet 75 mg, Daily    valproate (DEPACON) 1,000 mg in sodium chloride 0.9 % 50 mL for headache, Once    zolpidem (AMBIEN) tablet 5 mg, HS PRN    Administrative Statements   Today, Patient Was Seen By: Giovanni Rucker MD      **Please Note: This note may have been constructed using a voice recognition system.**

## 2024-12-31 NOTE — ASSESSMENT & PLAN NOTE
Fever, leukocytes count with UA positive for WBCs count  Sepsis secondary to UTI  Continue IV fluids for 12 hours  Ceftriaxone, blood cultures, urine culture sensitivity

## 2024-12-31 NOTE — ASSESSMENT & PLAN NOTE
Reports n/v/d x2 days  Patient had 1 episodes of vomiting in the morning today  IV antibiotic as needed  BMP daily

## 2024-12-31 NOTE — ASSESSMENT & PLAN NOTE
Presents to ED complaining of headache and weakness for past several weeks, reports onset of n/v/d and urinary urgency yesterday.  UA + trace blood, nitrates, small leukocytes, microscopic 4-10 RBC, 30-50 WBC, occasional mucus threads  CTH negative acute intracranial abnormality  Labs revealing leukocytosis 12.63 otherwise unremarkable  Febrile on arrival 102.4F, currently afebrile s/p Tylenol in ED  Received migraine cocktail in ED  Continue with IV ceftriaxone  Pending blood cultures, urine culture sensitivity

## 2024-12-31 NOTE — UTILIZATION REVIEW
Initial Clinical Review    Admission: Date/Time/Statement:   Admission Orders (From admission, onward)       Ordered        12/30/24 2314  Place in Observation  Once                          Orders Placed This Encounter   Procedures    Place in Observation     Standing Status:   Standing     Number of Occurrences:   1     Level of Care:   Med Surg [16]     ED Arrival Information       Expected   12/30/2024     Arrival   12/30/2024 17:03    Acuity   Urgent              Means of arrival   Wheelchair    Escorted by   Family Member    Service   Hospitalist    Admission type   Emergency              Arrival complaint   Acute intractable headache, unspecified headache type             Chief Complaint   Patient presents with    Headache     Came in for complaints of headaches x 2 weeks, also complaint of dizziness and nausea. Unable to tolerate PO intake.       Initial Presentation: 79 y.o. female to ED via WC from home  Present to ED with complaining of headache and weakness for past several weeks, reports onset of n/v/d and urinary urgency yesterday. Endorses decreased oral intake  PMHX depression with anxiety, asthma, T2DM, HTN, COPD, BOLA, insomnia   Admitted to OBS with DX: Urinary tract infection   on exam: T 102.4; hypertensive; abdominal tenderness (TTP LUQ). There is guarding. Pain 10/10; WBC 12.63; Na 134; UA + trace blood, nitrates, small leukocytes, microscopic 4-10 RBC, 30-50 WBC, occasional mucus threads   PLAN: cont iv abx; cont ivf; monitor labs; clear liq; Accuchecks with ssic; f/u urine cx; f/u CT a/p      Anticipated Length of Stay/Certification Statement: Patient will be admitted on an observation basis with an anticipated length of stay of less than 2 midnights secondary to IV abx 2/2 uti.       ED Treatment-Medication Administration from 12/30/2024 1656 to 12/30/2024 2351         Date/Time Order Dose Route Action     12/30/2024 1755 acetaminophen (TYLENOL) tablet 650 mg 650 mg Oral Given     12/30/2024  1901 sodium chloride 0.9 % bolus 1,000 mL 1,000 mL Intravenous New Bag     12/30/2024 1915 metoclopramide (REGLAN) injection 10 mg 10 mg Intravenous Given     12/30/2024 1952 magnesium sulfate 2 g/50 mL IVPB (premix) 2 g 2 g Intravenous New Bag     12/30/2024 1952 ketorolac (TORADOL) injection 15 mg 15 mg Intravenous Given     12/30/2024 2114 sulfamethoxazole-trimethoprim (BACTRIM DS) 800-160 mg per tablet 1 tablet 1 tablet Oral Given     12/30/2024 2146 sodium chloride 0.9 % bolus 1,000 mL 1,000 mL Intravenous New Bag     12/30/2024 2148 dexamethasone (PF) (DECADRON) injection 10 mg 10 mg Intravenous Given            Scheduled Medications:  amLODIPine, 10 mg, Oral, HS  carvedilol, 6.25 mg, Oral, BID With Meals  cefTRIAXone, 1,000 mg, Intravenous, Q24H  fondaparinux, 2.5 mg, Subcutaneous, Q24H  insulin lispro, 1-6 Units, Subcutaneous, 4x Daily (AC & HS)  latanoprost, 1 drop, Both Eyes, HS  montelukast, 10 mg, Oral, HS  pregabalin, 50 mg, Oral, TID  sertraline, 75 mg, Oral, Daily    sodium chloride 0.9 % infusion  Dose: 50 mL/hr  Freq: Once Route: IV  Indications of Use: IV Hydration  Last Dose: 50 mL/hr (12/31/24 0025)  Start: 12/31/24 0030 End: 12/31/24 0025      Continuous IV Infusions:  multi-electrolyte, 100 mL/hr, Intravenous, Continuous      PRN Meds:  acetaminophen, 650 mg, Oral, Q6H PRN (12/31 recd x2 so far today)  albuterol, 1 puff, Inhalation, Q6H PRN  albuterol, 2.5 mg, Nebulization, Q6H PRN  zolpidem, 5 mg, Oral, HS PRN      ED Triage Vitals   Temperature Pulse Respirations Blood Pressure SpO2 Pain Score   12/30/24 1749 12/30/24 1749 12/30/24 1749 12/30/24 1749 12/30/24 1749 12/30/24 1755   (!) 102.4 °F (39.1 °C) 85 18 (!) 178/78 95 % Med Not Given for Pain - for MAR use only     Vital Signs (last 3 days)       Date/Time Temp Pulse Resp BP MAP (mmHg) SpO2 O2 Device Patient Position - Orthostatic VS Brock Coma Scale Score Pain    12/31/24 07:30:09 98.1 °F (36.7 °C) 59 18 142/64 90 96 % -- -- -- --     12/31/24 05:35:55 97.1 °F (36.2 °C) 62 -- -- -- 94 % -- -- -- --    12/31/24 0027 -- -- -- -- -- -- -- -- -- 9    12/31/24 0007 -- -- -- -- -- -- None (Room air) -- 15 --    12/30/24 2359 97.5 °F (36.4 °C) 64 20 145/70 95 93 % -- -- -- --    12/30/24 2230 -- 68 16 139/63 90 96 % None (Room air) Sitting -- --    12/30/24 1952 -- -- -- -- -- -- -- -- -- 10 - Worst Possible Pain    12/30/24 1936 99.6 °F (37.6 °C) -- -- -- -- -- -- -- -- --    12/30/24 1840 -- -- -- -- -- -- -- -- 15 9    12/30/24 1755 -- -- -- -- -- -- -- -- -- Med Not Given for Pain - for MAR use only    12/30/24 1749 102.4 °F (39.1 °C) 85 18 178/78 112 95 % None (Room air) Sitting -- --              Pertinent Labs/Diagnostic Test Results:   Radiology:  CT head without contrast   Final Interpretation by Rob Pressley MD (12/30 2145)      No acute intracranial abnormality.      Reidentified Chiari I malformation.            Workstation performed: [id number]         CT abdomen pelvis wo contrast    (Results Pending)       Results from last 7 days   Lab Units 12/31/24  0503 12/30/24  1845   WBC Thousand/uL 12.79* 12.63*   HEMOGLOBIN g/dL 11.2* 11.7   HEMATOCRIT % 35.0 36.9   PLATELETS Thousands/uL 181 110*   TOTAL NEUT ABS Thousands/µL  --  10.27*       Results from last 7 days   Lab Units 12/31/24  0601 12/30/24  1845   SODIUM mmol/L 134* 134*   POTASSIUM mmol/L 4.9 4.1   CHLORIDE mmol/L 105 101   CO2 mmol/L 21 26   ANION GAP mmol/L 8 7   BUN mg/dL 14 13   CREATININE mg/dL 0.78 0.74   EGFR ml/min/1.73sq m 73 78   CALCIUM mg/dL 8.7 9.2     Results from last 7 days   Lab Units 12/30/24  1845   AST U/L 11*   ALT U/L 9   ALK PHOS U/L 57   TOTAL PROTEIN g/dL 7.3   ALBUMIN g/dL 4.0   TOTAL BILIRUBIN mg/dL 0.39     Results from last 7 days   Lab Units 12/31/24  0752 12/31/24  0149   POC GLUCOSE mg/dl 203* 180*     Results from last 7 days   Lab Units 12/31/24  0601 12/30/24  1845   GLUCOSE RANDOM mg/dL 200* 149*       Results from last 7 days   Lab Units  12/30/24  1845   PROCALCITONIN ng/ml 0.18     Results from last 7 days   Lab Units 12/30/24  1845   LACTIC ACID mmol/L 0.7       Results from last 7 days   Lab Units 12/30/24 2003   CLARITY UA  Turbid   COLOR UA  Light Yellow   SPEC GRAV UA  1.016   PH UA  7.5   GLUCOSE UA mg/dl Negative   KETONES UA mg/dl Negative   BLOOD UA  Trace*   PROTEIN UA mg/dl Trace*   NITRITE UA  Positive*   BILIRUBIN UA  Negative   UROBILINOGEN UA (BE) mg/dl <2.0   LEUKOCYTES UA  Small*   WBC UA /hpf 30-50*   RBC UA /hpf 4-10*   BACTERIA UA /hpf Occasional   EPITHELIAL CELLS WET PREP /hpf Occasional   MUCUS THREADS  Occasional*       Results from last 7 days   Lab Units 12/30/24  1856 12/30/24 1845   BLOOD CULTURE  Received in Microbiology Lab. Culture in Progress. Received in Microbiology Lab. Culture in Progress.       Past Medical History:   Diagnosis Date    Asthma     Benign hypertension 11/30/2018    Cardiac arrest (HCC)     Diabetes mellitus (HCC)     Glaucoma     Hypertension     Kidney stone     Neuropathy     Sleep apnea     no machine    SOB (shortness of breath)     Tubular adenoma of colon 10/2019    Wheezing      Present on Admission:   Depression with anxiety   Mild intermittent asthma   Type 2 diabetes mellitus with stage 3a chronic kidney disease (HCC)   COPD (chronic obstructive pulmonary disease) (HCC)   BOLA (obstructive sleep apnea)   Primary hypertension   Insomnia      Admitting Diagnosis: Nausea [R11.0]  UTI (urinary tract infection) [N39.0]  Fever [R50.9]  Headache [R51.9]  Age/Sex: 79 y.o. female    Network Utilization Review Department  ATTENTION: Please call with any questions or concerns to [phone] and carefully listen to the prompts so that you are directed to the right person. All voicemails are confidential.   For Discharge needs, contact Care Management DC Support Team at [phone] opt. [phone]  Send all requests for admission clinical reviews, approved or denied determinations and any other requests to  dedicated fax number below belonging to the campus where the patient is receiving treatment. List of dedicated fax numbers for the Facilities:  FACILITY NAME UR FAX NUMBER   ADMISSION DENIALS (Administrative/Medical Necessity) [phone]   DISCHARGE SUPPORT TEAM (NETWORK) [phone]   PARENT CHILD HEALTH (Maternity/NICU/Pediatrics) [phone]   General acute hospital [phone]   [facility] [phone]   [facility] [phone]   [facility] [phone]   [facility] [phone]   [facility] [phone]   [facility] [phone]   [facility] [phone]   [facility] [phone]   [facility] [phone]   [facility] [phone]   [facility] [phone]

## 2024-12-31 NOTE — ASSESSMENT & PLAN NOTE
Lab Results   Component Value Date    HGBA1C 7.0 (H) 09/04/2024       Recent Labs     12/31/24  0149 12/31/24  0752   POCGLU 180* 203*       Blood Sugar Average: Last 72 hrs:  (P) 191.5    - Goal euglycemia.   - Management as per primary team.

## 2024-12-31 NOTE — ASSESSMENT & PLAN NOTE
Presents to ED complaining of headache and weakness for past several weeks, reports onset of n/v/d and urinary urgency yesterday.  UA + trace blood, nitrates, small leukocytes, microscopic 4-10 RBC, 30-50 WBC, occasional mucus threads  CTH negative acute intracranial abnormality  Labs revealing leukocytosis 12.63 otherwise unremarkable  Febrile on arrival 102.4F, currently afebrile s/p Tylenol in ED  Received migraine cocktail in ED  IV ceftriaxone and gentle IVF hydration initiated  Trend leukocytosis with daily CBC  Tylenol for headache/fever  Plan to follow-up urine culture

## 2024-12-31 NOTE — H&P
H&P - Hospitalist   Name: Pebbles Landon 79 y.o. female I MRN: [id number]  Unit/Bed#: 2 E 264-01 I Date of Admission: 12/30/2024   Date of Service: 12/31/2024 I Hospital Day: 0     Assessment & Plan  Urinary tract infection  Presents to ED complaining of headache and weakness for past several weeks, reports onset of n/v/d and urinary urgency yesterday.  UA + trace blood, nitrates, small leukocytes, microscopic 4-10 RBC, 30-50 WBC, occasional mucus threads  CTH negative acute intracranial abnormality  Labs revealing leukocytosis 12.63 otherwise unremarkable  Febrile on arrival 102.4F, currently afebrile s/p Tylenol in ED  Received migraine cocktail in ED  IV ceftriaxone and gentle IVF hydration initiated  Trend leukocytosis with daily CBC  Tylenol for headache/fever  Plan to follow-up urine culture    Vomiting  Reports n/v/d x2 days  Abdomen TTP  w/ guarding LUQ  Will order CT a/p, plan to follow up  Gentle IVF hydration initiated  CLD advance as tolerated  Depression with anxiety  Continue PTA Zoloft  Mild intermittent asthma  Continue PTA inhalers/nebs  Type 2 diabetes mellitus with stage 3a chronic kidney disease (HCC)  SSI AC/at bedtime ordered  Monitor Accu-Cheks closely, avoid hypoglycemia  Lab Results   Component Value Date    HGBA1C 7.0 (H) 09/04/2024     BMI 37.0-37.9, adult  Healthy diet and exercise encouraged  Primary hypertension  BP stable since admission, continue to monitor  Continue PTA carvedilol, amlodipine  COPD (chronic obstructive pulmonary disease) (HCC)  Denies chest pain or shortness of breath  Continue PTA inhalers/nebs  BOLA (obstructive sleep apnea)  CPAP ordered  Insomnia  Continue PTA Lunesta  PDMP reviewed      VTE Pharmacologic Prophylaxis: VTE Score: 5 High Risk (Score >/= 5) - Pharmacological DVT Prophylaxis Ordered: other medication fondiparinux. Sequential Compression Devices Ordered.  Code Status: Level 1 - Full Code   Discussion with family: Patient declined call to contact  person.     Anticipated Length of Stay: Patient will be admitted on an observation basis with an anticipated length of stay of less than 2 midnights secondary to IV abx 2/2 uti.    History of Present Illness   Chief Complaint:     Pebbles Landon is a 79 y.o. female with a PMH of depression with anxiety, asthma, T2DM, HTN, COPD, BOLA, insomnia who presents with headache and weakness.  Patient reports history of headache and weakness for past several weeks.  She reports nausea, vomiting and diarrhea x 2 days.  Reports 3 episodes of watery stool, denies blood in stool or vomit.  States she has not been able to keep food or drink down.  Admits to generalized headache not amenable to Tylenol at home.  Denies history of fever although febrile on arrival to ED.  Reports chills.  Denies abdominal pain, chest pain, shortness of breath.     Review of Systems   Constitutional:  Positive for chills. Negative for fever.   HENT:  Negative for rhinorrhea and sore throat.    Respiratory:  Negative for cough, chest tightness and shortness of breath.    Cardiovascular:  Negative for chest pain, palpitations and leg swelling.   Gastrointestinal:  Positive for diarrhea, nausea and vomiting. Negative for abdominal pain and blood in stool.   Genitourinary:  Positive for urgency. Negative for dysuria and frequency.   Musculoskeletal:  Negative for back pain.   Neurological:  Positive for light-headedness and headaches. Negative for syncope and numbness.       Historical Information   Past Medical History:   Diagnosis Date    Asthma     Benign hypertension 2018    Cardiac arrest (HCC)     Diabetes mellitus (HCC)     Glaucoma     Hypertension     Kidney stone     Neuropathy     Sleep apnea     no machine    SOB (shortness of breath)     Tubular adenoma of colon 10/2019    Wheezing      Past Surgical History:   Procedure Laterality Date     SECTION      COLONOSCOPY      HYSTERECTOMY      IR BIOPSY BONE MARROW  2022     TONSILLECTOMY       Social History     Tobacco Use    Smoking status: Never     Passive exposure: Never    Smokeless tobacco: Never   Vaping Use    Vaping status: Never Used   Substance and Sexual Activity    Alcohol use: Never    Drug use: No    Sexual activity: Yes     Birth control/protection: Surgical     E-Cigarette/Vaping    E-Cigarette Use Never User      E-Cigarette/Vaping Substances    Nicotine No     THC No     CBD No     Flavoring No     Other No     Unknown No      Family History   Problem Relation Age of Onset    Diabetes Mother     Hypertension Father     Prostate cancer Father     Diabetes Sister     Hypertension Sister     Breast cancer Sister 58    No Known Problems Sister     No Known Problems Daughter     No Known Problems Daughter     No Known Problems Daughter     No Known Problems Maternal Grandmother     No Known Problems Paternal Grandmother     Diabetes Brother     Hypertension Brother     No Known Problems Maternal Aunt     Breast cancer Maternal Aunt 62    No Known Problems Maternal Aunt     No Known Problems Maternal Aunt     Pancreatic cancer Paternal Aunt     No Known Problems Paternal Aunt     No Known Problems Paternal Aunt     Asthma Family     Colon cancer Neg Hx     Ovarian cancer Neg Hx     Uterine cancer Neg Hx     Cervical cancer Neg Hx      Social History:  Marital Status: /Civil Union   Occupation:   Patient Pre-hospital Living Situation: Home  Patient Pre-hospital Level of Mobility: unable to be assessed at time of evaluation  Patient Pre-hospital Diet Restrictions:     Meds/Allergies   I have reviewed home medications with patient personally.  Prior to Admission medications    Medication Sig Start Date End Date Taking? Authorizing Provider   acetaminophen (TYLENOL) 325 mg tablet Take 2 tablets (650 mg total) by mouth every 4 (four) hours as needed for mild pain 4/19/22   Cem Cristina MD   albuterol (2.5 mg/3 mL) 0.083 % nebulizer solution Take 3 mL (2.5 mg total) by  nebulization every 6 (six) hours as needed for wheezing or shortness of breath 7/10/24   Van ARCEO MD   albuterol (PROVENTIL HFA,VENTOLIN HFA) 90 mcg/act inhaler INHALE 1 PUFF BY MOUTH EVERY 6 HOURS AS NEEDED FOR WHEEZE 10/17/24   Jovana ALEKSANDER Bauman   amLODIPine (NORVASC) 10 mg tablet Take 1 tablet (10 mg total) by mouth daily at bedtime 9/26/24   Jovana ALEKSANDER Bauman   carvedilol (COREG) 6.25 mg tablet Take 1 tablet (6.25 mg total) by mouth 2 (two) times a day with meals 9/26/24   Jovana ALEKSANDER Bauman   ergocalciferol (VITAMIN D2) 50,000 units Take 1 capsule (50,000 Units total) by mouth 2 (two) times a week for 16 doses 9/26/24 11/19/24  Jovana ALEKSANDER Bauman   eszopiclone (LUNESTA) 2 mg tablet Take 1 tablet (2 mg total) by mouth daily at bedtime as needed for sleep Take immediately before bedtime 12/5/24   Jovana ALEKSANDER Bauman   fluticasone-salmeterol (AIRDUO RESPICLICK) 113-14 mcg/act dry powder inhaler INHALE 1 PUFF 2 TIMES A DAY RINSE MOUTH AFTER USE. 8/14/24   Jovana ALEKSANDER Bauman   glucose blood test strip  8/16/18   Historical Provider, MD   hydrocortisone 2.5 % cream APPLY TO AFFECTED AREA TWICE A DAY 9/25/24   Jovana ALEKSANDER Bauman   latanoprost (XALATAN) 0.005 % ophthalmic solution  9/24/21   Historical Provider, MD   lidocaine (Lidoderm) 5 % Apply 1 patch topically over 12 hours daily Remove & Discard patch within 12 hours or as directed by MD 12/6/24   Jon Blount Ken[place],    LORazepam (ATIVAN) 1 mg tablet Take 1 tablet (1 mg total) by mouth daily at bedtime 9/4/24   Jovana ALEKSANDER Bauman   Lumigan 0.01 % ophthalmic drops APPLY ONE DROP TO EACH EYE AT BEDTIME 11/21/24   Historical Provider, MD   Lymichelle  MG TB24 TAKE 1 TABLET BY MOUTH TWICE A DAY 9/26/24   Jovana ALEKSANDER Bauman   meloxicam (Mobic) 15 mg tablet Take 1 tablet (15 mg total) by mouth daily 10/24/24   Jovana ALEKSANDER Bauman   montelukast (SINGULAIR) 10 mg tablet TAKE 1 TABLET BY MOUTH EVERYDAY AT BEDTIME 8/8/24   Jovana ALEKSANDER Bauman   mupirocin (BACTROBAN) 2 %  ointment Apply topically 3 (three) times a day for 10 days 7/30/24 8/9/24  ALEKSANDER Loera   ondansetron (Zofran ODT) 4 mg disintegrating tablet Take 1 tablet (4 mg total) by mouth every 6 (six) hours as needed for nausea or vomiting 5/7/24   Mercedes Langley DO   sertraline (ZOLOFT) 50 mg tablet TAKE 1 AND 1/2 TABLETS BY MOUTH DAILY 11/4/24   ALEKSANDER Manning   SITagliptin-metFORMIN HCl ER (Janumet XR) 100-1000 MG TB24 TAKE 1 TABLET BY MOUTH EVERY DAY WITH DINNER 11/4/24   ALEKSANDER Manning   sucralfate (CARAFATE) 1 g tablet Take 1 tablet (1 g total) by mouth 4 (four) times a day for 14 days 5/7/24 5/21/24  Mercedes Langley DO   traMADol (Ultram) 50 mg tablet Take 1 tablet (50 mg total) by mouth every 6 (six) hours as needed for severe pain 6/19/24   ALEKSANDER Manning     Allergies   Allergen Reactions    Ciprofloxacin Itching    Levaquin [Levofloxacin] Swelling    Milk (Cow) GI Intolerance    Penicillins GI Intolerance    Pork Allergy - Food Allergy GI Intolerance    Shellfish Allergy - Food Allergy GI Intolerance    Soy Allergy - Food Allergy GI Intolerance    Wheat Bran - Food Allergy GI Intolerance       Objective :  Temp:  [97.5 °F (36.4 °C)-102.4 °F (39.1 °C)] 97.5 °F (36.4 °C)  HR:  [64-85] 64  BP: (139-178)/(60-78) 145/70  Resp:  [16-20] 20  SpO2:  [91 %-96 %] 93 %  O2 Device: None (Room air)    Physical Exam  Vitals reviewed.   Constitutional:       General: She is not in acute distress.     Appearance: Normal appearance. She is not ill-appearing, toxic-appearing or diaphoretic.   Cardiovascular:      Rate and Rhythm: Normal rate and regular rhythm.   Pulmonary:      Effort: Pulmonary effort is normal. No respiratory distress.      Breath sounds: Normal breath sounds.   Abdominal:      General: Bowel sounds are normal. There is no distension.      Palpations: Abdomen is soft.      Tenderness: There is abdominal tenderness (TTP LUQ). There is guarding.   Musculoskeletal:         General: No  swelling.      Right lower leg: No edema.      Left lower leg: No edema.   Skin:     General: Skin is warm and dry.   Neurological:      Mental Status: She is alert.         Lab Results: I have reviewed the following results:  Results from last 7 days   Lab Units 12/30/24  1845   WBC Thousand/uL 12.63*   HEMOGLOBIN g/dL 11.7   HEMATOCRIT % 36.9   PLATELETS Thousands/uL 110*   SEGS PCT % 82*   LYMPHO PCT % 10*   MONO PCT % 7   EOS PCT % 0     Results from last 7 days   Lab Units 12/30/24  1845   SODIUM mmol/L 134*   POTASSIUM mmol/L 4.1   CHLORIDE mmol/L 101   CO2 mmol/L 26   BUN mg/dL 13   CREATININE mg/dL 0.74   ANION GAP mmol/L 7   CALCIUM mg/dL 9.2   ALBUMIN g/dL 4.0   TOTAL BILIRUBIN mg/dL 0.39   ALK PHOS U/L 57   ALT U/L 9   AST U/L 11*   GLUCOSE RANDOM mg/dL 149*             Lab Results   Component Value Date    HGBA1C 7.0 (H) 09/04/2024    HGBA1C 6.6 (A) 08/29/2024    HGBA1C 7.0 (H) 01/05/2024     Results from last 7 days   Lab Units 12/30/24  1845   LACTIC ACID mmol/L 0.7   PROCALCITONIN ng/ml 0.18       Imaging Results Review: I reviewed radiology reports from this admission including: CT head.  Other Study Results Review: No additional pertinent studies reviewed.    Administrative Statements   I have spent a total time of 46 minutes in caring for this patient on the day of the visit/encounter including Diagnostic results, Instructions for management, Patient and family education, Impressions, Counseling / Coordination of care, Documenting in the medical record, Reviewing / ordering tests, medicine, procedures  , Obtaining or reviewing history  , and Communicating with other healthcare professionals .    ** Please Note: This note has been constructed using a voice recognition system. **     no

## 2024-12-31 NOTE — PLAN OF CARE
Problem: Potential for Falls  Goal: Patient will remain free of falls  Description: INTERVENTIONS:  - Educate patient/family on patient safety including physical limitations  - Instruct patient to call for assistance with activity   - Consult OT/PT to assist with strengthening/mobility   - Keep Call bell within reach  - Keep bed low and locked with side rails adjusted as appropriate  - Keep care items and personal belongings within reach  - Initiate and maintain comfort rounds  - Make Fall Risk Sign visible to staff  - Offer Toileting every 2 Hours, in advance of need  - Initiate/Maintain bed alarm  - Obtain necessary fall risk management equipment:   - Apply yellow socks and bracelet for high fall risk patients  - Consider moving patient to room near nurses station  12/31/2024 0212 by JERARDO ROCHA  Outcome: Progressing  12/31/2024 0212 by JERARDO ROCHA  Outcome: Progressing

## 2024-12-31 NOTE — CONSULTS
Consultation - Neurology   Name: Pebbles Landon 79 y.o. female I MRN: [id number]  Unit/Bed#: 2 E 264-01 I Date of Admission: 12/30/2024   Date of Service: 12/31/2024 I Hospital Day: 0   Inpatient consult to Neurology  Consult performed by: Alley Murry PA-C  Consult ordered by: Giovanni Rucker MD        Physician Requesting Evaluation: Giovanni Rucker MD   Reason for Evaluation / Principal Problem: Headache     Assessment & Plan  Headache  79 y.o.  female with diabetes, asthma, COPD, BOLA, HTN, CKD, obesity, depression, history of kidney stones, history of glaucoma, neuropathy, who presented to [facility] on 12/30/24 with headache and weakness.    Workup  12/30/24 CTH:    No acute intracranial abnormality.   Reidentified Chiari I malformation.  Labs:   UA with small leukocytes, positive nitrites  Urine culture pending   Blood cultures x 2 pending  Covid/flu/rsv negative     Suspect worsening HA in setting of underlying infection, recent head trauma and cannot entirely exclude rebound effect headache given extensive use of Tylenol over the past several weeks.     Plan  - Will give Depacon 1 g now.   - Can continue with migraine cocktail: Reglan 10 mg Q8H x 72 hours, Benadryl 25 mg Q8H x 72H, Mag sulfate 2 g Q24H x 72 hours.  - Will order Nurtec 75 mg dose to be given at bedtime.  - Discussed importance of minimizing OTC headache medications including Tylenol in order to prevent rebound HA. Patient expressed understanding.   - Continue to treat underlying infection as per primary team.  Urinary tract infection  - Antibiotics as per primary team.  Depression with anxiety  - Management as per primary team.  Type 2 diabetes mellitus with stage 3a chronic kidney disease (HCC)  Lab Results   Component Value Date    HGBA1C 7.0 (H) 09/04/2024       Recent Labs     12/31/24  0149 12/31/24  0752   POCGLU 180* 203*       Blood Sugar Average: Last 72 hrs:  (P) 191.5    - Goal euglycemia.   - Management as per primary team.  Primary  hypertension  - Goal normotension.   - Management as per primary team.  Sepsis without acute organ dysfunction (HCC)  - Patient with fever, leukocytosis.   - Work-up and management as per primary team.      Recommendations for outpatient neurological follow up have yet to be determined.    History of Present Illness   Pebbles Landon is a 79 y.o.  female with diabetes, asthma, COPD, BOLA, HTN, CKD, obesity, depression, history of kidney stones, history of glaucoma, neuropathy, who presented to [facility] on 12/30/24 with headache and weakness.    Per admission note: Pt reported headache and weakness for the past several weeks. Pt endorsed nausea, vomiting, and diarrhea x 2 days. Pt reported not being able to tolerate PO intake. Pt reports headache did not improve with Tylenol at home. CTH completed in ED revealed no acute intracranial abnormality. Pt with leukocytosis and UA concerning for UTI in ED.     Patient reports that she had a fall with head trauma in November 2024 and since that time, headaches have been occurring nearly daily. She notes that she has been taking Tylenol 650 mg at least twice a day since that time. She notes that with Tylenol, her headache will come down to about 4/10 in severity but over the past several days, her headache has been significant. She notes that prior to the fall, she was having headaches only infrequently. She reports that she was not taking Vit B2 or magnesium at home for headaches.     She notes that she has associated nausea and vomiting but denies photophobia and phonophobia. She notes that when she came to the hospital, she had a fever and was feeling generally unwell. She denies urinary frequency or dysuria but does note urinary urgency.    Previous Neurologic History:   Patient follows with [facility] as an outpatient by Dr. Cristina via telemedicine on 1/17/24 in follow-up for headache. At that time, patient noted significant improvement in her headaches, experiencing  them less than weekly. Patient tolerating magnesium and B2 supplements daily. Patient had a flare up in her headache when admitted to the hospital for PNA but since her hospital d/c (1/4/24), headaches improved. Patient underwent outpatient MRI brain wwo contrast on 10/7/23 for her headaches and MRI brain revealed no acute intracranial hemorrhage or evident of recent infarction. MRI brain did show Chiari type 1 malformation as well as partially empty sella. Patient was ultimately recommended to continue with magnesium and B2 supplements daily. The following medications are to be avoided or considered with worsening headaches in the future:   For preventative treatment:   -To Avoid: Topamax (history of kidney stones and glaucoma), beta blockers (pulmonary issues, TCAs (age)  - Options: memantine, diamox, CGRPs, Botox  - For Acute treatment:   - Avoid: triptans (history of bradycardia and uncontrolled HTN), NSAIDs (CKD)  - Options: Prochlorperazine, Depakote 500 mg nightly up to 5 days, or dexamethasone 2 mg daily for prolonged migraine, Ubrelvy, Reyvox, or Nurtec          Review of Systems   Constitutional:  Positive for fatigue. Negative for chills and fever.   HENT:  Negative for trouble swallowing.    Eyes:  Negative for photophobia and visual disturbance.   Respiratory:  Negative for shortness of breath.    Cardiovascular:  Negative for chest pain.   Gastrointestinal:  Positive for nausea. Negative for abdominal pain and vomiting.   Genitourinary:  Positive for urgency. Negative for difficulty urinating, dysuria and frequency.   Musculoskeletal:  Negative for back pain, gait problem and neck pain.   Skin:  Negative for rash.   Neurological:  Positive for weakness and headaches. Negative for dizziness, speech difficulty, light-headedness and numbness.   Psychiatric/Behavioral:  Negative for confusion.         I have reviewed the patient's PMH, PSH, Social History, Family History, Meds, and Allergies  Historical  Information   Past Medical History:   Diagnosis Date    Asthma     Benign hypertension 2018    Cardiac arrest (HCC)     Diabetes mellitus (HCC)     Glaucoma     Hypertension     Kidney stone     Neuropathy     Sleep apnea     no machine    SOB (shortness of breath)     Tubular adenoma of colon 10/2019    Wheezing      Past Surgical History:   Procedure Laterality Date     SECTION      COLONOSCOPY      HYSTERECTOMY  1985    IR BIOPSY BONE MARROW  2022    TONSILLECTOMY       Social History     Tobacco Use    Smoking status: Never     Passive exposure: Never    Smokeless tobacco: Never   Vaping Use    Vaping status: Never Used   Substance and Sexual Activity    Alcohol use: Never    Drug use: No    Sexual activity: Yes     Birth control/protection: Surgical     E-Cigarette/Vaping    E-Cigarette Use Never User      E-Cigarette/Vaping Substances    Nicotine No     THC No     CBD No     Flavoring No     Other No     Unknown No      Family History   Problem Relation Age of Onset    Diabetes Mother     Hypertension Father     Prostate cancer Father     Diabetes Sister     Hypertension Sister     Breast cancer Sister 58    No Known Problems Sister     No Known Problems Daughter     No Known Problems Daughter     No Known Problems Daughter     No Known Problems Maternal Grandmother     No Known Problems Paternal Grandmother     Diabetes Brother     Hypertension Brother     No Known Problems Maternal Aunt     Breast cancer Maternal Aunt 62    No Known Problems Maternal Aunt     No Known Problems Maternal Aunt     Pancreatic cancer Paternal Aunt     No Known Problems Paternal Aunt     No Known Problems Paternal Aunt     Asthma Family     Colon cancer Neg Hx     Ovarian cancer Neg Hx     Uterine cancer Neg Hx     Cervical cancer Neg Hx      Social History     Tobacco Use    Smoking status: Never     Passive exposure: Never    Smokeless tobacco: Never   Vaping Use    Vaping status: Never Used   Substance and  Sexual Activity    Alcohol use: Never    Drug use: No    Sexual activity: Yes     Birth control/protection: Surgical       Current Facility-Administered Medications:     acetaminophen (TYLENOL) tablet 650 mg, Q6H PRN    albuterol (PROVENTIL HFA,VENTOLIN HFA) inhaler 1 puff, Q6H PRN    albuterol inhalation solution 2.5 mg, Q6H PRN    amLODIPine (NORVASC) tablet 10 mg, HS    carvedilol (COREG) tablet 6.25 mg, BID With Meals    cefTRIAXone (ROCEPHIN) 1,000 mg in dextrose 5 % 50 mL IVPB, Q24H, Last Rate: 1,000 mg (12/31/24 0934)    fondaparinux (ARIXTRA) subcutaneous injection 2.5 mg, Q24H    insulin lispro (HumALOG/ADMELOG) 100 units/mL subcutaneous injection 1-6 Units, 4x Daily (AC & HS) **AND** Fingerstick Glucose (POCT), 4x Daily AC and at bedtime    latanoprost (XALATAN) 0.005 % ophthalmic solution 1 drop, HS    montelukast (SINGULAIR) tablet 10 mg, HS    multi-electrolyte (PLASMALYTE-A/ISOLYTE-S PH 7.4) IV solution, Continuous, Last Rate: 100 mL/hr (12/31/24 1029)    pregabalin (LYRICA) capsule 50 mg, TID    sertraline (ZOLOFT) tablet 75 mg, Daily    valproate (DEPACON) 1,000 mg in sodium chloride 0.9 % 50 mL for headache, Once    zolpidem (AMBIEN) tablet 5 mg, HS PRN  Prior to Admission Medications   Prescriptions Last Dose Informant Patient Reported? Taking?   LORazepam (ATIVAN) 1 mg tablet  Self No No   Sig: Take 1 tablet (1 mg total) by mouth daily at bedtime   Lumigan 0.01 % ophthalmic drops  Self Yes No   Sig: APPLY ONE DROP TO EACH EYE AT BEDTIME   Lyrica  MG TB24  Self No No   Sig: TAKE 1 TABLET BY MOUTH TWICE A DAY   SITagliptin-metFORMIN HCl ER (Janumet XR) 100-1000 MG TB24  Self No No   Sig: TAKE 1 TABLET BY MOUTH EVERY DAY WITH DINNER   acetaminophen (TYLENOL) 325 mg tablet  Self No No   Sig: Take 2 tablets (650 mg total) by mouth every 4 (four) hours as needed for mild pain   albuterol (2.5 mg/3 mL) 0.083 % nebulizer solution  Self No No   Sig: Take 3 mL (2.5 mg total) by nebulization every 6  (six) hours as needed for wheezing or shortness of breath   albuterol (PROVENTIL HFA,VENTOLIN HFA) 90 mcg/act inhaler  Self No No   Sig: INHALE 1 PUFF BY MOUTH EVERY 6 HOURS AS NEEDED FOR WHEEZE   amLODIPine (NORVASC) 10 mg tablet  Self No No   Sig: Take 1 tablet (10 mg total) by mouth daily at bedtime   carvedilol (COREG) 6.25 mg tablet  Self No No   Sig: Take 1 tablet (6.25 mg total) by mouth 2 (two) times a day with meals   ergocalciferol (VITAMIN D2) 50,000 units   No No   Sig: Take 1 capsule (50,000 Units total) by mouth 2 (two) times a week for 16 doses   eszopiclone (LUNESTA) 2 mg tablet  Self No No   Sig: Take 1 tablet (2 mg total) by mouth daily at bedtime as needed for sleep Take immediately before bedtime   fluticasone-salmeterol (AIRDUO RESPICLICK) 113-14 mcg/act dry powder inhaler  Self No No   Sig: INHALE 1 PUFF 2 TIMES A DAY RINSE MOUTH AFTER USE.   glucose blood test strip  Self Yes No   hydrocortisone 2.5 % cream  Self No No   Sig: APPLY TO AFFECTED AREA TWICE A DAY   latanoprost (XALATAN) 0.005 % ophthalmic solution  Self Yes No   lidocaine (Lidoderm) 5 %   No No   Sig: Apply 1 patch topically over 12 hours daily Remove & Discard patch within 12 hours or as directed by MD   meloxicam (Mobic) 15 mg tablet  Self No No   Sig: Take 1 tablet (15 mg total) by mouth daily   montelukast (SINGULAIR) 10 mg tablet  Self No No   Sig: TAKE 1 TABLET BY MOUTH EVERYDAY AT BEDTIME   mupirocin (BACTROBAN) 2 % ointment   No No   Sig: Apply topically 3 (three) times a day for 10 days   ondansetron (Zofran ODT) 4 mg disintegrating tablet  Self No No   Sig: Take 1 tablet (4 mg total) by mouth every 6 (six) hours as needed for nausea or vomiting   sertraline (ZOLOFT) 50 mg tablet  Self No No   Sig: TAKE 1 AND 1/2 TABLETS BY MOUTH DAILY   sucralfate (CARAFATE) 1 g tablet   No No   Sig: Take 1 tablet (1 g total) by mouth 4 (four) times a day for 14 days   traMADol (Ultram) 50 mg tablet  Self No No   Sig: Take 1 tablet (50  mg total) by mouth every 6 (six) hours as needed for severe pain      Facility-Administered Medications: None     Ciprofloxacin, Levaquin [levofloxacin], Milk (cow), Penicillins, Pork allergy - food allergy, Shellfish allergy - food allergy, Soy allergy - food allergy, and Wheat bran - food allergy    Objective :  Temp:  [97.1 °F (36.2 °C)-102.4 °F (39.1 °C)] 98.1 °F (36.7 °C)  HR:  [59-85] 59  BP: (139-178)/(60-78) 142/64  Resp:  [16-20] 18  SpO2:  [91 %-96 %] 96 %  O2 Device: None (Room air)    Physical Exam  Constitutional:       General: She is not in acute distress.     Appearance: She is not ill-appearing, toxic-appearing or diaphoretic.   HENT:      Head: Normocephalic and atraumatic.      Right Ear: Hearing normal.      Left Ear: Hearing normal.      Mouth/Throat:      Mouth: Mucous membranes are moist.      Pharynx: Oropharynx is clear. No oropharyngeal exudate or posterior oropharyngeal erythema.   Eyes:      General: No scleral icterus.        Right eye: No discharge.         Left eye: No discharge.      Extraocular Movements: EOM normal. No nystagmus.      Conjunctiva/sclera: Conjunctivae normal.   Cardiovascular:      Rate and Rhythm: Normal rate and regular rhythm.   Pulmonary:      Effort: Pulmonary effort is normal.      Breath sounds: Normal breath sounds.   Abdominal:      General: There is no distension.      Palpations: Abdomen is soft.      Tenderness: There is no abdominal tenderness.   Musculoskeletal:         General: Normal range of motion.      Cervical back: Normal range of motion and neck supple.      Right lower leg: No edema.      Left lower leg: No edema.   Skin:     General: Skin is warm.      Findings: Bruising present. No erythema or rash.   Neurological:      Mental Status: She is alert and oriented to person, place, and time.   Psychiatric:         Mood and Affect: Mood normal.         Speech: Speech normal.         Behavior: Behavior normal.     Neurological Exam  Mental  Status  Alert. Oriented to person, place, time and situation. Oriented to person, place, and time. Speech is normal. Language is fluent with no aphasia. Attention and concentration are normal.    Cranial Nerves  CN II: Right normal visual field. Left normal visual field.  CN III, IV, VI: Extraocular movements intact bilaterally. No nystagmus. Normal saccades.   Right pupil: 4 mm. Round. Reactive to light.   Left pupil: 4 mm. Round. Reactive to light.  CN V:  Right: Facial sensation is normal.  Left: Facial sensation is normal on the left.  CN VII:  Right: There is no facial weakness.  Left: There is no facial weakness.  CN VIII:  Right: Hearing is normal.  Left: Hearing is normal.  CN IX, X: Palate elevates symmetrically  CN XII: Tongue midline without atrophy or fasciculations.    Motor  Normal muscle bulk throughout. No fasciculations present. Normal muscle tone. No abnormal involuntary movements. No pronator drift.  Motor strength 5/5 B/L UE and LE..    Sensory  Light touch is normal in upper and lower extremities.     Coordination  Right: Finger-to-nose normal.Left: Finger-to-nose normal.    Gait    Deferred for safety..      Lab Results: I have reviewed the following results:I have personally reviewed pertinent reports.   Recent Results (from the past 24 hours)   POCT Rapid Covid Ag    Collection Time: 12/30/24  4:42 PM   Result Value Ref Range    POCT SARS-CoV-2 Ag Negative Negative    VALID CONTROL Valid    POCT rapid flu A and B    Collection Time: 12/30/24  4:42 PM   Result Value Ref Range    RAPID FLU A NEG     RAPID FLU B NEG    FLU/COVID Rapid Antigen (30 min. TAT) - Preferred screening test in ED    Collection Time: 12/30/24  6:45 PM    Specimen: Nose; Nares   Result Value Ref Range    SARS COV Rapid Antigen Negative Negative    Influenza A Rapid Antigen Negative Negative    Influenza B Rapid Antigen Negative Negative   CBC and differential    Collection Time: 12/30/24  6:45 PM   Result Value Ref Range     WBC 12.63 (H) 4.31 - 10.16 Thousand/uL    RBC 4.13 3.81 - 5.12 Million/uL    Hemoglobin 11.7 11.5 - 15.4 g/dL    Hematocrit 36.9 34.8 - 46.1 %    MCV 89 82 - 98 fL    MCH 28.3 26.8 - 34.3 pg    MCHC 31.7 31.4 - 37.4 g/dL    RDW 14.2 11.6 - 15.1 %    MPV 11.8 8.9 - 12.7 fL    Platelets 110 (L) 149 - 390 Thousands/uL    nRBC 0 /100 WBCs    Segmented % 82 (H) 43 - 75 %    Immature Grans % 1 0 - 2 %    Lymphocytes % 10 (L) 14 - 44 %    Monocytes % 7 4 - 12 %    Eosinophils Relative 0 0 - 6 %    Basophils Relative 0 0 - 1 %    Absolute Neutrophils 10.27 (H) 1.85 - 7.62 Thousands/µL    Absolute Immature Grans 0.16 0.00 - 0.20 Thousand/uL    Absolute Lymphocytes 1.20 0.60 - 4.47 Thousands/µL    Absolute Monocytes 0.94 0.17 - 1.22 Thousand/µL    Eosinophils Absolute 0.02 0.00 - 0.61 Thousand/µL    Basophils Absolute 0.04 0.00 - 0.10 Thousands/µL   Comprehensive metabolic panel    Collection Time: 12/30/24  6:45 PM   Result Value Ref Range    Sodium 134 (L) 135 - 147 mmol/L    Potassium 4.1 3.5 - 5.3 mmol/L    Chloride 101 96 - 108 mmol/L    CO2 26 21 - 32 mmol/L    ANION GAP 7 4 - 13 mmol/L    BUN 13 5 - 25 mg/dL    Creatinine 0.74 0.60 - 1.30 mg/dL    Glucose 149 (H) 65 - 140 mg/dL    Calcium 9.2 8.4 - 10.2 mg/dL    AST 11 (L) 13 - 39 U/L    ALT 9 7 - 52 U/L    Alkaline Phosphatase 57 34 - 104 U/L    Total Protein 7.3 6.4 - 8.4 g/dL    Albumin 4.0 3.5 - 5.0 g/dL    Total Bilirubin 0.39 0.20 - 1.00 mg/dL    eGFR 78 ml/min/1.73sq m   Lactic acid, plasma (w/reflex if result > 2.0)    Collection Time: 12/30/24  6:45 PM   Result Value Ref Range    LACTIC ACID 0.7 0.5 - 2.0 mmol/L   Procalcitonin    Collection Time: 12/30/24  6:45 PM   Result Value Ref Range    Procalcitonin 0.18 <=0.25 ng/ml   Blood culture #2    Collection Time: 12/30/24  6:45 PM    Specimen: Arm, Right; Blood   Result Value Ref Range    Blood Culture Received in Microbiology Lab. Culture in Progress.    Blood culture #1    Collection Time: 12/30/24  6:56  PM    Specimen: Hand, Right; Blood   Result Value Ref Range    Blood Culture Received in Microbiology Lab. Culture in Progress.    UA w Reflex to Microscopic w Reflex to Culture    Collection Time: 12/30/24  8:03 PM    Specimen: Urine, Clean Catch   Result Value Ref Range    Color, UA Light Yellow     Clarity, UA Turbid     Specific Gravity, UA 1.016 1.003 - 1.030    pH, UA 7.5 4.5, 5.0, 5.5, 6.0, 6.5, 7.0, 7.5, 8.0    Leukocytes, UA Small (A) Negative    Nitrite, UA Positive (A) Negative    Protein, UA Trace (A) Negative mg/dl    Glucose, UA Negative Negative mg/dl    Ketones, UA Negative Negative mg/dl    Urobilinogen, UA <2.0 <2.0 mg/dl mg/dl    Bilirubin, UA Negative Negative    Occult Blood, UA Trace (A) Negative   Urine Microscopic    Collection Time: 12/30/24  8:03 PM   Result Value Ref Range    RBC, UA 4-10 (A) None Seen, 1-2 /hpf    WBC, UA 30-50 (A) None Seen, 1-2 /hpf    Epithelial Cells Occasional None Seen, Occasional /hpf    Bacteria, UA Occasional None Seen, Occasional /hpf    MUCUS THREADS Occasional (A) None Seen   Fingerstick Glucose (POCT)    Collection Time: 12/31/24  1:49 AM   Result Value Ref Range    POC Glucose 180 (H) 65 - 140 mg/dl   CBC (With Platelets)    Collection Time: 12/31/24  5:03 AM   Result Value Ref Range    WBC 12.79 (H) 4.31 - 10.16 Thousand/uL    RBC 3.87 3.81 - 5.12 Million/uL    Hemoglobin 11.2 (L) 11.5 - 15.4 g/dL    Hematocrit 35.0 34.8 - 46.1 %    MCV 90 82 - 98 fL    MCH 28.9 26.8 - 34.3 pg    MCHC 32.0 31.4 - 37.4 g/dL    RDW 14.3 11.6 - 15.1 %    Platelets 181 149 - 390 Thousands/uL    MPV 9.7 8.9 - 12.7 fL   Basic metabolic panel    Collection Time: 12/31/24  6:01 AM   Result Value Ref Range    Sodium 134 (L) 135 - 147 mmol/L    Potassium 4.9 3.5 - 5.3 mmol/L    Chloride 105 96 - 108 mmol/L    CO2 21 21 - 32 mmol/L    ANION GAP 8 4 - 13 mmol/L    BUN 14 5 - 25 mg/dL    Creatinine 0.78 0.60 - 1.30 mg/dL    Glucose 200 (H) 65 - 140 mg/dL    Glucose, Fasting 200 (H) 65 -  99 mg/dL    Calcium 8.7 8.4 - 10.2 mg/dL    eGFR 73 ml/min/1.73sq m   Fingerstick Glucose (POCT)    Collection Time: 12/31/24  7:52 AM   Result Value Ref Range    POC Glucose 203 (H) 65 - 140 mg/dl   Fingerstick Glucose (POCT)    Collection Time: 12/31/24 11:15 AM   Result Value Ref Range    POC Glucose 202 (H) 65 - 140 mg/dl         Imaging  Imaging Results Review: I personally reviewed the following image studies in PACS and associated radiology reports: CTH- No acute intracranial abnormality. Reidentified Chiari I malformation.    VTE Prophylaxis: Fondaparinux (Arixtra)    This note was completed in part utilizing Dragon Software.  Grammatical errors, random word insertions, spelling mistakes, and incomplete sentences may be an occasional consequence of this system secondary to software limitations, ambient noise, and hardware issues.  If you have any questions or concerns about the content, text, or information contained within the body of this dictation, please contact the provider for clarification.

## 2024-12-31 NOTE — ASSESSMENT & PLAN NOTE
79 y.o.  female with diabetes, asthma, COPD, BOLA, HTN, CKD, obesity, depression, history of kidney stones, history of glaucoma, neuropathy, who presented to [facility] on 12/30/24 with headache and weakness.    Workup  12/30/24 CTH:    No acute intracranial abnormality.   Reidentified Chiari I malformation.  Labs:   UA with small leukocytes, positive nitrites  Urine culture pending   Blood cultures x 2 pending  Covid/flu/rsv negative     Suspect worsening HA in setting of underlying infection, recent head trauma and cannot entirely exclude rebound effect headache given extensive use of Tylenol over the past several weeks.     Plan  - Will give Depacon 1 g now.   - Can continue with migraine cocktail: Reglan 10 mg Q8H x 72 hours, Benadryl 25 mg Q8H x 72H, Mag sulfate 2 g Q24H x 72 hours.  - Will order Nurtec 75 mg dose to be given at bedtime.  - Discussed importance of minimizing OTC headache medications including Tylenol in order to prevent rebound HA. Patient expressed understanding.   - Continue to treat underlying infection as per primary team.

## 2024-12-31 NOTE — ASSESSMENT & PLAN NOTE
Patient does have chronic migraine headache  Patient still complaining of headache worsening for last 2 to 3 days  CT head negative for stroke/hemorrhage  Patient was given headache cocktail yesterday but did not get better  Consulted neurology for further evaluation  As per the last neurology note, avoid triptans due to history of bradycardia and history of uncontrolled hypertension, avoid NSAIDs due to history of CKD  - future options:  prochlorperazine, could consider trial of 5 days of Depakote 500 mg nightly or dexamethasone 2 mg daily for prolonged migraine, ubrelvy, reyvow, nurtec

## 2024-12-31 NOTE — TELEPHONE ENCOUNTER
----- Message from Smitha BRADLEY sent at 12/30/2024  4:09 PM EST -----  12/30/24 4:09 PM    Hello, our patient Pebbles Landon has had Diabetic Eye Exam completed/performed. Please assist in updating the patient chart by making an External outreach to [facility] located in [address] [place] [address], [place]. The date of service is 11/2024.    Thank you,  Smitha To MA  [place] [facility]

## 2024-12-31 NOTE — PLAN OF CARE

## 2024-12-31 NOTE — ASSESSMENT & PLAN NOTE
SSI AC/at bedtime ordered  Monitor Accu-Cheks closely, avoid hypoglycemia  Lab Results   Component Value Date    HGBA1C 7.0 (H) 09/04/2024

## 2024-12-31 NOTE — LETTER
Diabetic Eye Exam Form    Date Requested: 24  Patient: Pebbles Landon     Please complete form  Patient : 1945   Referring Provider: ALEKSANDER Manning      DIABETIC Eye Exam Date _______________________________      Type of Exam MUST be documented for Diabetic Eye Exams. Please CHECK ONE.     Retinal Exam       Dilated Retinal Exam       OCT       Optomap-Iris Exam      Fundus Photography       Left Eye - Please check Retinopathy or No Retinopathy        Exam did show retinopathy    Exam did not show retinopathy       Right Eye - Please check Retinopathy or No Retinopathy       Exam did show retinopathy    Exam did not show retinopathy       Comments __________________________________________________________    Practice Providing Exam ______________________________________________    Exam Performed By (print name) _______________________________________      Provider Signature ___________________________________________________      These reports are needed for  compliance.  Please fax this completed form and a copy of the Diabetic Eye Exam report to our office located at [address] [place] [address] as soon as possible via Fax [phone] attention Watson: Phone [phone]  We thank you for your assistance in treating our mutual patient.

## 2024-12-31 NOTE — CASE MANAGEMENT
Case Management Assessment & Discharge Planning Note    Patient name Pebbles Sierra Leonean  Location 2 [facility] 264/2 E 264-01 MRN [id number]  : 1945 Date 2024       Current Admission Date: 2024  Current Admission Diagnosis:Urinary tract infection   Patient Active Problem List    Diagnosis Date Noted Date Diagnosed    Vomiting 2024     Acute intractable headache 2024     Urinary tract infection 2024     Insomnia 2024     Neuropathy 2024     Sore throat 04/15/2024     Primary insomnia 04/15/2024     Rash in adult 04/15/2024     Bedbug bite 2024     Empty sella ([facility]) 03/15/2024     Conjunctival hemorrhage of right eye 2023     Diverticulitis 2022     Dysuria 2022     Type 2 diabetes mellitus with diabetic neuropathy, unspecified ([facility]) 2022    Gastro-esophageal reflux disease without esophagitis 2022     Elevation of levels of liver transaminase levels 2022     Diverticulitis of intestine, part unspecified, without perforation or abscess without bleeding 2022     Anxiety disorder, unspecified 2022     COPD (chronic obstructive pulmonary disease) ([facility]) 2022     Transaminitis 2022     Abnormal EKG 2022     Primary hypertension 2022     Stage 3a chronic kidney disease ([facility]) 2022     Psychophysiological insomnia 2021     Obesity, morbid ([facility]) 04/15/2021     BMI 37.0-37.9, adult 2020     Type 2 diabetes mellitus with stage 3a chronic kidney disease ([facility]) 2019     Depression with anxiety 2018     Mild intermittent asthma 2018     Vitamin D deficiency 10/19/2018     Overactive bladder 12/15/2017     Mixed incontinence urge and stress 12/15/2017     Bowel trouble 2017     Arthritis 2017     BOLA (obstructive sleep apnea) 10/17/2016     Asthma 10/17/2016       LOS (days): 0  Geometric Mean LOS (GMLOS) (days):   Days to GMLOS:     OBJECTIVE:               Current admission status: Observation       Preferred Pharmacy:   [facility][id number] - [place] - [address]  [address]  [facility] [address]  Phone: [phone] Fax: [phone]    [id number] [place], [place] - [address]  [address]  [facility] [address]  Phone: [phone] Fax: [phone]    Primary Care Provider: ALEKSANDER Manning    Primary Insurance: [employer]  Secondary Insurance:     ASSESSMENT:  Active Health Care Proxies       Evangelina Landon Health Care Representative - Daughter   Primary Phone: [phone] (Mobile)                 Advance Directives  Does patient have a Health Care POA?: No  Was patient offered paperwork?: Yes (CM supplied info)  Does patient currently have a Health Care decision maker?: Yes, please see Health Care Proxy section  Does patient have Advance Directives?: No  Was patient offered paperwork?: Yes (CM supplied info)  Primary Contact:  (Vicente) Omer and daughter Rosalva    Obs Notice Signed: 12/31/24    Readmission Root Cause  30 Day Readmission: No    Patient Information  Admitted from:: Home  Mental Status: Alert  During Assessment patient was accompanied by: Not accompanied during assessment  Assessment information provided by:: Patient  Primary Caregiver: Self  Support Systems: Spouse/significant other, Daughter  County of Residence: [place]  What city do you live in?: [place]  Home entry access options. Select all that apply.: Stairs  Number of steps to enter home.: 1  Do the steps have railings?: No  Type of Current Residence: 2 story home  Upon entering residence, is there a bedroom on the main floor (no further steps)?: Yes  Upon entering residence, is there a bathroom on the main floor (no further steps)?: Yes  Living Arrangements: Lives w/ Spouse/significant other, Lives w/ Daughter (lives with  Omer Razo) on the first floor and her daughter Rosalva lives on the  2nd floor)  Is patient a ?: Yes  Is patient active with [facility] ( Affairs)?: No    Activities of Daily Living Prior to Admission  Functional Status: Independent  Completes ADLs independently?: Yes  Ambulates independently?: Yes  Does patient use assisted devices?: No  Does patient currently own DME?: Yes  What DME does the patient currently own?: [employer]  Does patient have a history of Outpatient Therapy (PT/OT)?: No  Does the patient have a history of Short-Term Rehab?: Yes ([facility])  Does patient have a history of HHC?: Yes  Does patient currently have HHC?: No         Patient Information Continued  Income Source: Employed (Pt works part time)  Does patient have prescription coverage?: Yes  Does patient receive dialysis treatments?: No  Does patient have a history of substance abuse?: No  Does patient have a history of Mental Health Diagnosis?: No         Means of Transportation  Means of Transport to Appts:: Drives Self          DISCHARGE DETAILS:    Discharge planning discussed with:: patient  Freedom of Choice: Yes  Comments - Freedom of Choice: CM discussed d/c needs including HHC, but pt did not feel that would be needed.  IPTA  Works part time and drives.  Has adequate family support  CM contacted family/caregiver?: No- see comments (pt will update her family herself)  Were Treatment Team discharge recommendations reviewed with patient/caregiver?: Yes  Did patient/caregiver verbalize understanding of patient care needs?: Yes  Were patient/caregiver advised of the risks associated with not following Treatment Team discharge recommendations?: Yes    Contacts  Patient Contacts: Vicente Parker)  Relationship to Patient:: Family    Requested Home Health Care         Is the patient interested in HHC at discharge?: No    DME Referral Provided  Referral made for DME?: No    Other Referral/Resources/Interventions Provided:  Interventions: Advanced Directives  Referral Comments: No anticipated d/c  needs-declining HHC/CM did supply info on POA and Advanced Directives per pt request    Would you like to participate in our [facility] service program?  : No - Declined    Treatment Team Recommendation: Home  Discharge Destination Plan:: Home  Transport at Discharge : Family

## 2025-01-01 LAB
ANION GAP SERPL CALCULATED.3IONS-SCNC: 5 MMOL/L (ref 4–13)
ATRIAL RATE: 53 BPM
ATRIAL RATE: 55 BPM
BACTERIA UR CULT: ABNORMAL
BUN SERPL-MCNC: 10 MG/DL (ref 5–25)
CALCIUM SERPL-MCNC: 8.6 MG/DL (ref 8.4–10.2)
CHLORIDE SERPL-SCNC: 106 MMOL/L (ref 96–108)
CO2 SERPL-SCNC: 26 MMOL/L (ref 21–32)
CREAT SERPL-MCNC: 0.78 MG/DL (ref 0.6–1.3)
ERYTHROCYTE [DISTWIDTH] IN BLOOD BY AUTOMATED COUNT: 14.3 % (ref 11.6–15.1)
GFR SERPL CREATININE-BSD FRML MDRD: 73 ML/MIN/1.73SQ M
GLUCOSE P FAST SERPL-MCNC: 119 MG/DL (ref 65–99)
GLUCOSE SERPL-MCNC: 108 MG/DL (ref 65–140)
GLUCOSE SERPL-MCNC: 119 MG/DL (ref 65–140)
GLUCOSE SERPL-MCNC: 138 MG/DL (ref 65–140)
GLUCOSE SERPL-MCNC: 161 MG/DL (ref 65–140)
HCT VFR BLD AUTO: 33.6 % (ref 34.8–46.1)
HGB BLD-MCNC: 10.7 G/DL (ref 11.5–15.4)
MCH RBC QN AUTO: 28.7 PG (ref 26.8–34.3)
MCHC RBC AUTO-ENTMCNC: 31.8 G/DL (ref 31.4–37.4)
MCV RBC AUTO: 90 FL (ref 82–98)
P AXIS: 30 DEGREES
P AXIS: 65 DEGREES
PLATELET # BLD AUTO: 181 THOUSANDS/UL (ref 149–390)
PMV BLD AUTO: 9.5 FL (ref 8.9–12.7)
POTASSIUM SERPL-SCNC: 4.5 MMOL/L (ref 3.5–5.3)
PR INTERVAL: 152 MS
PR INTERVAL: 172 MS
QRS AXIS: 24 DEGREES
QRS AXIS: 39 DEGREES
QRSD INTERVAL: 68 MS
QRSD INTERVAL: 80 MS
QT INTERVAL: 426 MS
QT INTERVAL: 444 MS
QTC INTERVAL: 407 MS
QTC INTERVAL: 416 MS
RBC # BLD AUTO: 3.73 MILLION/UL (ref 3.81–5.12)
SODIUM SERPL-SCNC: 137 MMOL/L (ref 135–147)
T WAVE AXIS: 25 DEGREES
T WAVE AXIS: 35 DEGREES
VENTRICULAR RATE: 53 BPM
VENTRICULAR RATE: 55 BPM
WBC # BLD AUTO: 13.91 THOUSAND/UL (ref 4.31–10.16)

## 2025-01-01 PROCEDURE — 85027 COMPLETE CBC AUTOMATED: CPT | Performed by: STUDENT IN AN ORGANIZED HEALTH CARE EDUCATION/TRAINING PROGRAM

## 2025-01-01 PROCEDURE — 99233 SBSQ HOSP IP/OBS HIGH 50: CPT | Performed by: STUDENT IN AN ORGANIZED HEALTH CARE EDUCATION/TRAINING PROGRAM

## 2025-01-01 PROCEDURE — 94660 CPAP INITIATION&MGMT: CPT

## 2025-01-01 PROCEDURE — 94664 DEMO&/EVAL PT USE INHALER: CPT

## 2025-01-01 PROCEDURE — 82948 REAGENT STRIP/BLOOD GLUCOSE: CPT

## 2025-01-01 PROCEDURE — 93005 ELECTROCARDIOGRAM TRACING: CPT

## 2025-01-01 PROCEDURE — 93010 ELECTROCARDIOGRAM REPORT: CPT | Performed by: STUDENT IN AN ORGANIZED HEALTH CARE EDUCATION/TRAINING PROGRAM

## 2025-01-01 PROCEDURE — 94760 N-INVAS EAR/PLS OXIMETRY 1: CPT

## 2025-01-01 PROCEDURE — 80048 BASIC METABOLIC PNL TOTAL CA: CPT | Performed by: STUDENT IN AN ORGANIZED HEALTH CARE EDUCATION/TRAINING PROGRAM

## 2025-01-01 RX ORDER — METHYLPREDNISOLONE 4 MG/1
24 TABLET ORAL DAILY
Status: COMPLETED | OUTPATIENT
Start: 2025-01-02 | End: 2025-01-02

## 2025-01-01 RX ORDER — OXYCODONE HYDROCHLORIDE 5 MG/1
5 TABLET ORAL ONCE
Refills: 0 | Status: COMPLETED | OUTPATIENT
Start: 2025-01-01 | End: 2025-01-01

## 2025-01-01 RX ORDER — METHYLPREDNISOLONE 4 MG/1
20 TABLET ORAL DAILY
Status: DISCONTINUED | OUTPATIENT
Start: 2025-01-03 | End: 2025-01-02 | Stop reason: HOSPADM

## 2025-01-01 RX ORDER — KETOROLAC TROMETHAMINE 30 MG/ML
15 INJECTION, SOLUTION INTRAMUSCULAR; INTRAVENOUS ONCE
Status: COMPLETED | OUTPATIENT
Start: 2025-01-01 | End: 2025-01-01

## 2025-01-01 RX ORDER — METHYLPREDNISOLONE 4 MG/1
8 TABLET ORAL DAILY
Status: DISCONTINUED | OUTPATIENT
Start: 2025-01-06 | End: 2025-01-02 | Stop reason: HOSPADM

## 2025-01-01 RX ORDER — METHYLPREDNISOLONE 4 MG/1
12 TABLET ORAL DAILY
Status: DISCONTINUED | OUTPATIENT
Start: 2025-01-05 | End: 2025-01-02 | Stop reason: HOSPADM

## 2025-01-01 RX ORDER — METHYLPREDNISOLONE 4 MG/1
4 TABLET ORAL DAILY
Status: DISCONTINUED | OUTPATIENT
Start: 2025-01-07 | End: 2025-01-02 | Stop reason: HOSPADM

## 2025-01-01 RX ORDER — METHYLPREDNISOLONE 4 MG/1
16 TABLET ORAL DAILY
Status: DISCONTINUED | OUTPATIENT
Start: 2025-01-04 | End: 2025-01-02 | Stop reason: HOSPADM

## 2025-01-01 RX ADMIN — LATANOPROST 1 DROP: 50 SOLUTION OPHTHALMIC at 21:15

## 2025-01-01 RX ADMIN — CEFEPIME 1000 MG: 2 INJECTION, POWDER, FOR SOLUTION INTRAVENOUS at 15:49

## 2025-01-01 RX ADMIN — METOCLOPRAMIDE HYDROCHLORIDE 10 MG: 5 INJECTION INTRAMUSCULAR; INTRAVENOUS at 05:31

## 2025-01-01 RX ADMIN — DIPHENHYDRAMINE HYDROCHLORIDE 25 MG: 50 INJECTION, SOLUTION INTRAMUSCULAR; INTRAVENOUS at 21:11

## 2025-01-01 RX ADMIN — ZOLPIDEM TARTRATE 5 MG: 5 TABLET, COATED ORAL at 02:12

## 2025-01-01 RX ADMIN — MONTELUKAST 10 MG: 10 TABLET, FILM COATED ORAL at 21:11

## 2025-01-01 RX ADMIN — METOCLOPRAMIDE HYDROCHLORIDE 10 MG: 5 INJECTION INTRAMUSCULAR; INTRAVENOUS at 15:50

## 2025-01-01 RX ADMIN — METOCLOPRAMIDE HYDROCHLORIDE 10 MG: 5 INJECTION INTRAMUSCULAR; INTRAVENOUS at 21:11

## 2025-01-01 RX ADMIN — RIMEGEPANT SULFATE 75 MG: 75 TABLET, ORALLY DISINTEGRATING ORAL at 18:49

## 2025-01-01 RX ADMIN — KETOROLAC TROMETHAMINE 15 MG: 30 INJECTION, SOLUTION INTRAMUSCULAR at 07:45

## 2025-01-01 RX ADMIN — CARVEDILOL 6.25 MG: 6.25 TABLET, FILM COATED ORAL at 07:47

## 2025-01-01 RX ADMIN — PREGABALIN 50 MG: 25 CAPSULE ORAL at 08:11

## 2025-01-01 RX ADMIN — OXYCODONE HYDROCHLORIDE 5 MG: 5 TABLET ORAL at 12:12

## 2025-01-01 RX ADMIN — SERTRALINE HYDROCHLORIDE 75 MG: 50 TABLET ORAL at 08:11

## 2025-01-01 RX ADMIN — CARVEDILOL 6.25 MG: 6.25 TABLET, FILM COATED ORAL at 15:50

## 2025-01-01 RX ADMIN — DIPHENHYDRAMINE HYDROCHLORIDE 25 MG: 50 INJECTION, SOLUTION INTRAMUSCULAR; INTRAVENOUS at 05:31

## 2025-01-01 RX ADMIN — PREGABALIN 50 MG: 25 CAPSULE ORAL at 21:11

## 2025-01-01 RX ADMIN — MAGNESIUM SULFATE HEPTAHYDRATE 2 G: 40 INJECTION, SOLUTION INTRAVENOUS at 12:10

## 2025-01-01 RX ADMIN — AMLODIPINE BESYLATE 10 MG: 10 TABLET ORAL at 21:11

## 2025-01-01 RX ADMIN — PREGABALIN 50 MG: 25 CAPSULE ORAL at 15:50

## 2025-01-01 RX ADMIN — DIPHENHYDRAMINE HYDROCHLORIDE 25 MG: 50 INJECTION, SOLUTION INTRAMUSCULAR; INTRAVENOUS at 15:50

## 2025-01-01 RX ADMIN — CEFTRIAXONE SODIUM 1000 MG: 10 INJECTION, POWDER, FOR SOLUTION INTRAVENOUS at 08:12

## 2025-01-01 RX ADMIN — ZOLPIDEM TARTRATE 5 MG: 5 TABLET, COATED ORAL at 21:22

## 2025-01-01 NOTE — ASSESSMENT & PLAN NOTE
Reports n/v/d x2 days  Patient had 1 episodes of vomiting in the morning today  Resolved  BMP daily

## 2025-01-01 NOTE — PLAN OF CARE

## 2025-01-01 NOTE — ASSESSMENT & PLAN NOTE
Patient seen by neurology started her on migraine cocktail  Patient still complaining of intermittent headache.  Continue with GI cocktail we will give 1 dose of oxycodone

## 2025-01-01 NOTE — ASSESSMENT & PLAN NOTE
Lab Results   Component Value Date    HGBA1C 7.0 (H) 09/04/2024       Recent Labs     12/31/24  2035 01/01/25  0739 01/01/25  1126 01/01/25  1614   POCGLU 110 108 138 161*       Blood Sugar Average: Last 72 hrs:  (P) 155.625    - Goal euglycemia.   - Management as per primary team.

## 2025-01-01 NOTE — PLAN OF CARE
Problem: PAIN - ADULT  Goal: Verbalizes/displays adequate comfort level or baseline comfort level  Description: Interventions:  - Encourage patient to monitor pain and request assistance  - Assess pain using appropriate pain scale  - Administer analgesics based on type and severity of pain and evaluate response  - Implement non-pharmacological measures as appropriate and evaluate response  - Consider cultural and social influences on pain and pain management  - Notify physician/advanced practitioner if interventions unsuccessful or patient reports new pain  Outcome: Progressing     Problem: INFECTION - ADULT  Goal: Absence or prevention of progression during hospitalization  Description: INTERVENTIONS:  - Assess and monitor for signs and symptoms of infection  - Monitor lab/diagnostic results  - Monitor all insertion sites, i.e. indwelling lines, tubes, and drains  - Monitor endotracheal if appropriate and nasal secretions for changes in amount and color  - Donalds appropriate cooling/warming therapies per order  - Administer medications as ordered  - Instruct and encourage patient and family to use good hand hygiene technique  - Identify and instruct in appropriate isolation precautions for identified infection/condition  Outcome: Progressing

## 2025-01-01 NOTE — RESPIRATORY THERAPY NOTE
RT Protocol Note  Pebbles Kazakh 79 y.o. female MRN: [id number]  Unit/Bed#: 2 E 264-01 Encounter: [id number]    Assessment    Principal Problem:    Urinary tract infection  Active Problems:    Depression with anxiety    Mild intermittent asthma    Type 2 diabetes mellitus with stage 3a chronic kidney disease (HCC)    BMI 37.0-37.9, adult    Primary hypertension    COPD (chronic obstructive pulmonary disease) (HCC)    BOLA (obstructive sleep apnea)    Insomnia    Chronic nonintractable headache    Vomiting    Sepsis without acute organ dysfunction (HCC)    Headache      Home Pulmonary Medications:         Past Medical History:   Diagnosis Date    Asthma     Benign hypertension 11/30/2018    Cardiac arrest (HCC)     Diabetes mellitus (HCC)     Glaucoma     Hypertension     Kidney stone     Neuropathy     Sleep apnea     no machine    SOB (shortness of breath)     Tubular adenoma of colon 10/2019    Wheezing      Social History     Socioeconomic History    Marital status: /Civil Union     Spouse name: Not on file    Number of children: Not on file    Years of education: Not on file    Highest education level: Not on file   Occupational History    Occupation: retired    Tobacco Use    Smoking status: Never     Passive exposure: Never    Smokeless tobacco: Never   Vaping Use    Vaping status: Never Used   Substance and Sexual Activity    Alcohol use: Never    Drug use: No    Sexual activity: Yes     Birth control/protection: Surgical   Other Topics Concern    Not on file   Social History Narrative    Not on file     Social Drivers of Health     Financial Resource Strain: Low Risk  (10/12/2023)    Overall Financial Resource Strain (CARDIA)     Difficulty of Paying Living Expenses: Not hard at all   Food Insecurity: No Food Insecurity (12/31/2024)    Nursing - Inadequate Food Risk Classification     Worried About Running Out of Food in the Last Year: Never true     Ran Out of Food in the Last Year: Never true      Ran Out of Food in the Last Year: 1   Transportation Needs: No Transportation Needs (2024)    Nursing - Transportation Risk Classification     Lack of Transportation: Not on file     Lack of Transportation: 2   Physical Activity: Not on file   Stress: Not on file   Social Connections: Unknown (2024)    Received from [facility]    Social [employer]     How often do you feel lonely or isolated from those around you? (Adult - for ages 18 years and over): Not on file   Intimate Partner Violence: Unknown (2024)    Nursing IPS     Feels Physically and Emotionally Safe: Not on file     Physically Hurt by Someone: Not on file     Humiliated or Emotionally Abused by Someone: Not on file     Physically Hurt by Someone: 2     Hurt or Threatened by Someone: 2   Housing Stability: Unknown (2024)    Nursing: Inadequate Housing Risk Classification     Has Housing: Not on file     Worried About Losing Housing: Not on file     Unable to Get Utilities: Not on file     Unable to Pay for Housing in the Last Year: 2     Has Housin       Subjective    Subjective Data: will discuss/assess cpap use when pt is awake    Objective    Physical Exam:   Assessment Type: Assess only  General Appearance: Alert, Awake  Respiratory Pattern: Normal  Chest Assessment: Chest expansion symmetrical  Bilateral Breath Sounds: Clear  O2 Device: ra    Vitals:  Blood pressure 158/66, pulse 63, temperature 97.5 °F (36.4 °C), temperature source Oral, resp. rate 18, height 5' (1.524 m), weight 87.1 kg (192 lb), SpO2 96%, not currently breastfeeding.          Imaging and other studies: Results Review Statement: No pertinent imaging studies reviewed.    O2 Device: ra     Plan    Respiratory Plan: Home Bronchodilator Patient pathway        Resp Comments: No acute distress. bs clear. pt has hx copd asthma. pt uses symbicort daily at home and albuterol mdi prn. pt will request prn if required.

## 2025-01-01 NOTE — UTILIZATION REVIEW
Continued Stay Review    Date: 1/1                   OBS order 12/30 2315 converted to IP on 1/1 0721 for treatment of UTI            Start   Ordered   01/01/25 0721  INPATIENT ADMISSION  Once        Transfer Service: Hospitalist   Question Answer Comment   Level of Care Med Surg    Estimated length of stay More than 2 Midnights    Certification I certify that inpatient services are medically necessary for this patient for a duration of greater than two midnights. See H&P and MD Progress Notes for additional information about the patient's course of treatment.        01/01/25 0720 12/30/24 2315  Place in Observation  Once        Transfer Service: Hospitalist   Question: Level of Care Answer: Med Surg    12/30/24 2314        Current Patient Class: IP  Current Level of Care:     HPI:79 y.o. female initially admitted on 12/30 as OBS      Assessment/Plan:     Date: 1/1  Day 3: Has surpassed a 2nd midnight with active treatments and services.  Afebrile . Urine culture showed Klebsiella.  Sided ID recommended cefepime from ceftriaxone and discharged on Bactrim  Blood cultures negative for 24 hours, Flu panel negative. Vomiting resolved . Cont w/ intermittent HA , cont GI cocktail , given 1 dose of oxycodone .  Wbc inc to 13.91 from 12.63 . Anticipate discharge in 24-48 hrs to home with home services.         Medications:   Scheduled Medications:  amLODIPine, 10 mg, Oral, HS  carvedilol, 6.25 mg, Oral, BID With Meals  cefTRIAXone, 1,000 mg, Intravenous, Q24H  diphenhydrAMINE, 25 mg, Intravenous, Q8H CLAYTON  fondaparinux, 2.5 mg, Subcutaneous, Q24H  insulin lispro, 1-6 Units, Subcutaneous, 4x Daily (AC & HS)  ketorolac, 15 mg, Intravenous, Once  latanoprost, 1 drop, Both Eyes, HS  magnesium sulfate, 2 g, Intravenous, Q24H  metoclopramide, 10 mg, Intravenous, Q8H CLAYTON  montelukast, 10 mg, Oral, HS  pregabalin, 50 mg, Oral, TID  sertraline, 75 mg, Oral, Daily      Continuous IV Infusions:     PRN Meds:  acetaminophen, 650 mg,  Oral, Q6H PRN  albuterol, 1 puff, Inhalation, Q6H PRN  albuterol, 2.5 mg, Nebulization, Q6H PRN  zolpidem, 5 mg, Oral, HS PRN      Discharge Plan: TBD     Vital Signs (last 3 days)       Date/Time Temp Pulse Resp BP MAP (mmHg) SpO2 O2 Device O2 Interface Device Patient Position - Orthostatic VS Brock Coma Scale Score Pain    01/01/25 0710 -- -- -- -- -- -- -- -- -- -- 6    01/01/25 0437 -- -- -- -- -- 95 % None (Room air) Face mask -- -- --    01/01/25 0034 -- -- -- -- -- 95 % None (Room air) Face mask -- -- --    12/31/24 23:19:01 97.9 °F (36.6 °C) 66 18 153/65 94 95 % None (Room air) -- Lying -- --    12/31/24 2227 -- 63 -- -- -- 96 % -- -- -- -- --    12/31/24 21:44:30 -- 65 -- 158/66 97 96 % -- -- -- -- --    12/31/24 2142 -- -- -- 158/66 -- -- -- -- -- -- --    12/31/24 2003 -- -- -- -- -- -- -- -- -- 15 No Pain    12/31/24 19:36:50 -- 59 -- 146/69 95 97 % -- -- -- -- --    12/31/24 15:20:18 97.5 °F (36.4 °C) 54 18 133/58 83 96 % None (Room air) -- Lying -- --    12/31/24 1202 98.1 °F (36.7 °C) 59 18 142/64 -- -- -- -- -- -- --    12/31/24 1100 -- -- -- -- -- -- -- -- -- 15 7    12/31/24 07:30:09 98.1 °F (36.7 °C) 59 18 142/64 90 96 % -- -- -- -- --    12/31/24 05:35:55 97.1 °F (36.2 °C) 62 -- -- -- 94 % -- -- -- -- --    12/31/24 0027 -- -- -- -- -- -- -- -- -- -- 9    12/31/24 0007 -- -- -- -- -- -- None (Room air) -- -- 15 --    12/30/24 2359 97.5 °F (36.4 °C) 64 20 145/70 95 93 % -- -- -- -- --    12/30/24 2230 -- 68 16 139/63 90 96 % None (Room air) -- Sitting -- --    12/30/24 1952 -- -- -- -- -- -- -- -- -- -- 10 - Worst Possible Pain    12/30/24 1936 99.6 °F (37.6 °C) -- -- -- -- -- -- -- -- -- --    12/30/24 1840 -- -- -- -- -- -- -- -- -- 15 9    12/30/24 1755 -- -- -- -- -- -- -- -- -- -- Med Not Given for Pain - for MAR use only    12/30/24 1749 102.4 °F (39.1 °C) 85 18 178/78 112 95 % None (Room air) -- Sitting -- --          Weight (last 2 days)       Date/Time Weight    12/31/24 1202 87.1 (192)             Pertinent Labs/Diagnostic Results:   Radiology:  CT abdomen pelvis wo contrast   Final Interpretation by Sadi Rivera MD (12/31 1757)      1. No acute intra-abdominal disease. Colonic diverticulosis without diverticulitis. Normal appendix.      2. Bilateral nonobstructing renal stones. No hydronephrosis.      3. Small fat-containing umbilical and inguinal hernias               Electronically signed: 12/31/2024 05:57 PM Sadi Rivera MD      CT head without contrast   Final Interpretation by Rob Pressley MD (12/30 2145)      No acute intracranial abnormality.      Reidentified Chiari I malformation.            Workstation performed: [id number]           Cardiology:  ECG 12 lead    by Interface, Ris Results In (01/01 0623)      ECG 12 lead    by Interface, Ris Results In (01/01 0623)      ECG 12 lead    by Interface, Ris Results In (12/31 1639)        GI:  No orders to display           Results from last 7 days   Lab Units 01/01/25  0609 12/31/24  0503 12/30/24  1845   WBC Thousand/uL 13.91* 12.79* 12.63*   HEMOGLOBIN g/dL 10.7* 11.2* 11.7   HEMATOCRIT % 33.6* 35.0 36.9   PLATELETS Thousands/uL 181 181 110*   TOTAL NEUT ABS Thousands/µL  --   --  10.27*         Results from last 7 days   Lab Units 01/01/25  0609 12/31/24  0601 12/30/24  1845   SODIUM mmol/L 137 134* 134*   POTASSIUM mmol/L 4.5 4.9 4.1   CHLORIDE mmol/L 106 105 101   CO2 mmol/L 26 21 26   ANION GAP mmol/L 5 8 7   BUN mg/dL 10 14 13   CREATININE mg/dL 0.78 0.78 0.74   EGFR ml/min/1.73sq m 73 73 78   CALCIUM mg/dL 8.6 8.7 9.2     Results from last 7 days   Lab Units 12/30/24  1845   AST U/L 11*   ALT U/L 9   ALK PHOS U/L 57   TOTAL PROTEIN g/dL 7.3   ALBUMIN g/dL 4.0   TOTAL BILIRUBIN mg/dL 0.39     Results from last 7 days   Lab Units 12/31/24  2035 12/31/24  1556 12/31/24  1115 12/31/24  0752 12/31/24  0149   POC GLUCOSE mg/dl 110 143* 202* 203* 180*     Results from last 7 days   Lab Units 01/01/25  0609 12/31/24  0601  12/30/24 1845   GLUCOSE RANDOM mg/dL 119 200* 149*       Results from last 7 days   Lab Units 12/30/24 1845   PROCALCITONIN ng/ml 0.18     Results from last 7 days   Lab Units 12/30/24 1845   LACTIC ACID mmol/L 0.7       Results from last 7 days   Lab Units 12/30/24 2003   CLARITY UA  Turbid   COLOR UA  Light Yellow   SPEC GRAV UA  1.016   PH UA  7.5   GLUCOSE UA mg/dl Negative   KETONES UA mg/dl Negative   BLOOD UA  Trace*   PROTEIN UA mg/dl Trace*   NITRITE UA  Positive*   BILIRUBIN UA  Negative   UROBILINOGEN UA (BE) mg/dl <2.0   LEUKOCYTES UA  Small*   WBC UA /hpf 30-50*   RBC UA /hpf 4-10*   BACTERIA UA /hpf Occasional   EPITHELIAL CELLS WET PREP /hpf Occasional   MUCUS THREADS  Occasional*       Results from last 7 days   Lab Units 12/30/24 2003 12/30/24 1856 12/30/24 1845   BLOOD CULTURE   --  No Growth at 24 hrs. No Growth at 24 hrs.   URINE CULTURE  >100,000 cfu/ml Klebsiella aerogenes*  --   --          Network Utilization Review Department  ATTENTION: Please call with any questions or concerns to [phone] and carefully listen to the prompts so that you are directed to the right person. All voicemails are confidential.   For Discharge needs, contact Care Management DC Support Team at [phone] opt. 2  Send all requests for admission clinical reviews, approved or denied determinations and any other requests to dedicated fax number below belonging to the campus where the patient is receiving treatment. List of dedicated fax numbers for the Facilities:  FACILITY NAME UR FAX NUMBER   ADMISSION DENIALS (Administrative/Medical Necessity) [phone]   DISCHARGE SUPPORT TEAM (NETWORK) [phone]   PARENT CHILD HEALTH (Maternity/NICU/Pediatrics) [phone]   [facility] [phone]   [facility] [phone]   [facility] [phone]   [facility] [phone]   On license of [facility]  [place] [phone]   [facility] [phone]   [facility] [phone]   [facility] [phone]   [facility] [phone]   [facility] [phone]   [facility] [phone]   [facility] [phone]

## 2025-01-01 NOTE — PROGRESS NOTES
Progress Note - Hospitalist   Name: Pebbles Landon 79 y.o. female I MRN: [id number]  Unit/Bed#: 2 E 264-01 I Date of Admission: 12/30/2024   Date of Service: 1/1/2025 I Hospital Day: 0    Assessment & Plan  Urinary tract infection  Presents to ED complaining of headache and weakness for past several weeks, reports onset of n/v/d and urinary urgency yesterday.  UA + trace blood, nitrates, small leukocytes, microscopic 4-10 RBC, 30-50 WBC, occasional mucus threads  CTH negative acute intracranial abnormality  Labs revealing leukocytosis 12.63 otherwise unremarkable  Febrile on arrival 102.4F, currently afebrile s/p Tylenol in ED  Today's updated plan:  Urine culture showed Klebsiella.  Sided ID recommended cefepime from ceftriaxone and discharged on Bactrim  Blood cultures negative for 24 hours  Flu panel negative    Vomiting  Reports n/v/d x2 days  Patient had 1 episodes of vomiting in the morning today  Resolved  BMP daily  Depression with anxiety  Continue PTA Zoloft  Mild intermittent asthma  Continue PTA inhalers/nebs  Type 2 diabetes mellitus with stage 3a chronic kidney disease (HCC)  SSI AC/at bedtime ordered  Monitor Accu-Cheks closely, avoid hypoglycemia  Lab Results   Component Value Date    HGBA1C 7.0 (H) 09/04/2024     BMI 37.0-37.9, adult  Healthy diet and exercise encouraged  Primary hypertension  BP stable since admission, continue to monitor  Continue PTA carvedilol, amlodipine  COPD (chronic obstructive pulmonary disease) (HCC)  Denies chest pain or shortness of breath  Continue PTA inhalers/nebs  BOLA (obstructive sleep apnea)  CPAP ordered  Insomnia  Continue PTA Lunesta  PDMP reviewed  Sepsis without acute organ dysfunction (HCC)  Fever, leukocytes count with UA positive for WBCs count  Sepsis secondary to UTI  Urine culture showed Klebsiella pneumonia switch to cefepime 1 g every 24 hourly considering creatinine clearance as per pharmacy  Resolving  Chronic nonintractable headache  Patient  does have chronic migraine headache  Patient still complaining of headache worsening for last 2 to 3 days  CT head negative for stroke/hemorrhage  Patient was given headache cocktail yesterday but did not get better  Consulted neurology for further evaluation  As per the last neurology note, avoid triptans due to history of bradycardia and history of uncontrolled hypertension, avoid NSAIDs due to history of CKD  - future options:  prochlorperazine, could consider trial of 5 days of Depakote 500 mg nightly or dexamethasone 2 mg daily for prolonged migraine, ubrelvy, reyvow, nurtec  Headache  Patient seen by neurology started her on migraine cocktail  Patient still complaining of intermittent headache.  Continue with GI cocktail we will give 1 dose of oxycodone    VTE Pharmacologic Prophylaxis: VTE Score: 5 Moderate Risk (Score 3-4) - Pharmacological DVT Prophylaxis Ordered: Fondaparinux.    Mobility:   Basic Mobility Inpatient Raw Score: 22  -HLM Goal: 7: Walk 25 feet or more  JH-HLM Achieved: 2: Bed activities/Dependent transfer  JH-HLM Goal achieved. Continue to encourage appropriate mobility.    Patient Centered Rounds: I performed bedside rounds with nursing staff today.   Discussions with Specialists or Other Care Team Provider: Infectious disease    Education and Discussions with Family / Patient:  Updated patient.     Current Length of Stay: 0 day(s)  Current Patient Status: Inpatient   Certification Statement: The patient will continue to require additional inpatient hospital stay due to UTI  Discharge Plan: Anticipate discharge in 24-48 hrs to home with home services.    Code Status: Level 1 - Full Code    Subjective   Patient seen and examined at bedside.  She is feeling well otherwise except intermittent headache.  No nausea, vomiting    Objective :  Temp:  [97.5 °F (36.4 °C)-98.7 °F (37.1 °C)] 98.7 °F (37.1 °C)  HR:  [54-66] 61  BP: (133-158)/(58-69) 143/63  Resp:  [17-18] 17  SpO2:  [91 %-97 %] 93  %  O2 Device: None (Room air)    Body mass index is 37.5 kg/m².     Input and Output Summary (last 24 hours):     Intake/Output Summary (Last 24 hours) at 1/1/2025 1445  Last data filed at 1/1/2025 1355  Gross per 24 hour   Intake 480 ml   Output 500 ml   Net -20 ml       Physical Exam  Constitutional: No acute distress  HEENT: No pallor or icterus  CVS: S1 plus S2  Respiratory: Normal vascular breathe without crackles and wheeze  Gastroenterology: Soft nontender without any palpable mass  Skin: No bruises or ecchymosis  Neurology: No focal logical deficit    Lines/Drains:              Lab Results: I have reviewed the following results:   Results from last 7 days   Lab Units 01/01/25  0609 12/31/24  0503 12/30/24  1845   WBC Thousand/uL 13.91*   < > 12.63*   HEMOGLOBIN g/dL 10.7*   < > 11.7   HEMATOCRIT % 33.6*   < > 36.9   PLATELETS Thousands/uL 181   < > 110*   SEGS PCT %  --   --  82*   LYMPHO PCT %  --   --  10*   MONO PCT %  --   --  7   EOS PCT %  --   --  0    < > = values in this interval not displayed.     Results from last 7 days   Lab Units 01/01/25  0609 12/31/24  0601 12/30/24  1845   SODIUM mmol/L 137   < > 134*   POTASSIUM mmol/L 4.5   < > 4.1   CHLORIDE mmol/L 106   < > 101   CO2 mmol/L 26   < > 26   BUN mg/dL 10   < > 13   CREATININE mg/dL 0.78   < > 0.74   ANION GAP mmol/L 5   < > 7   CALCIUM mg/dL 8.6   < > 9.2   ALBUMIN g/dL  --   --  4.0   TOTAL BILIRUBIN mg/dL  --   --  0.39   ALK PHOS U/L  --   --  57   ALT U/L  --   --  9   AST U/L  --   --  11*   GLUCOSE RANDOM mg/dL 119   < > 149*    < > = values in this interval not displayed.         Results from last 7 days   Lab Units 01/01/25  1126 01/01/25  0739 12/31/24  2035 12/31/24  1556 12/31/24  1115 12/31/24  0752 12/31/24  0149   POC GLUCOSE mg/dl 138 108 110 143* 202* 203* 180*         Results from last 7 days   Lab Units 12/30/24  1845   LACTIC ACID mmol/L 0.7   PROCALCITONIN ng/ml 0.18       Recent Cultures (last 7 days):   Results from  last 7 days   Lab Units 12/30/24 2003 12/30/24  1856 12/30/24  1845   BLOOD CULTURE   --  No Growth at 24 hrs. No Growth at 24 hrs.   URINE CULTURE  >100,000 cfu/ml Klebsiella aerogenes*  --   --        Imaging Results Review: No pertinent imaging studies reviewed.  Other Study Results Review: Other studies reviewed include: Urine culture sensitivity, blood cultures, CBC and BMP    Last 24 Hours Medication List:     Current Facility-Administered Medications:     acetaminophen (TYLENOL) tablet 650 mg, Q6H PRN    albuterol (PROVENTIL HFA,VENTOLIN HFA) inhaler 1 puff, Q6H PRN    albuterol inhalation solution 2.5 mg, Q6H PRN    amLODIPine (NORVASC) tablet 10 mg, HS    carvedilol (COREG) tablet 6.25 mg, BID With Meals    cefepime (MAXIPIME) 2 g/50 mL dextrose IVPB, Q12H    diphenhydrAMINE (BENADRYL) injection 25 mg, Q8H CLAYTON    fondaparinux (ARIXTRA) subcutaneous injection 2.5 mg, Q24H    insulin lispro (HumALOG/ADMELOG) 100 units/mL subcutaneous injection 1-6 Units, 4x Daily (AC & HS) **AND** Fingerstick Glucose (POCT), 4x Daily AC and at bedtime    latanoprost (XALATAN) 0.005 % ophthalmic solution 1 drop, HS    magnesium sulfate 2 g/50 mL IVPB (premix) 2 g, Q24H, Last Rate: 2 g (01/01/25 1210)    metoclopramide (REGLAN) injection 10 mg, Q8H CLAYTON    montelukast (SINGULAIR) tablet 10 mg, HS    pregabalin (LYRICA) capsule 50 mg, TID    sertraline (ZOLOFT) tablet 75 mg, Daily    zolpidem (AMBIEN) tablet 5 mg, HS PRN    Administrative Statements   Today, Patient Was Seen By: Giovanni Rucker MD      **Please Note: This note may have been constructed using a voice recognition system.**

## 2025-01-01 NOTE — ASSESSMENT & PLAN NOTE
Presents to ED complaining of headache and weakness for past several weeks, reports onset of n/v/d and urinary urgency yesterday.  UA + trace blood, nitrates, small leukocytes, microscopic 4-10 RBC, 30-50 WBC, occasional mucus threads  CTH negative acute intracranial abnormality  Labs revealing leukocytosis 12.63 otherwise unremarkable  Febrile on arrival 102.4F, currently afebrile s/p Tylenol in ED  Today's updated plan:  Urine culture showed Klebsiella.  Sided ID recommended cefepime from ceftriaxone and discharged on Bactrim  Blood cultures negative for 24 hours  Flu panel negative

## 2025-01-01 NOTE — ASSESSMENT & PLAN NOTE
Pebbles Landon is a 79 y.o. female with DM type II, asthma, COPD, BOLA, HTN, CKD, obesity, depression, history of kidney stones, history of glaucoma, neuropathy, who presented to [facility] ED on 12/30/24 with headache and weakness.    Patient reports daily headaches for the past 3 weeks, reportedly taking Tylenol multiple times a day.  She also reports feeling generalized weakness and urinary urgency.    Workup  12/30/24 CTH:    No acute intracranial abnormality.   Reidentified Chiari I malformation.  Labs:   UA with small leukocytes, positive nitrites  Urine culture pending   Blood cultures x 2 pending  Covid/flu/rsv negative     Status migrainosus in the setting of underlying infection, recent head trauma, rebound effect headache given extensive use of Tylenol over the past several weeks.     Plan  - Current migraine regimen:  Reglan 10 mg every 8 hours x 72 hours  Benadryl 25 mg every 8 hours x 72 hours  Mag sulfate 2 g daily x 72 hours  Nurtec 75 mg daily, would not recommend giving past day 5  Can give patient Medrol Dosepak on discharge  - Discussed importance of minimizing OTC headache medications including Tylenol in order to prevent rebound HA. Patient expressed understanding.   - Counseled to take vitamin B2 and magnesium as migraine prophylactic daily.   - Continue to treat underlying infection as per primary team. Please avoid cefepime if able

## 2025-01-01 NOTE — PROGRESS NOTES
Progress Note - Neurology   Name: Pebbles Landon 79 y.o. female I MRN: [id number]  Unit/Bed#: 2 E 264-01 I Date of Admission: 12/30/2024   Date of Service: 1/1/2025 I Hospital Day: 0     Assessment & Plan  Headache  Pebbles Landon is a 79 y.o. female with DM type II, asthma, COPD, BOLA, HTN, CKD, obesity, depression, history of kidney stones, history of glaucoma, neuropathy, who presented to [facility] ED on 12/30/24 with headache and weakness.    Patient reports daily headaches for the past 3 weeks, reportedly taking Tylenol multiple times a day.  She also reports feeling generalized weakness and urinary urgency.    Workup  12/30/24 CTH:    No acute intracranial abnormality.   Reidentified Chiari I malformation.  Labs:   UA with small leukocytes, positive nitrites  Urine culture pending   Blood cultures x 2 pending  Covid/flu/rsv negative     Status migrainosus in the setting of underlying infection, recent head trauma, rebound effect headache given extensive use of Tylenol over the past several weeks.     Plan  - Current migraine regimen:  Reglan 10 mg every 8 hours x 72 hours  Benadryl 25 mg every 8 hours x 72 hours  Mag sulfate 2 g daily x 72 hours  Nurtec 75 mg daily, would not recommend giving past day 5  Can give patient Medrol Dosepak on discharge  - Discussed importance of minimizing OTC headache medications including Tylenol in order to prevent rebound HA. Patient expressed understanding.   - Counseled to take vitamin B2 and magnesium as migraine prophylactic daily.   - Continue to treat underlying infection as per primary team. Please avoid cefepime if able  Urinary tract infection  - Antibiotics as per primary team.  Depression with anxiety  - Management as per primary team.  Type 2 diabetes mellitus with stage 3a chronic kidney disease (HCC)  Lab Results   Component Value Date    HGBA1C 7.0 (H) 09/04/2024       Recent Labs     12/31/24  2035 01/01/25  0739 01/01/25  1126 01/01/25  1614   POCGLU 110  108 138 161*       Blood Sugar Average: Last 72 hrs:  (P) 155.625    - Goal euglycemia.   - Management as per primary team.  Primary hypertension  - Goal normotension.   - Management as per primary team.  Sepsis without acute organ dysfunction (HCC)  - Patient with fever, leukocytosis.   - Work-up and management as per primary team.    Pebbles Landon will need follow up in at the next regular appointment with headache attending or advance practitioner. She will not require outpatient neurological testing.    Subjective   Patient reports still having headache this am 6/10 and it has improved from previous day. She got more medication in the morning and it is helping her sleep a bit    Review of Systems  + migraine headaches    Objective :  Temp:  [97.9 °F (36.6 °C)-98.7 °F (37.1 °C)] 98.4 °F (36.9 °C)  HR:  [59-66] 64  BP: (143-158)/(62-69) 149/62  Resp:  [17-18] 17  SpO2:  [91 %-97 %] 94 %  O2 Device: None (Room air)    Physical Exam  Gen:   NAD. Sleeping in bed  HEENT:  No Septal deviation EOMI NCAT.  Resp:  Symmetric chest rise and patient in no obvious respiratory distress  MSK: ROM normal  Skin: No rash noted in visualized portion of this exam    Neurological Exam  Awake, alert, oriented. No aphasia or dysarthria confusion noted.  Patient is able to follow 2 and 3 step commands with ease  CN 2-12 grossly intact, EOMI,  no facial asymmetry with eye brow raise or smile, hearing intact to conversation,  Motor:  Intact antigravity x 4 extremities.   Sensation: Intact to light touch  Gait:  Deferred        Lab Results: I have personally reviewed pertinent reports.  Recent Results (from the past 24 hours)   ECG 12 lead    Collection Time: 12/31/24  4:36 PM   Result Value Ref Range    Ventricular Rate 53 BPM    Atrial Rate 53 BPM    GA Interval 172 ms    QRSD Interval 80 ms    QT Interval 444 ms    QTC Interval 416 ms    P Axis 65 degrees    QRS Axis 24 degrees    T Wave Axis 35 degrees   Fingerstick Glucose (POCT)     Collection Time: 12/31/24  8:35 PM   Result Value Ref Range    POC Glucose 110 65 - 140 mg/dl   CBC    Collection Time: 01/01/25  6:09 AM   Result Value Ref Range    WBC 13.91 (H) 4.31 - 10.16 Thousand/uL    RBC 3.73 (L) 3.81 - 5.12 Million/uL    Hemoglobin 10.7 (L) 11.5 - 15.4 g/dL    Hematocrit 33.6 (L) 34.8 - 46.1 %    MCV 90 82 - 98 fL    MCH 28.7 26.8 - 34.3 pg    MCHC 31.8 31.4 - 37.4 g/dL    RDW 14.3 11.6 - 15.1 %    Platelets 181 149 - 390 Thousands/uL    MPV 9.5 8.9 - 12.7 fL   Basic metabolic panel    Collection Time: 01/01/25  6:09 AM   Result Value Ref Range    Sodium 137 135 - 147 mmol/L    Potassium 4.5 3.5 - 5.3 mmol/L    Chloride 106 96 - 108 mmol/L    CO2 26 21 - 32 mmol/L    ANION GAP 5 4 - 13 mmol/L    BUN 10 5 - 25 mg/dL    Creatinine 0.78 0.60 - 1.30 mg/dL    Glucose 119 65 - 140 mg/dL    Glucose, Fasting 119 (H) 65 - 99 mg/dL    Calcium 8.6 8.4 - 10.2 mg/dL    eGFR 73 ml/min/1.73sq m   ECG 12 lead    Collection Time: 01/01/25  6:20 AM   Result Value Ref Range    Ventricular Rate 53 BPM    Atrial Rate 53 BPM    CO Interval 156 ms    QRSD Interval 70 ms    QT Interval 428 ms    QTC Interval 401 ms    P Axis 13 degrees    QRS Axis 44 degrees    T Wave Axis 32 degrees   ECG 12 lead    Collection Time: 01/01/25  6:21 AM   Result Value Ref Range    Ventricular Rate 55 BPM    Atrial Rate 55 BPM    CO Interval 152 ms    QRSD Interval 68 ms    QT Interval 426 ms    QTC Interval 407 ms    P Axis 30 degrees    QRS Axis 39 degrees    T Wave Axis 25 degrees   Fingerstick Glucose (POCT)    Collection Time: 01/01/25  7:39 AM   Result Value Ref Range    POC Glucose 108 65 - 140 mg/dl   Fingerstick Glucose (POCT)    Collection Time: 01/01/25 11:26 AM   Result Value Ref Range    POC Glucose 138 65 - 140 mg/dl   Fingerstick Glucose (POCT)    Collection Time: 01/01/25  4:14 PM   Result Value Ref Range    POC Glucose 161 (H) 65 - 140 mg/dl     Imaging Studies: I have personally reviewed pertinent reports and I  have personally reviewed pertinent films in PACS.    EKG, Pathology, and Other Studies: I have personally reviewed pertinent reports.    VTE Pharmacologic Prophylaxis: Sequential compression device (Venodyne)     Dictation voice to text software has been used in the creation of this document.  Please consider this in light of any contextual or grammatical errors.

## 2025-01-01 NOTE — ASSESSMENT & PLAN NOTE
Fever, leukocytes count with UA positive for WBCs count  Sepsis secondary to UTI  Urine culture showed Klebsiella pneumonia switch to cefepime 1 g every 24 hourly considering creatinine clearance as per pharmacy  Resolving

## 2025-01-01 NOTE — RESPIRATORY THERAPY NOTE
RT Protocol Note  Pebbles Azeri 79 y.o. female MRN: [id number]  Unit/Bed#: 2 E 264-01 Encounter: [id number]    Assessment    Principal Problem:    Urinary tract infection  Active Problems:    Depression with anxiety    Mild intermittent asthma    Type 2 diabetes mellitus with stage 3a chronic kidney disease (HCC)    BMI 37.0-37.9, adult    Primary hypertension    COPD (chronic obstructive pulmonary disease) (HCC)    BOLA (obstructive sleep apnea)    Insomnia    Chronic nonintractable headache    Vomiting    Sepsis without acute organ dysfunction (HCC)    Headache      Home Pulmonary Medications:         Past Medical History:   Diagnosis Date    Asthma     Benign hypertension 11/30/2018    Cardiac arrest (HCC)     Diabetes mellitus (HCC)     Glaucoma     Hypertension     Kidney stone     Neuropathy     Sleep apnea     no machine    SOB (shortness of breath)     Tubular adenoma of colon 10/2019    Wheezing      Social History     Socioeconomic History    Marital status: /Civil Union     Spouse name: Not on file    Number of children: Not on file    Years of education: Not on file    Highest education level: Not on file   Occupational History    Occupation: retired    Tobacco Use    Smoking status: Never     Passive exposure: Never    Smokeless tobacco: Never   Vaping Use    Vaping status: Never Used   Substance and Sexual Activity    Alcohol use: Never    Drug use: No    Sexual activity: Yes     Birth control/protection: Surgical   Other Topics Concern    Not on file   Social History Narrative    Not on file     Social Drivers of Health     Financial Resource Strain: Low Risk  (10/12/2023)    Overall Financial Resource Strain (CARDIA)     Difficulty of Paying Living Expenses: Not hard at all   Food Insecurity: No Food Insecurity (12/31/2024)    Nursing - Inadequate Food Risk Classification     Worried About Running Out of Food in the Last Year: Never true     Ran Out of Food in the Last Year: Never true      Ran Out of Food in the Last Year: 1   Transportation Needs: No Transportation Needs (2024)    Nursing - Transportation Risk Classification     Lack of Transportation: Not on file     Lack of Transportation: 2   Physical Activity: Not on file   Stress: Not on file   Social Connections: Unknown (2024)    Received from [facility]    Social Profilepasser     How often do you feel lonely or isolated from those around you? (Adult - for ages 18 years and over): Not on file   Intimate Partner Violence: Unknown (2024)    Nursing IPS     Feels Physically and Emotionally Safe: Not on file     Physically Hurt by Someone: Not on file     Humiliated or Emotionally Abused by Someone: Not on file     Physically Hurt by Someone: 2     Hurt or Threatened by Someone: 2   Housing Stability: Unknown (2024)    Nursing: Inadequate Housing Risk Classification     Has Housing: Not on file     Worried About Losing Housing: Not on file     Unable to Get Utilities: Not on file     Unable to Pay for Housing in the Last Year: 2     Has Housin       Subjective    Subjective Data: will discuss/assess cpap use when pt is awake    Objective    Physical Exam:   Assessment Type: Assess only  General Appearance: Awake  Respiratory Pattern: Normal  Chest Assessment: Chest expansion symmetrical  Bilateral Breath Sounds: Clear, Diminished  O2 Device: v30    Vitals:  Blood pressure 143/63, pulse 61, temperature 98.7 °F (37.1 °C), resp. rate 17, height 5' (1.524 m), weight 87.1 kg (192 lb), SpO2 93%, not currently breastfeeding.          Imaging and other studies: Results Review Statement: No pertinent imaging studies reviewed.    O2 Device: v30     Plan    Respiratory Plan: Home Bronchodilator Patient pathway        Resp Comments: pt with hx of copd, will continue with prn albuterol

## 2025-01-02 ENCOUNTER — TRANSITIONAL CARE MANAGEMENT (OUTPATIENT)
Dept: FAMILY MEDICINE CLINIC | Facility: CLINIC | Age: 80
End: 2025-01-02

## 2025-01-02 VITALS
HEIGHT: 60 IN | SYSTOLIC BLOOD PRESSURE: 135 MMHG | TEMPERATURE: 98.9 F | DIASTOLIC BLOOD PRESSURE: 61 MMHG | BODY MASS INDEX: 37.69 KG/M2 | OXYGEN SATURATION: 97 % | WEIGHT: 192 LBS | RESPIRATION RATE: 16 BRPM | HEART RATE: 76 BPM

## 2025-01-02 DIAGNOSIS — F51.01 PRIMARY INSOMNIA: ICD-10-CM

## 2025-01-02 LAB
ATRIAL RATE: 53 BPM
ATRIAL RATE: 67 BPM
ATRIAL RATE: 67 BPM
ERYTHROCYTE [DISTWIDTH] IN BLOOD BY AUTOMATED COUNT: 14.3 % (ref 11.6–15.1)
GLUCOSE SERPL-MCNC: 147 MG/DL (ref 65–140)
GLUCOSE SERPL-MCNC: 185 MG/DL (ref 65–140)
HCT VFR BLD AUTO: 36.7 % (ref 34.8–46.1)
HGB BLD-MCNC: 11.5 G/DL (ref 11.5–15.4)
MCH RBC QN AUTO: 28.2 PG (ref 26.8–34.3)
MCHC RBC AUTO-ENTMCNC: 31.3 G/DL (ref 31.4–37.4)
MCV RBC AUTO: 90 FL (ref 82–98)
P AXIS: 13 DEGREES
P AXIS: 79 DEGREES
P AXIS: 79 DEGREES
PLATELET # BLD AUTO: 186 THOUSANDS/UL (ref 149–390)
PMV BLD AUTO: 9.5 FL (ref 8.9–12.7)
PR INTERVAL: 156 MS
PR INTERVAL: 162 MS
PR INTERVAL: 162 MS
QRS AXIS: 26 DEGREES
QRS AXIS: 33 DEGREES
QRS AXIS: 44 DEGREES
QRSD INTERVAL: 70 MS
QRSD INTERVAL: 70 MS
QRSD INTERVAL: 74 MS
QT INTERVAL: 380 MS
QT INTERVAL: 380 MS
QT INTERVAL: 428 MS
QTC INTERVAL: 401 MS
RBC # BLD AUTO: 4.08 MILLION/UL (ref 3.81–5.12)
T WAVE AXIS: 32 DEGREES
T WAVE AXIS: 60 DEGREES
T WAVE AXIS: 64 DEGREES
VENTRICULAR RATE: 53 BPM
VENTRICULAR RATE: 67 BPM
VENTRICULAR RATE: 67 BPM
WBC # BLD AUTO: 8.94 THOUSAND/UL (ref 4.31–10.16)

## 2025-01-02 PROCEDURE — 99239 HOSP IP/OBS DSCHRG MGMT >30: CPT | Performed by: STUDENT IN AN ORGANIZED HEALTH CARE EDUCATION/TRAINING PROGRAM

## 2025-01-02 PROCEDURE — 82948 REAGENT STRIP/BLOOD GLUCOSE: CPT

## 2025-01-02 PROCEDURE — 85027 COMPLETE CBC AUTOMATED: CPT | Performed by: STUDENT IN AN ORGANIZED HEALTH CARE EDUCATION/TRAINING PROGRAM

## 2025-01-02 PROCEDURE — 93010 ELECTROCARDIOGRAM REPORT: CPT | Performed by: STUDENT IN AN ORGANIZED HEALTH CARE EDUCATION/TRAINING PROGRAM

## 2025-01-02 PROCEDURE — 93005 ELECTROCARDIOGRAM TRACING: CPT

## 2025-01-02 RX ORDER — METHYLPREDNISOLONE 4 MG/1
TABLET ORAL
Qty: 21 TABLET | Refills: 0 | Status: SHIPPED | OUTPATIENT
Start: 2025-01-02 | End: 2025-01-07

## 2025-01-02 RX ORDER — SULFAMETHOXAZOLE AND TRIMETHOPRIM 800; 160 MG/1; MG/1
1 TABLET ORAL EVERY 12 HOURS SCHEDULED
Qty: 12 TABLET | Refills: 0 | Status: SHIPPED | OUTPATIENT
Start: 2025-01-02 | End: 2025-01-08

## 2025-01-02 RX ADMIN — CARVEDILOL 6.25 MG: 6.25 TABLET, FILM COATED ORAL at 09:53

## 2025-01-02 RX ADMIN — METOCLOPRAMIDE HYDROCHLORIDE 10 MG: 5 INJECTION INTRAMUSCULAR; INTRAVENOUS at 05:20

## 2025-01-02 RX ADMIN — METHYLPREDNISOLONE 24 MG: 4 TABLET ORAL at 10:01

## 2025-01-02 RX ADMIN — DIPHENHYDRAMINE HYDROCHLORIDE 25 MG: 50 INJECTION, SOLUTION INTRAMUSCULAR; INTRAVENOUS at 05:20

## 2025-01-02 RX ADMIN — CEFEPIME 1000 MG: 2 INJECTION, POWDER, FOR SOLUTION INTRAVENOUS at 02:15

## 2025-01-02 RX ADMIN — ACETAMINOPHEN 650 MG: 325 TABLET, FILM COATED ORAL at 09:50

## 2025-01-02 RX ADMIN — PREGABALIN 50 MG: 25 CAPSULE ORAL at 09:53

## 2025-01-02 RX ADMIN — SERTRALINE HYDROCHLORIDE 75 MG: 50 TABLET ORAL at 09:53

## 2025-01-02 RX ADMIN — RIMEGEPANT SULFATE 75 MG: 75 TABLET, ORALLY DISINTEGRATING ORAL at 09:55

## 2025-01-02 NOTE — DISCHARGE INSTR - AVS FIRST PAGE
Please start taking Medrol 6 tablets for 1 day followed by 5 tablets for 1 day, 4 tablets for 1 day, 3 tablets for 1 day, 2 tablets for 1 day, 1 tablet for 1 day and then stop taking it.  Please start taking Bactrim 1 tablet twice daily for another 6 days  Please follow-up with neurology as an outpatient for the migraine headache

## 2025-01-02 NOTE — PLAN OF CARE

## 2025-01-02 NOTE — PLAN OF CARE
Problem: Potential for Falls  Goal: Patient will remain free of falls  Description: INTERVENTIONS:  - Educate patient/family on patient safety including physical limitations  - Instruct patient to call for assistance with activity   - Consult OT/PT to assist with strengthening/mobility   - Keep Call bell within reach  - Keep bed low and locked with side rails adjusted as appropriate  - Keep care items and personal belongings within reach  - Initiate and maintain comfort rounds  - Make Fall Risk Sign visible to staff  - Offer Toileting every 2 Hours, in advance of need  - Initiate/Maintain bed alarm- Apply yellow socks and bracelet for high fall risk patients  - Consider moving patient to room near nurses station  1/2/2025 1234 by Nahed Armijo RN  Outcome: Adequate for Discharge  1/2/2025 1055 by Nahed Armijo RN  Outcome: Progressing     Problem: PAIN - ADULT  Goal: Verbalizes/displays adequate comfort level or baseline comfort level  Description: Interventions:  - Encourage patient to monitor pain and request assistance  - Assess pain using appropriate pain scale  - Administer analgesics based on type and severity of pain and evaluate response  - Implement non-pharmacological measures as appropriate and evaluate response  - Consider cultural and social influences on pain and pain management  - Notify physician/advanced practitioner if interventions unsuccessful or patient reports new pain  1/2/2025 1234 by Nahed Armijo RN  Outcome: Adequate for Discharge  1/2/2025 1055 by Nahed Armijo RN  Outcome: Progressing     Problem: INFECTION - ADULT  Goal: Absence or prevention of progression during hospitalization  Description: INTERVENTIONS:  - Assess and monitor for signs and symptoms of infection  - Monitor lab/diagnostic results  - Monitor all insertion sites, i.e. indwelling lines, tubes, and drains  - Monitor endotracheal if appropriate and nasal secretions for changes in amount and color  - [place]  appropriate cooling/warming therapies per order  - Administer medications as ordered  - Instruct and encourage patient and family to use good hand hygiene technique  - Identify and instruct in appropriate isolation precautions for identified infection/condition  1/2/2025 1234 by Nahed Armijo RN  Outcome: Adequate for Discharge  1/2/2025 1055 by Nahed Armijo RN  Outcome: Progressing  Goal: Absence of fever/infection during neutropenic period  Description: INTERVENTIONS:  - Monitor WBC    1/2/2025 1234 by Nahed Armijo RN  Outcome: Adequate for Discharge  1/2/2025 1055 by Nahed Armijo RN  Outcome: Progressing     Problem: SAFETY ADULT  Goal: Patient will remain free of falls  Description: INTERVENTIONS:  - Educate patient/family on patient safety including physical limitations  - Instruct patient to call for assistance with activity   - Consult OT/PT to assist with strengthening/mobility   - Keep Call bell within reach  - Keep bed low and locked with side rails adjusted as appropriate  - Keep care items and personal belongings within reach  - Initiate and maintain comfort rounds  - Make Fall Risk Sign visible to staff  - Offer Toileting every 2 Hours, in advance of need  - Initiate/Maintain bed alarm  - Apply yellow socks and bracelet for high fall risk patients  - Consider moving patient to room near nurses station  1/2/2025 1234 by Nahed Armijo RN  Outcome: Adequate for Discharge  1/2/2025 1055 by Nahed Armijo RN  Outcome: Progressing  Goal: Maintain or return to baseline ADL function  Description: INTERVENTIONS:  -  Assess patient's ability to carry out ADLs; assess patient's baseline for ADL function and identify physical deficits which impact ability to perform ADLs (bathing, care of mouth/teeth, toileting, grooming, dressing, etc.)  - Assess/evaluate cause of self-care deficits   - Assess range of motion  - Assess patient's mobility; develop plan if impaired  - Assess patient's need for assistive  devices and provide as appropriate  - Encourage maximum independence but intervene and supervise when necessary  - Involve family in performance of ADLs  - Assess for home care needs following discharge   - Consider OT consult to assist with ADL evaluation and planning for discharge  - Provide patient education as appropriate  1/2/2025 1234 by Nahed Armijo RN  Outcome: Adequate for Discharge  1/2/2025 1055 by Nahed Armijo RN  Outcome: Progressing  Goal: Maintains/Returns to pre admission functional level  Description: INTERVENTIONS:  - Perform AM-PAC 6 Click Basic Mobility/ Daily Activity assessment daily.  - Set and communicate daily mobility goal to care team and patient/family/caregiver.   - Collaborate with rehabilitation services on mobility goals if consulted  - Perform Range of Motion 2 times a day.  - Reposition patient every 2 hours.  - Dangle patient 2 times a day  - Stand patient 2 times a day  - Ambulate patient 2 times a day  - Out of bed to chair 2 times a day   - Out of bed for meals 2 times a day  - Out of bed for toileting  - Record patient progress and toleration of activity level   1/2/2025 1234 by Nahed Armijo RN  Outcome: Adequate for Discharge  1/2/2025 1055 by Nahed Armijo RN  Outcome: Progressing     Problem: DISCHARGE PLANNING  Goal: Discharge to home or other facility with appropriate resources  Description: INTERVENTIONS:  - Identify barriers to discharge w/patient and caregiver  - Arrange for needed discharge resources and transportation as appropriate  - Identify discharge learning needs (meds, wound care, etc.)  - Arrange for interpretive services to assist at discharge as needed  - Refer to Case Management Department for coordinating discharge planning if the patient needs post-hospital services based on physician/advanced practitioner order or complex needs related to functional status, cognitive ability, or social support system  1/2/2025 1234 by Nahed Armijo RN  Outcome:  Adequate for Discharge  1/2/2025 1055 by Nahed Armijo RN  Outcome: Progressing     Problem: Knowledge Deficit  Goal: Patient/family/caregiver demonstrates understanding of disease process, treatment plan, medications, and discharge instructions  Description: Complete learning assessment and assess knowledge base.  Interventions:  - Provide teaching at level of understanding  - Provide teaching via preferred learning methods  1/2/2025 1234 by Nahed Armijo RN  Outcome: Adequate for Discharge  1/2/2025 1055 by Nahed Armijo RN  Outcome: Progressing

## 2025-01-02 NOTE — DISCHARGE SUMMARY
Medical Problems       Resolved Problems  Date Reviewed: 12/30/2024   None       Discharging Physician / Practitioner: Giovanni Rucker MD  PCP: ALEKSANDER Manning  Admission Date:   Admission Orders (From admission, onward)       Ordered        01/01/25 0720  INPATIENT ADMISSION  Once            12/30/24 2314  Place in Observation  Once                          Discharge Date: 01/02/25    Consultations During Hospital Stay:  Neurology    Procedures Performed:   None    Significant Findings / Test Results:   None    Incidental Findings:   None    Test Results Pending at Discharge (will require follow up):   None     Outpatient Tests Requested:  None    Complications: Sepsis    Reason for Admission: Headache, weakness    Hospital Course:   Pebbles Landon is a 79 y.o. female patient who originally presented to the hospital on 12/30/2024 due to sepsis secondary to UTI.  Patient started on ceftriaxone switched to cefepime and discharged on Bactrim.  Patient also had some migrainous headache started on headache cocktail and discharged on tapering dose of Medrol as per neurology      Please see above list of diagnoses and related plan for additional information.     Condition at Discharge: good    Discharge Day Visit / Exam:   Subjective: No overnight event.  No active complaint  Vitals: Blood Pressure: 135/61 (01/02/25 0734)  Pulse: 76 (01/01/25 2112)  Temperature: 98.9 °F (37.2 °C) (01/02/25 0734)  Temp Source: Oral (01/02/25 0734)  Respirations: 16 (01/02/25 0734)  Height: 5' (152.4 cm) (12/31/24 1202)  Weight - Scale: 87.1 kg (192 lb) (12/31/24 1202)  SpO2: 97 % (01/01/25 2112)  Exam:   Physical Exam   Constitutional: no Acute distress  HEENT:no  Pallor or icterus  CVS: S1 plus S2  Respiratory: Normal vascular breathe without crackles and wheeze  Gastroenterology: Soft nontender without any palpable mass  Skin: No bruises or ecchymosis  Neurology: No focal logical deficit     Discussion with Family:  Updated patient.      Discharge instructions/Information to patient and family:   See after visit summary for information provided to patient and family.      Provisions for Follow-Up Care:  See after visit summary for information related to follow-up care and any pertinent home health orders.      Mobility at time of Discharge:   Basic Mobility Inpatient Raw Score: 22  JH-HLM Goal: 7: Walk 25 feet or more  JH-HLM Achieved: 7: Walk 25 feet or more  HLM Goal achieved. Continue to encourage appropriate mobility.     Disposition:   Home    Planned Readmission: none     Discharge Statement:  I spent 37 minutes discharging the patient. This time was spent on the day of discharge. I had direct contact with the patient on the day of discharge. Greater than 50% of the total time was spent examining patient, answering all patient questions, arranging and discussing plan of care with patient as well as directly providing post-discharge instructions.  Additional time then spent on discharge activities.    Discharge Medications:  See after visit summary for reconciled discharge medications provided to patient and/or family.      **Please Note: This note may have been constructed using a voice recognition system**

## 2025-01-02 NOTE — PLAN OF CARE

## 2025-01-04 LAB
BACTERIA BLD CULT: NORMAL
BACTERIA BLD CULT: NORMAL

## 2025-01-05 RX ORDER — ESZOPICLONE 2 MG/1
TABLET, FILM COATED ORAL
Qty: 30 TABLET | Refills: 0 | Status: SHIPPED | OUTPATIENT
Start: 2025-01-05

## 2025-01-06 NOTE — TELEPHONE ENCOUNTER
Upon review of the In Basket request we were able to locate, review, and update the patient chart as requested for Diabetic Eye Exam.    Any additional questions or concerns should be emailed to the Practice Liaisons via the appropriate education email address, please do not reply via In Basket.    Thank you  Watson Hadley MA   [facility]

## 2025-01-07 LAB — GLUCOSE SERPL-MCNC: 173 MG/DL (ref 65–140)

## 2025-01-09 ENCOUNTER — OFFICE VISIT (OUTPATIENT)
Dept: FAMILY MEDICINE CLINIC | Facility: CLINIC | Age: 80
End: 2025-01-09
Payer: COMMERCIAL

## 2025-01-09 VITALS
HEIGHT: 60 IN | HEART RATE: 83 BPM | WEIGHT: 185 LBS | TEMPERATURE: 97.8 F | SYSTOLIC BLOOD PRESSURE: 150 MMHG | BODY MASS INDEX: 36.32 KG/M2 | RESPIRATION RATE: 14 BRPM | OXYGEN SATURATION: 95 % | DIASTOLIC BLOOD PRESSURE: 90 MMHG

## 2025-01-09 DIAGNOSIS — E66.01 OBESITY, MORBID (HCC): ICD-10-CM

## 2025-01-09 DIAGNOSIS — J45.21 MILD INTERMITTENT ASTHMA WITH ACUTE EXACERBATION: ICD-10-CM

## 2025-01-09 DIAGNOSIS — R06.2 WHEEZING: ICD-10-CM

## 2025-01-09 DIAGNOSIS — E55.9 HYPOVITAMINOSIS D: ICD-10-CM

## 2025-01-09 DIAGNOSIS — E11.40 CONTROLLED TYPE 2 DIABETES WITH NEUROPATHY (HCC): Primary | ICD-10-CM

## 2025-01-09 DIAGNOSIS — E23.6 EMPTY SELLA (HCC): ICD-10-CM

## 2025-01-09 DIAGNOSIS — R05.1 ACUTE COUGH: ICD-10-CM

## 2025-01-09 DIAGNOSIS — F51.04 PSYCHOPHYSIOLOGICAL INSOMNIA: ICD-10-CM

## 2025-01-09 DIAGNOSIS — F41.9 ACUTE ANXIETY: ICD-10-CM

## 2025-01-09 DIAGNOSIS — Z12.31 ENCOUNTER FOR SCREENING MAMMOGRAM FOR MALIGNANT NEOPLASM OF BREAST: ICD-10-CM

## 2025-01-09 DIAGNOSIS — J18.9 PNEUMONIA: ICD-10-CM

## 2025-01-09 LAB — SL AMB POCT HEMOGLOBIN AIC: 7 (ref ?–6.5)

## 2025-01-09 PROCEDURE — 99496 TRANSJ CARE MGMT HIGH F2F 7D: CPT | Performed by: NURSE PRACTITIONER

## 2025-01-09 PROCEDURE — 83036 HEMOGLOBIN GLYCOSYLATED A1C: CPT | Performed by: NURSE PRACTITIONER

## 2025-01-09 RX ORDER — ALBUTEROL SULFATE 0.83 MG/ML
2.5 SOLUTION RESPIRATORY (INHALATION) EVERY 6 HOURS PRN
Qty: 100 ML | Refills: 5 | Status: SHIPPED | OUTPATIENT
Start: 2025-01-09

## 2025-01-09 RX ORDER — HYDROCODONE POLISTIREX AND CHLORPHENIRAMINE POLISTIREX 10; 8 MG/5ML; MG/5ML
5 SUSPENSION, EXTENDED RELEASE ORAL EVERY 12 HOURS PRN
Qty: 120 ML | Refills: 0 | Status: SHIPPED | OUTPATIENT
Start: 2025-01-09 | End: 2025-01-17

## 2025-01-09 RX ORDER — LORAZEPAM 1 MG/1
1 TABLET ORAL
Qty: 10 TABLET | Refills: 0 | Status: SHIPPED | OUTPATIENT
Start: 2025-01-09

## 2025-01-09 RX ORDER — METHYLPREDNISOLONE 4 MG/1
TABLET ORAL
Qty: 21 EACH | Refills: 0 | Status: SHIPPED | OUTPATIENT
Start: 2025-01-09

## 2025-01-09 NOTE — ASSESSMENT & PLAN NOTE
Patient is obese.  Discussed the benefits of low carbohydrate diet increase exercise activity as tolerated

## 2025-01-09 NOTE — ASSESSMENT & PLAN NOTE
Reports difficulty sleeping.  Has been using Ativan discussed maintaining safety.  Discussed nonpharmacological interventions such as sleepy time tea,, we will see, lavender oil Orders:    LORazepam (ATIVAN) 1 mg tablet; Take 1 tablet (1 mg total) by mouth daily at bedtime

## 2025-01-09 NOTE — ASSESSMENT & PLAN NOTE
Orders:    albuterol (2.5 mg/3 mL) 0.083 % nebulizer solution; Take 3 mL (2.5 mg total) by nebulization every 6 (six) hours as needed for wheezing or shortness of breath

## 2025-01-09 NOTE — PROGRESS NOTES
Transition of Care Visit  Name: Pebbles Landon      : 1945      MRN: [id number]  Encounter Provider: ALEKSANDER Manning  Encounter Date: 2025   Encounter department: 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  Will continue medication continue low carbohydrate diet  Lab Results   Component Value Date    HGBA1C 7.0 (A) 2025       Orders:    POCT hemoglobin A1c    CBC and differential; Future    Comprehensive metabolic panel; Future    Albumin / creatinine urine ratio; Future    Lipid panel; Future    TSH, 3rd generation with Free T4 reflex; Future    Encounter for screening mammogram for malignant neoplasm of breast  Discussed the benefits of preventative screening.  Mammogram ordered  Orders:    Mammo screening bilateral w 3d and cad    Empty sella (HCC)  Currently not symptomatic       Obesity, morbid (HCC)         Patient is obese.  Discussed the benefits of low carbohydrate diet increase exercise activity as tolerated  Wheezing  Patient has wheezing in all lung fields.  Recently was in the hospital.  Has upper respiratory infection.  Medrol Dosepak ordered.  Patient is currently taking amoxicillin for UTI education provided  Orders:    methylPREDNISolone 4 MG tablet therapy pack; Use as directed on package    Acute cough  Has acute productive cough.  Currently on antibiotic.  Cough medicine ordered to prevent cough and promote rest  Orders:    Hydrocod Anand-Chlorphe Anand ER (TUSSIONEX) 10-8 mg/5 mL ER suspension; Take 5 mL by mouth every 12 (twelve) hours as needed for cough Max Daily Amount: 10 mL    Psychophysiological insomnia  Reports difficulty sleeping.  Has been using Ativan discussed maintaining safety.  Discussed nonpharmacological interventions such as sleepy time tea,, we will see, lavender oil Orders:    LORazepam (ATIVAN) 1 mg tablet; Take 1 tablet (1 mg total) by mouth daily at  bedtime    Acute anxiety  May use Ativan as needed       Hypovitaminosis D    Orders:    Vitamin D 25 hydroxy; Future    Pneumonia    Orders:    albuterol (2.5 mg/3 mL) 0.083 % nebulizer solution; Take 3 mL (2.5 mg total) by nebulization every 6 (six) hours as needed for wheezing or shortness of breath    Mild intermittent asthma with acute exacerbation    Orders:    albuterol (2.5 mg/3 mL) 0.083 % nebulizer solution; Take 3 mL (2.5 mg total) by nebulization every 6 (six) hours as needed for wheezing or shortness of breath      Urinary Incontinence Plan of Care: counseling topics discussed: practice Kegel (pelvic floor strengthening) exercises, use restroom every 2 hours, limiting fluid intake 3-4 hours before bed and preventing constipation.         History of Present Illness     Transitional Care Management Review:   Pebbles Landon is a 79 y.o. female here for TCM follow up.     During the TCM phone call patient stated:  TCM Call       Date and time call was made  1/2/2025  4:22 PM    Hospital care reviewed  Records reviewed    Patient was hospitial[facility] at  [facility]    Date of Admission  12/30/24    Date of discharge  01/02/25    Diagnosis  Pneumonia    Disposition  Home    Current Symptoms  None          TCM Call       Post hospital issues  None    Should patient be enrolled in anticoag monitoring?  No    Scheduled for follow up?  Yes    Did you obtain your prescribed medications  Yes    Do you need help managing your prescriptions or medications  No    Is transportation to your appointment needed  No    I have advised the patient to call PCP with any new or worsening symptoms  Yael Hancock MA    Living Arrangements  Spouse or Significiant other    Are you recieving any outpatient services  No    Are you recieving home care services  No    Are you using any community resources  No    Current waiver services  No    Have you fallen in the last 12 months  No    Interperter language line needed   No    Counseling  Patient          Patient is being seen for a follow-up visit after being admitted to hospital for a urinary tract infection.  Currently on antibiotics.  Reports some improvement.  Does have some upper respiratory infection, acute cough      Review of Systems   Constitutional:  Positive for fatigue.   HENT: Negative.     Eyes: Negative.    Respiratory:  Positive for cough.    Cardiovascular: Negative.    Gastrointestinal: Negative.    Endocrine: Negative.    Genitourinary: Negative.    Musculoskeletal: Negative.    Allergic/Immunologic: Negative.    Neurological: Negative.    Psychiatric/Behavioral: Negative.       Objective   /90 (BP Location: Left arm, Patient Position: Sitting, Cuff Size: Extra-Large)   Pulse 83   Temp 97.8 °F (36.6 °C) (Temporal)   Resp 14   Ht 5' (1.524 m)   Wt 83.9 kg (185 lb)   SpO2 95%   BMI 36.13 kg/m²     Physical Exam  Constitutional:       General: She is not in acute distress.     Appearance: Normal appearance. She is obese. She is ill-appearing.   HENT:      Head: Normocephalic and atraumatic.      Nose: Nose normal.      Mouth/Throat:      Mouth: Mucous membranes are moist.   Cardiovascular:      Rate and Rhythm: Normal rate and regular rhythm.      Pulses: Normal pulses.      Heart sounds: Normal heart sounds.   Pulmonary:      Effort: Pulmonary effort is normal. No respiratory distress.      Breath sounds: Normal breath sounds.   Chest:      Chest wall: Tenderness present.   Abdominal:      General: Abdomen is flat. Bowel sounds are normal. There is no distension.      Palpations: There is no mass.      Tenderness: There is no abdominal tenderness. There is no right CVA tenderness or left CVA tenderness.   Musculoskeletal:         General: Normal range of motion.      Cervical back: Normal range of motion and neck supple.   Skin:     General: Skin is warm and dry.   Neurological:      General: No focal deficit present.      Mental Status: She is alert  and oriented to person, place, and time.   Psychiatric:         Mood and Affect: Mood normal.         Behavior: Behavior normal.         Thought Content: Thought content normal.         Judgment: Judgment normal.       Medications have been reviewed by provider in current encounter

## 2025-01-17 DIAGNOSIS — R05.3 CHRONIC COUGH: Primary | ICD-10-CM

## 2025-01-17 RX ORDER — DEXTROMETHORPHAN HYDROBROMIDE AND PROMETHAZINE HYDROCHLORIDE 15; 6.25 MG/5ML; MG/5ML
5 SYRUP ORAL 4 TIMES DAILY PRN
Qty: 118 ML | Refills: 0 | Status: SHIPPED | OUTPATIENT
Start: 2025-01-17

## 2025-01-27 DIAGNOSIS — E55.9 VITAMIN D DEFICIENCY: ICD-10-CM

## 2025-01-28 RX ORDER — ERGOCALCIFEROL 1.25 MG/1
CAPSULE, LIQUID FILLED ORAL
Qty: 16 CAPSULE | Refills: 0 | OUTPATIENT
Start: 2025-01-28

## 2025-01-30 PROBLEM — N39.0 URINARY TRACT INFECTION: Status: RESOLVED | Noted: 2024-12-30 | Resolved: 2025-01-30

## 2025-02-04 DIAGNOSIS — F51.01 PRIMARY INSOMNIA: ICD-10-CM

## 2025-02-04 RX ORDER — ESZOPICLONE 2 MG/1
TABLET, FILM COATED ORAL
Qty: 30 TABLET | Refills: 0 | Status: SHIPPED | OUTPATIENT
Start: 2025-02-04

## 2025-02-04 NOTE — TELEPHONE ENCOUNTER
Reason for call:   [x] Refill   [] Prior Auth  [] Other:     Office:   [x] PCP/Provider - TAKE 1 TABLET BY MOUTH AT BEDTIME IMMEDIATELY BEFORE BEDTIME AS NEEDED FOR SLEEP / ALEKSANDER Yip  [] Specialty/Provider -     Medication: eszopiclone (LUNESTA) 2 mg tablet     Dose/Frequency: TAKE 1 TABLET BY MOUTH AT BEDTIME IMMEDIATELY BEFORE BEDTIME AS NEEDED FOR SLEEP     Quantity: 30    Pharmacy: [id number] - [place] - [address]     Does the patient have enough for 3 days?   [] Yes   [x] No - Send as HP to POD

## 2025-02-08 DIAGNOSIS — I10 PRIMARY HYPERTENSION: ICD-10-CM

## 2025-02-10 RX ORDER — CARVEDILOL 6.25 MG/1
6.25 TABLET ORAL 2 TIMES DAILY WITH MEALS
Qty: 180 TABLET | Refills: 1 | Status: SHIPPED | OUTPATIENT
Start: 2025-02-10

## 2025-02-21 ENCOUNTER — RA CDI HCC (OUTPATIENT)
Dept: OTHER | Facility: HOSPITAL | Age: 80
End: 2025-02-21

## 2025-02-26 ENCOUNTER — OFFICE VISIT (OUTPATIENT)
Dept: FAMILY MEDICINE CLINIC | Facility: CLINIC | Age: 80
End: 2025-02-26
Payer: COMMERCIAL

## 2025-02-26 VITALS
OXYGEN SATURATION: 97 % | RESPIRATION RATE: 14 BRPM | SYSTOLIC BLOOD PRESSURE: 130 MMHG | DIASTOLIC BLOOD PRESSURE: 68 MMHG | WEIGHT: 196 LBS | TEMPERATURE: 98.2 F | HEIGHT: 60 IN | BODY MASS INDEX: 38.48 KG/M2 | HEART RATE: 72 BPM

## 2025-02-26 DIAGNOSIS — I10 PRIMARY HYPERTENSION: ICD-10-CM

## 2025-02-26 DIAGNOSIS — M25.512 LEFT SHOULDER PAIN, UNSPECIFIED CHRONICITY: ICD-10-CM

## 2025-02-26 DIAGNOSIS — F51.04 PSYCHOPHYSIOLOGICAL INSOMNIA: ICD-10-CM

## 2025-02-26 DIAGNOSIS — N18.31 TYPE 2 DIABETES MELLITUS WITH STAGE 3A CHRONIC KIDNEY DISEASE, WITHOUT LONG-TERM CURRENT USE OF INSULIN (HCC): ICD-10-CM

## 2025-02-26 DIAGNOSIS — Z00.00 MEDICARE ANNUAL WELLNESS VISIT, SUBSEQUENT: Primary | ICD-10-CM

## 2025-02-26 DIAGNOSIS — E11.22 TYPE 2 DIABETES MELLITUS WITH STAGE 3A CHRONIC KIDNEY DISEASE, WITHOUT LONG-TERM CURRENT USE OF INSULIN (HCC): ICD-10-CM

## 2025-02-26 DIAGNOSIS — Z12.31 ENCOUNTER FOR SCREENING MAMMOGRAM FOR BREAST CANCER: ICD-10-CM

## 2025-02-26 DIAGNOSIS — J44.9 CHRONIC OBSTRUCTIVE PULMONARY DISEASE, UNSPECIFIED COPD TYPE (HCC): ICD-10-CM

## 2025-02-26 DIAGNOSIS — E66.01 OBESITY, MORBID (HCC): ICD-10-CM

## 2025-02-26 PROBLEM — A41.9 SEPSIS WITHOUT ACUTE ORGAN DYSFUNCTION (HCC): Status: RESOLVED | Noted: 2024-12-31 | Resolved: 2025-02-26

## 2025-02-26 PROCEDURE — G2211 COMPLEX E/M VISIT ADD ON: HCPCS | Performed by: NURSE PRACTITIONER

## 2025-02-26 PROCEDURE — G0439 PPPS, SUBSEQ VISIT: HCPCS | Performed by: NURSE PRACTITIONER

## 2025-02-26 PROCEDURE — 99214 OFFICE O/P EST MOD 30 MIN: CPT | Performed by: NURSE PRACTITIONER

## 2025-02-26 NOTE — ASSESSMENT & PLAN NOTE
Lab Results   Component Value Date    HGBA1C 7.0 (A) 01/09/2025   Blood sugar is stable.  Continue medication continue low-carb diet continue exercise activity as tolerated maintain good water hydration.  Blood work ordered

## 2025-02-26 NOTE — PROGRESS NOTES
Name: Pebbles Landon      : 1945      MRN: [id number]  Encounter Provider: ALEKSANDER Manning  Encounter Date: 2025   Encounter department: [facility] PRIMARY CARE    Assessment & Plan  Medicare annual wellness visit, subsequent  Medicare wellness completed.  Up-to-date with screenings.  Referral made for mammogram.  Continue with self-care maintain heart healthy diet continue to maintain safety.  ACP document given.   is POA.         Left shoulder pain, unspecified chronicity  Continues to have left shoulder pain.  Follow-up with orthopedic.  Continue anti-inflammatory meds.  Referral made to physical therapy.  May use heat muscle rub  Orders:    Ambulatory Referral to Physical Therapy; Future    Encounter for screening mammogram for breast cancer    Orders:    Mammo screening bilateral w 3d and cad    Chronic obstructive pulmonary disease, unspecified COPD type (HCC)  No recent exacerbation.  Continue inhalers       Obesity, morbid (HCC)    Patient is obese.  Maintain low calorie diet increase exercise activity safely         Psychophysiological insomnia  Continues to have problems with insomnia.  Has been taking Lunesta does work intermittently.  Continue with good sleep hygiene May use chamomile tea, sleepy time tea.         Primary hypertension  Blood pressure is stable.  Continue heart healthy diet continue medication blood work ordered         Type 2 diabetes mellitus with stage 3a chronic kidney disease, without long-term current use of insulin ([facility])    Lab Results   Component Value Date    HGBA1C 7.0 (A) 2025   Blood sugar is stable.  Continue medication continue low-carb diet continue exercise activity as tolerated maintain good water hydration.  Blood work ordered           Depression Screening and Follow-up Plan: Patient was screened for depression during today's encounter. They screened negative with a PHQ-9 score of 4.      Falls Plan of Care: balance,  strength, and gait training instructions were provided.     Urinary Incontinence Plan of Care: counseling topics discussed: practice Kegel (pelvic floor strengthening) exercises, limiting fluid intake 3-4 hours before bed and preventing constipation.       Preventive health issues were discussed with patient, and age appropriate screening tests were ordered as noted in patient's After Visit Summary. Personalized health advice and appropriate referrals for health education or preventive services given if needed, as noted in patient's After Visit Summary.    History of Present Illness     Patient is here to follow-up on chronic health conditions.  Generally feels well.  Continues to have some shoulder pain.  Does follow-up with orthopedics.       Patient Care Team:  ALEKSANDER Manning as PCP - General (Family Medicine)  MD Iggy Almaraz MD Charles F Cohan, DO (Gastroenterology)  Melly Dutta PA-C as Physician Assistant (Physician Assistant)    Review of Systems   Constitutional: Negative.    HENT: Negative.     Eyes: Negative.    Respiratory: Negative.     Cardiovascular: Negative.    Gastrointestinal: Negative.    Endocrine: Negative.    Genitourinary: Negative.    Musculoskeletal:  Positive for arthralgias.   Skin: Negative.    Allergic/Immunologic: Negative.    Neurological: Negative.    Psychiatric/Behavioral:  Positive for sleep disturbance.      Medical History Reviewed by provider this encounter:  Tobacco  Allergies  Meds  Problems  Med Hx  Surg Hx  Fam Hx       Annual Wellness Visit Questionnaire   Pebbles is here for her Subsequent Wellness visit. Last Medicare Wellness visit information reviewed, patient interviewed and updates made to the record today.      Health Risk Assessment:   Patient rates overall health as good. Patient feels that their physical health rating is slightly better. Patient is satisfied with their life. Eyesight was rated as slightly better. Hearing  was rated as slightly better. Patient feels that their emotional and mental health rating is slightly worse. Patients states they are sometimes angry. Patient states they are sometimes unusually tired/fatigued. Pain experienced in the last 7 days has been a lot. Patient's pain rating has been 8/10. Patient states that she has experienced no weight loss or gain in last 6 months.     Depression Screening:   PHQ-9 Score: 4      Fall Risk Screening:   In the past year, patient has experienced: history of falling in past year    Number of falls: 2 or more  Injured during fall?: Yes    Feels unsteady when standing or walking?: No    Worried about falling?: Yes      Urinary Incontinence Screening:   Patient has leaked urine accidently in the last six months.     Home Safety:  Patient has trouble with stairs inside or outside of their home. Patient has working smoke alarms and has working carbon monoxide detector. Home safety hazards include: none.     Nutrition:   Current diet is Diabetic and Limited junk food.     Medications:   Patient is currently taking over-the-counter supplements. OTC medications include: see medication list. Patient is able to manage medications.     Activities of Daily Living (ADLs)/Instrumental Activities of Daily Living (IADLs):   Walk and transfer into and out of bed and chair?: Yes  Dress and groom yourself?: Yes    Bathe or shower yourself?: Yes    Feed yourself? Yes  Do your laundry/housekeeping?: Yes  Manage your money, pay your bills and track your expenses?: Yes  Make your own meals?: Yes    Do your own shopping?: Yes    Previous Hospitalizations:   Any hospitalizations or ED visits within the last 12 months?: Yes    How many hospitalizations have you had in the last year?: 1-2    Advance Care Planning:   Living will: No    ACP document given: Yes      Cognitive Screening:   Provider or family/friend/caregiver concerned regarding cognition?: No    PREVENTIVE SCREENINGS      Cardiovascular  Screening:    General: Screening Current      Diabetes Screening:     General: Screening Not Indicated and History Diabetes      Colorectal Cancer Screening:     General: Screening Current      Breast Cancer Screening:     General: Screening Current      Cervical Cancer Screening:    General: Screening Not Indicated      Abdominal Aortic Aneurysm (AAA) Screening:        General: Screening Not Indicated      Lung Cancer Screening:     General: Screening Not Indicated      Hepatitis C Screening:    General: Screening Current    Screening, Brief Intervention, and Referral to Treatment (SBIRT)     Screening  Typical number of drinks in a day: 0  Typical number of drinks in a week: 0  Interpretation: Low risk drinking behavior.    Single Item Drug Screening:  How often have you used an illegal drug (including marijuana) or a prescription medication for non-medical reasons in the past year? never    Single Item Drug Screen Score: 0  Interpretation: Negative screen for possible drug use disorder    SDOH Risk Assessment  Social determinants of health (SDOH) risk assesment tool was completed. The tool at a minimum covered housing stability, food insecurity, transportation needs, and utility difficulty. Patient had at risk responses for the following SDOH domains: housing stability and utilities.     Other Counseling Topics:   Car/seat belt/driving safety, skin self-exam, sunscreen and calcium and vitamin D intake and regular weightbearing exercise.     Social Drivers of Health     Financial Resource Strain: Low Risk  (10/12/2023)    Overall Financial Resource Strain (CARDIA)     Difficulty of Paying Living Expenses: Not hard at all   Food Insecurity: No Food Insecurity (2/26/2025)    Hunger Vital Sign     Worried About Running Out of Food in the Last Year: Never true     Ran Out of Food in the Last Year: Never true   Transportation Needs: No Transportation Needs (2/26/2025)    PRAPARE - Transportation     Lack of  Transportation (Medical): No     Lack of Transportation (Non-Medical): No   Housing Stability: High Risk (2/26/2025)    Housing Stability Vital Sign     Unable to Pay for Housing in the Last Year: Yes     Number of Times Moved in the Last Year: 0     Homeless in the Last Year: No   Utilities: At Risk (2/26/2025)    [facility] Utilities     Threatened with loss of utilities: Yes     No results found.    Objective   /68 (BP Location: Left arm, Patient Position: Sitting, Cuff Size: Extra-Large)   Pulse 72   Temp 98.2 °F (36.8 °C) (Temporal)   Resp 14   Ht 5' (1.524 m)   Wt 88.9 kg (196 lb)   SpO2 97%   BMI 38.28 kg/m²     Physical Exam  Constitutional:       General: She is not in acute distress.     Appearance: Normal appearance. She is obese. She is not ill-appearing.   HENT:      Head: Normocephalic and atraumatic.      Nose: Nose normal.      Mouth/Throat:      Mouth: Mucous membranes are moist.   Eyes:      Pupils: Pupils are equal, round, and reactive to light.   Cardiovascular:      Rate and Rhythm: Normal rate and regular rhythm.      Pulses: Normal pulses.   Pulmonary:      Effort: Pulmonary effort is normal. No respiratory distress.      Breath sounds: Normal breath sounds.   Chest:      Chest wall: No tenderness.   Abdominal:      General: Abdomen is flat. Bowel sounds are normal. There is no distension.      Palpations: There is no mass.      Tenderness: There is no abdominal tenderness.   Musculoskeletal:         General: Normal range of motion.      Cervical back: Normal range of motion and neck supple.   Skin:     General: Skin is warm and dry.   Neurological:      General: No focal deficit present.      Mental Status: She is alert and oriented to person, place, and time.   Psychiatric:         Mood and Affect: Mood normal.         Behavior: Behavior normal.         Thought Content: Thought content normal.         Judgment: Judgment normal.

## 2025-02-26 NOTE — ASSESSMENT & PLAN NOTE
Blood pressure is stable.  Continue heart healthy diet continue medication blood work ordered

## 2025-02-26 NOTE — ASSESSMENT & PLAN NOTE
Continues to have problems with insomnia.  Has been taking Lunesta does work intermittently.  Continue with good sleep hygiene May use chamomile tea, sleepy time tea.

## 2025-02-26 NOTE — ASSESSMENT & PLAN NOTE
Medicare wellness completed.  Up-to-date with screenings.  Referral made for mammogram.  Continue with self-care maintain heart healthy diet continue to maintain safety.  ACP document given.   is POA.

## 2025-02-26 NOTE — PATIENT INSTRUCTIONS
Obtain fasting labs, nothing to eat after 8pm , may drink water.   Continue medications  Make physical therapy appt    Medicare Preventive Visit Patient Instructions  Thank you for completing your Welcome to Medicare Visit or Medicare Annual Wellness Visit today. Your next wellness visit will be due in one year (2/27/2026).  The screening/preventive services that you may require over the next 5-10 years are detailed below. Some tests may not apply to you based off risk factors and/or age. Screening tests ordered at today's visit but not completed yet may show as past due. Also, please note that scanned in results may not display below.  Preventive Screenings:  Service Recommendations Previous Testing/Comments   Colorectal Cancer Screening  * Colonoscopy    * Fecal Occult Blood Test (FOBT)/Fecal Immunochemical Test (FIT)  * Fecal DNA/Cologuard Test  * Flexible Sigmoidoscopy Age: 45-75 years old   Colonoscopy: every 10 years (may be performed more frequently if at higher risk)  OR  FOBT/FIT: every 1 year  OR  Cologuard: every 3 years  OR  Sigmoidoscopy: every 5 years  Screening may be recommended earlier than age 45 if at higher risk for colorectal cancer. Also, an individualized decision between you and your healthcare provider will decide whether screening between the ages of 76-85 would be appropriate. Colonoscopy: 04/09/2022  FOBT/FIT: Not on file  Cologuard: Not on file  Sigmoidoscopy: Not on file    Screening Current     Breast Cancer Screening Age: 40+ years old  Frequency: every 1-2 years  Not required if history of left and right mastectomy Mammogram: 10/04/2023    Screening Current   Cervical Cancer Screening Between the ages of 21-29, pap smear recommended once every 3 years.   Between the ages of 30-65, can perform pap smear with HPV co-testing every 5 years.   Recommendations may differ for women with a history of total hysterectomy, cervical cancer, or abnormal pap smears in past. Pap Smear:  11/04/2019    Screening Not Indicated   Hepatitis C Screening Once for adults born between 1945 and 1965  More frequently in patients at high risk for Hepatitis C Hep C Antibody: 09/30/2019    Screening Current   Diabetes Screening 1-2 times per year if you're at risk for diabetes or have pre-diabetes Fasting glucose: 119 mg/dL (1/1/2025)  A1C: 7.0 (1/9/2025)  Screening Not Indicated  History Diabetes   Cholesterol Screening Once every 5 years if you don't have a lipid disorder. May order more often based on risk factors. Lipid panel: 09/04/2024    Screening Current     Other Preventive Screenings Covered by Medicare:  Abdominal Aortic Aneurysm (AAA) Screening: covered once if your at risk. You're considered to be at risk if you have a family history of AAA.  Lung Cancer Screening: covers low dose CT scan once per year if you meet all of the following conditions: (1) Age 55-77; (2) No signs or symptoms of lung cancer; (3) Current smoker or have quit smoking within the last 15 years; (4) You have a tobacco smoking history of at least 20 pack years (packs per day multiplied by number of years you smoked); (5) You get a written order from a healthcare provider.  Glaucoma Screening: covered annually if you're considered high risk: (1) You have diabetes OR (2) Family history of glaucoma OR (3)  aged 50 and older OR (4)  American aged 65 and older  Osteoporosis Screening: covered every 2 years if you meet one of the following conditions: (1) You're estrogen deficient and at risk for osteoporosis based off medical history and other findings; (2) Have a vertebral abnormality; (3) On glucocorticoid therapy for more than 3 months; (4) Have primary hyperparathyroidism; (5) On osteoporosis medications and need to assess response to drug therapy.   Last bone density test (DXA Scan): 10/04/2023.  HIV Screening: covered annually if you're between the age of 15-65. Also covered annually if you are younger  than 15 and older than 65 with risk factors for HIV infection. For pregnant patients, it is covered up to 3 times per pregnancy.    Immunizations:  Immunization Recommendations   Influenza Vaccine Annual influenza vaccination during flu season is recommended for all persons aged >= 6 months who do not have contraindications   Pneumococcal Vaccine   * Pneumococcal conjugate vaccine = PCV13 (Prevnar 13), PCV15 (Vaxneuvance), PCV20 (Prevnar 20)  * Pneumococcal polysaccharide vaccine = PPSV23 (Pneumovax) Adults 19-63 yo with certain risk factors or if 65+ yo  If never received any pneumonia vaccine: recommend Prevnar 20 (PCV20)  Give PCV20 if previously received 1 dose of PCV13 or PPSV23   Hepatitis B Vaccine 3 dose series if at intermediate or high risk (ex: diabetes, end stage renal disease, liver disease)   Respiratory syncytial virus (RSV) Vaccine - COVERED BY MEDICARE PART D  * RSVPreF3 (Arexvy) CDC recommends that adults 60 years of age and older may receive a single dose of RSV vaccine using shared clinical decision-making (SCDM)   Tetanus (Td) Vaccine - COST NOT COVERED BY MEDICARE PART B Following completion of primary series, a booster dose should be given every 10 years to maintain immunity against tetanus. Td may also be given as tetanus wound prophylaxis.   Tdap Vaccine - COST NOT COVERED BY MEDICARE PART B Recommended at least once for all adults. For pregnant patients, recommended with each pregnancy.   Shingles Vaccine (Shingrix) - COST NOT COVERED BY MEDICARE PART B  2 shot series recommended in those 19 years and older who have or will have weakened immune systems or those 50 years and older     Health Maintenance Due:      Topic Date Due   • Breast Cancer Screening: Mammogram  10/04/2024   • Colorectal Cancer Screening  04/08/2027   • Hepatitis C Screening  Completed     Immunizations Due:      Topic Date Due   • Pneumococcal Vaccine: 65+ Years (1 of 2 - PCV) Never done   • Influenza Vaccine (1) Never  done   • COVID-19 Vaccine (3 - 2024-25 season) 09/01/2024     Advance Directives   What are advance directives?  Advance directives are legal documents that state your wishes and plans for medical care. These plans are made ahead of time in case you lose your ability to make decisions for yourself. Advance directives can apply to any medical decision, such as the treatments you want, and if you want to donate organs.   What are the types of advance directives?  There are many types of advance directives, and each state has rules about how to use them. You may choose a combination of any of the following:  Living will:  This is a written record of the treatment you want. You can also choose which treatments you do not want, which to limit, and which to stop at a certain time. This includes surgery, medicine, IV fluid, and tube feedings.   Durable power of  for healthcare (DPAHC):  This is a written record that states who you want to make healthcare choices for you when you are unable to make them for yourself. This person, called a proxy, is usually a family member or a friend. You may choose more than 1 proxy.  Do not resuscitate (DNR) order:  A DNR order is used in case your heart stops beating or you stop breathing. It is a request not to have certain forms of treatment, such as CPR. A DNR order may be included in other types of advance directives.  Medical directive:  This covers the care that you want if you are in a coma, near death, or unable to make decisions for yourself. You can list the treatments you want for each condition. Treatment may include pain medicine, surgery, blood transfusions, dialysis, IV or tube feedings, and a ventilator (breathing machine).  Values history:  This document has questions about your views, beliefs, and how you feel and think about life. This information can help others choose the care that you would choose.  Why are advance directives important?  An advance directive  helps you control your care. Although spoken wishes may be used, it is better to have your wishes written down. Spoken wishes can be misunderstood, or not followed. Treatments may be given even if you do not want them. An advance directive may make it easier for your family to make difficult choices about your care.   Fall Prevention    Fall prevention  includes ways to make your home and other areas safer. It also includes ways you can move more carefully to prevent a fall. Health conditions that cause changes in your blood pressure, vision, or muscle strength and coordination may increase your risk for falls. Medicines may also increase your risk for falls if they make you dizzy, weak, or sleepy.   Fall prevention tips:   Stand or sit up slowly.    Use assistive devices as directed.    Wear shoes that fit well and have soles that .    Wear a personal alarm.    Stay active.    Manage your medical conditions.    Home Safety Tips:  Add items to prevent falls in the bathroom.    Keep paths clear.    Install bright lights in your home.    Keep items you use often on shelves within reach.    Paint or place reflective tape on the edges of your stairs.    Urinary Incontinence   Urinary incontinence (UI)  is when you lose control of your bladder. UI develops because your bladder cannot store or empty urine properly. The 3 most common types of UI are stress incontinence, urge incontinence, or both.  Medicines:   May be given to help strengthen your bladder control. Report any side effects of medication to your healthcare provider.  Do pelvic muscle exercises often:  Your pelvic muscles help you stop urinating. Squeeze these muscles tight for 5 seconds, then relax for 5 seconds. Gradually work up to squeezing for 10 seconds. Do 3 sets of 15 repetitions a day, or as directed. This will help strengthen your pelvic muscles and improve bladder control.  Train your bladder:  Go to the bathroom at set times, such as every 2  hours, even if you do not feel the urge to go. You can also try to hold your urine when you feel the urge to go. For example, hold your urine for 5 minutes when you feel the urge to go. As that becomes easier, hold your urine for 10 minutes.   Self-care:   Keep a UI record.  Write down how often you leak urine and how much you leak. Make a note of what you were doing when you leaked urine.  Drink liquids as directed. You may need to limit the amount of liquid you drink to help control your urine leakage. Do not drink any liquid right before you go to bed. Limit or do not have drinks that contain caffeine or alcohol.   Prevent constipation.  Eat a variety of high-fiber foods. Good examples are high-fiber cereals, beans, vegetables, and whole-grain breads. Walking is the best way to trigger your intestines to have a bowel movement.  Exercise regularly and maintain a healthy weight.  Weight loss and exercise will decrease pressure on your bladder and help you control your leakage.   Use a catheter as directed  to help empty your bladder. A catheter is a tiny, plastic tube that is put into your bladder to drain your urine.   Go to behavior therapy as directed.  Behavior therapy may be used to help you learn to control your urge to urinate.    Weight Management   Why it is important to manage your weight:  Being overweight increases your risk of health conditions such as heart disease, high blood pressure, type 2 diabetes, and certain types of cancer. It can also increase your risk for osteoarthritis, sleep apnea, and other respiratory problems. Aim for a slow, steady weight loss. Even a small amount of weight loss can lower your risk of health problems.  How to lose weight safely:  A safe and healthy way to lose weight is to eat fewer calories and get regular exercise. You can lose up about 1 pound a week by decreasing the number of calories you eat by 500 calories each day.   Healthy meal plan for weight management:  A  healthy meal plan includes a variety of foods, contains fewer calories, and helps you stay healthy. A healthy meal plan includes the following:  Eat whole-grain foods more often.  A healthy meal plan should contain fiber. Fiber is the part of grains, fruits, and vegetables that is not broken down by your body. Whole-grain foods are healthy and provide extra fiber in your diet. Some examples of whole-grain foods are whole-wheat breads and pastas, oatmeal, brown rice, and bulgur.  Eat a variety of vegetables every day.  Include dark, leafy greens such as spinach, kale, rowena greens, and mustard greens. Eat yellow and orange vegetables such as carrots, sweet potatoes, and winter squash.   Eat a variety of fruits every day.  Choose fresh or canned fruit (canned in its own juice or light syrup) instead of juice. Fruit juice has very little or no fiber.  Eat low-fat dairy foods.  Drink fat-free (skim) milk or 1% milk. Eat fat-free yogurt and low-fat cottage cheese. Try low-fat cheeses such as mozzarella and other reduced-fat cheeses.  Choose meat and other protein foods that are low in fat.  Choose beans or other legumes such as split peas or lentils. Choose fish, skinless poultry (chicken or turkey), or lean cuts of red meat (beef or pork). Before you cook meat or poultry, cut off any visible fat.   Use less fat and oil.  Try baking foods instead of frying them. Add less fat, such as margarine, sour cream, regular salad dressing and mayonnaise to foods. Eat fewer high-fat foods. Some examples of high-fat foods include french fries, doughnuts, ice cream, and cakes.  Eat fewer sweets.  Limit foods and drinks that are high in sugar. This includes candy, cookies, regular soda, and sweetened drinks.  Exercise:  Exercise at least 30 minutes per day on most days of the week. Some examples of exercise include walking, biking, dancing, and swimming. You can also fit in more physical activity by taking the stairs instead of the  elevator or parking farther away from stores. Ask your healthcare provider about the best exercise plan for you.      © Copyright [facility] 2018 Information is for End User's use only and may not be sold, redistributed or otherwise used for commercial purposes. All illustrations and images included in CareNotes® are the copyrighted property of [employer].A.M., Inc[employer] or [employer]

## 2025-03-04 DIAGNOSIS — F51.01 PRIMARY INSOMNIA: ICD-10-CM

## 2025-03-04 NOTE — TELEPHONE ENCOUNTER
Patient request refill of Lunesta 2 mg to be filled with [facility] #[id number].    Requested Prescriptions     Pending Prescriptions Disp Refills    eszopiclone (LUNESTA) 2 mg tablet 30 tablet 0     Sig: TAKE 1 TABLET BY MOUTH AT BEDTIME IMMEDIATELY BEFORE BEDTIME AS NEEDED FOR SLEEP

## 2025-03-05 ENCOUNTER — TELEPHONE (OUTPATIENT)
Age: 80
End: 2025-03-05

## 2025-03-05 DIAGNOSIS — F51.01 PRIMARY INSOMNIA: ICD-10-CM

## 2025-03-05 RX ORDER — ESZOPICLONE 2 MG/1
TABLET, FILM COATED ORAL
Qty: 30 TABLET | Refills: 0 | Status: SHIPPED | OUTPATIENT
Start: 2025-03-05

## 2025-03-05 RX ORDER — ESZOPICLONE 2 MG/1
TABLET, FILM COATED ORAL
Qty: 30 TABLET | Refills: 0 | OUTPATIENT
Start: 2025-03-05

## 2025-03-05 NOTE — TELEPHONE ENCOUNTER
Patient called for refill    Lunesta 2 mg    [facility]

## 2025-03-05 NOTE — TELEPHONE ENCOUNTER
PA for eszopiclone (LUNESTA) 2 mg tablet SUBMITTED to     via    []CMM-KEY:   [x]Surescripts-Case ID # [id number]   []Availity-Auth ID # NDC #   []Faxed to plan   []Other website   []Phone call Case ID #     [x]PA sent as URGENT    All office notes, labs and other pertaining documents and studies sent. Clinical questions answered. Awaiting determination from insurance company.     Turnaround time for your insurance to make a decision on your Prior Authorization can take 7-21 business days.

## 2025-03-10 DIAGNOSIS — J45.990 EXERCISE INDUCED BRONCHOSPASM: ICD-10-CM

## 2025-03-10 DIAGNOSIS — R06.09 DOE (DYSPNEA ON EXERTION): ICD-10-CM

## 2025-03-10 DIAGNOSIS — E55.9 VITAMIN D DEFICIENCY: ICD-10-CM

## 2025-03-10 DIAGNOSIS — I16.0 HYPERTENSIVE URGENCY: ICD-10-CM

## 2025-03-11 RX ORDER — ERGOCALCIFEROL 1.25 MG/1
CAPSULE, LIQUID FILLED ORAL
Qty: 16 CAPSULE | Refills: 0 | Status: SHIPPED | OUTPATIENT
Start: 2025-03-11

## 2025-03-11 RX ORDER — AMLODIPINE BESYLATE 10 MG/1
10 TABLET ORAL
Qty: 90 TABLET | Refills: 1 | Status: SHIPPED | OUTPATIENT
Start: 2025-03-11

## 2025-03-11 RX ORDER — MONTELUKAST SODIUM 10 MG/1
10 TABLET ORAL
Qty: 90 TABLET | Refills: 1 | Status: SHIPPED | OUTPATIENT
Start: 2025-03-11

## 2025-03-18 ENCOUNTER — TELEPHONE (OUTPATIENT)
Age: 80
End: 2025-03-18

## 2025-03-18 DIAGNOSIS — M25.512 ACUTE PAIN OF LEFT SHOULDER: Primary | ICD-10-CM

## 2025-03-18 NOTE — TELEPHONE ENCOUNTER
Patient called in stating provider knew patient has been having pain in her shoulder and left arm. Patient did state now it is going up into her neck. Patient is asking if provider could put in an order for an xray. Please advise and give patient call back

## 2025-03-19 ENCOUNTER — APPOINTMENT (OUTPATIENT)
Dept: RADIOLOGY | Facility: CLINIC | Age: 80
End: 2025-03-19
Payer: COMMERCIAL

## 2025-03-19 DIAGNOSIS — M25.512 ACUTE PAIN OF LEFT SHOULDER: ICD-10-CM

## 2025-03-19 PROCEDURE — 73030 X-RAY EXAM OF SHOULDER: CPT

## 2025-03-21 ENCOUNTER — RESULTS FOLLOW-UP (OUTPATIENT)
Dept: FAMILY MEDICINE CLINIC | Facility: CLINIC | Age: 80
End: 2025-03-21

## 2025-03-26 ENCOUNTER — TELEPHONE (OUTPATIENT)
Age: 80
End: 2025-03-26

## 2025-03-26 ENCOUNTER — TELEMEDICINE (OUTPATIENT)
Dept: FAMILY MEDICINE CLINIC | Facility: CLINIC | Age: 80
End: 2025-03-26
Payer: COMMERCIAL

## 2025-03-26 DIAGNOSIS — M25.512 ACUTE PAIN OF LEFT SHOULDER: ICD-10-CM

## 2025-03-26 DIAGNOSIS — M25.512 CHRONIC LEFT SHOULDER PAIN: Primary | ICD-10-CM

## 2025-03-26 DIAGNOSIS — G89.29 CHRONIC LEFT SHOULDER PAIN: Primary | ICD-10-CM

## 2025-03-26 PROCEDURE — 99213 OFFICE O/P EST LOW 20 MIN: CPT | Performed by: NURSE PRACTITIONER

## 2025-03-26 RX ORDER — TRAMADOL HYDROCHLORIDE 50 MG/1
50 TABLET ORAL EVERY 6 HOURS PRN
Qty: 30 TABLET | Refills: 0 | Status: SHIPPED | OUTPATIENT
Start: 2025-03-26

## 2025-03-26 NOTE — TELEPHONE ENCOUNTER
Patient called she would like a list of Psychiatrists in her area that Dr. Bauman would recommend.   She would like to discuss at today's appointment.  Please advise

## 2025-03-26 NOTE — PROGRESS NOTES
Virtual Regular VisitName: Pebbles Landon      : 1945      MRN: [id number]  Encounter Provider: ALEKSANDER Manning  Encounter Date: 3/26/2025   Encounter department: [facility] PRIMARY CARE  :  Assessment & Plan  Chronic left shoulder pain  X-ray results reviewed.  Referral made to orthopedic.  Patient does take meloxicam daily.  Has been using heat, muscle rub.  Feels little improvement.  Orders:    Ambulatory Referral to Orthopedic Surgery; Future    Acute pain of left shoulder  May take tramadol as needed for acute pain.  Discussed maintaining safety.  Continues to use heat muscle rub.  May take Tylenol during the day for mild to moderate pain referral made to orthopedic  Orders:    traMADol (Ultram) 50 mg tablet; Take 1 tablet (50 mg total) by mouth every 6 (six) hours as needed for severe pain        History of Present Illness     Patient is being seen for virtual visit to review x-ray results.  Continues to have shoulder pain.  Had a fall in November.  Reports pain becomes very acute at times      Review of Systems   Constitutional: Negative.    HENT: Negative.     Eyes: Negative.    Respiratory: Negative.     Cardiovascular: Negative.    Gastrointestinal: Negative.    Endocrine: Negative.    Genitourinary: Negative.    Musculoskeletal:  Positive for arthralgias (Shoulder pain).   Allergic/Immunologic: Negative.    Neurological: Negative.    Psychiatric/Behavioral:  Positive for dysphoric mood (Currently is trying to help daughter with her mental health problems so patient is stressed) and sleep disturbance.        Objective   There were no vitals taken for this visit.    Physical Exam  Constitutional:       Appearance: She is well-developed.   HENT:      Head: Normocephalic and atraumatic.   Pulmonary:      Effort: Pulmonary effort is normal.   Musculoskeletal:         General: Tenderness (Left shoulder) present. No swelling. Normal range of motion.      Cervical back: Normal range of  motion and neck supple.   Skin:     General: Skin is dry.   Neurological:      Mental Status: She is alert and oriented to person, place, and time.   Psychiatric:         Behavior: Behavior normal.         Thought Content: Thought content normal.         Judgment: Judgment normal.         Administrative Statements   Encounter provider ALEKSANDER Manning    The Patient is located at Home and in the following state in which I hold an active license [place].    The patient was identified by name and date of birth. Pebblescatia Landon was informed that this is a telemedicine visit and that the visit is being conducted through the Epic Embedded platform. She agrees to proceed..  My office door was closed. No one else was in the room.  She acknowledged consent and understanding of privacy and security of the video platform. The patient has agreed to participate and understands they can discontinue the visit at any time.    I have spent a total time of 15 minutes in caring for this patient on the day of the visit/encounter including Diagnostic results, Instructions for management, and Patient and family education, not including the time spent for establishing the audio/video connection.

## 2025-03-26 NOTE — TELEPHONE ENCOUNTER
Pt would like to make today's appt at 1:20 a virtual as her daughter is sick and doesn't want to leave her at home. Tried to transfer 3 times with no answer, please call asap

## 2025-03-28 PROBLEM — Z00.00 MEDICARE ANNUAL WELLNESS VISIT, SUBSEQUENT: Status: RESOLVED | Noted: 2021-04-17 | Resolved: 2025-03-28

## 2025-04-01 ENCOUNTER — TELEPHONE (OUTPATIENT)
Dept: FAMILY MEDICINE CLINIC | Facility: CLINIC | Age: 80
End: 2025-04-01

## 2025-04-02 NOTE — TELEPHONE ENCOUNTER
Patient called asking for Jovana to please call her back as she was unable to hear what she was saying on phone. Advised orthopedic appointment for continues shoulder pain as per note, but patient wants to speak to doctor. Please call Pebbles at [phone]

## 2025-04-09 ENCOUNTER — OFFICE VISIT (OUTPATIENT)
Dept: FAMILY MEDICINE CLINIC | Facility: CLINIC | Age: 80
End: 2025-04-09
Payer: COMMERCIAL

## 2025-04-09 ENCOUNTER — APPOINTMENT (OUTPATIENT)
Dept: RADIOLOGY | Facility: CLINIC | Age: 80
End: 2025-04-09
Payer: COMMERCIAL

## 2025-04-09 VITALS
WEIGHT: 195 LBS | OXYGEN SATURATION: 95 % | TEMPERATURE: 98.3 F | HEIGHT: 60 IN | RESPIRATION RATE: 16 BRPM | HEART RATE: 72 BPM | DIASTOLIC BLOOD PRESSURE: 60 MMHG | SYSTOLIC BLOOD PRESSURE: 118 MMHG | BODY MASS INDEX: 38.28 KG/M2

## 2025-04-09 DIAGNOSIS — F11.20 OPIOID DEPENDENCE, UNCOMPLICATED (HCC): ICD-10-CM

## 2025-04-09 DIAGNOSIS — M25.551 PAIN OF RIGHT HIP: ICD-10-CM

## 2025-04-09 DIAGNOSIS — F51.04 PSYCHOPHYSIOLOGICAL INSOMNIA: ICD-10-CM

## 2025-04-09 DIAGNOSIS — M25.551 PAIN OF RIGHT HIP: Primary | ICD-10-CM

## 2025-04-09 PROCEDURE — 99213 OFFICE O/P EST LOW 20 MIN: CPT | Performed by: NURSE PRACTITIONER

## 2025-04-09 PROCEDURE — 73502 X-RAY EXAM HIP UNI 2-3 VIEWS: CPT

## 2025-04-09 NOTE — PATIENT INSTRUCTIONS
Get xray   Done  Make appt for Dr Hilliard  Use heat muscle rub      Patient Education     Hip Pain ED   General Information   You came to the Emergency Department (ED) for hip pain. Your hips are ball and socket joints. Cartilage covers the ends of the bones inside your hip joint and allows them to move smoothly. Ligaments are strong bands of tissue that hold your bones together. Tendons are bands of tissue that attach muscles to bones. A group of muscles surround your hips and attach to bones in your hip, leg, and pelvis. These allow you to bend your hip and move your leg. Nerves and blood vessels travel near your hip as they enter your legs.  Most hip pain is caused by damage to the cartilage or an injury to a ligament, tendon, or muscle. You also have nerves and blood vessels in your hip. However, other more serious problems, such as infection or injury to a nerve or blood vessel, can also cause hip pain. The doctors do not think something serious is causing your hip pain.  What care is needed at home?   Call your regular doctor to let them know you were in the ED. Make a follow-up appointment if you were told to.  Rest your hip. If needed, or if you were told to, use a cane, walker, or crutches.  You may want to take medicines like ibuprofen or naproxen for swelling and pain. These are nonsteroidal anti-inflammatory drugs (NSAIDS).  Place an ice pack or a bag of frozen vegetables wrapped in a towel over the painful part. Never put ice right on the skin. Do not leave the ice on more than 10 to 15 minutes at a time. Use for the first 24 to 48 hours after an injury.  Once your pain decreases, talk with your regular doctor or a physical therapist to find out if there are exercises or stretches that may help with your problem.  When do I need to get emergency help?   Return to the ED if:   Your hip pain becomes severe and you are not able to put weight on the injured leg or move it without making the pain  worse.  You cannot move your leg.  You have a fever of 100.4°F (38°C) or higher, swelling, or redness with your hip pain.  You become unable to control your bowels or bladder.  When do I need to call the doctor?   You have very bad pain that is not helped by pain medicine.  Your hip is swollen or bruised.  Your toes are numb, or turn blue, grey, or pale.  Your leg or foot becomes much weaker than the other.  You have new or worsening symptoms.  Last Reviewed Date   2021-04-09  Consumer Information Use and Disclaimer   This generalized information is a limited summary of diagnosis, treatment, and/or medication information. It is not meant to be comprehensive and should be used as a tool to help the user understand and/or assess potential diagnostic and treatment options. It does NOT include all information about conditions, treatments, medications, side effects, or risks that may apply to a specific patient. It is not intended to be medical advice or a substitute for the medical advice, diagnosis, or treatment of a health care provider based on the health care provider's examination and assessment of a patient’s specific and unique circumstances. Patients must speak with a health care provider for complete information about their health, medical questions, and treatment options, including any risks or benefits regarding use of medications. This information does not endorse any treatments or medications as safe, effective, or approved for treating a specific patient. Up[employer], Inc[employer] and its affiliates disclaim any warranty or liability relating to this information or the use thereof. The use of this information is governed by the Terms of Use, available at https://www.[employer].com/en/know/clinical-effectiveness-terms   Copyright   Copyright © 2024 [employer]Date, Inc. and its affiliates and/or licensors. All rights reserved.

## 2025-04-09 NOTE — ASSESSMENT & PLAN NOTE
Patient continues to have stressors due to ill daughter.  Has been using Lunesta.  Continue with self-care continue with nonpharmacological interventions for insomnia.

## 2025-04-09 NOTE — PROGRESS NOTES
Name: Pebbles Landon      : 1945      MRN: [id number]  Encounter Provider: ALEKSANDER Manning  Encounter Date: 2025   Encounter department: [facility]IMARY [facility]  :  Assessment & Plan  Pain of right hip  Reports increasing pain of the right hip.  Unable to turn.  Reports the pain radiates down to the front of her thigh.  X-ray ordered.  Discussed possible diagnosis of arthritis, hip degeneration.  Based on results of x-ray will referral to either sports medicine or orthopedics.  May use heat muscle rub.  Unable to complete physical therapy at this time.  May take tramadol for acute pain at night.  Orders:    XR hip/pelv 2-3 vws right if performed; Future    Opioid dependence, uncomplicated (HCC)  No abuse noted.  Patient does take tramadol as needed for acute hip pain.       Psychophysiological insomnia  Patient continues to have stressors due to ill daughter.  Has been using Lunesta.  Continue with self-care continue with nonpharmacological interventions for insomnia.                History of Present Illness   Patient is here with concerns of right hip pain already has problems with left shoulder.  Denies any acute injury.  Did have a couple of falls last year        Review of Systems   Constitutional: Negative.    HENT: Negative.     Eyes: Negative.    Respiratory: Negative.     Cardiovascular: Negative.    Gastrointestinal: Negative.    Endocrine: Negative.    Genitourinary: Negative.    Musculoskeletal:  Positive for arthralgias (Left shoulder pain, right hip pain) and gait problem.   Allergic/Immunologic: Negative.    Psychiatric/Behavioral: Negative.         Objective   /60 (BP Location: Left arm, Patient Position: Sitting, Cuff Size: Extra-Large)   Pulse 72   Temp 98.3 °F (36.8 °C) (Temporal)   Resp 16   Ht 5' (1.524 m)   Wt 88.5 kg (195 lb)   SpO2 95%   BMI 38.08 kg/m²      Physical Exam  Constitutional:       General: She is not in acute distress.     Appearance:  Normal appearance. She is obese. She is not ill-appearing.   HENT:      Head: Normocephalic and atraumatic.      Nose: Nose normal.      Mouth/Throat:      Mouth: Mucous membranes are moist.   Eyes:      Pupils: Pupils are equal, round, and reactive to light.   Cardiovascular:      Rate and Rhythm: Normal rate and regular rhythm.      Pulses: Normal pulses.      Heart sounds: Normal heart sounds.   Pulmonary:      Effort: Pulmonary effort is normal. No respiratory distress.      Breath sounds: Normal breath sounds.   Chest:      Chest wall: No tenderness.   Abdominal:      General: Abdomen is flat. Bowel sounds are normal. There is no distension.      Palpations: There is no mass.      Tenderness: There is no abdominal tenderness.   Musculoskeletal:         General: Tenderness (Right hip) present. No swelling. Normal range of motion.      Cervical back: Normal range of motion and neck supple.   Skin:     General: Skin is warm and dry.   Neurological:      General: No focal deficit present.      Mental Status: She is alert and oriented to person, place, and time.   Psychiatric:         Mood and Affect: Mood normal.         Behavior: Behavior normal.         Thought Content: Thought content normal.         Judgment: Judgment normal.

## 2025-04-10 ENCOUNTER — TELEPHONE (OUTPATIENT)
Age: 80
End: 2025-04-10

## 2025-04-10 NOTE — TELEPHONE ENCOUNTER
Patient called in asking in the office received any paperwork regarding her insurance enrollment? She says they should of gotten a form for the provider to fill out. Paperwork due April 30th. Please advise. Thank you!        Pebbles:

## 2025-04-15 ENCOUNTER — TELEPHONE (OUTPATIENT)
Dept: FAMILY MEDICINE CLINIC | Facility: CLINIC | Age: 80
End: 2025-04-15

## 2025-04-15 NOTE — TELEPHONE ENCOUNTER
Patient has responded she would like forms faxed to [facility] at [phone]. Thank you

## 2025-04-16 ENCOUNTER — TELEPHONE (OUTPATIENT)
Dept: FAMILY MEDICINE CLINIC | Facility: CLINIC | Age: 80
End: 2025-04-16

## 2025-04-16 NOTE — TELEPHONE ENCOUNTER
Patient left message with the POD stating that she had sent in a refill request for Diclofenac.  She said it was sent to the pharmacy, but then cancelled.  Patient also noted that she was told by PCP to stop Meloxicam and continue with Diclofenac. She is requesting a refill of Diclofenac

## 2025-04-17 DIAGNOSIS — M19.90 ARTHRITIS: Primary | ICD-10-CM

## 2025-04-20 DIAGNOSIS — E11.40 CONTROLLED TYPE 2 DIABETES WITH NEUROPATHY (HCC): ICD-10-CM

## 2025-04-22 RX ORDER — PREGABALIN 165 MG/1
1 TABLET, FILM COATED, EXTENDED RELEASE ORAL 2 TIMES DAILY
Qty: 60 TABLET | Refills: 0 | Status: SHIPPED | OUTPATIENT
Start: 2025-04-22

## 2025-04-30 ENCOUNTER — TELEPHONE (OUTPATIENT)
Age: 80
End: 2025-04-30

## 2025-04-30 DIAGNOSIS — M25.512 ACUTE PAIN OF LEFT SHOULDER: ICD-10-CM

## 2025-04-30 DIAGNOSIS — F51.01 PRIMARY INSOMNIA: ICD-10-CM

## 2025-04-30 NOTE — TELEPHONE ENCOUNTER
Reason for call:   [x] Prior Auth  [] Other:     Caller:  [x] Patient  [] Pharmacy  Name:   Address:   Callback Number:     Medication: Lyrica  MG     Dose/Frequency: TAKE 1 TABLET BY MOUTH TWICE A DAY     Quantity: 60    Ordering Provider:   [x] PCP/Provider -   [] Speciality/Provider -

## 2025-04-30 NOTE — TELEPHONE ENCOUNTER
Valentine from the prior auth dept at [facility] called.  She needs more information regarding the Lyrica.  She needs the specific diagnosis of neuropathic pain and also if the patient tried other medications and they failed.  She said this is time sensitive and a response is needed by May 1 at 6:30am CST or the prior auth will be denied.  The Reference# is [id number].  [facility] will need a call back at [phone] or fax# [phone] with the above requested information.

## 2025-04-30 NOTE — TELEPHONE ENCOUNTER
Reason for call:   [x] Prior Auth  [] Other:     Caller:  [x] Patient  [] Pharmacy  Name:   Address:   Callback Number:     Medication: Diclofenac Sodium (VOLTAREN) 1 %     Dose/Frequency: Apply 2 g topically 4 (four) times a day     Quantity: 350 g    Ordering Provider:   [x] PCP/Provider -   [] Speciality/Provider -

## 2025-04-30 NOTE — TELEPHONE ENCOUNTER
[facility] called, they will be faxing paperwork for additional information.    Please advise, thank you

## 2025-04-30 NOTE — TELEPHONE ENCOUNTER
Reason for call:   [x] Refill   [] Prior Auth  [] Other:     Office:   [x] PCP/Provider -   [] Specialty/Provider -     Medication:   traMADol (Ultram) 50 mg/Take 1 tablet (50 mg total) by mouth every 6 (six) hours as needed     eszopiclone (LUNESTA) 2 mg/TAKE 1 TABLET BY MOUTH AT BEDTIME IMMEDIATELY BEFORE BEDTIME AS NEEDED     Quantity: 30    Pharmacy: [facility]/pharmacy [place][id number] - [place] - [address]     Local Pharmacy   Does the patient have enough for 3 days?   [] Yes   [x] No - Send as HP to POD    Mail Away Pharmacy   Does the patient have enough for 10 days?   [] Yes   [] No - Send as HP to POD    
36.2

## 2025-04-30 NOTE — TELEPHONE ENCOUNTER
PA for Lyrica  MG TB24 SUBMITTED to     via    []CMM-KEY:   [x]Surescripts-Case ID #  [id number]   []Availity-Auth ID # NDC #   []Faxed to plan   []Other website   []Phone call Case ID #     [x]PA sent as URGENT    All office notes, labs and other pertaining documents and studies sent. Clinical questions answered. Awaiting determination from insurance company.     Turnaround time for your insurance to make a decision on your Prior Authorization can take 7-21 business days.

## 2025-05-01 RX ORDER — TRAMADOL HYDROCHLORIDE 50 MG/1
50 TABLET ORAL EVERY 6 HOURS PRN
Qty: 30 TABLET | Refills: 0 | Status: SHIPPED | OUTPATIENT
Start: 2025-05-01

## 2025-05-01 RX ORDER — ESZOPICLONE 2 MG/1
TABLET, FILM COATED ORAL
Qty: 30 TABLET | Refills: 0 | Status: SHIPPED | OUTPATIENT
Start: 2025-05-01

## 2025-05-01 NOTE — TELEPHONE ENCOUNTER
PA for Lyrica  MG TB24  DENIED    Reason:(Screenshot if applicable)        Message sent to office clinical pool Yes    Denial letter scanned into Media Yes    We can gladly do an appeal but the process can take about 30-60 days to provide determination. Please Schedule a Peer to Peer at phone [phone] . If an appeal is truly warranted please have Provider send clinical documentation to the PA department to support the appeal.     **Please follow up with your patient regarding denial and next steps**

## 2025-05-14 ENCOUNTER — TELEPHONE (OUTPATIENT)
Age: 80
End: 2025-05-14

## 2025-05-14 NOTE — TELEPHONE ENCOUNTER
Patient called stating she hasn't had the Lyrica in over a month now.  She has numbness in her left leg down to her feet and her feet are burning as well as her fingers.  She's asking to please look into this and call her back as to what needs to be done so she can get this medication again.  Below is a message from [facility] on 4/30.      Valentine from the prior auth dept at [facility] called. She needs more information regarding the Lyrica. She needs the specific diagnosis of neuropathic pain and also if the patient tried other medications and they failed. She said this is time sensitive and a response is needed by May 1 at 6:30am CST or the prior auth will be denied. The Reference# is [id number]. [facility] will need a call back at [phone] or fax# [phone] with the above requested information.    No/Designates Katya Caballero

## 2025-05-21 NOTE — TELEPHONE ENCOUNTER
Rx Refill Request Telephone Encounter    Name:  Sydney Juares  :  [id number]  Medication Name:  sumatriptan 100mg  take as needed to abort migraine, can take another 100mg 2 hours later if needed--erx'd             Specific Pharmacy location:  [address]  Date of last appointment:  25  Date of next appointment:      Best number to reach patient:               She is not picking up and not sure if she called me back because nothing is documented   Is it ok to save this until her f/u in two weeks

## 2025-05-25 DIAGNOSIS — E11.40 CONTROLLED TYPE 2 DIABETES WITH NEUROPATHY (HCC): ICD-10-CM

## 2025-05-27 RX ORDER — SITAGLIPTIN AND METFORMIN HYDROCHLORIDE 1000; 100 MG/1; MG/1
1 TABLET, FILM COATED, EXTENDED RELEASE ORAL
Qty: 30 TABLET | Refills: 5 | Status: SHIPPED | OUTPATIENT
Start: 2025-05-27

## 2025-06-02 DIAGNOSIS — F51.01 PRIMARY INSOMNIA: ICD-10-CM

## 2025-06-02 NOTE — TELEPHONE ENCOUNTER
Medication: eszopiclone (LUNESTA) 2 mg tablet     Dose/Frequency: TAKE 1 TABLET BY MOUTH AT BEDTIME IMMEDIATELY BEFORE BEDTIME AS NEEDED FOR SLEEP     Quantity: 30 tablets    Pharmacy: [facility]    Office:   [x] PCP/Provider -   [] Speciality/Provider -     Does the patient have enough for 3 days?   [] Yes   [x] No - Send as HP to POD

## 2025-06-03 RX ORDER — ESZOPICLONE 2 MG/1
TABLET, FILM COATED ORAL
Qty: 30 TABLET | Refills: 0 | Status: SHIPPED | OUTPATIENT
Start: 2025-06-03

## 2025-06-07 DIAGNOSIS — F41.8 DEPRESSION WITH ANXIETY: ICD-10-CM

## 2025-06-10 DIAGNOSIS — M25.512 ACUTE PAIN OF LEFT SHOULDER: ICD-10-CM

## 2025-06-10 RX ORDER — TRAMADOL HYDROCHLORIDE 50 MG/1
50 TABLET ORAL EVERY 6 HOURS PRN
Qty: 30 TABLET | Refills: 0 | Status: SHIPPED | OUTPATIENT
Start: 2025-06-10

## 2025-06-10 NOTE — TELEPHONE ENCOUNTER
Pt called in requesting med refill of the following medication b/c she is out of it. Reports she has been giving some to her daughter Codie for her pain.     Requested Prescriptions     Pending Prescriptions Disp Refills    traMADol (Ultram) 50 mg tablet 30 tablet 0     Sig: Take 1 tablet (50 mg total) by mouth every 6 (six) hours as needed for severe pain     Script going to the following pharmacy:  [facility]/pharmacy #[id number] - [place] - [address]) [phone]     Please advise & call pt back with update. Thank you!    Pebbles: [phone]    no

## 2025-07-01 ENCOUNTER — RA CDI HCC (OUTPATIENT)
Dept: OTHER | Facility: HOSPITAL | Age: 80
End: 2025-07-01

## 2025-07-02 NOTE — PROGRESS NOTES
HCC coding opportunities    J44.89     Chart Reviewed number of suggestions sent to Provider: 1     Patients Insurance     Medicare Insurance: [employer]vantage

## 2025-07-03 ENCOUNTER — APPOINTMENT (OUTPATIENT)
Dept: RADIOLOGY | Facility: CLINIC | Age: 80
End: 2025-07-03
Payer: COMMERCIAL

## 2025-07-03 ENCOUNTER — APPOINTMENT (OUTPATIENT)
Dept: RADIOLOGY | Facility: CLINIC | Age: 80
End: 2025-07-03
Attending: PHYSICIAN ASSISTANT
Payer: COMMERCIAL

## 2025-07-03 ENCOUNTER — OFFICE VISIT (OUTPATIENT)
Dept: URGENT CARE | Facility: CLINIC | Age: 80
End: 2025-07-03
Payer: COMMERCIAL

## 2025-07-03 ENCOUNTER — APPOINTMENT (OUTPATIENT)
Dept: LAB | Facility: CLINIC | Age: 80
End: 2025-07-03
Payer: COMMERCIAL

## 2025-07-03 VITALS
HEART RATE: 59 BPM | BODY MASS INDEX: 38.08 KG/M2 | WEIGHT: 195 LBS | RESPIRATION RATE: 18 BRPM | SYSTOLIC BLOOD PRESSURE: 132 MMHG | DIASTOLIC BLOOD PRESSURE: 56 MMHG | TEMPERATURE: 97.8 F | OXYGEN SATURATION: 98 %

## 2025-07-03 DIAGNOSIS — E55.9 HYPOVITAMINOSIS D: ICD-10-CM

## 2025-07-03 DIAGNOSIS — M25.561 ACUTE PAIN OF RIGHT KNEE: ICD-10-CM

## 2025-07-03 DIAGNOSIS — E11.40 CONTROLLED TYPE 2 DIABETES WITH NEUROPATHY (HCC): ICD-10-CM

## 2025-07-03 DIAGNOSIS — S83.91XA SPRAIN OF RIGHT KNEE, UNSPECIFIED LIGAMENT, INITIAL ENCOUNTER: Primary | ICD-10-CM

## 2025-07-03 LAB
ALBUMIN SERPL BCG-MCNC: 4.2 G/DL (ref 3.5–5)
ALP SERPL-CCNC: 56 U/L (ref 34–104)
ALT SERPL W P-5'-P-CCNC: 9 U/L (ref 7–52)
ANION GAP SERPL CALCULATED.3IONS-SCNC: 9 MMOL/L (ref 4–13)
AST SERPL W P-5'-P-CCNC: 19 U/L (ref 13–39)
BILIRUB SERPL-MCNC: 0.37 MG/DL (ref 0.2–1)
BUN SERPL-MCNC: 15 MG/DL (ref 5–25)
CALCIUM SERPL-MCNC: 9 MG/DL (ref 8.4–10.2)
CHLORIDE SERPL-SCNC: 106 MMOL/L (ref 96–108)
CO2 SERPL-SCNC: 24 MMOL/L (ref 21–32)
CREAT SERPL-MCNC: 0.89 MG/DL (ref 0.6–1.3)
GFR SERPL CREATININE-BSD FRML MDRD: 62 ML/MIN/1.73SQ M
GLUCOSE P FAST SERPL-MCNC: 93 MG/DL (ref 65–99)
POTASSIUM SERPL-SCNC: 5.1 MMOL/L (ref 3.5–5.3)
PROT SERPL-MCNC: 7.3 G/DL (ref 6.4–8.4)
SODIUM SERPL-SCNC: 139 MMOL/L (ref 135–147)

## 2025-07-03 PROCEDURE — 99214 OFFICE O/P EST MOD 30 MIN: CPT | Performed by: PHYSICIAN ASSISTANT

## 2025-07-03 PROCEDURE — 73564 X-RAY EXAM KNEE 4 OR MORE: CPT

## 2025-07-03 PROCEDURE — 36415 COLL VENOUS BLD VENIPUNCTURE: CPT

## 2025-07-03 PROCEDURE — G0463 HOSPITAL OUTPT CLINIC VISIT: HCPCS | Performed by: PHYSICIAN ASSISTANT

## 2025-07-03 PROCEDURE — 80053 COMPREHEN METABOLIC PANEL: CPT

## 2025-07-03 NOTE — PROGRESS NOTES
Patient Name: Pebbles Landon      : 1945      MRN: [id number]  Encounter Provider: Junie Tapia PA-C  Encounter Date: July 3, 2025  Encounter department: [facility]    Assessment & Plan  Sprain of right knee, unspecified ligament, initial encounter    Orders:    XR knee 4+ vw right injury; Future    Ambulatory Referral to Orthopedic Surgery; Future  - Preliminary x-ray read: X-rays look negative for acute osseous abnormality/fracture. Awaiting final read from radiologist.  - Referred to orthopedics for possible soft tissue injury     Patient Instructions:   Patient Instructions   Joint Pain     Medications to help with pain   Take ibuprofen or acetaminophen as directed on the packaging (it helps to start taking it around-the-clock at first to get ahead of the pain, then to take as needed)  Ice and elevate the injured area 4 times daily for 20-30 minutes each time for the next 3-4 days, then convert to warm compresses in the same regimen  Other measures to take to help with healing   Perform range of motion exercises 5-10 reps each at least 4 times per day so the joint does not get stiff   Limit your physical activity with the affected joint. Doing too much too fast is the easiest way to continue to have pain  Follow up with Orthopedics     Subjective   Chief Complaint   Patient presents with    Knee Pain     Pt here for right knee and was a board lose on steps feel and knee popped happened last and hard to walk felt like a cramp at first and has been using ace bandage and using cream         Pebbles Landon is a 80 y.o.female  Patient presents with right knee pain x last night. She states she fell on a loose board on the steps and her knee popped. She has been using an ace bandage and NSAID cream.         Review of Systems   Constitutional:  Negative for chills, fatigue and fever.   HENT:  Negative for congestion, ear pain, postnasal drip, rhinorrhea, sinus pressure,  sinus pain and sore throat.    Eyes:  Negative for pain and visual disturbance.   Respiratory:  Negative for cough, chest tightness and shortness of breath.    Cardiovascular:  Negative for chest pain and palpitations.   Gastrointestinal:  Negative for abdominal pain, diarrhea, nausea and vomiting.   Genitourinary:  Negative for dysuria and hematuria.   Musculoskeletal:  Positive for arthralgias and gait problem. Negative for back pain and myalgias.   Skin:  Negative for rash.   Neurological:  Negative for dizziness, seizures, syncope, numbness and headaches.   All other systems reviewed and are negative.      Current Medications[1]  Allergies as of 07/03/2025 - Reviewed 07/03/2025   Allergen Reaction Noted    Ciprofloxacin Itching 10/07/2005    Levaquin [levofloxacin] Swelling 08/13/2017    Milk (cow) GI Intolerance 04/21/2022    Penicillins GI Intolerance 08/13/2017    Pork allergy - food allergy GI Intolerance 04/23/2022    Shellfish allergy - food allergy GI Intolerance 04/21/2022    Soy allergy - food allergy GI Intolerance 04/21/2022    Wheat bran - food allergy GI Intolerance 04/21/2022     Past Medical History[2]  Past Surgical History[3]  Family History[4]     Objective   /56   Pulse 59   Temp 97.8 °F (36.6 °C) (Tympanic)   Resp 18   Wt 88.5 kg (195 lb)   SpO2 98%   BMI 38.08 kg/m²      Physical Exam  Vitals and nursing note reviewed.   Constitutional:       Appearance: Normal appearance. She is normal weight.   HENT:      Head: Normocephalic and atraumatic.     Cardiovascular:      Rate and Rhythm: Normal rate.   Pulmonary:      Effort: Pulmonary effort is normal.     Musculoskeletal:      Right knee: Swelling present. Decreased range of motion. Tenderness present over the lateral joint line.     Skin:     General: Skin is warm and dry.     Neurological:      General: No focal deficit present.      Mental Status: She is alert and oriented to person, place, and time.      Gait: Gait abnormal  (antalgic, ambulating with cane).     Psychiatric:         Mood and Affect: Mood normal.         Behavior: Behavior normal.            [1]   Current Outpatient Medications:     acetaminophen (TYLENOL) 325 mg tablet, Take 2 tablets (650 mg total) by mouth every 4 (four) hours as needed for mild pain, Disp: , Rfl: 0    albuterol (2.5 mg/3 mL) 0.083 % nebulizer solution, Take 3 mL (2.5 mg total) by nebulization every 6 (six) hours as needed for wheezing or shortness of breath, Disp: 100 mL, Rfl: 5    albuterol (PROVENTIL HFA,VENTOLIN HFA) 90 mcg/act inhaler, INHALE 1 PUFF BY MOUTH EVERY 6 HOURS AS NEEDED FOR WHEEZE, Disp: 18 g, Rfl: 3    amLODIPine (NORVASC) 10 mg tablet, TAKE 1 TABLET BY MOUTH DAILY AT BEDTIME, Disp: 90 tablet, Rfl: 1    carvedilol (COREG) 6.25 mg tablet, TAKE 1 TABLET BY MOUTH TWICE A DAY WITH MEALS, Disp: 180 tablet, Rfl: 1    Diclofenac Sodium (VOLTAREN) 1 %, Apply 2 g topically 4 (four) times a day, Disp: 350 g, Rfl: 1    ergocalciferol (VITAMIN D2) 50,000 units, TAKE 1 CAPSULE (50,000 UNITS TOTAL) BY MOUTH 2 (TWO) TIMES A WEEK FOR 16 DOSES...NOT COVERED, Disp: 16 capsule, Rfl: 0    eszopiclone (LUNESTA) 2 mg tablet, TAKE 1 TABLET BY MOUTH AT BEDTIME IMMEDIATELY BEFORE BEDTIME AS NEEDED FOR SLEEP, Disp: 30 tablet, Rfl: 0    fluticasone-salmeterol (AIRDUO RESPICLICK) 113-14 mcg/act dry powder inhaler, INHALE 1 PUFF 2 TIMES A DAY RINSE MOUTH AFTER USE., Disp: 1 each, Rfl: 3    glucose blood test strip, , Disp: , Rfl:     hydrocortisone 2.5 % cream, APPLY TO AFFECTED AREA TWICE A DAY, Disp: 28.35 g, Rfl: 5    Janumet -1000 MG TB24, TAKE 1 TABLET BY MOUTH EVERY DAY WITH DINNER, Disp: 30 tablet, Rfl: 5    latanoprost (XALATAN) 0.005 % ophthalmic solution, , Disp: , Rfl:     lidocaine (Lidoderm) 5 %, Apply 1 patch topically over 12 hours daily Remove & Discard patch within 12 hours or as directed by MD, Disp: 30 patch, Rfl: 1    LORazepam (ATIVAN) 1 mg tablet, Take 1 tablet (1 mg total) by mouth  daily at bedtime, Disp: 10 tablet, Rfl: 0    Lumigan 0.01 % ophthalmic drops, , Disp: , Rfl:     Lyrica  MG TB24, TAKE 1 TABLET BY MOUTH TWICE A DAY, Disp: 60 tablet, Rfl: 0    methylPREDNISolone 4 MG tablet therapy pack, Use as directed on package, Disp: 21 each, Rfl: 0    montelukast (SINGULAIR) 10 mg tablet, TAKE 1 TABLET BY MOUTH EVERYDAY AT BEDTIME, Disp: 90 tablet, Rfl: 1    sertraline (ZOLOFT) 50 mg tablet, TAKE 1 AND 1/2 TABLETS BY MOUTH DAILY, Disp: 45 tablet, Rfl: 5    traMADol (Ultram) 50 mg tablet, Take 1 tablet (50 mg total) by mouth every 6 (six) hours as needed for severe pain, Disp: 30 tablet, Rfl: 0    mupirocin (BACTROBAN) 2 % ointment, Apply topically 3 (three) times a day for 10 days, Disp: 30 g, Rfl: 1    ondansetron (Zofran ODT) 4 mg disintegrating tablet, Take 1 tablet (4 mg total) by mouth every 6 (six) hours as needed for nausea or vomiting (Patient not taking: Reported on 7/3/2025), Disp: 25 tablet, Rfl: 0    promethazine-dextromethorphan (PHENERGAN-DM) 6.25-15 mg/5 mL oral syrup, Take 5 mL by mouth 4 (four) times a day as needed for cough (Patient not taking: Reported on 7/3/2025), Disp: 118 mL, Rfl: 0    sucralfate (CARAFATE) 1 g tablet, Take 1 tablet (1 g total) by mouth 4 (four) times a day for 14 days, Disp: 56 tablet, Rfl: 0  [2]   Past Medical History:  Diagnosis Date    Asthma     Benign hypertension 2018    Cardiac arrest (HCC)     Diabetes mellitus (HCC)     Glaucoma     Hypertension     Kidney stone     Neuropathy     Sleep apnea     no machine    SOB (shortness of breath)     Tubular adenoma of colon 10/2019    Wheezing    [3]   Past Surgical History:  Procedure Laterality Date     SECTION      COLONOSCOPY      HYSTERECTOMY      IR BIOPSY BONE MARROW  2022    TONSILLECTOMY     [4]   Family History  Problem Relation Name Age of Onset    Diabetes Mother      Hypertension Father      Prostate cancer Father      Diabetes Sister      Hypertension Sister       Breast cancer Sister  58    No Known Problems Sister      No Known Problems Daughter      No Known Problems Daughter      No Known Problems Daughter      No Known Problems Maternal Grandmother      No Known Problems Paternal Grandmother      Diabetes Brother      Hypertension Brother      No Known Problems Maternal Aunt      Breast cancer Maternal Aunt  62    No Known Problems Maternal Aunt      No Known Problems Maternal Aunt      Pancreatic cancer Paternal Aunt      No Known Problems Paternal Aunt      No Known Problems Paternal Aunt      Asthma Family      Colon cancer Neg Hx      Ovarian cancer Neg Hx      Uterine cancer Neg Hx      Cervical cancer Neg Hx

## 2025-07-03 NOTE — PATIENT INSTRUCTIONS
Joint Pain     Medications to help with pain   Take ibuprofen or acetaminophen as directed on the packaging (it helps to start taking it around-the-clock at first to get ahead of the pain, then to take as needed)  Ice and elevate the injured area 4 times daily for 20-30 minutes each time for the next 3-4 days, then convert to warm compresses in the same regimen  Other measures to take to help with healing   Perform range of motion exercises 5-10 reps each at least 4 times per day so the joint does not get stiff   Limit your physical activity with the affected joint. Doing too much too fast is the easiest way to continue to have pain  Follow up with Orthopedics

## 2025-07-05 DIAGNOSIS — J45.990 EXERCISE INDUCED BRONCHOSPASM: ICD-10-CM

## 2025-07-05 DIAGNOSIS — R06.09 DOE (DYSPNEA ON EXERTION): ICD-10-CM

## 2025-07-05 DIAGNOSIS — F51.01 PRIMARY INSOMNIA: ICD-10-CM

## 2025-07-06 RX ORDER — MONTELUKAST SODIUM 10 MG/1
10 TABLET ORAL
Qty: 90 TABLET | Refills: 1 | Status: SHIPPED | OUTPATIENT
Start: 2025-07-06

## 2025-07-08 RX ORDER — ESZOPICLONE 2 MG/1
TABLET, FILM COATED ORAL
Qty: 30 TABLET | Refills: 0 | Status: SHIPPED | OUTPATIENT
Start: 2025-07-08

## 2025-07-10 ENCOUNTER — OFFICE VISIT (OUTPATIENT)
Dept: FAMILY MEDICINE CLINIC | Facility: CLINIC | Age: 80
End: 2025-07-10
Payer: COMMERCIAL

## 2025-07-10 ENCOUNTER — APPOINTMENT (OUTPATIENT)
Dept: LAB | Facility: CLINIC | Age: 80
End: 2025-07-10
Payer: COMMERCIAL

## 2025-07-10 VITALS
BODY MASS INDEX: 37.69 KG/M2 | HEART RATE: 70 BPM | DIASTOLIC BLOOD PRESSURE: 70 MMHG | TEMPERATURE: 97.5 F | RESPIRATION RATE: 16 BRPM | HEIGHT: 60 IN | WEIGHT: 192 LBS | OXYGEN SATURATION: 94 % | SYSTOLIC BLOOD PRESSURE: 130 MMHG

## 2025-07-10 DIAGNOSIS — J44.9 CHRONIC OBSTRUCTIVE PULMONARY DISEASE, UNSPECIFIED COPD TYPE (HCC): ICD-10-CM

## 2025-07-10 DIAGNOSIS — F41.8 DEPRESSION WITH ANXIETY: ICD-10-CM

## 2025-07-10 DIAGNOSIS — E11.40 CONTROLLED TYPE 2 DIABETES WITH NEUROPATHY (HCC): ICD-10-CM

## 2025-07-10 DIAGNOSIS — M25.561 ACUTE PAIN OF RIGHT KNEE: Primary | ICD-10-CM

## 2025-07-10 DIAGNOSIS — N18.31 TYPE 2 DIABETES MELLITUS WITH STAGE 3A CHRONIC KIDNEY DISEASE, WITHOUT LONG-TERM CURRENT USE OF INSULIN (HCC): ICD-10-CM

## 2025-07-10 DIAGNOSIS — I10 PRIMARY HYPERTENSION: ICD-10-CM

## 2025-07-10 DIAGNOSIS — E11.22 TYPE 2 DIABETES MELLITUS WITH STAGE 3A CHRONIC KIDNEY DISEASE, WITHOUT LONG-TERM CURRENT USE OF INSULIN (HCC): ICD-10-CM

## 2025-07-10 LAB
CREAT UR-MCNC: 49.2 MG/DL
MICROALBUMIN UR-MCNC: <7 MG/L

## 2025-07-10 PROCEDURE — 82570 ASSAY OF URINE CREATININE: CPT

## 2025-07-10 PROCEDURE — 82043 UR ALBUMIN QUANTITATIVE: CPT

## 2025-07-10 PROCEDURE — 99214 OFFICE O/P EST MOD 30 MIN: CPT | Performed by: NURSE PRACTITIONER

## 2025-07-10 NOTE — ASSESSMENT & PLAN NOTE
HISTORY OF PRESENT ILLNESS     HPI    This is a 41 year old male who presents today for a complete physical exam.    He is a smoker, 1/2 pack per day currently. Chantix worked very well and he was able to quit for 6 months. He unfortunately restarted about 3 months ago and wishes to stop again. He started back on Chantix with good effect so far. He does admit to difficulty sleeping if he takes two pills per day so he usually only takes 1 pill in the morning. It causes nausea as well but this is tolerable.    His blood pressure has been high at times, typically when he donates blood as he is afraid of needles. His salt intake is moderate. He was taking ibuprofen and when he stopped it his blood pressure improved. He has a family history of hypertension. Only his diastolic is elevated today.    Blood pressure 124/86, pulse 82, height 5' 10.75\" (1.797 m), weight 89.2 kg.    He admits to some depressive thoughts regarding the pandemic and not leaving his house much. He denies any suicidal ideations. He does not feel that he needs to be medicated for this yet.    PREVENTIVE HEALTH:    Diet:  poor lately  Exercise:  minimal    Last lipid testing:  HDL low otherwise normal    Lab Results   Component Value Date    CHOLESTEROL 156 01/05/2020    CHOLESTEROL 152 09/14/2019    CALCLDL 96 01/05/2020    CALCLDL 95 09/14/2019    HDL 38 (L) 01/05/2020    HDL 38 (L) 09/14/2019    TRIGLYCERIDE 111 01/05/2020    TRIGLYCERIDE 94 09/14/2019       Last glucose:  normal    Lab Results   Component Value Date    GLUCOSE 85 01/05/2020    GLUCOSE 84 09/14/2019       Immunizations:  TDaP/Td:  up to date  Pneumococcal:  due and will be given today  Influenza:  due and refuses    Immunization History   Administered Date(s) Administered   • TD Adult, Adsorbed 06/26/2006   • Td:Adult type tetanus/diphtheria 06/26/2006   • Tdap 09/16/2019       Advance Directive:   The patient has the following documents:  No Advance Directives on file. Patient    Lab Results   Component Value Date    HGBA1C 7.0 (A) 01/09/2025     Hemoglobin A1c is stable.  Blood work ordered.  Continue medication continue low carbohydrate diet.  Up-to-date.  Eye exam.  Foot exam completed in office  Orders:    Hemoglobin A1C     offered documents.    Recent PHQ 2/9 Score    PHQ 2:  Date Adult PHQ 2 Score Adult PHQ 2 Interpretation   1/15/2021 3 Further screening needed       PHQ 9:  Date Adult PHQ 9 Score Adult PHQ 9 Interpretation   1/15/2021 10 Moderate Depression       DEPRESSION ASSESSMENT/PLAN:  Situational depression.  Anticipate spontaneous resolution.  Will reassess in 12 months.     Body mass index is 27.61 kg/m².    BMI ASSESSMENT/PLAN:  Patient is overweight.    30-45 minutes of physical activity a day and caloric restriction advised      PAST MEDICAL, FAMILY AND SOCIAL HISTORY     I have reviewed the patient's medications and allergies, past medical, surgical, social and family history, updating these as appropriate.  See Histories section of the EMR for a display of this information.    REVIEW OF SYSTEMS     Review of Systems   All other systems reviewed and are negative.    PHYSICAL EXAM     Physical Exam  Vitals signs and nursing note reviewed.   Constitutional:       General: He is not in acute distress.     Appearance: Normal appearance. He is well-developed. He is not ill-appearing or diaphoretic.   HENT:      Head: Normocephalic and atraumatic.      Right Ear: Tympanic membrane, ear canal and external ear normal.      Left Ear: Tympanic membrane, ear canal and external ear normal.      Nose: Nose normal. No congestion or rhinorrhea.      Mouth/Throat:      Lips: Pink.      Mouth: Mucous membranes are moist. No oral lesions.      Pharynx: Oropharynx is clear. No oropharyngeal exudate or posterior oropharyngeal erythema.   Eyes:      General: Lids are normal. No scleral icterus.        Right eye: No discharge.         Left eye: No discharge.      Extraocular Movements: Extraocular movements intact.      Conjunctiva/sclera: Conjunctivae normal.      Pupils: Pupils are equal, round, and reactive to light.   Neck:      Musculoskeletal: Neck supple.      Thyroid: No thyroid mass or thyromegaly.      Vascular: No carotid bruit  or JVD.      Trachea: Trachea normal.   Cardiovascular:      Rate and Rhythm: Normal rate and regular rhythm.      Pulses: Normal pulses.      Heart sounds: Normal heart sounds. No murmur. No friction rub. No gallop.    Pulmonary:      Effort: Pulmonary effort is normal.      Breath sounds: Normal breath sounds. No wheezing or rales.   Chest:      Chest wall: No tenderness.   Abdominal:      General: Bowel sounds are normal. There is no distension.      Palpations: Abdomen is soft. Abdomen is not rigid. There is no hepatomegaly, splenomegaly or mass.      Tenderness: There is no abdominal tenderness. There is no guarding or rebound.      Hernia: A hernia is present. Hernia is present in the right inguinal area (probable). There is no hernia in the left inguinal area.   Genitourinary:     Penis: Normal. No discharge.       Scrotum/Testes: Normal.         Right: Mass, tenderness or swelling not present.         Left: Mass, tenderness or swelling not present.      Epididymis:      Right: Normal.      Left: Normal.   Musculoskeletal: Normal range of motion.         General: No tenderness.      Right lower leg: No edema.      Left lower leg: No edema.   Lymphadenopathy:      Cervical: No cervical adenopathy.   Skin:     General: Skin is warm and dry.      Coloration: Skin is not jaundiced or pale.      Findings: Petechiae (hemosiderin deposits noted on the distal feet and behind the ankles) present. No erythema or rash.      Nails: There is no clubbing.     Neurological:      General: No focal deficit present.      Mental Status: He is alert and oriented to person, place, and time.      Cranial Nerves: Cranial nerves are intact.      Sensory: Sensation is intact.      Motor: Motor function is intact.      Coordination: Coordination is intact.      Gait: Gait is intact.      Deep Tendon Reflexes: Reflexes are normal and symmetric.   Psychiatric:         Attention and Perception: Attention normal.         Mood and Affect:  Mood normal.         Speech: Speech normal.         Behavior: Behavior normal.         Thought Content: Thought content normal.         Cognition and Memory: Cognition normal.       ASSESSMENT/PLAN     ASSESSMENT:  1. Preventative health care    2. Tobacco dependence due to cigarettes      PLAN:  Orders Placed This Encounter   • PNEUMOCOCCAL POLYSACCHARIDE ADULT VACC, IM/SQ (PNEUMOVAX 23)   • Lipid Panel With Reflex   • CBC with Automated Differential   • Comprehensive Metabolic Panel   • PSA   • Ferritin   • varenicline (Chantix Continuing Month David) 1 MG tablet       He was advised to eat a heart healthy diet and to exercise 30-40 minutes 3-4 days a week. All appropriate preventive issues were addressed today. His vaccinations were updated today as noted above.    I advised him to continue with Chantix to help with tobacco cessation.    I advised updated fasting labs now (already ordered) and the labs listed above just prior to his next visit.    Return in about 1 year (around 1/15/2022) for physical, fasting labs just prior to visit.     On 1/15/2021, Alisha OSUNA scribed the services personally performed by Jaskaran Felton DO.    The documentation recorded by the scribe accurately and completely reflects the service(s) Jaskaran OSUNA DO, personally performed and the decisions made by me.

## 2025-07-10 NOTE — PROGRESS NOTES
Name: Pebbles Landon      : 1945      MRN: [id number]  Encounter Provider: ALEKSANDER Manning  Encounter Date: 7/10/2025   Encounter department: [facility]  :  Assessment & Plan  Acute pain of right knee  Continues to have knee pain discussed maintaining safety.  Patient has had several falls.  Continue to use heat muscle rub.  Orders:    Diclofenac Sodium (VOLTAREN) 1 %; Apply 2 g topically 4 (four) times a day    Type 2 diabetes mellitus with stage 3a chronic kidney disease, without long-term current use of insulin ([facility])    Lab Results   Component Value Date    HGBA1C 7.0 (A) 2025     Hemoglobin A1c is stable.  Blood work ordered.  Continue medication continue low carbohydrate diet.  Up-to-date.  Eye exam.  Foot exam completed in office  Orders:    Hemoglobin A1C    Controlled type 2 diabetes with neuropathy ([facility])    Lab Results   Component Value Date    HGBA1C 7.0 (A) 2025            Primary hypertension  Blood pressure is stable.  Continue medication.  Continue heart healthy diet         Chronic obstructive pulmonary disease, unspecified COPD type ([facility])  Patient has daily inhaler, also rescue inhaler.  Discussed management.  Does have follow-up with pulmonology         Depression with anxiety  Depression Screening Follow-up Plan: Patient's depression screening was positive with a PHQ-9 score of 7. Patient with underlying depression and was advised to continue current medications as prescribed.  Has had increased stressors with her daughter being ill.  Discussed self-care continue medication continue nonpharmacological intervention for management.             Depression Screening and Follow-up Plan: Patient's depression screening was positive with a PHQ-9 score of 7.   Patient with underlying depression and was advised to continue current medications as prescribed.       History of Present Illness   Patient is here to follow-up chronic health conditions.  Has had  recent increase stressors with her daughters.  Reports difficulty sleeping at times      Review of Systems   Constitutional: Negative.    HENT: Negative.     Eyes: Negative.    Respiratory: Negative.     Cardiovascular: Negative.    Gastrointestinal: Negative.    Endocrine: Negative.    Genitourinary: Negative.    Musculoskeletal:  Positive for arthralgias.   Skin: Negative.    Allergic/Immunologic: Negative.    Neurological: Negative.    Psychiatric/Behavioral:  Positive for dysphoric mood and sleep disturbance.        Objective   /70 (BP Location: Left arm, Patient Position: Sitting, Cuff Size: Extra-Large)   Pulse 70   Temp 97.5 °F (36.4 °C) (Temporal)   Resp 16   Ht 5' (1.524 m)   Wt 87.1 kg (192 lb)   SpO2 94%   BMI 37.50 kg/m²      Physical Exam  Constitutional:       General: She is not in acute distress.     Appearance: Normal appearance. She is obese. She is not ill-appearing.   HENT:      Head: Normocephalic and atraumatic.      Nose: Nose normal.      Mouth/Throat:      Mouth: Mucous membranes are moist.     Eyes:      Pupils: Pupils are equal, round, and reactive to light.       Cardiovascular:      Rate and Rhythm: Normal rate and regular rhythm.      Pulses: Normal pulses. no weak pulses.           Dorsalis pedis pulses are 2+ on the right side and 2+ on the left side.        Posterior tibial pulses are 2+ on the right side and 2+ on the left side.   Pulmonary:      Effort: Pulmonary effort is normal. No respiratory distress.      Breath sounds: Normal breath sounds.   Chest:      Chest wall: No tenderness.   Abdominal:      General: Bowel sounds are normal. There is no distension.      Palpations: There is no mass.      Tenderness: There is no abdominal tenderness.     Musculoskeletal:         General: Normal range of motion.      Cervical back: Normal range of motion and neck supple.   Feet:      Right foot:      Skin integrity: No ulcer, skin breakdown, erythema, warmth, callus or dry  skin.      Left foot:      Skin integrity: No ulcer, skin breakdown, erythema, warmth, callus or dry skin.     Skin:     General: Skin is warm and dry.     Neurological:      General: No focal deficit present.      Mental Status: She is alert and oriented to person, place, and time.     Psychiatric:         Mood and Affect: Mood normal.         Behavior: Behavior normal.         Thought Content: Thought content normal.         Judgment: Judgment normal.     Diabetic Foot Exam    Patient's shoes and socks removed.    Right Foot/Ankle   Right Foot Inspection  Skin Exam: skin normal and skin intact. No dry skin, no warmth, no callus, no erythema, no maceration, no abnormal color, no pre-ulcer, no ulcer and no callus.     Toe Exam: ROM and strength within normal limits.     Sensory   Vibration: intact  Proprioception: intact  Monofilament testing: intact    Vascular  Capillary refills: < 3 seconds  The right DP pulse is 2+. The right PT pulse is 2+.     Right Toe  - Comprehensive Exam  Ecchymosis: none  Arch: normal  Hammertoes: absent  Claw Toes: absent  Swelling: none   Tenderness: none       Left Foot/Ankle  Left Foot Inspection  Skin Exam: skin normal and skin intact. No dry skin, no warmth, no erythema, no maceration, normal color, no pre-ulcer, no ulcer and no callus.     Toe Exam: ROM and strength within normal limits.     Sensory   Vibration: intact  Proprioception: intact  Monofilament testing: intact    Vascular  Capillary refills: < 3 seconds  The left DP pulse is 2+. The left PT pulse is 2+.     Left Toe  - Comprehensive Exam  Ecchymosis: none  Arch: normal  Hammertoes: absent  Claw toes: absent  Swelling: none   Tenderness: none       Assign Risk Category  No deformity present  No loss of protective sensation  No weak pulses  Risk: 0

## 2025-07-15 PROBLEM — K63.9 BOWEL TROUBLE: Status: RESOLVED | Noted: 2017-09-13 | Resolved: 2025-07-15

## 2025-07-15 NOTE — ASSESSMENT & PLAN NOTE
Patient has daily inhaler, also rescue inhaler.  Discussed management.  Does have follow-up with pulmonology

## 2025-07-23 ENCOUNTER — OFFICE VISIT (OUTPATIENT)
Dept: OBGYN CLINIC | Facility: CLINIC | Age: 80
End: 2025-07-23
Attending: PHYSICIAN ASSISTANT
Payer: COMMERCIAL

## 2025-07-23 VITALS — HEIGHT: 60 IN | RESPIRATION RATE: 18 BRPM | BODY MASS INDEX: 37.5 KG/M2 | WEIGHT: 191 LBS

## 2025-07-23 DIAGNOSIS — M47.816 LUMBAR FACET ARTHROPATHY: ICD-10-CM

## 2025-07-23 DIAGNOSIS — R19.8 ABDOMINAL WEAKNESS: ICD-10-CM

## 2025-07-23 DIAGNOSIS — M62.9 HAMSTRING TIGHTNESS OF BOTH LOWER EXTREMITIES: ICD-10-CM

## 2025-07-23 DIAGNOSIS — R29.898 WEAKNESS OF BOTH HIPS: ICD-10-CM

## 2025-07-23 DIAGNOSIS — M51.362 DEGENERATION OF INTERVERTEBRAL DISC OF LUMBAR REGION WITH DISCOGENIC BACK PAIN AND LOWER EXTREMITY PAIN: ICD-10-CM

## 2025-07-23 DIAGNOSIS — M22.2X1 PATELLOFEMORAL SYNDROME OF BOTH KNEES: ICD-10-CM

## 2025-07-23 DIAGNOSIS — M54.16 LUMBAR RADICULOPATHY: Primary | ICD-10-CM

## 2025-07-23 DIAGNOSIS — E11.40 CONTROLLED TYPE 2 DIABETES WITH NEUROPATHY (HCC): ICD-10-CM

## 2025-07-23 DIAGNOSIS — I16.0 HYPERTENSIVE URGENCY: ICD-10-CM

## 2025-07-23 DIAGNOSIS — M51.26 PROTRUSION OF LUMBAR INTERVERTEBRAL DISC: ICD-10-CM

## 2025-07-23 DIAGNOSIS — M22.2X2 PATELLOFEMORAL SYNDROME OF BOTH KNEES: ICD-10-CM

## 2025-07-23 PROCEDURE — 99214 OFFICE O/P EST MOD 30 MIN: CPT | Performed by: FAMILY MEDICINE

## 2025-07-23 RX ORDER — PREDNISONE 20 MG/1
20 TABLET ORAL 2 TIMES DAILY WITH MEALS
Qty: 10 TABLET | Refills: 0 | Status: SHIPPED | OUTPATIENT
Start: 2025-07-23 | End: 2025-07-28

## 2025-07-23 NOTE — PROGRESS NOTES
Name: Pebbles Landon      : 1945      MRN: [id number]  Encounter Provider: Jon Hilliard DO  Encounter Date: 2025   Encounter department: [facility] [place]  :  Assessment & Plan  Lumbar radiculopathy  80-year-old female with right lower extremity pain and numbness 1 month duration.  MRI lumbar spine noted for:  -L3-L4: Facet arthrosis. No significant canal stenosis or foraminal narrowing.  -L4-L5: Mild uncovering of the disc space. Facet arthrosis. No significant canal stenosis. Mild foraminal encroachment.  -L5-S1: Facet arthrosis. Right paracentral disc protrusion. Mild mass effect on the thecal sac. Mild foraminal encroachment.  Clinical impression is that she has symptoms from aggravation of lumbar spine pathology contribute to radiculopathy in the right lower extremity.  I advised patient arthritis in the knee may be only minor contributing factor.  I discussed treatment regimen of anti-inflammatory and formal therapy.  Surgery is not warranted.  Take prednisone 20 mg twice daily with food for 5 days.  I advised patient that while on oral steroid blood sugars are likely elevated.  I will refer her to aqua therapy which she is to start as soon as possible and do home exercises as directed.  Follow-up if no improvement with 1 month of formal therapy.  Orders:    Ambulatory Referral to Orthopedic Surgery    predniSONE 20 mg tablet; Take 1 tablet (20 mg total) by mouth 2 (two) times a day with meals for 5 days    Ambulatory Referral to Physical Therapy; Future    Protrusion of lumbar intervertebral disc  80-year-old female with right lower extremity pain and numbness 1 month duration.  MRI lumbar spine noted for:  -L3-L4: Facet arthrosis. No significant canal stenosis or foraminal narrowing.  -L4-L5: Mild uncovering of the disc space. Facet arthrosis. No significant canal stenosis. Mild foraminal encroachment.  -L5-S1: Facet arthrosis. Right  paracentral disc protrusion. Mild mass effect on the thecal sac. Mild foraminal encroachment.  Clinical impression is that she has symptoms from aggravation of lumbar spine pathology contribute to radiculopathy in the right lower extremity.  I advised patient arthritis in the knee may be only minor contributing factor.  I discussed treatment regimen of anti-inflammatory and formal therapy.  Surgery is not warranted.  Take prednisone 20 mg twice daily with food for 5 days.  I advised patient that while on oral steroid blood sugars are likely elevated.  I will refer her to aqua therapy which she is to start as soon as possible and do home exercises as directed.  Follow-up if no improvement with 1 month of formal therapy.  Orders:    Ambulatory Referral to Physical Therapy; Future    Lumbar facet arthropathy  80-year-old female with right lower extremity pain and numbness 1 month duration.  MRI lumbar spine noted for:  -L3-L4: Facet arthrosis. No significant canal stenosis or foraminal narrowing.  -L4-L5: Mild uncovering of the disc space. Facet arthrosis. No significant canal stenosis. Mild foraminal encroachment.  -L5-S1: Facet arthrosis. Right paracentral disc protrusion. Mild mass effect on the thecal sac. Mild foraminal encroachment.  Clinical impression is that she has symptoms from aggravation of lumbar spine pathology contribute to radiculopathy in the right lower extremity.  I advised patient arthritis in the knee may be only minor contributing factor.  I discussed treatment regimen of anti-inflammatory and formal therapy.  Surgery is not warranted.  Take prednisone 20 mg twice daily with food for 5 days.  I advised patient that while on oral steroid blood sugars are likely elevated.  I will refer her to aqua therapy which she is to start as soon as possible and do home exercises as directed.  Follow-up if no improvement with 1 month of formal therapy.  Orders:    Ambulatory Referral to Physical Therapy;  Future    Degeneration of intervertebral disc of lumbar region with discogenic back pain and lower extremity pain  80-year-old female with right lower extremity pain and numbness 1 month duration.  MRI lumbar spine noted for:  -L3-L4: Facet arthrosis. No significant canal stenosis or foraminal narrowing.  -L4-L5: Mild uncovering of the disc space. Facet arthrosis. No significant canal stenosis. Mild foraminal encroachment.  -L5-S1: Facet arthrosis. Right paracentral disc protrusion. Mild mass effect on the thecal sac. Mild foraminal encroachment.  Clinical impression is that she has symptoms from aggravation of lumbar spine pathology contribute to radiculopathy in the right lower extremity.  I advised patient arthritis in the knee may be only minor contributing factor.  I discussed treatment regimen of anti-inflammatory and formal therapy.  Surgery is not warranted.  Take prednisone 20 mg twice daily with food for 5 days.  I advised patient that while on oral steroid blood sugars are likely elevated.  I will refer her to aqua therapy which she is to start as soon as possible and do home exercises as directed.  Follow-up if no improvement with 1 month of formal therapy.  Orders:    Ambulatory Referral to Physical Therapy; Future    Patellofemoral syndrome of both knees    Orders:    Ambulatory Referral to Physical Therapy; Future    Weakness of both hips    Orders:    Ambulatory Referral to Physical Therapy; Future    Hamstring tightness of both lower extremities    Orders:    Ambulatory Referral to Physical Therapy; Future    Abdominal weakness    Orders:    Ambulatory Referral to Physical Therapy; Future        History of Present Illness   Chief Complaint   Patient presents with    Right Leg - Pain, Numbness      HPI  Pebbles Landon is a 80 y.o. female who presents for evaluation of right lower extremity pain and numbness 1 month duration.  She reports onset of symptoms after going down the stairs at home when  her knee gave out and she felt a pop in the knee.  She had pain described as sudden in onset, generalized to the knee, achy and burning, worse with activity, and improved with resting.  She started experiencing pain radiating proximally to hip and low back and distally to the calf, achy and burning, associated numbness and tingling, associate with stiffness that is usually worse in the morning, and improved with warm up activity.  She was seen by primary care provider and advised use of topical diclofenac.  She denies bowel or bladder incontinence.    History obtained from: patient    Review of Systems   Musculoskeletal:  Positive for arthralgias. Negative for joint swelling.   Neurological:  Positive for numbness. Negative for weakness.          Objective   Resp 18   Ht 5' (1.524 m)   Wt 86.6 kg (191 lb)   BMI 37.30 kg/m²      Physical Exam  Vitals and nursing note reviewed.   Constitutional:       Appearance: Normal appearance. She is well-developed. She is not ill-appearing or diaphoretic.   HENT:      Head: Normocephalic and atraumatic.      Right Ear: External ear normal.      Left Ear: External ear normal.     Eyes:      Conjunctiva/sclera: Conjunctivae normal.     Neck:      Trachea: No tracheal deviation.   Pulmonary:      Effort: Pulmonary effort is normal. No respiratory distress.   Abdominal:      General: There is no distension.     Musculoskeletal:         General: Tenderness present.      Lumbar back: Negative right straight leg raise test and negative left straight leg raise test.     Skin:     General: Skin is warm and dry.      Coloration: Skin is not jaundiced or pale.     Neurological:      Mental Status: She is alert and oriented to person, place, and time.     Psychiatric:         Mood and Affect: Mood normal.         Behavior: Behavior normal.         Thought Content: Thought content normal.         Judgment: Judgment normal.       Right Ankle Exam     Muscle Strength   Dorsiflexion:   5/5  Plantar flexion:  5/5       Left Ankle Exam     Muscle Strength   Dorsiflexion:  5/5   Plantar flexion:  5/5       Right Hip Exam     Muscle Strength   Flexion: 4/5     Tests   RADHA: negative    Comments:  Negative FADDIR  Hamstring tightness      Left Hip Exam     Muscle Strength   Flexion: 4/5     Tests   RADHA: negative    Comments:  Negative FADDIR  Hamstring tightness      Back Exam     Range of Motion   Extension:  normal   Flexion:  normal   Lateral bend right:  normal   Lateral bend left:  normal   Rotation right:  abnormal   Rotation left:  abnormal     Muscle Strength   Right Quadriceps:  5/5   Left Quadriceps:  5/5     Tests   Straight leg raise right: negative  Straight leg raise left: negative             I have personally reviewed pertinent films in PACS and my interpretation is  .   X-rays right knee unremarkable for acute osseous abnormality.  There is significant medial joint space degenerative change with complete joint space loss with bone-on-bone and genu varum angulation.    Administrative Statements   I have spent a total time of 31 minutes in caring for this patient on the day of the visit/encounter including Diagnostic results, Prognosis, Risks and benefits of tx options, Instructions for management, Impressions, Documenting in the medical record, Reviewing/placing orders in the medical record (including tests, medications, and/or procedures), and Obtaining or reviewing history  .

## 2025-07-23 NOTE — ASSESSMENT & PLAN NOTE
Depression Screening Follow-up Plan: Patient's depression screening was positive with a PHQ-9 score of 7. Patient with underlying depression and was advised to continue current medications as prescribed.  Has had increased stressors with her daughter being ill.  Discussed self-care continue medication continue nonpharmacological intervention for management.

## 2025-07-23 NOTE — ASSESSMENT & PLAN NOTE
80-year-old female with right lower extremity pain and numbness 1 month duration.  MRI lumbar spine noted for:  -L3-L4: Facet arthrosis. No significant canal stenosis or foraminal narrowing.  -L4-L5: Mild uncovering of the disc space. Facet arthrosis. No significant canal stenosis. Mild foraminal encroachment.  -L5-S1: Facet arthrosis. Right paracentral disc protrusion. Mild mass effect on the thecal sac. Mild foraminal encroachment.  Clinical impression is that she has symptoms from aggravation of lumbar spine pathology contribute to radiculopathy in the right lower extremity.  I advised patient arthritis in the knee may be only minor contributing factor.  I discussed treatment regimen of anti-inflammatory and formal therapy.  Surgery is not warranted.  Take prednisone 20 mg twice daily with food for 5 days.  I advised patient that while on oral steroid blood sugars are likely elevated.  I will refer her to aqua therapy which she is to start as soon as possible and do home exercises as directed.  Follow-up if no improvement with 1 month of formal therapy.  Orders:    Ambulatory Referral to Physical Therapy; Future

## 2025-07-23 NOTE — ASSESSMENT & PLAN NOTE
80-year-old female with right lower extremity pain and numbness 1 month duration.  MRI lumbar spine noted for:  -L3-L4: Facet arthrosis. No significant canal stenosis or foraminal narrowing.  -L4-L5: Mild uncovering of the disc space. Facet arthrosis. No significant canal stenosis. Mild foraminal encroachment.  -L5-S1: Facet arthrosis. Right paracentral disc protrusion. Mild mass effect on the thecal sac. Mild foraminal encroachment.  Clinical impression is that she has symptoms from aggravation of lumbar spine pathology contribute to radiculopathy in the right lower extremity.  I advised patient arthritis in the knee may be only minor contributing factor.  I discussed treatment regimen of anti-inflammatory and formal therapy.  Surgery is not warranted.  Take prednisone 20 mg twice daily with food for 5 days.  I advised patient that while on oral steroid blood sugars are likely elevated.  I will refer her to aqua therapy which she is to start as soon as possible and do home exercises as directed.  Follow-up if no improvement with 1 month of formal therapy.  Orders:    Ambulatory Referral to Orthopedic Surgery    predniSONE 20 mg tablet; Take 1 tablet (20 mg total) by mouth 2 (two) times a day with meals for 5 days    Ambulatory Referral to Physical Therapy; Future

## 2025-07-23 NOTE — PATIENT INSTRUCTIONS
Rx: Prednisone 20 mg  - twice daily  - eat first  - 5 days  - not before bedtime    Aqua therapy and home exercises

## 2025-07-25 RX ORDER — SITAGLIPTIN AND METFORMIN HYDROCHLORIDE 1000; 100 MG/1; MG/1
1 TABLET, FILM COATED, EXTENDED RELEASE ORAL
Qty: 90 TABLET | Refills: 1 | Status: SHIPPED | OUTPATIENT
Start: 2025-07-25

## 2025-07-25 RX ORDER — AMLODIPINE BESYLATE 10 MG/1
10 TABLET ORAL
Qty: 90 TABLET | Refills: 1 | Status: SHIPPED | OUTPATIENT
Start: 2025-07-25

## 2025-08-11 LAB
LEFT EYE DIABETIC RETINOPATHY: NORMAL
RIGHT EYE DIABETIC RETINOPATHY: NORMAL

## 2025-08-22 ENCOUNTER — TELEPHONE (OUTPATIENT)
Dept: ADMINISTRATIVE | Facility: OTHER | Age: 80
End: 2025-08-22